# Patient Record
Sex: FEMALE | Race: WHITE | NOT HISPANIC OR LATINO | Employment: UNEMPLOYED | URBAN - NONMETROPOLITAN AREA
[De-identification: names, ages, dates, MRNs, and addresses within clinical notes are randomized per-mention and may not be internally consistent; named-entity substitution may affect disease eponyms.]

---

## 2015-06-10 LAB
C TRACH DNA SPEC QL PROBE+SIG AMP: NEGATIVE
N GONORRHOEA DNA SPEC QL PROBE+SIG AMP: NEGATIVE
SPECIMEN DESCRIP: NORMAL
SPECIMEN DESCRIPTION: NORMAL

## 2016-01-22 LAB — PHQ9 SCORE: 27

## 2016-04-07 LAB
ALT SERPL-CCNC: 25 U/L (ref 10–47)
AST SERPL-CCNC: 24 U/L (ref 11–38)
CREAT SERPL-MCNC: 1 MG/DL (ref 0.6–1.2)
GLUCOSE SERPL-MCNC: 84 MG/DL (ref 73–118)
POTASSIUM SERPL-SCNC: 4.6 MMOL/L (ref 3.6–5.1)

## 2016-04-12 LAB — PHQ9 SCORE: 24

## 2017-03-01 ENCOUNTER — TRANSFERRED RECORDS (OUTPATIENT)
Dept: HEALTH INFORMATION MANAGEMENT | Facility: HOSPITAL | Age: 42
End: 2017-03-01

## 2017-03-20 ENCOUNTER — TRANSFERRED RECORDS (OUTPATIENT)
Dept: HEALTH INFORMATION MANAGEMENT | Facility: HOSPITAL | Age: 42
End: 2017-03-20

## 2017-04-03 ENCOUNTER — OFFICE VISIT (OUTPATIENT)
Dept: FAMILY MEDICINE | Facility: OTHER | Age: 42
End: 2017-04-03
Attending: PHYSICIAN ASSISTANT
Payer: COMMERCIAL

## 2017-04-03 VITALS
SYSTOLIC BLOOD PRESSURE: 112 MMHG | OXYGEN SATURATION: 94 % | HEIGHT: 63 IN | HEART RATE: 72 BPM | TEMPERATURE: 97.9 F | WEIGHT: 232 LBS | BODY MASS INDEX: 41.11 KG/M2 | DIASTOLIC BLOOD PRESSURE: 70 MMHG

## 2017-04-03 DIAGNOSIS — F11.21 OPIOID DEPENDENCE IN REMISSION (H): ICD-10-CM

## 2017-04-03 DIAGNOSIS — G89.4 CHRONIC PAIN SYNDROME: ICD-10-CM

## 2017-04-03 DIAGNOSIS — Z71.89 ACP (ADVANCE CARE PLANNING): Chronic | ICD-10-CM

## 2017-04-03 DIAGNOSIS — M17.31 POST-TRAUMATIC OSTEOARTHRITIS OF RIGHT KNEE: Primary | ICD-10-CM

## 2017-04-03 DIAGNOSIS — Z76.89 ESTABLISHING CARE WITH NEW DOCTOR, ENCOUNTER FOR: ICD-10-CM

## 2017-04-03 DIAGNOSIS — E03.9 ACQUIRED HYPOTHYROIDISM: ICD-10-CM

## 2017-04-03 PROBLEM — F33.1 MODERATE EPISODE OF RECURRENT MAJOR DEPRESSIVE DISORDER (H): Status: ACTIVE | Noted: 2017-04-03

## 2017-04-03 PROBLEM — F41.1 GAD (GENERALIZED ANXIETY DISORDER): Status: ACTIVE | Noted: 2017-04-03

## 2017-04-03 PROBLEM — R60.0 BILATERAL EDEMA OF LOWER EXTREMITY: Status: ACTIVE | Noted: 2017-04-03

## 2017-04-03 PROBLEM — F43.10 PTSD (POST-TRAUMATIC STRESS DISORDER): Status: ACTIVE | Noted: 2017-04-03

## 2017-04-03 PROCEDURE — 99214 OFFICE O/P EST MOD 30 MIN: CPT | Performed by: PHYSICIAN ASSISTANT

## 2017-04-03 PROCEDURE — 99213 OFFICE O/P EST LOW 20 MIN: CPT

## 2017-04-03 RX ORDER — BUPRENORPHINE AND NALOXONE 8; 2 MG/1; MG/1
2 FILM, SOLUBLE BUCCAL; SUBLINGUAL DAILY
COMMUNITY
End: 2019-12-13

## 2017-04-03 RX ORDER — LEVOTHYROXINE SODIUM 200 UG/1
200 TABLET ORAL DAILY
COMMUNITY
Start: 2010-11-16 | End: 2017-09-06

## 2017-04-03 RX ORDER — HYDROCHLOROTHIAZIDE 12.5 MG/1
12.5 CAPSULE ORAL DAILY
COMMUNITY
End: 2017-04-24

## 2017-04-03 RX ORDER — BUPROPION HYDROCHLORIDE 150 MG/1
150 TABLET, EXTENDED RELEASE ORAL DAILY
COMMUNITY
End: 2018-05-24

## 2017-04-03 RX ORDER — HYDROXYZINE HYDROCHLORIDE 25 MG/1
25 TABLET, FILM COATED ORAL 4 TIMES DAILY PRN
COMMUNITY

## 2017-04-03 RX ORDER — LAMOTRIGINE 100 MG/1
175 TABLET ORAL 2 TIMES DAILY
COMMUNITY
End: 2019-06-27

## 2017-04-03 ASSESSMENT — PATIENT HEALTH QUESTIONNAIRE - PHQ9: 5. POOR APPETITE OR OVEREATING: MORE THAN HALF THE DAYS

## 2017-04-03 ASSESSMENT — ANXIETY QUESTIONNAIRES
GAD7 TOTAL SCORE: 12
7. FEELING AFRAID AS IF SOMETHING AWFUL MIGHT HAPPEN: NOT AT ALL
5. BEING SO RESTLESS THAT IT IS HARD TO SIT STILL: SEVERAL DAYS
2. NOT BEING ABLE TO STOP OR CONTROL WORRYING: MORE THAN HALF THE DAYS
6. BECOMING EASILY ANNOYED OR IRRITABLE: MORE THAN HALF THE DAYS
1. FEELING NERVOUS, ANXIOUS, OR ON EDGE: NEARLY EVERY DAY
3. WORRYING TOO MUCH ABOUT DIFFERENT THINGS: MORE THAN HALF THE DAYS
IF YOU CHECKED OFF ANY PROBLEMS ON THIS QUESTIONNAIRE, HOW DIFFICULT HAVE THESE PROBLEMS MADE IT FOR YOU TO DO YOUR WORK, TAKE CARE OF THINGS AT HOME, OR GET ALONG WITH OTHER PEOPLE: SOMEWHAT DIFFICULT

## 2017-04-03 ASSESSMENT — PAIN SCALES - GENERAL: PAINLEVEL: SEVERE PAIN (7)

## 2017-04-03 NOTE — NURSING NOTE
"Chief Complaint   Patient presents with     Establish Care     *_* Health Care Directive *_*     Declined        Initial /70 (BP Location: Left arm, Patient Position: Chair, Cuff Size: Adult Large)  Pulse 72  Temp 97.9  F (36.6  C) (Tympanic)  Ht 5' 3.25\" (1.607 m)  Wt 200 lb (90.7 kg)  LMP 03/20/2017  SpO2 94%  Breastfeeding? No  BMI 35.15 kg/m2 Estimated body mass index is 35.15 kg/(m^2) as calculated from the following:    Height as of this encounter: 5' 3.25\" (1.607 m).    Weight as of this encounter: 200 lb (90.7 kg).  Medication Reconciliation: complete   Annette Araiza CMA(AAMA)   "

## 2017-04-03 NOTE — MR AVS SNAPSHOT
After Visit Summary   4/3/2017    Autumn Wallko    MRN: [id number]           Patient Information     Date Of Birth          1975        Visit Information        Provider Department      4/3/2017 3:15 PM Apryl Hathaway PA [facility]        Today's Diagnoses     Post-traumatic osteoarthritis of right knee    -  1    Establishing care with new doctor, encounter for        ACP (advance care planning)        Opioid dependence in remission (H)        Chronic pain syndrome        Acquired hypothyroidism          Care Instructions      Thank you for choosing [facility].   I have office hours 8:00 am to 4:30 pm on Monday's, Wednesday's, Thursday's and Friday's. My nurse and I are out of the office every Tuesday.    Following your visit, when your labs and diagnostic testing have returned, I will review then and you will be contacted by my nurse.  If you are on My Chart, you can also view results there.    For refills, notify your pharmacy regarding what you need and the pharmacy will generate a refill request. Do not call my nurse as she is unable to process refill request. Please plan ahead and allow 3-5 days for refill requests.    You will generally receive a reminder call the day prior to your appointment.  If you cannot attend your appointment, please cancel your appointment with as much notice as possible.  If there is a pattern of failure to present for your appointments, I cannot provide consistent, meaningful, ongoing care for you. It is very important to me that you come in for your care, so we can best assist you with your health care needs.    IMPORTANT:  Please note that it is my standard of practice to NOT participate in prescribing ongoing requested Narcotic Analgesic therapy, and/or participate in the prescribing of other controlled substances.  My nurse and I am happy to assist you with the process of referral for alternative pain management as needed, and  other treatment modalities including but not limited to:  Physical Therapy, Physical Medicine and Rehab, Counseling, Chiropractic Care, Orthopedic Care, and non-narcotic medication management.     In the event that you need to be seen for emergent concerns and I am out of office,  please see one of my colleagues for acute concerns.  You may also present to UC or ER.  I appreciate the opportunity to serve you and look forward to supporting your healthcare needs in the future. Please contact me with any questions or concerns that you may have.    Sincerely,      Apryl Hathaway RN, PA-C             Follow-ups after your visit        Additional Services     ORTHOPEDICS ADULT REFERRAL       Your provider has referred you to: ortho for Synvisc (due in June) for est care for care of her knee.     Please be aware that coverage of these services is subject to the terms and limitations of your health insurance plan.  Call member services at your health plan with any benefit or coverage questions.      Please bring the following to your appointment:    >>   Any x-rays, CTs or MRIs which have been performed.  Contact the facility where they were done to arrange for  prior to your scheduled appointment.    >>   List of current medications   >>   This referral request   >>   Any documents/labs given to you for this referral                  Who to contact     If you have questions or need follow up information about today's clinic visit or your schedule please contact [facility] directly at [phone].  Normal or non-critical lab and imaging results will be communicated to you by MyChart, letter or phone within 4 business days after the clinic has received the results. If you do not hear from us within 7 days, please contact the clinic through MyChart or phone. If you have a critical or abnormal lab result, we will notify you by phone as soon as possible.  Submit refill requests through [employer] or call your  "pharmacy and they will forward the refill request to us. Please allow 3 business days for your refill to be completed.          Additional Information About Your Visit        MyChart Information     [employer] lets you send messages to your doctor, view your test results, renew your prescriptions, schedule appointments and more. To sign up, go to [email or web address] . Click on \"Log in\" on the left side of the screen, which will take you to the Welcome page. Then click on \"Sign up Now\" on the right side of the page.     You will be asked to enter the access code listed below, as well as some personal information. Please follow the directions to create your username and password.     Your access code is: [email or web address]  Expires: 2017  3:51 PM     Your access code will  in 90 days. If you need help or a new code, please call your [place] clinic or [phone].        Care EveryWhere ID     This is your Care EveryWhere ID. This could be used by other organizations to access your [facility] medical records  [id number]        Your Vitals Were     Pulse Temperature Height Last Period Pulse Oximetry Breastfeeding?    72 97.9  F (36.6  C) (Tympanic) 5' 3.25\" (1.607 m) 2017 94% No    BMI (Body Mass Index)                   40.77 kg/m2            Blood Pressure from Last 3 Encounters:   17 112/70    Weight from Last 3 Encounters:   17 232 lb (105.2 kg)              We Performed the Following     ORTHOPEDICS ADULT REFERRAL        Primary Care Provider    None Specified       No primary provider on file.        Thank you!     Thank you for choosing [facility]  for your care. Our goal is always to provide you with excellent care. Hearing back from our patients is one way we can continue to improve our services. Please take a few minutes to complete the written survey that you may receive in the mail after your visit with us. Thank you!             Your Updated Medication List - Protect " others around you: Learn how to safely use, store and throw away your medicines at www.disposemymeds.org.          This list is accurate as of: 4/3/17  3:56 PM.  Always use your most recent med list.                   Brand Name Dispense Instructions for use    buprenorphine HCl-naloxone HCl 8-2 MG per film    SUBOXONE     Place 1 Film under the tongue daily       hydrochlorothiazide 12.5 MG capsule    MICROZIDE     Take 12.5 mg by mouth daily       HYDROXYZINE HCL PO      Take 25 mg by mouth 4 times daily as needed for itching       LAMICTAL 100 MG tablet   Generic drug:  lamoTRIgine      Take 100 mg by mouth 2 times daily       levothyroxine 200 MCG tablet    SYNTHROID/LEVOTHROID     Take 200 mcg by mouth daily       nicotine      Place onto the skin every 8 hours       TYLENOL PO      Take 500 mg by mouth every 4 hours as needed for mild pain or fever       WELLBUTRIN  MG 12 hr tablet   Generic drug:  buPROPion      Take 150 mg by mouth 2 times daily

## 2017-04-03 NOTE — PROGRESS NOTES
SUBJECTIVE:                                                    Ilene Holbrook is a 41 year old female who presents to clinic today for the following health issues:        New Patient/Transfer of Care    Hypothyroidism Follow-up      Since last visit, patient describes the following symptoms: Weight stable, no hair loss, no skin changes, no constipation, no loose stools       Problem list and histories reviewed & adjusted, as indicated.  Additional history: as documented    Patient Active Problem List   Diagnosis     ACP (advance care planning)     Past Surgical History:   Procedure Laterality Date     CHOLECYSTECTOMY       GYN SURGERY       SECTION 7733-1646       Social History   Substance Use Topics     Smoking status: Current Every Day Smoker     Packs/day: 0.50     Years: 20.00     Types: Cigarettes     Smokeless tobacco: Not on file     Alcohol use No     History reviewed. No pertinent family history.      Current Outpatient Prescriptions   Medication Sig Dispense Refill     levothyroxine (SYNTHROID/LEVOTHROID) 200 MCG tablet Take 200 mcg by mouth daily       lamoTRIgine (LAMICTAL) 100 MG tablet Take 100 mg by mouth 2 times daily       buPROPion (WELLBUTRIN SR) 150 MG 12 hr tablet Take 150 mg by mouth 2 times daily       hydrochlorothiazide (MICROZIDE) 12.5 MG capsule Take 12.5 mg by mouth daily       buprenorphine HCl-naloxone HCl (SUBOXONE) 8-2 MG per film Place 1 Film under the tongue daily       HYDROXYZINE HCL PO Take 25 mg by mouth 4 times daily as needed for itching       Acetaminophen (TYLENOL PO) Take 500 mg by mouth every 4 hours as needed for mild pain or fever       nicotine Patch in Place Place onto the skin every 8 hours       Allergies   Allergen Reactions     Depakote [Valproic Acid]      BP Readings from Last 3 Encounters:   17 112/70    Wt Readings from Last 3 Encounters:   17 200 lb (90.7 kg)                    Reviewed and updated as needed this visit by clinical  "staff  Tobacco  Allergies  Meds  Med Hx  Surg Hx  Fam Hx  Soc Hx      Reviewed and updated as needed this visit by Provider         ROS:  Constitutional, neuro, ENT, endocrine, pulmonary, cardiac, gastrointestinal, genitourinary, musculoskeletal, integument and psychiatric systems are negative, except as otherwise noted.    OBJECTIVE:                                                    /70 (BP Location: Left arm, Patient Position: Chair, Cuff Size: Adult Large)  Pulse 72  Temp 97.9  F (36.6  C) (Tympanic)  Ht 5' 3.25\" (1.607 m)  Wt 200 lb (90.7 kg)  LMP 03/20/2017  SpO2 94%  Breastfeeding? No  BMI 35.15 kg/m2  Body mass index is 35.15 kg/(m^2).  GENERAL APPEARANCE: healthy, alert and no distress  EYES: Eyes grossly normal to inspection, PERRL and conjunctivae and sclerae normal  HENT: ear canals and TM's normal and nose and mouth without ulcers or lesions  NECK: no adenopathy, no asymmetry, masses, or scars and thyroid normal to palpation  RESP: lungs clear to auscultation - no rales, rhonchi or wheezes  CV: regular rates and rhythm, normal S1 S2, no S3 or S4 and no murmur, click or rub  MS: extremities normal- no gross deformities noted  SKIN: no suspicious lesions or rashes  NEURO: Normal strength and tone, mentation intact and speech normal  PSYCH: mentation appears normal and affect normal/bright. Long discussion on her journey to sobriety.  She is clean.  She is active in the suboxone clinic in [facility].       Diagnostic test results:  Diagnostic Test Results:  No results found for this or any previous visit (from the past 24 hour(s)).     ASSESSMENT/PLAN:                                                    1. Establishing care with new doctor, encounter for  She is accepted for health care.  We will not manage her pain or Suboxone.  I do not have ability to script this as I an not licensed.     2. ACP (advance care planning)  Given and discussed.     3. Opioid dependence in remission " (H)  [facility] and  Using [facility] for depression and other concerns.     4. Post-traumatic osteoarthritis of right knee  Needing referral to have Synvisc placed.  She will not need this for 2 months.   Also has gotten injections of cortisone.  Wants to see ortho in June. Referral is made.     5. Chronic pain syndrome  Back, knees, hips, ? Fibromyalgia.           See Patient Instructions    SEVEN Bryant  [facility]

## 2017-04-03 NOTE — PATIENT INSTRUCTIONS
Thank you for choosing [facility].   I have office hours 8:00 am to 4:30 pm on Monday's, Wednesday's, Thursday's and Friday's. My nurse and I are out of the office every Tuesday.    Following your visit, when your labs and diagnostic testing have returned, I will review then and you will be contacted by my nurse.  If you are on My Chart, you can also view results there.    For refills, notify your pharmacy regarding what you need and the pharmacy will generate a refill request. Do not call my nurse as she is unable to process refill request. Please plan ahead and allow 3-5 days for refill requests.    You will generally receive a reminder call the day prior to your appointment.  If you cannot attend your appointment, please cancel your appointment with as much notice as possible.  If there is a pattern of failure to present for your appointments, I cannot provide consistent, meaningful, ongoing care for you. It is very important to me that you come in for your care, so we can best assist you with your health care needs.    IMPORTANT:  Please note that it is my standard of practice to NOT participate in prescribing ongoing requested Narcotic Analgesic therapy, and/or participate in the prescribing of other controlled substances.  My nurse and I am happy to assist you with the process of referral for alternative pain management as needed, and other treatment modalities including but not limited to:  Physical Therapy, Physical Medicine and Rehab, Counseling, Chiropractic Care, Orthopedic Care, and non-narcotic medication management.     In the event that you need to be seen for emergent concerns and I am out of office,  please see one of my colleagues for acute concerns.  You may also present to  or ER.  I appreciate the opportunity to serve you and look forward to supporting your healthcare needs in the future. Please contact me with any questions or concerns that you may  have.    Sincerely,      Apryl Hathaway RN, PA-C

## 2017-04-04 ASSESSMENT — ANXIETY QUESTIONNAIRES: GAD7 TOTAL SCORE: 12

## 2017-04-04 ASSESSMENT — PATIENT HEALTH QUESTIONNAIRE - PHQ9: SUM OF ALL RESPONSES TO PHQ QUESTIONS 1-9: 10

## 2017-04-05 DIAGNOSIS — M25.561 ACUTE PAIN OF RIGHT KNEE: Primary | ICD-10-CM

## 2017-04-10 ENCOUNTER — OFFICE VISIT (OUTPATIENT)
Dept: ORTHOPEDICS | Facility: OTHER | Age: 42
End: 2017-04-10
Attending: ORTHOPAEDIC SURGERY
Payer: COMMERCIAL

## 2017-04-10 ENCOUNTER — APPOINTMENT (OUTPATIENT)
Dept: GENERAL RADIOLOGY | Facility: OTHER | Age: 42
End: 2017-04-10
Attending: ORTHOPAEDIC SURGERY
Payer: COMMERCIAL

## 2017-04-10 VITALS
DIASTOLIC BLOOD PRESSURE: 78 MMHG | WEIGHT: 230 LBS | SYSTOLIC BLOOD PRESSURE: 116 MMHG | TEMPERATURE: 96.3 F | OXYGEN SATURATION: 98 % | HEIGHT: 63 IN | BODY MASS INDEX: 40.75 KG/M2 | HEART RATE: 67 BPM

## 2017-04-10 DIAGNOSIS — M17.31 POST-TRAUMATIC OSTEOARTHRITIS OF RIGHT KNEE: ICD-10-CM

## 2017-04-10 PROCEDURE — 99203 OFFICE O/P NEW LOW 30 MIN: CPT | Performed by: ORTHOPAEDIC SURGERY

## 2017-04-10 PROCEDURE — 99212 OFFICE O/P EST SF 10 MIN: CPT

## 2017-04-10 PROCEDURE — 73564 X-RAY EXAM KNEE 4 OR MORE: CPT | Mod: TC,RT

## 2017-04-10 ASSESSMENT — PAIN SCALES - GENERAL: PAINLEVEL: SEVERE PAIN (7)

## 2017-04-10 NOTE — MR AVS SNAPSHOT
"              After Visit Summary   4/10/2017    Autumn Holbrook    MRN: [id number]           Patient Information     Date Of Birth          1975        Visit Information        Provider Department      4/10/2017 10:15 AM Gurjit Hung DO  ORTHOPEDICS        Today's Diagnoses     Post-traumatic osteoarthritis of right knee           Follow-ups after your visit        Your next 10 appointments already scheduled     Jun 08, 2017 10:15 AM CDT   (Arrive by 10:00 AM)   Return Visit with Gurjit Hung DO    ORTHOPEDICS ([place] [facility])    [address] [facility] [id number] [phone]              Who to contact     If you have questions or need follow up information about today's clinic visit or your schedule please contact  ORTHOPEDICS directly at [phone].  Normal or non-critical lab and imaging results will be communicated to you by MyChart, letter or phone within 4 business days after the clinic has received the results. If you do not hear from us within 7 days, please contact the clinic through MyChart or phone. If you have a critical or abnormal lab result, we will notify you by phone as soon as possible.  Submit refill requests through [employer] or call your pharmacy and they will forward the refill request to us. Please allow 3 business days for your refill to be completed.          Additional Information About Your Visit        [employer] Information     [employer] lets you send messages to your doctor, view your test results, renew your prescriptions, schedule appointments and more. To sign up, go to [email or web address] . Click on \"Log in\" on the left side of the screen, which will take you to the Welcome page. Then click on \"Sign up Now\" on the right side of the page.     You will be asked to enter the access code listed below, as well as some personal information. Please follow the directions to create your username and password.     Your access code is: [email or web address]  Expires: " "2017  3:51 PM     Your access code will  in 90 days. If you need help or a new code, please call your [facility] clinic or [phone].        Care EveryWhere ID     This is your Care EveryWhere ID. This could be used by other organizations to access your [facility] medical records  [id number]        Your Vitals Were     Pulse Temperature Height Last Period Pulse Oximetry BMI (Body Mass Index)    67 96.3  F (35.7  C) (Tympanic) 5' 3\" (1.6 m) 2017 98% 40.74 kg/m2       Blood Pressure from Last 3 Encounters:   04/10/17 116/78   17 112/70    Weight from Last 3 Encounters:   04/10/17 230 lb (104.3 kg)   17 232 lb (105.2 kg)              Today, you had the following     No orders found for display       Primary Care Provider    None Specified       No primary provider on file.        Thank you!     Thank you for choosing  ORTHOPEDICS  for your care. Our goal is always to provide you with excellent care. Hearing back from our patients is one way we can continue to improve our services. Please take a few minutes to complete the written survey that you may receive in the mail after your visit with us. Thank you!             Your Updated Medication List - Protect others around you: Learn how to safely use, store and throw away your medicines at www.disposemymeds.org.          This list is accurate as of: 4/10/17 10:27 AM.  Always use your most recent med list.                   Brand Name Dispense Instructions for use    buprenorphine HCl-naloxone HCl 8-2 MG per film    SUBOXONE     Place 1 Film under the tongue daily       hydrochlorothiazide 12.5 MG capsule    MICROZIDE     Take 12.5 mg by mouth daily       HYDROXYZINE HCL PO      Take 25 mg by mouth 4 times daily as needed for itching       LAMICTAL 100 MG tablet   Generic drug:  lamoTRIgine      Take 100 mg by mouth 2 times daily       levothyroxine 200 MCG tablet    SYNTHROID/LEVOTHROID     Take 200 mcg by mouth daily       nicotine      " Place onto the skin every 8 hours       TYLENOL PO      Take 500 mg by mouth every 4 hours as needed for mild pain or fever       WELLBUTRIN  MG 12 hr tablet   Generic drug:  buPROPion      Take 150 mg by mouth 2 times daily

## 2017-04-10 NOTE — NURSING NOTE
"Chief Complaint   Patient presents with     Musculoskeletal Problem     Complaints of left knee meniscus tear.       Initial /78 (BP Location: Right arm, Patient Position: Chair, Cuff Size: Adult Regular)  Pulse 67  Temp 96.3  F (35.7  C) (Tympanic)  Ht 5' 3\" (1.6 m)  Wt 230 lb (104.3 kg)  LMP 03/20/2017  SpO2 98%  BMI 40.74 kg/m2 Estimated body mass index is 40.74 kg/(m^2) as calculated from the following:    Height as of this encounter: 5' 3\" (1.6 m).    Weight as of this encounter: 230 lb (104.3 kg).  Medication Reconciliation: complete   Marleny Isidro LPN      "

## 2017-04-10 NOTE — PROGRESS NOTES
Patient presents today to establish care with an orthopedic surgeon in the area.  She recently moved here from [place] area has been undergoing treatment for osteoarthritis in her right knee.  She has a usual symptoms of aching pain, activity related increasing in pain and start up stiffness.  She's had a series of corticosteroid injections as well as a Visco supplementation injection in December.  Her orthopedic surgeon in any [place] was told her that she would need another injection probably in 6 months or so since she had such a good response to the first one.    Past medical history is significant for hypothyroidism, seizure disorder, peripheral edema, and opioid dependence.  Her present medications are levothyroxine Wellbutrin Suboxone hydroxyzine and nicotine patch.    Review of systems General no fever chills or night sweats no recent illnesses    HEENT wears glasses, no difficulty with swallowing since of smell hearing or neck motion    Chest normal    Cardiovascular normal    Abdomen and , negative    Neurologic: No recent changes    Musculoskeletal see chief complaint    Physical exam: The patient is alert oriented female in no obvious distress    HEENT: Normal vision, speech, hearing, full range of motion cervical spine    Chest normal excursion no deformity    Cardiovascular regular rate and rhythm no murmurs    Abdomen: Normal.    Neurologic: Normal gait, normal balance and coordination    Musculoskeletal: The right knee has range of motion of 0 120 , there is tenderness over the lateral joint line and crepitation with range of motion.  The knee is stable to varus valgus and drawer, there is a moderate effusion present.  There is tenderness over the proximal tibia, and with compression of the patellofemoral joint.    Radiograph: X-rays demonstrate narrowing of the lateral joint space with peripheral osteophytes as well as narrowing of the patellofemoral joint and patellofemoral joint  "arthrosis.    Assessment and plan: Patient has moderate arthritic change and has responded well to previous Visco supplementation.  If her symptoms warrant, she could receive this again in June as directed by her previous orthopedic provider./78 (BP Location: Right arm, Patient Position: Chair, Cuff Size: Adult Regular)  Pulse 67  Temp 96.3  F (35.7  C) (Tympanic)  Ht 5' 3\" (1.6 m)  Wt 230 lb (104.3 kg)  LMP 03/20/2017  SpO2 98%  BMI 40.74 kg/m2  "

## 2017-04-24 DIAGNOSIS — I10 BENIGN ESSENTIAL HYPERTENSION: Primary | ICD-10-CM

## 2017-04-24 RX ORDER — HYDROCHLOROTHIAZIDE 12.5 MG/1
12.5 CAPSULE ORAL DAILY
Qty: 30 CAPSULE | Refills: 0 | Status: SHIPPED | OUTPATIENT
Start: 2017-04-24 | End: 2017-06-14

## 2017-04-24 NOTE — TELEPHONE ENCOUNTER
HCTZ      Last Written Prescription Date: Historic entry  Last Fill Quantity:n/a, # refills: n/a  Last Office Visit with G, P or [facility] prescribing provider: 4/3/17  Next 5 appointments (look out 90 days)     Jun 08, 2017 10:15 AM CDT   (Arrive by 10:00 AM)   Return Visit with Gurjit Hung DO    ORTHOPEDICS ([facility])    [address] [facility] [id number] [phone]                   No results found for: POTASSIUM  No results found for: CR  BP Readings from Last 3 Encounters:   04/10/17 116/78   04/03/17 112/70

## 2017-04-27 DIAGNOSIS — Z12.31 VISIT FOR SCREENING MAMMOGRAM: Primary | ICD-10-CM

## 2017-05-23 ENCOUNTER — TRANSFERRED RECORDS (OUTPATIENT)
Dept: HEALTH INFORMATION MANAGEMENT | Facility: HOSPITAL | Age: 42
End: 2017-05-23

## 2017-06-08 ENCOUNTER — OFFICE VISIT (OUTPATIENT)
Dept: ORTHOPEDICS | Facility: OTHER | Age: 42
End: 2017-06-08
Attending: ORTHOPAEDIC SURGERY
Payer: COMMERCIAL

## 2017-06-08 VITALS
HEIGHT: 63 IN | WEIGHT: 250 LBS | RESPIRATION RATE: 18 BRPM | TEMPERATURE: 97.7 F | SYSTOLIC BLOOD PRESSURE: 128 MMHG | HEART RATE: 81 BPM | OXYGEN SATURATION: 97 % | BODY MASS INDEX: 44.3 KG/M2 | DIASTOLIC BLOOD PRESSURE: 70 MMHG

## 2017-06-08 DIAGNOSIS — M17.31 POST-TRAUMATIC OSTEOARTHRITIS OF RIGHT KNEE: Primary | ICD-10-CM

## 2017-06-08 PROCEDURE — 20610 DRAIN/INJ JOINT/BURSA W/O US: CPT | Mod: RT

## 2017-06-08 PROCEDURE — 20610 DRAIN/INJ JOINT/BURSA W/O US: CPT | Mod: RT | Performed by: ORTHOPAEDIC SURGERY

## 2017-06-08 PROCEDURE — 99212 OFFICE O/P EST SF 10 MIN: CPT

## 2017-06-08 ASSESSMENT — PAIN SCALES - GENERAL: PAINLEVEL: EXTREME PAIN (8)

## 2017-06-08 NOTE — MR AVS SNAPSHOT
"              After Visit Summary   2017    Autumn Holbrook    MRN: [id number]           Patient Information     Date Of Birth          1975        Visit Information        Provider Department      2017 10:15 AM Gurjit Hung, DO  ORTH[facility]ED[facility]        Today's Diagnoses     Post-traumatic osteoarthritis of right knee    -  1       Follow-ups after your visit        Follow-up notes from your care team     Return if symptoms worsen or fail to improve.      Who to contact     If you have questions or need follow up information about today's clinic visit or your schedule please contact  ORTHOPEDICS directly at [phone].  Normal or non-critical lab and imaging results will be communicated to you by MyChart, letter or phone within 4 business days after the clinic has received the results. If you do not hear from us within 7 days, please contact the clinic through TranSwitchhart or phone. If you have a critical or abnormal lab result, we will notify you by phone as soon as possible.  Submit refill requests through edelight or call your pharmacy and they will forward the refill request to us. Please allow 3 business days for your refill to be completed.          Additional Information About Your Visit        MyChart Information     edelight lets you send messages to your doctor, view your test results, renew your prescriptions, schedule appointments and more. To sign up, go to [email or web address] . Click on \"Log in\" on the left side of the screen, which will take you to the Welcome page. Then click on \"Sign up Now\" on the right side of the page.     You will be asked to enter the access code listed below, as well as some personal information. Please follow the directions to create your username and password.     Your access code is: [email or web address]  Expires: 2017  3:51 PM     Your access code will  in 90 days. If you need help or a new code, please call your [place] clinic or [phone].        Care " "EveryWhere ID     This is your Care EveryWhere ID. This could be used by other organizations to access your [facility] medical records  [id number]        Your Vitals Were     Pulse Temperature Respirations Height Pulse Oximetry BMI (Body Mass Index)    81 97.7  F (36.5  C) (Tympanic) 18 5' 3\" (1.6 m) 97% 44.29 kg/m2       Blood Pressure from Last 3 Encounters:   06/08/17 128/70   04/10/17 116/78   04/03/17 112/70    Weight from Last 3 Encounters:   06/08/17 250 lb (113.4 kg)   04/10/17 230 lb (104.3 kg)   04/03/17 232 lb (105.2 kg)              Today, you had the following     No orders found for display       Primary Care Provider    None Specified       No primary provider on file.        Thank you!     Thank you for choosing  ORTHOPEDICS  for your care. Our goal is always to provide you with excellent care. Hearing back from our patients is one way we can continue to improve our services. Please take a few minutes to complete the written survey that you may receive in the mail after your visit with us. Thank you!             Your Updated Medication List - Protect others around you: Learn how to safely use, store and throw away your medicines at www.disposemymeds.org.          This list is accurate as of: 6/8/17 10:35 AM.  Always use your most recent med list.                   Brand Name Dispense Instructions for use    buprenorphine HCl-naloxone HCl 8-2 MG per film    SUBOXONE     Place 1 Film under the tongue daily       hydrochlorothiazide 12.5 MG capsule    MICROZIDE    30 capsule    Take 1 capsule (12.5 mg) by mouth daily       HYDROXYZINE HCL PO      Take 25 mg by mouth 4 times daily as needed for itching       LAMICTAL 100 MG tablet   Generic drug:  lamoTRIgine      Take 100 mg by mouth 2 times daily       LATUDA PO      Patient states 25mg once daily       levothyroxine 200 MCG tablet    SYNTHROID/LEVOTHROID     Take 200 mcg by mouth daily       nicotine      Place onto the skin every 8 hours       " TYLENOL PO      Take 500 mg by mouth every 4 hours as needed for mild pain or fever       WELLBUTRIN  MG 12 hr tablet   Generic drug:  buPROPion      Take 150 mg by mouth 2 times daily

## 2017-06-08 NOTE — NURSING NOTE
"Chief Complaint   Patient presents with     RECHECK     Right Knee follow up and states injection in right knee synvisc       Initial /70 (BP Location: Left arm, Patient Position: Chair, Cuff Size: Adult Large)  Pulse 81  Temp 97.7  F (36.5  C) (Tympanic)  Resp 18  Ht 5' 3\" (1.6 m)  Wt 250 lb (113.4 kg)  SpO2 97%  BMI 44.29 kg/m2 Estimated body mass index is 44.29 kg/(m^2) as calculated from the following:    Height as of this encounter: 5' 3\" (1.6 m).    Weight as of this encounter: 250 lb (113.4 kg).  Medication Reconciliation: unable or not appropriate to perform   Nevaeh Dudley LPN      "

## 2017-06-08 NOTE — PROGRESS NOTES
Patient presents today for an injection in her right knee.  She has a post traumatic osteoarthritis of her right knee and has been receiving periodic corticosteroid and physical supplementation in the right knee.  It is too early for her to have another Synvisc but she hasn't had a corticosteroid shot several months and this can also help with her symptoms.  A cortical steroid was injected into her right knee today without incident.    Medication utilized Carbocaine and 4 mg of dexamethasone.    Description of procedure: Corticosteroid injection right knee    Patient was counseled regarding risks and benefits in technical aspects of the procedure as well as what to watch for in terms of potential complications after the injection.  Consent form was signed by the patient in the doctor.  Right knee was injected with a mixture of Carbocaine and 4 mg dexamethasone without incident.  She will follow-up on an as-needed basis.

## 2017-06-14 ENCOUNTER — OFFICE VISIT (OUTPATIENT)
Dept: FAMILY MEDICINE | Facility: OTHER | Age: 42
End: 2017-06-14
Attending: PHYSICIAN ASSISTANT
Payer: COMMERCIAL

## 2017-06-14 VITALS
HEART RATE: 76 BPM | SYSTOLIC BLOOD PRESSURE: 122 MMHG | OXYGEN SATURATION: 95 % | TEMPERATURE: 98.4 F | BODY MASS INDEX: 44.29 KG/M2 | WEIGHT: 250 LBS | DIASTOLIC BLOOD PRESSURE: 66 MMHG

## 2017-06-14 DIAGNOSIS — R63.5 ABNORMAL WEIGHT GAIN: ICD-10-CM

## 2017-06-14 DIAGNOSIS — E03.9 ACQUIRED HYPOTHYROIDISM: Primary | ICD-10-CM

## 2017-06-14 DIAGNOSIS — I10 BENIGN ESSENTIAL HYPERTENSION: ICD-10-CM

## 2017-06-14 LAB
ANION GAP SERPL CALCULATED.3IONS-SCNC: 7 MMOL/L (ref 3–14)
BUN SERPL-MCNC: 15 MG/DL (ref 7–30)
CALCIUM SERPL-MCNC: 8.7 MG/DL (ref 8.5–10.1)
CHLORIDE SERPL-SCNC: 105 MMOL/L (ref 94–109)
CO2 SERPL-SCNC: 28 MMOL/L (ref 20–32)
CREAT SERPL-MCNC: 0.78 MG/DL (ref 0.52–1.04)
GFR SERPL CREATININE-BSD FRML MDRD: 80 ML/MIN/1.7M2
GLUCOSE SERPL-MCNC: 113 MG/DL (ref 70–99)
POTASSIUM SERPL-SCNC: 4.3 MMOL/L (ref 3.4–5.3)
SODIUM SERPL-SCNC: 140 MMOL/L (ref 133–144)
TSH SERPL DL<=0.05 MIU/L-ACNC: 1.73 MU/L (ref 0.4–4)

## 2017-06-14 PROCEDURE — 36415 COLL VENOUS BLD VENIPUNCTURE: CPT | Mod: ZL | Performed by: PHYSICIAN ASSISTANT

## 2017-06-14 PROCEDURE — 80048 BASIC METABOLIC PNL TOTAL CA: CPT | Mod: ZL | Performed by: PHYSICIAN ASSISTANT

## 2017-06-14 PROCEDURE — 99214 OFFICE O/P EST MOD 30 MIN: CPT | Performed by: PHYSICIAN ASSISTANT

## 2017-06-14 PROCEDURE — 84443 ASSAY THYROID STIM HORMONE: CPT | Mod: ZL | Performed by: PHYSICIAN ASSISTANT

## 2017-06-14 PROCEDURE — 99212 OFFICE O/P EST SF 10 MIN: CPT

## 2017-06-14 RX ORDER — HYDROCHLOROTHIAZIDE 12.5 MG/1
12.5 CAPSULE ORAL DAILY
Qty: 90 CAPSULE | Refills: 1 | Status: SHIPPED | OUTPATIENT
Start: 2017-06-14 | End: 2017-11-26

## 2017-06-14 ASSESSMENT — ANXIETY QUESTIONNAIRES
GAD7 TOTAL SCORE: 21
IF YOU CHECKED OFF ANY PROBLEMS ON THIS QUESTIONNAIRE, HOW DIFFICULT HAVE THESE PROBLEMS MADE IT FOR YOU TO DO YOUR WORK, TAKE CARE OF THINGS AT HOME, OR GET ALONG WITH OTHER PEOPLE: VERY DIFFICULT
6. BECOMING EASILY ANNOYED OR IRRITABLE: NEARLY EVERY DAY
1. FEELING NERVOUS, ANXIOUS, OR ON EDGE: NEARLY EVERY DAY
7. FEELING AFRAID AS IF SOMETHING AWFUL MIGHT HAPPEN: NEARLY EVERY DAY
3. WORRYING TOO MUCH ABOUT DIFFERENT THINGS: NEARLY EVERY DAY
5. BEING SO RESTLESS THAT IT IS HARD TO SIT STILL: NEARLY EVERY DAY
2. NOT BEING ABLE TO STOP OR CONTROL WORRYING: NEARLY EVERY DAY

## 2017-06-14 ASSESSMENT — PAIN SCALES - GENERAL: PAINLEVEL: EXTREME PAIN (8)

## 2017-06-14 ASSESSMENT — PATIENT HEALTH QUESTIONNAIRE - PHQ9: 5. POOR APPETITE OR OVEREATING: NEARLY EVERY DAY

## 2017-06-14 NOTE — MR AVS SNAPSHOT
After Visit Summary   6/14/2017    Autumn Holbrook    MRN: [id number]           Patient Information     Date Of Birth          1975        Visit Information        Provider Department      6/14/2017 9:00 AM Apryl Hathaway PA [facility]        Today's Diagnoses     Acquired hypothyroidism    -  1    Benign essential hypertension        Abnormal weight gain          Care Instructions      Thank you for choosing [facility].   I have office hours 8:00 am to 4:30 pm on Monday's, Wednesday's, Thursday's and Friday's. My nurse and I are out of the office every Tuesday.    Following your visit, when your labs and diagnostic testing have returned, I will review then and you will be contacted by my nurse.  If you are on My Chart, you can also view results there.    For refills, notify your pharmacy regarding what you need and the pharmacy will generate a refill request. Do not call my nurse as she is unable to process refill request. Please plan ahead and allow 3-5 days for refill requests.    You will generally receive a reminder call the day prior to your appointment.  If you cannot attend your appointment, please cancel your appointment with as much notice as possible.  If there is a pattern of failure to present for your appointments, I cannot provide consistent, meaningful, ongoing care for you. It is very important to me that you come in for your care, so we can best assist you with your health care needs.    IMPORTANT:  Please note that it is my standard of practice to NOT participate in prescribing ongoing requested Narcotic Analgesic therapy, and/or participate in the prescribing of other controlled substances.  My nurse and I am happy to assist you with the process of referral for alternative pain management as needed, and other treatment modalities including but not limited to:  Physical Therapy, Physical Medicine and Rehab, Counseling, Chiropractic Care, Orthopedic  Care, and non-narcotic medication management.     In the event that you need to be seen for emergent concerns and I am out of office,  please see one of my colleagues for acute concerns.  You may also present to  or ER.  I appreciate the opportunity to serve you and look forward to supporting your healthcare needs in the future. Please contact me with any questions or concerns that you may have.    Sincerely,      Apryl Hathaway RN, PA-C               Follow-ups after your visit        Additional Services     NUTRITION REFERRAL       Your provider has referred you to: [facility]    Please be aware that coverage of these services is subject to the terms and limitations of your health insurance plan.  Call member services at your health plan with any benefit or coverage questions.      Please bring the following with you to your appointment:    (1) This referral request  (2) Any documents given to you regarding this referral  (3) Any specific questions you have about diet and/or food choices                  Your next 10 appointments already scheduled     Jun 19, 2017 10:30 AM CDT   MAMMOGRAM with  MAMMOGRAM [facility] ([facility] )    [address] [id number] [phone]           Do not wear any body powder, lotions, deodorant or perfume the day of the exam. Bring a list of all medications, especially hormones.  If your last mammogram was not done at [facility], please bring your mammogram films. We will need the name of your MD/PA to send a copy of your report.            Jun 20, 2017  1:40 PM CDT   (Arrive by 1:20 PM)   Return Visit with Gurjit Hung DO    ORTHOPEDICS ([facility] )    [address] [facility] [address] [phone]              Who to contact     If you have questions or need follow up information about today's clinic visit or your schedule please contact [facility] directly at [phone].  Normal  "or non-critical lab and imaging results will be communicated to you by JustShareIthart, letter or phone within 4 business days after the clinic has received the results. If you do not hear from us within 7 days, please contact the clinic through Neuro Kineticst or phone. If you have a critical or abnormal lab result, we will notify you by phone as soon as possible.  Submit refill requests through Streamup or call your pharmacy and they will forward the refill request to us. Please allow 3 business days for your refill to be completed.          Additional Information About Your Visit        [email or web address] Information     Streamup lets you send messages to your doctor, view your test results, renew your prescriptions, schedule appointments and more. To sign up, go to [email or web address] . Click on \"Log in\" on the left side of the screen, which will take you to the Welcome page. Then click on \"Sign up Now\" on the right side of the page.     You will be asked to enter the access code listed below, as well as some personal information. Please follow the directions to create your username and password.     Your access code is: [email or web address]  Expires: 2017  3:51 PM     Your access code will  in 90 days. If you need help or a new code, please call your [place] clinic or [phone].        Care EveryWhere ID     This is your Care EveryWhere ID. This could be used by other organizations to access your [place] medical records  [id number]        Your Vitals Were     Pulse Temperature Pulse Oximetry BMI (Body Mass Index)          76 98.4  F (36.9  C) (Tympanic) 95% 44.29 kg/m2         Blood Pressure from Last 3 Encounters:   17 122/66   17 128/70   04/10/17 116/78    Weight from Last 3 Encounters:   17 250 lb (113.4 kg)   17 250 lb (113.4 kg)   04/10/17 230 lb (104.3 kg)              We Performed the Following     Basic metabolic panel     NUTRITION REFERRAL     TSH          Where to get your medicines    "   These medications were sent to [facility] [address] [place], [place] - [address]  [address], [place] [address]     Phone:  [phone]     hydrochlorothiazide 12.5 MG capsule          Primary Care Provider    None Specified       No primary provider on file.        Thank you!     Thank you for choosing [facility]  for your care. Our goal is always to provide you with excellent care. Hearing back from our patients is one way we can continue to improve our services. Please take a few minutes to complete the written survey that you may receive in the mail after your visit with us. Thank you!             Your Updated Medication List - Protect others around you: Learn how to safely use, store and throw away your medicines at www.disposemymeds.org.          This list is accurate as of: 6/14/17 10:13 AM.  Always use your most recent med list.                   Brand Name Dispense Instructions for use    buprenorphine HCl-naloxone HCl 8-2 MG per film    SUBOXONE     Place 1 Film under the tongue daily       hydrochlorothiazide 12.5 MG capsule    MICROZIDE    90 capsule    Take 1 capsule (12.5 mg) by mouth daily       HYDROXYZINE HCL PO      Take 25 mg by mouth 4 times daily as needed for itching       LAMICTAL 100 MG tablet   Generic drug:  lamoTRIgine      Take 100 mg by mouth 2 times daily       LATUDA PO      Take 20 mg by mouth daily Patient states 25mg once daily       levothyroxine 200 MCG tablet    SYNTHROID/LEVOTHROID     Take 200 mcg by mouth daily       nicotine      Place onto the skin every 8 hours       TYLENOL PO      Take 500 mg by mouth every 4 hours as needed for mild pain or fever       WELLBUTRIN  MG 12 hr tablet   Generic drug:  buPROPion      Take 150 mg by mouth 2 times daily

## 2017-06-14 NOTE — PATIENT INSTRUCTIONS
Thank you for choosing [facility].   I have office hours 8:00 am to 4:30 pm on Monday's, Wednesday's, Thursday's and Friday's. My nurse and I are out of the office every Tuesday.    Following your visit, when your labs and diagnostic testing have returned, I will review then and you will be contacted by my nurse.  If you are on My Chart, you can also view results there.    For refills, notify your pharmacy regarding what you need and the pharmacy will generate a refill request. Do not call my nurse as she is unable to process refill request. Please plan ahead and allow 3-5 days for refill requests.    You will generally receive a reminder call the day prior to your appointment.  If you cannot attend your appointment, please cancel your appointment with as much notice as possible.  If there is a pattern of failure to present for your appointments, I cannot provide consistent, meaningful, ongoing care for you. It is very important to me that you come in for your care, so we can best assist you with your health care needs.    IMPORTANT:  Please note that it is my standard of practice to NOT participate in prescribing ongoing requested Narcotic Analgesic therapy, and/or participate in the prescribing of other controlled substances.  My nurse and I am happy to assist you with the process of referral for alternative pain management as needed, and other treatment modalities including but not limited to:  Physical Therapy, Physical Medicine and Rehab, Counseling, Chiropractic Care, Orthopedic Care, and non-narcotic medication management.     In the event that you need to be seen for emergent concerns and I am out of office,  please see one of my colleagues for acute concerns.  You may also present to  or ER.  I appreciate the opportunity to serve you and look forward to supporting your healthcare needs in the future. Please contact me with any questions or concerns that you may  have.    Sincerely,      Apryl Hathaway RN, PA-C

## 2017-06-14 NOTE — PROGRESS NOTES
SUBJECTIVE:                                                    Autumn Holbrook is a 41 year old female who presents to clinic today for the following health issues:        Hypothyroidism Follow-up      Since last visit, patient describes the following symptoms:  no hair loss, no skin changes,, no loose stools, weight gain of 20 lbs and constipation       Amount of exercise or physical activity: 4-5 days/week for an average of 45-60 minutes    Problems taking medications regularly: No    Medication side effects: none    Diet: low salt      Diuretic medication follow up       Duration: Has been on since Nov 2016    Description (location/character/radiation): Patient states follow up for Microzide medication,     Intensity:  mild    Accompanying signs and symptoms: None. Notes significant improvement.     History (similar episodes/previous evaluation): taking Microzide     Precipitating or alleviating factors: None    Therapies tried and outcome: Microzide, effective       Depression and Anxiety Follow-Up    Status since last visit: No change    Other associated symptoms:Tearful, PTSD    Complicating factors: stress at home, recently moved    Significant life event: Yes-  Stress, moving, taking care of her mom     Current substance abuse: Not currently but has been clean of Vicodin and marijuana for one year    Follows at [facility] with Rafa Wall and Karyn Akhtar and they fill her medications. Gets Suboxone filled by Dr. Tse at [facility] in [facility].     PHQ-9 SCORE 4/3/2017 6/14/2017   Total Score 10 22     COY-7 SCORE 4/3/2017 6/14/2017   Total Score 12 21        PHQ-9  English      PHQ-9   Any Language     GAD7       Amount of exercise or physical activity: 4-5 days/week for an average of 45-60 minutes    Problems taking medications regularly: No    Medication side effects: none    Diet: low salt       Problem list and histories reviewed & adjusted, as indicated.  Additional history: as  documented    Patient Active Problem List   Diagnosis     ACP (advance care planning)     Post-traumatic osteoarthritis of right knee     Chronic pain syndrome     Opioid dependence in remission (H)     PTSD (post-traumatic stress disorder)     COY (generalized anxiety disorder)     Moderate episode of recurrent major depressive disorder (H)     Bilateral edema of lower extremity     Acquired hypothyroidism     Past Surgical History:   Procedure Laterality Date     CHOLECYSTECTOMY       GYN SURGERY       SECTION 0493-2447     GYN SURGERY  2004    [facility]E       Social History   Substance Use Topics     Smoking status: Current Every Day Smoker     Packs/day: 0.50     Years: 20.00     Types: Cigarettes     Smokeless tobacco: Not on file     Alcohol use No     Family History   Problem Relation Age of Onset     CEREBROVASCULAR DISEASE Mother      Alcoholism Mother      CANCER Mother      Depression Mother      Eczema Mother      Hypertension Mother      Migraines Mother      MENTAL ILLNESS Mother      Osteoarthritis Mother      OSTEOPOROSIS Mother      Alcoholism Father      CANCER Father      Hyperlipidemia Father      Hypertension Father      Myocardial Infarction Father      MENTAL ILLNESS Sister      Migraines Sister          Current Outpatient Prescriptions   Medication Sig Dispense Refill     Lurasidone HCl (LATUDA PO) Take 20 mg by mouth daily Patient states 25mg once daily        hydrochlorothiazide (MICROZIDE) 12.5 MG capsule Take 1 capsule (12.5 mg) by mouth daily 30 capsule 0     levothyroxine (SYNTHROID/LEVOTHROID) 200 MCG tablet Take 200 mcg by mouth daily       lamoTRIgine (LAMICTAL) 100 MG tablet Take 100 mg by mouth 2 times daily       buPROPion (WELLBUTRIN SR) 150 MG 12 hr tablet Take 150 mg by mouth 2 times daily       buprenorphine HCl-naloxone HCl (SUBOXONE) 8-2 MG per film Place 1 Film under the tongue daily       Acetaminophen (TYLENOL PO) Take 500 mg by mouth every 4 hours as needed for  mild pain or fever       HYDROXYZINE HCL PO Take 25 mg by mouth 4 times daily as needed for itching       nicotine Patch in Place Place onto the skin every 8 hours       Allergies   Allergen Reactions     Depakote [Valproic Acid]      BP Readings from Last 3 Encounters:   06/14/17 122/66   06/08/17 128/70   04/10/17 116/78    Wt Readings from Last 3 Encounters:   06/14/17 250 lb (113.4 kg)   06/08/17 250 lb (113.4 kg)   04/10/17 230 lb (104.3 kg)           Tobacco  Allergies  Meds  Med Hx  Surg Hx  Fam Hx  Soc Hx      Reviewed and updated as needed this visit by Provider    ROS:  Constitutional, HEENT, cardiovascular, pulmonary, GI, , musculoskeletal, neuro, skin, endocrine and psych systems are negative, except as otherwise noted.    OBJECTIVE:                                                    /66 (BP Location: Left arm, Patient Position: Chair, Cuff Size: Adult Large)  Pulse 76  Temp 98.4  F (36.9  C) (Tympanic)  Wt 250 lb (113.4 kg)  SpO2 95%  BMI 44.29 kg/m2  Body mass index is 44.29 kg/(m^2).  GENERAL: healthy, alert and no distress  EYES: Eyes grossly normal to inspection, PERRL and conjunctivae and sclerae normal  HENT: ear canals and TM's normal, nose and mouth without ulcers or lesions  NECK: no adenopathy, no asymmetry, masses, or scars and thyroid normal to palpation  RESP: lungs clear to auscultation - no rales, rhonchi or wheezes  CV: regular rate and rhythm, normal S1 S2, no S3 or S4, no murmur, click or rub, no peripheral edema  ABDOMEN: soft, nontender, no hepatosplenomegaly, no masses and bowel sounds normal  MS: no gross musculoskeletal defects noted, no edema  NEURO: Normal strength and tone, mentation intact and speech normal  PSYCH: mentation appears normal, affect normal/bright    Diagnostic Test Results:  No results found for this or any previous visit (from the past 24 hour(s)).     ASSESSMENT/PLAN:                                                      1. Acquired  hypothyroidism  Patient has been tolerating her synthroid well. She has noted a 20 lbs weight gain recently. We will recheck levels today and make adjustments if necessary.   - TSH    2. Benign essential hypertension  Her BP has been well controlled but she is in need of refills. She has been tolerating the med well with no side effects. We will check lab work and refill today.   - hydrochlorothiazide (MICROZIDE) 12.5 MG capsule; Take 1 capsule (12.5 mg) by mouth daily  Dispense: 90 capsule; Refill: 1  - TSH  - Basic metabolic panel    3. Abnormal weight gain  20 lb weight gain. Her and her therapist feel it would be beneficial for patient to see a dietician. We will place referral .   - Basic metabolic panel  - NUTRITION REFERRAL    Please make appt today for a well-woman exam. We will review your mammo and complete your pap smear at that visit.     Emily Macdonald, FNP Student, saw this patient under the supervision of Apryl Hathaway PA-C.     [facility]  [facility]

## 2017-06-15 ASSESSMENT — PATIENT HEALTH QUESTIONNAIRE - PHQ9: SUM OF ALL RESPONSES TO PHQ QUESTIONS 1-9: 22

## 2017-06-15 ASSESSMENT — ANXIETY QUESTIONNAIRES: GAD7 TOTAL SCORE: 21

## 2017-06-19 DIAGNOSIS — Z12.31 VISIT FOR SCREENING MAMMOGRAM: Primary | ICD-10-CM

## 2017-06-19 PROCEDURE — G0202 SCR MAMMO BI INCL CAD: HCPCS | Mod: TC

## 2017-06-28 ENCOUNTER — TRANSFERRED RECORDS (OUTPATIENT)
Dept: HEALTH INFORMATION MANAGEMENT | Facility: HOSPITAL | Age: 42
End: 2017-06-28

## 2017-07-03 ENCOUNTER — OFFICE VISIT (OUTPATIENT)
Dept: ORTHOPEDICS | Facility: OTHER | Age: 42
End: 2017-07-03
Attending: ORTHOPAEDIC SURGERY
Payer: COMMERCIAL

## 2017-07-03 VITALS
DIASTOLIC BLOOD PRESSURE: 76 MMHG | TEMPERATURE: 98 F | HEART RATE: 72 BPM | HEIGHT: 63 IN | WEIGHT: 250 LBS | OXYGEN SATURATION: 95 % | SYSTOLIC BLOOD PRESSURE: 112 MMHG | BODY MASS INDEX: 44.3 KG/M2

## 2017-07-03 DIAGNOSIS — M17.31 POST-TRAUMATIC OSTEOARTHRITIS OF RIGHT KNEE: Primary | ICD-10-CM

## 2017-07-03 PROCEDURE — 99212 OFFICE O/P EST SF 10 MIN: CPT

## 2017-07-03 PROCEDURE — 20610 DRAIN/INJ JOINT/BURSA W/O US: CPT | Mod: RT | Performed by: ORTHOPAEDIC SURGERY

## 2017-07-03 ASSESSMENT — PAIN SCALES - GENERAL: PAINLEVEL: EXTREME PAIN (8)

## 2017-07-03 NOTE — PROGRESS NOTES
"Patient presents today for Synvisc injection into her right knee.  She has had these injections in the past with excellent results and now is beginning to experience more symptoms.  A fairly recent steroid injection gave her approximately 2 weeks of relief but did not alleviate her symptoms to her satisfaction.    Physical exam: The knee has a full range of motion, trace effusion, tenderness along the lateral and medial joint lines, as well as tenderness with compression of the patellofemoral joint.  X-rays were reviewed and demonstrate joint space narrowing consistent with early arthritic change.    Assessment and plan the patient was counseled regarding the planned injection, risks and benefits were again were reiterated, and her consent was obtained.  After the injection she tolerated this well and will go about her usual activity and follow-up on an as-needed basis.    Procedure note: Synvisc one injection in the right knee.    Description of procedure: After the patient's consent was obtained, her right knee was prepped with alcohol.  Through a medial approach, the Synvisc one syringe contents were injected into the knee joint without difficulty.  After the needle was withdrawn and pressure was held on the needle wound to make sure there've been no bleeding.  A Band-Aid was applied.  The patient was instructed in potential complications and adverse reactions and how to obtain urgent care should she experience these./76 (BP Location: Left arm, Patient Position: Sitting, Cuff Size: Adult Large)  Pulse 72  Temp 98  F (36.7  C) (Tympanic)  Ht 5' 3\" (1.6 m)  Wt 250 lb (113.4 kg)  SpO2 95%  BMI 44.29 kg/m2  "

## 2017-07-03 NOTE — MR AVS SNAPSHOT
"              After Visit Summary   7/3/2017    Autumn Holbrook    MRN: [id number]           Patient Information     Date Of Birth          1975        Visit Information        Provider Department      7/3/2017 1:20 PM Gurjit Hung,   ORTHOPEDICS        Today's Diagnoses     Post-traumatic osteoarthritis of right knee    -  1       Follow-ups after your visit        Follow-up notes from your care team     Return if symptoms worsen or fail to improve.      Your next 10 appointments already scheduled     Jul 06, 2017  2:15 PM CDT   (Arrive by 2:00 PM)   PHYSICAL with SEVEN Pandey   [facility] ([facility] )    [address] [place] [address] [phone]            Jul 20, 2017  2:30 PM CDT   (Arrive by 2:15 PM)   New Nutrition with Blanca Mota RD   [facility] ([facility] )    [address] [place] [address] [phone]              Who to contact     If you have questions or need follow up information about today's clinic visit or your schedule please contact  ORTHOPEDICS directly at [phone].  Normal or non-critical lab and imaging results will be communicated to you by MyChart, letter or phone within 4 business days after the clinic has received the results. If you do not hear from us within 7 days, please contact the clinic through [employer] or phone. If you have a critical or abnormal lab result, we will notify you by phone as soon as possible.  Submit refill requests through [employer] or call your pharmacy and they will forward the refill request to us. Please allow 3 business days for your refill to be completed.          Additional Information About Your Visit        MyChart Information     Promon lets you send messages to your doctor, view your test results, renew your prescriptions, schedule appointments and more. To sign up, go to [email or web address] . Click on \"Log in\" on the left side of the screen, " "which will take you to the Welcome page. Then click on \"Sign up Now\" on the right side of the page.     You will be asked to enter the access code listed below, as well as some personal information. Please follow the directions to create your username and password.     Your access code is: [email or web address]  Expires: 10/1/2017  1:41 PM     Your access code will  in 90 days. If you need help or a new code, please call your [facility] or [phone].        Care EveryWhere ID     This is your Care EveryWhere ID. This could be used by other organizations to access your [place] medical records  [id number]        Your Vitals Were     Pulse Temperature Height Pulse Oximetry BMI (Body Mass Index)       72 98  F (36.7  C) (Tympanic) 5' 3\" (1.6 m) 95% 44.29 kg/m2        Blood Pressure from Last 3 Encounters:   17 112/76   17 122/66   17 128/70    Weight from Last 3 Encounters:   17 250 lb (113.4 kg)   17 250 lb (113.4 kg)   17 250 lb (113.4 kg)              We Performed the Following     Large Joint/Bursa injection and/or drainage (Shoulder, Knee)          Today's Medication Changes          These changes are accurate as of: 7/3/17  1:41 PM.  If you have any questions, ask your nurse or doctor.               Start taking these medicines.        Dose/Directions    Hylan 48 MG/6ML Sosy injection   Commonly known as:  SYNVISC ONE   Used for:  Post-traumatic osteoarthritis of right knee   Started by:  Gurjit Hung DO        Dose:  16 mg   16 mg by INTRA-ARTICULAR route once for 1 dose   Quantity:  2 mL   Refills:  0            Where to get your medicines      Some of these will need a paper prescription and others can be bought over the counter.  Ask your nurse if you have questions.     You don't need a prescription for these medications     Hylan 48 MG/6ML Sosy injection                Primary Care Provider Office Phone # Fax #    SEVEN Pandey [phone] "       [facility] [address]  [place] [address]        Equal Access to Services     [employer] : Hadii carol camilo hadhayleeo Sobridgetali, waaxda luqadaha, qaybta kaalmada adedanielito, consuelo lizzy quocandrew hayskale gudino jayce valverde. So [facility] [phone].    ATENCIÓN: Si habla español, tiene a muñoz disposición servicios gratuitos de asistencia lingüística. Llame al [phone].    We comply with applicable federal civil rights laws and [place] laws. We do not discriminate on the basis of race, color, national origin, age, disability sex, sexual orientation or gender identity.            Thank you!     Thank you for choosing  ORTHOPEDICS  for your care. Our goal is always to provide you with excellent care. Hearing back from our patients is one way we can continue to improve our services. Please take a few minutes to complete the written survey that you may receive in the mail after your visit with us. Thank you!             Your Updated Medication List - Protect others around you: Learn how to safely use, store and throw away your medicines at www.disposemymeds.org.          This list is accurate as of: 7/3/17  1:41 PM.  Always use your most recent med list.                   Brand Name Dispense Instructions for use Diagnosis    buprenorphine HCl-naloxone HCl 8-2 MG per film    SUBOXONE     Place 1 Film under the tongue daily        hydrochlorothiazide 12.5 MG capsule    MICROZIDE    90 capsule    Take 1 capsule (12.5 mg) by mouth daily    Benign essential hypertension       HYDROXYZINE HCL PO      Take 25 mg by mouth 4 times daily as needed for itching        Hylan 48 MG/6ML Sosy injection    SYNVISC ONE    2 mL    16 mg by INTRA-ARTICULAR route once for 1 dose    Post-traumatic osteoarthritis of right knee       LAMICTAL 100 MG tablet   Generic drug:  lamoTRIgine      Take 100 mg by mouth 2 times daily        LATUDA PO      Take 20 mg by mouth daily Patient states 25mg once daily        levothyroxine 200  MCG tablet    SYNTHROID/LEVOTHROID     Take 200 mcg by mouth daily        nicotine      Place onto the skin every 8 hours        TYLENOL PO      Take 500 mg by mouth every 4 hours as needed for mild pain or fever        WELLBUTRIN  MG 12 hr tablet   Generic drug:  buPROPion      Take 150 mg by mouth 2 times daily

## 2017-07-03 NOTE — NURSING NOTE
"Chief Complaint   Patient presents with     RECHECK     Right knee follow up possible synvisc injection.       Initial /76 (BP Location: Left arm, Patient Position: Sitting, Cuff Size: Adult Large)  Pulse 72  Temp 98  F (36.7  C) (Tympanic)  Ht 5' 3\" (1.6 m)  Wt 250 lb (113.4 kg)  SpO2 95%  BMI 44.29 kg/m2 Estimated body mass index is 44.29 kg/(m^2) as calculated from the following:    Height as of this encounter: 5' 3\" (1.6 m).    Weight as of this encounter: 250 lb (113.4 kg).  Medication Reconciliation: complete   Marleny Isidro LPN      "

## 2017-07-06 ENCOUNTER — OFFICE VISIT (OUTPATIENT)
Dept: FAMILY MEDICINE | Facility: OTHER | Age: 42
End: 2017-07-06
Attending: PHYSICIAN ASSISTANT
Payer: COMMERCIAL

## 2017-07-06 VITALS
HEART RATE: 74 BPM | TEMPERATURE: 98.3 F | DIASTOLIC BLOOD PRESSURE: 70 MMHG | OXYGEN SATURATION: 96 % | WEIGHT: 257 LBS | HEIGHT: 63 IN | BODY MASS INDEX: 45.54 KG/M2 | SYSTOLIC BLOOD PRESSURE: 116 MMHG

## 2017-07-06 DIAGNOSIS — Z01.419 WELL WOMAN EXAM WITH ROUTINE GYNECOLOGICAL EXAM: Primary | ICD-10-CM

## 2017-07-06 PROCEDURE — G0476 HPV COMBO ASSAY CA SCREEN: HCPCS | Mod: ZL | Performed by: PHYSICIAN ASSISTANT

## 2017-07-06 PROCEDURE — 99000 SPECIMEN HANDLING OFFICE-LAB: CPT | Performed by: PHYSICIAN ASSISTANT

## 2017-07-06 PROCEDURE — 99396 PREV VISIT EST AGE 40-64: CPT | Performed by: PHYSICIAN ASSISTANT

## 2017-07-06 PROCEDURE — G0123 SCREEN CERV/VAG THIN LAYER: HCPCS | Mod: ZL | Performed by: PHYSICIAN ASSISTANT

## 2017-07-06 ASSESSMENT — ANXIETY QUESTIONNAIRES
6. BECOMING EASILY ANNOYED OR IRRITABLE: NEARLY EVERY DAY
7. FEELING AFRAID AS IF SOMETHING AWFUL MIGHT HAPPEN: NEARLY EVERY DAY
GAD7 TOTAL SCORE: 21
1. FEELING NERVOUS, ANXIOUS, OR ON EDGE: NEARLY EVERY DAY
IF YOU CHECKED OFF ANY PROBLEMS ON THIS QUESTIONNAIRE, HOW DIFFICULT HAVE THESE PROBLEMS MADE IT FOR YOU TO DO YOUR WORK, TAKE CARE OF THINGS AT HOME, OR GET ALONG WITH OTHER PEOPLE: EXTREMELY DIFFICULT
5. BEING SO RESTLESS THAT IT IS HARD TO SIT STILL: NEARLY EVERY DAY
3. WORRYING TOO MUCH ABOUT DIFFERENT THINGS: NEARLY EVERY DAY
2. NOT BEING ABLE TO STOP OR CONTROL WORRYING: NEARLY EVERY DAY

## 2017-07-06 ASSESSMENT — PAIN SCALES - GENERAL: PAINLEVEL: EXTREME PAIN (9)

## 2017-07-06 ASSESSMENT — PATIENT HEALTH QUESTIONNAIRE - PHQ9: 5. POOR APPETITE OR OVEREATING: NEARLY EVERY DAY

## 2017-07-06 NOTE — PROGRESS NOTES
SUBJECTIVE:   CC: Autumn Holbrook is an 41 year old woman who presents for preventive health visit.     Healthy Habits:    Do you get at least three servings of calcium containing foods daily (dairy, green leafy vegetables, etc.)? yes    Amount of exercise or daily activities, outside of work: 7 day(s) per week    Problems taking medications regularly No    Medication side effects: No    Have you had an eye exam in the past two years? no    Do you see a dentist twice per year? no    Do you have sleep apnea, excessive snoring or daytime drowsiness?yes     GYN:  LMP 6.11.17.  Cycles irregular.  Weight is up.   She is wanting more exercise. Too fatigued. BSE done and Mammogram is good. No family hx in first degree for breast cancer.  Hx of Leep and HPV +.          Today's PHQ-2 Score: No flowsheet data found.    Abuse: Current or Past(Physical, Sexual or Emotional)- No  Do you feel safe in your environment - Yes    Social History   Substance Use Topics     Smoking status: Current Every Day Smoker     Packs/day: 0.50     Years: 20.00     Types: Cigarettes     Smokeless tobacco: Not on file     Alcohol use No     The patient does not drink >3 drinks per day nor >7 drinks per week.    Reviewed orders with patient.  Reviewed health maintenance and updated orders accordingly - Yes  Labs reviewed in EPIC  BP Readings from Last 3 Encounters:   17 116/70   17 112/76   17 122/66    Wt Readings from Last 3 Encounters:   17 257 lb (116.6 kg)   17 250 lb (113.4 kg)   17 250 lb (113.4 kg)                  Patient Active Problem List   Diagnosis     ACP (advance care planning)     Post-traumatic osteoarthritis of right knee     Chronic pain syndrome     Opioid dependence in remission (H)     PTSD (post-traumatic stress disorder)     COY (generalized anxiety disorder)     Moderate episode of recurrent major depressive disorder (H)     Bilateral edema of lower extremity     Acquired  hypothyroidism     Past Surgical History:   Procedure Laterality Date     CHOLECYSTECTOMY       GYN SURGERY       SECTION 2415-0551     GYN SURGERY  2004    [facility]       Social History   Substance Use Topics     Smoking status: Current Every Day Smoker     Packs/day: 0.50     Years: 20.00     Types: Cigarettes     Smokeless tobacco: Not on file     Alcohol use No     Family History   Problem Relation Age of Onset     CEREBROVASCULAR DISEASE Mother      Alcoholism Mother      CANCER Mother      Depression Mother      Eczema Mother      Hypertension Mother      Migraines Mother      MENTAL ILLNESS Mother      Osteoarthritis Mother      OSTEOPOROSIS Mother      Alcoholism Father      CANCER Father      Hyperlipidemia Father      Hypertension Father      Myocardial Infarction Father      MENTAL ILLNESS Sister      Migraines Sister          Current Outpatient Prescriptions   Medication Sig Dispense Refill     hydrochlorothiazide (MICROZIDE) 12.5 MG capsule Take 1 capsule (12.5 mg) by mouth daily 90 capsule 1     Lurasidone HCl (LATUDA PO) Take 30 mg by mouth daily Patient states 25mg once daily        levothyroxine (SYNTHROID/LEVOTHROID) 200 MCG tablet Take 200 mcg by mouth daily       lamoTRIgine (LAMICTAL) 100 MG tablet Take 100 mg by mouth 2 times daily       buPROPion (WELLBUTRIN SR) 150 MG 12 hr tablet Take 150 mg by mouth daily        buprenorphine HCl-naloxone HCl (SUBOXONE) 8-2 MG per film Place 1 Film under the tongue daily       HYDROXYZINE HCL PO Take 25 mg by mouth 4 times daily as needed for itching       Acetaminophen (TYLENOL PO) Take 500 mg by mouth every 4 hours as needed for mild pain or fever       nicotine Patch in Place Place onto the skin every 8 hours       Allergies   Allergen Reactions     Depakote [Valproic Acid]        Patient over age 50, mutual decision to screen reflected in health maintenance.  Patient under age 50, mutual decision reflected in health maintenance.      Pertinent  "mammograms are reviewed under the imaging tab.  History of abnormal Pap smear: YES - updated in Problem List and Health Maintenance accordingly    Reviewed and updated as needed this visit by clinical staff  Tobacco  Allergies  Meds  Med Hx  Surg Hx  Fam Hx  Soc Hx        Reviewed and updated as needed this visit by Provider          Past Medical History:   Diagnosis Date     Anxiety      Arthritis      Depressive disorder      Migraine      Osteoarthritis      Thyroid disease       Past Surgical History:   Procedure Laterality Date     CHOLECYSTECTOMY       GYN SURGERY       SECTION 9991-0389     GYN SURGERY  2004    LEEP       ROS:  C: NEGATIVE for fever, chills, change in weight  I: NEGATIVE for worrisome rashes, moles or lesions  E: NEGATIVE for vision changes or irritation  ENT: NEGATIVE for ear, mouth and throat problems  R: NEGATIVE for significant cough or SOB  B: NEGATIVE for masses, tenderness or discharge  CV: NEGATIVE for chest pain, palpitations or peripheral edema  GI: NEGATIVE for nausea, abdominal pain, heartburn, or change in bowel habits  : NEGATIVE for unusual urinary or vaginal symptoms. Periods are regular.  M: NEGATIVE for significant arthralgias or myalgia  N: NEGATIVE for weakness, dizziness or paresthesias  E: NEGATIVE for temperature intolerance, skin/hair changes  H: NEGATIVE for bleeding problems  P: NEGATIVE for changes in mood or affect    OBJECTIVE:   /70 (BP Location: Left arm, Patient Position: Chair, Cuff Size: Adult Large)  Pulse 74  Temp 98.3  F (36.8  C) (Tympanic)  Ht 5' 3\" (1.6 m)  Wt 257 lb (116.6 kg)  SpO2 96%  Breastfeeding? No  BMI 45.53 kg/m2  EXAM:  GENERAL: healthy, alert and no distress  EYES: Eyes grossly normal to inspection, PERRL and conjunctivae and sclerae normal  HENT: ear canals and TM's normal, nose and mouth without ulcers or lesions  NECK: no adenopathy, no asymmetry, masses, or scars and thyroid normal to palpation  RESP: " "lungs clear to auscultation - no rales, rhonchi or wheezes  BREAST: normal without masses, tenderness or nipple discharge and no palpable axillary masses or adenopathy. Mammogram done and normal.   CV: regular rate and rhythm, normal S1 S2, no S3 or S4, no murmur, click or rub, no peripheral edema and peripheral pulses strong  ABDOMEN: soft, nontender, no hepatosplenomegaly, no masses and bowel sounds normal   (female): normal female external genitalia, normal urethral meatus, vaginal mucosa pink, moist, well rugated, and normal cervix/adnexa/uterus without masses or discharge  MS: no gross musculoskeletal defects noted, no edema  SKIN: no suspicious lesions or rashes  NEURO: Normal strength and tone, mentation intact and speech normal  PSYCH: mentation appears normal, affect normal/bright    ASSESSMENT/PLAN:   1. Well woman exam with routine gynecological exam  She is going to be having pap smear under [facility].   She is going to be given referral for smoking and is active with this now.  Getting better on smoking.  Lifestyle weight loss and discussion. Working with Mental Health.     - A pap thin layer screen with  HPV - recommended age 30 - 65 years (select HPV order below)  - HPV High Risk Types DNA Cervical      COUNSELING:   Reviewed preventive health counseling, as reflected in patient instructions       Regular exercise       Healthy diet/nutrition       Vision screening       Hearing screening       Contraception       Safe sex practices/STD prevention       Advance Care Planning         reports that she has been smoking Cigarettes.  She has a 10.00 pack-year smoking history. She does not have any smokeless tobacco history on file.  Tobacco Cessation Action Plan: Pharmacotherapies : Zyban/Wellbutrin  Estimated body mass index is 45.53 kg/(m^2) as calculated from the following:    Height as of this encounter: 5' 3\" (1.6 m).    Weight as of this encounter: 257 lb (116.6 kg).       Counseling Resources:  ATP IV " Guidelines  Pooled Cohorts Equation Calculator  Breast Cancer Risk Calculator  FRAX Risk Assessment  ICSI Preventive Guidelines  Dietary Guidelines for Americans, 2010  [employer]'s MyPlate  ASA Prophylaxis  Lung CA Screening    SEVEN Bryant  [facility]

## 2017-07-06 NOTE — NURSING NOTE
"Chief Complaint   Patient presents with     Physical     ADELA        Initial /70 (BP Location: Left arm, Patient Position: Chair, Cuff Size: Adult Large)  Pulse 74  Temp 98.3  F (36.8  C) (Tympanic)  Ht 5' 3\" (1.6 m)  Wt 257 lb (116.6 kg)  SpO2 96%  Breastfeeding? No  BMI 45.53 kg/m2 Estimated body mass index is 45.53 kg/(m^2) as calculated from the following:    Height as of this encounter: 5' 3\" (1.6 m).    Weight as of this encounter: 257 lb (116.6 kg).  Medication Reconciliation: complete   Annette Araiza CMA(AAMA)   "

## 2017-07-06 NOTE — MR AVS SNAPSHOT
After Visit Summary   7/6/2017    Autumn Holbrook    MRN: [id number]           Patient Information     Date Of Birth          1975        Visit Information        Provider Department      7/6/2017 2:15 PM Apryl Hathaway PA [facility]        Today's Diagnoses     Well woman exam with routine gynecological exam    -  1      Care Instructions      Preventive Health Recommendations  Female Ages 40 to 49    Yearly exam:     See your health care provider every year in order to  1. Review health changes.   2. Discuss preventive care.    3. Review your medicines if your doctor prescribed any.      Get a Pap test every three years (unless you have an abnormal result and your provider advises testing more often).      If you get Pap tests with HPV test, you only need to test every 5 years, unless you have an abnormal result. You do not need a Pap test if your uterus was removed (hysterectomy) and you have not had cancer.      You should be tested each year for STDs (sexually transmitted diseases), if you're at risk.       Ask your doctor if you should have a mammogram.      Have a colonoscopy (test for colon cancer) if someone in your family has had colon cancer or polyps before age 50.       Have a cholesterol test every 5 years.       Have a diabetes test (fasting glucose) after age 45. If you are at risk for diabetes, you should have this test every 3 years.    Shots: Get a flu shot each year. Get a tetanus shot every 10 years.     Nutrition:     Eat at least 5 servings of fruits and vegetables each day.    Eat whole-grain bread, whole-wheat pasta and brown rice instead of white grains and rice.    Talk to your provider about Calcium and Vitamin D.     Lifestyle    Exercise at least 150 minutes a week (an average of 30 minutes a day, 5 days a week). This will help you control your weight and prevent disease.    Limit alcohol to one drink per day.    No smoking.     Wear sunscreen to  "prevent skin cancer.    See your dentist every six months for an exam and cleaning.          Follow-ups after your visit        Follow-up notes from your care team     Return in about 1 year (around 2018).      Your next 10 appointments already scheduled     2017  2:30 PM CDT   (Arrive by 2:15 PM)   New Nutrition with Blanca Mota RD   [facility] ([facility] )    [address] [place] [address] [phone]              Who to contact     If you have questions or need follow up information about today's clinic visit or your schedule please contact [facility] directly at [phone].  Normal or non-critical lab and imaging results will be communicated to you by MyChart, letter or phone within 4 business days after the clinic has received the results. If you do not hear from us within 7 days, please contact the clinic through [employer] or phone. If you have a critical or abnormal lab result, we will notify you by phone as soon as possible.  Submit refill requests through [employer] or call your pharmacy and they will forward the refill request to us. Please allow 3 business days for your refill to be completed.          Additional Information About Your Visit        MyChart Information     [employer] lets you send messages to your doctor, view your test results, renew your prescriptions, schedule appointments and more. To sign up, go to [email or web address] . Click on \"Log in\" on the left side of the screen, which will take you to the Welcome page. Then click on \"Sign up Now\" on the right side of the page.     You will be asked to enter the access code listed below, as well as some personal information. Please follow the directions to create your username and password.     Your access code is: [email or web address]  Expires: 10/1/2017  1:41 PM     Your access code will  in 90 days. If you need help or a new code, please call your [facility] or " "[phone].        Care EveryWhere ID     This is your Care EveryWhere ID. This could be used by other organizations to access your [place] medical records  [id number]        Your Vitals Were     Pulse Temperature Height Pulse Oximetry Breastfeeding? BMI (Body Mass Index)    74 98.3  F (36.8  C) (Tympanic) 5' 3\" (1.6 m) 96% No 45.53 kg/m2       Blood Pressure from Last 3 Encounters:   07/06/17 116/70   07/03/17 112/76   06/14/17 122/66    Weight from Last 3 Encounters:   07/06/17 257 lb (116.6 kg)   07/03/17 250 lb (113.4 kg)   06/14/17 250 lb (113.4 kg)              We Performed the Following     A pap thin layer screen with  HPV - recommended age 30 - 65 years (select HPV order below)     HPV High Risk Types DNA Cervical        Primary Care Provider Office Phone # Fax #    SEVEN Pandey [phone]       [facility] [address]  [facility] [address]        Equal Access to Services     [facility]: Hadii aad ku hadasho Soomaali, waaxda luqadaha, qaybta kaalmada adekaleyada, consuelo eduardo . So [facility] [phone].    ATENCIÓN: Si habla español, tiene a muñoz disposición servicios gratuitos de asistencia lingüística. Brandt al [phone].    We comply with applicable federal civil rights laws and [place] laws. We do not discriminate on the basis of race, color, national origin, age, disability sex, sexual orientation or gender identity.            Thank you!     Thank you for choosing [facility]  for your care. Our goal is always to provide you with excellent care. Hearing back from our patients is one way we can continue to improve our services. Please take a few minutes to complete the written survey that you may receive in the mail after your visit with us. Thank you!             Your Updated Medication List - Protect others around you: Learn how to safely use, store and throw away your medicines at www.disposemymeds.org.        "   This list is accurate as of: 7/6/17  2:46 PM.  Always use your most recent med list.                   Brand Name Dispense Instructions for use Diagnosis    buprenorphine HCl-naloxone HCl 8-2 MG per film    SUBOXONE     Place 1 Film under the tongue daily        hydrochlorothiazide 12.5 MG capsule    MICROZIDE    90 capsule    Take 1 capsule (12.5 mg) by mouth daily    Benign essential hypertension       HYDROXYZINE HCL PO      Take 25 mg by mouth 4 times daily as needed for itching        LAMICTAL 100 MG tablet   Generic drug:  lamoTRIgine      Take 100 mg by mouth 2 times daily        LATUDA PO      Take 30 mg by mouth daily Patient states 25mg once daily        levothyroxine 200 MCG tablet    SYNTHROID/LEVOTHROID     Take 200 mcg by mouth daily        nicotine      Place onto the skin every 8 hours        TYLENOL PO      Take 500 mg by mouth every 4 hours as needed for mild pain or fever        WELLBUTRIN  MG 12 hr tablet   Generic drug:  buPROPion      Take 150 mg by mouth daily

## 2017-07-06 NOTE — LETTER
[facility] [place]  [address]  [facility] [address]  [phone]        July 12, 2017    Autumn Holbrook  [address] [facility] [address]          Dear Autumn Holbrook    Your pap smear is normal.  It is generally recommended that women have a yearly pelvic exam.  If your provider  requested that you have a pap smear more frequently because of any previous problems please schedule that appointment as requested.  If you have any questions please call the clinic at [phone].      Sincerely,        Apryl Hathaway PA-C

## 2017-07-07 ASSESSMENT — PATIENT HEALTH QUESTIONNAIRE - PHQ9: SUM OF ALL RESPONSES TO PHQ QUESTIONS 1-9: 23

## 2017-07-07 ASSESSMENT — ANXIETY QUESTIONNAIRES: GAD7 TOTAL SCORE: 21

## 2017-07-12 LAB
COPATH REPORT: NORMAL
PAP: NORMAL

## 2017-07-13 LAB
FINAL DIAGNOSIS: NORMAL
HPV HR 12 DNA CVX QL NAA+PROBE: NEGATIVE
HPV16 DNA SPEC QL NAA+PROBE: NEGATIVE
HPV18 DNA SPEC QL NAA+PROBE: NEGATIVE
SPECIMEN DESCRIPTION: NORMAL

## 2017-07-20 ENCOUNTER — HOSPITAL ENCOUNTER (OUTPATIENT)
Dept: NUTRITION | Facility: HOSPITAL | Age: 42
Discharge: HOME | End: 2017-07-20
Attending: PHYSICIAN ASSISTANT | Admitting: PHYSICIAN ASSISTANT
Payer: COMMERCIAL

## 2017-07-20 VITALS — HEIGHT: 63 IN | BODY MASS INDEX: 45.43 KG/M2 | WEIGHT: 256.4 LBS

## 2017-07-20 PROCEDURE — 97802 MEDICAL NUTRITION INDIV IN: CPT | Performed by: DIETITIAN, REGISTERED

## 2017-07-20 NOTE — PROGRESS NOTES
"[facility]  Medical Nutrition Therapy    Visit Type: Initial Assessment    Autumn Holbrook referred by Apryl Hathaway NP for MNT related to Obesity    Patient accompanied by daughter.    Nutrition Assessment:  Anthropometrics  Height: .  160 cm (5' 3\") BMI:    45.51   Weight:  116.3 kg (256 lb 6.4 oz) BSA:  2.27   IBW (kg):  Female: 52.2 Male: 57.2 Recommended BMT:          Nutrition History:  Pt reports weight was 183# 1 year ago and attributes weight gain to some psych meds and moving/quitting her job.  At one point she was 325# per her report.  Her goal weight is 180-190#.  Pt also reports hx of bulimia but states has not practiced bulimic behaviors since 3/2017.         Physical Activity:  No scheduled.  States she is very busy with household chores.  She does have a fitbit.         Nutrition Prescription  Energy:         Protein:            Fluid:         Fat:          Carbohydrate:   45 grams/meal   15-30 grams/snack       Fiber:               Micronutrient:                           Food Record:  Pt states she often eats very little during the day and then overeats at supper meal and in the evening.  She likes sweets in the evening.  Drinks diet drinks and 1 - 32 oz coffee with heavy cream in it daily.         Nutrition Diagnosis:  Morbid obesity with BMI of 45.        Nutrition Intervention:  Educated pt on healthy diet and carbohydrate counting meal plan.  Reviewed carbohydrate limits, portion control, label reading, importance of eating meals more frequently throughout the day, benefits of exercise, optimal weight loss.  Written educational materials provided.         Nutrition Goals:  Weight loss of 1# per week.         Nutrition Recommendations:  Follow carbohydrate recommendations provided.  Add exercise to goal of 30 minutes daily.  Keep written food logs or use phone dany.       Patient to follow-up with RD in 1 month.   Patient has RD contact information to call/email if " needed.

## 2017-08-08 ENCOUNTER — TRANSFERRED RECORDS (OUTPATIENT)
Dept: HEALTH INFORMATION MANAGEMENT | Facility: HOSPITAL | Age: 42
End: 2017-08-08

## 2017-08-30 ENCOUNTER — OFFICE VISIT (OUTPATIENT)
Dept: FAMILY MEDICINE | Facility: OTHER | Age: 42
End: 2017-08-30
Attending: PHYSICIAN ASSISTANT
Payer: COMMERCIAL

## 2017-08-30 VITALS
SYSTOLIC BLOOD PRESSURE: 116 MMHG | WEIGHT: 265 LBS | DIASTOLIC BLOOD PRESSURE: 68 MMHG | OXYGEN SATURATION: 98 % | HEART RATE: 71 BPM | TEMPERATURE: 97 F | BODY MASS INDEX: 46.94 KG/M2

## 2017-08-30 DIAGNOSIS — Z13.220 LIPID SCREENING: ICD-10-CM

## 2017-08-30 DIAGNOSIS — R60.0 BILATERAL EDEMA OF LOWER EXTREMITY: ICD-10-CM

## 2017-08-30 DIAGNOSIS — F33.1 MODERATE EPISODE OF RECURRENT MAJOR DEPRESSIVE DISORDER (H): ICD-10-CM

## 2017-08-30 DIAGNOSIS — N91.1 SECONDARY AMENORRHEA: ICD-10-CM

## 2017-08-30 DIAGNOSIS — E03.9 ACQUIRED HYPOTHYROIDISM: Primary | ICD-10-CM

## 2017-08-30 LAB
ALBUMIN SERPL-MCNC: 3.4 G/DL (ref 3.4–5)
ALP SERPL-CCNC: 80 U/L (ref 40–150)
ALT SERPL W P-5'-P-CCNC: 24 U/L (ref 0–50)
ANION GAP SERPL CALCULATED.3IONS-SCNC: 7 MMOL/L (ref 3–14)
AST SERPL W P-5'-P-CCNC: 14 U/L (ref 0–45)
BILIRUB SERPL-MCNC: 0.5 MG/DL (ref 0.2–1.3)
BUN SERPL-MCNC: 13 MG/DL (ref 7–30)
CALCIUM SERPL-MCNC: 8.5 MG/DL (ref 8.5–10.1)
CHLORIDE SERPL-SCNC: 105 MMOL/L (ref 94–109)
CHOLEST SERPL-MCNC: 212 MG/DL
CO2 SERPL-SCNC: 29 MMOL/L (ref 20–32)
CREAT SERPL-MCNC: 0.8 MG/DL (ref 0.52–1.04)
EST. AVERAGE GLUCOSE BLD GHB EST-MCNC: 120 MG/DL
FSH SERPL-ACNC: 6.7 IU/L
GFR SERPL CREATININE-BSD FRML MDRD: 77 ML/MIN/1.7M2
GLUCOSE SERPL-MCNC: 102 MG/DL (ref 70–99)
HBA1C MFR BLD: 5.8 % (ref 4.3–6)
HDLC SERPL-MCNC: 81 MG/DL
LDLC SERPL CALC-MCNC: 120 MG/DL
NONHDLC SERPL-MCNC: 131 MG/DL
POTASSIUM SERPL-SCNC: 3.8 MMOL/L (ref 3.4–5.3)
PROT SERPL-MCNC: 6.9 G/DL (ref 6.8–8.8)
SODIUM SERPL-SCNC: 141 MMOL/L (ref 133–144)
T3FREE SERPL-MCNC: 2.6 PG/ML (ref 2.3–4.2)
T4 FREE SERPL-MCNC: 1.15 NG/DL (ref 0.76–1.46)
TRIGL SERPL-MCNC: 54 MG/DL
TSH SERPL DL<=0.005 MIU/L-ACNC: 3.41 MU/L (ref 0.4–4)

## 2017-08-30 PROCEDURE — 83036 HEMOGLOBIN GLYCOSYLATED A1C: CPT | Mod: ZL | Performed by: PHYSICIAN ASSISTANT

## 2017-08-30 PROCEDURE — 36415 COLL VENOUS BLD VENIPUNCTURE: CPT | Mod: ZL | Performed by: PHYSICIAN ASSISTANT

## 2017-08-30 PROCEDURE — 80061 LIPID PANEL: CPT | Mod: ZL | Performed by: PHYSICIAN ASSISTANT

## 2017-08-30 PROCEDURE — 99000 SPECIMEN HANDLING OFFICE-LAB: CPT | Performed by: PHYSICIAN ASSISTANT

## 2017-08-30 PROCEDURE — 83001 ASSAY OF GONADOTROPIN (FSH): CPT | Mod: ZL | Performed by: PHYSICIAN ASSISTANT

## 2017-08-30 PROCEDURE — 99212 OFFICE O/P EST SF 10 MIN: CPT

## 2017-08-30 PROCEDURE — 80053 COMPREHEN METABOLIC PANEL: CPT | Mod: ZL | Performed by: PHYSICIAN ASSISTANT

## 2017-08-30 PROCEDURE — 40000788 ZZHCL STATISTIC ESTIMATED AVERAGE GLUCOSE: Mod: ZL | Performed by: PHYSICIAN ASSISTANT

## 2017-08-30 PROCEDURE — 99214 OFFICE O/P EST MOD 30 MIN: CPT | Performed by: PHYSICIAN ASSISTANT

## 2017-08-30 PROCEDURE — 84481 FREE ASSAY (FT-3): CPT | Mod: ZL | Performed by: PHYSICIAN ASSISTANT

## 2017-08-30 PROCEDURE — 84443 ASSAY THYROID STIM HORMONE: CPT | Mod: ZL | Performed by: PHYSICIAN ASSISTANT

## 2017-08-30 PROCEDURE — 84439 ASSAY OF FREE THYROXINE: CPT | Mod: ZL | Performed by: PHYSICIAN ASSISTANT

## 2017-08-30 ASSESSMENT — PAIN SCALES - GENERAL: PAINLEVEL: SEVERE PAIN (6)

## 2017-08-30 NOTE — PROGRESS NOTES
SUBJECTIVE:   Autumn Holbrook is a 42 year old female who presents to clinic today for the following health issues:          Lab Work       Duration: none     Description (location/character/radiation): patient is here today to have labs done for the [facility]. Patient requesting to have CBC, Lipid profile, ALC, Metabolic panel, thyroid, tsh, t3&t4 done today. Patient is fasting today     Intensity:  Patient states 6/10 for pain     Accompanying signs and symptoms: none     History (similar episodes/previous evaluation): tobacco exposure     Precipitating or alleviating factors: None    Therapies tried and outcome: None         Problem list and histories reviewed & adjusted, as indicated.  Additional history: as documented    Patient Active Problem List   Diagnosis     ACP (advance care planning)     Post-traumatic osteoarthritis of right knee     Chronic pain syndrome     Opioid dependence in remission (H)     PTSD (post-traumatic stress disorder)     COY (generalized anxiety disorder)     Moderate episode of recurrent major depressive disorder (H)     Bilateral edema of lower extremity     Acquired hypothyroidism     Past Surgical History:   Procedure Laterality Date     CHOLECYSTECTOMY       GYN SURGERY       SECTION 2932-7530     GYN SURGERY  2004    [facility]       Social History   Substance Use Topics     Smoking status: Current Every Day Smoker     Packs/day: 0.50     Years: 20.00     Types: Cigarettes     Smokeless tobacco: Not on file     Alcohol use No     Family History   Problem Relation Age of Onset     CEREBROVASCULAR DISEASE Mother      Alcoholism Mother      CANCER Mother      Depression Mother      Eczema Mother      Hypertension Mother      Migraines Mother      MENTAL ILLNESS Mother      Osteoarthritis Mother      OSTEOPOROSIS Mother      Alcoholism Father      CANCER Father      Hyperlipidemia Father      Hypertension Father      Myocardial Infarction Father      MENTAL ILLNESS Sister       Migraines Sister          Current Outpatient Prescriptions   Medication Sig Dispense Refill     hydrochlorothiazide (MICROZIDE) 12.5 MG capsule Take 1 capsule (12.5 mg) by mouth daily 90 capsule 1     levothyroxine (SYNTHROID/LEVOTHROID) 200 MCG tablet Take 200 mcg by mouth daily       lamoTRIgine (LAMICTAL) 100 MG tablet Take 100 mg by mouth 2 times daily       buPROPion (WELLBUTRIN SR) 150 MG 12 hr tablet Take 150 mg by mouth daily        buprenorphine HCl-naloxone HCl (SUBOXONE) 8-2 MG per film Place 1 Film under the tongue daily       HYDROXYZINE HCL PO Take 25 mg by mouth 4 times daily as needed for itching       Acetaminophen (TYLENOL PO) Take 500 mg by mouth every 4 hours as needed for mild pain or fever       nicotine Patch in Place Place onto the skin every 8 hours       Allergies   Allergen Reactions     Depakote [Valproic Acid]      BP Readings from Last 3 Encounters:   08/30/17 116/68   07/06/17 116/70   07/03/17 112/76    Wt Readings from Last 3 Encounters:   08/30/17 265 lb (120.2 kg)   07/20/17 256 lb 6.4 oz (116.3 kg)   07/06/17 257 lb (116.6 kg)                        Reviewed and updated as needed this visit by clinical staffAllergies       Reviewed and updated as needed this visit by Provider         ROS:  Constitutional, neuro, ENT, endocrine, pulmonary, cardiac, gastrointestinal, genitourinary, musculoskeletal, integument and psychiatric systems are negative, except as otherwise noted.      OBJECTIVE:                                                    /68 (BP Location: Left arm, Patient Position: Chair, Cuff Size: Adult Large)  Pulse 71  Temp 97  F (36.1  C) (Tympanic)  Wt 265 lb (120.2 kg)  SpO2 98%  Breastfeeding? No  BMI 46.94 kg/m2  Body mass index is 46.94 kg/(m^2).  GENERAL APPEARANCE: healthy, alert, mild distress, cooperative and fatigued  EYES: Eyes grossly normal to inspection, PERRL and conjunctivae and sclerae normal  HENT: ear canals and TM's normal and nose and  mouth without ulcers or lesions  NECK: no adenopathy, no asymmetry, masses, or scars and thyroid normal to palpation  RESP: lungs clear to auscultation - no rales, rhonchi or wheezes  CV: regular rates and rhythm, normal S1 S2, no S3 or S4 and no murmur, click or rub  LYMPHATICS: normal ant/post cervical and supraclavicular nodes  ABDOMEN: soft, nontender, without hepatosplenomegaly or masses and bowel sounds normal  MS: lower leg swelling. Joint pain.   SKIN: no suspicious lesions or rashes  NEURO: Normal strength and tone, mentation intact and speech normal  PSYCH: mentation appears normal, affect flat and fatigued    Diagnostic test results:  Diagnostic Test Results:  Results for orders placed or performed in visit on 08/30/17 (from the past 24 hour(s))   T4, free   Result Value Ref Range    T4 Free 1.15 0.76 - 1.46 ng/dL   TSH   Result Value Ref Range    TSH 3.41 0.40 - 4.00 mU/L   Lipid Profile (Chol, Trig, HDL, LDL calc)   Result Value Ref Range    Cholesterol 212 (H) <200 mg/dL    Triglycerides 54 <150 mg/dL    HDL Cholesterol 81 >49 mg/dL    LDL Cholesterol Calculated 120 (H) <100 mg/dL    Non HDL Cholesterol 131 (H) <130 mg/dL   Hemoglobin A1c   Result Value Ref Range    Hemoglobin A1C 5.8 4.3 - 6.0 %   Comprehensive metabolic panel (BMP + Alb, Alk Phos, ALT, AST, Total. Bili, TP)   Result Value Ref Range    Sodium 141 133 - 144 mmol/L    Potassium 3.8 3.4 - 5.3 mmol/L    Chloride 105 94 - 109 mmol/L    Carbon Dioxide 29 20 - 32 mmol/L    Anion Gap 7 3 - 14 mmol/L    Glucose 102 (H) 70 - 99 mg/dL    Urea Nitrogen 13 7 - 30 mg/dL    Creatinine 0.80 0.52 - 1.04 mg/dL    GFR Estimate 77 >60 mL/min/1.7m2    GFR Estimate If Black >90 >60 mL/min/1.7m2    Calcium 8.5 8.5 - 10.1 mg/dL    Bilirubin Total 0.5 0.2 - 1.3 mg/dL    Albumin 3.4 3.4 - 5.0 g/dL    Protein Total 6.9 6.8 - 8.8 g/dL    Alkaline Phosphatase 80 40 - 150 U/L    ALT 24 0 - 50 U/L    AST 14 0 - 45 U/L   Estimated Average Glucose   Result Value Ref  Range    Estimated Average Glucose 120 mg/dL          ASSESSMENT/PLAN:                                                    1. Acquired hypothyroidism  Weight up and fatigued.   Wants recheck needing checked for mental health also. Wants a dietary program if her TSH is ok.  Stimulants are not recommended.   Not able to exercise due to arthritis and discussion on her possibly going to water walking.   - T3 Free  - T4, free  - TSH    2. Moderate episode of recurrent major depressive disorder (H)  Worseing.  Off Latuda and working on a new program. Seeing her therapist and wanting new direction.  We will check her labs and forward.  She really looks overwhelmed and exhausted today.   discussion on extra stressors she has placed in her life might be too much for her to handle.   - Hemoglobin A1c    3. Bilateral edema of lower extremity  With weight up check labs.   - Comprehensive metabolic panel (BMP + Alb, Alk Phos, ALT, AST, Total. Bili, TP)    4. Lipid screening  Needing this done. Hx of hyper lipid. She has mild increase in her LDL and recommendations are dietary therapy and recheck in a year.   - Lipid Profile (Chol, Trig, HDL, LDL calc)    30 minutes in visit over 50 % face to face discussion.   See Patient Instructions    SEVEN Bryant  [facility] [place]

## 2017-08-30 NOTE — NURSING NOTE
"Chief Complaint   Patient presents with     Blood Draw     requesting labs        Initial /68 (BP Location: Left arm, Patient Position: Chair, Cuff Size: Adult Large)  Pulse 71  Temp 97  F (36.1  C) (Tympanic)  Wt 265 lb (120.2 kg)  SpO2 98%  Breastfeeding? No  BMI 46.94 kg/m2 Estimated body mass index is 46.94 kg/(m^2) as calculated from the following:    Height as of 7/20/17: 5' 3\" (1.6 m).    Weight as of this encounter: 265 lb (120.2 kg).  Medication Reconciliation: complete   Annette Araiza CMA(AAMA)   "

## 2017-08-30 NOTE — PATIENT INSTRUCTIONS
Thank you for choosing [facility].   I have office hours 8:00 am to 4:30 pm on Monday's, Wednesday's, Thursday's and Friday's. My nurse and I are out of the office every Tuesday.    Following your visit, when your labs and diagnostic testing have returned, I will review then and you will be contacted by my nurse.  If you are on My Chart, you can also view results there.    For refills, notify your pharmacy regarding what you need and the pharmacy will generate a refill request. Do not call my nurse as she is unable to process refill request. Please plan ahead and allow 3-5 days for refill requests.    You will generally receive a reminder call the day prior to your appointment.  If you cannot attend your appointment, please cancel your appointment with as much notice as possible.  If there is a pattern of failure to present for your appointments, I cannot provide consistent, meaningful, ongoing care for you. It is very important to me that you come in for your care, so we can best assist you with your health care needs.    IMPORTANT:  Please note that it is my standard of practice to NOT participate in prescribing ongoing requested Narcotic Analgesic therapy, and/or participate in the prescribing of other controlled substances.  My nurse and I am happy to assist you with the process of referral for alternative pain management as needed, and other treatment modalities including but not limited to:  Physical Therapy, Physical Medicine and Rehab, Counseling, Chiropractic Care, Orthopedic Care, and non-narcotic medication management.     In the event that you need to be seen for emergent concerns and I am out of office,  please see one of my colleagues for acute concerns.  You may also present to  or ER.  I appreciate the opportunity to serve you and look forward to supporting your healthcare needs in the future. Please contact me with any questions or concerns that you may  have.    Sincerely,      Apryl Hathaway RN, PA-C

## 2017-08-30 NOTE — LETTER
August 30, 2017      Autumn Holbrook  [address] [facility] [address]        Dear ,    We are writing to inform you of your test results.    We also faxed a copy of these to [facility].    Resulted Orders   T4, free   Result Value Ref Range    T4 Free 1.15 0.76 - 1.46 ng/dL   TSH   Result Value Ref Range    TSH 3.41 0.40 - 4.00 mU/L   Lipid Profile (Chol, Trig, HDL, LDL calc)   Result Value Ref Range    Cholesterol 212 (H) <200 mg/dL      Comment:      Desirable:       <200 mg/dl    Triglycerides 54 <150 mg/dL      Comment:      Fasting specimen    HDL Cholesterol 81 >49 mg/dL    LDL Cholesterol Calculated 120 (H) <100 mg/dL      Comment:      Above desirable:  100-129 mg/dl  Borderline High:  130-159 mg/dL  High:             160-189 mg/dL  Very high:       >189 mg/dl      Non HDL Cholesterol 131 (H) <130 mg/dL      Comment:      Above Desirable:  130-159 mg/dl  Borderline high:  160-189 mg/dl  High:             190-219 mg/dl  Very high:       >219 mg/dl     Hemoglobin A1c   Result Value Ref Range    Hemoglobin A1C 5.8 4.3 - 6.0 %   Comprehensive metabolic panel (BMP + Alb, Alk Phos, ALT, AST, Total. Bili, TP)   Result Value Ref Range    Sodium 141 133 - 144 mmol/L    Potassium 3.8 3.4 - 5.3 mmol/L    Chloride 105 94 - 109 mmol/L    Carbon Dioxide 29 20 - 32 mmol/L    Anion Gap 7 3 - 14 mmol/L    Glucose 102 (H) 70 - 99 mg/dL      Comment:      Fasting specimen    Urea Nitrogen 13 7 - 30 mg/dL    Creatinine 0.80 0.52 - 1.04 mg/dL    GFR Estimate 77 >60 mL/min/1.7m2      Comment:      Non  GFR Calc    GFR Estimate If Black >90 >60 mL/min/1.7m2      Comment:       GFR Calc    Calcium 8.5 8.5 - 10.1 mg/dL    Bilirubin Total 0.5 0.2 - 1.3 mg/dL    Albumin 3.4 3.4 - 5.0 g/dL    Protein Total 6.9 6.8 - 8.8 g/dL    Alkaline Phosphatase 80 40 - 150 U/L    ALT 24 0 - 50 U/L    AST 14 0 - 45 U/L   Estimated Average Glucose   Result Value Ref Range    Estimated Average Glucose 120 mg/dL        If you have any questions or concerns, please call the clinic at the number listed above.       Sincerely,      The office of  SEVEN Bryant

## 2017-08-31 ENCOUNTER — HOSPITAL ENCOUNTER (OUTPATIENT)
Dept: NUTRITION | Facility: HOSPITAL | Age: 42
Setting detail: THERAPIES SERIES
End: 2017-08-31
Attending: PHYSICIAN ASSISTANT
Payer: COMMERCIAL

## 2017-08-31 VITALS — WEIGHT: 266.7 LBS | BODY MASS INDEX: 47.24 KG/M2

## 2017-08-31 PROCEDURE — 97803 MED NUTRITION INDIV SUBSEQ: CPT | Performed by: DIETITIAN, REGISTERED

## 2017-08-31 ASSESSMENT — ANXIETY QUESTIONNAIRES
3. WORRYING TOO MUCH ABOUT DIFFERENT THINGS: NEARLY EVERY DAY
2. NOT BEING ABLE TO STOP OR CONTROL WORRYING: NEARLY EVERY DAY
6. BECOMING EASILY ANNOYED OR IRRITABLE: NEARLY EVERY DAY
1. FEELING NERVOUS, ANXIOUS, OR ON EDGE: MORE THAN HALF THE DAYS
7. FEELING AFRAID AS IF SOMETHING AWFUL MIGHT HAPPEN: NEARLY EVERY DAY
GAD7 TOTAL SCORE: 19
5. BEING SO RESTLESS THAT IT IS HARD TO SIT STILL: MORE THAN HALF THE DAYS
IF YOU CHECKED OFF ANY PROBLEMS ON THIS QUESTIONNAIRE, HOW DIFFICULT HAVE THESE PROBLEMS MADE IT FOR YOU TO DO YOUR WORK, TAKE CARE OF THINGS AT HOME, OR GET ALONG WITH OTHER PEOPLE: VERY DIFFICULT

## 2017-08-31 ASSESSMENT — PATIENT HEALTH QUESTIONNAIRE - PHQ9
5. POOR APPETITE OR OVEREATING: NEARLY EVERY DAY
SUM OF ALL RESPONSES TO PHQ QUESTIONS 1-9: 24

## 2017-08-31 NOTE — PROGRESS NOTES
Pt is here today for follow up r/t her desire to lose weight.      Current weight is 266.7# which is up 10.3# since last visit 6 weeks ago.  BMI is 47 - morbidly obese.  IBWR is 104-127#.      Pt states she did not do well with eating more meals during the day and less in the evening.  She is still eating majority of food from supper meal on.  She is very teary during our visit today and states she is feeling overwhelmed with caring for her mother, trying to clean and do repairs on her mother's home and attend all medical appointments for both of them.  She does see a therapist weekly and states she knows she needs to allow time to care for herself.  She is not exercising.      We reviewed labs from yesterday:  A1c 5.8%, fasting glucose 102, LDL-120.  Emphasized importance of weight loss to prevent health issues in the future.      Pt's goals are:    1.  Allow time for exercise at least 3x/week.   2.  Discuss sweets with mother as mother has many sweets in the home which tempt her.    3.  Again, adjust meal schedule to allow for more routine meals vs eating in the evening.      Follow up in 1 month.

## 2017-09-01 ASSESSMENT — ANXIETY QUESTIONNAIRES: GAD7 TOTAL SCORE: 19

## 2017-09-06 DIAGNOSIS — E03.9 ACQUIRED HYPOTHYROIDISM: Primary | ICD-10-CM

## 2017-09-06 RX ORDER — LEVOTHYROXINE SODIUM 200 UG/1
200 TABLET ORAL DAILY
Qty: 90 TABLET | Refills: 1 | Status: SHIPPED | OUTPATIENT
Start: 2017-09-06 | End: 2018-03-02

## 2017-09-06 NOTE — TELEPHONE ENCOUNTER
Synthroid  Historical reported by pt  Last visit: 8/30/17  Last refill: 11/16/2010  Routed to Apryl Hathaway to advise. [facility]  [facility], per report received from Mirta Mak RN

## 2017-09-07 DIAGNOSIS — Z53.9 ERRONEOUS ENCOUNTER--DISREGARD: Primary | ICD-10-CM

## 2017-09-11 ENCOUNTER — OFFICE VISIT (OUTPATIENT)
Dept: ORTHOPEDICS | Facility: OTHER | Age: 42
End: 2017-09-11
Attending: ORTHOPAEDIC SURGERY
Payer: COMMERCIAL

## 2017-09-11 VITALS
DIASTOLIC BLOOD PRESSURE: 70 MMHG | HEIGHT: 63 IN | BODY MASS INDEX: 46.95 KG/M2 | OXYGEN SATURATION: 96 % | HEART RATE: 78 BPM | TEMPERATURE: 97.8 F | SYSTOLIC BLOOD PRESSURE: 118 MMHG | WEIGHT: 265 LBS | RESPIRATION RATE: 18 BRPM

## 2017-09-11 DIAGNOSIS — M17.31 POST-TRAUMATIC OSTEOARTHRITIS OF RIGHT KNEE: Primary | ICD-10-CM

## 2017-09-11 PROCEDURE — 99212 OFFICE O/P EST SF 10 MIN: CPT

## 2017-09-11 PROCEDURE — 99213 OFFICE O/P EST LOW 20 MIN: CPT | Performed by: ORTHOPAEDIC SURGERY

## 2017-09-11 ASSESSMENT — PAIN SCALES - GENERAL: PAINLEVEL: SEVERE PAIN (6)

## 2017-09-11 NOTE — MR AVS SNAPSHOT
"              After Visit Summary   9/11/2017    Autumn Holbrook    MRN: [id number]           Patient Information     Date Of Birth          1975        Visit Information        Provider Department      9/11/2017 3:00 PM Gurjit Hung DO  ORTHOPEDICS         Follow-ups after your visit        Your next 10 appointments already scheduled     Sep 26, 2017  2:30 PM CDT   (Arrive by 2:15 PM)   Return Nutrition with Blanca Mota RD   [facility] ([facility] )    [address] [place] [address] [phone]            Jan 08, 2018  2:00 PM CST   (Arrive by 1:45 PM)   Return Visit with Gurjit Hung DO    ORTHOPEDICS ([facility] )    [address] [facility] [address] [phone]              Who to contact     If you have questions or need follow up information about today's clinic visit or your schedule please contact  ORTHOPEDICS directly at [phone].  Normal or non-critical lab and imaging results will be communicated to you by [employer], letter or phone within 4 business days after the clinic has received the results. If you do not hear from us within 7 days, please contact the clinic through AkaRxt or phone. If you have a critical or abnormal lab result, we will notify you by phone as soon as possible.  Submit refill requests through [employer]weetminster or call your pharmacy and they will forward the refill request to us. Please allow 3 business days for your refill to be completed.          Additional Information About Your Visit        Bagels [employer] BeanharSocialite Information     Tweetminster lets you send messages to your doctor, view your test results, renew your prescriptions, schedule appointments and more. To sign up, go to [email or web address] . Click on \"Log in\" on the left side of the screen, which will take you to the Welcome page. Then click on \"Sign up Now\" on the right side of the page.     You will be asked to enter the access code listed below, as well " "as some personal information. Please follow the directions to create your username and password.     Your access code is: [email or web address]  Expires: 10/1/2017  1:41 PM     Your access code will  in 90 days. If you need help or a new code, please call your [facility] clinic or [phone].        Care EveryWhere ID     This is your Care EveryWhere ID. This could be used by other organizations to access your [facility] medical records  [id number]        Your Vitals Were     Pulse Temperature Respirations Height Pulse Oximetry BMI (Body Mass Index)    78 97.8  F (36.6  C) (Tympanic) 18 5' 3\" (1.6 m) 96% 46.94 kg/m2       Blood Pressure from Last 3 Encounters:   17 118/70   17 116/68   17 116/70    Weight from Last 3 Encounters:   17 265 lb (120.2 kg)   17 266 lb 11.2 oz (121 kg)   17 265 lb (120.2 kg)              Today, you had the following     No orders found for display       Primary Care Provider Office Phone # Fax #    SEVEN Pandey [phone]       [facility] [address] [place] [id number]        Equal Access to Services     SEBAS BUITRAGO AH: Hadii aad ku hadasho Soomaali, waaxda luqadaha, qaybta kaalmada adeegyada, consuelo ball haykhloen evie eduardo . So [facility] [phone].    ATENCIÓN: Si habla español, tiene a muñoz disposición servicios gratuitos de asistencia lingüística. Llame al [phone].    We comply with applicable federal civil rights laws and [place] laws. We do not discriminate on the basis of race, color, national origin, age, disability sex, sexual orientation or gender identity.            Thank you!     Thank you for choosing  ORTHOPEDICS  for your care. Our goal is always to provide you with excellent care. Hearing back from our patients is one way we can continue to improve our services. Please take a few minutes to complete the written survey that you may receive in the mail after your visit with us. Thank " you!             Your Updated Medication List - Protect others around you: Learn how to safely use, store and throw away your medicines at www.disposemymeds.org.          This list is accurate as of: 9/11/17  3:15 PM.  Always use your most recent med list.                   Brand Name Dispense Instructions for use Diagnosis    buprenorphine HCl-naloxone HCl 8-2 MG per film    SUBOXONE     Place 8 Film under the tongue 2 times daily Patient states takes 1 film 8 mg twice daily        hydrochlorothiazide 12.5 MG capsule    MICROZIDE    90 capsule    Take 1 capsule (12.5 mg) by mouth daily    Benign essential hypertension       HYDROXYZINE HCL PO      Take 25 mg by mouth 4 times daily as needed for itching        LAMICTAL 100 MG tablet   Generic drug:  lamoTRIgine      Take 150 mg by mouth 2 times daily        levothyroxine 200 MCG tablet    SYNTHROID/LEVOTHROID    90 tablet    Take 1 tablet (200 mcg) by mouth daily    Acquired hypothyroidism       nicotine      Place onto the skin every 8 hours        TYLENOL PO      Take 500 mg by mouth every 4 hours as needed for mild pain or fever        WELLBUTRIN  MG 12 hr tablet   Generic drug:  buPROPion      Take 150 mg by mouth daily

## 2017-09-11 NOTE — PROGRESS NOTES
Patient presents today requesting a steroid injection into her right knee.  She has established diagnosis of posttraumatic osteoarthritis of the right knee, although her x-ray findings and her last radiographs were not more than mild to moderate.  She schedules her Synvisc injections every 6 months and has done that regardless of whether her symptoms have worsened or not, and she has in the past received several corticosteroid injections in the knee on almost a scheduled basis.  Every 6-8 weeks seems to be her pattern.  She moved up here from the [facility] back in the spring and has had one corticosteroid injection by me back in April, and then her Synvisc was administered in July.  Since that time there's been no appreciable change in her knee, she has no effusions, pain pattern is roughly similar.  She has gained proximally 30-40 pounds since the spring as well.  She has done no PT, and is trying to diet but apparently not very successfully.  Recent thyroid function was within normal ranges.    Physical exam: The patient has a valgus knee, has no effusion, has full range of motion in extension and has flexion to approximately 100 .  She has mild joint line tenderness, and tolerates a fairly vigorous physical exam today.  X-rays were reviewed from her previous radiographs.  Assessment and plan: I think the patient has become too dependent on these injections and seems to be very fixated on them.  I informed her that corticosteroid injections particularly in early in an mild arthritis Actually cause further arthritic change and deterioration of the cartilage and actually accelerate the process, they should be used extremely sparingly and only for control of significant inflammation in order words she should reserve them for when she really has no other option.  Also as far as her Synvisc is concerned scheduling them every 6 months as probably not necessary either the patient was very agitated and flustered by this  "conversation but I do not think it's in her best interest to be placing medication and her knees every 3-4 months for her particular level of arthritic change.  I recommended she work very hard on weight loss, worked very hard on maintaining her range of motion and getting some muscle strength and control, cannot rely on these injections on a scheduled manner, again she was not very happy with my assessment and was very insistent upon the injections but I refused.  I recommend that she follows up with me or another orthopedist of her choice on an as-needed basis but am strongly recommending against repetitive corticosteroid injections several times a year for her condition./70 (BP Location: Left arm, Patient Position: Sitting, Cuff Size: Adult Large)  Pulse 78  Temp 97.8  F (36.6  C) (Tympanic)  Resp 18  Ht 5' 3\" (1.6 m)  Wt 265 lb (120.2 kg)  SpO2 96%  BMI 46.94 kg/m2  "

## 2017-09-11 NOTE — NURSING NOTE
"Chief Complaint   Patient presents with     Musculoskeletal Problem     Right knee follow up on injection        Initial /70 (BP Location: Left arm, Patient Position: Sitting, Cuff Size: Adult Large)  Pulse 78  Temp 97.8  F (36.6  C) (Tympanic)  Resp 18  Ht 5' 3\" (1.6 m)  Wt 265 lb (120.2 kg)  SpO2 96%  BMI 46.94 kg/m2 Estimated body mass index is 46.94 kg/(m^2) as calculated from the following:    Height as of this encounter: 5' 3\" (1.6 m).    Weight as of this encounter: 265 lb (120.2 kg).  Medication Reconciliation: unable or not appropriate to perform   Nevaeh Dudley LPN      "

## 2017-09-26 ENCOUNTER — HOSPITAL ENCOUNTER (OUTPATIENT)
Dept: NUTRITION | Facility: HOSPITAL | Age: 42
Setting detail: THERAPIES SERIES
End: 2017-09-26
Attending: PHYSICIAN ASSISTANT
Payer: COMMERCIAL

## 2017-09-26 VITALS — BODY MASS INDEX: 45.47 KG/M2 | WEIGHT: 256.7 LBS

## 2017-09-26 PROCEDURE — 97803 MED NUTRITION INDIV SUBSEQ: CPT | Performed by: DIETITIAN, REGISTERED

## 2017-09-26 NOTE — PROGRESS NOTES
Pt is here today for follow up regarding her desire to lose weight.      Wt 116.4 kg (256 lb 11.2 oz)  BMI 45.47 kg/m2  Weight is down 10# in the past month.  Pt is very pleased with her loss.      She notes that she has done well with limiting sweets.  She placed all the sweets that her mother likes in one cupboard and she does not go in it.  She has made some jello and sugar free pudding for herself.  Pt has started eating some oatmeal for breakfast which she states keeps her full for a long time.  She is trying to eat supper earlier 5:30-7 pm.  Still no scheduled exercise - doing many household projects she states need to be done by winter.      Pt states she has some swelling in her legs she feels may be r/t her Suboxone.  She is going to talk to her provider in [place] regarding weaning the Suboxone.      Pt has done well this month. Encouraged increased exercise.  She desires follow up to continue to stay on track.  Will see again in 1 month.      Total time spent with pt was 20 minutes.

## 2017-10-30 ENCOUNTER — HOSPITAL ENCOUNTER (OUTPATIENT)
Dept: NUTRITION | Facility: HOSPITAL | Age: 42
Setting detail: THERAPIES SERIES
End: 2017-10-30
Attending: PHYSICIAN ASSISTANT
Payer: COMMERCIAL

## 2017-10-30 VITALS — WEIGHT: 255.6 LBS | BODY MASS INDEX: 45.28 KG/M2

## 2017-10-30 PROCEDURE — 97803 MED NUTRITION INDIV SUBSEQ: CPT | Performed by: DIETITIAN, REGISTERED

## 2017-10-30 NOTE — PROGRESS NOTES
Pt is here today for follow up r/t her desire to lose weight.      Current weight is 255.6# which is down 1.1# from visit one month ago and down 11.1# in the past 2 months.      Pt reports she has not been feeling well since after our last visit.  She c/o of nausea, vomiting, some dizziness and not being able to eat much.  She is somewhat shocked she did not lose more weight.  Pt started to wean Suboxone last week and is also changing antidepressants from Wellbutrin to Zoloft.  She states she has swelling in her legs and hopes this will go away as Suboxone weans (weaning takes 7 months).  She has also been wearing a nicotine patch in effort to quit smoking.  She is very focused on feeling ill.  I encouraged her to see primary provider.      Pt states she has been eating very small portions r/t feeling so ill.  Activity has been the cleaning and organizing of her mother's home along with some shoveling.  She has thoughts of joining a gym but has not followed through.  She is walking with a cane today.      Pt desires follow up in month.  Weight loss goal remains 1# per week.      Will see again in 1 month for follow up.      Total time spent with pt was 30 minutes.

## 2017-11-26 DIAGNOSIS — I10 BENIGN ESSENTIAL HYPERTENSION: ICD-10-CM

## 2017-11-28 ENCOUNTER — HOSPITAL ENCOUNTER (OUTPATIENT)
Dept: NUTRITION | Facility: HOSPITAL | Age: 42
Discharge: HOME | End: 2017-11-28
Attending: PHYSICIAN ASSISTANT | Admitting: PHYSICIAN ASSISTANT
Payer: COMMERCIAL

## 2017-11-28 VITALS — BODY MASS INDEX: 46.07 KG/M2 | WEIGHT: 260.1 LBS

## 2017-11-28 PROCEDURE — 97803 MED NUTRITION INDIV SUBSEQ: CPT | Performed by: DIETITIAN, REGISTERED

## 2017-11-28 NOTE — PROGRESS NOTES
"Pt is here today for follow up regarding her desire to lose weight.      Current weight is 2601.# with ibwr of 104-127#.  BMI is 46.  She would like to weigh 180#.     Weight is up 4.5# in the past month.     Pt reports the past month has been \"bad\".  She notes more travel and dining out.  Pt also states she had two Thanksgivings and has been eating more sweets and comfort foods.      No routine exercise.  Stopped wearing her Fitbit as she can't find the .      Pt continues to speak much about how busy she is and that she does not have time to exercise.  She also notes she will have more sweets available to her over the Christmas season.  We again discussed the importance of routine exercise and taking care of herself so she can continue to take care of others.  Pt does see a counselor.  She continues to wean off of  Suboxone.  Pt states legs remain swollen.  Reviewed benefits of low sodium diet and foods to limit.      Goals:  Routine exercise.   Weight loss 1# per week.  Pt will investigate WEL program.     Follow up in 1 month.     Total time spent with pt was 35 minutes.    "

## 2017-11-30 RX ORDER — HYDROCHLOROTHIAZIDE 12.5 MG/1
CAPSULE ORAL
Qty: 90 CAPSULE | Refills: 2 | Status: SHIPPED | OUTPATIENT
Start: 2017-11-30 | End: 2018-08-15

## 2018-01-11 ENCOUNTER — TELEPHONE (OUTPATIENT)
Dept: FAMILY MEDICINE | Facility: OTHER | Age: 43
End: 2018-01-11

## 2018-01-11 NOTE — TELEPHONE ENCOUNTER
Received DMV form for a disability parking permit to be filled out.  States her current one expires this month. Okay to fill this out for her osteoarthritis of knee.  Rosio Bethea LPN

## 2018-01-29 ENCOUNTER — ALLIED HEALTH/NURSE VISIT (OUTPATIENT)
Dept: ALLERGY | Facility: OTHER | Age: 43
End: 2018-01-29
Attending: PHYSICIAN ASSISTANT
Payer: COMMERCIAL

## 2018-01-29 DIAGNOSIS — Z11.1 SCREENING EXAMINATION FOR PULMONARY TUBERCULOSIS: Primary | ICD-10-CM

## 2018-01-29 PROCEDURE — 86580 TB INTRADERMAL TEST: CPT | Mod: ZL

## 2018-01-29 PROCEDURE — 96372 THER/PROPH/DIAG INJ SC/IM: CPT

## 2018-01-31 ENCOUNTER — ALLIED HEALTH/NURSE VISIT (OUTPATIENT)
Dept: ALLERGY | Facility: OTHER | Age: 43
End: 2018-01-31
Attending: PHYSICIAN ASSISTANT
Payer: COMMERCIAL

## 2018-01-31 DIAGNOSIS — Z11.1 SCREENING EXAMINATION FOR PULMONARY TUBERCULOSIS: Primary | ICD-10-CM

## 2018-01-31 LAB
PPDINDURATION: 0 MM (ref 0–5)
PPDREDNESS: 0 MM

## 2018-01-31 NOTE — MR AVS SNAPSHOT
"              After Visit Summary   2018    Autumn Holbrook    MRN: [id number]           Patient Information     Date Of Birth          1975        Visit Information        Provider Department      2018 2:00 PM HC SHOT ROOM [facility]        Today's Diagnoses     Screening examination for pulmonary tuberculosis    -  1       Follow-ups after your visit        Who to contact     If you have questions or need follow up information about today's clinic visit or your schedule please contact [facility] directly at [phone].  Normal or non-critical lab and imaging results will be communicated to you by MyChart, letter or phone within 4 business days after the clinic has received the results. If you do not hear from us within 7 days, please contact the clinic through [facility] or phone. If you have a critical or abnormal lab result, we will notify you by phone as soon as possible.  Submit refill requests through [employer] or call your pharmacy and they will forward the refill request to us. Please allow 3 business days for your refill to be completed.          Additional Information About Your Visit        MyChart Information     [employer]See[employer] lets you send messages to your doctor, view your test results, renew your prescriptions, schedule appointments and more. To sign up, go to [email or web address] . Click on \"Log in\" on the left side of the screen, which will take you to the Welcome page. Then click on \"Sign up Now\" on the right side of the page.     You will be asked to enter the access code listed below, as well as some personal information. Please follow the directions to create your username and password.     Your access code is: [email or web address]  Expires: 2018  1:57 PM     Your access code will  in 90 days. If you need help or a new code, please call your [facility] or [phone].        Care EveryWhere ID     This is your Care EveryWhere ID. This could be used " by other organizations to access your [facility] medical records  [id number]         Blood Pressure from Last 3 Encounters:   09/11/17 118/70   08/30/17 116/68   07/06/17 116/70    Weight from Last 3 Encounters:   11/28/17 260 lb 1.6 oz (118 kg)   10/30/17 255 lb 9.6 oz (115.9 kg)   09/26/17 256 lb 11.2 oz (116.4 kg)              Today, you had the following     No orders found for display       Primary Care Provider Office Phone # Fax #    SEVEN Pandey [phone]       [facility] [address] [facility] [address]  [facility] [place] [address]        Equal Access to Services     BELL BUITRAGO : Hadii aad ku hadasho Soomaali, waaxda luqadaha, qaybta kaalmada adeegyada, consuelo eduardo . So [facility] [phone].    ATENCIÓN: Si habla español, tiene a muñoz disposición servicios gratuitos de asistencia lingüística. Llame al [phone].    We comply with applicable federal civil rights laws and [place] laws. We do not discriminate on the basis of race, color, national origin, age, disability, sex, sexual orientation, or gender identity.            Thank you!     Thank you for choosing [facility]  for your care. Our goal is always to provide you with excellent care. Hearing back from our patients is one way we can continue to improve our services. Please take a few minutes to complete the written survey that you may receive in the mail after your visit with us. Thank you!             Your Updated Medication List - Protect others around you: Learn how to safely use, store and throw away your medicines at www.disposemymeds.org.          This list is accurate as of 1/31/18  2:56 PM.  Always use your most recent med list.                   Brand Name Dispense Instructions for use Diagnosis    buprenorphine HCl-naloxone HCl 8-2 MG per film    SUBOXONE     Place 8 Film under the tongue 2 times daily Patient states takes 1 film 8 mg twice daily        hydrochlorothiazide 12.5 MG  capsule    MICROZIDE    90 capsule    TAKE ONE CAPSULE BY MOUTH ONCE DAILY    Benign essential hypertension       HYDROXYZINE HCL PO      Take 25 mg by mouth 4 times daily as needed for itching        LAMICTAL 100 MG tablet   Generic drug:  lamoTRIgine      Take 150 mg by mouth 2 times daily        levothyroxine 200 MCG tablet    SYNTHROID/LEVOTHROID    90 tablet    Take 1 tablet (200 mcg) by mouth daily    Acquired hypothyroidism       nicotine      Place onto the skin every 8 hours        TYLENOL PO      Take 500 mg by mouth every 4 hours as needed for mild pain or fever        WELLBUTRIN  MG 12 hr tablet   Generic drug:  buPROPion      Take 150 mg by mouth daily

## 2018-01-31 NOTE — PROGRESS NOTES
Prior to mantoux reading verified patient identity using patient's name and date of birth.    Mantoux results: No induration.  No swelling.  No redness.  Janay Hemphill RN

## 2018-03-02 DIAGNOSIS — E03.9 ACQUIRED HYPOTHYROIDISM: ICD-10-CM

## 2018-03-02 RX ORDER — LEVOTHYROXINE SODIUM 200 UG/1
TABLET ORAL
Qty: 90 TABLET | Refills: 0 | Status: SHIPPED | OUTPATIENT
Start: 2018-03-02 | End: 2018-05-29

## 2018-05-23 NOTE — PROGRESS NOTES
SUBJECTIVE:   Autumn Holbrook is a 42 year old female who presents to clinic today for the following health issues:        Hypothyroidism Follow-up      Since last visit, patient describes the following symptoms: weight gain of 25 lbs, dry skin, tremors, depression and fatigue      Amount of exercise or physical activity: 6-7 days/week for an average of 30-45 minutes    Problems taking medications regularly: No    Medication side effects: fatigue    Diet: regular (no restrictions)        Depression and Anxiety Follow-Up    Status since last visit: Worsened  Body pains,weight gain,living situation    Other associated symptoms:None    Complicating factors:     Significant life event: No     Current substance abuse: None    PHQ-9 2017   Total Score 22 23 24   Q9: Suicide Ideation Nearly every day Nearly every day Nearly every day     COY-7 SCORE 2017   Total Score 21 21 19       PHQ-9  English  PHQ-9   Any Language  COY-7  Suicide Assessment Five-step Evaluation and Treatment (SAFE-T)    Problem list and histories reviewed & adjusted, as indicated.  Additional history: as documented    Patient Active Problem List   Diagnosis     ACP (advance care planning)     Post-traumatic osteoarthritis of right knee     Chronic pain syndrome     Opioid dependence in remission (H)     PTSD (post-traumatic stress disorder)     COY (generalized anxiety disorder)     Moderate episode of recurrent major depressive disorder (H)     Bilateral edema of lower extremity     Acquired hypothyroidism     Past Surgical History:   Procedure Laterality Date     CHOLECYSTECTOMY       GYN SURGERY       SECTION 1292-2460     GYN SURGERY  2004    LEE       Social History   Substance Use Topics     Smoking status: Current Every Day Smoker     Packs/day: 0.50     Years: 20.00     Types: Cigarettes     Smokeless tobacco: Never Used     Alcohol use No     Family History   Problem Relation Age  of Onset     CEREBROVASCULAR DISEASE Mother      Alcoholism Mother      CANCER Mother      Depression Mother      Eczema Mother      Hypertension Mother      Migraines Mother      MENTAL ILLNESS Mother      Osteoarthritis Mother      OSTEOPOROSIS Mother      Alcoholism Father      CANCER Father      Hyperlipidemia Father      Hypertension Father      Myocardial Infarction Father      MENTAL ILLNESS Sister      Migraines Sister          Current Outpatient Prescriptions   Medication Sig Dispense Refill     Acetaminophen (TYLENOL PO) Take 500 mg by mouth every 4 hours as needed for mild pain or fever       atomoxetine (STRATTERA) 40 MG capsule Take 40 mg by mouth       buprenorphine HCl-naloxone HCl (SUBOXONE) 8-2 MG per film Place 8 Film under the tongue 2 times daily Patient states takes 1 film 8 mg twice daily       hydrochlorothiazide (MICROZIDE) 12.5 MG capsule TAKE ONE CAPSULE BY MOUTH ONCE DAILY 90 capsule 2     HYDROXYZINE HCL PO Take 25 mg by mouth 4 times daily as needed for itching       lamoTRIgine (LAMICTAL) 100 MG tablet Take 150 mg by mouth 2 times daily        levothyroxine (SYNTHROID/LEVOTHROID) 200 MCG tablet TAKE ONE TABLET BY MOUTH ONCE DAILY 90 tablet 0     nicotine Patch in Place Place onto the skin every 8 hours       predniSONE (DELTASONE) 20 MG tablet        sertraline (ZOLOFT) 100 MG tablet        Allergies   Allergen Reactions     Depakote [Valproic Acid]      Gabapentin Swelling     Recent Labs   Lab Test  08/30/17   1000  06/14/17   1017 04/07/16   A1C  5.8   --    --    LDL  120*   --    --    HDL  81   --    --    TRIG  54   --    --    ALT  24   --   25   CR  0.80  0.78  1.0   GFRESTIMATED  77  80   --    GFRESTBLACK  >90  >90  African American GFR Calc     --    POTASSIUM  3.8  4.3  4.6   TSH  3.41  1.73   --       BP Readings from Last 3 Encounters:   05/24/18 120/70   09/11/17 118/70   08/30/17 116/68    Wt Readings from Last 3 Encounters:   05/24/18 268 lb (121.6 kg)   11/28/17 260  "lb 1.6 oz (118 kg)   10/30/17 255 lb 9.6 oz (115.9 kg)                    Reviewed and updated as needed this visit by clinical staff       Reviewed and updated as needed this visit by Provider         ROS:  Constitutional, neuro, ENT, endocrine, pulmonary, cardiac, gastrointestinal, genitourinary, musculoskeletal, integument and psychiatric systems are negative, except as otherwise noted.    OBJECTIVE:                                                    /70  Pulse 74  Temp 98  F (36.7  C)  Ht 5' 3\" (1.6 m)  Wt 268 lb (121.6 kg)  SpO2 97%  Breastfeeding? No  BMI 47.47 kg/m2  Body mass index is 47.47 kg/(m^2).  GENERAL APPEARANCE: healthy, alert and no distress  EYES: Eyes grossly normal to inspection, PERRL and conjunctivae and sclerae normal  HENT: ear canals and TM's normal and nose and mouth without ulcers or lesions  NECK: no adenopathy, no asymmetry, masses, or scars and thyroid normal to palpation  RESP: lungs clear to auscultation - no rales, rhonchi or wheezes  CV: regular rates and rhythm, normal S1 S2, no S3 or S4 and no murmur, click or rub  LYMPHATICS: no cervical adenopathy  ABDOMEN: soft, nontender, without hepatosplenomegaly or masses and bowel sounds normal  MS: extremities normal- no gross deformities noted  SKIN: no suspicious lesions or rashes  NEURO: Normal strength and tone, mentation intact and speech normal  PSYCH: crying all the time. Very overwhelmed. 35 minutes just on her mom and her stressors.     Diagnostic test results:  Diagnostic Test Results:  No results found for this or any previous visit (from the past 24 hour(s)).     ASSESSMENT/PLAN:                                                    1. Acquired hypothyroidism  She is going to be given a recheck and increase her medications.   - TSH with free T4 reflex    2. Moderate episode of recurrent major depressive disorder (H)  She is depressed. Started on Straterra.     3. COY (generalized anxiety disorder)  Mood lability. " Given recommendations. Is seeing counseling.       See Patient Instructions    SEVEN Bryant  [facility]

## 2018-05-23 NOTE — PATIENT INSTRUCTIONS

## 2018-05-24 ENCOUNTER — OFFICE VISIT (OUTPATIENT)
Dept: FAMILY MEDICINE | Facility: OTHER | Age: 43
End: 2018-05-24
Attending: PHYSICIAN ASSISTANT
Payer: COMMERCIAL

## 2018-05-24 ENCOUNTER — RADIANT APPOINTMENT (OUTPATIENT)
Dept: GENERAL RADIOLOGY | Facility: OTHER | Age: 43
End: 2018-05-24
Attending: PHYSICIAN ASSISTANT
Payer: COMMERCIAL

## 2018-05-24 VITALS
SYSTOLIC BLOOD PRESSURE: 120 MMHG | WEIGHT: 268 LBS | TEMPERATURE: 98 F | BODY MASS INDEX: 47.48 KG/M2 | DIASTOLIC BLOOD PRESSURE: 70 MMHG | HEIGHT: 63 IN | HEART RATE: 74 BPM | OXYGEN SATURATION: 97 %

## 2018-05-24 DIAGNOSIS — R10.32 ABDOMINAL PAIN, LEFT LOWER QUADRANT: ICD-10-CM

## 2018-05-24 DIAGNOSIS — F41.1 GAD (GENERALIZED ANXIETY DISORDER): ICD-10-CM

## 2018-05-24 DIAGNOSIS — F33.1 MODERATE EPISODE OF RECURRENT MAJOR DEPRESSIVE DISORDER (H): ICD-10-CM

## 2018-05-24 DIAGNOSIS — E03.9 ACQUIRED HYPOTHYROIDISM: Primary | ICD-10-CM

## 2018-05-24 LAB
ALBUMIN SERPL-MCNC: 3.7 G/DL (ref 3.4–5)
ALP SERPL-CCNC: 76 U/L (ref 40–150)
ALT SERPL W P-5'-P-CCNC: 21 U/L (ref 0–50)
ANION GAP SERPL CALCULATED.3IONS-SCNC: 3 MMOL/L (ref 3–14)
AST SERPL W P-5'-P-CCNC: 11 U/L (ref 0–45)
BASOPHILS # BLD AUTO: 0.1 10E9/L (ref 0–0.2)
BASOPHILS NFR BLD AUTO: 0.6 %
BILIRUB SERPL-MCNC: 0.4 MG/DL (ref 0.2–1.3)
BUN SERPL-MCNC: 14 MG/DL (ref 7–30)
CALCIUM SERPL-MCNC: 9.2 MG/DL (ref 8.5–10.1)
CHLORIDE SERPL-SCNC: 103 MMOL/L (ref 94–109)
CO2 SERPL-SCNC: 33 MMOL/L (ref 20–32)
CREAT SERPL-MCNC: 0.84 MG/DL (ref 0.52–1.04)
DIFFERENTIAL METHOD BLD: ABNORMAL
EOSINOPHIL # BLD AUTO: 0.3 10E9/L (ref 0–0.7)
EOSINOPHIL NFR BLD AUTO: 1.9 %
ERYTHROCYTE [DISTWIDTH] IN BLOOD BY AUTOMATED COUNT: 14.9 % (ref 10–15)
GFR SERPL CREATININE-BSD FRML MDRD: 73 ML/MIN/1.7M2
GLUCOSE SERPL-MCNC: 102 MG/DL (ref 70–99)
HCT VFR BLD AUTO: 44.7 % (ref 35–47)
HGB BLD-MCNC: 14.7 G/DL (ref 11.7–15.7)
IMM GRANULOCYTES # BLD: 0.1 10E9/L (ref 0–0.4)
IMM GRANULOCYTES NFR BLD: 0.9 %
LYMPHOCYTES # BLD AUTO: 3.3 10E9/L (ref 0.8–5.3)
LYMPHOCYTES NFR BLD AUTO: 23.4 %
MCH RBC QN AUTO: 29 PG (ref 26.5–33)
MCHC RBC AUTO-ENTMCNC: 32.9 G/DL (ref 31.5–36.5)
MCV RBC AUTO: 88 FL (ref 78–100)
MONOCYTES # BLD AUTO: 1 10E9/L (ref 0–1.3)
MONOCYTES NFR BLD AUTO: 6.9 %
NEUTROPHILS # BLD AUTO: 9.2 10E9/L (ref 1.6–8.3)
NEUTROPHILS NFR BLD AUTO: 66.3 %
NRBC # BLD AUTO: 0 10*3/UL
NRBC BLD AUTO-RTO: 0 /100
PLATELET # BLD AUTO: 301 10E9/L (ref 150–450)
POTASSIUM SERPL-SCNC: 4.4 MMOL/L (ref 3.4–5.3)
PROT SERPL-MCNC: 7.3 G/DL (ref 6.8–8.8)
RBC # BLD AUTO: 5.07 10E12/L (ref 3.8–5.2)
SODIUM SERPL-SCNC: 139 MMOL/L (ref 133–144)
TSH SERPL DL<=0.005 MIU/L-ACNC: 1.17 MU/L (ref 0.4–4)
WBC # BLD AUTO: 13.9 10E9/L (ref 4–11)

## 2018-05-24 PROCEDURE — G0463 HOSPITAL OUTPT CLINIC VISIT: HCPCS

## 2018-05-24 PROCEDURE — 85025 COMPLETE CBC W/AUTO DIFF WBC: CPT | Mod: ZL | Performed by: PHYSICIAN ASSISTANT

## 2018-05-24 PROCEDURE — 36415 COLL VENOUS BLD VENIPUNCTURE: CPT | Mod: ZL | Performed by: PHYSICIAN ASSISTANT

## 2018-05-24 PROCEDURE — 84443 ASSAY THYROID STIM HORMONE: CPT | Mod: ZL | Performed by: PHYSICIAN ASSISTANT

## 2018-05-24 PROCEDURE — 74019 RADEX ABDOMEN 2 VIEWS: CPT | Mod: TC

## 2018-05-24 PROCEDURE — 99214 OFFICE O/P EST MOD 30 MIN: CPT | Performed by: PHYSICIAN ASSISTANT

## 2018-05-24 PROCEDURE — 80053 COMPREHEN METABOLIC PANEL: CPT | Mod: ZL | Performed by: PHYSICIAN ASSISTANT

## 2018-05-24 RX ORDER — PREDNISONE 20 MG/1
TABLET ORAL
COMMUNITY
Start: 2018-04-19 | End: 2018-12-19

## 2018-05-24 RX ORDER — ATOMOXETINE 40 MG/1
40 CAPSULE ORAL
COMMUNITY
Start: 2018-05-17 | End: 2018-09-21

## 2018-05-24 RX ORDER — SERTRALINE HYDROCHLORIDE 100 MG/1
TABLET, FILM COATED ORAL
COMMUNITY
Start: 2018-03-15 | End: 2019-06-27 | Stop reason: DRUGHIGH

## 2018-05-24 ASSESSMENT — ANXIETY QUESTIONNAIRES
1. FEELING NERVOUS, ANXIOUS, OR ON EDGE: NEARLY EVERY DAY
GAD7 TOTAL SCORE: 20
IF YOU CHECKED OFF ANY PROBLEMS ON THIS QUESTIONNAIRE, HOW DIFFICULT HAVE THESE PROBLEMS MADE IT FOR YOU TO DO YOUR WORK, TAKE CARE OF THINGS AT HOME, OR GET ALONG WITH OTHER PEOPLE: VERY DIFFICULT
2. NOT BEING ABLE TO STOP OR CONTROL WORRYING: NEARLY EVERY DAY
5. BEING SO RESTLESS THAT IT IS HARD TO SIT STILL: NEARLY EVERY DAY
4. TROUBLE RELAXING: NEARLY EVERY DAY
6. BECOMING EASILY ANNOYED OR IRRITABLE: NEARLY EVERY DAY
7. FEELING AFRAID AS IF SOMETHING AWFUL MIGHT HAPPEN: MORE THAN HALF THE DAYS
3. WORRYING TOO MUCH ABOUT DIFFERENT THINGS: NEARLY EVERY DAY

## 2018-05-24 ASSESSMENT — PAIN SCALES - GENERAL: PAINLEVEL: MODERATE PAIN (4)

## 2018-05-24 NOTE — NURSING NOTE
"Chief Complaint   Patient presents with     Thyroid Problem     Panel Management     hiv screen, TD      Depression       Initial /70  Pulse 74  Temp 98  F (36.7  C)  Ht 5' 3\" (1.6 m)  Wt 268 lb (121.6 kg)  SpO2 97%  Breastfeeding? No  BMI 47.47 kg/m2 Estimated body mass index is 47.47 kg/(m^2) as calculated from the following:    Height as of this encounter: 5' 3\" (1.6 m).    Weight as of this encounter: 268 lb (121.6 kg).  Medication Reconciliation: complete    SKYE Samayoa    "

## 2018-05-24 NOTE — MR AVS SNAPSHOT
After Visit Summary   5/24/2018    Autumn Holbrook    MRN: [id number]           Patient Information     Date Of Birth          1975        Visit Information        Provider Department      5/24/2018 11:15 AM Apryl Hathaway PA [facility] [place]        Today's Diagnoses     Acquired hypothyroidism    -  1    Moderate episode of recurrent major depressive disorder (H)        COY (generalized anxiety disorder)        Abdominal pain, left lower quadrant          Care Instructions      HOW TO QUIT SMOKING  Smoking is one of the hardest habits to break. About half of all those who have ever smoked have been able to quit, and most of those (about 70%) who still smoke want to quit. Here are some of the best ways to stop smoking.     KEEP TRYING:  It takes most smokers about 8 tries before they are finally able to fully quit. So, the more often you try and fail, the better your chance of quitting the next time! So, don't give up!    GO COLD TURKEY:  Most ex-smokers quit cold turkey. Trying to cut back gradually doesn't seem to work as well, perhaps because it continues the smoking habit. Also, it is possible to fool yourself by inhaling more while smoking fewer cigarettes. This results in the same amount of nicotine in your body!    GET SUPPORT:  Support programs can make an important difference, especially for the heavy smoker. These groups offer lectures, methods to change your behavior and peer support. Call the free national Quitline for more information. 800-QUIT-NOW (163-892-5785). Low-cost or free programs are offered by many hospitals, local chapters of the American Lung Association (329-304-4979) and the American Cancer Society (697-127-8427). Support at home is important too. Non-smokers can help by offering praise and encouragement. If the smoker fails to quit, encourage them to try again!    OVER-THE-COUNTER MEDICINES:  For those who can't quit on their own, Nicotine Replacement  Therapy (NRT) may make quitting much easier. Certain aids such as the nicotine patch, gum and lozenge are available without a prescription. However, it is best to use these under the guidance of your doctor. The skin patch provides a steady supply of nicotine to the body. Nicotine gum and lozenge gives temporary bursts of low levels of nicotine. Both methods take the edge off the craving for cigarettes. WARNING: If you feel symptoms of nicotine overdose, such as nausea, vomiting, dizziness, weakness, or fast heartbeat, stop using these and see your doctor.    PRESCRIPTION MEDICINES:  After evaluating your smoking patterns and prior attempts at quitting, your doctor may offer a prescription medicine such as bupropion (Zyban, Wellbutrin), varenicline (Chantix, Champix), a niocotine inhaler or nasal spray. Each has its unique advantage and side effects which your doctor can review with you.    HEALTH BENEFITS OF QUITTING:  The benefits of quitting start right away and keep improving the longer you go without smokin minutes: blood pressure and pulse return to normal  8 hours: oxygen levels return to normal  2 days: ability to smell and taste begins to improve as damaged nerves start to regrow  2-3 weeks: circulation and lung function improves  1-9 months: decreased cough, congestion and shortness of breath; less tired  1 year: risk of heart attack decreases by half  5 years: risk of lung cancer decreases by half; risk of stroke becomes the same as a non-smoker  For information about how to quit smoking, visit the following links:  National Cancer [place] ,   Clearing the Air, Quit Smoking Today   - an online booklet. http://www.smokefree.gov/pubs/clearing_the_air.pdf  Smokefree.gov http://smokefree.gov/  QuitNet http://www.quitnet.com/    4363-8639 [employer]le[employer] Singlet[employer], [address] [place] [address], [place] [address]. All rights reserved. This information is not intended as a substitute for professional medical care.  Always follow your healthcare professional's instructions.    The Benefits of Living Smoke Free  What do you want to gain from quitting? Check off some reasons to quit.  Health Benefits  ___ Reduce my risk of lung cancer, heart disease, chronic lung disease  ___ Have fewer wrinkles and softer skin  ___ Improve my sense of taste and smell  ___ For pregnant women--reduce the risk of having a miscarriage, stillbirth, premature birth, or low-birth-weight baby  Personal Benefits  ___ Feel more in control of my life  ___ Have better-smelling hair, breath, clothes, home, and car  ___ Save time by not having to take smoke breaks, buy cigarettes, or hunt for a light  ___ Have whiter teeth  Family Benefits  ___ Reduce my children s respiratory tract infections  ___ Set a good example for my children  ___ Reduce my family s cancer risk  Financial Benefits  ___ Save hundreds of dollars each year that would be spent on cigarettes  ___ Save money on medical bills  ___ Save on life, health, and car insurance premiums    Those Dollars Add Up!  Cigarettes are expensive, and getting more expensive all the time. Do you realize how much money you are spending on cigarettes per year? What is the average amount you spend on a pack of cigarettes? What is the average number of packs that you smoke per day? Using your answers to these questions, fill in this formula to help you find out:  ($ _____ per pack) ×  ( _____ number of packs per day) × (365 days) =  $ _____ yearly cost of smoking  Besides tobacco, there are other costs, including extra cleaning bills and replacement costs for clothing and furniture; medical expenses for smoking-related illnesses; and higher health, life, and car insurance premiums.    Cigars and Pipes Count Too!  Cigars and pipes are also dangerous. So are smokeless (chewing) tobacco and snuff. All of these products contain nicotine, a highly addictive substance that has harmful effects on your body. Quitting  "smoking means giving up all tobacco products.      2034-6738 [employer]le[employer] Singlet[employer], [address] [facility], [place] [address]. All rights reserved. This information is not intended as a substitute for professional medical care. Always follow your healthcare professional's instructions.          Follow-ups after your visit        Future tests that were ordered for you today     Open Future Orders        Priority Expected Expires Ordered    XR ABDOMEN 2 VW (Clinic Performed) Routine 5/24/2018 5/24/2019 5/24/2018            Who to contact     If you have questions or need follow up information about today's clinic visit or your schedule please contact [facility] [place] directly at [phone].  Normal or non-critical lab and imaging results will be communicated to you by MyChart, letter or phone within 4 business days after the clinic has received the results. If you do not hear from us within 7 days, please contact the clinic through MyChart or phone. If you have a critical or abnormal lab result, we will notify you by phone as soon as possible.  Submit refill requests through [employer] or call your pharmacy and they will forward the refill request to us. Please allow 3 business days for your refill to be completed.          Additional Information About Your Visit        Care EveryWhere ID     This is your Care EveryWhere ID. This could be used by other organizations to access your [facility] medical records  [id number]        Your Vitals Were     Pulse Temperature Height Pulse Oximetry Breastfeeding? BMI (Body Mass Index)    74 98  F (36.7  C) 5' 3\" (1.6 m) 97% No 47.47 kg/m2       Blood Pressure from Last 3 Encounters:   05/24/18 120/70   09/11/17 118/70   08/30/17 116/68    Weight from Last 3 Encounters:   05/24/18 268 lb (121.6 kg)   11/28/17 260 lb 1.6 oz (118 kg)   10/30/17 255 lb 9.6 oz (115.9 kg)              We Performed the Following     CBC with platelets differential     Comprehensive metabolic panel (BMP " + Alb, Alk Phos, ALT, AST, Total. Bili, TP)     Tobacco Cessation - Order to Satisfy Health Maintenance     TSH with free T4 reflex        Primary Care Provider Office Phone # Fax #    Apryl WALLACE SEVEN Hathaway [phone]       [facility] [address] [address]        Equal Access to Services     BELL BUITRAGO : Hadii aad ku hadasho Soomaali, waaxda luqadaha, qaybta kaalmada adeegyada, waxay idiin hayaan adeeg kharash laomarn . So [facility] [phone].    ATENCIÓN: Si habla español, tiene a muñoz disposición servicios gratuitos de asistencia lingüística. Brandt al [phone].    We comply with applicable federal civil rights laws and [place] laws. We do not discriminate on the basis of race, color, national origin, age, disability, sex, sexual orientation, or gender identity.            Thank you!     Thank you for choosing [facility]  for your care. Our goal is always to provide you with excellent care. Hearing back from our patients is one way we can continue to improve our services. Please take a few minutes to complete the written survey that you may receive in the mail after your visit with us. Thank you!             Your Updated Medication List - Protect others around you: Learn how to safely use, store and throw away your medicines at www.disposemymeds.org.          This list is accurate as of 5/24/18 12:04 PM.  Always use your most recent med list.                   Brand Name Dispense Instructions for use Diagnosis    atomoxetine 40 MG capsule    STRATTERA     Take 40 mg by mouth        buprenorphine HCl-naloxone HCl 8-2 MG per film    SUBOXONE     Place 8 Film under the tongue 2 times daily Patient states takes 1 film 8 mg twice daily        hydrochlorothiazide 12.5 MG capsule    MICROZIDE    90 capsule    TAKE ONE CAPSULE BY MOUTH ONCE DAILY    Benign essential hypertension       HYDROXYZINE HCL PO      Take 25 mg by mouth 4 times daily as needed for itching        LAMICTAL  100 MG tablet   Generic drug:  lamoTRIgine      Take 150 mg by mouth 2 times daily        levothyroxine 200 MCG tablet    SYNTHROID/LEVOTHROID    90 tablet    TAKE ONE TABLET BY MOUTH ONCE DAILY    Acquired hypothyroidism       nicotine      Place onto the skin every 8 hours        predniSONE 20 MG tablet    DELTASONE          sertraline 100 MG tablet    ZOLOFT          TYLENOL PO      Take 500 mg by mouth every 4 hours as needed for mild pain or fever

## 2018-05-24 NOTE — LETTER
My Depression Action Plan  Name: Autumn BATISTA Hnatko   Date of Birth 1975  Date: 5/23/2018    My doctor: Apryl Hathaway   My clinic: [facility] [place]  [place] [address]  [phone]          GREEN    ZONE   Good Control    What it looks like:     Things are going generally well. You have normal up s and down s. You may even feel depressed from time to time, but bad moods usually last less than a day.   What you need to do:  1. Continue to care for yourself (see self care plan)  2. Check your depression survival kit and update it as needed  3. Follow your physician s recommendations including any medication.  4. Do not stop taking medication unless you consult with your physician first.           YELLOW         ZONE Getting Worse    What it looks like:     Depression is starting to interfere with your life.     It may be hard to get out of bed; you may be starting to isolate yourself from others.    Symptoms of depression are starting to last most all day and this has happened for several days.     You may have suicidal thoughts but they are not constant.   What you need to do:     1. Call your care team, your response to treatment will improve if you keep your care team informed of your progress. Yellow periods are signs an adjustment may need to be made.     2. Continue your self-care, even if you have to fake it!    3. Talk to someone in your support network    4. Open up your depression survival kit           RED    ZONE Medical Alert - Get Help    What it looks like:     Depression is seriously interfering with your life.     You may experience these or other symptoms: You can t get out of bed most days, can t work or engage in other necessary activities, you have trouble taking care of basic hygiene, or basic responsibilities, thoughts of suicide or death that will not go away, self-injurious behavior.     What you need to do:  1. Call your care team and request a  same-day appointment. If they are not available (weekends or after hours) call your local crisis line, emergency room or 911.            Depression Self Care Plan / Survival Kit    Self-Care for Depression  Here s the deal. Your body and mind are really not as separate as most people think.  What you do and think affects how you feel and how you feel influences what you do and think. This means if you do things that people who feel good do, it will help you feel better.  Sometimes this is all it takes.  There is also a place for medication and therapy depending on how severe your depression is, so be sure to consult with your medical provider and/ or Behavioral Health Consultant if your symptoms are worsening or not improving.     In order to better manage my stress, I will:    Exercise  Get some form of exercise, every day. This will help reduce pain and release endorphins, the  feel good  chemicals in your brain. This is almost as good as taking antidepressants!  This is not the same as joining a gym and then never going! (they count on that by the way ) It can be as simple as just going for a walk or doing some gardening, anything that will get you moving.      Hygiene   Maintain good hygiene (Get out of bed in the morning, Make your bed, Brush your teeth, Take a shower, and Get dressed like you were going to work, even if you are unemployed).  If your clothes don't fit try to get ones that do.    Diet  I will strive to eat foods that are good for me, drink plenty of water, and avoid excessive sugar, caffeine, alcohol, and other mood-altering substances.  Some foods that are helpful in depression are: complex carbohydrates, B vitamins, flaxseed, fish or fish oil, fresh fruits and vegetables.    Psychotherapy  I agree to participate in Individual Therapy (if recommended).    Medication  If prescribed medications, I agree to take them.  Missing doses can result in serious side effects.  I understand that drinking  alcohol, or other illicit drug use, may cause potential side effects.  I will not stop my medication abruptly without first discussing it with my provider.    Staying Connected With Others  I will stay in touch with my friends, family members, and my primary care provider/team.    Use your imagination  Be creative.  We all have a creative side; it doesn t matter if it s oil painting, sand castles, or mud pies! This will also kick up the endorphins.    Witness Beauty  (AKA stop and smell the roses) Take a look outside, even in mid-winter. Notice colors, textures. Watch the squirrels and birds.     Service to others  Be of service to others.  There is always someone else in need.  By helping others we can  get out of ourselves  and remember the really important things.  This also provides opportunities for practicing all the other parts of the program.    Humor  Laugh and be silly!  Adjust your TV habits for less news and crime-drama and more comedy.    Control your stress  Try breathing deep, massage therapy, biofeedback, and meditation. Find time to relax each day.     My support system    Clinic Contact:  Phone number:    Contact 1:  Phone number:    Contact 2:  Phone number:    Confucianist/:  Phone number:    Therapist:  Phone number:    Local crisis center:    Phone number:    Other community support:  Phone number:

## 2018-05-25 ASSESSMENT — PATIENT HEALTH QUESTIONNAIRE - PHQ9: SUM OF ALL RESPONSES TO PHQ QUESTIONS 1-9: 23

## 2018-05-25 ASSESSMENT — ANXIETY QUESTIONNAIRES: GAD7 TOTAL SCORE: 20

## 2018-05-29 DIAGNOSIS — E03.9 ACQUIRED HYPOTHYROIDISM: ICD-10-CM

## 2018-05-31 RX ORDER — LEVOTHYROXINE SODIUM 200 UG/1
TABLET ORAL
Qty: 90 TABLET | Refills: 3 | Status: SHIPPED | OUTPATIENT
Start: 2018-05-31 | End: 2019-06-06

## 2018-05-31 NOTE — TELEPHONE ENCOUNTER
LEVOTHYROXIN 200MCG TAB      Last Written Prescription Date:  030218  Last Fill Quantity: 90,   # refills: 0  Last Office Visit: 052418  Future Office visit:       Routing refill request to provider for review/approval because:  Medication is reported/historical

## 2018-08-15 DIAGNOSIS — I10 BENIGN ESSENTIAL HYPERTENSION: ICD-10-CM

## 2018-08-15 NOTE — TELEPHONE ENCOUNTER
hydrochlorothiazide      Last Written Prescription Date:  11/30/17  Last Fill Quantity: 90,   # refills: 2  Last Office Visit: 5/24/18  Future Office visit:

## 2018-08-16 RX ORDER — HYDROCHLOROTHIAZIDE 12.5 MG/1
CAPSULE ORAL
Qty: 90 CAPSULE | Refills: 2 | Status: SHIPPED | OUTPATIENT
Start: 2018-08-16 | End: 2019-05-02

## 2018-08-26 ENCOUNTER — HOSPITAL ENCOUNTER (EMERGENCY)
Facility: HOSPITAL | Age: 43
Discharge: HOME | End: 2018-08-26
Attending: FAMILY MEDICINE | Admitting: FAMILY MEDICINE
Payer: COMMERCIAL

## 2018-08-26 ENCOUNTER — APPOINTMENT (OUTPATIENT)
Dept: CT IMAGING | Facility: HOSPITAL | Age: 43
End: 2018-08-26
Attending: FAMILY MEDICINE
Payer: COMMERCIAL

## 2018-08-26 ENCOUNTER — APPOINTMENT (OUTPATIENT)
Dept: GENERAL RADIOLOGY | Facility: HOSPITAL | Age: 43
End: 2018-08-26
Attending: FAMILY MEDICINE
Payer: COMMERCIAL

## 2018-08-26 VITALS
HEART RATE: 90 BPM | DIASTOLIC BLOOD PRESSURE: 97 MMHG | RESPIRATION RATE: 16 BRPM | TEMPERATURE: 98.6 F | SYSTOLIC BLOOD PRESSURE: 131 MMHG | OXYGEN SATURATION: 94 %

## 2018-08-26 DIAGNOSIS — S00.83XA FACIAL HEMATOMA, INITIAL ENCOUNTER: ICD-10-CM

## 2018-08-26 DIAGNOSIS — S83.91XA SPRAIN OF RIGHT KNEE/LEG, INITIAL ENCOUNTER: ICD-10-CM

## 2018-08-26 PROCEDURE — 99284 EMERGENCY DEPT VISIT MOD MDM: CPT | Mod: 25

## 2018-08-26 PROCEDURE — 70486 CT MAXILLOFACIAL W/O DYE: CPT | Mod: TC

## 2018-08-26 PROCEDURE — 73562 X-RAY EXAM OF KNEE 3: CPT | Mod: TC,RT

## 2018-08-26 PROCEDURE — 99284 EMERGENCY DEPT VISIT MOD MDM: CPT | Mod: Z6 | Performed by: FAMILY MEDICINE

## 2018-08-26 PROCEDURE — 70450 CT HEAD/BRAIN W/O DYE: CPT | Mod: TC

## 2018-08-26 PROCEDURE — 72125 CT NECK SPINE W/O DYE: CPT | Mod: TC

## 2018-08-26 ASSESSMENT — ENCOUNTER SYMPTOMS
SEIZURES: 0
BACK PAIN: 0
VOMITING: 0
HEADACHES: 1
LOSS OF CONSCIOUSNESS: 1
NECK PAIN: 0
NAUSEA: 0
ABDOMINAL PAIN: 0
DIFFICULTY BREATHING: 0

## 2018-08-26 ASSESSMENT — COPD QUESTIONNAIRES: COPD: 0

## 2018-08-26 NOTE — ED NOTES
Patient arrives via [facility] for evaluation of a fall. Patient was at local establishment when she tripped and fell, face first into a display rack hitting the left side of her face. Patient reports possibly losing consciousness for a few seconds. Reporting a mild headache 1-2/10. Bruising/swelling noted below left eye. Also reporting right knee pain 5/10, worse with movement. Call light within reach.

## 2018-08-26 NOTE — ED NOTES
Discharge instructions completed with patient with no questions or concerns. Vital signs stable. Knee immobilizer in place. Ice pack given.

## 2018-08-26 NOTE — DISCHARGE INSTRUCTIONS
"  Facial Contusion  A contusion is another word for a bruise. It happens when small blood vessels break open and leak blood into the nearby area. A facial contusion can result from a bump, hit, or fall. This may happen during sports or an accident. Symptoms of a contusion often include changes in skin color (bruising), swelling, and pain.   The swelling from the contusion should decrease in a few days. Bruising and pain may take several weeks to go away.   Home care    If you have been prescribed medicines for pain, take them as directed.    To help reduce swelling and pain, wrap a cold pack or bag of frozen peas in a thin towel. Put it on the injured area for up to 20 minutes. Do this a few times a day until the swelling goes down.     If you have scrapes or cuts on your face requiring stiches or other closures, care for them as directed.    For the next 24 hours (or longer if instructed):  ? Don t drink alcohol, or use sedatives or medicines that make you sleepy.  ? Don t drive or operate machinery.  ? Don't do anything strenuous. Don t lift or strain.  ? Don't return to sports or other activity that could result in another head injury.  Note about concussions  Because the injury was to your head, it is possible that a concussion (mild brain injury) could result. Symptoms of a concussion can show up later. Be alert for signs and symptoms of a concussion. Seek emergency medical care if any of these develop over the next hours to days:    Headache    Nausea or vomiting    Dizziness    Sensitivity to light or noise    Unusual sleepiness or grogginess    Trouble falling asleep    Personality changes    Vision changes    Memory loss    Confusion    Trouble walking or clumsiness    Loss of consciousness (even for a short time)    Inability to be awakened    Feeling \"off\" or slow as if in a daze   Follow-up care  Follow up with your healthcare provider, or as directed.  When to seek medical advice  Call your healthcare " provider right away if any of these occur:    Swelling or pain that gets worse, not better    New swelling or pain    Warmth or drainage from the swollen area or from cuts or scrapes    Fluid drainage or bleeding from the nose or ears    Fever of 100.4 F (38 C) or higher, or as directed by your healthcare provider  Call 911  Call 911 if any of the following occur:     Repeated vomiting    Unusual drowsiness or trouble awakening    Fainting or loss of consciousness    Seizure    Worsening confusion, memory loss, dizziness, headache, behavior, speech, or vision  Date Last Reviewed: 5/1/2017 2000-2017 [employer]. [address] [place], [place] [address]. All rights reserved. This information is not intended as a substitute for professional medical care. Always follow your healthcare professional's instructions.          Treating Strains and Sprains  Strains and sprains happen when muscles or other soft tissues near your bones stretch or tear. These injuries can cause bruising, swelling, and pain. To ease your discomfort and speed the healing of your strain or sprain, follow the tips below. Remember, a strain or sprain can take 6 to 8 weeks to heal.     Important Note: Do not give aspirin to children or teens without discussing it with your healthcare provider first.        Ice first, heat later    Use ice for the first 24 to 48 hours after injury. Ice helps prevent swelling and reduce pain. Ice the injury for no more than 20 minutes at a time and allow at least 20 minutes between icing sessions.    Apply heat after the first 72 hours, once the swelling has gone down. Heat relaxes muscles and increases blood flow. Soak the injured area in warm water or use a heating pad set on low for no more than 15 minutes at a time.  Wrap and elevate    Wrap an injured limb firmly with an elastic bandage. This provides support and helps prevent swelling. Don t wear an elastic bandage overnight. Watch for tingling,  numbness, or increased pain. Remove the bandage immediately if any of these occurs.    Elevate the injured area to help reduce swelling and throbbing. It s best to raise an injured limb above the level of your heart.     Medicines    Over-the-counter medicines such as acetaminophen or ibuprofen can help reduce pain. Some also help reduce swelling.    Take medicine only as directed.    Rest the area even if medicines are controlling the pain.  Rest    Rest the injured area by not using it for 24 hours.    When you re ready, return slowly to your normal activities. Rest the injured area often.    Don t use or walk on an injured limb if it hurts.  Date Last Reviewed: 1/1/2018 2000-2017 [employer]. [address] [place] [address], [place] [address]. All rights reserved. This information is not intended as a substitute for professional medical care. Always follow your healthcare professional's instructions.

## 2018-08-26 NOTE — ED AVS SNAPSHOT
[facility]    [address]    [place] [address]    Phone:  [phone]                                       Autumn Holbrook   MRN: [id number]    Department:  [facility]   Date of Visit:  8/26/2018           Patient Information     Date Of Birth          1975        Your diagnoses for this visit were:     Facial hematoma, initial encounter     Sprain of right knee/leg, initial encounter        You were seen by Brittni Purdy MD.      Follow-up Information     Follow up with Apryl Hathaway PA.    Specialty:  Family Practice    Why:  As needed    Contact information:    [address] [place] [address]  [place] [facility] [id number] [phone]          Discharge Instructions         Facial Contusion  A contusion is another word for a bruise. It happens when small blood vessels break open and leak blood into the nearby area. A facial contusion can result from a bump, hit, or fall. This may happen during sports or an accident. Symptoms of a contusion often include changes in skin color (bruising), swelling, and pain.   The swelling from the contusion should decrease in a few days. Bruising and pain may take several weeks to go away.   Home care    If you have been prescribed medicines for pain, take them as directed.    To help reduce swelling and pain, wrap a cold pack or bag of frozen peas in a thin towel. Put it on the injured area for up to 20 minutes. Do this a few times a day until the swelling goes down.     If you have scrapes or cuts on your face requiring stiches or other closures, care for them as directed.    For the next 24 hours (or longer if instructed):  ? Don t drink alcohol, or use sedatives or medicines that make you sleepy.  ? Don t drive or operate machinery.  ? Don't do anything strenuous. Don t lift or strain.  ? Don't return to sports or other activity that could result in another head injury.  Note about concussions  Because the injury was to your  "head, it is possible that a concussion (mild brain injury) could result. Symptoms of a concussion can show up later. Be alert for signs and symptoms of a concussion. Seek emergency medical care if any of these develop over the next hours to days:    Headache    Nausea or vomiting    Dizziness    Sensitivity to light or noise    Unusual sleepiness or grogginess    Trouble falling asleep    Personality changes    Vision changes    Memory loss    Confusion    Trouble walking or clumsiness    Loss of consciousness (even for a short time)    Inability to be awakened    Feeling \"off\" or slow as if in a daze   Follow-up care  Follow up with your healthcare provider, or as directed.  When to seek medical advice  Call your healthcare provider right away if any of these occur:    Swelling or pain that gets worse, not better    New swelling or pain    Warmth or drainage from the swollen area or from cuts or scrapes    Fluid drainage or bleeding from the nose or ears    Fever of 100.4 F (38 C) or higher, or as directed by your healthcare provider  Call 911  Call 911 if any of the following occur:     Repeated vomiting    Unusual drowsiness or trouble awakening    Fainting or loss of consciousness    Seizure    Worsening confusion, memory loss, dizziness, headache, behavior, speech, or vision  Date Last Reviewed: 5/1/2017 2000-2017 [employer]. [address] [place] [address]. All rights reserved. This information is not intended as a substitute for professional medical care. Always follow your healthcare professional's instructions.          Treating Strains and Sprains  Strains and sprains happen when muscles or other soft tissues near your bones stretch or tear. These injuries can cause bruising, swelling, and pain. To ease your discomfort and speed the healing of your strain or sprain, follow the tips below. Remember, a strain or sprain can take 6 to 8 weeks to heal.     Important Note: Do not give " aspirin to children or teens without discussing it with your healthcare provider first.        Ice first, heat later    Use ice for the first 24 to 48 hours after injury. Ice helps prevent swelling and reduce pain. Ice the injury for no more than 20 minutes at a time and allow at least 20 minutes between icing sessions.    Apply heat after the first 72 hours, once the swelling has gone down. Heat relaxes muscles and increases blood flow. Soak the injured area in warm water or use a heating pad set on low for no more than 15 minutes at a time.  Wrap and elevate    Wrap an injured limb firmly with an elastic bandage. This provides support and helps prevent swelling. Don t wear an elastic bandage overnight. Watch for tingling, numbness, or increased pain. Remove the bandage immediately if any of these occurs.    Elevate the injured area to help reduce swelling and throbbing. It s best to raise an injured limb above the level of your heart.     Medicines    Over-the-counter medicines such as acetaminophen or ibuprofen can help reduce pain. Some also help reduce swelling.    Take medicine only as directed.    Rest the area even if medicines are controlling the pain.  Rest    Rest the injured area by not using it for 24 hours.    When you re ready, return slowly to your normal activities. Rest the injured area often.    Don t use or walk on an injured limb if it hurts.  Date Last Reviewed: 1/1/2018 2000-2017 [employer]. [address] [place] [address]. All rights reserved. This information is not intended as a substitute for professional medical care. Always follow your healthcare professional's instructions.             Review of your medicines      Our records show that you are taking the medicines listed below. If these are incorrect, please call your family doctor or clinic.        Dose / Directions Last dose taken    atomoxetine 40 MG capsule   Commonly known as:  STRATTERA   Dose:  40 mg         Take 40 mg by mouth   Refills:  0        buprenorphine HCl-naloxone HCl 8-2 MG per film   Commonly known as:  SUBOXONE   Dose:  8 Film        Place 8 Film under the tongue 2 times daily Patient states takes 1 film 8 mg twice daily   Refills:  0        hydrochlorothiazide 12.5 MG capsule   Commonly known as:  MICROZIDE   Quantity:  90 capsule        TAKE ONE CAPSULE BY MOUTH ONCE DAILY   Refills:  2        HYDROXYZINE HCL PO   Dose:  25 mg        Take 25 mg by mouth 4 times daily as needed for itching   Refills:  0        LAMICTAL 100 MG tablet   Dose:  150 mg   Generic drug:  lamoTRIgine        Take 150 mg by mouth 2 times daily   Refills:  0        levothyroxine 200 MCG tablet   Commonly known as:  SYNTHROID/LEVOTHROID   Quantity:  90 tablet        TAKE 1 TABLET BY MOUTH ONCE DAILY   Refills:  3        predniSONE 20 MG tablet   Commonly known as:  DELTASONE        Refills:  0        sertraline 100 MG tablet   Commonly known as:  ZOLOFT        Refills:  0        TYLENOL PO   Dose:  500 mg        Take 500 mg by mouth every 4 hours as needed for mild pain or fever   Refills:  0                Procedures and tests performed during your visit     CT Cervical Spine w/o Contrast    CT Facial Bones without Contrast    CT Head w/o Contrast    XR Knee Right 3 Views      Orders Needing Specimen Collection     None      Pending Results     Date and Time Order Name Status Description    8/26/2018 1557 XR Knee Right 3 Views In process     8/26/2018 1556 CT Facial Bones without Contrast In process     8/26/2018 1556 CT Cervical Spine w/o Contrast In process     8/26/2018 1556 CT Head w/o Contrast In process             Pending Culture Results     No orders found from 8/24/2018 to 8/27/2018.            Thank you for choosing [facility]       Thank you for choosing [facility] for your care. Our goal is always to provide you with excellent care. Hearing back from our patients is one way we can continue to improve our services.  "Please take a few minutes to complete the written survey that you may receive in the mail after you visit with us. Thank you!        [employer] Information     [facility] lets you send messages to your doctor, view your test results, renew your prescriptions, schedule appointments and more. To sign up, go to [email or web address] . Click on \"Log in\" on the left side of the screen, which will take you to the Welcome page. Then click on \"Sign up Now\" on the right side of the page.     You will be asked to enter the access code listed below, as well as some personal information. Please follow the directions to create your username and password.     Your access code is: [email or web address]  Expires: 2018  5:30 PM     Your access code will  in 90 days. If you need help or a new code, please call your [facility] clinic or [phone].        Care EveryWhere ID     This is your Care EveryWhere ID. This could be used by other organizations to access your [facility] medical records  [id number]        Equal Access to Services     [facility]: Hadcosme Pryor, waaxda gurmeet, qaybdiony kaalkeiry collins, consuelo eduardo . So [facility] [phone].    ATENCIÓN: Si habla español, tiene a muñoz disposición servicios gratuitos de asistencia lingüística. Llame al [phone].    We comply with applicable federal civil rights laws and [place] laws. We do not discriminate on the basis of race, color, national origin, age, disability, sex, sexual orientation, or gender identity.            After Visit Summary       This is your record. Keep this with you and show to your community pharmacist(s) and doctor(s) at your next visit.                  "

## 2018-08-26 NOTE — ED PROVIDER NOTES
History     Chief Complaint   Patient presents with     Fall     Patient is a 42 year old female presenting with trauma.   Trauma  Mechanism of injury: fall  Injury location: head/neck and leg  Injury location detail: head and L ear and R knee  [place]ident location[place] [employer]   Time since incident: 30 minutes  Arrived directly from scene: yes     Fall:       Fall occurred: tripped       Height of fall: from standig        Impact surface: tripped and face planted onto a shelf.       Point of impact: head       Entrapped after fall: no    Protective equipment:        None       Suspicion of alcohol use: no       Suspicion of drug use: no    EMS/PTA data:       Bystander interventions: none       Ambulatory at scene: yes       Blood loss: none       Responsiveness: alert       Oriented to: person, place, situation and time       Loss of consciousness: yes       Loss of consciousness duration: 15 seconds       Amnesic to event: no       Airway interventions: none       Breathing interventions: none       IV access: none       IO access: none       Fluids administered: none       Cardiac interventions: none       Medications administered: none       Immobilization: C-collar       Airway condition since incident: stable       Breathing condition since incident: stable       Circulation condition since incident: stable       Mental status condition since incident: stable       Disability condition since incident: stable    Current symptoms:       Pain scale: 5/10       Pain quality: aching       Pain timing: intermittent       Associated symptoms:             Reports headache and loss of consciousness.             Denies abdominal pain, back pain, blindness, chest pain, difficulty breathing, hearing loss, nausea, neck pain, seizures and vomiting.     Relevant PMH:       Medical risk factors:             No asthma, COPD, CAD, CHF, past MI, CABG, cardiac stents, AICD, pacemaker, hemophilia, diabetes, kidney disease,  dialysis or pregnancy.        Tetanus status: unknown       The patient has not been admitted to the hospital due to injury in the past year, and has not been treated and released from the ED due to injury in the past year.    Autumn Holbrook is a 42 year old female who presents to ER by EMS after falling while shopping [place] the [employer]     Problem List:    Patient Active Problem List    Diagnosis Date Noted     ACP (advance care planning) 2017     Priority: Medium     Advance Care Planning 4/3/2017: ACP Review of Chart / Resources Provided:  Reviewed chart for advance care plan.  Ilene Holbrook has no plan or code status on file. Discussed available resources and provided with information.   Added by Annette Araiza             Post-traumatic osteoarthritis of right knee 2017     Priority: Medium     Chronic pain syndrome 2017     Priority: Medium     Opioid dependence in remission (H) 2017     Priority: Medium     On suboxone.        PTSD (post-traumatic stress disorder) 2017     Priority: Medium     COY (generalized anxiety disorder) 2017     Priority: Medium     Moderate episode of recurrent major depressive disorder (H) 2017     Priority: Medium     Bilateral edema of lower extremity 2017     Priority: Medium     Acquired hypothyroidism 2017     Priority: Medium        Past Medical History:    Past Medical History:   Diagnosis Date     Anxiety      Arthritis      Depressive disorder      Migraine      Osteoarthritis      Thyroid disease        Past Surgical History:    Past Surgical History:   Procedure Laterality Date     CHOLECYSTECTOMY       GYN SURGERY       SECTION 7718-3193     GYN SURGERY  2004    LEEP       Family History:    Family History   Problem Relation Age of Onset     Cerebrovascular Disease Mother      Alcoholism Mother      Cancer Mother      Depression Mother      Eczema Mother      Hypertension Mother      Migraines Mother       Mental Illness Mother      Osteoarthritis Mother      Osteoperosis Mother      Alcoholism Father      Cancer Father      Hyperlipidemia Father      Hypertension Father      Myocardial Infarction Father      Mental Illness Sister      Migraines Sister        Social History:  Marital Status:  Single [1]  Social History   Substance Use Topics     Smoking status: Current Every Day Smoker     Packs/day: 0.50     Years: 20.00     Types: Cigarettes     Smokeless tobacco: Never Used     Alcohol use No        Medications:      Acetaminophen (TYLENOL PO)   atomoxetine (STRATTERA) 40 MG capsule   buprenorphine HCl-naloxone HCl (SUBOXONE) 8-2 MG per film   hydrochlorothiazide (MICROZIDE) 12.5 MG capsule   HYDROXYZINE HCL PO   lamoTRIgine (LAMICTAL) 100 MG tablet   levothyroxine (SYNTHROID/LEVOTHROID) 200 MCG tablet   nicotine Patch in Place   predniSONE (DELTASONE) 20 MG tablet   sertraline (ZOLOFT) 100 MG tablet         Review of Systems   HENT: Negative for hearing loss.    Eyes: Negative for blindness.   Cardiovascular: Negative for chest pain.   Gastrointestinal: Negative for abdominal pain, nausea and vomiting.   Musculoskeletal: Negative for back pain and neck pain.   Neurological: Positive for loss of consciousness and headaches. Negative for seizures.       Physical Exam          Physical Exam   Constitutional: She is oriented to person, place, and time. She appears well-developed and well-nourished.   Left periorbital bruising    HENT:   Right Ear: External ear normal.   Left Ear: External ear normal.   Nose: Nose normal.   Mouth/Throat: Oropharynx is clear and moist.   Eyes: Pupils are equal, round, and reactive to light. Right eye exhibits no discharge. Left eye exhibits no discharge. No scleral icterus.   Neck: Normal range of motion. Neck supple. No JVD present. No tracheal deviation present. No thyromegaly present.   Cardiovascular: Normal rate, regular rhythm, normal heart sounds and intact distal pulses.   Exam reveals no gallop and no friction rub.    No murmur heard.  Pulmonary/Chest: Effort normal and breath sounds normal. No stridor. No respiratory distress. She has no wheezes. She has no rales. She exhibits no tenderness.   Abdominal: Soft. Bowel sounds are normal. She exhibits no distension and no mass. There is no tenderness. There is no rebound and no guarding.   Musculoskeletal:   Left knee with medial effusion and medial joint line tenderness    Lymphadenopathy:     She has no cervical adenopathy.   Neurological: She is alert and oriented to person, place, and time. She displays normal reflexes. No cranial nerve deficit. She exhibits normal muscle tone. Coordination normal.   Skin: Skin is warm and dry. No rash noted. No erythema. No pallor.   Nursing note and vitals reviewed.      ED Course     ED Course     Procedures          {Patient presented to ER for evaluation of injuries following a fall  While [place] [employer]. Patient arrived by EMS . EMS report taken . Primary survey . Airway without compromise. Breathing without compromise. Circulation . Normal blood pressure. Warm extremities with equal pedal pulses.   C collar in place. GCS 15 . Brief loc . No amnesia. Secondary survey  Large facial contusion . Significant right knee effusion and tenderness . Labs and diagnostics ordered. Xray knee showing extensive degenerative changes with spurs, do not suspect significant acute fracture but will await final radiology read.Patient placed in knee immobilizer and felt improved knee stability and less discomfort. Ct head showing no cervical fracture or facial fracture, NO intracranial bleed or skull fracture. Discussed all findings with patient . Patient discharged to home in stable to improved condition with written discharge instructions and followup provided         Results for orders placed or performed in visit on 05/24/18   XR ABDOMEN 2 VW (Clinic Performed)    Narrative    PROCEDURE: XR ABDOMEN 2 VW  5/24/2018 12:23 PM    HISTORY: ; Moderate episode of recurrent major depressive disorder  (H); COY (generalized anxiety disorder)    COMPARISONS: None.    TECHNIQUE: Supine and upright views.    FINDINGS: There is no free air. Surgical clips are seen in the right  upper quadrant. Moderate amount gas and stool are seen within the  colon, especially on the right. There are no dilated gas-filled small  bowel loops. No mass is seen and there are no suspicious  calcifications. Mild degenerative changes seen in the lower lumbar  spine.         Impression    IMPRESSION: Moderate amount of gas and stool.    TRISTIAN LAFLEUR MD        No results found for this or any previous visit (from the past 24 hour(s)).    Medications - No data to display    Assessments & Plan (with Medical Decision Making)     I have reviewed the nursing notes.    I have reviewed the findings, diagnosis, plan and need for follow up with the patient.      New Prescriptions    No medications on file       Final diagnoses:   Facial hematoma, initial encounter   Sprain of right knee/leg, initial encounter       8/26/2018   [facility]     Brittni Purdy MD  08/26/18 3214

## 2018-08-26 NOTE — ED TRIAGE NOTES
Pt presents with c/o falling at the [employer] store and now has knee pain.

## 2018-09-17 NOTE — PROGRESS NOTES
SUBJECTIVE:   Autumn Holbrook is a 43 year old female who presents to clinic today for the following health issues:    ED/UC Followup:    [place]  [facility] [place] of visit: 18  Reason for visit: Facial hematoma  Current Status: improved some     She fell while at [employer] injuring her face and her right knee.  Autumn was seen in Urgent Care and x rays were negative.  She also has worsening right knee pain and she has had a pre existing knee issue.    Records are reviewed    Problem list and histories reviewed & adjusted, as indicated.  Additional history: as documented    Patient Active Problem List   Diagnosis     ACP (advance care planning)     Post-traumatic osteoarthritis of right knee     Chronic pain syndrome     Opioid dependence in remission (H)     PTSD (post-traumatic stress disorder)     COY (generalized anxiety disorder)     Moderate episode of recurrent major depressive disorder (H)     Bilateral edema of lower extremity     Acquired hypothyroidism     Past Surgical History:   Procedure Laterality Date     CHOLECYSTECTOMY       GYN SURGERY       SECTION 7621-6625     GYN SURGERY  2004    [facility]       Social History   Substance Use Topics     Smoking status: Current Every Day Smoker     Packs/day: 0.50     Years: 20.00     Types: Cigarettes     Smokeless tobacco: Never Used     Alcohol use No     Family History   Problem Relation Age of Onset     Cerebrovascular Disease Mother      Alcoholism Mother      Cancer Mother      Depression Mother      Eczema Mother      Hypertension Mother      Migraines Mother      Mental Illness Mother      Osteoarthritis Mother      Osteoporosis Mother      Alcoholism Father      Cancer Father      Hyperlipidemia Father      Hypertension Father      Myocardial Infarction Father      Mental Illness Sister      Migraines Sister          Current Outpatient Prescriptions   Medication Sig Dispense Refill     UNABLE TO FIND Place 3 Film under the tongue daily  "MEDICATION NAME: suboxone 6-2 mg       Acetaminophen (TYLENOL PO) Take 500 mg by mouth every 4 hours as needed for mild pain or fever       atomoxetine (STRATTERA) 80 MG capsule        buprenorphine HCl-naloxone HCl (SUBOXONE) 8-2 MG per film Place 8 Film under the tongue 2 times daily Patient states takes 1 film 8 mg twice daily       hydrochlorothiazide (MICROZIDE) 12.5 MG capsule TAKE ONE CAPSULE BY MOUTH ONCE DAILY 90 capsule 2     HYDROXYZINE HCL PO Take 25 mg by mouth 4 times daily as needed for itching       lamoTRIgine (LAMICTAL) 100 MG tablet Take 150 mg by mouth 2 times daily        levothyroxine (SYNTHROID/LEVOTHROID) 200 MCG tablet TAKE 1 TABLET BY MOUTH ONCE DAILY 90 tablet 3     predniSONE (DELTASONE) 20 MG tablet        sertraline (ZOLOFT) 100 MG tablet        Allergies   Allergen Reactions     Depakote [Valproic Acid]      Gabapentin Swelling       Reviewed and updated as needed this visit by clinical staff  Tobacco  Allergies  Meds  Problems  Med Hx  Surg Hx  Fam Hx  Soc Hx        Reviewed and updated as needed this visit by Provider         ROS:  Constitutional, HEENT, cardiovascular, pulmonary, gi and gu systems are negative, except as otherwise noted.    OBJECTIVE:     /70 (BP Location: Right arm, Patient Position: Sitting, Cuff Size: Adult Large)  Pulse 96  Temp 97.6  F (36.4  C) (Tympanic)  Resp 18  Ht 5' 3\" (1.6 m)  Wt 285 lb (129.3 kg)  SpO2 99%  BMI 50.49 kg/m2  Body mass index is 50.49 kg/(m^2).  Physical Exam   Constitutional: She is oriented to person, place, and time. She appears well-developed and well-nourished. No distress.   HENT:   There is a palpable organizing hematoma in the left infraorbital region.   Musculoskeletal:   Examination of the right knee shows tenderness over the medial aspect.  There is no effusion noted. Mild patellofemoral crepitus. There is no MCL, LCL, PCL, or ACL laxity.  Patellar apprehension test is negative.  Lachmann's test is not " performed.  Michael's test is not performed.   Neurological: She is alert and oriented to person, place, and time.   Psychiatric: She has a normal mood and affect.         Diagnostic Test Results:  No results found for this or any previous visit (from the past 24 hour(s)).    ASSESSMENT/PLAN:     Facial hematoma, subsequent encounter  Discussed the natural history of organizing hematoma.  Will observe.    Acute pain of right knee  X ray shows questionable fracture.  This is reviewed with patient.  MRI scheduled.  Follow up with Primary Care Provider   - MR Knee Right w/o Contrast; Future      Apollo Trores MD  [facility] [place]

## 2018-09-21 ENCOUNTER — OFFICE VISIT (OUTPATIENT)
Dept: FAMILY MEDICINE | Facility: OTHER | Age: 43
End: 2018-09-21
Attending: FAMILY MEDICINE
Payer: COMMERCIAL

## 2018-09-21 VITALS
TEMPERATURE: 97.6 F | WEIGHT: 285 LBS | RESPIRATION RATE: 18 BRPM | HEART RATE: 96 BPM | BODY MASS INDEX: 50.5 KG/M2 | HEIGHT: 63 IN | SYSTOLIC BLOOD PRESSURE: 122 MMHG | DIASTOLIC BLOOD PRESSURE: 70 MMHG | OXYGEN SATURATION: 99 %

## 2018-09-21 DIAGNOSIS — S00.83XD FACIAL HEMATOMA, SUBSEQUENT ENCOUNTER: Primary | ICD-10-CM

## 2018-09-21 DIAGNOSIS — M25.561 ACUTE PAIN OF RIGHT KNEE: ICD-10-CM

## 2018-09-21 PROCEDURE — G0463 HOSPITAL OUTPT CLINIC VISIT: HCPCS

## 2018-09-21 PROCEDURE — 99213 OFFICE O/P EST LOW 20 MIN: CPT | Performed by: FAMILY MEDICINE

## 2018-09-21 RX ORDER — BUPRENORPHINE HYDROCHLORIDE, NALOXONE HYDROCHLORIDE 2; .5 MG/1; MG/1
3 FILM, SOLUBLE BUCCAL; SUBLINGUAL DAILY
COMMUNITY
Start: 2018-09-11 | End: 2018-09-21

## 2018-09-21 RX ORDER — ATOMOXETINE 80 MG/1
CAPSULE ORAL
COMMUNITY
Start: 2018-08-29 | End: 2018-11-28

## 2018-09-21 ASSESSMENT — ANXIETY QUESTIONNAIRES
GAD7 TOTAL SCORE: 17
6. BECOMING EASILY ANNOYED OR IRRITABLE: MORE THAN HALF THE DAYS
5. BEING SO RESTLESS THAT IT IS HARD TO SIT STILL: MORE THAN HALF THE DAYS
IF YOU CHECKED OFF ANY PROBLEMS ON THIS QUESTIONNAIRE, HOW DIFFICULT HAVE THESE PROBLEMS MADE IT FOR YOU TO DO YOUR WORK, TAKE CARE OF THINGS AT HOME, OR GET ALONG WITH OTHER PEOPLE: SOMEWHAT DIFFICULT
3. WORRYING TOO MUCH ABOUT DIFFERENT THINGS: NEARLY EVERY DAY
1. FEELING NERVOUS, ANXIOUS, OR ON EDGE: MORE THAN HALF THE DAYS
7. FEELING AFRAID AS IF SOMETHING AWFUL MIGHT HAPPEN: MORE THAN HALF THE DAYS
2. NOT BEING ABLE TO STOP OR CONTROL WORRYING: NEARLY EVERY DAY
4. TROUBLE RELAXING: NEARLY EVERY DAY

## 2018-09-21 ASSESSMENT — PAIN SCALES - GENERAL: PAINLEVEL: SEVERE PAIN (7)

## 2018-09-21 NOTE — MR AVS SNAPSHOT
"              After Visit Summary   9/21/2018    Autumn Holbrook    MRN: [id number]           Patient Information     Date Of Birth          1975        Visit Information        Provider Department      9/21/2018 9:20 AM Apollo Torres MD [facility]        Today's Diagnoses     Facial hematoma, subsequent encounter    -  1    Acute pain of right knee          Care Instructions    Radiology will call to schedule MRI right knee.            Follow-ups after your visit        Follow-up notes from your care team     Return in about 1 week (around 9/28/2018) for follow up visit Apryl Hathaway.      Future tests that were ordered for you today     Open Future Orders        Priority Expected Expires Ordered    MR Knee Right w/o Contrast Routine  9/21/2019 9/21/2018            Who to contact     If you have questions or need follow up information about today's clinic visit or your schedule please contact [facility] directly at [phone].  Normal or non-critical lab and imaging results will be communicated to you by MyChart, letter or phone within 4 business days after the clinic has received the results. If you do not hear from us within 7 days, please contact the clinic through MyChart or phone. If you have a critical or abnormal lab result, we will notify you by phone as soon as possible.  Submit refill requests through [employer] or call your pharmacy and they will forward the refill request to us. Please allow 3 business days for your refill to be completed.          Additional Information About Your Visit        [facility] Information     [employer] lets you send messages to your doctor, view your test results, renew your prescriptions, schedule appointments and more. To sign up, go to [email or web address] . Click on \"Log in\" on the left side of the screen, which will take you to the Welcome page. Then click on \"Sign up Now\" on the right side of the page.     You will " "be asked to enter the access code listed below, as well as some personal information. Please follow the directions to create your username and password.     Your access code is: [email or web address]  Expires: 2018  5:30 PM     Your access code will  in 90 days. If you need help or a new code, please call your [facility] or [phone].        Care EveryWhere ID     This is your Care EveryWhere ID. This could be used by other organizations to access your [facility] medical records  [id number]        Your Vitals Were     Pulse Temperature Respirations Height Pulse Oximetry BMI (Body Mass Index)    96 97.6  F (36.4  C) (Tympanic) 18 5' 3\" (1.6 m) 99% 50.49 kg/m2       Blood Pressure from Last 3 Encounters:   18 122/70   18 131/97   18 120/70    Weight from Last 3 Encounters:   18 285 lb (129.3 kg)   18 268 lb (121.6 kg)   17 260 lb 1.6 oz (118 kg)               Primary Care Provider Office Phone # Fax #    SEVEN Pandey [phone]       [address] [place] [address]        Equal Access to Services     BELL BUITRAGO AH: Hadii carol ku hadasho Soomaali, waaxda luqadaha, qaybta kaalmada adeegyada, consuelo eduardo . So [facility] [phone].    ATENCIÓN: Si habla español, tiene a muñoz disposición servicios gratuitos de asistencia lingüística. LlKind[facility] Hospital [facility] [phone].    We comply with applicable federal civil rights laws and [place] laws. We do not discriminate on the basis of race, color, national origin, age, disability, sex, sexual orientation, or gender identity.            Thank you!     Thank you for choosing [facility]  for your care. Our goal is always to provide you with excellent care. Hearing back from our patients is one way we can continue to improve our services. Please take a few minutes to complete the written survey that you may receive in the mail after your visit with us. Thank you!   "           Your Updated Medication List - Protect others around you: Learn how to safely use, store and throw away your medicines at www.disposemymeds.org.          This list is accurate as of 9/21/18  9:48 AM.  Always use your most recent med list.                   Brand Name Dispense Instructions for use Diagnosis    atomoxetine 80 MG capsule    STRATTERA          buprenorphine HCl-naloxone HCl 8-2 MG per film    SUBOXONE     Place 8 Film under the tongue 2 times daily Patient states takes 1 film 8 mg twice daily        hydrochlorothiazide 12.5 MG capsule    MICROZIDE    90 capsule    TAKE ONE CAPSULE BY MOUTH ONCE DAILY    Benign essential hypertension       HYDROXYZINE HCL PO      Take 25 mg by mouth 4 times daily as needed for itching        LAMICTAL 100 MG tablet   Generic drug:  lamoTRIgine      Take 150 mg by mouth 2 times daily        levothyroxine 200 MCG tablet    SYNTHROID/LEVOTHROID    90 tablet    TAKE 1 TABLET BY MOUTH ONCE DAILY    Acquired hypothyroidism       predniSONE 20 MG tablet    DELTASONE          sertraline 100 MG tablet    ZOLOFT          TYLENOL PO      Take 500 mg by mouth every 4 hours as needed for mild pain or fever        UNABLE TO FIND      Place 3 Film under the tongue daily MEDICATION NAME: suboxone 6-2 mg

## 2018-09-21 NOTE — NURSING NOTE
"Chief Complaint   Patient presents with     ER F/U       Initial /70 (BP Location: Right arm, Patient Position: Sitting, Cuff Size: Adult Large)  Pulse 96  Temp 97.6  F (36.4  C) (Tympanic)  Resp 18  Ht 5' 3\" (1.6 m)  Wt 285 lb (129.3 kg)  SpO2 99%  BMI 50.49 kg/m2 Estimated body mass index is 50.49 kg/(m^2) as calculated from the following:    Height as of this encounter: 5' 3\" (1.6 m).    Weight as of this encounter: 285 lb (129.3 kg).  Medication Reconciliation: complete    Juana Berman LPN  "

## 2018-09-22 ASSESSMENT — ANXIETY QUESTIONNAIRES: GAD7 TOTAL SCORE: 17

## 2018-09-22 ASSESSMENT — PATIENT HEALTH QUESTIONNAIRE - PHQ9: SUM OF ALL RESPONSES TO PHQ QUESTIONS 1-9: 21

## 2018-10-03 ENCOUNTER — HOSPITAL ENCOUNTER (OUTPATIENT)
Dept: MRI IMAGING | Facility: HOSPITAL | Age: 43
Discharge: HOME | End: 2018-10-03
Attending: FAMILY MEDICINE | Admitting: FAMILY MEDICINE
Payer: COMMERCIAL

## 2018-10-03 DIAGNOSIS — M25.561 ACUTE PAIN OF RIGHT KNEE: ICD-10-CM

## 2018-10-03 PROCEDURE — 73721 MRI JNT OF LWR EXTRE W/O DYE: CPT | Mod: TC,RT

## 2018-10-05 ENCOUNTER — TELEPHONE (OUTPATIENT)
Dept: FAMILY MEDICINE | Facility: OTHER | Age: 43
End: 2018-10-05

## 2018-10-05 DIAGNOSIS — M25.561 ACUTE PAIN OF RIGHT KNEE: Primary | ICD-10-CM

## 2018-10-10 ENCOUNTER — TELEPHONE (OUTPATIENT)
Dept: FAMILY MEDICINE | Facility: OTHER | Age: 43
End: 2018-10-10

## 2018-10-10 NOTE — TELEPHONE ENCOUNTER
1:58 PM    Reason for Call: Phone Call    Description: Pt spoke with Juana from Dr Torres's office just a little bit ago. She would like you to call her back again and stated it was important. Please call her back at [phone]    Was an appointment offered for this call? No  If yes : Appointment type              Date    Preferred method for responding to this message: Telephone Call  What is your phone number ?    If we cannot reach you directly, may we leave a detailed response at the number you provided? Yes    Can this message wait until your PCP/provider returns, if available today? No, provider out    Luci Hernadez

## 2018-11-26 NOTE — PROGRESS NOTES
SUBJECTIVE:   CC: Autumn Holbrook is an 43 year old woman who presents for preventive health visit.     Healthy Habits:    Do you get at least three servings of calcium containing foods daily (dairy, green leafy vegetables, etc.)?  no,    Amount of exercise or daily activities, outside of work: 3 hour(s) per day    Problems taking medications regularly No    Medication side effects: Yes headaches, believes to be caused from the Trintellix    Have you had an eye exam in the past two years? no    Do you see a dentist twice per year? no    Do you have sleep apnea, excessive snoring or daytime drowsiness?no      Depression and Anxiety Follow-Up    Status since last visit: Worsened and working with her therapist.     Other associated symptoms:None    Complicating factors:     Significant life event: yes change in med.      Current substance abuse: None    PHQ 5/24/2018 9/21/2018 11/28/2018   PHQ-9 Total Score 23 21 27   Q9: Suicide Ideation More than half the days More than half the days Nearly every day   F/U: Thoughts of suicide or self-harm No No Yes   F/U: Self harm-plan - - No   F/U: Self-harm action - - No   F/U: Safety concerns No No Yes     COY-7 SCORE 5/24/2018 9/21/2018 11/28/2018   Total Score 20 17 15       PHQ-9  English  PHQ-9   Any Language  COY-7  Suicide Assessment Five-step Evaluation and Treatment (SAFE-T)  Hypothyroidism Follow-up      Since last visit, patient describes the following symptoms: weight gain of 20 lbs    Chronic Pain Follow-Up       Type / Location of Pain: knee pain, back pain,   Analgesia/pain control:       Recent changes:  worse      Overall control: Inadequate pain control  Activity level/function:      Daily activities:  Able to do light housework, cooking    Work:  Unable to work  Adverse effects:  Yes living with her mom who is dependant.   Adherance    Taking medication as directed?  Yes    Participating in other treatments: yes  Risk Factors:    Sleep:  Fair     Mood/anxiety:  worsened    Recent family or social stressors:  conflict between family members    Other aggravating factors: prolonged sitting, prolonged standing and poor posture  PHQ-9 SCORE 2018   PHQ-9 Total Score 23 21 27     COY-7 SCORE 2018   Total Score 20 17 15     Encounter-Level CSA:     There are no encounter-level csa.          Today's PHQ-2 Score: No flowsheet data found.    Abuse: Current or Past(Physical, Sexual or Emotional)- No  Do you feel safe in your environment? Yes    Social History   Substance Use Topics     Smoking status: Current Every Day Smoker     Packs/day: 0.50     Years: 20.00     Types: Cigarettes     Smokeless tobacco: Never Used     Alcohol use No     If you drink alcohol do you typically have >3 drinks per day or >7 drinks per week? No                     Reviewed orders with patient.  Reviewed health maintenance and updated orders accordingly - Yes  Labs reviewed in EPIC  BP Readings from Last 3 Encounters:   18 144/86   18 122/70   18 131/97    Wt Readings from Last 3 Encounters:   18 295 lb (133.8 kg)   18 285 lb (129.3 kg)   18 268 lb (121.6 kg)                  Patient Active Problem List   Diagnosis     ACP (advance care planning)     Post-traumatic osteoarthritis of right knee     Chronic pain syndrome     Opioid dependence in remission (H)     PTSD (post-traumatic stress disorder)     COY (generalized anxiety disorder)     Moderate episode of recurrent major depressive disorder (H)     Bilateral edema of lower extremity     Acquired hypothyroidism     Morbid obesity (H)     Past Surgical History:   Procedure Laterality Date     CHOLECYSTECTOMY       GYN SURGERY       SECTION 1668-1702     GYN SURGERY  [address] [place] [address]       Social History   Substance Use Topics     Smoking status: Current Every Day Smoker     Packs/day: 0.50     Years: 20.00     Types: Cigarettes     Smokeless  tobacco: Never Used     Alcohol use No     Family History   Problem Relation Age of Onset     Cerebrovascular Disease Mother      Alcoholism Mother      Cancer Mother      Depression Mother      Eczema Mother      Hypertension Mother      Migraines Mother      Mental Illness Mother      Osteoarthritis Mother      Osteoporosis Mother      Alcoholism Father      Cancer Father      Hyperlipidemia Father      Hypertension Father      Myocardial Infarction Father      Mental Illness Sister      Migraines Sister          Current Outpatient Prescriptions   Medication Sig Dispense Refill     Acetaminophen (TYLENOL PO) Take 500 mg by mouth every 4 hours as needed for mild pain or fever       hydrochlorothiazide (MICROZIDE) 12.5 MG capsule TAKE ONE CAPSULE BY MOUTH ONCE DAILY 90 capsule 2     HYDROXYZINE HCL PO Take 25 mg by mouth 4 times daily as needed for itching       lamoTRIgine (LAMICTAL) 100 MG tablet Take 150 mg by mouth 2 times daily        levothyroxine (SYNTHROID/LEVOTHROID) 200 MCG tablet TAKE 1 TABLET BY MOUTH ONCE DAILY 90 tablet 3     order for DME Equipment being ordered: TEDS 2 Device 3     predniSONE (DELTASONE) 20 MG tablet        sertraline (ZOLOFT) 100 MG tablet        UNABLE TO FIND Place 3 Film under the tongue daily MEDICATION NAME: suboxone 6-2 mg       buprenorphine HCl-naloxone HCl (SUBOXONE) 8-2 MG per film Place 4 Film under the tongue 2 times daily Patient states takes 1 film 8 mg twice daily       Allergies   Allergen Reactions     Depakote [Valproic Acid]      Gabapentin Swelling       Mammogram Screening: Patient over age 50, mutual decision to screen reflected in health maintenance.    Pertinent mammograms are reviewed under the imaging tab.  History of abnormal Pap smear:   Last 3 Pap and HPV Results:   PAP / HPV Latest Ref Rng & Units 7/6/2017   PAP - NIL   HPV 16 DNA NEG Negative   HPV 18 DNA NEG Negative   OTHER HR HPV NEG Negative     PAP / HPV Latest Ref Rng & Units 7/6/2017   PAP - NIL  "  HPV 16 DNA NEG Negative   HPV 18 DNA NEG Negative   OTHER HR HPV NEG Negative     Reviewed and updated as needed this visit by clinical staff  Tobacco  Allergies  Meds         Reviewed and updated as needed this visit by Provider          Past Medical History:   Diagnosis Date     Anxiety      Arthritis      Depressive disorder      Migraine      Osteoarthritis      Thyroid disease       Past Surgical History:   Procedure Laterality Date     CHOLECYSTECTOMY       GYN SURGERY       SECTION [id number]     GYN SURGERY  2004    LEEP       ROS:  CONSTITUTIONAL: NEGATIVE for fever, chills, POSITIVE: mood changes and loss of feli.   INTEGUMENTARU/SKIN: NEGATIVE for worrisome rashes, moles or lesions  EYES: NEGATIVE for vision changes or irritation  ENT: NEGATIVE for ear, mouth and throat problems  RESP: NEGATIVE for significant cough or SOB  BREAST: NEGATIVE for masses, tenderness or discharge  CV: NEGATIVE for chest pain, palpitations or peripheral edema  GI: NEGATIVE for nausea, abdominal pain, heartburn, or change in bowel habits  : NEGATIVE for unusual urinary or vaginal symptoms. Periods are regular.  MUSCULOSKELETAL: NEGATIVE for significant arthralgias or myalgia  NEURO: NEGATIVE for weakness, dizziness or paresthesias  ENDOCRINE: NEGATIVE for temperature intolerance, skin/hair changes  PSYCHIATRIC: positive for mood changes and working with her therapist.  Crying in the visit and crying every day.     OBJECTIVE:   /86 (BP Location: Left arm)  Pulse 73  Temp 98.3  F (36.8  C) (Tympanic)  Ht 5' 3\" (1.6 m)  Wt 295 lb (133.8 kg)  SpO2 97%  BMI 52.26 kg/m2  EXAM:  GENERAL: healthy, alert and no distress  EYES: Eyes grossly normal to inspection, PERRL and conjunctivae and sclerae normal  HENT: ear canals and TM's normal, nose and mouth without ulcers or lesions  NECK: no adenopathy, no asymmetry, masses, or scars and thyroid normal to palpation  RESP: lungs clear to auscultation - no rales, " rhonchi or wheezes  BREAST: normal without masses, tenderness or nipple discharge and no palpable axillary masses or adenopathy  CV: regular rate and rhythm, normal S1 S2, no S3 or S4, no murmur, click or rub, no peripheral edema and peripheral pulses strong  ABDOMEN: soft, nontender, no hepatosplenomegaly, no masses and bowel sounds normal  MS: no gross musculoskeletal defects noted, no edema  SKIN: no suspicious lesions or rashes  NEURO: Normal strength and tone, mentation intact and speech normal  PSYCH: mentation appears normal, affect is tearful and emotional. Is having caregiver burnout. Feels a heavy burden. Low self esteem about her weight gain and her poor lifestyle. livign with her ill mom nad caring for her. Seeing therapy on a weekly basis. Chronic pain is not helping her with management of this.   LYMPH: no cervical, supraclavicular, axillary, or inguinal adenopathy    Diagnostic Test Results:  No results found for this or any previous visit (from the past 24 hour(s)).  Results for orders placed or performed in visit on 11/28/18 (from the past 24 hour(s))   Comprehensive metabolic panel   Result Value Ref Range    Sodium 136 133 - 144 mmol/L    Potassium 4.0 3.4 - 5.3 mmol/L    Chloride 105 94 - 109 mmol/L    Carbon Dioxide 27 20 - 32 mmol/L    Anion Gap 4 3 - 14 mmol/L    Glucose 106 (H) 70 - 99 mg/dL    Urea Nitrogen 11 7 - 30 mg/dL    Creatinine 0.67 0.52 - 1.04 mg/dL    GFR Estimate >90 >60 mL/min/1.7m2    GFR Estimate If Black >90 >60 mL/min/1.7m2    Calcium 8.6 8.5 - 10.1 mg/dL    Bilirubin Total 0.4 0.2 - 1.3 mg/dL    Albumin 3.5 3.4 - 5.0 g/dL    Protein Total 7.1 6.8 - 8.8 g/dL    Alkaline Phosphatase 79 40 - 150 U/L    ALT 28 0 - 50 U/L    AST 20 0 - 45 U/L   TSH with free T4 reflex   Result Value Ref Range    TSH 0.85 0.40 - 4.00 mU/L   Lipid Profile   Result Value Ref Range    Cholesterol 228 (H) <200 mg/dL    Triglycerides 64 <150 mg/dL    HDL Cholesterol 90 >49 mg/dL    LDL Cholesterol  Calculated 125 (H) <100 mg/dL    Non HDL Cholesterol 138 (H) <130 mg/dL       ASSESSMENT/PLAN:   1. Well woman exam  Not needing pap smear and needing mammogram.  Not sexually active. Placing others needs before her own. Very tearful today. Her labs are done. Discussion on vaccine. Mental health preservation.  Working with medications and management with her Sulfoxone.   - Lipid Profile    2. Generalized edema  Never seems to get better. Checking labs for albumin and proteins.  Given compression socks for her legs. Avoiding salt. .    - order for DME; Equipment being ordered: TEDS  Dispense: 2 Device; Refill: 3  - Comprehensive metabolic panel  - TSH with free T4 reflex    3. Morbid obesity (H)  Is having knee surgery and needing to lose weight before her knee  Surgery.   Options reviewed.  Limited funds.   - phentermine (ADIPEX-P) 15 MG capsule; Take 1 capsule (15 mg) by mouth every morning  Dispense: 30 capsule; Refill: 0    4. Acquired hypothyroidism  Due for recheck.   - TSH with free T4 reflex    5. Atypical chest pain  Feels it is related to anxiety.   Checked EKG and was sinus and labs are done. If pain returns she should go to ER.     - EKG 12-lead complete w/read - (Clinic Performed)    6. Encounter for screening mammogram for breast cancer  Will get this done exam negative.   - MA SCREENING DIGITAL BILATERAL ([place]); Future    7. Severe episode of recurrent major depressive disorder, without psychotic features (H)  Adding in top max to see if we can stop her from cycling of her mood. May also curb appetites. Recheck weight in one month.    - topiramate (TOPAMAX) 25 MG tablet; Take one to two at bedtime  Dispense: 60 tablet; Refill: 1      COUNSELING:   Reviewed preventive health counseling, as reflected in patient instructions       Regular exercise       Healthy diet/nutrition       Vision screening       Hearing screening       Immunization             Advance Care Planning    BP Readings from Last 1  "Encounters:   11/28/18 144/86     Estimated body mass index is 52.26 kg/(m^2) as calculated from the following:    Height as of this encounter: 5' 3\" (1.6 m).    Weight as of this encounter: 295 lb (133.8 kg).           reports that she has been smoking Cigarettes.  She has a 10.00 pack-year smoking history. She has never used smokeless tobacco.      Counseling Resources:  ATP IV Guidelines  Pooled Cohorts Equation Calculator  Breast Cancer Risk Calculator  FRAX Risk Assessment  ICSI Preventive Guidelines  Dietary Guidelines for Americans, 2010  USDA's MyPlate  ASA Prophylaxis  Lung CA Screening    SEVEN Bryant  [facility] [place]  "

## 2018-11-28 ENCOUNTER — OFFICE VISIT (OUTPATIENT)
Dept: FAMILY MEDICINE | Facility: OTHER | Age: 43
End: 2018-11-28
Attending: PHYSICIAN ASSISTANT
Payer: COMMERCIAL

## 2018-11-28 VITALS
TEMPERATURE: 98.3 F | HEIGHT: 63 IN | SYSTOLIC BLOOD PRESSURE: 144 MMHG | BODY MASS INDEX: 51.91 KG/M2 | HEART RATE: 73 BPM | OXYGEN SATURATION: 97 % | WEIGHT: 293 LBS | DIASTOLIC BLOOD PRESSURE: 86 MMHG

## 2018-11-28 DIAGNOSIS — F33.2 SEVERE EPISODE OF RECURRENT MAJOR DEPRESSIVE DISORDER, WITHOUT PSYCHOTIC FEATURES (H): ICD-10-CM

## 2018-11-28 DIAGNOSIS — E03.9 ACQUIRED HYPOTHYROIDISM: ICD-10-CM

## 2018-11-28 DIAGNOSIS — Z01.419 WELL WOMAN EXAM: Primary | ICD-10-CM

## 2018-11-28 DIAGNOSIS — R07.89 ATYPICAL CHEST PAIN: ICD-10-CM

## 2018-11-28 DIAGNOSIS — E66.01 MORBID OBESITY (H): ICD-10-CM

## 2018-11-28 DIAGNOSIS — Z12.31 ENCOUNTER FOR SCREENING MAMMOGRAM FOR BREAST CANCER: ICD-10-CM

## 2018-11-28 DIAGNOSIS — R60.1 GENERALIZED EDEMA: ICD-10-CM

## 2018-11-28 LAB
ALBUMIN SERPL-MCNC: 3.5 G/DL (ref 3.4–5)
ALP SERPL-CCNC: 79 U/L (ref 40–150)
ALT SERPL W P-5'-P-CCNC: 28 U/L (ref 0–50)
ANION GAP SERPL CALCULATED.3IONS-SCNC: 4 MMOL/L (ref 3–14)
AST SERPL W P-5'-P-CCNC: 20 U/L (ref 0–45)
BILIRUB SERPL-MCNC: 0.4 MG/DL (ref 0.2–1.3)
BUN SERPL-MCNC: 11 MG/DL (ref 7–30)
CALCIUM SERPL-MCNC: 8.6 MG/DL (ref 8.5–10.1)
CHLORIDE SERPL-SCNC: 105 MMOL/L (ref 94–109)
CHOLEST SERPL-MCNC: 228 MG/DL
CO2 SERPL-SCNC: 27 MMOL/L (ref 20–32)
CREAT SERPL-MCNC: 0.67 MG/DL (ref 0.52–1.04)
GFR SERPL CREATININE-BSD FRML MDRD: >90 ML/MIN/1.7M2
GLUCOSE SERPL-MCNC: 106 MG/DL (ref 70–99)
HDLC SERPL-MCNC: 90 MG/DL
LDLC SERPL CALC-MCNC: 125 MG/DL
NONHDLC SERPL-MCNC: 138 MG/DL
POTASSIUM SERPL-SCNC: 4 MMOL/L (ref 3.4–5.3)
PROT SERPL-MCNC: 7.1 G/DL (ref 6.8–8.8)
SODIUM SERPL-SCNC: 136 MMOL/L (ref 133–144)
TRIGL SERPL-MCNC: 64 MG/DL
TSH SERPL DL<=0.005 MIU/L-ACNC: 0.85 MU/L (ref 0.4–4)

## 2018-11-28 PROCEDURE — 93005 ELECTROCARDIOGRAM TRACING: CPT

## 2018-11-28 PROCEDURE — 84443 ASSAY THYROID STIM HORMONE: CPT | Mod: ZL | Performed by: PHYSICIAN ASSISTANT

## 2018-11-28 PROCEDURE — 80053 COMPREHEN METABOLIC PANEL: CPT | Mod: ZL | Performed by: PHYSICIAN ASSISTANT

## 2018-11-28 PROCEDURE — 80061 LIPID PANEL: CPT | Mod: ZL | Performed by: PHYSICIAN ASSISTANT

## 2018-11-28 PROCEDURE — 36415 COLL VENOUS BLD VENIPUNCTURE: CPT | Mod: ZL | Performed by: PHYSICIAN ASSISTANT

## 2018-11-28 PROCEDURE — 93010 ELECTROCARDIOGRAM REPORT: CPT | Performed by: INTERNAL MEDICINE

## 2018-11-28 PROCEDURE — 99396 PREV VISIT EST AGE 40-64: CPT | Performed by: PHYSICIAN ASSISTANT

## 2018-11-28 RX ORDER — TOPIRAMATE 25 MG/1
TABLET, FILM COATED ORAL
Qty: 60 TABLET | Refills: 1 | Status: SHIPPED | OUTPATIENT
Start: 2018-11-28 | End: 2018-12-19

## 2018-11-28 RX ORDER — PHENTERMINE HYDROCHLORIDE 15 MG/1
15 CAPSULE ORAL EVERY MORNING
Qty: 30 CAPSULE | Refills: 0 | Status: SHIPPED | OUTPATIENT
Start: 2018-11-28 | End: 2018-12-26

## 2018-11-28 ASSESSMENT — ANXIETY QUESTIONNAIRES
1. FEELING NERVOUS, ANXIOUS, OR ON EDGE: SEVERAL DAYS
3. WORRYING TOO MUCH ABOUT DIFFERENT THINGS: NEARLY EVERY DAY
6. BECOMING EASILY ANNOYED OR IRRITABLE: NEARLY EVERY DAY
GAD7 TOTAL SCORE: 15
5. BEING SO RESTLESS THAT IT IS HARD TO SIT STILL: SEVERAL DAYS
2. NOT BEING ABLE TO STOP OR CONTROL WORRYING: NEARLY EVERY DAY
7. FEELING AFRAID AS IF SOMETHING AWFUL MIGHT HAPPEN: SEVERAL DAYS

## 2018-11-28 ASSESSMENT — PATIENT HEALTH QUESTIONNAIRE - PHQ9
5. POOR APPETITE OR OVEREATING: NEARLY EVERY DAY
SUM OF ALL RESPONSES TO PHQ QUESTIONS 1-9: 27

## 2018-11-28 ASSESSMENT — PAIN SCALES - GENERAL: PAINLEVEL: SEVERE PAIN (6)

## 2018-11-28 NOTE — MR AVS SNAPSHOT
After Visit Summary   11/28/2018    Autumn Holbrook    MRN: [id number]           Patient Information     Date Of Birth          1975        Visit Information        Provider Department      11/28/2018 10:40 AM Apryl Hathaway PA [facility]        Today's Diagnoses     Well woman exam    -  1    Generalized edema        Morbid obesity (H)        Acquired hypothyroidism        Atypical chest pain        Encounter for screening mammogram for breast cancer        Severe episode of recurrent major depressive disorder, without psychotic features (H)          Care Instructions      Preventive Health Recommendations  Female Ages 40 to 49    Yearly exam:     See your health care provider every year in order to  1. Review health changes.   2. Discuss preventive care.    3. Review your medicines if your doctor prescribed any.      Get a Pap test every three years (unless you have an abnormal result and your provider advises testing more often).      If you get Pap tests with HPV test, you only need to test every 5 years, unless you have an abnormal result. You do not need a Pap test if your uterus was removed (hysterectomy) and you have not had cancer.      You should be tested each year for STDs (sexually transmitted diseases), if you're at risk.     Ask your doctor if you should have a mammogram.      Have a colonoscopy (test for colon cancer) if someone in your family has had colon cancer or polyps before age 50.       Have a cholesterol test every 5 years.       Have a diabetes test (fasting glucose) after age 45. If you are at risk for diabetes, you should have this test every 3 years.    Shots: Get a flu shot each year. Get a tetanus shot every 10 years.     Nutrition:     Eat at least 5 servings of fruits and vegetables each day.    Eat whole-grain bread, whole-wheat pasta and brown rice instead of white grains and rice.    Get adequate Calcium and Vitamin  "D.      Lifestyle    Exercise at least 150 minutes a week (an average of 30 minutes a day, 5 days a week). This will help you control your weight and prevent disease.    Limit alcohol to one drink per day.    No smoking.     Wear sunscreen to prevent skin cancer.    See your dentist every six months for an exam and cleaning.          Follow-ups after your visit        Your next 10 appointments already scheduled     Dec 19, 2018  2:20 PM CST   (Arrive by 2:05 PM)   Pre-Op physical with SEVEN Pandey   [facility] [place] ([facility] [place] )    [address]  [place] [address]   [phone]              Future tests that were ordered for you today     Open Future Orders        Priority Expected Expires Ordered    MA SCREENING DIGITAL BILATERAL ([place]) Routine  11/28/2019 11/28/2018            Who to contact     If you have questions or need follow up information about today's clinic visit or your schedule please contact [facility] directly at [phone].  Normal or non-critical lab and imaging results will be communicated to you by MyChart, letter or phone within 4 business days after the clinic has received the results. If you do not hear from us within 7 days, please contact the clinic through MyChart or phone. If you have a critical or abnormal lab result, we will notify you by phone as soon as possible.  Submit refill requests through [employer] or call your pharmacy and they will forward the refill request to us. Please allow 3 business days for your refill to be completed.          Additional Information About Your Visit        Care EveryWhere ID     This is your Care EveryWhere ID. This could be used by other organizations to access your [facility] medical records  [id number]        Your Vitals Were     Pulse Temperature Height Pulse Oximetry BMI (Body Mass Index)       73 98.3  F (36.8  C) (Tympanic) 5' 3\" (1.6 m) 97% 52.26 kg/m2        Blood " Pressure from Last 3 Encounters:   11/28/18 144/86   09/21/18 122/70   08/26/18 131/97    Weight from Last 3 Encounters:   11/28/18 295 lb (133.8 kg)   09/21/18 285 lb (129.3 kg)   05/24/18 268 lb (121.6 kg)              We Performed the Following     Comprehensive metabolic panel     EKG 12-lead complete w/read - (Clinic Performed)     Lipid Profile     TSH with free T4 reflex          Today's Medication Changes          These changes are accurate as of 11/28/18 11:47 AM.  If you have any questions, ask your nurse or doctor.               Start taking these medicines.        Dose/Directions    order for DME   Used for:  Generalized edema   Started by:  Apryl Hathaway PA        Equipment being ordered: TEDS   Quantity:  2 Device   Refills:  3       phentermine 15 MG capsule   Commonly known as:  ADIPEX-P   Used for:  Morbid obesity (H)   Started by:  Apryl Hathaway PA        Dose:  15 mg   Take 1 capsule (15 mg) by mouth every morning   Quantity:  30 capsule   Refills:  0       topiramate 25 MG tablet   Commonly known as:  TOPAMAX   Used for:  Severe episode of recurrent major depressive disorder, without psychotic features (H)   Started by:  Apryl Hathaway PA        Take one to two at bedtime   Quantity:  60 tablet   Refills:  1         Stop taking these medicines if you haven't already. Please contact your care team if you have questions.     atomoxetine 80 MG capsule   Commonly known as:  STRATTERA   Stopped by:  Apryl Hathaway PA                Where to get your medicines      These medications were sent to [id number] - [place], [place] - [address], [place] [address]     Phone:  [phone]     topiramate 25 MG tablet         Some of these will need a paper prescription and others can be bought over the counter.  Ask your nurse if you have questions.     Bring a paper prescription for each of these medications     order for DME    phentermine 15 MG capsule                 Primary Care Provider Office Phone # Fax #    SEVEN Pandey [phone]       [facility] [address] [address]        Equal Access to Services     BELL BUITRAGO : Hadii aad ku hadbree Pryor, waaxda luqadaha, qaybta kaalmada karina, consuelo garcia liskale gudino jayce valverde. So [facility] [phone].    ATENCIÓN: Si habla español, tiene a muñoz disposición servicios gratuitos de asistencia lingüística. Elieame al [phone].    We comply with applicable federal civil rights laws and [place] laws. We do not discriminate on the basis of race, color, national origin, age, disability, sex, sexual orientation, or gender identity.            Thank you!     Thank you for choosing [facility]  for your care. Our goal is always to provide you with excellent care. Hearing back from our patients is one way we can continue to improve our services. Please take a few minutes to complete the written survey that you may receive in the mail after your visit with us. Thank you!             Your Updated Medication List - Protect others around you: Learn how to safely use, store and throw away your medicines at www.disposemymeds.org.          This list is accurate as of 11/28/18 11:47 AM.  Always use your most recent med list.                   Brand Name Dispense Instructions for use Diagnosis    buprenorphine HCl-naloxone HCl 8-2 MG per film    SUBOXONE     Place 4 Film under the tongue 2 times daily Patient states takes 1 film 8 mg twice daily        hydrochlorothiazide 12.5 MG capsule    MICROZIDE    90 capsule    TAKE ONE CAPSULE BY MOUTH ONCE DAILY    Benign essential hypertension       HYDROXYZINE HCL PO      Take 25 mg by mouth 4 times daily as needed for itching        LAMICTAL 100 MG tablet   Generic drug:  lamoTRIgine      Take 150 mg by mouth 2 times daily        levothyroxine 200 MCG tablet    SYNTHROID/LEVOTHROID    90 tablet    TAKE 1 TABLET BY MOUTH ONCE DAILY     Acquired hypothyroidism       order for DME     2 Device    Equipment being ordered: TEDS    Generalized edema       phentermine 15 MG capsule    ADIPEX-P    30 capsule    Take 1 capsule (15 mg) by mouth every morning    Morbid obesity (H)       predniSONE 20 MG tablet    DELTASONE          sertraline 100 MG tablet    ZOLOFT          topiramate 25 MG tablet    TOPAMAX    60 tablet    Take one to two at bedtime    Severe episode of recurrent major depressive disorder, without psychotic features (H)       TYLENOL PO      Take 500 mg by mouth every 4 hours as needed for mild pain or fever        UNABLE TO FIND      Place 3 Film under the tongue daily MEDICATION NAME: suboxone 6-2 mg

## 2018-11-28 NOTE — NURSING NOTE
"Chief Complaint   Patient presents with     Physical       Initial /86 (BP Location: Left arm)  Pulse 73  Temp 98.3  F (36.8  C) (Tympanic)  Ht 5' 3\" (1.6 m)  Wt 295 lb (133.8 kg)  SpO2 97%  BMI 52.26 kg/m2 Estimated body mass index is 52.26 kg/(m^2) as calculated from the following:    Height as of this encounter: 5' 3\" (1.6 m).    Weight as of this encounter: 295 lb (133.8 kg).  Medication Reconciliation: complete    Mary Bond LPN  "

## 2018-11-29 ASSESSMENT — ANXIETY QUESTIONNAIRES: GAD7 TOTAL SCORE: 15

## 2018-12-18 NOTE — PATIENT INSTRUCTIONS
Before Your Surgery      Call your surgeon if there is any change in your health. This includes signs of a cold or flu (such as a sore throat, runny nose, cough, rash or fever).    Do not smoke, drink alcohol or take over the counter medicine (unless your surgeon or primary care doctor tells you to) for the 24 hours before and after surgery.    If you take prescribed drugs: Follow your doctor s orders about which medicines to take and which to stop until after surgery.    Eating and drinking prior to surgery: follow the instructions from your surgeon    Take a shower or bath the night before surgery. Use the soap your surgeon gave you to gently clean your skin. If you do not have soap from your surgeon, use your regular soap. Do not shave or scrub the surgery site.  Wear clean pajamas and have clean sheets on your bed.   HOW TO QUIT SMOKING  Smoking is one of the hardest habits to break. About half of all those who have ever smoked have been able to quit, and most of those (about 70%) who still smoke want to quit. Here are some of the best ways to stop smoking.     KEEP TRYING:  It takes most smokers about 8 tries before they are finally able to fully quit. So, the more often you try and fail, the better your chance of quitting the next time! So, don't give up!    GO COLD TURKEY:  Most ex-smokers quit cold turkey. Trying to cut back gradually doesn't seem to work as well, perhaps because it continues the smoking habit. Also, it is possible to fool yourself by inhaling more while smoking fewer cigarettes. This results in the same amount of nicotine in your body!    GET SUPPORT:  Support programs can make an important difference, especially for the heavy smoker. These groups offer lectures, methods to change your behavior and peer support. Call the free national Quitline for more information. 800-QUIT-NOW (999-744-8624). Low-cost or free programs are offered by many hospitals, local chapters of the American Lung  Association (275-860-8615) and the American Cancer Society (425-146-0644). Support at home is important too. Non-smokers can help by offering praise and encouragement. If the smoker fails to quit, encourage them to try again!    OVER-THE-COUNTER MEDICINES:  For those who can't quit on their own, Nicotine Replacement Therapy (NRT) may make quitting much easier. Certain aids such as the nicotine patch, gum and lozenge are available without a prescription. However, it is best to use these under the guidance of your doctor. The skin patch provides a steady supply of nicotine to the body. Nicotine gum and lozenge gives temporary bursts of low levels of nicotine. Both methods take the edge off the craving for cigarettes. WARNING: If you feel symptoms of nicotine overdose, such as nausea, vomiting, dizziness, weakness, or fast heartbeat, stop using these and see your doctor.    PRESCRIPTION MEDICINES:  After evaluating your smoking patterns and prior attempts at quitting, your doctor may offer a prescription medicine such as bupropion (Zyban, Wellbutrin), varenicline (Chantix, Champix), a niocotine inhaler or nasal spray. Each has its unique advantage and side effects which your doctor can review with you.    HEALTH BENEFITS OF QUITTING:  The benefits of quitting start right away and keep improving the longer you go without smokin minutes: blood pressure and pulse return to normal  8 hours: oxygen levels return to normal  2 days: ability to smell and taste begins to improve as damaged nerves start to regrow  2-3 weeks: circulation and lung function improves  1-9 months: decreased cough, congestion and shortness of breath; less tired  1 year: risk of heart attack decreases by half  5 years: risk of lung cancer decreases by half; risk of stroke becomes the same as a non-smoker  For information about how to quit smoking, visit the following links:  [facility] ,   Clearing the Air, Quit Smoking Today   -  an online booklet. http://www.smokefree.gov/pubs/clearing_the_air.pdf  Smokefree.gov http://smokefree.gov/  QuitNet http://www.quitnet.com/    3120-4363 [employer], [address] [place] [address], [place] [address]. All rights reserved. This information is not intended as a substitute for professional medical care. Always follow your healthcare professional's instructions.      The Benefits of Living Smoke Free  What do you want to gain from quitting? Check off some reasons to quit.  Health Benefits  ___ Reduce my risk of lung cancer, heart disease, chronic lung disease  ___ Have fewer wrinkles and softer skin  ___ Improve my sense of taste and smell  ___ For pregnant women--reduce the risk of having a miscarriage, stillbirth, premature birth, or low-birth-weight baby  Personal Benefits  ___ Feel more in control of my life  ___ Have better-smelling hair, breath, clothes, home, and car  ___ Save time by not having to take smoke breaks, buy cigarettes, or hunt for a light  ___ Have whiter teeth  Family Benefits  ___ Reduce my children s respiratory tract infections  ___ Set a good example for my children  ___ Reduce my family s cancer risk  Financial Benefits  ___ Save hundreds of dollars each year that would be spent on cigarettes  ___ Save money on medical bills  ___ Save on life, health, and car insurance premiums    Those Dollars Add Up!  Cigarettes are expensive, and getting more expensive all the time. Do you realize how much money you are spending on cigarettes per year? What is the average amount you spend on a pack of cigarettes? What is the average number of packs that you smoke per day? Using your answers to these questions, fill in this formula to help you find out:  ($ _____ per pack) ×  ( _____ number of packs per day) × (365 days) =  $ _____ yearly cost of smoking  Besides tobacco, there are other costs, including extra cleaning bills and replacement costs for clothing and furniture; medical expenses for  smoking-related illnesses; and higher health, life, and car insurance premiums.    Cigars and Pipes Count Too!  Cigars and pipes are also dangerous. So are smokeless (chewing) tobacco and snuff. All of these products contain nicotine, a highly addictive substance that has harmful effects on your body. Quitting smoking means giving up all tobacco products.      2519-0913 [employer] [facility], [address] [place] [address], [place] [address]. All rights reserved. This information is not intended as a substitute for professional medical care. Always follow your healthcare professional's instructions.

## 2018-12-18 NOTE — PROGRESS NOTES
[facility] [place]  [address]  [place] [address]  [phone]  Dept: [phone]    PRE-OP EVALUATION:  Today's date: 2018    Autumn Holbrook (: 1975) presents for pre-operative evaluation assessment as requested by Dr. Wilkins.  She requires evaluation and anesthesia risk assessment prior to undergoing surgery/procedure for treatment of rhinoscopy .    Proposed Surgery/ Procedure: rhinoscopy   Date of Surgery/ Procedure: 2019  Time of Surgery/ Procedure: [facility]/Surgical Facility: [facility]  Primary Physician: Apryl Hathaway  Type of Anesthesia Anticipated: to be determined    Patient has a Health Care Directive or Living Will:  NO    1. NO - Do you have a history of heart attack, stroke, stent, bypass or surgery on an artery in the head, neck, heart or legs?  2. NO - Do you ever have any pain or discomfort in your chest?  3. NO - Do you have a history of  Heart Failure?  4. NO - Are you troubled by shortness of breath when: walking on the level, up a slight hill or at night?  5. NO - Do you currently have a cold, bronchitis or other respiratory infection?  6. NO - Do you have a cough, shortness of breath or wheezing?  7. NO - Do you sometimes get pains in the calves of your legs when you walk?  8. NO - Do you or anyone in your family have previous history of blood clots?  9. YES - DO YOU OR DOES ANYONE IN YOUR FAMILY HAVE A SERIOUS BLEEDING PROBLEM SUCH AS PROLONGED BLEEDING FOLLOWING SURGERIES OR CUTS? 1195 sfter   10. NO - Have you ever had problems with anemia or been told to take iron pills?  11. NO - Have you had any abnormal blood loss such as black, tarry or bloody stools, or abnormal vaginal bleeding?  12. YES - HAVE YOU EVER HAD A BLOOD TRANSFUSION?  following   13. NO - Have you or any of your relatives ever had problems with anesthesia?  14. NO - Do you have sleep apnea, excessive snoring or daytime drowsiness?  15. NO - Do you  have any prosthetic heart valves?  16. NO - Do you have prosthetic joints?  17. NO - Is there any chance that you may be pregnant?      HPI:     HPI related to upcoming procedure: she will be having right knee arthroscopic surgery to repair meniscal tear.       See problem list for active medical problems.  Problems all longstanding and stable, except as noted/documented.  See ROS for pertinent symptoms related to these conditions.                                                                                                                                                          .    MEDICAL HISTORY:     Patient Active Problem List    Diagnosis Date Noted     Morbid obesity (H) 2018     Priority: Medium     ACP (advance care planning) 2017     Priority: Medium     Advance Care Planning 4/3/2017: ACP Review of Chart / Resources Provided:  Reviewed chart for advance care plan.  Ilene Holbrook has no plan or code status on file. Discussed available resources and provided with information.   Added by Annette Araiza             Post-traumatic osteoarthritis of right knee 2017     Priority: Medium     Chronic pain syndrome 2017     Priority: Medium     Opioid dependence in remission (H) 2017     Priority: Medium     On suboxone.        PTSD (post-traumatic stress disorder) 2017     Priority: Medium     COY (generalized anxiety disorder) 2017     Priority: Medium     Moderate episode of recurrent major depressive disorder (H) 2017     Priority: Medium     Bilateral edema of lower extremity 2017     Priority: Medium     Acquired hypothyroidism 2017     Priority: Medium      Past Medical History:   Diagnosis Date     Anxiety      Arthritis      Depressive disorder      Migraine      Osteoarthritis      Thyroid disease      Past Surgical History:   Procedure Laterality Date     CHOLECYSTECTOMY       GYN SURGERY       SECTION [id number]     GYN SURGERY   2004    [facility]     Current Outpatient Medications   Medication Sig Dispense Refill     Acetaminophen (TYLENOL PO) Take 500 mg by mouth every 4 hours as needed for mild pain or fever       buprenorphine HCl-naloxone HCl (SUBOXONE) 8-2 MG per film Place 4 Film under the tongue daily Patient states takes 1 film 8 mg twice daily       hydrochlorothiazide (MICROZIDE) 12.5 MG capsule TAKE ONE CAPSULE BY MOUTH ONCE DAILY 90 capsule 2     HYDROXYZINE HCL PO Take 25 mg by mouth 4 times daily as needed for itching       lamoTRIgine (LAMICTAL) 100 MG tablet Take 150 mg by mouth 2 times daily        levothyroxine (SYNTHROID/LEVOTHROID) 200 MCG tablet TAKE 1 TABLET BY MOUTH ONCE DAILY 90 tablet 3     order for DME Equipment being ordered: TEDS 2 Device 3     phentermine (ADIPEX-P) 15 MG capsule Take 1 capsule (15 mg) by mouth every morning 30 capsule 0     sertraline (ZOLOFT) 100 MG tablet        topiramate (TOPAMAX) 25 MG tablet Take one to two at bedtime 60 tablet 1     UNABLE TO FIND Place 3 Film under the tongue daily MEDICATION NAME: suboxone 6-2 mg       OTC products: no recent use of OTC ASA, NSAIDS or Steroids, no use of herbal medications or other supplements and remains on Suboxone. May require high level of pain control.     Allergies   Allergen Reactions     Depakote [Valproic Acid]      Gabapentin Swelling      Latex Allergy: NO    Social History     Tobacco Use     Smoking status: Current Every Day Smoker     Packs/day: 0.50     Years: 20.00     Pack years: 10.00     Types: Cigarettes     Smokeless tobacco: Never Used   Substance Use Topics     Alcohol use: No     History   Drug Use No     Comment: previous opiod addiction       REVIEW OF SYSTEMS:   CONSTITUTIONAL: NEGATIVE for fever, chills, change in weight  INTEGUMENTARY/SKIN: NEGATIVE for worrisome rashes, moles or lesions  EYES: NEGATIVE for vision changes or irritation  ENT/MOUTH: NEGATIVE for ear, mouth and throat problems  RESP: NEGATIVE for significant cough  "or SOB  CV: NEGATIVE for chest pain, palpitations or peripheral edema  GI: NEGATIVE for nausea, abdominal pain, heartburn, or change in bowel habits  : NEGATIVE for frequency, dysuria, or hematuria  MUSCULOSKELETAL: positive for knee pain.  Has tear in meniscus.   NEURO: NEGATIVE for weakness, dizziness or paresthesias  ENDOCRINE: NEGATIVE for temperature intolerance, skin/hair changes  HEME: NEGATIVE for bleeding problems  PSYCHIATRIC: NEGATIVE for changes in mood or affect    EXAM:   /86 (BP Location: Left arm, Patient Position: Sitting, Cuff Size: Adult Large)   Pulse 85   Temp 98.8  F (37.1  C) (Tympanic)   Ht 1.6 m (5' 3\")   Wt 130.6 kg (288 lb)   SpO2 95%   BMI 51.02 kg/m      GENERAL APPEARANCE: healthy, alert and no distress     EYES: EOMI, PERRL     HENT: ear canals and TM's normal and nose and mouth without ulcers or lesions     NECK: no adenopathy, no asymmetry, masses, or scars and thyroid normal to palpation     RESP: lungs clear to auscultation - no rales, rhonchi or wheezes     CV: regular rates and rhythm, normal S1 S2, no S3 or S4 and no murmur, click or rub     ABDOMEN:  soft, nontender, no HSM or masses and bowel sounds normal     MS: extremities normal- no gross deformities noted, right knee is clicking. Has pain on standing and limited ability to get on table with out assistance.      SKIN: no suspicious lesions or rashes     NEURO: Normal strength and tone, sensory exam grossly normal, mentation intact and speech normal     PSYCH: mentation appears normal. and affect normal/bright, much better this week.      LYMPHATICS: No cervical adenopathy    DIAGNOSTICS:     EKG: appears normal, NSR, normal axis, normal intervals, no acute ST/T changes c/w ischemia, no LVH by voltage criteria, unchanged from previous tracings  Labs Resulted Today:   Results for orders placed or performed in visit on 12/19/18   Hemoglobin A1c   Result Value Ref Range    Hemoglobin A1C 5.9 (H) 0 - 5.6 % "   Estimated Average Glucose   Result Value Ref Range    Estimated Average Glucose 123 mg/dL       Recent Labs   Lab Test 11/28/18  1215 05/24/18  1213 08/30/17  1000   HGB  --  14.7  --    PLT  --  301  --     139 141   POTASSIUM 4.0 4.4 3.8   CR 0.67 0.84 0.80   A1C  --   --  5.8        IMPRESSION:   Reason for surgery/procedure: right knee meniscal tear   Diagnosis/reason for consult: medical clearance.     The proposed surgical procedure is considered INTERMEDIATE risk.    REVISED CARDIAC RISK INDEX  The patient has the following serious cardiovascular risks for perioperative complications such as (MI, PE, VFib and 3  AV Block):  No serious cardiac risks  INTERPRETATION: 1 risks: Class II (low risk - 0.9% complication rate)    The patient has the following additional risks for perioperative complications:  No identified additional risks  Morbid obesity      ICD-10-CM    1. Preop general physical exam Z01.818    2. Tobacco abuse Z72.0 Tobacco Cessation - Order to Satisfy Health Maintenance   3. Tobacco abuse counseling Z71.6        RECOMMENDATIONS:   1. Preop general physical exam  She is on Suboxone for narcotic addiction.  Has taped all the way down to 4 mg from 32 2 years ago. We are working on getting her off this. Can hold the morning of her procedure. Trying to limit her medication afterwards. We will work with her on pain control if needed and has a pain specialist working with her.      2. Tobacco abuse  Working on cutting back.   - Tobacco Cessation - Order to Satisfy Health Maintenance    3. Tobacco abuse counseling  Working on cutting back.     4. Severe episode of recurrent major depressive disorder, without psychotic features (H)  She is going to be given Topamax at 100 mg at bedtime. Working for her weight loss.   - topiramate (TOPAMAX) 50 MG tablet; Take one to two at bedtime (50 to 100 mg)  Dispense: 60 tablet; Refill: 1      APPROVAL GIVEN to proceed with proposed procedure, without further  diagnostic evaluation       Signed Electronically by: SEVEN Bryant    Copy of this evaluation report is provided to requesting physician.    [place] Preop Guidelines    Revised Cardiac Risk Index

## 2018-12-19 ENCOUNTER — OFFICE VISIT (OUTPATIENT)
Dept: FAMILY MEDICINE | Facility: OTHER | Age: 43
End: 2018-12-19
Attending: PHYSICIAN ASSISTANT
Payer: COMMERCIAL

## 2018-12-19 ENCOUNTER — TELEPHONE (OUTPATIENT)
Dept: FAMILY MEDICINE | Facility: OTHER | Age: 43
End: 2018-12-19

## 2018-12-19 VITALS
HEIGHT: 63 IN | DIASTOLIC BLOOD PRESSURE: 86 MMHG | WEIGHT: 288 LBS | HEART RATE: 85 BPM | OXYGEN SATURATION: 95 % | TEMPERATURE: 98.8 F | BODY MASS INDEX: 51.03 KG/M2 | SYSTOLIC BLOOD PRESSURE: 122 MMHG

## 2018-12-19 DIAGNOSIS — Z71.6 TOBACCO ABUSE COUNSELING: ICD-10-CM

## 2018-12-19 DIAGNOSIS — Z72.0 TOBACCO ABUSE: ICD-10-CM

## 2018-12-19 DIAGNOSIS — F33.2 SEVERE EPISODE OF RECURRENT MAJOR DEPRESSIVE DISORDER, WITHOUT PSYCHOTIC FEATURES (H): ICD-10-CM

## 2018-12-19 DIAGNOSIS — Z01.818 PREOP GENERAL PHYSICAL EXAM: Primary | ICD-10-CM

## 2018-12-19 DIAGNOSIS — E66.01 MORBID OBESITY (H): ICD-10-CM

## 2018-12-19 LAB
EST. AVERAGE GLUCOSE BLD GHB EST-MCNC: 123 MG/DL
HBA1C MFR BLD: 5.9 % (ref 0–5.6)

## 2018-12-19 PROCEDURE — 36415 COLL VENOUS BLD VENIPUNCTURE: CPT | Mod: ZL | Performed by: PHYSICIAN ASSISTANT

## 2018-12-19 PROCEDURE — 83036 HEMOGLOBIN GLYCOSYLATED A1C: CPT | Mod: ZL | Performed by: PHYSICIAN ASSISTANT

## 2018-12-19 PROCEDURE — 40000788 ZZHCL STATISTIC ESTIMATED AVERAGE GLUCOSE: Mod: ZL | Performed by: PHYSICIAN ASSISTANT

## 2018-12-19 PROCEDURE — G0463 HOSPITAL OUTPT CLINIC VISIT: HCPCS

## 2018-12-19 PROCEDURE — 99214 OFFICE O/P EST MOD 30 MIN: CPT | Performed by: PHYSICIAN ASSISTANT

## 2018-12-19 RX ORDER — TOPIRAMATE 50 MG/1
TABLET, FILM COATED ORAL
Qty: 60 TABLET | Refills: 1 | Status: SHIPPED | OUTPATIENT
Start: 2018-12-19 | End: 2019-01-16

## 2018-12-19 ASSESSMENT — ANXIETY QUESTIONNAIRES
3. WORRYING TOO MUCH ABOUT DIFFERENT THINGS: NEARLY EVERY DAY
6. BECOMING EASILY ANNOYED OR IRRITABLE: SEVERAL DAYS
1. FEELING NERVOUS, ANXIOUS, OR ON EDGE: MORE THAN HALF THE DAYS
7. FEELING AFRAID AS IF SOMETHING AWFUL MIGHT HAPPEN: SEVERAL DAYS
GAD7 TOTAL SCORE: 14
4. TROUBLE RELAXING: MORE THAN HALF THE DAYS
2. NOT BEING ABLE TO STOP OR CONTROL WORRYING: NEARLY EVERY DAY
5. BEING SO RESTLESS THAT IT IS HARD TO SIT STILL: MORE THAN HALF THE DAYS

## 2018-12-19 ASSESSMENT — PATIENT HEALTH QUESTIONNAIRE - PHQ9: SUM OF ALL RESPONSES TO PHQ QUESTIONS 1-9: 14

## 2018-12-19 ASSESSMENT — MIFFLIN-ST. JEOR: SCORE: 1920.49

## 2018-12-19 ASSESSMENT — PAIN SCALES - GENERAL: PAINLEVEL: SEVERE PAIN (6)

## 2018-12-19 NOTE — NURSING NOTE
"Chief Complaint   Patient presents with     Pre-Op Exam       Initial /86 (BP Location: Left arm, Patient Position: Sitting, Cuff Size: Adult Large)   Pulse 85   Temp 98.8  F (37.1  C) (Tympanic)   Ht 1.6 m (5' 3\")   Wt 130.6 kg (288 lb)   SpO2 95%   BMI 51.02 kg/m   Estimated body mass index is 51.02 kg/m  as calculated from the following:    Height as of this encounter: 1.6 m (5' 3\").    Weight as of this encounter: 130.6 kg (288 lb).  Medication Reconciliation: complete    Mary Bond LPN  "

## 2018-12-20 ASSESSMENT — ANXIETY QUESTIONNAIRES: GAD7 TOTAL SCORE: 14

## 2018-12-26 DIAGNOSIS — E66.01 MORBID OBESITY (H): ICD-10-CM

## 2018-12-27 RX ORDER — PHENTERMINE HYDROCHLORIDE 15 MG/1
CAPSULE ORAL
Qty: 30 CAPSULE | Refills: 0 | Status: SHIPPED | OUTPATIENT
Start: 2018-12-27 | End: 2019-01-16

## 2018-12-27 NOTE — TELEPHONE ENCOUNTER
Phentermine      Last Written Prescription Date:  11/28/18  Last Fill Quantity: 30,   # refills: 0  Last Office Visit: 12/19/18  Future Office visit:    Next 5 appointments (look out 90 days)    Jan 16, 2019  2:00 PM CST  (Arrive by 1:45 PM)  SHORT with SEVEN Pandey  [facility] [place] ([facility] ) [address]  [place] [address]  [phone]

## 2019-01-14 NOTE — PROGRESS NOTES
SUBJECTIVE:   Autumn Holbrook is a 43 year old female who presents to clinic today for the following health issues:      Weight f/u      Duration: Follow up    Description (location/character/radiation): pt taking phentermine and topamax    Intensity:  severe    Accompanying signs and symptoms: N/A    History (similar episodes/previous evaluation): None    Precipitating or alleviating factors: None    Therapies tried and outcome: Medication, currently off d/t surgery           Problem list and histories reviewed & adjusted, as indicated.  Additional history: as documented    Patient Active Problem List   Diagnosis     ACP (advance care planning)     Post-traumatic osteoarthritis of right knee     Chronic pain syndrome     Opioid dependence in remission (H)     PTSD (post-traumatic stress disorder)     COY (generalized anxiety disorder)     Moderate episode of recurrent major depressive disorder (H)     Bilateral edema of lower extremity     Acquired hypothyroidism     Morbid obesity (H)     Past Surgical History:   Procedure Laterality Date     CHOLECYSTECTOMY       GYN SURGERY       SECTION [id number]     GYN SURGERY  2004    [facility]       Social History     Tobacco Use     Smoking status: Current Every Day Smoker     Packs/day: 0.50     Years: 20.00     Pack years: 10.00     Types: Cigarettes     Smokeless tobacco: Never Used   Substance Use Topics     Alcohol use: No     Family History   Problem Relation Age of Onset     Cerebrovascular Disease Mother      Alcoholism Mother      Cancer Mother      Depression Mother      Eczema Mother      Hypertension Mother      Migraines Mother      Mental Illness Mother      Osteoarthritis Mother      Osteoporosis Mother      Alcoholism Father      Cancer Father      Hyperlipidemia Father      Hypertension Father      Myocardial Infarction Father      Mental Illness Sister      Migraines Sister          Current Outpatient Medications   Medication Sig Dispense Refill      Acetaminophen (TYLENOL PO) Take 500 mg by mouth every 4 hours as needed for mild pain or fever       hydrochlorothiazide (MICROZIDE) 12.5 MG capsule TAKE ONE CAPSULE BY MOUTH ONCE DAILY 90 capsule 2     HYDROXYZINE HCL PO Take 25 mg by mouth 4 times daily as needed for itching       lamoTRIgine (LAMICTAL) 100 MG tablet Take 150 mg by mouth 2 times daily        levothyroxine (SYNTHROID/LEVOTHROID) 200 MCG tablet TAKE 1 TABLET BY MOUTH ONCE DAILY 90 tablet 3     order for DME Equipment being ordered: TEDS 2 Device 3     phentermine (ADIPEX-P) 15 MG capsule TAKE 1 CAPSULE BY MOUTH ONCE DAILY IN THE MORNING 30 capsule 0     sertraline (ZOLOFT) 100 MG tablet        topiramate (TOPAMAX) 50 MG tablet Take one to two at bedtime (50 to 100 mg) 60 tablet 1     buprenorphine HCl-naloxone HCl (SUBOXONE) 8-2 MG per film Place 4 Film under the tongue daily Patient states takes 1 film 8 mg twice daily       Allergies   Allergen Reactions     Depakote [Valproic Acid]      Gabapentin Swelling     Recent Labs   Lab Test 12/19/18  1155 11/28/18  1215 05/24/18  1213 08/30/17  1000   A1C 5.9*  --   --  5.8   LDL  --  125*  --  120*   HDL  --  90  --  81   TRIG  --  64  --  54   ALT  --  28 21 24   CR  --  0.67 0.84 0.80   GFRESTIMATED  --  >90 73 77   GFRESTBLACK  --  >90 89 >90   POTASSIUM  --  4.0 4.4 3.8   TSH  --  0.85 1.17 3.41      BP Readings from Last 3 Encounters:   01/16/19 118/76   12/19/18 122/86   11/28/18 144/86    Wt Readings from Last 3 Encounters:   01/16/19 130.6 kg (288 lb)   12/19/18 130.6 kg (288 lb)   11/28/18 133.8 kg (295 lb)                    Reviewed and updated as needed this visit by clinical staff       Reviewed and updated as needed this visit by Provider         ROS:  Constitutional, neuro, ENT, endocrine, pulmonary, cardiac, gastrointestinal, genitourinary, musculoskeletal, integument and psychiatric systems are negative, except as otherwise noted.    OBJECTIVE:                                         "            /76 (BP Location: Left arm, Patient Position: Sitting, Cuff Size: Adult Large)   Pulse 84   Temp 97.6  F (36.4  C) (Tympanic)   Ht 1.6 m (5' 3\")   Wt 130.6 kg (288 lb)   SpO2 97%   BMI 51.02 kg/m    Body mass index is 51.02 kg/m .  GENERAL APPEARANCE: healthy, alert and no distress  EYES: Eyes grossly normal to inspection, PERRL and conjunctivae and sclerae normal  HENT: ear canals and TM's normal and nose and mouth without ulcers or lesions  NECK: no adenopathy, no asymmetry, masses, or scars and thyroid normal to palpation  RESP: lungs clear to auscultation - no rales, rhonchi or wheezes  CV: regular rates and rhythm, normal S1 S2, no S3 or S4 and no murmur, click or rub  PSYCH: mentation appears normal and affect normal/bright    Diagnostic test results:  Diagnostic Test Results:  No results found for this or any previous visit (from the past 24 hour(s)).     ASSESSMENT/PLAN:                                                    1. Morbid obesity (H)  She is on hold right now due to surgery.   She is given a refill of Topamax.  Has been taking 2 pills at bedtime. Some sleep issues on or off the phentermine.   - phentermine (ADIPEX-P) 15 MG capsule; Take 1 capsule (15 mg) by mouth every morning  Dispense: 30 capsule; Refill: 0    2. Encounter for tobacco use cessation counseling  Trying to quit.   - Tobacco Cessation - Order to Satisfy Health Maintenance        See Patient Instructions    Apryl Hathaway PA-C  [facility] [place]  "

## 2019-01-16 ENCOUNTER — OFFICE VISIT (OUTPATIENT)
Dept: FAMILY MEDICINE | Facility: OTHER | Age: 44
End: 2019-01-16
Attending: PHYSICIAN ASSISTANT
Payer: COMMERCIAL

## 2019-01-16 VITALS
TEMPERATURE: 97.6 F | SYSTOLIC BLOOD PRESSURE: 118 MMHG | HEIGHT: 63 IN | HEART RATE: 84 BPM | DIASTOLIC BLOOD PRESSURE: 76 MMHG | WEIGHT: 288 LBS | OXYGEN SATURATION: 97 % | BODY MASS INDEX: 51.03 KG/M2

## 2019-01-16 DIAGNOSIS — F33.2 SEVERE EPISODE OF RECURRENT MAJOR DEPRESSIVE DISORDER, WITHOUT PSYCHOTIC FEATURES (H): ICD-10-CM

## 2019-01-16 DIAGNOSIS — Z71.6 ENCOUNTER FOR TOBACCO USE CESSATION COUNSELING: ICD-10-CM

## 2019-01-16 DIAGNOSIS — E66.01 MORBID OBESITY (H): Primary | ICD-10-CM

## 2019-01-16 PROCEDURE — 99213 OFFICE O/P EST LOW 20 MIN: CPT | Performed by: PHYSICIAN ASSISTANT

## 2019-01-16 PROCEDURE — G0463 HOSPITAL OUTPT CLINIC VISIT: HCPCS

## 2019-01-16 RX ORDER — PHENTERMINE HYDROCHLORIDE 15 MG/1
15 CAPSULE ORAL EVERY MORNING
Qty: 30 CAPSULE | Refills: 0 | Status: SHIPPED | OUTPATIENT
Start: 2019-01-16 | End: 2019-02-13

## 2019-01-16 RX ORDER — TOPIRAMATE 50 MG/1
TABLET, FILM COATED ORAL
Qty: 60 TABLET | Refills: 1 | Status: SHIPPED | OUTPATIENT
Start: 2019-01-16 | End: 2019-04-19

## 2019-01-16 ASSESSMENT — ANXIETY QUESTIONNAIRES
6. BECOMING EASILY ANNOYED OR IRRITABLE: NEARLY EVERY DAY
1. FEELING NERVOUS, ANXIOUS, OR ON EDGE: NEARLY EVERY DAY
7. FEELING AFRAID AS IF SOMETHING AWFUL MIGHT HAPPEN: NEARLY EVERY DAY
5. BEING SO RESTLESS THAT IT IS HARD TO SIT STILL: NEARLY EVERY DAY
GAD7 TOTAL SCORE: 21
3. WORRYING TOO MUCH ABOUT DIFFERENT THINGS: NEARLY EVERY DAY
IF YOU CHECKED OFF ANY PROBLEMS ON THIS QUESTIONNAIRE, HOW DIFFICULT HAVE THESE PROBLEMS MADE IT FOR YOU TO DO YOUR WORK, TAKE CARE OF THINGS AT HOME, OR GET ALONG WITH OTHER PEOPLE: VERY DIFFICULT
2. NOT BEING ABLE TO STOP OR CONTROL WORRYING: NEARLY EVERY DAY

## 2019-01-16 ASSESSMENT — PATIENT HEALTH QUESTIONNAIRE - PHQ9
5. POOR APPETITE OR OVEREATING: NEARLY EVERY DAY
SUM OF ALL RESPONSES TO PHQ QUESTIONS 1-9: 24

## 2019-01-16 ASSESSMENT — PAIN SCALES - GENERAL: PAINLEVEL: EXTREME PAIN (8)

## 2019-01-16 ASSESSMENT — MIFFLIN-ST. JEOR: SCORE: 1920.49

## 2019-01-16 NOTE — PATIENT INSTRUCTIONS

## 2019-01-16 NOTE — NURSING NOTE
"Chief Complaint   Patient presents with     Weight Problem       Initial /76 (BP Location: Left arm, Patient Position: Sitting, Cuff Size: Adult Large)   Pulse 84   Temp 97.6  F (36.4  C) (Tympanic)   Ht 1.6 m (5' 3\")   Wt 130.6 kg (288 lb)   SpO2 97%   BMI 51.02 kg/m   Estimated body mass index is 51.02 kg/m  as calculated from the following:    Height as of this encounter: 1.6 m (5' 3\").    Weight as of this encounter: 130.6 kg (288 lb).  Medication Reconciliation: complete    Mary Bond LPN  "

## 2019-01-17 ASSESSMENT — ANXIETY QUESTIONNAIRES: GAD7 TOTAL SCORE: 21

## 2019-01-18 ENCOUNTER — TRANSFERRED RECORDS (OUTPATIENT)
Dept: HEALTH INFORMATION MANAGEMENT | Facility: CLINIC | Age: 44
End: 2019-01-18

## 2019-02-13 DIAGNOSIS — E66.01 MORBID OBESITY (H): ICD-10-CM

## 2019-02-14 ENCOUNTER — TRANSFERRED RECORDS (OUTPATIENT)
Dept: HEALTH INFORMATION MANAGEMENT | Facility: CLINIC | Age: 44
End: 2019-02-14

## 2019-02-14 RX ORDER — PHENTERMINE HYDROCHLORIDE 15 MG/1
CAPSULE ORAL
Qty: 30 CAPSULE | Refills: 0 | Status: SHIPPED | OUTPATIENT
Start: 2019-02-14 | End: 2019-03-06

## 2019-02-14 NOTE — TELEPHONE ENCOUNTER
Phentermine  Last visit: 1.16.19  Last refill: 1.16.19 #30    Future appointments:   Next 5 appointments (look out 90 days)    Mar 06, 2019  2:00 PM CST  (Arrive by 1:45 PM)  SHORT with SEVEN Pandey  [facility] [place] ([facility] [place] ) [address]  [place] [address]  [phone]

## 2019-03-01 NOTE — PROGRESS NOTES
SUBJECTIVE:   Autumn Holbrook is a 43 year old female who presents to clinic today for the following health issues:      Obesity f/u      Duration: follow up     Description (location/character/radiation): pt taking phentermine and topamax    Intensity:  NA    Accompanying signs and symptoms: Weight    History (similar episodes/previous evaluation): None    Precipitating or alleviating factors: None    Therapies tried and outcome: phentermine 15 MG       Depression Followup    Status since last visit: Worsened     See PHQ-9 for current symptoms.  Other associated symptoms: None    Complicating factors:   Significant life event:  No   Current substance abuse:  None  Anxiety or Panic symptoms:  Yes-      PHQ 2018   PHQ-9 Total Score 27 14 24   Q9: Suicide Ideation Nearly every day Several days Nearly every day   F/U: Thoughts of suicide or self-harm Yes - -   F/U: Self harm-plan No - -   F/U: Self-harm action No - -   F/U: Safety concerns Yes - -       PHQ-9  English  PHQ-9   Any Language  Suicide Assessment Five-step Evaluation and Treatment (SAFE-T)    Problem list and histories reviewed & adjusted, as indicated.  Additional history: as documented    Patient Active Problem List   Diagnosis     ACP (advance care planning)     Post-traumatic osteoarthritis of right knee     Chronic pain syndrome     Opioid dependence in remission (H)     PTSD (post-traumatic stress disorder)     COY (generalized anxiety disorder)     Moderate episode of recurrent major depressive disorder (H)     Bilateral edema of lower extremity     Acquired hypothyroidism     Morbid obesity (H)     Past Surgical History:   Procedure Laterality Date     CHOLECYSTECTOMY       GYN SURGERY       SECTION 9149-2638     GYN SURGERY  2004    [facility]       Social History     Tobacco Use     Smoking status: Current Every Day Smoker     Packs/day: 0.50     Years: 20.00     Pack years: 10.00     Types: Cigarettes      Smokeless tobacco: Never Used   Substance Use Topics     Alcohol use: No     Family History   Problem Relation Age of Onset     Cerebrovascular Disease Mother      Alcoholism Mother      Cancer Mother      Depression Mother      Eczema Mother      Hypertension Mother      Migraines Mother      Mental Illness Mother      Osteoarthritis Mother      Osteoporosis Mother      Alcoholism Father      Cancer Father      Hyperlipidemia Father      Hypertension Father      Myocardial Infarction Father      Mental Illness Sister      Migraines Sister          Current Outpatient Medications   Medication Sig Dispense Refill     Acetaminophen (TYLENOL PO) Take 500 mg by mouth every 4 hours as needed for mild pain or fever       atomoxetine (STRATTERA) 60 MG capsule Take 60 mg by mouth daily       buprenorphine HCl-naloxone HCl (SUBOXONE) 8-2 MG per film Place 4 Film under the tongue daily Patient states takes 1 film 8 mg twice daily       hydrochlorothiazide (MICROZIDE) 12.5 MG capsule TAKE ONE CAPSULE BY MOUTH ONCE DAILY 90 capsule 2     HYDROXYZINE HCL PO Take 25 mg by mouth 4 times daily as needed for itching       lamoTRIgine (LAMICTAL) 100 MG tablet Take 150 mg by mouth 2 times daily        levothyroxine (SYNTHROID/LEVOTHROID) 200 MCG tablet TAKE 1 TABLET BY MOUTH ONCE DAILY 90 tablet 3     order for DME Equipment being ordered: TEDS 2 Device 3     phentermine (ADIPEX-P) 15 MG capsule TAKE 1 CAPSULE BY MOUTH ONCE DAILY IN THE MORNING 30 capsule 0     sertraline (ZOLOFT) 100 MG tablet        topiramate (TOPAMAX) 50 MG tablet Take one to two at bedtime (50 to 100 mg) 60 tablet 1     Allergies   Allergen Reactions     Depakote [Valproic Acid]      Gabapentin Swelling     Propofol Unknown     Got very stiff and tight.      Recent Labs   Lab Test 12/19/18  1155 11/28/18  1215 05/24/18  1213 08/30/17  1000   A1C 5.9*  --   --  5.8   LDL  --  125*  --  120*   HDL  --  90  --  81   TRIG  --  64  --  54   ALT  --  28 21 24   CR  --   "0.67 0.84 0.80   GFRESTIMATED  --  >90 73 77   GFRESTBLACK  --  >90 89 >90   POTASSIUM  --  4.0 4.4 3.8   TSH  --  0.85 1.17 3.41      BP Readings from Last 3 Encounters:   03/06/19 140/80   01/16/19 118/76   12/19/18 122/86    Wt Readings from Last 3 Encounters:   03/06/19 129.3 kg (285 lb)   01/16/19 130.6 kg (288 lb)   12/19/18 130.6 kg (288 lb)                    Reviewed and updated as needed this visit by clinical staff       Reviewed and updated as needed this visit by Provider         ROS:  Constitutional, neuro, ENT, endocrine, pulmonary, cardiac, gastrointestinal, genitourinary, musculoskeletal, integument and psychiatric systems are negative, except as otherwise noted.    OBJECTIVE:                                                    /80 (BP Location: Left arm, Patient Position: Sitting, Cuff Size: Adult Large)   Pulse 85   Temp 98.5  F (36.9  C) (Tympanic)   Ht 1.6 m (5' 3\")   Wt 129.3 kg (285 lb)   SpO2 99%   BMI 50.49 kg/m    Body mass index is 50.49 kg/m .  GENERAL APPEARANCE: healthy, alert and mild distress  EYES: Eyes grossly normal to inspection, PERRL and conjunctivae and sclerae normal  HENT: ear canals and TM's normal and nose and mouth without ulcers or lesions  NECK: no adenopathy, no asymmetry, masses, or scars and thyroid normal to palpation  RESP: lungs clear to auscultation - no rales, rhonchi or wheezes  CV: regular rates and rhythm, normal S1 S2, no S3 or S4 and no murmur, click or rub  LYMPHATICS: no cervical adenopathy  ABDOMEN: soft, nontender, without hepatosplenomegaly or masses and bowel sounds normal  MS: right knee crepitation is present. Painful. Swollen.   SKIN: no suspicious lesions or rashes  NEURO: Normal strength and tone, mentation intact and speech normal    Diagnostic test results:  Diagnostic Test Results:  No results found for this or any previous visit (from the past 24 hour(s)).     ASSESSMENT/PLAN:                                                      1. " Tobacco abuse  Given a referral.   - Tobacco Cessation - Order to Satisfy Health Maintenance    2. Tobacco abuse counseling  Given referral.     3. Chronic pain syndrome  [facility]. Worse pain after knee surgery. Is cutting back on her Suboxone per the taper they are doing. Down to 4 mg and we will give some baclofen.   - baclofen (LIORESAL) 10 MG tablet; Take 1 tablet (10 mg) by mouth 3 times daily  Dispense: 90 tablet; Refill: 1    4. Morbid obesity (H)  This helps her keep some weight off and we are trying to get her weight down so she can move easier. Very emotional.   - phentermine (ADIPEX-P) 15 MG capsule; Take 1 capsule (15 mg) by mouth every morning  Dispense: 30 capsule; Refill: 1    5. Current mild episode of major depressive disorder without prior episode (H)  She is going to be seeing her therapist and psychiatrist. Mood is down and tearful. Just had surgery and pain is out of control.        See Patient Instructions    SEVEN Bryant  [facility] [place]

## 2019-03-06 ENCOUNTER — OFFICE VISIT (OUTPATIENT)
Dept: FAMILY MEDICINE | Facility: OTHER | Age: 44
End: 2019-03-06
Attending: PHYSICIAN ASSISTANT
Payer: COMMERCIAL

## 2019-03-06 VITALS
WEIGHT: 285 LBS | HEART RATE: 85 BPM | TEMPERATURE: 98.5 F | HEIGHT: 63 IN | DIASTOLIC BLOOD PRESSURE: 80 MMHG | BODY MASS INDEX: 50.5 KG/M2 | SYSTOLIC BLOOD PRESSURE: 140 MMHG | OXYGEN SATURATION: 99 %

## 2019-03-06 DIAGNOSIS — E66.01 MORBID OBESITY (H): ICD-10-CM

## 2019-03-06 DIAGNOSIS — F32.0 CURRENT MILD EPISODE OF MAJOR DEPRESSIVE DISORDER WITHOUT PRIOR EPISODE (H): ICD-10-CM

## 2019-03-06 DIAGNOSIS — G89.4 CHRONIC PAIN SYNDROME: Primary | ICD-10-CM

## 2019-03-06 DIAGNOSIS — Z71.6 TOBACCO ABUSE COUNSELING: ICD-10-CM

## 2019-03-06 DIAGNOSIS — Z72.0 TOBACCO ABUSE: ICD-10-CM

## 2019-03-06 PROCEDURE — G0463 HOSPITAL OUTPT CLINIC VISIT: HCPCS | Mod: 25

## 2019-03-06 PROCEDURE — 99214 OFFICE O/P EST MOD 30 MIN: CPT | Performed by: PHYSICIAN ASSISTANT

## 2019-03-06 PROCEDURE — G0463 HOSPITAL OUTPT CLINIC VISIT: HCPCS

## 2019-03-06 RX ORDER — PHENTERMINE HYDROCHLORIDE 15 MG/1
15 CAPSULE ORAL EVERY MORNING
Qty: 30 CAPSULE | Refills: 1 | Status: SHIPPED | OUTPATIENT
Start: 2019-03-06 | End: 2019-04-10

## 2019-03-06 RX ORDER — ATOMOXETINE 60 MG/1
60 CAPSULE ORAL DAILY
COMMUNITY
End: 2023-03-01

## 2019-03-06 RX ORDER — BACLOFEN 10 MG/1
10 TABLET ORAL 3 TIMES DAILY
Qty: 90 TABLET | Refills: 1 | Status: SHIPPED | OUTPATIENT
Start: 2019-03-06 | End: 2019-05-02

## 2019-03-06 ASSESSMENT — PAIN SCALES - GENERAL: PAINLEVEL: WORST PAIN (10)

## 2019-03-06 ASSESSMENT — MIFFLIN-ST. JEOR: SCORE: 1906.88

## 2019-03-06 NOTE — PATIENT INSTRUCTIONS

## 2019-03-06 NOTE — NURSING NOTE
"Chief Complaint   Patient presents with     Weight Problem       Initial /80 (BP Location: Left arm, Patient Position: Sitting, Cuff Size: Adult Large)   Pulse 85   Temp 98.5  F (36.9  C) (Tympanic)   Ht 1.6 m (5' 3\")   Wt 129.3 kg (285 lb)   SpO2 99%   BMI 50.49 kg/m   Estimated body mass index is 50.49 kg/m  as calculated from the following:    Height as of this encounter: 1.6 m (5' 3\").    Weight as of this encounter: 129.3 kg (285 lb).  Medication Reconciliation: complete    Mary Bond LPN  "

## 2019-03-07 ASSESSMENT — ANXIETY QUESTIONNAIRES
1. FEELING NERVOUS, ANXIOUS, OR ON EDGE: NEARLY EVERY DAY
7. FEELING AFRAID AS IF SOMETHING AWFUL MIGHT HAPPEN: SEVERAL DAYS
6. BECOMING EASILY ANNOYED OR IRRITABLE: NEARLY EVERY DAY
3. WORRYING TOO MUCH ABOUT DIFFERENT THINGS: NEARLY EVERY DAY
IF YOU CHECKED OFF ANY PROBLEMS ON THIS QUESTIONNAIRE, HOW DIFFICULT HAVE THESE PROBLEMS MADE IT FOR YOU TO DO YOUR WORK, TAKE CARE OF THINGS AT HOME, OR GET ALONG WITH OTHER PEOPLE: EXTREMELY DIFFICULT
2. NOT BEING ABLE TO STOP OR CONTROL WORRYING: NEARLY EVERY DAY
GAD7 TOTAL SCORE: 19
5. BEING SO RESTLESS THAT IT IS HARD TO SIT STILL: NEARLY EVERY DAY

## 2019-03-07 ASSESSMENT — PATIENT HEALTH QUESTIONNAIRE - PHQ9
SUM OF ALL RESPONSES TO PHQ QUESTIONS 1-9: 24
5. POOR APPETITE OR OVEREATING: NEARLY EVERY DAY

## 2019-03-08 ASSESSMENT — ANXIETY QUESTIONNAIRES: GAD7 TOTAL SCORE: 19

## 2019-04-05 NOTE — PROGRESS NOTES
SUBJECTIVE:   Autumn Holbrook is a 43 year old female who presents to clinic today for the following health issues:      Morbid Obesity      Duration: 1 month f/u    Description (location/character/radiation): taking phentermine and topamax for weight control    Intensity:  NA    Accompanying signs and symptoms: bipolar illness.     History (similar episodes/previous evaluation): None    Precipitating or alleviating factors: None    Therapies tried and outcome: None       Depression Followup    Status since last visit: Worsened anxiety    See PHQ-9 for current symptoms.  Other associated symptoms: None    Complicating factors:   Significant life event:  Yes-  Taking care of mom.    Current substance abuse:  None  Anxiety or Panic symptoms:  Yes-  Crying every day.     PHQ 2019 3/7/2019 4/10/2019   PHQ-9 Total Score 24 24 23   Q9: Thoughts of better off dead/self-harm past 2 weeks Nearly every day Nearly every day Nearly every day   F/U: Thoughts of suicide or self-harm - - -   F/U: Self harm-plan - - -   F/U: Self-harm action - - -   F/U: Safety concerns - - -       PHQ-9  English  PHQ-9   Any Language  Suicide Assessment Five-step Evaluation and Treatment (SAFE-T)    Problem list and histories reviewed & adjusted, as indicated.  Additional history: as documented    Patient Active Problem List   Diagnosis     ACP (advance care planning)     Post-traumatic osteoarthritis of right knee     Chronic pain syndrome     Opioid dependence in remission (H)     PTSD (post-traumatic stress disorder)     COY (generalized anxiety disorder)     Moderate episode of recurrent major depressive disorder (H)     Bilateral edema of lower extremity     Acquired hypothyroidism     Morbid obesity (H)     Past Surgical History:   Procedure Laterality Date     CHOLECYSTECTOMY       GYN SURGERY       SECTION 4412-4388     GYN SURGERY  [address] [place] [address]       Social History     Tobacco Use     Smoking status: Current Every Day  Smoker     Packs/day: 0.50     Years: 20.00     Pack years: 10.00     Types: Cigarettes     Smokeless tobacco: Never Used   Substance Use Topics     Alcohol use: No     Family History   Problem Relation Age of Onset     Cerebrovascular Disease Mother      Alcoholism Mother      Cancer Mother      Depression Mother      Eczema Mother      Hypertension Mother      Migraines Mother      Mental Illness Mother      Osteoarthritis Mother      Osteoporosis Mother      Alcoholism Father      Cancer Father      Hyperlipidemia Father      Hypertension Father      Myocardial Infarction Father      Mental Illness Sister      Migraines Sister          Current Outpatient Medications   Medication Sig Dispense Refill     Acetaminophen (TYLENOL PO) Take 500 mg by mouth every 4 hours as needed for mild pain or fever       atomoxetine (STRATTERA) 60 MG capsule Take 60 mg by mouth daily       baclofen (LIORESAL) 10 MG tablet Take 1 tablet (10 mg) by mouth 3 times daily 90 tablet 1     buprenorphine HCl-naloxone HCl (SUBOXONE) 8-2 MG per film Place 2 Film under the tongue daily Patient states takes 2mg film total 4 mg  daily       hydrochlorothiazide (MICROZIDE) 12.5 MG capsule TAKE ONE CAPSULE BY MOUTH ONCE DAILY 90 capsule 2     HYDROXYZINE HCL PO Take 25 mg by mouth 4 times daily as needed for itching       lamoTRIgine (LAMICTAL) 100 MG tablet Take 175 mg by mouth 2 times daily        levothyroxine (SYNTHROID/LEVOTHROID) 200 MCG tablet TAKE 1 TABLET BY MOUTH ONCE DAILY 90 tablet 3     Melatonin 10 MG CAPS Take 10 mg by mouth daily       melatonin 3 MG CAPS Take 3 mg by mouth daily       order for DME Equipment being ordered: TEDS 2 Device 3     phentermine (ADIPEX-P) 15 MG capsule Take 1 capsule (15 mg) by mouth every morning 30 capsule 1     sertraline (ZOLOFT) 100 MG tablet        topiramate (TOPAMAX) 50 MG tablet Take one to two at bedtime (50 to 100 mg) 60 tablet 1     Allergies   Allergen Reactions     Depakote [Valproic Acid]   "    Gabapentin Swelling     Propofol Unknown     Got very stiff and tight.      Recent Labs   Lab Test 12/19/18  1155 11/28/18  1215 05/24/18  1213 08/30/17  1000   A1C 5.9*  --   --  5.8   LDL  --  125*  --  120*   HDL  --  90  --  81   TRIG  --  64  --  54   ALT  --  28 21 24   CR  --  0.67 0.84 0.80   GFRESTIMATED  --  >90 73 77   GFRESTBLACK  --  >90 89 >90   POTASSIUM  --  4.0 4.4 3.8   TSH  --  0.85 1.17 3.41      BP Readings from Last 3 Encounters:   04/10/19 124/78   03/06/19 140/80   01/16/19 118/76    Wt Readings from Last 3 Encounters:   04/10/19 127 kg (280 lb)   03/06/19 129.3 kg (285 lb)   01/16/19 130.6 kg (288 lb)                    Reviewed and updated as needed this visit by clinical staff       Reviewed and updated as needed this visit by Provider         ROS:  Constitutional, neuro, ENT, endocrine, pulmonary, cardiac, gastrointestinal, genitourinary, musculoskeletal, integument and psychiatric systems are negative, except as otherwise noted.    OBJECTIVE:                                                    /78 (BP Location: Left arm, Patient Position: Sitting, Cuff Size: Adult Large)   Pulse 78   Temp 98.7  F (37.1  C) (Tympanic)   Ht 1.6 m (5' 3\")   Wt 127 kg (280 lb)   SpO2 97%   BMI 49.60 kg/m    Body mass index is 49.6 kg/m .  GENERAL APPEARANCE: healthy, alert and no distress  EYES: Eyes grossly normal to inspection, PERRL and conjunctivae and sclerae normal  HENT: ear canals and TM's normal and nose and mouth without ulcers or lesions  NECK: no adenopathy, no asymmetry, masses, or scars and thyroid normal to palpation  RESP: lungs clear to auscultation - no rales, rhonchi or wheezes  CV: regular rates and rhythm, normal S1 S2, no S3 or S4 and no murmur, click or rub  MS: extremities normal- no gross deformities noted  SKIN: no suspicious lesions or rashes  NEURO: Normal strength and tone, mentation intact and speech normal  PSYCH: mentation appears normal and affect " normal/bright    Diagnostic test results:  Diagnostic Test Results:  No results found for this or any previous visit (from the past 24 hour(s)).     ASSESSMENT/PLAN:                                                      1. Morbid obesity (H)  Not getting much better. Low dose is not really affecting her. Will bump up  A little bit.     2. Moderate episode of recurrent major depressive disorder (H)  Is going to counseling. Is bipolar. Is really in a struggling mess. Bumped up her Lamotrigine.  Will need significant counseling and is showing up for appointments. Calling her Psyche MD and see if decreasing her Zoloft will help. She is going to be seeing us back in a month.   45 minutes in visit over 50 % in face to face counseling.   See Patient Instructions    SEVEN Bryant  [facility]

## 2019-04-10 ENCOUNTER — OFFICE VISIT (OUTPATIENT)
Dept: FAMILY MEDICINE | Facility: OTHER | Age: 44
End: 2019-04-10
Attending: PHYSICIAN ASSISTANT
Payer: COMMERCIAL

## 2019-04-10 VITALS
WEIGHT: 280 LBS | TEMPERATURE: 98.7 F | HEART RATE: 78 BPM | OXYGEN SATURATION: 97 % | HEIGHT: 63 IN | SYSTOLIC BLOOD PRESSURE: 124 MMHG | DIASTOLIC BLOOD PRESSURE: 78 MMHG | BODY MASS INDEX: 49.61 KG/M2

## 2019-04-10 DIAGNOSIS — F33.1 MODERATE EPISODE OF RECURRENT MAJOR DEPRESSIVE DISORDER (H): ICD-10-CM

## 2019-04-10 DIAGNOSIS — E66.01 MORBID OBESITY (H): Primary | ICD-10-CM

## 2019-04-10 PROCEDURE — G0463 HOSPITAL OUTPT CLINIC VISIT: HCPCS | Mod: 25

## 2019-04-10 PROCEDURE — G0463 HOSPITAL OUTPT CLINIC VISIT: HCPCS

## 2019-04-10 PROCEDURE — 99214 OFFICE O/P EST MOD 30 MIN: CPT | Performed by: PHYSICIAN ASSISTANT

## 2019-04-10 RX ORDER — MELATONIN 10 MG
10 CAPSULE ORAL
COMMUNITY

## 2019-04-10 RX ORDER — PHENTERMINE HYDROCHLORIDE 30 MG/1
30 CAPSULE ORAL EVERY MORNING
Qty: 30 CAPSULE | Refills: 1 | Status: SHIPPED | OUTPATIENT
Start: 2019-04-10 | End: 2019-05-08

## 2019-04-10 ASSESSMENT — ANXIETY QUESTIONNAIRES
IF YOU CHECKED OFF ANY PROBLEMS ON THIS QUESTIONNAIRE, HOW DIFFICULT HAVE THESE PROBLEMS MADE IT FOR YOU TO DO YOUR WORK, TAKE CARE OF THINGS AT HOME, OR GET ALONG WITH OTHER PEOPLE: EXTREMELY DIFFICULT
GAD7 TOTAL SCORE: 16
1. FEELING NERVOUS, ANXIOUS, OR ON EDGE: NEARLY EVERY DAY
7. FEELING AFRAID AS IF SOMETHING AWFUL MIGHT HAPPEN: NOT AT ALL
2. NOT BEING ABLE TO STOP OR CONTROL WORRYING: NEARLY EVERY DAY
5. BEING SO RESTLESS THAT IT IS HARD TO SIT STILL: NEARLY EVERY DAY
6. BECOMING EASILY ANNOYED OR IRRITABLE: SEVERAL DAYS
3. WORRYING TOO MUCH ABOUT DIFFERENT THINGS: NEARLY EVERY DAY

## 2019-04-10 ASSESSMENT — PATIENT HEALTH QUESTIONNAIRE - PHQ9
SUM OF ALL RESPONSES TO PHQ QUESTIONS 1-9: 23
5. POOR APPETITE OR OVEREATING: NEARLY EVERY DAY

## 2019-04-10 ASSESSMENT — MIFFLIN-ST. JEOR: SCORE: 1884.2

## 2019-04-10 ASSESSMENT — PAIN SCALES - GENERAL: PAINLEVEL: SEVERE PAIN (6)

## 2019-04-10 NOTE — NURSING NOTE
"Chief Complaint   Patient presents with     Weight Problem       Initial /78 (BP Location: Left arm, Patient Position: Sitting, Cuff Size: Adult Large)   Pulse 78   Temp 98.7  F (37.1  C) (Tympanic)   Ht 1.6 m (5' 3\")   Wt 127 kg (280 lb)   SpO2 97%   BMI 49.60 kg/m   Estimated body mass index is 49.6 kg/m  as calculated from the following:    Height as of this encounter: 1.6 m (5' 3\").    Weight as of this encounter: 127 kg (280 lb).  Medication Reconciliation: complete    Mary Bond LPN  "

## 2019-04-10 NOTE — PATIENT INSTRUCTIONS
Thank you for choosing [facility].   I have office hours 8:00 am to 4:30 pm on Monday's, Wednesday's, Thursday's and Friday's. My nurse and I are out of the office every Tuesday.    Following your visit, when your labs and diagnostic testing have returned, I will review then and you will be contacted by my nurse.  If you are on My Chart, you can also view results there.    For refills, notify your pharmacy regarding what you need and the pharmacy will generate a refill request. Do not call my nurse as she is unable to process refill request. Please plan ahead and allow 3-5 days for refill requests.    You will generally receive a reminder call the day prior to your appointment.  If you cannot attend your appointment, please cancel your appointment with as much notice as possible.  If there is a pattern of failure to present for your appointments, I cannot provide consistent, meaningful, ongoing care for you. It is very important to me that you come in for your care, so we can best assist you with your health care needs.    IMPORTANT:  Please note that it is my standard of practice to NOT participate in prescribing ongoing requested Narcotic Analgesic therapy, and/or participate in the prescribing of other controlled substances.  My nurse and I am happy to assist you with the process of referral for alternative pain management as needed, and other treatment modalities including but not limited to:  Physical Therapy, Physical Medicine and Rehab, Counseling, Chiropractic Care, Orthopedic Care, and non-narcotic medication management.     In the event that you need to be seen for emergent concerns and I am out of office,  please see one of my colleagues for acute concerns.  You may also present to  or ER.  I appreciate the opportunity to serve you and look forward to supporting your healthcare needs in the future. Please contact me with any questions or concerns that you may  have.    Sincerely,      Apryl Hathaway RN, PA-C

## 2019-04-11 ASSESSMENT — ANXIETY QUESTIONNAIRES: GAD7 TOTAL SCORE: 16

## 2019-04-19 DIAGNOSIS — F33.2 SEVERE EPISODE OF RECURRENT MAJOR DEPRESSIVE DISORDER, WITHOUT PSYCHOTIC FEATURES (H): ICD-10-CM

## 2019-04-22 NOTE — TELEPHONE ENCOUNTER
topamax      Last Written Prescription Date:  1/16/19  Last Fill Quantity: 60,   # refills: 1  Last Office Visit: 4/10/19  Future Office visit:    Next 5 appointments (look out 90 days)    May 08, 2019  3:40 PM CDT  (Arrive by 3:25 PM)  SHORT with SEVEN Pandey  [facility] [address]  [place] [address]  [phone]

## 2019-04-23 RX ORDER — TOPIRAMATE 50 MG/1
TABLET, FILM COATED ORAL
Qty: 60 TABLET | Refills: 1 | Status: SHIPPED | OUTPATIENT
Start: 2019-04-23 | End: 2019-05-08

## 2019-05-02 DIAGNOSIS — I10 BENIGN ESSENTIAL HYPERTENSION: ICD-10-CM

## 2019-05-02 DIAGNOSIS — G89.4 CHRONIC PAIN SYNDROME: ICD-10-CM

## 2019-05-02 RX ORDER — HYDROCHLOROTHIAZIDE 12.5 MG/1
CAPSULE ORAL
Qty: 90 CAPSULE | Refills: 0 | Status: SHIPPED | OUTPATIENT
Start: 2019-05-02 | End: 2019-08-08

## 2019-05-02 NOTE — TELEPHONE ENCOUNTER
baclofen (LIORESAL) 10 MG tablet      Last Written Prescription Date:  3-6-19  Last Fill Quantity: 90,   # refills: 1  Last Office Visit: 4-10-19  Future Office visit:    Next 5 appointments (look out 90 days)    May 08, 2019  3:40 PM CDT  (Arrive by 3:25 PM)  SHORT with SEVEN Pandey  [facility] ([facility] ) [address]  [place] [address]  [phone]           Routing refill request to provider for review/approval because:  Drug not on the [facility], [facility] or [facility] refill protocol or controlled substance

## 2019-05-03 RX ORDER — BACLOFEN 10 MG/1
TABLET ORAL
Qty: 90 TABLET | Refills: 1 | Status: SHIPPED | OUTPATIENT
Start: 2019-05-03 | End: 2019-07-05

## 2019-05-08 ENCOUNTER — OFFICE VISIT (OUTPATIENT)
Dept: FAMILY MEDICINE | Facility: OTHER | Age: 44
End: 2019-05-08
Attending: PHYSICIAN ASSISTANT
Payer: COMMERCIAL

## 2019-05-08 VITALS
HEART RATE: 79 BPM | WEIGHT: 287 LBS | DIASTOLIC BLOOD PRESSURE: 80 MMHG | OXYGEN SATURATION: 97 % | SYSTOLIC BLOOD PRESSURE: 128 MMHG | BODY MASS INDEX: 50.84 KG/M2 | TEMPERATURE: 97.6 F

## 2019-05-08 DIAGNOSIS — F33.1 MODERATE EPISODE OF RECURRENT MAJOR DEPRESSIVE DISORDER (H): Primary | ICD-10-CM

## 2019-05-08 DIAGNOSIS — E66.01 MORBID OBESITY (H): ICD-10-CM

## 2019-05-08 DIAGNOSIS — F33.2 SEVERE EPISODE OF RECURRENT MAJOR DEPRESSIVE DISORDER, WITHOUT PSYCHOTIC FEATURES (H): ICD-10-CM

## 2019-05-08 PROCEDURE — G0463 HOSPITAL OUTPT CLINIC VISIT: HCPCS

## 2019-05-08 PROCEDURE — 99213 OFFICE O/P EST LOW 20 MIN: CPT | Performed by: PHYSICIAN ASSISTANT

## 2019-05-08 RX ORDER — TOPIRAMATE 50 MG/1
TABLET, FILM COATED ORAL
Qty: 60 TABLET | Refills: 1 | Status: SHIPPED | OUTPATIENT
Start: 2019-05-08 | End: 2019-09-04

## 2019-05-08 RX ORDER — PHENTERMINE HYDROCHLORIDE 30 MG/1
30 CAPSULE ORAL EVERY MORNING
Qty: 30 CAPSULE | Refills: 1 | Status: SHIPPED | OUTPATIENT
Start: 2019-05-08 | End: 2019-08-05

## 2019-05-08 RX ORDER — BUPROPION HYDROCHLORIDE 150 MG/1
150 TABLET ORAL EVERY MORNING
Qty: 90 TABLET | Refills: 0 | Status: SHIPPED | OUTPATIENT
Start: 2019-05-08 | End: 2019-08-05

## 2019-05-08 ASSESSMENT — ANXIETY QUESTIONNAIRES
7. FEELING AFRAID AS IF SOMETHING AWFUL MIGHT HAPPEN: SEVERAL DAYS
4. TROUBLE RELAXING: NEARLY EVERY DAY
2. NOT BEING ABLE TO STOP OR CONTROL WORRYING: MORE THAN HALF THE DAYS
3. WORRYING TOO MUCH ABOUT DIFFERENT THINGS: MORE THAN HALF THE DAYS
GAD7 TOTAL SCORE: 15
6. BECOMING EASILY ANNOYED OR IRRITABLE: SEVERAL DAYS
5. BEING SO RESTLESS THAT IT IS HARD TO SIT STILL: NEARLY EVERY DAY
IF YOU CHECKED OFF ANY PROBLEMS ON THIS QUESTIONNAIRE, HOW DIFFICULT HAVE THESE PROBLEMS MADE IT FOR YOU TO DO YOUR WORK, TAKE CARE OF THINGS AT HOME, OR GET ALONG WITH OTHER PEOPLE: EXTREMELY DIFFICULT
1. FEELING NERVOUS, ANXIOUS, OR ON EDGE: NEARLY EVERY DAY

## 2019-05-08 ASSESSMENT — PATIENT HEALTH QUESTIONNAIRE - PHQ9: SUM OF ALL RESPONSES TO PHQ QUESTIONS 1-9: 23

## 2019-05-08 ASSESSMENT — PAIN SCALES - GENERAL: PAINLEVEL: EXTREME PAIN (9)

## 2019-05-08 NOTE — LETTER
My Depression Action Plan  Name: Autumn BATISTA Hnatko   Date of Birth 1975  Date: 5/8/2019    My doctor: Apryl Hathaway   My clinic: [facility] [place]  [address]  [place] [address]  [phone]          GREEN    ZONE   Good Control    What it looks like:     Things are going generally well. You have normal up s and down s. You may even feel depressed from time to time, but bad moods usually last less than a day.   What you need to do:  1. Continue to care for yourself (see self care plan)  2. Check your depression survival kit and update it as needed  3. Follow your physician s recommendations including any medication.  4. Do not stop taking medication unless you consult with your physician first.           YELLOW         ZONE Getting Worse    What it looks like:     Depression is starting to interfere with your life.     It may be hard to get out of bed; you may be starting to isolate yourself from others.    Symptoms of depression are starting to last most all day and this has happened for several days.     You may have suicidal thoughts but they are not constant.   What you need to do:     1. Call your care team, your response to treatment will improve if you keep your care team informed of your progress. Yellow periods are signs an adjustment may need to be made.     2. Continue your self-care, even if you have to fake it!    3. Talk to someone in your support network    4. Open up your depression survival kit           RED    ZONE Medical Alert - Get Help    What it looks like:     Depression is seriously interfering with your life.     You may experience these or other symptoms: You can t get out of bed most days, can t work or engage in other necessary activities, you have trouble taking care of basic hygiene, or basic responsibilities, thoughts of suicide or death that will not go away, self-injurious behavior.     What you need to do:  1. Call your care team and  request a same-day appointment. If they are not available (weekends or after hours) call your local crisis line, emergency room or 911.            Depression Self Care Plan / Survival Kit    Self-Care for Depression  Here s the deal. Your body and mind are really not as separate as most people think.  What you do and think affects how you feel and how you feel influences what you do and think. This means if you do things that people who feel good do, it will help you feel better.  Sometimes this is all it takes.  There is also a place for medication and therapy depending on how severe your depression is, so be sure to consult with your medical provider and/ or Behavioral Health Consultant if your symptoms are worsening or not improving.     In order to better manage my stress, I will:    Exercise  Get some form of exercise, every day. This will help reduce pain and release endorphins, the  feel good  chemicals in your brain. This is almost as good as taking antidepressants!  This is not the same as joining a gym and then never going! (they count on that by the way ) It can be as simple as just going for a walk or doing some gardening, anything that will get you moving.      Hygiene   Maintain good hygiene (Get out of bed in the morning, Make your bed, Brush your teeth, Take a shower, and Get dressed like you were going to work, even if you are unemployed).  If your clothes don't fit try to get ones that do.    Diet  I will strive to eat foods that are good for me, drink plenty of water, and avoid excessive sugar, caffeine, alcohol, and other mood-altering substances.  Some foods that are helpful in depression are: complex carbohydrates, B vitamins, flaxseed, fish or fish oil, fresh fruits and vegetables.    Psychotherapy  I agree to participate in Individual Therapy (if recommended).    Medication  If prescribed medications, I agree to take them.  Missing doses can result in serious side effects.  I understand that  drinking alcohol, or other illicit drug use, may cause potential side effects.  I will not stop my medication abruptly without first discussing it with my provider.    Staying Connected With Others  I will stay in touch with my friends, family members, and my primary care provider/team.    Use your imagination  Be creative.  We all have a creative side; it doesn t matter if it s oil painting, sand castles, or mud pies! This will also kick up the endorphins.    Witness Beauty  (AKA stop and smell the roses) Take a look outside, even in mid-winter. Notice colors, textures. Watch the squirrels and birds.     Service to others  Be of service to others.  There is always someone else in need.  By helping others we can  get out of ourselves  and remember the really important things.  This also provides opportunities for practicing all the other parts of the program.    Humor  Laugh and be silly!  Adjust your TV habits for less news and crime-drama and more comedy.    Control your stress  Try breathing deep, massage therapy, biofeedback, and meditation. Find time to relax each day.     My support system    Clinic Contact:  Phone number:    Contact 1:  Phone number:    Contact 2:  Phone number:    Jewish/:  Phone number:    Therapist:  Phone number:    Local crisis center:    Phone number:    Other community support:  Phone number:

## 2019-05-08 NOTE — PATIENT INSTRUCTIONS
Thank you for choosing [facility].   I have office hours 8:00 am to 4:30 pm on Monday's, Wednesday's, Thursday's and Friday's. My nurse and I are out of the office every Tuesday.    Following your visit, when your labs and diagnostic testing have returned, I will review then and you will be contacted by my nurse.  If you are on My Chart, you can also view results there.    For refills, notify your pharmacy regarding what you need and the pharmacy will generate a refill request. Do not call my nurse as she is unable to process refill request. Please plan ahead and allow 3-5 days for refill requests.    You will generally receive a reminder call the day prior to your appointment.  If you cannot attend your appointment, please cancel your appointment with as much notice as possible.  If there is a pattern of failure to present for your appointments, I cannot provide consistent, meaningful, ongoing care for you. It is very important to me that you come in for your care, so we can best assist you with your health care needs.    IMPORTANT:  Please note that it is my standard of practice to NOT participate in prescribing ongoing requested Narcotic Analgesic therapy, and/or participate in the prescribing of other controlled substances.  My nurse and I am happy to assist you with the process of referral for alternative pain management as needed, and other treatment modalities including but not limited to:  Physical Therapy, Physical Medicine and Rehab, Counseling, Chiropractic Care, Orthopedic Care, and non-narcotic medication management.     In the event that you need to be seen for emergent concerns and I am out of office,  please see one of my colleagues for acute concerns.  You may also present to  or ER.  I appreciate the opportunity to serve you and look forward to supporting your healthcare needs in the future. Please contact me with any questions or concerns that you may  have.    Sincerely,      Apryl Hathaway RN, PA-C

## 2019-05-08 NOTE — PROGRESS NOTES
SUBJECTIVE:   Autumn Holbrook is a 43 year old female who presents to clinic today for the following   health issues:        Depression and Anxiety Follow-Up    Status since last visit: No change    Other associated symptoms:None    Complicating factors:     Significant life event: No     Current substance abuse: None    PHQ 1/16/2019 3/7/2019 4/10/2019   PHQ-9 Total Score 24 24 23   Q9: Thoughts of better off dead/self-harm past 2 weeks Nearly every day Nearly every day Nearly every day   F/U: Thoughts of suicide or self-harm - - -   F/U: Self harm-plan - - -   F/U: Self-harm action - - -   F/U: Safety concerns - - -     COY-7 SCORE 1/16/2019 3/7/2019 4/10/2019   Total Score 21 19 16       PHQ-9  English  PHQ-9   Any Language  COY-7  Suicide Assessment Five-step Evaluation and Treatment (SAFE-T)    Amount of exercise or physical activity: everyday work at home     Problems taking medications regularly: No    Medication side effects: none    Diet: regular (no restrictions)      Weight loss follow up       Duration: 1 month    Description (location/character/radiation): patient does everyday work at home but no routine exercise. Patient current weight today was 287lb.     Intensity:  9/10    Accompanying signs and symptoms: no side effects to medications     History (similar episodes/previous evaluation): phentermine and Topamax     Precipitating or alleviating factors: None    Therapies tried and outcome: phentermine/ topamax            Additional history: as documented    Reviewed  and updated as needed this visit by clinical staff         Reviewed and updated as needed this visit by Provider         Patient Active Problem List   Diagnosis     ACP (advance care planning)     Post-traumatic osteoarthritis of right knee     Chronic pain syndrome     Opioid dependence in remission (H)     PTSD (post-traumatic stress disorder)     COY (generalized anxiety disorder)     Moderate episode of recurrent major depressive  disorder (H)     Bilateral edema of lower extremity     Acquired hypothyroidism     Morbid obesity (H)     Past Surgical History:   Procedure Laterality Date     CHOLECYSTECTOMY       GYN SURGERY       SECTION 5538-8315     GYN SURGERY  2004    [facility]E       Social History     Tobacco Use     Smoking status: Current Every Day Smoker     Packs/day: 0.50     Years: 20.00     Pack years: 10.00     Types: Cigarettes     Smokeless tobacco: Never Used   Substance Use Topics     Alcohol use: No     Family History   Problem Relation Age of Onset     Cerebrovascular Disease Mother      Alcoholism Mother      Cancer Mother      Depression Mother      Eczema Mother      Hypertension Mother      Migraines Mother      Mental Illness Mother      Osteoarthritis Mother      Osteoporosis Mother      Alcoholism Father      Cancer Father      Hyperlipidemia Father      Hypertension Father      Myocardial Infarction Father      Mental Illness Sister      Migraines Sister          Current Outpatient Medications   Medication Sig Dispense Refill     Acetaminophen (TYLENOL PO) Take 500 mg by mouth every 4 hours as needed for mild pain or fever       atomoxetine (STRATTERA) 60 MG capsule Take 60 mg by mouth daily       baclofen (LIORESAL) 10 MG tablet TAKE 1 TABLET BY MOUTH THREE TIMES DAILY 90 tablet 1     buprenorphine HCl-naloxone HCl (SUBOXONE) 8-2 MG per film Place 2 Film under the tongue daily Patient states takes 2mg film total 4 mg  daily       hydrochlorothiazide (MICROZIDE) 12.5 MG capsule TAKE 1 CAPSULE BY MOUTH ONCE DAILY 90 capsule 0     HYDROXYZINE HCL PO Take 25 mg by mouth 4 times daily as needed for itching       lamoTRIgine (LAMICTAL) 100 MG tablet Take 175 mg by mouth 2 times daily        levothyroxine (SYNTHROID/LEVOTHROID) 200 MCG tablet TAKE 1 TABLET BY MOUTH ONCE DAILY 90 tablet 3     Melatonin 10 MG CAPS Take 10 mg by mouth daily       melatonin 3 MG CAPS Take 3 mg by mouth daily       order for DME  Equipment being ordered: TEDS 2 Device 3     phentermine (ADIPEX-P) 30 MG capsule Take 1 capsule (30 mg) by mouth every morning 30 capsule 1     sertraline (ZOLOFT) 100 MG tablet        topiramate (TOPAMAX) 50 MG tablet TAKE 1 TO 2 TABLETS BY MOUTH AT BEDTIME 60 tablet 1     Allergies   Allergen Reactions     Depakote [Valproic Acid]      Gabapentin Swelling     Propofol Unknown     Got very stiff and tight.      Recent Labs   Lab Test 12/19/18  1155 11/28/18  1215 05/24/18  1213 08/30/17  1000   A1C 5.9*  --   --  5.8   LDL  --  125*  --  120*   HDL  --  90  --  81   TRIG  --  64  --  54   ALT  --  28 21 24   CR  --  0.67 0.84 0.80   GFRESTIMATED  --  >90 73 77   GFRESTBLACK  --  >90 89 >90   POTASSIUM  --  4.0 4.4 3.8   TSH  --  0.85 1.17 3.41      BP Readings from Last 3 Encounters:   05/08/19 128/80   04/10/19 124/78   03/06/19 140/80    Wt Readings from Last 3 Encounters:   05/08/19 130.2 kg (287 lb)   04/10/19 127 kg (280 lb)   03/06/19 129.3 kg (285 lb)                    ROS:  Constitutional, neuro, ENT, endocrine, pulmonary, cardiac, gastrointestinal, genitourinary, musculoskeletal, integument and psychiatric systems are negative, except as otherwise noted.    OBJECTIVE:                                                    /80 (BP Location: Right arm, Patient Position: Chair, Cuff Size: Adult Large)   Pulse 79   Temp 97.6  F (36.4  C) (Tympanic)   Wt 130.2 kg (287 lb)   SpO2 97%   BMI 50.84 kg/m    Body mass index is 50.84 kg/m .  GENERAL APPEARANCE: healthy, alert and no distress  EYES: Eyes grossly normal to inspection, PERRL and conjunctivae and sclerae normal  HENT: ear canals and TM's normal and nose and mouth without ulcers or lesions  NECK: no adenopathy, no asymmetry, masses, or scars and thyroid normal to palpation  RESP: lungs clear to auscultation - no rales, rhonchi or wheezes  CV: regular rates and rhythm, normal S1 S2, no S3 or S4 and no murmur, click or rub  LYMPHATICS: no cervical  "adenopathy  ABDOMEN: soft, nontender, without hepatosplenomegaly or masses and bowel sounds normal  MS: extremities right knee is \"just terrible\".  Has severe pain in knee and preventing her from moving.   SKIN: no suspicious lesions or rashes  NEURO: Normal strength and tone, mentation intact and speech normal  PSYCH: mentation appears normal and affect stressed. Emotional. Making bad food choices.     Diagnostic test results:  Diagnostic Test Results:  No results found for this or any previous visit (from the past 24 hour(s)).     ASSESSMENT/PLAN:                                                    1. Morbid obesity (H)  Refill and recheck in a month.     2. Moderate episode of recurrent major depressive disorder (H)  Not really getting any time away. Mood is adjusted by her mom's behavior most of the time. Wellbutrin will be added in. With nalxone might help her weight. Continue on her Straterra.  Still working with Suboxone and that seems to help.         See Patient Instructions    SEVEN Bryant  [facility] - [place]        "

## 2019-05-08 NOTE — NURSING NOTE
"Chief Complaint   Patient presents with     Weight Check     Depression       Initial /80 (BP Location: Right arm, Patient Position: Chair, Cuff Size: Adult Large)   Pulse 79   Temp 97.6  F (36.4  C) (Tympanic)   Wt 130.2 kg (287 lb)   SpO2 97%   BMI 50.84 kg/m   Estimated body mass index is 50.84 kg/m  as calculated from the following:    Height as of 4/10/19: 1.6 m (5' 3\").    Weight as of this encounter: 130.2 kg (287 lb).  Medication Reconciliation: complete    Annette Araiza MA  "

## 2019-05-09 ASSESSMENT — ANXIETY QUESTIONNAIRES: GAD7 TOTAL SCORE: 15

## 2019-05-16 ENCOUNTER — TELEPHONE (OUTPATIENT)
Dept: FAMILY MEDICINE | Facility: OTHER | Age: 44
End: 2019-05-16

## 2019-05-16 NOTE — TELEPHONE ENCOUNTER
Left message for patient to return call to schedule appointment. Please inform provider is out and patient does have a follow up scheduled in June. Schedule if patient still needs/wants with another provider.

## 2019-05-16 NOTE — TELEPHONE ENCOUNTER
1:26 PM    Reason for Call: OVERBOOK    Patient is having the following symptoms: follow up 1 month for 1 months.    The patient is requesting an appointment for ASAP with Apryl Hathaway.    Was an appointment offered for this call? No  If yes : Appointment type              Date    Preferred method for responding to this message: Telephone Call  What is your phone number ?[phone]    If we cannot reach you directly, may we leave a detailed response at the number you provided? Yes    Can this message wait until your PCP/provider returns, if unavailable today? Yes , pcp is out     Charla Head

## 2019-06-06 DIAGNOSIS — E03.9 ACQUIRED HYPOTHYROIDISM: ICD-10-CM

## 2019-06-07 RX ORDER — LEVOTHYROXINE SODIUM 200 UG/1
TABLET ORAL
Qty: 30 TABLET | Refills: 1 | Status: SHIPPED | OUTPATIENT
Start: 2019-06-07 | End: 2019-08-05

## 2019-06-24 NOTE — PROGRESS NOTES
"Subjective     Autumn Holbrook is a 43 year old female who presents to clinic today for the following health issues:    HPI   {Chronic Problem SUPERLIST:078978}    Amount of exercise or physical activity: {Exercise frequency days per week:946597}    Problems taking medications regularly: {Med Problems:613044::\"No\"}    Medication side effects: {CHRONIC MED SIDE EFFECTS:755228::\"none\"}    Diet: { :365463}      Weight Issues       Duration: ***    Description (location/character/radiation): ***    Intensity:  {mild,moderate,severe:475987}    Accompanying signs and symptoms: ***    History (similar episodes/previous evaluation): {.:924869::\"None\"}    Precipitating or alleviating factors: {.:600133::\"None\"}    Therapies tried and outcome: {.:648968::\"None\"}       {HIST REVIEW/ LINKS 2 (Optional):637523}    {Additional problems for the provider to add (optional):142029}  Reviewed and updated as needed this visit by Provider         Review of Systems   {ROS COMP (Optional):895747}      Objective    There were no vitals taken for this visit.  There is no height or weight on file to calculate BMI.  Physical Exam   {Exam List (Optional):652655}    {Diagnostic Test Results (Optional):967687::\"Diagnostic Test Results:\",\"Labs reviewed in Epic\"}        {PROVIDER CHARTING PREFERENCE:298077}      "

## 2019-06-27 ENCOUNTER — OFFICE VISIT (OUTPATIENT)
Dept: FAMILY MEDICINE | Facility: OTHER | Age: 44
End: 2019-06-27
Attending: PHYSICIAN ASSISTANT
Payer: COMMERCIAL

## 2019-06-27 VITALS
BODY MASS INDEX: 49.95 KG/M2 | WEIGHT: 282 LBS | HEART RATE: 86 BPM | OXYGEN SATURATION: 94 % | SYSTOLIC BLOOD PRESSURE: 128 MMHG | DIASTOLIC BLOOD PRESSURE: 84 MMHG

## 2019-06-27 DIAGNOSIS — B37.2 YEAST INFECTION OF THE SKIN: ICD-10-CM

## 2019-06-27 DIAGNOSIS — F30.9 MANIA (H): Primary | ICD-10-CM

## 2019-06-27 PROCEDURE — 99214 OFFICE O/P EST MOD 30 MIN: CPT | Performed by: PHYSICIAN ASSISTANT

## 2019-06-27 PROCEDURE — G0463 HOSPITAL OUTPT CLINIC VISIT: HCPCS

## 2019-06-27 RX ORDER — BUSPIRONE HYDROCHLORIDE 10 MG/1
10 TABLET ORAL 2 TIMES DAILY
Refills: 0 | COMMUNITY
Start: 2019-06-20 | End: 2021-12-09

## 2019-06-27 RX ORDER — LAMOTRIGINE 25 MG/1
TABLET ORAL
Refills: 1 | COMMUNITY
Start: 2019-06-17 | End: 2020-12-04

## 2019-06-27 RX ORDER — IBUPROFEN 800 MG/1
TABLET, FILM COATED ORAL
Refills: 1 | COMMUNITY
Start: 2019-04-04 | End: 2020-03-13

## 2019-06-27 RX ORDER — NYSTATIN 100000 [USP'U]/G
POWDER TOPICAL 2 TIMES DAILY
Qty: 60 G | Refills: 3 | Status: SHIPPED | OUTPATIENT
Start: 2019-06-27 | End: 2020-03-13

## 2019-06-27 RX ORDER — NALOXONE HYDROCHLORIDE 4 MG/.1ML
SPRAY NASAL
Refills: 0 | COMMUNITY
Start: 2019-04-18 | End: 2023-04-20

## 2019-06-27 RX ORDER — LAMOTRIGINE 150 MG/1
150 TABLET ORAL 2 TIMES DAILY
Refills: 2 | COMMUNITY
Start: 2019-05-24 | End: 2021-07-26

## 2019-06-27 ASSESSMENT — ANXIETY QUESTIONNAIRES
1. FEELING NERVOUS, ANXIOUS, OR ON EDGE: NEARLY EVERY DAY
6. BECOMING EASILY ANNOYED OR IRRITABLE: NEARLY EVERY DAY
4. TROUBLE RELAXING: NEARLY EVERY DAY
5. BEING SO RESTLESS THAT IT IS HARD TO SIT STILL: NEARLY EVERY DAY
7. FEELING AFRAID AS IF SOMETHING AWFUL MIGHT HAPPEN: NEARLY EVERY DAY
IF YOU CHECKED OFF ANY PROBLEMS ON THIS QUESTIONNAIRE, HOW DIFFICULT HAVE THESE PROBLEMS MADE IT FOR YOU TO DO YOUR WORK, TAKE CARE OF THINGS AT HOME, OR GET ALONG WITH OTHER PEOPLE: EXTREMELY DIFFICULT
GAD7 TOTAL SCORE: 21
3. WORRYING TOO MUCH ABOUT DIFFERENT THINGS: NEARLY EVERY DAY
2. NOT BEING ABLE TO STOP OR CONTROL WORRYING: NEARLY EVERY DAY

## 2019-06-27 ASSESSMENT — PATIENT HEALTH QUESTIONNAIRE - PHQ9: SUM OF ALL RESPONSES TO PHQ QUESTIONS 1-9: 27

## 2019-06-27 ASSESSMENT — PAIN SCALES - GENERAL: PAINLEVEL: EXTREME PAIN (8)

## 2019-06-27 NOTE — PROGRESS NOTES
Subjective     Autumn Holbrook is a 43 year old female who presents to clinic today for the following health issues:    HPI   Depression and Anxiety Follow-Up    How are you doing with your depression since your last visit? Worsened     How are you doing with your anxiety since your last visit?  Worsened     Are you having other symptoms that might be associated with depression or anxiety? Yes:  panic attacks, mood changes, frustrated, confused, having episodes where she goes blank, lethargy, nausea,vomiting    Have you had a significant life event? No     Do you have any concerns with your use of alcohol or other drugs? No    Social History     Tobacco Use     Smoking status: Current Every Day Smoker     Packs/day: 0.50     Years: 20.00     Pack years: 10.00     Types: Cigarettes     Smokeless tobacco: Never Used   Substance Use Topics     Alcohol use: No     Drug use: No     Comment: previous opiod addiction     PHQ 3/7/2019 4/10/2019 5/8/2019   PHQ-9 Total Score 24 23 -   Q9: Thoughts of better off dead/self-harm past 2 weeks Nearly every day Nearly every day -   F/U: Thoughts of suicide or self-harm - - -   F/U: Self harm-plan - - -   F/U: Self-harm action - - -   F/U: Safety concerns - - -   PHQ-A Total Score - - 23   PHQ-A Depressed most days in past year - - Yes   PHQ-A Mood affect on daily activities - - Extremely dIfficult   PHQ-A Suicide Ideation past 2 weeks - - Nearly every day   PHQ-A Suicide Ideation past month - - No   PHQ-A Previous suicide attempt - - No     COY-7 SCORE 3/7/2019 4/10/2019 5/8/2019   Total Score 19 16 15     No flowsheet data found.  No flowsheet data found.      Suicide Assessment Five-step Evaluation and Treatment (SAFE-T)    Amount of exercise or physical activity: 6-7 days/week for an average of 30-45 minutes    Problems taking medications regularly: No    Medication side effects: many     Diet: regular (no restrictions)      Weight follow up       Duration: 1 month follow up      Description (location/character/radiation): unable to lose weight    Intensity:  NA    Accompanying signs and symptoms: pt believes phentermine was helping until her zoloft dose was changed     History (similar episodes/previous evaluation): None    Precipitating or alleviating factors: None    Therapies tried and outcome: phentermine/topamax       Patient Active Problem List   Diagnosis     ACP (advance care planning)     Post-traumatic osteoarthritis of right knee     Chronic pain syndrome     Opioid dependence in remission (H)     PTSD (post-traumatic stress disorder)     COY (generalized anxiety disorder)     Moderate episode of recurrent major depressive disorder (H)     Bilateral edema of lower extremity     Acquired hypothyroidism     Morbid obesity (H)     Past Surgical History:   Procedure Laterality Date     CHOLECYSTECTOMY       GYN SURGERY       SECTION 8781-0996     GYN SURGERY  2004    [facility]E       Social History     Tobacco Use     Smoking status: Current Every Day Smoker     Packs/day: 0.50     Years: 20.00     Pack years: 10.00     Types: Cigarettes     Smokeless tobacco: Never Used   Substance Use Topics     Alcohol use: No     Family History   Problem Relation Age of Onset     Cerebrovascular Disease Mother      Alcoholism Mother      Cancer Mother      Depression Mother      Eczema Mother      Hypertension Mother      Migraines Mother      Mental Illness Mother      Osteoarthritis Mother      Osteoporosis Mother      Alcoholism Father      Cancer Father      Hyperlipidemia Father      Hypertension Father      Myocardial Infarction Father      Mental Illness Sister      Migraines Sister          Current Outpatient Medications   Medication Sig Dispense Refill     Acetaminophen (TYLENOL PO) Take 500 mg by mouth every 4 hours as needed for mild pain or fever       atomoxetine (STRATTERA) 60 MG capsule Take 60 mg by mouth daily       baclofen (LIORESAL) 10 MG tablet TAKE 1 TABLET BY  MOUTH THREE TIMES DAILY 90 tablet 1     buprenorphine HCl-naloxone HCl (SUBOXONE) 8-2 MG per film Place 2 Film under the tongue daily Patient states takes 2mg film total 4 mg  daily       buPROPion (WELLBUTRIN XL) 150 MG 24 hr tablet Take 1 tablet (150 mg) by mouth every morning 90 tablet 0     busPIRone (BUSPAR) 10 MG tablet Take 10 mg by mouth 2 times daily  0     hydrochlorothiazide (MICROZIDE) 12.5 MG capsule TAKE 1 CAPSULE BY MOUTH ONCE DAILY 90 capsule 0     HYDROXYZINE HCL PO Take 25 mg by mouth 4 times daily as needed for itching       ibuprofen (ADVIL/MOTRIN) 800 MG tablet   1     lamoTRIgine (LAMICTAL) 150 MG tablet Take 150 mg by mouth 2 times daily  2     lamoTRIgine (LAMICTAL) 25 MG tablet TAKE 1 TABLET BY MOUTH ONCE DAILY WITH LAMOTRIGINE 150 MG  1     levothyroxine (SYNTHROID/LEVOTHROID) 200 MCG tablet TAKE 1 TABLET BY MOUTH ONCE DAILY 30 tablet 1     Melatonin 10 MG CAPS Take 10 mg by mouth daily       melatonin 3 MG CAPS Take 3 mg by mouth daily       NARCAN 4 MG/0.1ML nasal spray   0     nystatin (MYCOSTATIN) 618360 UNIT/GM external powder Apply topically 2 times daily 60 g 3     order for DME Equipment being ordered: TEDS 2 Device 3     phentermine (ADIPEX-P) 30 MG capsule Take 1 capsule (30 mg) by mouth every morning 30 capsule 1     topiramate (TOPAMAX) 50 MG tablet TAKE 1 TO 2 TABLETS BY MOUTH AT BEDTIME 60 tablet 1     Allergies   Allergen Reactions     Depakote [Valproic Acid]      Gabapentin Swelling     Propofol Unknown     Got very stiff and tight.      Recent Labs   Lab Test 12/19/18  1155 11/28/18  1215 05/24/18  1213 08/30/17  1000   A1C 5.9*  --   --  5.8   LDL  --  125*  --  120*   HDL  --  90  --  81   TRIG  --  64  --  54   ALT  --  28 21 24   CR  --  0.67 0.84 0.80   GFRESTIMATED  --  >90 73 77   GFRESTBLACK  --  >90 89 >90   POTASSIUM  --  4.0 4.4 3.8   TSH  --  0.85 1.17 3.41      BP Readings from Last 3 Encounters:   06/27/19 128/84   05/08/19 128/80   04/10/19 124/78    Wt  Readings from Last 3 Encounters:   06/27/19 127.9 kg (282 lb)   05/08/19 130.2 kg (287 lb)   04/10/19 127 kg (280 lb)                      Reviewed and updated as needed this visit by Provider         Review of Systems   ROS COMP: Constitutional, HEENT, cardiovascular, pulmonary, GI, , musculoskeletal, neuro, skin, endocrine and psych systems are negative, except as otherwise noted.      Objective    /84 (Patient Position: Sitting)   Pulse 86   Wt 127.9 kg (282 lb)   SpO2 94%   BMI 49.95 kg/m    Body mass index is 49.95 kg/m .  Physical Exam   GENERAL: healthy, alert, over weight and fatigued  EYES: Eyes grossly normal to inspection, PERRL and conjunctivae and sclerae normal  NECK: no adenopathy, no asymmetry, masses, or scars and thyroid normal to palpation  CV: regular rate and rhythm, normal S1 S2, no S3 or S4, no murmur, click or rub, no peripheral edema and peripheral pulses strong  ABDOMEN: soft, nontender, no hepatosplenomegaly, no masses and bowel sounds normal  MS: no gross musculoskeletal defects noted, no edema  PSYCH: concentration poor, confused, tangential, anxious, fatigued, speech pressured, judgement and insight impaired, appearance disheveled.  After 45 mintues of her non stop talking, she was angry and yelling I did not give her enough time.  I rush her in office.   I explained she is feeling manic right now and I am concerned about her safety. Denies any substance abuse.  She is given the recommendation to go to [facility] if they are open bed for her.  She then got more angry.  Was able to calm once I told her there was no hold nor was I going to force her into going to the hospital but would benefit her.      Diagnostic Test Results:  Labs reviewed in Epic        Assessment & Plan     1. Lisa (H)  She hasn't been sleeping.  I am not scripting her psyche medication and has angelina appointment on Monday with her med MD and also her therapist is seen weekly. Has the responsibility of  "caring for her mom who is a handful.  Also she is emotionally labile every day bringing something different.  hasnt' slept in over a week. Less than an hour a night.   We did let her go home we call her on Monday.     2. Yeast infection of the skin  Given medication for her panus.  Rash is present.   - nystatin (MYCOSTATIN) 725490 UNIT/GM external powder; Apply topically 2 times daily  Dispense: 60 g; Refill: 3     Tobacco Cessation:   reports that she has been smoking cigarettes.  She has a 10.00 pack-year smoking history. She has never used smokeless tobacco.        BMI:   Estimated body mass index is 49.95 kg/m  as calculated from the following:    Height as of 4/10/19: 1.6 m (5' 3\").    Weight as of this encounter: 127.9 kg (282 lb).           See Patient Instructions    No follow-ups on file.    SEVEN Bryant  [facility] - [place]      "

## 2019-06-27 NOTE — NURSING NOTE
"Chief Complaint   Patient presents with     Weight Problem     Depression       Initial /84 (Patient Position: Sitting)   Pulse 86   Wt 127.9 kg (282 lb)   SpO2 94%   BMI 49.95 kg/m   Estimated body mass index is 49.95 kg/m  as calculated from the following:    Height as of 4/10/19: 1.6 m (5' 3\").    Weight as of this encounter: 127.9 kg (282 lb).  Medication Reconciliation: complete  "

## 2019-06-27 NOTE — PATIENT INSTRUCTIONS
Thank you for choosing [facility].   I have office hours 8:00 am to 4:30 pm on Monday's, Wednesday's, Thursday's and Friday's. My nurse and I are out of the office every Tuesday.    Following your visit, when your labs and diagnostic testing have returned, I will review then and you will be contacted by my nurse.  If you are on My Chart, you can also view results there.    For refills, notify your pharmacy regarding what you need and the pharmacy will generate a refill request. Do not call my nurse as she is unable to process refill request. Please plan ahead and allow 3-5 days for refill requests.    You will generally receive a reminder call the day prior to your appointment.  If you cannot attend your appointment, please cancel your appointment with as much notice as possible.  If there is a pattern of failure to present for your appointments, I cannot provide consistent, meaningful, ongoing care for you. It is very important to me that you come in for your care, so we can best assist you with your health care needs.    IMPORTANT:  Please note that it is my standard of practice to NOT participate in prescribing ongoing requested Narcotic Analgesic therapy, and/or participate in the prescribing of other controlled substances.  My nurse and I am happy to assist you with the process of referral for alternative pain management as needed, and other treatment modalities including but not limited to:  Physical Therapy, Physical Medicine and Rehab, Counseling, Chiropractic Care, Orthopedic Care, and non-narcotic medication management.     In the event that you need to be seen for emergent concerns and I am out of office,  please see one of my colleagues for acute concerns.  You may also present to  or ER.  I appreciate the opportunity to serve you and look forward to supporting your healthcare needs in the future. Please contact me with any questions or concerns that you may  have.    Sincerely,      Apryl Hathaway RN, PA-C

## 2019-06-28 ASSESSMENT — ANXIETY QUESTIONNAIRES: GAD7 TOTAL SCORE: 21

## 2019-07-01 ENCOUNTER — OFFICE VISIT (OUTPATIENT)
Dept: FAMILY MEDICINE | Facility: OTHER | Age: 44
End: 2019-07-01
Attending: PHYSICIAN ASSISTANT
Payer: COMMERCIAL

## 2019-07-01 VITALS
HEART RATE: 91 BPM | WEIGHT: 282 LBS | BODY MASS INDEX: 49.95 KG/M2 | DIASTOLIC BLOOD PRESSURE: 76 MMHG | SYSTOLIC BLOOD PRESSURE: 122 MMHG | OXYGEN SATURATION: 97 %

## 2019-07-01 DIAGNOSIS — F31.81 BIPOLAR 2 DISORDER (H): ICD-10-CM

## 2019-07-01 DIAGNOSIS — R73.09 ELEVATED GLUCOSE: Primary | ICD-10-CM

## 2019-07-01 DIAGNOSIS — G89.29 OTHER CHRONIC PAIN: ICD-10-CM

## 2019-07-01 LAB
ALBUMIN SERPL-MCNC: 3.5 G/DL (ref 3.4–5)
ALP SERPL-CCNC: 82 U/L (ref 40–150)
ALT SERPL W P-5'-P-CCNC: 25 U/L (ref 0–50)
ANION GAP SERPL CALCULATED.3IONS-SCNC: 4 MMOL/L (ref 3–14)
AST SERPL W P-5'-P-CCNC: 17 U/L (ref 0–45)
BILIRUB SERPL-MCNC: 0.2 MG/DL (ref 0.2–1.3)
BUN SERPL-MCNC: 16 MG/DL (ref 7–30)
CALCIUM SERPL-MCNC: 8.6 MG/DL (ref 8.5–10.1)
CHLORIDE SERPL-SCNC: 108 MMOL/L (ref 94–109)
CO2 SERPL-SCNC: 26 MMOL/L (ref 20–32)
CREAT SERPL-MCNC: 0.95 MG/DL (ref 0.52–1.04)
EST. AVERAGE GLUCOSE BLD GHB EST-MCNC: 131 MG/DL
GFR SERPL CREATININE-BSD FRML MDRD: 72 ML/MIN/{1.73_M2}
GLUCOSE SERPL-MCNC: 116 MG/DL (ref 70–99)
HBA1C MFR BLD: 6.2 % (ref 0–5.6)
POTASSIUM SERPL-SCNC: 3.6 MMOL/L (ref 3.4–5.3)
PROT SERPL-MCNC: 6.9 G/DL (ref 6.8–8.8)
SODIUM SERPL-SCNC: 138 MMOL/L (ref 133–144)
TSH SERPL DL<=0.005 MIU/L-ACNC: 1.9 MU/L (ref 0.4–4)

## 2019-07-01 PROCEDURE — 80053 COMPREHEN METABOLIC PANEL: CPT | Mod: ZL | Performed by: PHYSICIAN ASSISTANT

## 2019-07-01 PROCEDURE — 84443 ASSAY THYROID STIM HORMONE: CPT | Mod: ZL | Performed by: PHYSICIAN ASSISTANT

## 2019-07-01 PROCEDURE — 83036 HEMOGLOBIN GLYCOSYLATED A1C: CPT | Mod: ZL | Performed by: PHYSICIAN ASSISTANT

## 2019-07-01 PROCEDURE — G0463 HOSPITAL OUTPT CLINIC VISIT: HCPCS

## 2019-07-01 PROCEDURE — 99213 OFFICE O/P EST LOW 20 MIN: CPT | Performed by: PHYSICIAN ASSISTANT

## 2019-07-01 PROCEDURE — 36415 COLL VENOUS BLD VENIPUNCTURE: CPT | Mod: ZL | Performed by: PHYSICIAN ASSISTANT

## 2019-07-01 PROCEDURE — 40000788 ZZHCL STATISTIC ESTIMATED AVERAGE GLUCOSE: Mod: ZL | Performed by: PHYSICIAN ASSISTANT

## 2019-07-01 ASSESSMENT — PAIN SCALES - GENERAL: PAINLEVEL: EXTREME PAIN (9)

## 2019-07-01 NOTE — PATIENT INSTRUCTIONS
Thank you for choosing [facility].   I have office hours 8:00 am to 4:30 pm on Monday's, Wednesday's, Thursday's and Friday's. My nurse and I are out of the office every Tuesday.    Following your visit, when your labs and diagnostic testing have returned, I will review then and you will be contacted by my nurse.  If you are on My Chart, you can also view results there.    For refills, notify your pharmacy regarding what you need and the pharmacy will generate a refill request. Do not call my nurse as she is unable to process refill request. Please plan ahead and allow 3-5 days for refill requests.    You will generally receive a reminder call the day prior to your appointment.  If you cannot attend your appointment, please cancel your appointment with as much notice as possible.  If there is a pattern of failure to present for your appointments, I cannot provide consistent, meaningful, ongoing care for you. It is very important to me that you come in for your care, so we can best assist you with your health care needs.    IMPORTANT:  Please note that it is my standard of practice to NOT participate in prescribing ongoing requested Narcotic Analgesic therapy, and/or participate in the prescribing of other controlled substances.  My nurse and I am happy to assist you with the process of referral for alternative pain management as needed, and other treatment modalities including but not limited to:  Physical Therapy, Physical Medicine and Rehab, Counseling, Chiropractic Care, Orthopedic Care, and non-narcotic medication management.     In the event that you need to be seen for emergent concerns and I am out of office,  please see one of my colleagues for acute concerns.  You may also present to  or ER.  I appreciate the opportunity to serve you and look forward to supporting your healthcare needs in the future. Please contact me with any questions or concerns that you may  have.    Sincerely,      Apryl Hathaway RN, PA-C

## 2019-07-01 NOTE — PROGRESS NOTES
Subjective     Autumn Holbrook is a 43 year old female who presents to clinic today for the following health issues:    HPI   Depression and Anxiety Follow-Up    How are you doing with your depression since your last visit? No change    How are you doing with your anxiety since your last visit?  No change    Are you having other symptoms that might be associated with depression or anxiety? Yes:  irritability    Have you had a significant life event? No     Do you have any concerns with your use of alcohol or other drugs? No    Social History     Tobacco Use     Smoking status: Current Every Day Smoker     Packs/day: 0.50     Years: 20.00     Pack years: 10.00     Types: Cigarettes     Smokeless tobacco: Never Used   Substance Use Topics     Alcohol use: No     Drug use: No     Comment: previous opiod addiction     PHQ 4/10/2019 5/8/2019 6/27/2019   PHQ-9 Total Score 23 - 27   Q9: Thoughts of better off dead/self-harm past 2 weeks Nearly every day - Nearly every day   F/U: Thoughts of suicide or self-harm - - Yes   F/U: Self harm-plan - - Yes   F/U: Self-harm action - - No   F/U: Safety concerns - - No   PHQ-A Total Score - 23 -   PHQ-A Depressed most days in past year - Yes -   PHQ-A Mood affect on daily activities - Extremely dIfficult -   PHQ-A Suicide Ideation past 2 weeks - Nearly every day -   PHQ-A Suicide Ideation past month - No -   PHQ-A Previous suicide attempt - No -     COY-7 SCORE 4/10/2019 5/8/2019 6/27/2019   Total Score 16 15 21     No flowsheet data found.  No flowsheet data found.      Suicide Assessment Five-step Evaluation and Treatment (SAFE-T)    Amount of exercise or physical activity: None    Problems taking medications regularly: No    Medication side effects: none    Diet: regular (no restrictions)          Patient Active Problem List   Diagnosis     ACP (advance care planning)     Post-traumatic osteoarthritis of right knee     Chronic pain syndrome     Opioid dependence in remission (H)      PTSD (post-traumatic stress disorder)     COY (generalized anxiety disorder)     Moderate episode of recurrent major depressive disorder (H)     Bilateral edema of lower extremity     Acquired hypothyroidism     Morbid obesity (H)     Past Surgical History:   Procedure Laterality Date     CHOLECYSTECTOMY       GYN SURGERY       SECTION 7499-9309     GYN SURGERY  2004    LEE       Social History     Tobacco Use     Smoking status: Current Every Day Smoker     Packs/day: 0.50     Years: 20.00     Pack years: 10.00     Types: Cigarettes     Smokeless tobacco: Never Used   Substance Use Topics     Alcohol use: No     Family History   Problem Relation Age of Onset     Cerebrovascular Disease Mother      Alcoholism Mother      Cancer Mother      Depression Mother      Eczema Mother      Hypertension Mother      Migraines Mother      Mental Illness Mother      Osteoarthritis Mother      Osteoporosis Mother      Alcoholism Father      Cancer Father      Hyperlipidemia Father      Hypertension Father      Myocardial Infarction Father      Mental Illness Sister      Migraines Sister          Current Outpatient Medications   Medication Sig Dispense Refill     Acetaminophen (TYLENOL PO) Take 500 mg by mouth every 4 hours as needed for mild pain or fever       atomoxetine (STRATTERA) 60 MG capsule Take 60 mg by mouth daily       baclofen (LIORESAL) 10 MG tablet TAKE 1 TABLET BY MOUTH THREE TIMES DAILY 90 tablet 1     buprenorphine HCl-naloxone HCl (SUBOXONE) 8-2 MG per film Place 2 Film under the tongue daily Patient states takes 2mg film total 4 mg  daily       buPROPion (WELLBUTRIN XL) 150 MG 24 hr tablet Take 1 tablet (150 mg) by mouth every morning 90 tablet 0     busPIRone (BUSPAR) 10 MG tablet Take 10 mg by mouth 2 times daily  0     hydrochlorothiazide (MICROZIDE) 12.5 MG capsule TAKE 1 CAPSULE BY MOUTH ONCE DAILY 90 capsule 0     HYDROXYZINE HCL PO Take 25 mg by mouth 4 times daily as needed for itching        ibuprofen (ADVIL/MOTRIN) 800 MG tablet   1     lamoTRIgine (LAMICTAL) 150 MG tablet Take 150 mg by mouth 2 times daily  2     lamoTRIgine (LAMICTAL) 25 MG tablet TAKE 1 TABLET BY MOUTH ONCE DAILY WITH LAMOTRIGINE 150 MG  1     levothyroxine (SYNTHROID/LEVOTHROID) 200 MCG tablet TAKE 1 TABLET BY MOUTH ONCE DAILY 30 tablet 1     Melatonin 10 MG CAPS Take 10 mg by mouth daily       melatonin 3 MG CAPS Take 3 mg by mouth daily       NARCAN 4 MG/0.1ML nasal spray   0     nystatin (MYCOSTATIN) 086003 UNIT/GM external powder Apply topically 2 times daily 60 g 3     order for DME Equipment being ordered: TEDS 2 Device 3     phentermine (ADIPEX-P) 30 MG capsule Take 1 capsule (30 mg) by mouth every morning 30 capsule 1     topiramate (TOPAMAX) 50 MG tablet TAKE 1 TO 2 TABLETS BY MOUTH AT BEDTIME 60 tablet 1     Allergies   Allergen Reactions     Depakote [Valproic Acid]      Gabapentin Swelling     Propofol Unknown     Got very stiff and tight.      Recent Labs   Lab Test 12/19/18  1155 11/28/18  1215 05/24/18  1213 08/30/17  1000   A1C 5.9*  --   --  5.8   LDL  --  125*  --  120*   HDL  --  90  --  81   TRIG  --  64  --  54   ALT  --  28 21 24   CR  --  0.67 0.84 0.80   GFRESTIMATED  --  >90 73 77   GFRESTBLACK  --  >90 89 >90   POTASSIUM  --  4.0 4.4 3.8   TSH  --  0.85 1.17 3.41      BP Readings from Last 3 Encounters:   07/01/19 122/76   06/27/19 128/84   05/08/19 128/80    Wt Readings from Last 3 Encounters:   07/01/19 127.9 kg (282 lb)   06/27/19 127.9 kg (282 lb)   05/08/19 130.2 kg (287 lb)                      Reviewed and updated as needed this visit by Provider         Review of Systems   ROS COMP: Constitutional, HEENT, cardiovascular, pulmonary, gi and gu systems are negative, except as otherwise noted.      Objective    /76 (Patient Position: Sitting)   Pulse 91   Wt 127.9 kg (282 lb)   SpO2 97%   BMI 49.95 kg/m    Body mass index is 49.95 kg/m .  Physical Exam   GENERAL: healthy, alert and no  "distress  EYES: Eyes grossly normal to inspection, PERRL and conjunctivae and sclerae normal  HENT: ear canals and TM's normal, nose and mouth without ulcers or lesions  NECK: no adenopathy, no asymmetry, masses, or scars and thyroid normal to palpation  RESP: lungs clear to auscultation - no rales, rhonchi or wheezes  CV: regular rate and rhythm, normal S1 S2, no S3 or S4, no murmur, click or rub, no peripheral edema and peripheral pulses strong  ABDOMEN: soft, nontender, no hepatosplenomegaly, no masses and bowel sounds normal  MS: no gross musculoskeletal defects noted, no edema  SKIN: no suspicious lesions or rashes  NEURO: Normal strength and tone, mentation intact and speech normal  PSYCH: mood is less agitated. Focus and attention are better  Upset at her co pay and change in insurance.     Diagnostic Test Results:  Labs reviewed in Epic  No results found for this or any previous visit (from the past 24 hour(s)).        Assessment & Plan     1. Elevated glucose  Recheck glucose again. Only 10 lbs down. recommenced going off Phentermine due to the cost and really not giving us the effect we need.    - Comprehensive metabolic panel  - Hemoglobin A1c    2. Other chronic pain  She is going to be given  Referral to see chronic pain. Going to go off Suboxone.   - TSH with free T4 reflex      3. Bipolar 2 disorder (H)  She is going to see [facility]. Working with her med MD. Better on her jude today. Not homicidal and suicidal. Speech is better.focus is better. See us back in 6 months.     Tobacco Cessation:   reports that she has been smoking cigarettes.  She has a 10.00 pack-year smoking history. She has never used smokeless tobacco.        BMI:   Estimated body mass index is 49.95 kg/m  as calculated from the following:    Height as of 4/10/19: 1.6 m (5' 3\").    Weight as of this encounter: 127.9 kg (282 lb).   Weight management plan: Discussed healthy diet and exercise guidelines        See Patient " Instructions    No follow-ups on file.    SEVEN Bryant  [facility]

## 2019-07-01 NOTE — NURSING NOTE
"Chief Complaint   Patient presents with     Depression     Anxiety       Initial /76 (Patient Position: Sitting)   Pulse 91   Wt 127.9 kg (282 lb)   SpO2 97%   BMI 49.95 kg/m   Estimated body mass index is 49.95 kg/m  as calculated from the following:    Height as of 4/10/19: 1.6 m (5' 3\").    Weight as of this encounter: 127.9 kg (282 lb).  Medication Reconciliation: complete  "

## 2019-07-05 DIAGNOSIS — G89.4 CHRONIC PAIN SYNDROME: ICD-10-CM

## 2019-07-05 RX ORDER — BACLOFEN 10 MG/1
TABLET ORAL
Qty: 90 TABLET | Refills: 1 | Status: SHIPPED | OUTPATIENT
Start: 2019-07-05 | End: 2019-08-27

## 2019-07-11 ENCOUNTER — TELEPHONE (OUTPATIENT)
Dept: FAMILY MEDICINE | Facility: OTHER | Age: 44
End: 2019-07-11

## 2019-07-11 NOTE — TELEPHONE ENCOUNTER
Pt calls, had disability parking form filled out  Was to pick it up with other paper work  Was not in envelope  Made copy from scanned form and faxed to [employer]  for pt to .    Isabel Holbrook

## 2019-08-05 DIAGNOSIS — E66.01 MORBID OBESITY (H): ICD-10-CM

## 2019-08-05 DIAGNOSIS — F33.2 SEVERE EPISODE OF RECURRENT MAJOR DEPRESSIVE DISORDER, WITHOUT PSYCHOTIC FEATURES (H): ICD-10-CM

## 2019-08-05 DIAGNOSIS — E03.9 ACQUIRED HYPOTHYROIDISM: ICD-10-CM

## 2019-08-05 NOTE — TELEPHONE ENCOUNTER
Wellbutrin       Last Written Prescription Date:  5/8/2019  Last Fill Quantity: 90,   # refills: 0      Euthrox       Last Written Prescription Date:  Request   Last Fill Quantity: 30,   # refills: 1  Last Office Visit: 7/1/2019  Future Office visit:       Routing refill request to provider for review/approval because:  Drug not active on patient's medication list    Adipex       Last Written Prescription Date:  5/8/2019  Last Fill Quantity: 30,   # refills: 1  Last Office Visit: 7/1/2019  Future Office visit:       Routing refill request to provider for review/approval because:  Drug not on the [facility], P or [facility] refill protocol or controlled substance

## 2019-08-06 RX ORDER — BUPROPION HYDROCHLORIDE 150 MG/1
TABLET ORAL
Qty: 90 TABLET | Refills: 0 | Status: SHIPPED | OUTPATIENT
Start: 2019-08-06 | End: 2019-10-23

## 2019-08-06 RX ORDER — LEVOTHYROXINE SODIUM 200 UG/1
TABLET ORAL
Qty: 30 TABLET | Refills: 1 | Status: SHIPPED | OUTPATIENT
Start: 2019-08-06 | End: 2019-11-13

## 2019-08-06 RX ORDER — PHENTERMINE HYDROCHLORIDE 30 MG/1
CAPSULE ORAL
Qty: 30 CAPSULE | Refills: 0 | Status: SHIPPED | OUTPATIENT
Start: 2019-08-06 | End: 2019-09-04

## 2019-08-08 DIAGNOSIS — I10 BENIGN ESSENTIAL HYPERTENSION: ICD-10-CM

## 2019-08-09 RX ORDER — HYDROCHLOROTHIAZIDE 12.5 MG/1
CAPSULE ORAL
Qty: 90 CAPSULE | Refills: 0 | Status: SHIPPED | OUTPATIENT
Start: 2019-08-09 | End: 2019-11-21

## 2019-08-27 DIAGNOSIS — G89.4 CHRONIC PAIN SYNDROME: ICD-10-CM

## 2019-08-27 NOTE — TELEPHONE ENCOUNTER
Baclofen      Last Written Prescription Date:  8.5.19  Last Fill Quantity: #90,   # refills: 0  Last Office Visit: 7.1.19  Future Office visit:       Routing refill request to provider for review/approval because:  Drug not on the [facility], P or [facility] refill protocol or controlled substance

## 2019-08-28 RX ORDER — BACLOFEN 10 MG/1
TABLET ORAL
Qty: 90 TABLET | Refills: 1 | Status: SHIPPED | OUTPATIENT
Start: 2019-08-28 | End: 2019-10-28

## 2019-09-04 DIAGNOSIS — F33.2 SEVERE EPISODE OF RECURRENT MAJOR DEPRESSIVE DISORDER, WITHOUT PSYCHOTIC FEATURES (H): ICD-10-CM

## 2019-09-04 DIAGNOSIS — E66.01 MORBID OBESITY (H): ICD-10-CM

## 2019-09-05 RX ORDER — TOPIRAMATE 50 MG/1
TABLET, FILM COATED ORAL
Qty: 60 TABLET | Refills: 1 | Status: SHIPPED | OUTPATIENT
Start: 2019-09-05 | End: 2019-10-29

## 2019-09-05 NOTE — TELEPHONE ENCOUNTER
Phentermine 30mg      Last Written Prescription Date:  8/6/2019  Last Fill Quantity: 30,   # refills: 0  Last Office Visit: 7/1/2019  Future Office visit:       Routing refill request to provider for review/approval because:  Drug not on the [facility], P or [facility] refill protocol or controlled substance

## 2019-09-06 RX ORDER — PHENTERMINE HYDROCHLORIDE 30 MG/1
CAPSULE ORAL
Qty: 30 CAPSULE | Refills: 0 | Status: SHIPPED | OUTPATIENT
Start: 2019-09-06 | End: 2019-09-30

## 2019-09-13 ENCOUNTER — TELEPHONE (OUTPATIENT)
Dept: FAMILY MEDICINE | Facility: OTHER | Age: 44
End: 2019-09-13

## 2019-09-13 NOTE — TELEPHONE ENCOUNTER
Notified Arabella of verbal orders for patient, also faxed them current H & P from last visit with Apryl.

## 2019-09-13 NOTE — TELEPHONE ENCOUNTER
Angelica from [facility]   Needs verbal orders to admit to home care.

## 2019-09-15 ENCOUNTER — MEDICAL CORRESPONDENCE (OUTPATIENT)
Dept: HEALTH INFORMATION MANAGEMENT | Facility: CLINIC | Age: 44
End: 2019-09-15

## 2019-09-17 ENCOUNTER — TELEPHONE (OUTPATIENT)
Dept: FAMILY MEDICINE | Facility: OTHER | Age: 44
End: 2019-09-17

## 2019-09-17 NOTE — TELEPHONE ENCOUNTER
Marion from [facility] calls  Requesting verbal orders for skilled nursing 1 time weekly     [phone]    May need an MD to sign??    Isabel Holbrook LPN

## 2019-09-30 DIAGNOSIS — E66.01 MORBID OBESITY (H): ICD-10-CM

## 2019-09-30 RX ORDER — PHENTERMINE HYDROCHLORIDE 30 MG/1
CAPSULE ORAL
Qty: 30 CAPSULE | Refills: 0 | Status: SHIPPED | OUTPATIENT
Start: 2019-09-30 | End: 2019-11-12

## 2019-10-23 DIAGNOSIS — F33.2 SEVERE EPISODE OF RECURRENT MAJOR DEPRESSIVE DISORDER, WITHOUT PSYCHOTIC FEATURES (H): ICD-10-CM

## 2019-10-25 RX ORDER — BUPROPION HYDROCHLORIDE 150 MG/1
TABLET ORAL
Qty: 90 TABLET | Refills: 0 | Status: SHIPPED | OUTPATIENT
Start: 2019-10-25 | End: 2020-01-30

## 2019-10-28 DIAGNOSIS — F33.2 SEVERE EPISODE OF RECURRENT MAJOR DEPRESSIVE DISORDER, WITHOUT PSYCHOTIC FEATURES (H): ICD-10-CM

## 2019-10-28 DIAGNOSIS — G89.4 CHRONIC PAIN SYNDROME: ICD-10-CM

## 2019-10-28 NOTE — TELEPHONE ENCOUNTER
Topirimate 50mg      Last Written Prescription Date:  9-5-2019  Last Fill Quantity: 60,   # refills: 1  Last Office Visit: 7-1-2019  Future Office visit:    Next 5 appointments (look out 90 days)    Jan 02, 2020  3:40 PM CST  (Arrive by 3:25 PM)  SHORT with SEVEN Pandey  [facility] ([facility] ) [address]  [place] [address]  [phone]           Routing refill request to provider for review/approval because:

## 2019-10-29 RX ORDER — TOPIRAMATE 50 MG/1
TABLET, FILM COATED ORAL
Qty: 60 TABLET | Refills: 1 | Status: SHIPPED | OUTPATIENT
Start: 2019-10-29 | End: 2020-01-03

## 2019-10-29 NOTE — TELEPHONE ENCOUNTER
Baclofen      Last Written Prescription Date:  9.30.19  Last Fill Quantity: #90,   # refills: 0  Last Office Visit: 7.1.19  Future Office visit:    Next 5 appointments (look out 90 days)    Jan 02, 2020  3:40 PM CST  (Arrive by 3:25 PM)  SHORT with SEVEN Pandey  [facility] ([facility] ) [address]  [place] [address]  [phone]           Routing refill request to provider for review/approval because:  Drug not on the [facility], P or [facility] refill protocol or controlled substance     no

## 2019-10-30 RX ORDER — BACLOFEN 10 MG/1
TABLET ORAL
Qty: 90 TABLET | Refills: 1 | Status: SHIPPED | OUTPATIENT
Start: 2019-10-30 | End: 2020-01-03

## 2019-11-12 DIAGNOSIS — E66.01 MORBID OBESITY (H): ICD-10-CM

## 2019-11-12 RX ORDER — PHENTERMINE HYDROCHLORIDE 30 MG/1
CAPSULE ORAL
Qty: 30 CAPSULE | Refills: 0 | Status: SHIPPED | OUTPATIENT
Start: 2019-11-12 | End: 2019-12-13

## 2019-11-13 DIAGNOSIS — E03.9 ACQUIRED HYPOTHYROIDISM: ICD-10-CM

## 2019-11-13 RX ORDER — LEVOTHYROXINE SODIUM 200 UG/1
TABLET ORAL
Qty: 30 TABLET | Refills: 1 | Status: SHIPPED | OUTPATIENT
Start: 2019-11-13 | End: 2019-12-13

## 2019-11-21 DIAGNOSIS — I10 BENIGN ESSENTIAL HYPERTENSION: ICD-10-CM

## 2019-11-22 RX ORDER — HYDROCHLOROTHIAZIDE 12.5 MG/1
CAPSULE ORAL
Qty: 90 CAPSULE | Refills: 0 | Status: SHIPPED | OUTPATIENT
Start: 2019-11-22 | End: 2020-02-25

## 2019-11-22 NOTE — TELEPHONE ENCOUNTER
hydrochlorothiazide (MICROZIDE) 12.5 MG capsule     Last Written Prescription Date:  08/09/2019  Last Fill Quantity: 90,   # refills: 0  Last Office Visit: 07/01/2019  Future Office visit:    Next 5 appointments (look out 90 days)    Dec 13, 2019 11:20 AM CST  (Arrive by 11:05 AM)  [facility] Visit with SEVEN Pandey  [facility] [address]  [place] [address]  [phone]   Jan 02, 2020  3:40 PM CST  (Arrive by 3:25 PM)  SHORT with SEVEN Pandey  [facility] ([facility] [place] ) [address]  [place] [address]  [phone]           Routing refill request to provider for review/approval because:

## 2019-12-13 ENCOUNTER — OFFICE VISIT (OUTPATIENT)
Dept: FAMILY MEDICINE | Facility: OTHER | Age: 44
End: 2019-12-13
Attending: PHYSICIAN ASSISTANT
Payer: COMMERCIAL

## 2019-12-13 VITALS
OXYGEN SATURATION: 97 % | HEIGHT: 63 IN | DIASTOLIC BLOOD PRESSURE: 82 MMHG | BODY MASS INDEX: 50.5 KG/M2 | HEART RATE: 83 BPM | SYSTOLIC BLOOD PRESSURE: 122 MMHG | WEIGHT: 285 LBS

## 2019-12-13 DIAGNOSIS — E03.9 ACQUIRED HYPOTHYROIDISM: ICD-10-CM

## 2019-12-13 DIAGNOSIS — N39.46 MIXED STRESS AND URGE URINARY INCONTINENCE: ICD-10-CM

## 2019-12-13 DIAGNOSIS — G89.4 CHRONIC PAIN SYNDROME: Primary | ICD-10-CM

## 2019-12-13 DIAGNOSIS — F33.1 MODERATE EPISODE OF RECURRENT MAJOR DEPRESSIVE DISORDER (H): ICD-10-CM

## 2019-12-13 DIAGNOSIS — R60.9 DEPENDENT EDEMA: ICD-10-CM

## 2019-12-13 DIAGNOSIS — E66.01 MORBID OBESITY (H): ICD-10-CM

## 2019-12-13 DIAGNOSIS — F31.61 MILD MIXED BIPOLAR I DISORDER (H): ICD-10-CM

## 2019-12-13 PROBLEM — E66.9 OBESITY: Status: ACTIVE | Noted: 2018-09-21

## 2019-12-13 PROBLEM — Z00.00 ROUTINE GENERAL MEDICAL EXAMINATION AT A HEALTH CARE FACILITY: Status: ACTIVE | Noted: 2017-04-03

## 2019-12-13 PROBLEM — S83.281A TEAR OF LATERAL CARTILAGE OR MENISCUS OF KNEE, CURRENT, RIGHT, INITIAL ENCOUNTER: Status: ACTIVE | Noted: 2017-04-03

## 2019-12-13 PROBLEM — K21.9 GERD (GASTROESOPHAGEAL REFLUX DISEASE): Status: ACTIVE | Noted: 2019-12-13

## 2019-12-13 PROCEDURE — 99214 OFFICE O/P EST MOD 30 MIN: CPT | Performed by: PHYSICIAN ASSISTANT

## 2019-12-13 PROCEDURE — G0463 HOSPITAL OUTPT CLINIC VISIT: HCPCS

## 2019-12-13 RX ORDER — BUPRENORPHINE AND NALOXONE 2; .5 MG/1; MG/1
1 FILM, SOLUBLE BUCCAL; SUBLINGUAL DAILY
Refills: 0 | COMMUNITY
Start: 2019-11-29 | End: 2022-11-02

## 2019-12-13 RX ORDER — LEVOTHYROXINE SODIUM 200 UG/1
200 TABLET ORAL DAILY
Qty: 30 TABLET | Refills: 1 | Status: SHIPPED | OUTPATIENT
Start: 2019-12-13 | End: 2020-01-30

## 2019-12-13 RX ORDER — IBUPROFEN 800 MG/1
800 TABLET, FILM COATED ORAL EVERY 8 HOURS PRN
Qty: 90 TABLET | Refills: 0 | Status: SHIPPED | OUTPATIENT
Start: 2019-12-13 | End: 2023-01-24

## 2019-12-13 RX ORDER — ARIPIPRAZOLE 10 MG/1
10 TABLET ORAL DAILY
Refills: 0 | COMMUNITY
Start: 2019-11-25 | End: 2021-07-26

## 2019-12-13 RX ORDER — OXYBUTYNIN CHLORIDE 5 MG/1
5 TABLET ORAL 3 TIMES DAILY
Qty: 90 TABLET | Refills: 1 | Status: SHIPPED | OUTPATIENT
Start: 2019-12-13 | End: 2020-03-09

## 2019-12-13 ASSESSMENT — PATIENT HEALTH QUESTIONNAIRE - PHQ9: SUM OF ALL RESPONSES TO PHQ QUESTIONS 1-9: 24

## 2019-12-13 ASSESSMENT — MIFFLIN-ST. JEOR: SCORE: 1901.88

## 2019-12-13 ASSESSMENT — ANXIETY QUESTIONNAIRES
2. NOT BEING ABLE TO STOP OR CONTROL WORRYING: MORE THAN HALF THE DAYS
6. BECOMING EASILY ANNOYED OR IRRITABLE: MORE THAN HALF THE DAYS
3. WORRYING TOO MUCH ABOUT DIFFERENT THINGS: MORE THAN HALF THE DAYS
GAD7 TOTAL SCORE: 16
5. BEING SO RESTLESS THAT IT IS HARD TO SIT STILL: NEARLY EVERY DAY
1. FEELING NERVOUS, ANXIOUS, OR ON EDGE: MORE THAN HALF THE DAYS
4. TROUBLE RELAXING: NEARLY EVERY DAY
7. FEELING AFRAID AS IF SOMETHING AWFUL MIGHT HAPPEN: MORE THAN HALF THE DAYS

## 2019-12-13 ASSESSMENT — PAIN SCALES - GENERAL: PAINLEVEL: EXTREME PAIN (8)

## 2019-12-13 NOTE — PROGRESS NOTES
Subjective     Autumn Holbrook is a 44 year old female who presents to clinic today for the following health issues:    HPI   Depression and Anxiety Follow-Up    How are you doing with your depression since your last visit? Improved     How are you doing with your anxiety since your last visit?  Improved     Are you having other symptoms that might be associated with depression or anxiety? Yes:  suicidal thoughts, insomnia, she told me she had not showered for 3 months and finally took one the other day    Have you had a significant life event? OTHER: stressors with her mother. mothers health declining, getting harder for pt to deal with      Do you have any concerns with your use of alcohol or other drugs? No    Social History     Tobacco Use     Smoking status: Current Every Day Smoker     Packs/day: 0.50     Years: 20.00     Pack years: 10.00     Types: Cigarettes     Smokeless tobacco: Never Used   Substance Use Topics     Alcohol use: No     Drug use: No     Comment: previous opiod addiction     PHQ 5/8/2019 6/27/2019 12/13/2019   PHQ-9 Total Score - 27 24   Q9: Thoughts of better off dead/self-harm past 2 weeks - Nearly every day More than half the days   F/U: Thoughts of suicide or self-harm - Yes Yes   F/U: Self harm-plan - Yes No   F/U: Self-harm action - No No   F/U: Safety concerns - No No   PHQ-A Total Score 23 - -   PHQ-A Depressed most days in past year Yes - -   PHQ-A Mood affect on daily activities Extremely dIfficult - -   PHQ-A Suicide Ideation past 2 weeks Nearly every day - -   PHQ-A Suicide Ideation past month No - -   PHQ-A Previous suicide attempt No - -     COY-7 SCORE 5/8/2019 6/27/2019 12/13/2019   Total Score 15 21 16     Last PHQ-9 12/13/2019   1.  Little interest or pleasure in doing things 2   2.  Feeling down, depressed, or hopeless 2   3.  Trouble falling or staying asleep, or sleeping too much 3   4.  Feeling tired or having little energy 3   5.  Poor appetite or overeating 3   6.   Feeling bad about yourself 3   7.  Trouble concentrating 3   8.  Moving slowly or restless 3   Q9: Thoughts of better off dead/self-harm past 2 weeks 2   PHQ-9 Total Score 24   Difficulty at work, home, or with people Extremely dIfficult   In the past two weeks have you had thoughts of suicide or self harm? Yes   Do you have concerns about your personal safety or the safety of others? No   In the past 2 weeks have you thought about a plan or had intention to harm yourself? No   In the past 2 weeks have you acted on these thoughts in any way? No     COY-7  12/13/2019   1. Feeling nervous, anxious, or on edge 2   2. Not being able to stop or control worrying 2   3. Worrying too much about different things 2   4. Trouble relaxing 3   5. Being so restless that it is hard to sit still 3   6. Becoming easily annoyed or irritable 2   7. Feeling afraid, as if something awful might happen 2   COY-7 Total Score 16   If you checked any problems, how difficult have they made it for you to do your work, take care of things at home, or get along with other people? -     Recommend transfer of care to [facility] Assessment Five-step Evaluation and Treatment (SAFE-T)    Chronic Pain Follow-Up       Type / Location of Pain: knees and back  Analgesia/pain control:       Recent changes:  worse      Overall control: Inadequate pain control  Activity level/function:      Daily activities:  Able to do light housework, cooking    Work:  Unable to work  Adverse effects:  No  Adherance    Taking medication as directed?  Yes    Participating in other treatments: no -   Risk Factors:    Sleep:  Poor    Mood/anxiety:  worsened    Recent family or social stressors:  conflict between family members    Other aggravating factors: prolonged sitting, prolonged standing and repetitive activities - movement  PHQ-9 SCORE 5/8/2019 6/27/2019 12/13/2019   PHQ-9 Total Score - 27 24   PHQ-A Total Score 23 - -     COY-7 SCORE 5/8/2019 6/27/2019  2019   Total Score 15 21 16     Encounter-Level CSA:    There are no encounter-level csa.     Patient-Level CSA:    There are no patient-level csa.         How many servings of fruits and vegetables do you eat daily?  2-3    On average, how many sweetened beverages do you drink each day (Examples: soda, juice, sweet tea, etc.  Do NOT count diet or artificially sweetened beverages)?   1    How many days per week do you miss taking your medication? 0    Medication Followup of phentermine    Taking Medication as prescribed: yes    Side Effects:  None    Medication Helping Symptoms:  yes       Patient Active Problem List   Diagnosis     Routine general medical examination at a health care facility     Tear of lateral cartilage or meniscus of knee, current, right, initial encounter     Chronic pain syndrome     Opioid dependence in remission (H)     PTSD (post-traumatic stress disorder)     COY (generalized anxiety disorder)     Moderate episode of recurrent major depressive disorder (H)     Bilateral edema of lower extremity     Hypothyroid     Obesity     GERD (gastroesophageal reflux disease)     Mild mixed bipolar I disorder (H)     Trigger finger of both hands     Morbid obesity (H)     Past Surgical History:   Procedure Laterality Date     CHOLECYSTECTOMY       GYN SURGERY       SECTION 9354-1650     GYN SURGERY  [address] [place] [address]       Social History     Tobacco Use     Smoking status: Current Every Day Smoker     Packs/day: 0.50     Years: 20.00     Pack years: 10.00     Types: Cigarettes     Smokeless tobacco: Never Used   Substance Use Topics     Alcohol use: No     Family History   Problem Relation Age of Onset     Cerebrovascular Disease Mother      Alcoholism Mother      Cancer Mother      Depression Mother      Eczema Mother      Hypertension Mother      Migraines Mother      Mental Illness Mother      Osteoarthritis Mother      Osteoporosis Mother      Alcoholism Father      Cancer Father       Hyperlipidemia Father      Hypertension Father      Myocardial Infarction Father      Mental Illness Sister      Migraines Sister          Current Outpatient Medications   Medication Sig Dispense Refill     Acetaminophen (TYLENOL PO) Take 500 mg by mouth every 4 hours as needed for mild pain or fever       ARIPiprazole (ABILIFY) 10 MG tablet Take 10 mg by mouth daily  0     atomoxetine (STRATTERA) 60 MG capsule Take 60 mg by mouth daily       baclofen (LIORESAL) 10 MG tablet TAKE 1 TABLET BY MOUTH THREE TIMES DAILY 90 tablet 1     buprenorphine HCl-naloxone HCl (SUBOXONE) 2-0.5 MG per film PLACE 1 STRIP UNDER THE TONGUE TWICE DAILY  0     buPROPion (WELLBUTRIN XL) 150 MG 24 hr tablet TAKE 1 TABLET BY MOUTH ONCE DAILY IN THE MORNING 90 tablet 0     busPIRone (BUSPAR) 10 MG tablet Take 10 mg by mouth 2 times daily  0     hydrochlorothiazide (MICROZIDE) 12.5 MG capsule TAKE 1 CAPSULE BY MOUTH ONCE DAILY 90 capsule 0     HYDROXYZINE HCL PO Take 25 mg by mouth 4 times daily as needed for itching       hylan (SYNVISC) 16 MG/2ML injection 16 mg by INTRA-ARTICULAR route every 6 months       ibuprofen (ADVIL/MOTRIN) 800 MG tablet Take 1 tablet (800 mg) by mouth every 8 hours as needed for moderate pain 90 tablet 0     ibuprofen (ADVIL/MOTRIN) 800 MG tablet   1     lamoTRIgine (LAMICTAL) 150 MG tablet Take 150 mg by mouth 2 times daily  2     lamoTRIgine (LAMICTAL) 25 MG tablet TAKE 1 TABLET BY MOUTH ONCE DAILY WITH LAMOTRIGINE 150 MG  1     levothyroxine (EUTHYROX) 200 MCG tablet Take 1 tablet (200 mcg) by mouth daily 30 tablet 1     Melatonin 10 MG CAPS Take 10 mg by mouth daily       melatonin 3 MG CAPS Take 3 mg by mouth daily       NARCAN 4 MG/0.1ML nasal spray   0     nystatin (MYCOSTATIN) 345431 UNIT/GM external powder Apply topically 2 times daily 60 g 3     order for DME Equipment being ordered: TEDS 2 Device 3     oxybutynin (DITROPAN) 5 MG tablet Take 1 tablet (5 mg) by mouth 3 times daily 90 tablet 1      "phentermine (ADIPEX-P) 30 MG capsule TAKE 1 CAPSULE BY MOUTH ONCE DAILY IN THE MORNING 30 capsule 0     topiramate (TOPAMAX) 50 MG tablet TAKE 1 TO 2 TABLETS BY MOUTH ONCE DAILY AT BEDTIME 60 tablet 1     Allergies   Allergen Reactions     Depakote [Valproic Acid]      Gabapentin Swelling     Propofol Unknown     Got very stiff and tight.      Recent Labs   Lab Test 07/01/19  1140 12/19/18  1155 11/28/18  1215 05/24/18  1213 08/30/17  1000   A1C 6.2* 5.9*  --   --  5.8   LDL  --   --  125*  --  120*   HDL  --   --  90  --  81   TRIG  --   --  64  --  54   ALT 25  --  28 21 24   CR 0.95  --  0.67 0.84 0.80   GFRESTIMATED 72  --  >90 73 77   GFRESTBLACK 84  --  >90 89 >90   POTASSIUM 3.6  --  4.0 4.4 3.8   TSH 1.90  --  0.85 1.17 3.41      BP Readings from Last 3 Encounters:   12/13/19 122/82   07/01/19 122/76   06/27/19 128/84    Wt Readings from Last 3 Encounters:   12/13/19 129.3 kg (285 lb)   07/01/19 127.9 kg (282 lb)   06/27/19 127.9 kg (282 lb)          Hypothyroidism Follow-up      Since last visit, patient describes the following symptoms: Weight stable, no hair loss, no skin changes, no constipation, no loose stools      How many servings of fruits and vegetables do you eat daily?  0-1    On average, how many sweetened beverages do you drink each day (Examples: soda, juice, sweet tea, etc.  Do NOT count diet or artificially sweetened beverages)?   0    How many days per week do you miss taking your medication? 0                 Reviewed and updated as needed this visit by Provider  Tobacco  Allergies  Meds  Problems  Med Hx  Surg Hx  Fam Hx         Review of Systems   ROS COMP: Constitutional, HEENT, cardiovascular, pulmonary, GI, , musculoskeletal, neuro, skin, endocrine and psych systems are negative, except as otherwise noted.      Objective    /82 (Patient Position: Sitting)   Pulse 83   Ht 1.6 m (5' 3\")   Wt 129.3 kg (285 lb)   SpO2 97%   BMI 50.49 kg/m    Body mass index is 50.49 " kg/m .  Physical Exam   GENERAL: healthy, alert and no distress  EYES: Eyes grossly normal to inspection, PERRL and conjunctivae and sclerae normal  HENT: ear canals and TM's normal, nose and mouth without ulcers or lesions  NECK: no adenopathy, no asymmetry, masses, or scars and thyroid normal to palpation  RESP: lungs clear to auscultation - no rales, rhonchi or wheezes  BREAST: normal without masses, tenderness or nipple discharge and no palpable axillary masses or adenopathy  CV: regular rate and rhythm, normal S1 S2, no S3 or S4, no murmur, click or rub, no peripheral edema and peripheral pulses strong  ABDOMEN: soft, nontender, no hepatosplenomegaly, no masses and bowel sounds normal  MS: no gross musculoskeletal defects noted, no edema  SKIN: no suspicious lesions or rashes  NEURO: Normal strength and tone, mentation intact and speech normal  PSYCH: mentation appears normal, affect normal/bright  LYMPH: no cervical, supraclavicular, axillary, or inguinal adenopathy    Diagnostic Test Results:  Labs reviewed in Epic  No results found for this or any previous visit (from the past 24 hour(s)).        Assessment & Plan     1. Acquired hypothyroidism  Recheck labs.   - levothyroxine (EUTHYROX) 200 MCG tablet; Take 1 tablet (200 mcg) by mouth daily  Dispense: 30 tablet; Refill: 1  - TSH with free T4 reflex    2. Chronic pain syndrome  She is miserable. Getting off suboxone wants to be done. Wants to look in to medical [facility]. Referral will be made. Given information and referral per our Care coordination.  Kylie did come in office today and discuss this with her.   - ibuprofen (ADVIL/MOTRIN) 800 MG tablet; Take 1 tablet (800 mg) by mouth every 8 hours as needed for moderate pain  Dispense: 90 tablet; Refill: 0  - MENTAL HEALTH REFERRAL  - Adult; Psychiatry and Medication Management; Psychiatry; Other: Not Listed - Enter Referral Details in Scheduling Comments Below; Patient call to schedule    3. Morbid obesity  (H)  Weight leading to leg swelling.  She can't get her weight down. emotional state  not appropriate for bipass.     4. Moderate episode of recurrent major depressive disorder (H)  Seeing therapy weekly. Not helping her   - MENTAL HEALTH REFERRAL  - Adult; Psychiatry and Medication Management; Psychiatry; Other: Not Listed - Enter Referral Details in Scheduling Comments Below; Patient call to schedule    5. Mild mixed bipolar I disorder (H)  As above. In Care Coordination as of today.   - MENTAL HEALTH REFERRAL  - Adult; Psychiatry and Medication Management; Psychiatry; Other: Not Listed - Enter Referral Details in Scheduling Comments Below; Patient call to schedule    6. Mixed stress and urge urinary incontinence  Refill is given. Check for infection.   - UA reflex to Microscopic and Culture - [place]  - oxybutynin (DITROPAN) 5 MG tablet; Take 1 tablet (5 mg) by mouth 3 times daily  Dispense: 90 tablet; Refill: 1     Tobacco Cessation:   reports that she has been smoking cigarettes. She has a 10.00 pack-year smoking history. She has never used smokeless tobacco.          See Patient Instructions    No follow-ups on file.    SEVEN Bryant  [facility] [place]

## 2019-12-13 NOTE — PATIENT INSTRUCTIONS
Psychologists/ Counselors      [place]   [phone]  [facility]   [phone]  [facility] [phone]  [facility] (kids) [phone]  [facility](teens) [phone]  [facility]  [phone]  [facility]  [phone]  [facility]  [phone]  [facility]  [phone]  [facility]  [phone]  [facility] counseling [phone] (Opening new location)  [facility]   [phone] (Opening new location)  [facility]    [phone]   [facility]   [phone]   [facility]  [phone]  [facility] [phone]  [facility]  [phone]  [facility]  [phone]     [facility] [phone]      [facility] counseling [phone]  [facility]   [phone]  [facility] (Samuel Fenton LMFT)                                                   [phone]  [facility] Greg[facility]  [phone]  [facility] [phone]  [facility]      [phone]  [facility]       [phone]  [facility]  [phone]  [facility]      [phone]     [place]  Atilio [facility]   [phone]  [facility]      [phone]  [facility] Dr. RODRIGO Mathew [phone]  [facility] Psychological Services      [phone]  [facility]  [phone]    [facility]                      [phone]    *Facilities in bold italics indicate medication management  Services are offered.    Crisis support  Mobile crisis line- [phone]  [facility]: Free online resources including therapy for Mental Health and substance use problems: Http://thriverange.org    Crisis Text Line  Text HOME to 966-327 - The Crisis Text Line serves anyone, in any type of crisis, providing access to free, 24/7 support and information via the medium people already use and  trust  http://www.crisistextline.org   Here's how it works:  Text HOME to 325-982 from anywhere in the [place], anytime, about any type of crisis.  A live, trained Crisis Counselor receives the text and responds quickly.  The volunteer Crisis Counselor will help you move from a 'hot moment to a cool moment'

## 2019-12-13 NOTE — NURSING NOTE
"No chief complaint on file.      Initial /82 (Patient Position: Sitting)   Pulse 83   Ht 1.6 m (5' 3\")   Wt 129.3 kg (285 lb)   SpO2 97%   BMI 50.49 kg/m   Estimated body mass index is 50.49 kg/m  as calculated from the following:    Height as of this encounter: 1.6 m (5' 3\").    Weight as of this encounter: 129.3 kg (285 lb).  Medication Reconciliation: complete  Merissa Randle LPN    "

## 2019-12-14 ASSESSMENT — ANXIETY QUESTIONNAIRES: GAD7 TOTAL SCORE: 16

## 2019-12-16 RX ORDER — PHENTERMINE HYDROCHLORIDE 30 MG/1
CAPSULE ORAL
Qty: 30 CAPSULE | Refills: 0 | Status: SHIPPED | OUTPATIENT
Start: 2019-12-16 | End: 2020-01-16

## 2019-12-16 NOTE — TELEPHONE ENCOUNTER
Phentermine       Last Written Prescription Date:  11/12/2019  Last Fill Quantity: 30,   # refills: 0  Last Office Visit: 12/13/2019  Future Office visit:    Next 5 appointments (look out 90 days)    Jan 02, 2020  3:40 PM CST  (Arrive by 3:25 PM)  SHORT with SEVEN Pandey  [facility] [place] ([facility] [place] ) [address]  [place] [address]  [phone]

## 2019-12-30 DIAGNOSIS — F33.2 SEVERE EPISODE OF RECURRENT MAJOR DEPRESSIVE DISORDER, WITHOUT PSYCHOTIC FEATURES (H): ICD-10-CM

## 2019-12-30 DIAGNOSIS — G89.4 CHRONIC PAIN SYNDROME: ICD-10-CM

## 2020-01-03 DIAGNOSIS — G89.4 CHRONIC PAIN SYNDROME: ICD-10-CM

## 2020-01-03 RX ORDER — BACLOFEN 10 MG/1
TABLET ORAL
Qty: 90 TABLET | Refills: 1 | Status: CANCELLED | OUTPATIENT
Start: 2020-01-03

## 2020-01-03 RX ORDER — TOPIRAMATE 50 MG/1
TABLET, FILM COATED ORAL
Qty: 60 TABLET | Refills: 3 | Status: SHIPPED | OUTPATIENT
Start: 2020-01-03 | End: 2020-05-06

## 2020-01-03 RX ORDER — BACLOFEN 10 MG/1
TABLET ORAL
Qty: 90 TABLET | Refills: 3 | Status: SHIPPED | OUTPATIENT
Start: 2020-01-03 | End: 2020-04-27

## 2020-01-03 NOTE — TELEPHONE ENCOUNTER
baclofen (LIORESAL) 10 MG tablet  Last visit date with prescribing provider: 12-  Last refill date: 10-  Quantity: 90, Refills: 1    Isabel Holbrook LPN      Next 5 appointments (look out 90 days)    Jan 23, 2020 11:40 AM CST  (Arrive by 11:25 AM)  SHORT with SEVEN Pandey  [facility] [place] ([facility] [place] ) [address] [place] [address]  [phone]            Next 5 appointments (look out 90 days)    Jan 23, 2020 11:40 AM CST  (Arrive by 11:25 AM)  SHORT with SEVEN Pandey  [facility] [place] ([facility] [place] ) [address]  [place] [address]  [phone]

## 2020-01-09 ENCOUNTER — TELEPHONE (OUTPATIENT)
Dept: FAMILY MEDICINE | Facility: OTHER | Age: 45
End: 2020-01-09

## 2020-01-09 NOTE — TELEPHONE ENCOUNTER
[facility] called looking for verbal orders continuation of nursing once weekly    [facility] staff can be reached at the following number:  [phone]

## 2020-01-14 DIAGNOSIS — E66.01 MORBID OBESITY (H): ICD-10-CM

## 2020-01-15 NOTE — TELEPHONE ENCOUNTER
phentermine      Last Written Prescription Date:  12/16/19  Last Fill Quantity: 30,   # refills: 0  Last Office Visit: 12/13/19  Future Office visit:    Next 5 appointments (look out 90 days)    Jan 23, 2020 11:40 AM CST  (Arrive by 11:25 AM)  SHORT with SEVEN Pandey  [facility] [place] ([facility] [place] ) [address]  [place] [address]  [phone]

## 2020-01-16 RX ORDER — PHENTERMINE HYDROCHLORIDE 30 MG/1
CAPSULE ORAL
Qty: 30 CAPSULE | Refills: 1 | Status: SHIPPED | OUTPATIENT
Start: 2020-01-16 | End: 2020-03-09

## 2020-01-22 DIAGNOSIS — Z00.00 ROUTINE GENERAL MEDICAL EXAMINATION AT A HEALTH CARE FACILITY: ICD-10-CM

## 2020-01-22 DIAGNOSIS — R73.09 ELEVATED GLUCOSE: ICD-10-CM

## 2020-01-22 DIAGNOSIS — E03.9 ACQUIRED HYPOTHYROIDISM: Primary | ICD-10-CM

## 2020-01-22 DIAGNOSIS — F32.A FATIGUE DUE TO DEPRESSION: ICD-10-CM

## 2020-01-22 DIAGNOSIS — R53.83 FATIGUE DUE TO DEPRESSION: ICD-10-CM
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
## 2020-01-29 DIAGNOSIS — R53.83 FATIGUE DUE TO DEPRESSION: ICD-10-CM

## 2020-01-29 DIAGNOSIS — F32.A FATIGUE DUE TO DEPRESSION: ICD-10-CM

## 2020-01-29 DIAGNOSIS — Z00.00 ROUTINE GENERAL MEDICAL EXAMINATION AT A HEALTH CARE FACILITY: ICD-10-CM

## 2020-01-29 DIAGNOSIS — R73.09 ELEVATED GLUCOSE: ICD-10-CM

## 2020-01-29 DIAGNOSIS — N39.46 MIXED STRESS AND URGE URINARY INCONTINENCE: ICD-10-CM

## 2020-01-29 DIAGNOSIS — E03.9 ACQUIRED HYPOTHYROIDISM: ICD-10-CM

## 2020-01-29 LAB
ALBUMIN SERPL-MCNC: 3.5 G/DL (ref 3.4–5)
ALBUMIN UR-MCNC: NEGATIVE MG/DL
ALP SERPL-CCNC: 66 U/L (ref 40–150)
ALT SERPL W P-5'-P-CCNC: 28 U/L (ref 0–50)
ANION GAP SERPL CALCULATED.3IONS-SCNC: 5 MMOL/L (ref 3–14)
APPEARANCE UR: CLEAR
AST SERPL W P-5'-P-CCNC: 13 U/L (ref 0–45)
BASOPHILS # BLD AUTO: 0 10E9/L (ref 0–0.2)
BASOPHILS NFR BLD AUTO: 0.5 %
BILIRUB SERPL-MCNC: 0.4 MG/DL (ref 0.2–1.3)
BILIRUB UR QL STRIP: NEGATIVE
BUN SERPL-MCNC: 16 MG/DL (ref 7–30)
CALCIUM SERPL-MCNC: 9.1 MG/DL (ref 8.5–10.1)
CHLORIDE SERPL-SCNC: 109 MMOL/L (ref 94–109)
CHOLEST SERPL-MCNC: 252 MG/DL
CO2 SERPL-SCNC: 26 MMOL/L (ref 20–32)
COLOR UR AUTO: NORMAL
CREAT SERPL-MCNC: 0.98 MG/DL (ref 0.52–1.04)
DIFFERENTIAL METHOD BLD: NORMAL
EOSINOPHIL # BLD AUTO: 0.2 10E9/L (ref 0–0.7)
EOSINOPHIL NFR BLD AUTO: 2.2 %
ERYTHROCYTE [DISTWIDTH] IN BLOOD BY AUTOMATED COUNT: 14.4 % (ref 10–15)
EST. AVERAGE GLUCOSE BLD GHB EST-MCNC: 126 MG/DL
GFR SERPL CREATININE-BSD FRML MDRD: 69 ML/MIN/{1.73_M2}
GLUCOSE SERPL-MCNC: 101 MG/DL (ref 70–99)
GLUCOSE UR STRIP-MCNC: NEGATIVE MG/DL
HBA1C MFR BLD: 6 % (ref 0–5.6)
HCT VFR BLD AUTO: 41.2 % (ref 35–47)
HDLC SERPL-MCNC: 87 MG/DL
HGB BLD-MCNC: 13.5 G/DL (ref 11.7–15.7)
HGB UR QL STRIP: NEGATIVE
IMM GRANULOCYTES # BLD: 0 10E9/L (ref 0–0.4)
IMM GRANULOCYTES NFR BLD: 0.5 %
KETONES UR STRIP-MCNC: NEGATIVE MG/DL
LDLC SERPL CALC-MCNC: 155 MG/DL
LEUKOCYTE ESTERASE UR QL STRIP: NEGATIVE
LYMPHOCYTES # BLD AUTO: 1.9 10E9/L (ref 0.8–5.3)
LYMPHOCYTES NFR BLD AUTO: 24.1 %
MCH RBC QN AUTO: 29.2 PG (ref 26.5–33)
MCHC RBC AUTO-ENTMCNC: 32.8 G/DL (ref 31.5–36.5)
MCV RBC AUTO: 89 FL (ref 78–100)
MONOCYTES # BLD AUTO: 0.5 10E9/L (ref 0–1.3)
MONOCYTES NFR BLD AUTO: 5.8 %
NEUTROPHILS # BLD AUTO: 5.4 10E9/L (ref 1.6–8.3)
NEUTROPHILS NFR BLD AUTO: 66.9 %
NITRATE UR QL: NEGATIVE
NONHDLC SERPL-MCNC: 165 MG/DL
NRBC # BLD AUTO: 0 10*3/UL
NRBC BLD AUTO-RTO: 0 /100
PH UR STRIP: 6 PH (ref 4.7–8)
PLATELET # BLD AUTO: 270 10E9/L (ref 150–450)
POTASSIUM SERPL-SCNC: 3.8 MMOL/L (ref 3.4–5.3)
PROT SERPL-MCNC: 6.8 G/DL (ref 6.8–8.8)
RBC # BLD AUTO: 4.63 10E12/L (ref 3.8–5.2)
SODIUM SERPL-SCNC: 140 MMOL/L (ref 133–144)
SOURCE: NORMAL
SP GR UR STRIP: 1.02 (ref 1–1.03)
T4 FREE SERPL-MCNC: 1.01 NG/DL (ref 0.76–1.46)
TRIGL SERPL-MCNC: 52 MG/DL
TSH SERPL DL<=0.005 MIU/L-ACNC: 18.84 MU/L (ref 0.4–4)
UROBILINOGEN UR STRIP-MCNC: NORMAL MG/DL (ref 0–2)
WBC # BLD AUTO: 8 10E9/L (ref 4–11)

## 2020-01-29 PROCEDURE — 84443 ASSAY THYROID STIM HORMONE: CPT | Mod: ZL | Performed by: PHYSICIAN ASSISTANT

## 2020-01-29 PROCEDURE — 85025 COMPLETE CBC W/AUTO DIFF WBC: CPT | Mod: ZL | Performed by: PHYSICIAN ASSISTANT

## 2020-01-29 PROCEDURE — 40000788 ZZHCL STATISTIC ESTIMATED AVERAGE GLUCOSE: Mod: ZL | Performed by: PHYSICIAN ASSISTANT

## 2020-01-29 PROCEDURE — 36415 COLL VENOUS BLD VENIPUNCTURE: CPT | Mod: ZL | Performed by: PHYSICIAN ASSISTANT

## 2020-01-29 PROCEDURE — 81003 URINALYSIS AUTO W/O SCOPE: CPT | Mod: ZL | Performed by: PHYSICIAN ASSISTANT

## 2020-01-29 PROCEDURE — 82306 VITAMIN D 25 HYDROXY: CPT | Mod: ZL | Performed by: PHYSICIAN ASSISTANT

## 2020-01-29 PROCEDURE — 80061 LIPID PANEL: CPT | Mod: ZL | Performed by: PHYSICIAN ASSISTANT

## 2020-01-29 PROCEDURE — 84439 ASSAY OF FREE THYROXINE: CPT | Mod: ZL | Performed by: PHYSICIAN ASSISTANT

## 2020-01-29 PROCEDURE — 83036 HEMOGLOBIN GLYCOSYLATED A1C: CPT | Mod: ZL | Performed by: PHYSICIAN ASSISTANT

## 2020-01-29 PROCEDURE — 80053 COMPREHEN METABOLIC PANEL: CPT | Mod: ZL | Performed by: PHYSICIAN ASSISTANT

## 2020-01-30 ENCOUNTER — OFFICE VISIT (OUTPATIENT)
Dept: FAMILY MEDICINE | Facility: OTHER | Age: 45
End: 2020-01-30
Attending: PHYSICIAN ASSISTANT
Payer: COMMERCIAL

## 2020-01-30 VITALS
BODY MASS INDEX: 51.89 KG/M2 | SYSTOLIC BLOOD PRESSURE: 120 MMHG | DIASTOLIC BLOOD PRESSURE: 80 MMHG | OXYGEN SATURATION: 99 % | WEIGHT: 282 LBS | HEIGHT: 62 IN | HEART RATE: 75 BPM | TEMPERATURE: 98 F

## 2020-01-30 DIAGNOSIS — G89.4 CHRONIC PAIN SYNDROME: ICD-10-CM

## 2020-01-30 DIAGNOSIS — Z23 NEED FOR TDAP VACCINATION: ICD-10-CM

## 2020-01-30 DIAGNOSIS — F33.2 SEVERE EPISODE OF RECURRENT MAJOR DEPRESSIVE DISORDER, WITHOUT PSYCHOTIC FEATURES (H): ICD-10-CM

## 2020-01-30 DIAGNOSIS — E66.01 MORBID OBESITY (H): ICD-10-CM

## 2020-01-30 DIAGNOSIS — E03.9 ACQUIRED HYPOTHYROIDISM: ICD-10-CM

## 2020-01-30 DIAGNOSIS — Z00.00 ROUTINE GENERAL MEDICAL EXAMINATION AT A HEALTH CARE FACILITY: Primary | ICD-10-CM

## 2020-01-30 DIAGNOSIS — F33.1 MODERATE EPISODE OF RECURRENT MAJOR DEPRESSIVE DISORDER (H): ICD-10-CM

## 2020-01-30 DIAGNOSIS — Z12.31 VISIT FOR SCREENING MAMMOGRAM: ICD-10-CM

## 2020-01-30 DIAGNOSIS — I10 BENIGN ESSENTIAL HYPERTENSION: ICD-10-CM

## 2020-01-30 PROCEDURE — 99396 PREV VISIT EST AGE 40-64: CPT | Performed by: PHYSICIAN ASSISTANT

## 2020-01-30 PROCEDURE — 90471 IMMUNIZATION ADMIN: CPT

## 2020-01-30 PROCEDURE — 90715 TDAP VACCINE 7 YRS/> IM: CPT

## 2020-01-30 RX ORDER — LEVOTHYROXINE SODIUM 300 UG/1
300 TABLET ORAL DAILY
Qty: 90 TABLET | Refills: 1 | Status: SHIPPED | OUTPATIENT
Start: 2020-01-30 | End: 2020-05-28

## 2020-01-30 RX ORDER — BUPROPION HYDROCHLORIDE 150 MG/1
TABLET ORAL
Qty: 90 TABLET | Refills: 1 | Status: SHIPPED | OUTPATIENT
Start: 2020-01-30 | End: 2020-07-24

## 2020-01-30 ASSESSMENT — ANXIETY QUESTIONNAIRES
2. NOT BEING ABLE TO STOP OR CONTROL WORRYING: MORE THAN HALF THE DAYS
IF YOU CHECKED OFF ANY PROBLEMS ON THIS QUESTIONNAIRE, HOW DIFFICULT HAVE THESE PROBLEMS MADE IT FOR YOU TO DO YOUR WORK, TAKE CARE OF THINGS AT HOME, OR GET ALONG WITH OTHER PEOPLE: EXTREMELY DIFFICULT
3. WORRYING TOO MUCH ABOUT DIFFERENT THINGS: MORE THAN HALF THE DAYS
7. FEELING AFRAID AS IF SOMETHING AWFUL MIGHT HAPPEN: NEARLY EVERY DAY
6. BECOMING EASILY ANNOYED OR IRRITABLE: MORE THAN HALF THE DAYS
5. BEING SO RESTLESS THAT IT IS HARD TO SIT STILL: NEARLY EVERY DAY
GAD7 TOTAL SCORE: 18
1. FEELING NERVOUS, ANXIOUS, OR ON EDGE: NEARLY EVERY DAY
4. TROUBLE RELAXING: NEARLY EVERY DAY

## 2020-01-30 ASSESSMENT — PATIENT HEALTH QUESTIONNAIRE - PHQ9: SUM OF ALL RESPONSES TO PHQ QUESTIONS 1-9: 27

## 2020-01-30 ASSESSMENT — MIFFLIN-ST. JEOR: SCORE: 1864.45

## 2020-01-30 ASSESSMENT — PAIN SCALES - GENERAL: PAINLEVEL: SEVERE PAIN (7)

## 2020-01-30 NOTE — NURSING NOTE
"Chief Complaint   Patient presents with     Physical       Initial /80   Pulse 75   Temp 98  F (36.7  C) (Tympanic)   Ht 1.562 m (5' 1.5\")   Wt 127.9 kg (282 lb)   SpO2 99%   BMI 52.42 kg/m   Estimated body mass index is 52.42 kg/m  as calculated from the following:    Height as of this encounter: 1.562 m (5' 1.5\").    Weight as of this encounter: 127.9 kg (282 lb).  Medication Reconciliation: complete     Lisha Perez LPN    "

## 2020-01-31 LAB — DEPRECATED CALCIDIOL+CALCIFEROL SERPL-MC: 35 UG/L (ref 20–75)

## 2020-01-31 PROCEDURE — 90715 TDAP VACCINE 7 YRS/> IM: CPT | Performed by: PHYSICIAN ASSISTANT

## 2020-01-31 PROCEDURE — 90715 TDAP VACCINE 7 YRS/> IM: CPT

## 2020-01-31 ASSESSMENT — ANXIETY QUESTIONNAIRES: GAD7 TOTAL SCORE: 18

## 2020-02-03 RX ORDER — BUPROPION HYDROCHLORIDE 150 MG/1
TABLET ORAL
Refills: 0 | OUTPATIENT
Start: 2020-02-03

## 2020-02-18 NOTE — PROGRESS NOTES
Subjective     Autumn Holbrook is a 44 year old female who presents to clinic today for the following health issues:    HPI   Medication Followup of  Phentermine, and Levothyroxine     Taking Medication as prescribed: yes    Side Effects:  None    Medication Helping Symptoms:  yes     Leg swelling       Duration: many months     Description (location/character/radiation): both legs. Avoiding salt all the time. Not wearing LUBNA hose.     Intensity:  moderate    Accompanying signs and symptoms: swelling with changes in skin color (stasis dermatitis)    History (similar episodes/previous evaluation): None    Precipitating or alleviating factors: None    Therapies tried and outcome: None       Patient Active Problem List   Diagnosis     Routine general medical examination at a health care facility     Tear of lateral cartilage or meniscus of knee, current, right, initial encounter     Chronic pain syndrome     Opioid dependence in remission (H)     PTSD (post-traumatic stress disorder)     COY (generalized anxiety disorder)     Moderate episode of recurrent major depressive disorder (H)     Bilateral edema of lower extremity     Hypothyroid     Obesity     GERD (gastroesophageal reflux disease)     Mild mixed bipolar I disorder (H)     Trigger finger of both hands     Morbid obesity (H)     Past Surgical History:   Procedure Laterality Date     CHOLECYSTECTOMY       GYN SURGERY       SECTION [id number]     GYN SURGERY  [address] [place] [address]       Social History     Tobacco Use     Smoking status: Current Every Day Smoker     Packs/day: 0.50     Years: 20.00     Pack years: 10.00     Types: Cigarettes     Smokeless tobacco: Never Used     Tobacco comment: has patches   Substance Use Topics     Alcohol use: No     Family History   Problem Relation Age of Onset     Cerebrovascular Disease Mother      Alcoholism Mother      Cancer Mother      Depression Mother      Eczema Mother      Hypertension Mother      Migraines  Mother      Mental Illness Mother      Osteoarthritis Mother      Osteoporosis Mother      Alcoholism Father      Cancer Father      Hyperlipidemia Father      Hypertension Father      Myocardial Infarction Father      Mental Illness Sister      Migraines Sister          Current Outpatient Medications   Medication Sig Dispense Refill     Acetaminophen (TYLENOL PO) Take 500 mg by mouth every 4 hours as needed for mild pain or fever       ARIPiprazole (ABILIFY) 10 MG tablet Take 10 mg by mouth daily  0     atomoxetine (STRATTERA) 60 MG capsule Take 60 mg by mouth daily       baclofen (LIORESAL) 10 MG tablet TAKE 1 TABLET BY MOUTH THREE TIMES DAILY 90 tablet 3     buprenorphine HCl-naloxone HCl (SUBOXONE) 2-0.5 MG per film PLACE 1 STRIP UNDER THE TONGUE TWICE DAILY  0     buPROPion (WELLBUTRIN XL) 150 MG 24 hr tablet TAKE 1 TABLET BY MOUTH ONCE DAILY IN THE MORNING 90 tablet 1     busPIRone (BUSPAR) 10 MG tablet Take 10 mg by mouth 2 times daily  0     cholecalciferol (VITAMIN D3) 5000 units (125 mcg) capsule Take by mouth daily       furosemide (LASIX) 20 MG tablet Take 1 tablet (20 mg) by mouth 2 times daily as needed (leg swelling) 60 tablet 1     hydrochlorothiazide (MICROZIDE) 12.5 MG capsule TAKE 1 CAPSULE BY MOUTH ONCE DAILY 90 capsule 2     HYDROXYZINE HCL PO Take 25 mg by mouth 4 times daily as needed for itching       hylan (SYNVISC) 16 MG/2ML injection 16 mg by INTRA-ARTICULAR route every 6 months       ibuprofen (ADVIL/MOTRIN) 800 MG tablet Take 1 tablet (800 mg) by mouth every 8 hours as needed for moderate pain 90 tablet 0     lamoTRIgine (LAMICTAL) 150 MG tablet Take 150 mg by mouth 2 times daily  2     lamoTRIgine (LAMICTAL) 25 MG tablet TAKE 1 TABLET BY MOUTH ONCE DAILY WITH LAMOTRIGINE 150 MG  1     levothyroxine (SYNTHROID/LEVOTHROID) 300 MCG tablet Take 1 tablet (300 mcg) by mouth daily 90 tablet 1     Melatonin 10 MG CAPS Take 10 mg by mouth daily       melatonin 3 MG CAPS Take 3 mg by mouth daily   "     NARCAN 4 MG/0.1ML nasal spray   0     order for DME Equipment being ordered: incont pad to wear in undergarment. 100 pad 11     order for DME Equipment being ordered: TEDS 2 Device 3     oxybutynin (DITROPAN) 5 MG tablet TAKE 1 TABLET BY MOUTH THREE TIMES DAILY 90 tablet 1     phentermine (ADIPEX-P) 30 MG capsule Take 1 capsule by mouth once daily in the morning 30 capsule 0     topiramate (TOPAMAX) 50 MG tablet TAKE 1 TO 2 TABLETS BY MOUTH ONCE DAILY AT BEDTIME 60 tablet 3     Allergies   Allergen Reactions     Depakote [Valproic Acid]      Gabapentin Swelling     Propofol Unknown     Got very stiff and tight.      Recent Labs   Lab Test 02/27/20  1634 01/29/20  1351 07/01/19  1140 12/19/18  1155 11/28/18  1215  08/30/17  1000   A1C  --  6.0* 6.2* 5.9*  --   --  5.8   LDL  --  155*  --   --  125*  --  120*   HDL  --  87  --   --  90  --  81   TRIG  --  52  --   --  64  --  54   ALT  --  28 25  --  28   < > 24   CR  --  0.98 0.95  --  0.67   < > 0.80   GFRESTIMATED  --  69 72  --  >90   < > 77   GFRESTBLACK  --  80 84  --  >90   < > >90   POTASSIUM  --  3.8 3.6  --  4.0   < > 3.8   TSH 0.07* 18.84* 1.90  --  0.85   < > 3.41    < > = values in this interval not displayed.      BP Readings from Last 3 Encounters:   03/13/20 120/84   01/30/20 120/80   12/13/19 122/82    Wt Readings from Last 3 Encounters:   03/13/20 126 kg (277 lb 12.8 oz)   01/30/20 127.9 kg (282 lb)   12/13/19 129.3 kg (285 lb)                      Reviewed and updated as needed this visit by Provider         Review of Systems   ROS COMP: Constitutional, HEENT, cardiovascular, pulmonary, gi and gu systems are negative, except as otherwise noted.      Objective    /84 (BP Location: Left arm, Patient Position: Sitting, Cuff Size: Adult Large)   Pulse 92   Temp 98.5  F (36.9  C) (Tympanic)   Ht 1.562 m (5' 1.5\")   Wt 126 kg (277 lb 12.8 oz)   SpO2 98%   BMI 51.64 kg/m    Body mass index is 51.64 kg/m .  Physical Exam   GENERAL: healthy, " alert and no distress  EYES: Eyes grossly normal to inspection, PERRL and conjunctivae and sclerae normal  HENT: ear canals and TM's normal, nose and mouth without ulcers or lesions  NECK: no adenopathy, no asymmetry, masses, or scars and thyroid normal to palpation  RESP: lungs clear to auscultation - no rales, rhonchi or wheezes  CV: regular rate and rhythm, normal S1 S2, no S3 or S4, no murmur, click or rub, no peripheral edema and peripheral pulses strong  MS: both legs are swollen and +2 pitting. Dependent issues.   SKIN: stasis dermatitis.   PSYCH: mentation appears normal, affect normal/bright    Diagnostic Test Results:  Labs reviewed in Epic  No results found for this or any previous visit (from the past 24 hour(s)).        Assessment & Plan     1. Vaccine counseling  Given   - PPSV23, IM/SUBQ (2+ YRS) - [id number]    2. Bilateral leg edema  Try lasix. Us only PRN . Salt in diet and how to avoid recommended. Not sleeping well sitting up and this also is causing more edema.  Discussion on leg elevation possible use of ULBNA hose doesn't think she can get them on.   - furosemide (LASIX) 20 MG tablet; Take 1 tablet (20 mg) by mouth 2 times daily as needed (leg swelling)  Dispense: 60 tablet; Refill: 1    3. Morbid obesity (H)  Phentermine is showing help in weight down almost 20 # continue.     4. Acquired hypothyroidism  Was on 200 now over replaced on 300. Will do 250. See where this brings us. Recheck in 6 weeks.   - TSH with free T4 reflex    5. Chronic rhinitis  Very congested today. Try this for opening up sinus passage.   - fluticasone (FLONASE) 50 MCG/ACT nasal spray; Spray 1 spray into both nostrils daily  Dispense: 15.8 mL; Refill: 11           See Patient Instructions    No follow-ups on file.    Apryl Hathaway, PA  [facility] - [place]

## 2020-02-21 ENCOUNTER — TELEPHONE (OUTPATIENT)
Dept: FAMILY MEDICINE | Facility: OTHER | Age: 45
End: 2020-02-21

## 2020-02-21 DIAGNOSIS — E03.9 ACQUIRED HYPOTHYROIDISM: Primary | ICD-10-CM

## 2020-02-21 NOTE — TELEPHONE ENCOUNTER
12:03 PM    Reason for Call: Phone Call    Description: Pt called to reschedule her appt she had for today - we rescheduled her for 03/13/20 and she is wondering if there are labs that need to be done prior to appt time. Please call pt back.    Was an appointment offered for this call? No  If yes : Appointment type              Date    Preferred method for responding to this message: Telephone Call  What is your phone number ?[phone]    If we cannot reach you directly, may we leave a detailed response at the number you provided? Yes    Can this message wait until your PCP/provider returns, if available today? Not applicable, pcp is in today     Melonie Chaudhary

## 2020-02-24 DIAGNOSIS — I10 BENIGN ESSENTIAL HYPERTENSION: ICD-10-CM

## 2020-02-25 RX ORDER — HYDROCHLOROTHIAZIDE 12.5 MG/1
CAPSULE ORAL
Qty: 90 CAPSULE | Refills: 2 | Status: SHIPPED | OUTPATIENT
Start: 2020-02-25 | End: 2020-11-19

## 2020-02-27 DIAGNOSIS — E03.9 ACQUIRED HYPOTHYROIDISM: ICD-10-CM

## 2020-02-27 LAB
T4 FREE SERPL-MCNC: 1.82 NG/DL (ref 0.76–1.46)
TSH SERPL DL<=0.005 MIU/L-ACNC: 0.07 MU/L (ref 0.4–4)

## 2020-02-27 PROCEDURE — 84443 ASSAY THYROID STIM HORMONE: CPT | Mod: ZL | Performed by: PHYSICIAN ASSISTANT

## 2020-02-27 PROCEDURE — 84439 ASSAY OF FREE THYROXINE: CPT | Mod: ZL | Performed by: PHYSICIAN ASSISTANT

## 2020-02-27 PROCEDURE — 36415 COLL VENOUS BLD VENIPUNCTURE: CPT | Mod: ZL | Performed by: PHYSICIAN ASSISTANT

## 2020-02-28 ENCOUNTER — TELEPHONE (OUTPATIENT)
Dept: FAMILY MEDICINE | Facility: OTHER | Age: 45
End: 2020-02-28

## 2020-02-28 NOTE — TELEPHONE ENCOUNTER
Called and left message for patient to call back to set up appointment to be seen per Apryl. Kenya Mendes LPN

## 2020-03-02 DIAGNOSIS — N39.46 MIXED STRESS AND URGE URINARY INCONTINENCE: ICD-10-CM

## 2020-03-04 RX ORDER — OXYBUTYNIN CHLORIDE 5 MG/1
TABLET ORAL
Refills: 0 | OUTPATIENT
Start: 2020-03-04

## 2020-03-05 ENCOUNTER — ANCILLARY PROCEDURE (OUTPATIENT)
Dept: MAMMOGRAPHY | Facility: OTHER | Age: 45
End: 2020-03-05
Attending: PHYSICIAN ASSISTANT
Payer: COMMERCIAL

## 2020-03-05 DIAGNOSIS — N39.46 MIXED STRESS AND URGE URINARY INCONTINENCE: ICD-10-CM

## 2020-03-05 DIAGNOSIS — E66.01 MORBID OBESITY (H): ICD-10-CM

## 2020-03-05 DIAGNOSIS — Z12.31 VISIT FOR SCREENING MAMMOGRAM: ICD-10-CM

## 2020-03-05 PROCEDURE — 77067 SCR MAMMO BI INCL CAD: CPT | Mod: TC

## 2020-03-09 RX ORDER — OXYBUTYNIN CHLORIDE 5 MG/1
TABLET ORAL
Qty: 90 TABLET | Refills: 1 | Status: SHIPPED | OUTPATIENT
Start: 2020-03-09 | End: 2020-05-28

## 2020-03-09 RX ORDER — PHENTERMINE HYDROCHLORIDE 30 MG/1
CAPSULE ORAL
Qty: 30 CAPSULE | Refills: 0 | Status: SHIPPED | OUTPATIENT
Start: 2020-03-09 | End: 2020-04-14

## 2020-03-09 NOTE — TELEPHONE ENCOUNTER
Not on protocol, please advise.    phentermine (ADIPEX-P) 30 MG capsule       Last Written Prescription Date:  1/16/20  Last Fill Quantity: 30,   # refills: 1  Last Office Visit: 1/30/20  Future Office visit:    Next 5 appointments (look out 90 days)    Mar 13, 2020  2:00 PM CDT  (Arrive by 1:45 PM)  SHORT with SEVEN Pandey  [facility] ([facility] [place] ) [address]  [place] [address]  [phone]           Routing refill request to provider for review/approval because:  Drug not on the G, P or  Health refill protocol or controlled substance

## 2020-03-12 DIAGNOSIS — Z53.9 PERSONS ENCOUNTERING HEALTH SERVICES FOR SPECIFIC PROCEDURES, NOT CARRIED OUT: Primary | ICD-10-CM

## 2020-03-13 ENCOUNTER — OFFICE VISIT (OUTPATIENT)
Dept: FAMILY MEDICINE | Facility: OTHER | Age: 45
End: 2020-03-13
Attending: PHYSICIAN ASSISTANT
Payer: COMMERCIAL

## 2020-03-13 VITALS
BODY MASS INDEX: 51.12 KG/M2 | WEIGHT: 277.8 LBS | TEMPERATURE: 98.5 F | HEIGHT: 62 IN | DIASTOLIC BLOOD PRESSURE: 84 MMHG | HEART RATE: 92 BPM | SYSTOLIC BLOOD PRESSURE: 120 MMHG | OXYGEN SATURATION: 98 %

## 2020-03-13 DIAGNOSIS — R60.0 BILATERAL LEG EDEMA: ICD-10-CM

## 2020-03-13 DIAGNOSIS — Z71.85 VACCINE COUNSELING: Primary | ICD-10-CM

## 2020-03-13 DIAGNOSIS — J31.0 CHRONIC RHINITIS: ICD-10-CM

## 2020-03-13 DIAGNOSIS — E03.9 ACQUIRED HYPOTHYROIDISM: ICD-10-CM

## 2020-03-13 DIAGNOSIS — E66.01 MORBID OBESITY (H): ICD-10-CM

## 2020-03-13 PROCEDURE — 90471 IMMUNIZATION ADMIN: CPT

## 2020-03-13 PROCEDURE — 99214 OFFICE O/P EST MOD 30 MIN: CPT | Performed by: PHYSICIAN ASSISTANT

## 2020-03-13 PROCEDURE — 90732 PPSV23 VACC 2 YRS+ SUBQ/IM: CPT

## 2020-03-13 PROCEDURE — G0463 HOSPITAL OUTPT CLINIC VISIT: HCPCS

## 2020-03-13 RX ORDER — LEVOTHYROXINE SODIUM 125 UG/1
250 TABLET ORAL DAILY
Qty: 60 TABLET | Refills: 1 | Status: SHIPPED | OUTPATIENT
Start: 2020-03-13 | End: 2020-05-28

## 2020-03-13 RX ORDER — FLUTICASONE PROPIONATE 50 MCG
1 SPRAY, SUSPENSION (ML) NASAL DAILY
Qty: 15.8 ML | Refills: 11 | Status: SHIPPED | OUTPATIENT
Start: 2020-03-13 | End: 2020-08-14

## 2020-03-13 RX ORDER — FUROSEMIDE 20 MG
20 TABLET ORAL 2 TIMES DAILY PRN
Qty: 60 TABLET | Refills: 1 | Status: SHIPPED | OUTPATIENT
Start: 2020-03-13 | End: 2020-05-06

## 2020-03-13 ASSESSMENT — PAIN SCALES - GENERAL: PAINLEVEL: EXTREME PAIN (8)

## 2020-03-13 ASSESSMENT — MIFFLIN-ST. JEOR: SCORE: 1845.4

## 2020-03-13 NOTE — NURSING NOTE
"Chief Complaint   Patient presents with     follow up medication       Initial /84 (BP Location: Left arm, Patient Position: Sitting, Cuff Size: Adult Large)   Pulse 92   Temp 98.5  F (36.9  C) (Tympanic)   Ht 1.562 m (5' 1.5\")   Wt 126 kg (277 lb 12.8 oz)   SpO2 98%   BMI 51.64 kg/m   Estimated body mass index is 51.64 kg/m  as calculated from the following:    Height as of this encounter: 1.562 m (5' 1.5\").    Weight as of this encounter: 126 kg (277 lb 12.8 oz).  Medication Reconciliation: complete  Kenya Mendes LPN  "

## 2020-03-13 NOTE — PATIENT INSTRUCTIONS
Thank you for choosing [facility].   I have office hours 8:00 am to 4:30 pm on Monday's, Wednesday's, Thursday's and Friday's. My nurse and I are out of the office every Tuesday.    Following your visit, when your labs and diagnostic testing have returned, I will review then and you will be contacted by my nurse.  If you are on My Chart, you can also view results there.    For refills, notify your pharmacy regarding what you need and the pharmacy will generate a refill request. Do not call my nurse as she is unable to process refill request. Please plan ahead and allow 3-5 days for refill requests.    You will generally receive a reminder call the day prior to your appointment.  If you cannot attend your appointment, please cancel your appointment with as much notice as possible.  If there is a pattern of failure to present for your appointments, I cannot provide consistent, meaningful, ongoing care for you. It is very important to me that you come in for your care, so we can best assist you with your health care needs.    IMPORTANT:  Please note that it is my standard of practice to NOT participate in prescribing ongoing requested Narcotic Analgesic therapy, and/or participate in the prescribing of other controlled substances.  My nurse and I am happy to assist you with the process of referral for alternative pain management as needed, and other treatment modalities including but not limited to:  Physical Therapy, Physical Medicine and Rehab, Counseling, Chiropractic Care, Orthopedic Care, and non-narcotic medication management.     In the event that you need to be seen for emergent concerns and I am out of office,  please see one of my colleagues for acute concerns.  You may also present to  or ER.  I appreciate the opportunity to serve you and look forward to supporting your healthcare needs in the future. Please contact me with any questions or concerns that you may  have.    Sincerely,      Apryl Hathaway RN, PA-C

## 2020-04-13 DIAGNOSIS — E66.01 MORBID OBESITY (H): ICD-10-CM

## 2020-04-14 RX ORDER — PHENTERMINE HYDROCHLORIDE 30 MG/1
CAPSULE ORAL
Qty: 30 CAPSULE | Refills: 0 | Status: SHIPPED | OUTPATIENT
Start: 2020-04-14 | End: 2020-05-16

## 2020-04-14 NOTE — TELEPHONE ENCOUNTER
phentermine (ADIPEX-P) 30 MG capsule    Last Written Prescription Date:  3/9/20  Last Fill Quantity: 30,   # refills: 0  Last Office Visit: 3/13/20  Future Office visit:       Routing refill request to provider for review/approval because:  Medication is reported/historical

## 2020-04-15 DIAGNOSIS — E66.01 MORBID OBESITY (H): ICD-10-CM

## 2020-04-16 RX ORDER — PHENTERMINE HYDROCHLORIDE 30 MG/1
CAPSULE ORAL
Qty: 30 CAPSULE | Refills: 0 | OUTPATIENT
Start: 2020-04-16

## 2020-04-26 DIAGNOSIS — G89.4 CHRONIC PAIN SYNDROME: ICD-10-CM

## 2020-04-27 RX ORDER — BACLOFEN 10 MG/1
TABLET ORAL
Qty: 90 TABLET | Refills: 0 | Status: SHIPPED | OUTPATIENT
Start: 2020-04-27 | End: 2020-05-28

## 2020-04-27 NOTE — TELEPHONE ENCOUNTER
baclofen      Last Written Prescription Date:  1/3/20  Last Fill Quantity: 90,   # refills: 3  Last Office Visit: 3/13/20  Future Office visit:    Next 5 appointments (look out 90 days)    May 06, 2020  3:20 PM CDT  Telephone Visit with SEVEN Pandey  [facility] [place] ([facility] ) [address]  [place] [address]  [phone]           Routing refill request to provider for review/approval because:  Drug not on the [facility], P or [facility] refill protocol or controlled substance

## 2020-05-05 DIAGNOSIS — F33.2 SEVERE EPISODE OF RECURRENT MAJOR DEPRESSIVE DISORDER, WITHOUT PSYCHOTIC FEATURES (H): ICD-10-CM

## 2020-05-05 DIAGNOSIS — R60.0 BILATERAL LEG EDEMA: ICD-10-CM

## 2020-05-06 RX ORDER — TOPIRAMATE 50 MG/1
TABLET, FILM COATED ORAL
Qty: 60 TABLET | Refills: 0 | Status: SHIPPED | OUTPATIENT
Start: 2020-05-06 | End: 2020-06-10

## 2020-05-06 RX ORDER — FUROSEMIDE 20 MG
TABLET ORAL
Qty: 60 TABLET | Refills: 0 | Status: SHIPPED | OUTPATIENT
Start: 2020-05-06 | End: 2020-06-09

## 2020-05-06 NOTE — TELEPHONE ENCOUNTER
topiramate      Last Written Prescription Date:  1/3/20  Last Fill Quantity: 60,   # refills: 3  Last Office Visit: 3/13/20  Future Office visit:       Routing refill request to provider for review/approval because:  Review Authorizing provider's last note.

## 2020-05-11 ENCOUNTER — TELEPHONE (OUTPATIENT)
Dept: FAMILY MEDICINE | Facility: OTHER | Age: 45
End: 2020-05-11

## 2020-05-11 DIAGNOSIS — Z53.9 PERSONS ENCOUNTERING HEALTH SERVICES FOR SPECIFIC PROCEDURES, NOT CARRIED OUT: Primary | ICD-10-CM

## 2020-05-11 NOTE — TELEPHONE ENCOUNTER
[facility] requesting verbal orders to continue nursing cares 1 time weekly for 9 weeks, medication management, mental health and pain assessment.    [phone]    Please advise.  Ginny Yadav LPN

## 2020-05-14 DIAGNOSIS — E66.01 MORBID OBESITY (H): ICD-10-CM

## 2020-05-16 RX ORDER — PHENTERMINE HYDROCHLORIDE 30 MG/1
CAPSULE ORAL
Qty: 30 CAPSULE | Refills: 0 | Status: SHIPPED | OUTPATIENT
Start: 2020-05-16 | End: 2020-06-10

## 2020-05-25 DIAGNOSIS — N39.46 MIXED STRESS AND URGE URINARY INCONTINENCE: ICD-10-CM

## 2020-05-25 DIAGNOSIS — G89.4 CHRONIC PAIN SYNDROME: ICD-10-CM

## 2020-05-25 DIAGNOSIS — E03.9 ACQUIRED HYPOTHYROIDISM: ICD-10-CM

## 2020-05-28 ENCOUNTER — OFFICE VISIT (OUTPATIENT)
Dept: FAMILY MEDICINE | Facility: OTHER | Age: 45
End: 2020-05-28
Attending: PHYSICIAN ASSISTANT
Payer: COMMERCIAL

## 2020-05-28 VITALS
HEART RATE: 82 BPM | OXYGEN SATURATION: 97 % | SYSTOLIC BLOOD PRESSURE: 110 MMHG | TEMPERATURE: 98.9 F | DIASTOLIC BLOOD PRESSURE: 68 MMHG

## 2020-05-28 DIAGNOSIS — E03.9 ACQUIRED HYPOTHYROIDISM: ICD-10-CM

## 2020-05-28 DIAGNOSIS — F43.10 PTSD (POST-TRAUMATIC STRESS DISORDER): ICD-10-CM

## 2020-05-28 DIAGNOSIS — N39.46 MIXED STRESS AND URGE URINARY INCONTINENCE: ICD-10-CM

## 2020-05-28 DIAGNOSIS — G89.4 CHRONIC PAIN SYNDROME: Primary | ICD-10-CM

## 2020-05-28 LAB — TSH SERPL DL<=0.005 MIU/L-ACNC: 0.44 MU/L (ref 0.4–4)

## 2020-05-28 PROCEDURE — 84443 ASSAY THYROID STIM HORMONE: CPT | Mod: ZL | Performed by: PHYSICIAN ASSISTANT

## 2020-05-28 PROCEDURE — G0463 HOSPITAL OUTPT CLINIC VISIT: HCPCS

## 2020-05-28 PROCEDURE — 99214 OFFICE O/P EST MOD 30 MIN: CPT | Performed by: PHYSICIAN ASSISTANT

## 2020-05-28 PROCEDURE — 36415 COLL VENOUS BLD VENIPUNCTURE: CPT | Mod: ZL | Performed by: PHYSICIAN ASSISTANT

## 2020-05-28 RX ORDER — OXYBUTYNIN CHLORIDE 5 MG/1
TABLET ORAL
Qty: 90 TABLET | Refills: 0 | Status: SHIPPED | OUTPATIENT
Start: 2020-05-28 | End: 2020-10-13

## 2020-05-28 RX ORDER — BACLOFEN 10 MG/1
TABLET ORAL
Qty: 90 TABLET | Refills: 0 | Status: SHIPPED | OUTPATIENT
Start: 2020-05-28 | End: 2020-10-21

## 2020-05-28 RX ORDER — LEVOTHYROXINE SODIUM 125 UG/1
TABLET ORAL
Qty: 180 TABLET | Refills: 1 | Status: SHIPPED | OUTPATIENT
Start: 2020-05-28 | End: 2020-11-19

## 2020-05-28 RX ORDER — OXYBUTYNIN CHLORIDE 5 MG/1
TABLET ORAL
Qty: 90 TABLET | Refills: 1 | Status: SHIPPED | OUTPATIENT
Start: 2020-05-28 | End: 2020-08-14

## 2020-05-28 RX ORDER — BACLOFEN 10 MG/1
TABLET ORAL
Qty: 90 TABLET | Refills: 3 | Status: SHIPPED | OUTPATIENT
Start: 2020-05-28 | End: 2020-08-14

## 2020-05-28 ASSESSMENT — ANXIETY QUESTIONNAIRES
2. NOT BEING ABLE TO STOP OR CONTROL WORRYING: SEVERAL DAYS
3. WORRYING TOO MUCH ABOUT DIFFERENT THINGS: SEVERAL DAYS
5. BEING SO RESTLESS THAT IT IS HARD TO SIT STILL: SEVERAL DAYS
1. FEELING NERVOUS, ANXIOUS, OR ON EDGE: NEARLY EVERY DAY
6. BECOMING EASILY ANNOYED OR IRRITABLE: NEARLY EVERY DAY
7. FEELING AFRAID AS IF SOMETHING AWFUL MIGHT HAPPEN: NEARLY EVERY DAY
GAD7 TOTAL SCORE: 15
4. TROUBLE RELAXING: NEARLY EVERY DAY

## 2020-05-28 ASSESSMENT — PATIENT HEALTH QUESTIONNAIRE - PHQ9: SUM OF ALL RESPONSES TO PHQ QUESTIONS 1-9: 22

## 2020-05-28 ASSESSMENT — PAIN SCALES - GENERAL: PAINLEVEL: EXTREME PAIN (9)

## 2020-05-28 NOTE — PATIENT INSTRUCTIONS
Thank you for choosing [facility].   I have office hours 8:00 am to 4:30 pm on Monday's, Wednesday's, Thursday's and Friday's. My nurse and I are out of the office every Tuesday.    Following your visit, when your labs and diagnostic testing have returned, I will review then and you will be contacted by my nurse.  If you are on My Chart, you can also view results there.    For refills, notify your pharmacy regarding what you need and the pharmacy will generate a refill request. Do not call my nurse as she is unable to process refill request. Please plan ahead and allow 3-5 days for refill requests.    You will generally receive a reminder call the day prior to your appointment.  If you cannot attend your appointment, please cancel your appointment with as much notice as possible.  If there is a pattern of failure to present for your appointments, I cannot provide consistent, meaningful, ongoing care for you. It is very important to me that you come in for your care, so we can best assist you with your health care needs.    IMPORTANT:  Please note that it is my standard of practice to NOT participate in prescribing ongoing requested Narcotic Analgesic therapy, and/or participate in the prescribing of other controlled substances.  My nurse and I am happy to assist you with the process of referral for alternative pain management as needed, and other treatment modalities including but not limited to:  Physical Therapy, Physical Medicine and Rehab, Counseling, Chiropractic Care, Orthopedic Care, and non-narcotic medication management.     In the event that you need to be seen for emergent concerns and I am out of office,  please see one of my colleagues for acute concerns.  You may also present to  or ER.  I appreciate the opportunity to serve you and look forward to supporting your healthcare needs in the future. Please contact me with any questions or concerns that you may  have.    Sincerely,      Apryl Hathaway RN, PA-C

## 2020-05-28 NOTE — LETTER
[facility] [place]  [address] [place] [address]  Phone: [phone]    May 28, 2020        Autumn Holbrook  [address] [facility] [address]          To whom it may concern:    RE: Autumn Holbrook    Patient was seen and treated today at our clinic.  She is applying to be placed on Medical Marijuana.  She has known chronic intractable pain in her joints of her hands and knees.  Has current injections in her knee's, both cortisone and Synvisc.  She has had multiple knee surgeries.  Pt has not been of benefit.  She has hx of Opoid Use disorder and thus has been on Suboxone and that does help her pain minimally.  She also has a hx of PSTD, and seeing her therapist over a year. He mood is very sad and labile.       She would benefit from Medical Cannabis.     She will sign a release of x rays and labs and ortho notes to be sent on her knee pain.  Her mental health records are with her Therapist and of course your facility.       Please contact me for questions or concerns.      Sincerely,        Apryl Hathaway PA-C

## 2020-05-28 NOTE — NURSING NOTE
"Chief Complaint   Patient presents with     Thyroid Problem       Initial /68   Pulse 82   Temp 98.9  F (37.2  C) (Tympanic)   SpO2 97%  Estimated body mass index is 51.64 kg/m  as calculated from the following:    Height as of 3/13/20: 1.562 m (5' 1.5\").    Weight as of 3/13/20: 126 kg (277 lb 12.8 oz).  Medication Reconciliation: complete  Lisha Perez LPN  "

## 2020-05-28 NOTE — PROGRESS NOTES
"Subjective     Autumn Holbrook is a 44 year old female who presents to clinic today for the following health issues:    HPI   Hypertension Follow-up      Do you check your blood pressure regularly outside of the clinic? { :257792}     Are you following a low salt diet? { :384788}    Are your blood pressures ever more than 140 on the top number (systolic) OR more   than 90 on the bottom number (diastolic), for example 140/90? { :320402}      How many servings of fruits and vegetables do you eat daily?  { :011978}    On average, how many sweetened beverages do you drink each day (Examples: soda, juice, sweet tea, etc.  Do NOT count diet or artificially sweetened beverages)?   { 1-11:574891}    How many days per week do you exercise enough to make your heart beat faster? { :547713}    How many minutes a day do you exercise enough to make your heart beat faster? { :544226}    How many days per week do you miss taking your medication? {0-7 :601049}    {additonal problems for provider to add (Optional):729452}    {HIST REVIEW/ LINKS 2 (Optional):424214}    {Additional problems for the provider to add (optional):833995}  Reviewed and updated as needed this visit by Provider         Review of Systems   {ROS COMP (Optional):798478}      Objective    There were no vitals taken for this visit.  There is no height or weight on file to calculate BMI.  Physical Exam   {Exam List (Optional):038431}    {Diagnostic Test Results (Optional):291107::\"Diagnostic Test Results:\",\"Labs reviewed in Epic\"}        {PROVIDER CHARTING PREFERENCE:879051}      "

## 2020-05-28 NOTE — PROGRESS NOTES
Subjective     Autumn Holbrook is a 44 year old female who presents to clinic today for the following health issues:    HPI   Hypothyroidism Follow-up      Since last visit, patient describes the following symptoms:   constipation      How many servings of fruits and vegetables do you eat daily?  0-1    On average, how many sweetened beverages do you drink each day (Examples: soda, juice, sweet tea, etc.  Do NOT count diet or artificially sweetened beverages)?   0    How many days per week do you exercise enough to make your heart beat faster? 3 or less    How many minutes a day do you exercise enough to make your heart beat faster? 9 or less    How many days per week do you miss taking your medication? 0    Depression and Anxiety Follow-Up    How are you doing with your depression since your last visit? No change    How are you doing with your anxiety since your last visit?  No change    Are you having other symptoms that might be associated with depression or anxiety? No    Have you had a significant life event? No - upon asking this question pt became tearful    Do you have any concerns with your use of alcohol or other drugs? No    Social History     Tobacco Use     Smoking status: Current Every Day Smoker     Packs/day: 0.50     Years: 20.00     Pack years: 10.00     Types: Cigarettes     Smokeless tobacco: Never Used     Tobacco comment: has patches   Substance Use Topics     Alcohol use: No     Drug use: No     Comment: previous opiod addiction     PHQ 12/13/2019 1/30/2020 5/28/2020   PHQ-9 Total Score 24 27 22   Q9: Thoughts of better off dead/self-harm past 2 weeks More than half the days Nearly every day More than half the days   F/U: Thoughts of suicide or self-harm Yes - -   F/U: Self harm-plan No - -   F/U: Self-harm action No - -   F/U: Safety concerns No - -   PHQ-A Total Score - - -   PHQ-A Depressed most days in past year - - -   PHQ-A Mood affect on daily activities - - -   PHQ-A Suicide Ideation  past 2 weeks - - -   PHQ-A Suicide Ideation past month - - -   PHQ-A Previous suicide attempt - - -     COY-7 SCORE 12/13/2019 1/30/2020 5/28/2020   Total Score 16 18 15     Last PHQ-9 5/28/2020   1.  Little interest or pleasure in doing things 2   2.  Feeling down, depressed, or hopeless 2   3.  Trouble falling or staying asleep, or sleeping too much 3   4.  Feeling tired or having little energy 3   5.  Poor appetite or overeating 3   6.  Feeling bad about yourself 3   7.  Trouble concentrating 3   8.  Moving slowly or restless 1   Q9: Thoughts of better off dead/self-harm past 2 weeks 2   PHQ-9 Total Score 22   Difficulty at work, home, or with people -   In the past two weeks have you had thoughts of suicide or self harm? -   Do you have concerns about your personal safety or the safety of others? -   In the past 2 weeks have you thought about a plan or had intention to harm yourself? -   In the past 2 weeks have you acted on these thoughts in any way? -     CYO-7  5/28/2020   1. Feeling nervous, anxious, or on edge 3   2. Not being able to stop or control worrying 1   3. Worrying too much about different things 1   4. Trouble relaxing 3   5. Being so restless that it is hard to sit still 1   6. Becoming easily annoyed or irritable 3   7. Feeling afraid, as if something awful might happen 3   COY-7 Total Score 15   If you checked any problems, how difficult have they made it for you to do your work, take care of things at home, or get along with other people? -         Suicide Assessment Five-step Evaluation and Treatment (SAFE-T)      Patient Active Problem List   Diagnosis     Routine general medical examination at a health care facility     Tear of lateral cartilage or meniscus of knee, current, right, initial encounter     Chronic pain syndrome     Opioid dependence in remission (H)     PTSD (post-traumatic stress disorder)     COY (generalized anxiety disorder)     Moderate episode of recurrent major  depressive disorder (H)     Bilateral edema of lower extremity     Hypothyroid     Obesity     GERD (gastroesophageal reflux disease)     Mild mixed bipolar I disorder (H)     Trigger finger of both hands     Morbid obesity (H)     Past Surgical History:   Procedure Laterality Date     CHOLECYSTECTOMY       GYN SURGERY       SECTION 6299-5621     GYN SURGERY  2004    LEE       Social History     Tobacco Use     Smoking status: Current Every Day Smoker     Packs/day: 0.50     Years: 20.00     Pack years: 10.00     Types: Cigarettes     Smokeless tobacco: Never Used     Tobacco comment: has patches   Substance Use Topics     Alcohol use: No     Family History   Problem Relation Age of Onset     Cerebrovascular Disease Mother      Alcoholism Mother      Cancer Mother      Depression Mother      Eczema Mother      Hypertension Mother      Migraines Mother      Mental Illness Mother      Osteoarthritis Mother      Osteoporosis Mother      Alcoholism Father      Cancer Father      Hyperlipidemia Father      Hypertension Father      Myocardial Infarction Father      Mental Illness Sister      Migraines Sister          Current Outpatient Medications   Medication Sig Dispense Refill     Acetaminophen (TYLENOL PO) Take 500 mg by mouth every 4 hours as needed for mild pain or fever       ARIPiprazole (ABILIFY) 10 MG tablet Take 10 mg by mouth daily  0     atomoxetine (STRATTERA) 60 MG capsule Take 60 mg by mouth daily       baclofen (LIORESAL) 10 MG tablet TAKE 1 TABLET BY MOUTH THREE TIMES DAILY 90 tablet 0     buprenorphine HCl-naloxone HCl (SUBOXONE) 2-0.5 MG per film PLACE 1 STRIP UNDER THE TONGUE TWICE DAILY  0     buPROPion (WELLBUTRIN XL) 150 MG 24 hr tablet TAKE 1 TABLET BY MOUTH ONCE DAILY IN THE MORNING 90 tablet 1     busPIRone (BUSPAR) 10 MG tablet Take 10 mg by mouth 2 times daily  0     cholecalciferol (VITAMIN D3) 5000 units (125 mcg) capsule Take by mouth daily       fluticasone (FLONASE) 50  MCG/ACT nasal spray Spray 1 spray into both nostrils daily 15.8 mL 11     furosemide (LASIX) 20 MG tablet TAKE 1 TABLET BY MOUTH TWICE DAILY AS NEEDED FOR  LEG  SWELLING 60 tablet 0     hydrochlorothiazide (MICROZIDE) 12.5 MG capsule TAKE 1 CAPSULE BY MOUTH ONCE DAILY 90 capsule 2     HYDROXYZINE HCL PO Take 25 mg by mouth 4 times daily as needed for itching       hylan (SYNVISC) 16 MG/2ML injection 16 mg by INTRA-ARTICULAR route every 6 months       ibuprofen (ADVIL/MOTRIN) 800 MG tablet Take 1 tablet (800 mg) by mouth every 8 hours as needed for moderate pain 90 tablet 0     lamoTRIgine (LAMICTAL) 150 MG tablet Take 150 mg by mouth 2 times daily  2     lamoTRIgine (LAMICTAL) 25 MG tablet TAKE 1 TABLET BY MOUTH ONCE DAILY WITH LAMOTRIGINE 150 MG  1     levothyroxine (SYNTHROID/LEVOTHROID) 125 MCG tablet Take 2 tablets (250 mcg) by mouth daily 60 tablet 1     Melatonin 10 MG CAPS Take 10 mg by mouth daily       order for DME Equipment being ordered: incont pad to wear in undergarment. 100 pad 11     order for DME Equipment being ordered: TEDS 2 Device 3     oxybutynin (DITROPAN) 5 MG tablet TAKE 1 TABLET BY MOUTH THREE TIMES DAILY 90 tablet 1     phentermine (ADIPEX-P) 30 MG capsule Take 1 capsule by mouth once daily in the morning 30 capsule 0     topiramate (TOPAMAX) 50 MG tablet TAKE 1 TO 2 TABLETS BY MOUTH ONCE DAILY AT BEDTIME 60 tablet 0     NARCAN 4 MG/0.1ML nasal spray   0     Allergies   Allergen Reactions     Depakote [Valproic Acid]      Gabapentin Swelling     Propofol Unknown     Got very stiff and tight.      Recent Labs   Lab Test 02/27/20  1634 01/29/20  1351 07/01/19  1140 12/19/18  1155 11/28/18  1215  08/30/17  1000   A1C  --  6.0* 6.2* 5.9*  --   --  5.8   LDL  --  155*  --   --  125*  --  120*   HDL  --  87  --   --  90  --  81   TRIG  --  52  --   --  64  --  54   ALT  --  28 25  --  28   < > 24   CR  --  0.98 0.95  --  0.67   < > 0.80   GFRESTIMATED  --  69 72  --  >90   < > 77   GFRESTBLACK   --  80 84  --  >90   < > >90   POTASSIUM  --  3.8 3.6  --  4.0   < > 3.8   TSH 0.07* 18.84* 1.90  --  0.85   < > 3.41    < > = values in this interval not displayed.      BP Readings from Last 3 Encounters:   05/28/20 110/68   03/13/20 120/84   01/30/20 120/80    Wt Readings from Last 3 Encounters:   03/13/20 126 kg (277 lb 12.8 oz)   01/30/20 127.9 kg (282 lb)   12/13/19 129.3 kg (285 lb)                      Reviewed and updated as needed this visit by Provider         Review of Systems   Constitutional, HEENT, cardiovascular, pulmonary, GI, , musculoskeletal, neuro, skin, endocrine and psych systems are negative, except as otherwise noted.      Objective    /68   Pulse 82   Temp 98.9  F (37.2  C) (Tympanic)   SpO2 97%   There is no height or weight on file to calculate BMI.  Physical Exam   GENERAL: healthy, alert and no distress  EYES: Eyes grossly normal to inspection, PERRL and conjunctivae and sclerae normal  HENT: ear canals and TM's normal, nose and mouth without ulcers or lesions  NECK: no adenopathy, no asymmetry, masses, or scars and thyroid normal to palpation  RESP: lungs clear to auscultation - no rales, rhonchi or wheezes  CV: regular rate and rhythm, normal S1 S2, no S3 or S4, no murmur, click or rub, no peripheral edema and peripheral pulses strong  ABDOMEN: soft, nontender, no hepatosplenomegaly, no masses and bowel sounds normal  MS: no gross musculoskeletal defects noted, no edema  SKIN: no suspicious lesions or rashes  PSYCH: she is very emotional today. Crying all the time. Daughter is graduating tomorrow.      Diagnostic Test Results:  Labs reviewed in Epic  No results found for this or any previous visit (from the past 24 hour(s)).        Assessment & Plan     1. Acquired hypothyroidism  She is over replaced in March we need her to have a recheck today.   - TSH with free T4 reflex    2. Chronic pain syndrome  She is going to be trying to move her Suboxone to [place].   She is  dong very well on a very low dose. We are trying to get all her care at one mental health facility.     3. PTSD (post-traumatic stress disorder)  She is going to be given recommendations on her Therapy.  Seeing Duane again today. Not much of a change in her medications.  Most of this is around her daughter going off to College.  She is her best friend.       35 mintues in visit over 50 % face to face.     See Patient Instructions    No follow-ups on file.    SEVEN Bryant  [facility] [place]

## 2020-05-29 ASSESSMENT — ANXIETY QUESTIONNAIRES: GAD7 TOTAL SCORE: 15

## 2020-06-09 DIAGNOSIS — F33.2 SEVERE EPISODE OF RECURRENT MAJOR DEPRESSIVE DISORDER, WITHOUT PSYCHOTIC FEATURES (H): ICD-10-CM

## 2020-06-09 DIAGNOSIS — R60.0 BILATERAL LEG EDEMA: ICD-10-CM

## 2020-06-09 DIAGNOSIS — E66.01 MORBID OBESITY (H): ICD-10-CM

## 2020-06-09 RX ORDER — FUROSEMIDE 20 MG
TABLET ORAL
Qty: 60 TABLET | Refills: 0 | Status: SHIPPED | OUTPATIENT
Start: 2020-06-09 | End: 2020-07-13

## 2020-06-09 NOTE — TELEPHONE ENCOUNTER
topiramate 50mg      Last Written Prescription Date:  5/6/2020  Last Fill Quantity: 60,   # refills: 0  Last Office Visit: 5/28/20  Future Office visit:    Next 5 appointments (look out 90 days)    Aug 14, 2020  2:00 PM CDT  (Arrive by 1:45 PM)  SHORT with SEVEN Pandey  [facility] [place] ([facility] [place] ) [address]  [place] [address]  [phone]           Routing refill request to provider for review/approval because:  Needs provider review

## 2020-06-09 NOTE — TELEPHONE ENCOUNTER
phentermine      Last Written Prescription Date:  5/16/20  Last Fill Quantity: 30,   # refills: 0  Last Office Visit: 5/28/20  Future Office visit:    Next 5 appointments (look out 90 days)    Aug 14, 2020  2:00 PM CDT  (Arrive by 1:45 PM)  SHORT with SEVEN Pnadey  [facility] ([facility] ) [address]  [place] [address]  [phone]           Routing refill request to provider for review/approval because:  Drug not on the [facility], P or [facility] refill protocol or controlled substance

## 2020-06-10 RX ORDER — TOPIRAMATE 50 MG/1
TABLET, FILM COATED ORAL
Qty: 60 TABLET | Refills: 0 | Status: SHIPPED | OUTPATIENT
Start: 2020-06-10 | End: 2020-07-13

## 2020-06-10 RX ORDER — PHENTERMINE HYDROCHLORIDE 30 MG/1
CAPSULE ORAL
Qty: 30 CAPSULE | Refills: 0 | Status: SHIPPED | OUTPATIENT
Start: 2020-06-10 | End: 2020-07-13

## 2020-07-09 ENCOUNTER — TELEPHONE (OUTPATIENT)
Dept: FAMILY MEDICINE | Facility: OTHER | Age: 45
End: 2020-07-09

## 2020-07-09 DIAGNOSIS — F33.2 SEVERE EPISODE OF RECURRENT MAJOR DEPRESSIVE DISORDER, WITHOUT PSYCHOTIC FEATURES (H): ICD-10-CM

## 2020-07-09 DIAGNOSIS — E66.01 MORBID OBESITY (H): ICD-10-CM

## 2020-07-09 DIAGNOSIS — R60.0 BILATERAL LEG EDEMA: ICD-10-CM

## 2020-07-09 NOTE — TELEPHONE ENCOUNTER
Sandra from [facility] called, need verbal orders for skilled nursing once weekly for mental health assessment, pain management, med management.     [phone]

## 2020-07-10 NOTE — TELEPHONE ENCOUNTER
Call from [facility] inquiring on verbal orders, from call on 7/9/20. Notified ok for verbal orders per Apryl Hathaway.

## 2020-07-13 RX ORDER — FUROSEMIDE 20 MG
TABLET ORAL
Qty: 60 TABLET | Refills: 3 | Status: SHIPPED | OUTPATIENT
Start: 2020-07-13 | End: 2020-08-14

## 2020-07-13 RX ORDER — PHENTERMINE HYDROCHLORIDE 30 MG/1
CAPSULE ORAL
Qty: 30 CAPSULE | Refills: 0 | Status: SHIPPED | OUTPATIENT
Start: 2020-07-13 | End: 2020-08-10

## 2020-07-13 RX ORDER — TOPIRAMATE 50 MG/1
TABLET, FILM COATED ORAL
Qty: 60 TABLET | Refills: 0 | Status: SHIPPED | OUTPATIENT
Start: 2020-07-13 | End: 2020-08-20

## 2020-07-13 NOTE — TELEPHONE ENCOUNTER
PCP out. Please advise, thank you.    phentermine (ADIPEX-P) 30 MG capsule       Last Written Prescription Date:  06/10/20  Last Fill Quantity: 30,   # refills: 0  Last Office Visit: 05/28/20   Future Office visit:    Next 5 appointments (look out 90 days)    Aug 14, 2020  2:00 PM CDT  (Arrive by 1:45 PM)  [facility] with SEVEN Pandey  [facility] [address]  [place] [address]  [phone]           Routing refill request to provider for review/approval because:  Drug not on the [facility], [facility] or  Health refill protocol or controlled substance    topiramate (TOPAMAX) 50 MG tablet       Last Written Prescription Date:  06/10/20  Last Fill Quantity: 60,   # refills: 0    Failed protocol due to  Review Authorizing provider's last note.

## 2020-07-20 DIAGNOSIS — Z53.9 PERSONS ENCOUNTERING HEALTH SERVICES FOR SPECIFIC PROCEDURES, NOT CARRIED OUT: Primary | ICD-10-CM

## 2020-07-22 DIAGNOSIS — F33.2 SEVERE EPISODE OF RECURRENT MAJOR DEPRESSIVE DISORDER, WITHOUT PSYCHOTIC FEATURES (H): ICD-10-CM

## 2020-07-22 NOTE — TELEPHONE ENCOUNTER
Bupropion 150 MG 24 hour      Last Written Prescription Date:  1-  Last Fill Quantity: 90,   # refills: 1  Last Office Visit: 5-  Future Office visit:    Next 5 appointments (look out 90 days)    Aug 14, 2020  2:00 PM CDT  (Arrive by 1:45 PM)  SHORT with SEVEN Pandey  [facility] [place] ([facility] [place] ) [address]  [place] [address]  [phone]

## 2020-07-24 RX ORDER — BUPROPION HYDROCHLORIDE 150 MG/1
TABLET ORAL
Qty: 90 TABLET | Refills: 0 | Status: SHIPPED | OUTPATIENT
Start: 2020-07-24 | End: 2020-10-21

## 2020-08-13 NOTE — PROGRESS NOTES
Subjective     Autumn Holbrook is a 44 year old female who presents to clinic today for the following health issues:    HPI       Anxiety Follow-Up    How are you doing with your anxiety since your last visit? Worsened due to situational things going on    Are you having other symptoms that might be associated with anxiety? No    Have you had a significant life event? Grief or Loss and Health Concerns     Are you feeling depressed? Yes:  a little bit    Do you have any concerns with your use of alcohol or other drugs? No    Social History     Tobacco Use     Smoking status: Current Every Day Smoker     Packs/day: 0.50     Years: 20.00     Pack years: 10.00     Types: Cigarettes     Smokeless tobacco: Never Used     Tobacco comment: has patches   Substance Use Topics     Alcohol use: No     Drug use: No     Comment: previous opiod addiction     COY-7 SCORE 1/30/2020 5/28/2020 8/14/2020   Total Score 18 15 13     PHQ 1/30/2020 5/28/2020 8/14/2020   PHQ-9 Total Score 27 22 0   Q9: Thoughts of better off dead/self-harm past 2 weeks Nearly every day More than half the days Not at all   F/U: Thoughts of suicide or self-harm - - -   F/U: Self harm-plan - - -   F/U: Self-harm action - - -   F/U: Safety concerns - - -   PHQ-A Total Score - - -   PHQ-A Depressed most days in past year - - -   PHQ-A Mood affect on daily activities - - -   PHQ-A Suicide Ideation past 2 weeks - - -   PHQ-A Suicide Ideation past month - - -   PHQ-A Previous suicide attempt - - -     Last PHQ-9 8/14/2020   1.  Little interest or pleasure in doing things 0   2.  Feeling down, depressed, or hopeless 0   3.  Trouble falling or staying asleep, or sleeping too much 0   4.  Feeling tired or having little energy 0   5.  Poor appetite or overeating 0   6.  Feeling bad about yourself 0   7.  Trouble concentrating 0   8.  Moving slowly or restless 0   Q9: Thoughts of better off dead/self-harm past 2 weeks 0   PHQ-9 Total Score 0   Difficulty at work, home,  or with people -   In the past two weeks have you had thoughts of suicide or self harm? -   Do you have concerns about your personal safety or the safety of others? -   In the past 2 weeks have you thought about a plan or had intention to harm yourself? -   In the past 2 weeks have you acted on these thoughts in any way? -     COY-7  8/14/2020   1. Feeling nervous, anxious, or on edge 1   2. Not being able to stop or control worrying 2   3. Worrying too much about different things 2   4. Trouble relaxing 2   5. Being so restless that it is hard to sit still 2   6. Becoming easily annoyed or irritable 2   7. Feeling afraid, as if something awful might happen 2   COY-7 Total Score 13   If you checked any problems, how difficult have they made it for you to do your work, take care of things at home, or get along with other people? Somewhat difficult         How many servings of fruits and vegetables do you eat daily?  0-1    On average, how many sweetened beverages do you drink each day (Examples: soda, juice, sweet tea, etc.  Do NOT count diet or artificially sweetened beverages)?   0    How many days per week do you exercise enough to make your heart beat faster? 3 or less    How many minutes a day do you exercise enough to make your heart beat faster? 9 or less    How many days per week do you miss taking your medication? 0    Leg swelling bilateral      Duration: been ongoing for 2 monts    Description (location/character/radiation): both legs    Intensity:  moderate    Accompanying signs and symptoms: none    History (similar episodes/previous evaluation): yes hx of legs swelling    Precipitating or alleviating factors: None    Therapies tried and outcome: lasix, hydrochlorothiazide, oxybutnin she takes but still swelling         Patient Active Problem List   Diagnosis     Routine general medical examination at a health care facility     Tear of lateral cartilage or meniscus of knee, current, right, initial  encounter     Chronic pain syndrome     Opioid dependence in remission (H)     PTSD (post-traumatic stress disorder)     COY (generalized anxiety disorder)     Moderate episode of recurrent major depressive disorder (H)     Bilateral edema of lower extremity     Hypothyroid     Obesity     GERD (gastroesophageal reflux disease)     Mild mixed bipolar I disorder (H)     Trigger finger of both hands     Morbid obesity (H)     Past Surgical History:   Procedure Laterality Date     CHOLECYSTECTOMY       GYN SURGERY       SECTION [id number]     GYN SURGERY  2004    LEEP       Social History     Tobacco Use     Smoking status: Current Every Day Smoker     Packs/day: 0.50     Years: 20.00     Pack years: 10.00     Types: Cigarettes     Smokeless tobacco: Never Used     Tobacco comment: has patches   Substance Use Topics     Alcohol use: No     Family History   Problem Relation Age of Onset     Cerebrovascular Disease Mother      Alcoholism Mother      Cancer Mother      Depression Mother      Eczema Mother      Hypertension Mother      Migraines Mother      Mental Illness Mother      Osteoarthritis Mother      Osteoporosis Mother      Alcoholism Father      Cancer Father      Hyperlipidemia Father      Hypertension Father      Myocardial Infarction Father      Mental Illness Sister      Migraines Sister          Current Outpatient Medications   Medication Sig Dispense Refill     Acetaminophen (TYLENOL PO) Take 500 mg by mouth every 4 hours as needed for mild pain or fever       ARIPiprazole (ABILIFY) 10 MG tablet Take 10 mg by mouth daily  0     atomoxetine (STRATTERA) 60 MG capsule Take 60 mg by mouth daily       baclofen (LIORESAL) 10 MG tablet TAKE 1 TABLET BY MOUTH THREE TIMES DAILY 90 tablet 0     buprenorphine HCl-naloxone HCl (SUBOXONE) 2-0.5 MG per film PLACE 1 STRIP UNDER THE TONGUE TWICE DAILY  0     buPROPion (WELLBUTRIN XL) 150 MG 24 hr tablet TAKE 1 TABLET BY MOUTH ONCE DAILY IN THE MORNING 90  tablet 0     busPIRone (BUSPAR) 10 MG tablet Take 10 mg by mouth 2 times daily  0     EUTHYROX 125 MCG tablet Take 2 tablets by mouth once daily 180 tablet 1     furosemide (LASIX) 20 MG tablet TAKE 1 TABLET BY MOUTH TWICE DAILY AS NEEDED FOR  LEG  SWELLING 60 tablet 3     hydrochlorothiazide (MICROZIDE) 12.5 MG capsule TAKE 1 CAPSULE BY MOUTH ONCE DAILY 90 capsule 2     HYDROXYZINE HCL PO Take 25 mg by mouth 4 times daily as needed for itching       hylan (SYNVISC) 16 MG/2ML injection 16 mg by INTRA-ARTICULAR route every 6 months       ibuprofen (ADVIL/MOTRIN) 800 MG tablet Take 1 tablet (800 mg) by mouth every 8 hours as needed for moderate pain 90 tablet 0     lamoTRIgine (LAMICTAL) 150 MG tablet Take 150 mg by mouth 2 times daily  2     lamoTRIgine (LAMICTAL) 25 MG tablet TAKE 1 TABLET BY MOUTH ONCE DAILY WITH LAMOTRIGINE 150 MG  1     Melatonin 10 MG CAPS Take 10 mg by mouth daily       NARCAN 4 MG/0.1ML nasal spray   0     NEW MED 0.5 mLs Tangerine Vaporizer Oil Cartridge 0.5ml Inhale 1 to 3 puffs up to 5x per day as needed       order for DME Equipment being ordered: incont pad to wear in undergarment. 100 pad 11     order for DME Equipment being ordered: TEDS 2 Device 3     oxybutynin (DITROPAN) 5 MG tablet TAKE 1 TABLET BY MOUTH THREE TIMES DAILY 90 tablet 0     phentermine (ADIPEX-P) 30 MG capsule Take 1 capsule by mouth once daily in the morning 30 capsule 0     topiramate (TOPAMAX) 50 MG tablet TAKE 1 TO 2 TABLETS BY MOUTH ONCE DAILY AT BEDTIME 60 tablet 0     Allergies   Allergen Reactions     Depakote [Valproic Acid]      Gabapentin Swelling     Propofol Unknown     Got very stiff and tight.      Recent Labs   Lab Test 05/28/20  1209 02/27/20  1634 01/29/20  1351 07/01/19  1140 12/19/18  1155 11/28/18  1215  08/30/17  1000   A1C  --   --  6.0* 6.2* 5.9*  --   --  5.8   LDL  --   --  155*  --   --  125*  --  120*   HDL  --   --  87  --   --  90  --  81   TRIG  --   --  52  --   --  64  --  54   ALT  --   " --  28 25  --  28   < > 24   CR  --   --  0.98 0.95  --  0.67   < > 0.80   GFRESTIMATED  --   --  69 72  --  >90   < > 77   GFRESTBLACK  --   --  80 84  --  >90   < > >90   POTASSIUM  --   --  3.8 3.6  --  4.0   < > 3.8   TSH 0.44 0.07* 18.84* 1.90  --  0.85   < > 3.41    < > = values in this interval not displayed.      BP Readings from Last 3 Encounters:   08/14/20 110/60   05/28/20 110/68   03/13/20 120/84    Wt Readings from Last 3 Encounters:   08/14/20 128.2 kg (282 lb 9.6 oz)   03/13/20 126 kg (277 lb 12.8 oz)   01/30/20 127.9 kg (282 lb)                    Reviewed and updated as needed this visit by Provider         Review of Systems   Constitutional, HEENT, cardiovascular, pulmonary, GI, , musculoskeletal, neuro, skin, endocrine and psych systems are negative, except as otherwise noted.      Objective    /60 (BP Location: Left arm, Patient Position: Sitting, Cuff Size: Adult Large)   Pulse 88   Temp 97.4  F (36.3  C) (Tympanic)   Ht 1.562 m (5' 1.5\")   Wt 128.2 kg (282 lb 9.6 oz)   LMP 11/14/2019   SpO2 97%   BMI 52.53 kg/m    Body mass index is 52.53 kg/m .  Physical Exam   GENERAL: healthy, alert and no distress  EYES: Eyes grossly normal to inspection, PERRL and conjunctivae and sclerae normal  HENT: ear canals and TM's normal, nose and mouth without ulcers or lesions  NECK: no adenopathy, no asymmetry, masses, or scars and thyroid normal to palpation  RESP: lungs clear to auscultation - no rales, rhonchi or wheezes  BREAST: normal without masses, tenderness or nipple discharge and no palpable axillary masses or adenopathy  CV: regular rates and rhythm, normal S1 S2, no S3 or S4, no murmur, click or rub, peripheral pulses strong and no peripheral edema  ABDOMEN: soft, nontender, no hepatosplenomegaly, no masses and bowel sounds normal  MS: bilateral leg swelling in both lower legs.   SKIN: no suspicious lesions or rashes  NEURO: Normal strength and tone, mentation intact and speech " "normal  PSYCH: mentation appears normal, affect normal/bright    Diagnostic Test Results:  Labs reviewed in Epic  No results found for this or any previous visit (from the past 24 hour(s)).        Assessment & Plan     1. PTSD (post-traumatic stress disorder)  She is very tearful again today. She is very stressed with her father dying and all the drama that is going with this.  Seeing her therapist.  Working with her on her mood. Helping her lose weight.     2. Bilateral edema of lower extremity  Most likely related to her weight but will check for insuffiencey.   - [place] Doppler No Exercise; Future  - Basic metabolic panel    3. Chronic pain syndrome  On Suboxone, was doing this up in the falls.  On a very low low dose. No issues with use knees are still very bad but weight loss very important on her ability to have further intervention.        BMI:   Estimated body mass index is 52.53 kg/m  as calculated from the following:    Height as of this encounter: 1.562 m (5' 1.5\").    Weight as of this encounter: 128.2 kg (282 lb 9.6 oz).           Apryl Hathaway RN, PA-C       No follow-ups on file.    SEVEN Bryant  [facility] [place]    "

## 2020-08-14 ENCOUNTER — OFFICE VISIT (OUTPATIENT)
Dept: FAMILY MEDICINE | Facility: OTHER | Age: 45
End: 2020-08-14
Attending: PHYSICIAN ASSISTANT
Payer: COMMERCIAL

## 2020-08-14 VITALS
TEMPERATURE: 97.4 F | HEART RATE: 88 BPM | SYSTOLIC BLOOD PRESSURE: 110 MMHG | HEIGHT: 62 IN | BODY MASS INDEX: 52.01 KG/M2 | DIASTOLIC BLOOD PRESSURE: 60 MMHG | OXYGEN SATURATION: 97 % | WEIGHT: 282.6 LBS

## 2020-08-14 DIAGNOSIS — Z11.4 ENCOUNTER FOR SCREENING FOR HIV: ICD-10-CM

## 2020-08-14 DIAGNOSIS — Z23 ENCOUNTER FOR IMMUNIZATION: ICD-10-CM

## 2020-08-14 DIAGNOSIS — F43.10 PTSD (POST-TRAUMATIC STRESS DISORDER): Primary | ICD-10-CM

## 2020-08-14 DIAGNOSIS — R60.0 BILATERAL LEG EDEMA: ICD-10-CM

## 2020-08-14 DIAGNOSIS — R60.0 BILATERAL EDEMA OF LOWER EXTREMITY: ICD-10-CM

## 2020-08-14 DIAGNOSIS — G89.4 CHRONIC PAIN SYNDROME: ICD-10-CM

## 2020-08-14 LAB
ANION GAP SERPL CALCULATED.3IONS-SCNC: 6 MMOL/L (ref 3–14)
BUN SERPL-MCNC: 12 MG/DL (ref 7–30)
CALCIUM SERPL-MCNC: 9 MG/DL (ref 8.5–10.1)
CHLORIDE SERPL-SCNC: 106 MMOL/L (ref 94–109)
CO2 SERPL-SCNC: 26 MMOL/L (ref 20–32)
CREAT SERPL-MCNC: 0.84 MG/DL (ref 0.52–1.04)
GFR SERPL CREATININE-BSD FRML MDRD: 83 ML/MIN/{1.73_M2}
GLUCOSE SERPL-MCNC: 127 MG/DL (ref 70–99)
POTASSIUM SERPL-SCNC: 3.2 MMOL/L (ref 3.4–5.3)
SODIUM SERPL-SCNC: 138 MMOL/L (ref 133–144)

## 2020-08-14 PROCEDURE — 80048 BASIC METABOLIC PNL TOTAL CA: CPT | Mod: ZL | Performed by: PHYSICIAN ASSISTANT

## 2020-08-14 PROCEDURE — 90746 HEPB VACCINE 3 DOSE ADULT IM: CPT

## 2020-08-14 PROCEDURE — 90471 IMMUNIZATION ADMIN: CPT

## 2020-08-14 PROCEDURE — G0463 HOSPITAL OUTPT CLINIC VISIT: HCPCS | Mod: 25

## 2020-08-14 PROCEDURE — 87389 HIV-1 AG W/HIV-1&-2 AB AG IA: CPT | Mod: ZL | Performed by: PHYSICIAN ASSISTANT

## 2020-08-14 PROCEDURE — G0463 HOSPITAL OUTPT CLINIC VISIT: HCPCS

## 2020-08-14 PROCEDURE — 36415 COLL VENOUS BLD VENIPUNCTURE: CPT | Mod: ZL | Performed by: PHYSICIAN ASSISTANT

## 2020-08-14 PROCEDURE — 99214 OFFICE O/P EST MOD 30 MIN: CPT | Performed by: PHYSICIAN ASSISTANT

## 2020-08-14 RX ORDER — FUROSEMIDE 20 MG
40 TABLET ORAL DAILY PRN
Qty: 60 TABLET | Refills: 3 | Status: SHIPPED | OUTPATIENT
Start: 2020-08-14 | End: 2021-03-04

## 2020-08-14 RX ORDER — POTASSIUM CHLORIDE 750 MG/1
TABLET, EXTENDED RELEASE ORAL
Qty: 30 TABLET | Refills: 3 | Status: SHIPPED | OUTPATIENT
Start: 2020-08-14 | End: 2020-12-09

## 2020-08-14 ASSESSMENT — ANXIETY QUESTIONNAIRES
2. NOT BEING ABLE TO STOP OR CONTROL WORRYING: MORE THAN HALF THE DAYS
5. BEING SO RESTLESS THAT IT IS HARD TO SIT STILL: MORE THAN HALF THE DAYS
1. FEELING NERVOUS, ANXIOUS, OR ON EDGE: SEVERAL DAYS
4. TROUBLE RELAXING: MORE THAN HALF THE DAYS
3. WORRYING TOO MUCH ABOUT DIFFERENT THINGS: MORE THAN HALF THE DAYS
6. BECOMING EASILY ANNOYED OR IRRITABLE: MORE THAN HALF THE DAYS
GAD7 TOTAL SCORE: 13
IF YOU CHECKED OFF ANY PROBLEMS ON THIS QUESTIONNAIRE, HOW DIFFICULT HAVE THESE PROBLEMS MADE IT FOR YOU TO DO YOUR WORK, TAKE CARE OF THINGS AT HOME, OR GET ALONG WITH OTHER PEOPLE: SOMEWHAT DIFFICULT
7. FEELING AFRAID AS IF SOMETHING AWFUL MIGHT HAPPEN: MORE THAN HALF THE DAYS

## 2020-08-14 ASSESSMENT — PATIENT HEALTH QUESTIONNAIRE - PHQ9: SUM OF ALL RESPONSES TO PHQ QUESTIONS 1-9: 0

## 2020-08-14 ASSESSMENT — MIFFLIN-ST. JEOR: SCORE: 1867.18

## 2020-08-14 ASSESSMENT — PAIN SCALES - GENERAL: PAINLEVEL: EXTREME PAIN (8)

## 2020-08-14 NOTE — NURSING NOTE
"Chief Complaint   Patient presents with     anxiety     swelling in both legs       Initial /60 (BP Location: Left arm, Patient Position: Sitting, Cuff Size: Adult Large)   Pulse 88   Temp 97.4  F (36.3  C) (Tympanic)   Ht 1.562 m (5' 1.5\")   Wt 128.2 kg (282 lb 9.6 oz)   LMP 11/14/2019   SpO2 97%   BMI 52.53 kg/m   Estimated body mass index is 52.53 kg/m  as calculated from the following:    Height as of this encounter: 1.562 m (5' 1.5\").    Weight as of this encounter: 128.2 kg (282 lb 9.6 oz).  Medication Reconciliation: complete  Kenya Mendes LPN  "

## 2020-08-14 NOTE — LETTER
My Depression Action Plan  Name: Autumn BATISTA Hnatko   Date of Birth 1975  Date: 8/14/2020    My doctor: Apryl Hathaway   My clinic: [facility] [place]  [facility] [facility] [address]  [phone]          GREEN    ZONE   Good Control    What it looks like:     Things are going generally well. You have normal ups and downs. You may even feel depressed from time to time, but bad moods usually last less than a day.   What you need to do:  1. Continue to care for yourself (see self care plan)  2. Check your depression survival kit and update it as needed  3. Follow your physician s recommendations including any medication.  4. Do not stop taking medication unless you consult with your physician first.           YELLOW         ZONE Getting Worse    What it looks like:     Depression is starting to interfere with your life.     It may be hard to get out of bed; you may be starting to isolate yourself from others.    Symptoms of depression are starting to last most all day and this has happened for several days.     You may have suicidal thoughts but they are not constant.   What you need to do:     1. Call your care team. Your response to treatment will improve if you keep your care team informed of your progress. Yellow periods are signs an adjustment may need to be made.     2. Continue your self-care.  Just get dressed and ready for the day.  Don't give yourself time to talk yourself out of it.    3. Talk to someone in your support network.    4. Open up your Depression Self-Care Plan/Wellness Kit.           RED    ZONE Medical Alert - Get Help    What it looks like:     Depression is seriously interfering with your life.     You may experience these or other symptoms: You can t get out of bed most days, can t work or engage in other necessary activities, you have trouble taking care of basic hygiene, or basic responsibilities, thoughts of suicide or death that will not go away,  self-injurious behavior.     What you need to do:  1. Call your care team and request a same-day appointment. If they are not available (weekends or after hours) call your local crisis line, emergency room or 911.            Depression Self-Care Plan / Wellness Kit    Self-Care for Depression  Here s the deal. Your body and mind are really not as separate as most people think.  What you do and think affects how you feel and how you feel influences what you do and think. This means if you do things that people who feel good do, it will help you feel better.  Sometimes this is all it takes.  There is also a place for medication and therapy depending on how severe your depression is, so be sure to consult with your medical provider and/ or Behavioral Health Consultant if your symptoms are worsening or not improving.     In order to better manage my stress, I will:    Exercise  Get some form of exercise, every day. This will help reduce pain and release endorphins, the  feel good  chemicals in your brain. This is almost as good as taking antidepressants!  This is not the same as joining a gym and then never going! (they count on that by the way ) It can be as simple as just going for a walk or doing some gardening, anything that will get you moving.      Hygiene   Maintain good hygiene (get out of bed in the morning, make your bed, brush your teeth, take a shower, and get dressed like you were going to work, even if you are unemployed).  If your clothes don't fit try to get ones that do.    Diet  Strive to eat foods that are good for me, drink plenty of water, and avoid excessive sugar, caffeine, alcohol, and other mood-altering substances.  Some foods that are helpful in depression are: complex carbohydrates, B vitamins, flaxseed, fish or fish oil, fresh fruits and vegetables.    Psychotherapy  Agree to participate in Individual Therapy (if recommended).    Medication  If prescribed medications, I agree to take them.   Missing doses can result in serious side effects.  I understand that drinking alcohol, or other illicit drug use, may cause potential side effects.  I will not stop my medication abruptly without first discussing it with my provider.    Staying Connected With Others  Stay in touch with my friends, family members, and my primary care provider/team.    Use your imagination  Be creative.  We all have a creative side; it doesn t matter if it s oil painting, sand castles, or mud pies! This will also kick up the endorphins.    Witness Beauty  (AKA stop and smell the roses) Take a look outside, even in mid-winter. Notice colors, textures. Watch the squirrels and birds.     Service to others  Be of service to others.  There is always someone else in need.  By helping others we can  get out of ourselves  and remember the really important things.  This also provides opportunities for practicing all the other parts of the program.    Humor  Laugh and be silly!  Adjust your TV habits for less news and crime-drama and more comedy.    Control your stress  Try breathing deep, massage therapy, biofeedback, and meditation. Find time to relax each day.     Crisis Text Line  http://www.crisistextline.org    The Crisis Text Line serves anyone, in any type of crisis, providing access to free, 24/7 support and information via the medium people already use and trust:    Here's how it works:  1.  Text 783-983 from anywhere in the [place], anytime, about any type of crisis.  2.  A live, trained Crisis Counselor receives the text and responds quickly.  3.  The volunteer Crisis Counselor will help you move from a 'hot moment to a cool moment'.    My support system    Clinic Contact:  Phone number:    Contact 1:  Phone number:    Contact 2:  Phone number:    Church/:  Phone number:    Therapist:  Phone number:    Local crisis center:    Phone number:    Other community support:  Phone number:

## 2020-08-14 NOTE — LETTER
My Depression Action Plan  Name: Autumn BATISTA Hnatko   Date of Birth 1975  Date: 8/14/2020    My doctor: Apryl Hathaway   My clinic: [facility] [place]  [facility] [facility] [address]  [phone]          GREEN    ZONE   Good Control    What it looks like:     Things are going generally well. You have normal ups and downs. You may even feel depressed from time to time, but bad moods usually last less than a day.   What you need to do:  1. Continue to care for yourself (see self care plan)  2. Check your depression survival kit and update it as needed  3. Follow your physician s recommendations including any medication.  4. Do not stop taking medication unless you consult with your physician first.           YELLOW         ZONE Getting Worse    What it looks like:     Depression is starting to interfere with your life.     It may be hard to get out of bed; you may be starting to isolate yourself from others.    Symptoms of depression are starting to last most all day and this has happened for several days.     You may have suicidal thoughts but they are not constant.   What you need to do:     1. Call your care team. Your response to treatment will improve if you keep your care team informed of your progress. Yellow periods are signs an adjustment may need to be made.     2. Continue your self-care.  Just get dressed and ready for the day.  Don't give yourself time to talk yourself out of it.    3. Talk to someone in your support network.    4. Open up your Depression Self-Care Plan/Wellness Kit.           RED    ZONE Medical Alert - Get Help    What it looks like:     Depression is seriously interfering with your life.     You may experience these or other symptoms: You can t get out of bed most days, can t work or engage in other necessary activities, you have trouble taking care of basic hygiene, or basic responsibilities, thoughts of suicide or death that will not go away,  self-injurious behavior.     What you need to do:  1. Call your care team and request a same-day appointment. If they are not available (weekends or after hours) call your local crisis line, emergency room or 911.            Depression Self-Care Plan / Wellness Kit    Self-Care for Depression  Here s the deal. Your body and mind are really not as separate as most people think.  What you do and think affects how you feel and how you feel influences what you do and think. This means if you do things that people who feel good do, it will help you feel better.  Sometimes this is all it takes.  There is also a place for medication and therapy depending on how severe your depression is, so be sure to consult with your medical provider and/ or Behavioral Health Consultant if your symptoms are worsening or not improving.     In order to better manage my stress, I will:    Exercise  Get some form of exercise, every day. This will help reduce pain and release endorphins, the  feel good  chemicals in your brain. This is almost as good as taking antidepressants!  This is not the same as joining a gym and then never going! (they count on that by the way ) It can be as simple as just going for a walk or doing some gardening, anything that will get you moving.      Hygiene   Maintain good hygiene (get out of bed in the morning, make your bed, brush your teeth, take a shower, and get dressed like you were going to work, even if you are unemployed).  If your clothes don't fit try to get ones that do.    Diet  Strive to eat foods that are good for me, drink plenty of water, and avoid excessive sugar, caffeine, alcohol, and other mood-altering substances.  Some foods that are helpful in depression are: complex carbohydrates, B vitamins, flaxseed, fish or fish oil, fresh fruits and vegetables.    Psychotherapy  Agree to participate in Individual Therapy (if recommended).    Medication  If prescribed medications, I agree to take them.   Missing doses can result in serious side effects.  I understand that drinking alcohol, or other illicit drug use, may cause potential side effects.  I will not stop my medication abruptly without first discussing it with my provider.    Staying Connected With Others  Stay in touch with my friends, family members, and my primary care provider/team.    Use your imagination  Be creative.  We all have a creative side; it doesn t matter if it s oil painting, sand castles, or mud pies! This will also kick up the endorphins.    Witness Beauty  (AKA stop and smell the roses) Take a look outside, even in mid-winter. Notice colors, textures. Watch the squirrels and birds.     Service to others  Be of service to others.  There is always someone else in need.  By helping others we can  get out of ourselves  and remember the really important things.  This also provides opportunities for practicing all the other parts of the program.    Humor  Laugh and be silly!  Adjust your TV habits for less news and crime-drama and more comedy.    Control your stress  Try breathing deep, massage therapy, biofeedback, and meditation. Find time to relax each day.     Crisis Text Line  http://www.crisistextline.org    The Crisis Text Line serves anyone, in any type of crisis, providing access to free, 24/7 support and information via the medium people already use and trust:    Here's how it works:  1.  Text 036-673 from anywhere in the [place], anytime, about any type of crisis.  2.  A live, trained Crisis Counselor receives the text and responds quickly.  3.  The volunteer Crisis Counselor will help you move from a 'hot moment to a cool moment'.    My support system    Clinic Contact:  Phone number:    Contact 1:  Phone number:    Contact 2:  Phone number:    Gnosticism/:  Phone number:    Therapist:  Phone number:    Local crisis center:    Phone number:    Other community support:  Phone number:

## 2020-08-15 ASSESSMENT — ANXIETY QUESTIONNAIRES: GAD7 TOTAL SCORE: 13

## 2020-08-18 LAB — HIV 1+2 AB+HIV1 P24 AG SERPL QL IA: NONREACTIVE

## 2020-08-19 DIAGNOSIS — F33.2 SEVERE EPISODE OF RECURRENT MAJOR DEPRESSIVE DISORDER, WITHOUT PSYCHOTIC FEATURES (H): ICD-10-CM

## 2020-08-19 NOTE — TELEPHONE ENCOUNTER
topiramate 50 MG      Last Written Prescription Date:  7-  Last Fill Quantity: 60,   # refills: 0  Last Office Visit: 8-  Future Office visit:    Next 5 appointments (look out 90 days)    Nov 11, 2020 11:20 AM CST  (Arrive by 11:05 AM)  SHORT with SEVEN Pandey  [facility] [place] ([facility] [place] ) [address]  [place] [address]  [phone]

## 2020-08-20 ENCOUNTER — TELEPHONE (OUTPATIENT)
Dept: FAMILY MEDICINE | Facility: OTHER | Age: 45
End: 2020-08-20

## 2020-08-20 RX ORDER — TOPIRAMATE 50 MG/1
TABLET, FILM COATED ORAL
Qty: 60 TABLET | Refills: 0 | Status: SHIPPED | OUTPATIENT
Start: 2020-08-20 | End: 2021-01-27

## 2020-09-03 DIAGNOSIS — E66.01 MORBID OBESITY (H): ICD-10-CM

## 2020-09-04 ENCOUNTER — TELEPHONE (OUTPATIENT)
Dept: FAMILY MEDICINE | Facility: OTHER | Age: 45
End: 2020-09-04

## 2020-09-04 NOTE — TELEPHONE ENCOUNTER
Phentermine  30 mg     Last Written Prescription Date:  8-  Last Fill Quantity: 30,   # refills: 0  Last Office Visit: 8-  Future Office visit:    Next 5 appointments (look out 90 days)    Nov 11, 2020 11:15 AM CST  (Arrive by 11:00 AM)  SHORT with SEVEN Pandey  [facility] ([facility] ) [address]  [place] [address]  [phone]           Routing refill request to provider for review/approval because:

## 2020-09-09 RX ORDER — PHENTERMINE HYDROCHLORIDE 30 MG/1
CAPSULE ORAL
Qty: 30 CAPSULE | Refills: 0 | Status: SHIPPED | OUTPATIENT
Start: 2020-09-09 | End: 2020-10-09

## 2020-09-10 DIAGNOSIS — Z53.9 PERSONS ENCOUNTERING HEALTH SERVICES FOR SPECIFIC PROCEDURES, NOT CARRIED OUT: Primary | ICD-10-CM

## 2020-10-07 DIAGNOSIS — E66.01 MORBID OBESITY (H): ICD-10-CM

## 2020-10-08 NOTE — TELEPHONE ENCOUNTER
Phentermine       Last Written Prescription Date:  9/09/2020  Last Fill Quantity: 30,   # refills: 0  Last Office Visit: 8/14/2020  Future Office visit:    Next 5 appointments (look out 90 days)    Nov 11, 2020 11:15 AM  (Arrive by 11:00 AM)  SHORT with SEVEN Pandey  [facility] [place] ([facility] [place] ) [address]  [place] [address]  [phone]

## 2020-10-09 RX ORDER — PHENTERMINE HYDROCHLORIDE 30 MG/1
CAPSULE ORAL
Qty: 30 CAPSULE | Refills: 0 | Status: SHIPPED | OUTPATIENT
Start: 2020-10-09 | End: 2020-11-04

## 2020-10-20 DIAGNOSIS — F33.2 SEVERE EPISODE OF RECURRENT MAJOR DEPRESSIVE DISORDER, WITHOUT PSYCHOTIC FEATURES (H): ICD-10-CM

## 2020-10-20 DIAGNOSIS — G89.4 CHRONIC PAIN SYNDROME: ICD-10-CM

## 2020-10-20 NOTE — TELEPHONE ENCOUNTER
Wellbutrin      Last Written Prescription Date:  7.24.2020  Last Fill Quantity: 90,   # refills: 0    Baclofen      Last Written Prescription Date:  5.28.2020  Last Fill Quantity: 90,   # refills: 0  Last Office Visit: 08.14.2020

## 2020-10-21 RX ORDER — BACLOFEN 10 MG/1
TABLET ORAL
Qty: 90 TABLET | Refills: 0 | Status: SHIPPED | OUTPATIENT
Start: 2020-10-21 | End: 2020-11-25

## 2020-10-21 RX ORDER — BUPROPION HYDROCHLORIDE 150 MG/1
TABLET ORAL
Qty: 90 TABLET | Refills: 0 | Status: SHIPPED | OUTPATIENT
Start: 2020-10-21 | End: 2021-01-21

## 2020-10-24 ENCOUNTER — TELEPHONE (OUTPATIENT)
Dept: FAMILY MEDICINE | Facility: OTHER | Age: 45
End: 2020-10-24

## 2020-10-24 NOTE — TELEPHONE ENCOUNTER
Received a PA from [employer] for Levothyroxine Sodium 125 mcg tablets. Submitted on CMM. Waiting for a response.

## 2020-10-28 NOTE — TELEPHONE ENCOUNTER
Received a letter from [employer] stating that Levothyroxine Sodium 125 mcg tablets is covered and doesn't need a PA. Forms scanned to Epic.

## 2020-11-03 DIAGNOSIS — E66.01 MORBID OBESITY (H): ICD-10-CM

## 2020-11-04 ENCOUNTER — TELEPHONE (OUTPATIENT)
Dept: FAMILY MEDICINE | Facility: OTHER | Age: 45
End: 2020-11-04

## 2020-11-04 RX ORDER — PHENTERMINE HYDROCHLORIDE 30 MG/1
CAPSULE ORAL
Qty: 30 CAPSULE | Refills: 0 | Status: SHIPPED | OUTPATIENT
Start: 2020-11-04 | End: 2020-12-02

## 2020-11-04 NOTE — TELEPHONE ENCOUNTER
3:09 PM    Reason for Call: Verbal Orders     Description: [facility] verbal orders to continue SNV 1 x per week x 9 weeks.     Was an appointment offered for this call? No  If yes : Appointment type              Date    Preferred method for responding to this message: Telephone Call  What is your phone number -  Sandra can be reached at [phone]    If we cannot reach you directly, may we leave a detailed response at the number you provided? Yes    Can this message wait until your PCP/provider returns, if available today? YES        Angelica Bailey RN

## 2020-11-04 NOTE — TELEPHONE ENCOUNTER
phetermine      Last Written Prescription Date:  10/9/20  Last Fill Quantity: 30,   # refills: 0  Last Office Visit: 8/14/20  Future Office visit:    Next 5 appointments (look out 90 days)    Nov 11, 2020 11:15 AM  (Arrive by 11:00 AM)  SHORT with SEVEN Pandey  [facility] [place] ([facility] [place] ) [address]  [place] [address]  [phone]

## 2020-11-05 NOTE — TELEPHONE ENCOUNTER
I attempted to call Sandra back with the number that was listed, and had to leave a voicemail. Awaiting for a return call back to give verbal orders. Kenya Mendes LPN

## 2020-11-12 DIAGNOSIS — Z53.9 PERSONS ENCOUNTERING HEALTH SERVICES FOR SPECIFIC PROCEDURES, NOT CARRIED OUT: Primary | ICD-10-CM

## 2020-11-19 DIAGNOSIS — E03.9 ACQUIRED HYPOTHYROIDISM: ICD-10-CM

## 2020-11-19 DIAGNOSIS — I10 BENIGN ESSENTIAL HYPERTENSION: ICD-10-CM

## 2020-11-19 RX ORDER — LEVOTHYROXINE SODIUM 125 UG/1
TABLET ORAL
Qty: 180 TABLET | Refills: 0 | Status: SHIPPED | OUTPATIENT
Start: 2020-11-19 | End: 2020-12-04

## 2020-11-19 RX ORDER — HYDROCHLOROTHIAZIDE 12.5 MG/1
CAPSULE ORAL
Qty: 90 CAPSULE | Refills: 0 | Status: SHIPPED | OUTPATIENT
Start: 2020-11-19 | End: 2021-02-17

## 2020-11-19 NOTE — TELEPHONE ENCOUNTER
hydrochlorothiazide 12.5 mg      Last Written Prescription Date:  2/25/20  Last Fill Quantity: 90,   # refills: 2  Last Office Visit: 8/14/20  Future Office visit:    Next 5 appointments (look out 90 days)    Dec 04, 2020  3:00 PM  (Arrive by 2:45 PM)  SHORT with SEVEN Pandey  [facility] [place] ([facility] ) [address] [place] [address]  [phone]           Routing refill request to provider for review/approval because:      Synthroid 125 mcg      Last Written Prescription Date:  0  Last Fill Quantity: 0,   # refills: 0  Last Office Visit: 8/14/20  Future Office visit:    Next 5 appointments (look out 90 days)    Dec 04, 2020  3:00 PM  (Arrive by 2:45 PM)  SHORT with SEVEN Pandey  [facility] ([facility] ) [address] [facility] [address] [facility] [address]  [phone]           Routing refill request to provider for review/approval because:  Drug not active on patient's medication list

## 2020-11-23 DIAGNOSIS — G89.4 CHRONIC PAIN SYNDROME: ICD-10-CM

## 2020-11-25 RX ORDER — BACLOFEN 10 MG/1
TABLET ORAL
Qty: 90 TABLET | Refills: 0 | Status: SHIPPED | OUTPATIENT
Start: 2020-11-25 | End: 2020-12-23

## 2020-11-25 NOTE — TELEPHONE ENCOUNTER
baclofen      Last Written Prescription Date:  10/21/20  Last Fill Quantity: 90,   # refills: 0  Last Office Visit: 8/14/20  Future Office visit:    Next 5 appointments (look out 90 days)    Dec 04, 2020  3:00 PM  (Arrive by 2:45 PM)  SHORT with SEVEN Pandey  [facility] ([facility] [place] ) [address]  [place] [address]  [phone]

## 2020-12-01 DIAGNOSIS — E66.01 MORBID OBESITY (H): ICD-10-CM

## 2020-12-02 RX ORDER — PHENTERMINE HYDROCHLORIDE 30 MG/1
CAPSULE ORAL
Qty: 30 CAPSULE | Refills: 0 | Status: SHIPPED | OUTPATIENT
Start: 2020-12-02 | End: 2021-01-06

## 2020-12-04 ENCOUNTER — TELEPHONE (OUTPATIENT)
Dept: FAMILY MEDICINE | Facility: OTHER | Age: 45
End: 2020-12-04

## 2020-12-04 ENCOUNTER — OFFICE VISIT (OUTPATIENT)
Dept: FAMILY MEDICINE | Facility: OTHER | Age: 45
End: 2020-12-04
Attending: PHYSICIAN ASSISTANT
Payer: COMMERCIAL

## 2020-12-04 VITALS
TEMPERATURE: 99.4 F | HEIGHT: 62 IN | DIASTOLIC BLOOD PRESSURE: 76 MMHG | SYSTOLIC BLOOD PRESSURE: 118 MMHG | OXYGEN SATURATION: 96 % | HEART RATE: 94 BPM | WEIGHT: 281.8 LBS | BODY MASS INDEX: 51.86 KG/M2

## 2020-12-04 DIAGNOSIS — F33.2 SEVERE EPISODE OF RECURRENT MAJOR DEPRESSIVE DISORDER, WITHOUT PSYCHOTIC FEATURES (H): ICD-10-CM

## 2020-12-04 DIAGNOSIS — F31.61 MILD MIXED BIPOLAR I DISORDER (H): ICD-10-CM

## 2020-12-04 DIAGNOSIS — R60.0 BILATERAL LEG EDEMA: ICD-10-CM

## 2020-12-04 DIAGNOSIS — E03.9 ACQUIRED HYPOTHYROIDISM: Primary | ICD-10-CM

## 2020-12-04 DIAGNOSIS — R60.0 BILATERAL LEG EDEMA: Primary | ICD-10-CM

## 2020-12-04 LAB
ANION GAP SERPL CALCULATED.3IONS-SCNC: 6 MMOL/L (ref 3–14)
BUN SERPL-MCNC: 11 MG/DL (ref 7–30)
CALCIUM SERPL-MCNC: 8.9 MG/DL (ref 8.5–10.1)
CHLORIDE SERPL-SCNC: 102 MMOL/L (ref 94–109)
CO2 SERPL-SCNC: 30 MMOL/L (ref 20–32)
CREAT SERPL-MCNC: 0.72 MG/DL (ref 0.52–1.04)
GFR SERPL CREATININE-BSD FRML MDRD: >90 ML/MIN/{1.73_M2}
GLUCOSE SERPL-MCNC: 113 MG/DL (ref 70–99)
POTASSIUM SERPL-SCNC: 3.8 MMOL/L (ref 3.4–5.3)
SODIUM SERPL-SCNC: 138 MMOL/L (ref 133–144)
T4 FREE SERPL-MCNC: 1.47 NG/DL (ref 0.76–1.46)
TSH SERPL DL<=0.005 MIU/L-ACNC: 0.1 MU/L (ref 0.4–4)

## 2020-12-04 PROCEDURE — 99214 OFFICE O/P EST MOD 30 MIN: CPT | Performed by: PHYSICIAN ASSISTANT

## 2020-12-04 PROCEDURE — G0463 HOSPITAL OUTPT CLINIC VISIT: HCPCS

## 2020-12-04 PROCEDURE — 84439 ASSAY OF FREE THYROXINE: CPT | Mod: ZL | Performed by: PHYSICIAN ASSISTANT

## 2020-12-04 PROCEDURE — 84443 ASSAY THYROID STIM HORMONE: CPT | Mod: ZL | Performed by: PHYSICIAN ASSISTANT

## 2020-12-04 PROCEDURE — 80048 BASIC METABOLIC PNL TOTAL CA: CPT | Mod: ZL | Performed by: PHYSICIAN ASSISTANT

## 2020-12-04 PROCEDURE — 36415 COLL VENOUS BLD VENIPUNCTURE: CPT | Mod: ZL | Performed by: PHYSICIAN ASSISTANT

## 2020-12-04 RX ORDER — LAMOTRIGINE 100 MG/1
100 TABLET ORAL 2 TIMES DAILY
Qty: 180 TABLET | Refills: 3 | Status: SHIPPED | OUTPATIENT
Start: 2020-12-04 | End: 2020-12-04

## 2020-12-04 RX ORDER — LEVOTHYROXINE SODIUM 112 UG/1
224 TABLET ORAL DAILY
Qty: 180 TABLET | Refills: 0 | Status: SHIPPED | OUTPATIENT
Start: 2020-12-04 | End: 2021-02-25

## 2020-12-04 ASSESSMENT — ANXIETY QUESTIONNAIRES
4. TROUBLE RELAXING: NEARLY EVERY DAY
1. FEELING NERVOUS, ANXIOUS, OR ON EDGE: NEARLY EVERY DAY
GAD7 TOTAL SCORE: 15
5. BEING SO RESTLESS THAT IT IS HARD TO SIT STILL: NEARLY EVERY DAY
7. FEELING AFRAID AS IF SOMETHING AWFUL MIGHT HAPPEN: NEARLY EVERY DAY
2. NOT BEING ABLE TO STOP OR CONTROL WORRYING: NOT AT ALL
6. BECOMING EASILY ANNOYED OR IRRITABLE: NOT AT ALL
3. WORRYING TOO MUCH ABOUT DIFFERENT THINGS: NEARLY EVERY DAY

## 2020-12-04 ASSESSMENT — MIFFLIN-ST. JEOR: SCORE: 1858.55

## 2020-12-04 ASSESSMENT — PAIN SCALES - GENERAL: PAINLEVEL: EXTREME PAIN (8)

## 2020-12-04 ASSESSMENT — PATIENT HEALTH QUESTIONNAIRE - PHQ9: SUM OF ALL RESPONSES TO PHQ QUESTIONS 1-9: 13

## 2020-12-04 NOTE — NURSING NOTE
"Chief Complaint   Patient presents with     Medication Follow-up       Initial /76 (BP Location: Left arm, Patient Position: Sitting, Cuff Size: Adult Regular)   Pulse 94   Temp 99.4  F (37.4  C) (Tympanic)   Ht 1.562 m (5' 1.5\")   Wt 127.8 kg (281 lb 12.8 oz)   SpO2 96%   BMI 52.38 kg/m   Estimated body mass index is 52.38 kg/m  as calculated from the following:    Height as of this encounter: 1.562 m (5' 1.5\").    Weight as of this encounter: 127.8 kg (281 lb 12.8 oz).  Medication Reconciliation: complete  Kenya Mendes LPN  "

## 2020-12-04 NOTE — PROGRESS NOTES
Subjective     Autumn Holbrook is a 45 year old female who presents to clinic today for the following health issues:    HPI         Depression and Anxiety Follow-Up    How are you doing with your depression since your last visit? Worsened     How are you doing with your anxiety since your last visit?  Worsened     Are you having other symptoms that might be associated with depression or anxiety? No    Have you had a significant life event? OTHER: mom just got diagnosed with cancer     Do you have any concerns with your use of alcohol or other drugs? No    Social History     Tobacco Use     Smoking status: Current Every Day Smoker     Packs/day: 0.50     Years: 20.00     Pack years: 10.00     Types: Cigarettes     Smokeless tobacco: Never Used     Tobacco comment: has patches   Substance Use Topics     Alcohol use: No     Drug use: No     Comment: previous opiod addiction     PHQ 5/28/2020 8/14/2020 12/4/2020   PHQ-9 Total Score 22 0 13   Q9: Thoughts of better off dead/self-harm past 2 weeks More than half the days Not at all Not at all   F/U: Thoughts of suicide or self-harm - - -   F/U: Self harm-plan - - -   F/U: Self-harm action - - -   F/U: Safety concerns - - -   PHQ-A Total Score - - -   PHQ-A Depressed most days in past year - - -   PHQ-A Mood affect on daily activities - - -   PHQ-A Suicide Ideation past 2 weeks - - -   PHQ-A Suicide Ideation past month - - -   PHQ-A Previous suicide attempt - - -     COY-7 SCORE 5/28/2020 8/14/2020 12/4/2020   Total Score 15 13 15     Last PHQ-9 12/4/2020   1.  Little interest or pleasure in doing things 1   2.  Feeling down, depressed, or hopeless 2   3.  Trouble falling or staying asleep, or sleeping too much 3   4.  Feeling tired or having little energy 1   5.  Poor appetite or overeating 2   6.  Feeling bad about yourself 3   7.  Trouble concentrating 1   8.  Moving slowly or restless 0   Q9: Thoughts of better off dead/self-harm past 2 weeks 0   PHQ-9 Total Score 13  "  Difficulty at work, home, or with people -   In the past two weeks have you had thoughts of suicide or self harm? -   Do you have concerns about your personal safety or the safety of others? -   In the past 2 weeks have you thought about a plan or had intention to harm yourself? -   In the past 2 weeks have you acted on these thoughts in any way? -     COY-7  12/4/2020   1. Feeling nervous, anxious, or on edge 3   2. Not being able to stop or control worrying 0   3. Worrying too much about different things 3   4. Trouble relaxing 3   5. Being so restless that it is hard to sit still 3   6. Becoming easily annoyed or irritable 0   7. Feeling afraid, as if something awful might happen 3   COY-7 Total Score 15   If you checked any problems, how difficult have they made it for you to do your work, take care of things at home, or get along with other people? -       Suicide Assessment Five-step Evaluation and Treatment (SAFE-T)      Concern - Bilateral leg swelling  Onset: many months  Description: leg and hand swelling  Intensity: 8/10  Progression of Symptoms:  worsening  Accompanying Signs & Symptoms: none  Previous history of similar problem: none  Precipitating factors:        Worsened by: none  Alleviating factors:        Improved by: none  Therapies tried and outcome: None      Review of Systems   Constitutional, HEENT, cardiovascular, pulmonary, gi and gu systems are negative, except as otherwise noted.      Objective    /76 (BP Location: Left arm, Patient Position: Sitting, Cuff Size: Adult Regular)   Pulse 94   Temp 99.4  F (37.4  C) (Tympanic)   Ht 1.562 m (5' 1.5\")   Wt 127.8 kg (281 lb 12.8 oz)   SpO2 96%   BMI 52.38 kg/m    Body mass index is 52.38 kg/m .  Physical Exam   GENERAL: healthy, alert and no distress  NECK: no adenopathy, no asymmetry, masses, or scars and thyroid normal to palpation  RESP: lungs clear to auscultation - no rales, rhonchi or wheezes  CV: regular rate and rhythm, " normal S1 S2, no S3 or S4, no murmur, click or rub, no peripheral edema and peripheral pulses strong  ABDOMEN: soft, nontender, no hepatosplenomegaly, no masses and bowel sounds normal  MS: has significant leg swelling on right very taught. Not improving with leg elevation. Both legs have puffiness but right is more swollen than left.   NEURO: Normal strength and tone, mentation intact and speech normal  PSYCH: mentation appears normal, affect normal/bright  LYMPH: no cervical, supraclavicular, axillary, or inguinal adenopathy    No results found for this or any previous visit (from the past 24 hour(s)).        Assessment & Plan       1. Acquired hypothyroidism  Decreased to 224 recheck in 2 months.   - TSH with free T4 reflex    2. Bilateral leg edema  She is going to have Ct run noff to right leg.   - Basic metabolic panel  - CT Abdomen Pelvis w Contrast; Future    3. Severe episode of recurrent major depressive disorder, without psychotic features (H)  Mom has cancer. Father  this year. Really a bad year for jenn. Seeing therpaist and this is really helping her. Needing to walk through this day by day.           See Patient Instructions    No follow-ups on file.    Apryl Hathaway PA-C  [facility] [place]

## 2020-12-05 ASSESSMENT — ANXIETY QUESTIONNAIRES: GAD7 TOTAL SCORE: 15

## 2020-12-07 DIAGNOSIS — R60.0 BILATERAL EDEMA OF LOWER EXTREMITY: ICD-10-CM

## 2020-12-08 NOTE — TELEPHONE ENCOUNTER
potassium chloride ER (K-TAB/KLOR-CON) 10 MEQ CR tablet      Last Written Prescription Date:  8/14/20  Last Fill Quantity: 30,   # refills: 3  Last Office Visit: 12/4/20  Future Office visit:    Next 5 appointments (look out 90 days)    Jan 06, 2021  2:00 PM  (Arrive by 1:45 PM)  SHORT with SEVEN Pandey  [facility] ([facility] ) [address]  [place] [address]  [phone]           Routing refill request to provider for review/approval because:

## 2020-12-09 RX ORDER — POTASSIUM CHLORIDE 750 MG/1
TABLET, EXTENDED RELEASE ORAL
Qty: 30 TABLET | Refills: 0 | Status: SHIPPED | OUTPATIENT
Start: 2020-12-09 | End: 2021-01-06

## 2020-12-14 ENCOUNTER — HOSPITAL ENCOUNTER (OUTPATIENT)
Dept: CT IMAGING | Facility: HOSPITAL | Age: 45
Discharge: HOME | End: 2020-12-14
Attending: PHYSICIAN ASSISTANT | Admitting: PHYSICIAN ASSISTANT
Payer: COMMERCIAL

## 2020-12-14 DIAGNOSIS — R60.0 BILATERAL LEG EDEMA: ICD-10-CM

## 2020-12-14 PROCEDURE — 250N000011 HC RX IP 250 OP 636: Performed by: RADIOLOGY

## 2020-12-14 PROCEDURE — 75635 CT ANGIO ABDOMINAL ARTERIES: CPT

## 2020-12-14 RX ORDER — IOPAMIDOL 755 MG/ML
100 INJECTION, SOLUTION INTRAVASCULAR ONCE
Status: COMPLETED | OUTPATIENT
Start: 2020-12-14 | End: 2020-12-14

## 2020-12-14 RX ADMIN — IOPAMIDOL 100 ML: 755 INJECTION, SOLUTION INTRAVENOUS at 14:01

## 2020-12-22 DIAGNOSIS — G89.4 CHRONIC PAIN SYNDROME: ICD-10-CM

## 2020-12-23 RX ORDER — BACLOFEN 10 MG/1
TABLET ORAL
Qty: 90 TABLET | Refills: 0 | Status: SHIPPED | OUTPATIENT
Start: 2020-12-23 | End: 2021-01-27

## 2020-12-23 NOTE — TELEPHONE ENCOUNTER
baclofen (LIORESAL) 10 MG tablet    Last Written Prescription Date: 11/25/20  Last Fill Quantity: 90,   # refills: 0  Last Office Visit: 12/4/20  Future Office visit:    Next 5 appointments (look out 90 days)    Jan 06, 2021  2:00 PM  (Arrive by 1:45 PM)  SHORT with SEVEN Pandey  [facility] ([facility] ) [address]  [place] [address]  [phone]           Routing refill request to provider for review/approval

## 2021-01-05 DIAGNOSIS — E66.01 MORBID OBESITY (H): ICD-10-CM

## 2021-01-05 DIAGNOSIS — R60.0 BILATERAL EDEMA OF LOWER EXTREMITY: ICD-10-CM

## 2021-01-05 DIAGNOSIS — N39.46 MIXED STRESS AND URGE URINARY INCONTINENCE: ICD-10-CM

## 2021-01-06 ENCOUNTER — TELEPHONE (OUTPATIENT)
Dept: FAMILY MEDICINE | Facility: OTHER | Age: 46
End: 2021-01-06

## 2021-01-06 RX ORDER — PHENTERMINE HYDROCHLORIDE 30 MG/1
CAPSULE ORAL
Qty: 30 CAPSULE | Refills: 0 | Status: SHIPPED | OUTPATIENT
Start: 2021-01-06 | End: 2021-02-03

## 2021-01-06 RX ORDER — POTASSIUM CHLORIDE 750 MG/1
TABLET, EXTENDED RELEASE ORAL
Qty: 30 TABLET | Refills: 0 | Status: SHIPPED | OUTPATIENT
Start: 2021-01-06 | End: 2021-02-03

## 2021-01-06 RX ORDER — OXYBUTYNIN CHLORIDE 5 MG/1
TABLET ORAL
Qty: 90 TABLET | Refills: 0 | Status: SHIPPED | OUTPATIENT
Start: 2021-01-06 | End: 2021-03-31

## 2021-01-06 NOTE — TELEPHONE ENCOUNTER
Potassium chlor      Last Written Prescription Date:  12/9/2020  Last Fill Quantity: 30,   # refills: 0  Last Office Visit: 12/4/2020  Future Office visit:    Next 5 appointments (look out 90 days)    Jan 18, 2021 11:15 AM  (Arrive by 11:00 AM)  SHORT with SEVEN Pandey  [facility] ) [address]  [place] [address]  [phone]         phentermine      Last Written Prescription Date:  12/2/2020  Last Fill Quantity: 30,   # refills: 0  Last Office Visit: 12/4/2020  Future Office visit:    Next 5 appointments (look out 90 days)    Jan 18, 2021 11:15 AM  (Arrive by 11:00 AM)  SHORT with SEVEN Pandey  [facility] [place] ([facility] [place] ) [address]  [place] [address]  [phone]         ditropan      Last Written Prescription Date:  10/13/2020  Last Fill Quantity: 90,   # refills: 0  Last Office Visit: 12/4/2020  Future Office visit:    Next 5 appointments (look out 90 days)    Jan 18, 2021 11:15 AM  (Arrive by 11:00 AM)  SHORT with SEVEN Pandey  [facility] ([facility] ) [address]  [place] [address]  [phone]

## 2021-01-06 NOTE — TELEPHONE ENCOUNTER
Verbal orders requested, continued skilled home nursing once a week x8 weeks. Call Sandra [phone]

## 2021-01-15 NOTE — PROGRESS NOTES
Subjective     Autumn Holbrook is a 45 year old female who presents to clinic today for the following health issues:    HPI      Depression and Anxiety Follow-Up    How are you doing with your depression since your last visit? No change    How are you doing with your anxiety since your last visit?  No change    Are you having other symptoms that might be associated with depression or anxiety? No    Have you had a significant life event? Grief or Loss;mom has cancer, dad  of lung cancer less than a year ago.      Do you have any concerns with your use of alcohol or other drugs? No, restarted her Topamax and that has also seemed to help her medications.     Social History     Tobacco Use     Smoking status: Current Every Day Smoker     Packs/day: 0.50     Years: 20.00     Pack years: 10.00     Types: Cigarettes     Smokeless tobacco: Never Used     Tobacco comment: has patches   Substance Use Topics     Alcohol use: No     Drug use: No     Comment: previous opiod addiction     PHQ 2020   PHQ-9 Total Score 0 13 23   Q9: Thoughts of better off dead/self-harm past 2 weeks Not at all Not at all More than half the days   F/U: Thoughts of suicide or self-harm - - -   F/U: Self harm-plan - - -   F/U: Self-harm action - - -   F/U: Safety concerns - - -   PHQ-A Total Score - - -   PHQ-A Depressed most days in past year - - -   PHQ-A Mood affect on daily activities - - -   PHQ-A Suicide Ideation past 2 weeks - - -   PHQ-A Suicide Ideation past month - - -   PHQ-A Previous suicide attempt - - -     COY-7 SCORE 2020   Total Score 13 15 21     Last PHQ-9 2021   1.  Little interest or pleasure in doing things 2   2.  Feeling down, depressed, or hopeless 3   3.  Trouble falling or staying asleep, or sleeping too much 3   4.  Feeling tired or having little energy 3   5.  Poor appetite or overeating 3   6.  Feeling bad about yourself 3   7.  Trouble concentrating 3   8.   Moving slowly or restless 1   Q9: Thoughts of better off dead/self-harm past 2 weeks 2   PHQ-9 Total Score 23   Difficulty at work, home, or with people -   In the past two weeks have you had thoughts of suicide or self harm? -   Do you have concerns about your personal safety or the safety of others? -   In the past 2 weeks have you thought about a plan or had intention to harm yourself? -   In the past 2 weeks have you acted on these thoughts in any way? -     COY-7  1/18/2021   1. Feeling nervous, anxious, or on edge 3   2. Not being able to stop or control worrying 3   3. Worrying too much about different things 3   4. Trouble relaxing 3   5. Being so restless that it is hard to sit still 3   6. Becoming easily annoyed or irritable 3   7. Feeling afraid, as if something awful might happen 3   COY-7 Total Score 21   If you checked any problems, how difficult have they made it for you to do your work, take care of things at home, or get along with other people? -       Suicide Assessment Five-step Evaluation and Treatment (SAFE-T)    Hypothyroidism Follow-up      Since last visit, patient describes the following symptoms: Weight stable, no hair loss, no skin changes, no constipation, no loose stools and dry skin      Both legs swelling      Duration: ongoing    Description (location/character/radiation): both legs but mostly in the right leg    Intensity:  mild, 7/10    Accompanying signs and symptoms: swelling    History (similar episodes/previous evaluation): none    Precipitating or alleviating factors: None    Therapies tried and outcome: None       Patient Active Problem List   Diagnosis     Routine general medical examination at a health care facility     Tear of lateral cartilage or meniscus of knee, current, right, initial encounter     Chronic pain syndrome     Opioid dependence in remission (H)     PTSD (post-traumatic stress disorder)     COY (generalized anxiety disorder)     Moderate episode of  recurrent major depressive disorder (H)     Bilateral edema of lower extremity     Hypothyroid     Obesity     GERD (gastroesophageal reflux disease)     Mild mixed bipolar I disorder (H)     Trigger finger of both hands     Morbid obesity (H)     Past Surgical History:   Procedure Laterality Date     CHOLECYSTECTOMY       GYN SURGERY       SECTION 9801-1704     GYN SURGERY  2004    LEE       Social History     Tobacco Use     Smoking status: Current Every Day Smoker     Packs/day: 0.50     Years: 20.00     Pack years: 10.00     Types: Cigarettes     Smokeless tobacco: Never Used     Tobacco comment: has patches   Substance Use Topics     Alcohol use: No     Family History   Problem Relation Age of Onset     Cerebrovascular Disease Mother      Alcoholism Mother      Cancer Mother      Depression Mother      Eczema Mother      Hypertension Mother      Migraines Mother      Mental Illness Mother      Osteoarthritis Mother      Osteoporosis Mother      Alcoholism Father      Cancer Father      Hyperlipidemia Father      Hypertension Father      Myocardial Infarction Father      Mental Illness Sister      Migraines Sister          Current Outpatient Medications   Medication Sig Dispense Refill     Acetaminophen (TYLENOL PO) Take 500 mg by mouth every 4 hours as needed for mild pain or fever       ARIPiprazole (ABILIFY) 10 MG tablet Take 10 mg by mouth daily  0     atomoxetine (STRATTERA) 60 MG capsule Take 60 mg by mouth daily       baclofen (LIORESAL) 10 MG tablet TAKE 1 TABLET BY MOUTH THREE TIMES DAILY 90 tablet 0     buprenorphine HCl-naloxone HCl (SUBOXONE) 2-0.5 MG per film PLACE 1 STRIP UNDER THE TONGUE TWICE DAILY  0     buPROPion (WELLBUTRIN XL) 150 MG 24 hr tablet TAKE 1 TABLET BY MOUTH ONCE DAILY IN THE MORNING 90 tablet 0     busPIRone (BUSPAR) 10 MG tablet Take 10 mg by mouth 2 times daily  0     furosemide (LASIX) 20 MG tablet Take 2 tablets (40 mg) by mouth daily as needed (leg swelling) 60  tablet 3     hydrochlorothiazide (MICROZIDE) 12.5 MG capsule Take 1 capsule by mouth once daily 90 capsule 0     HYDROXYZINE HCL PO Take 25 mg by mouth 4 times daily as needed for itching       hylan (SYNVISC) 16 MG/2ML injection 16 mg by INTRA-ARTICULAR route every 6 months       ibuprofen (ADVIL/MOTRIN) 800 MG tablet Take 1 tablet (800 mg) by mouth every 8 hours as needed for moderate pain 90 tablet 0     lamoTRIgine (LAMICTAL) 150 MG tablet Take 150 mg by mouth 2 times daily  2     levothyroxine (SYNTHROID/LEVOTHROID) 112 MCG tablet Take 2 tablets (224 mcg) by mouth daily 180 tablet 0     Melatonin 10 MG CAPS Take 10 mg by mouth daily       NARCAN 4 MG/0.1ML nasal spray   0     NEW MED 0.5 mLs Tangerine Vaporizer Oil Cartridge 0.5ml Inhale 1 to 3 puffs up to 5x per day as needed       order for DME Equipment being ordered: incont pad to wear in undergarment. 100 pad 11     order for DME Equipment being ordered: TEDS 2 Device 3     oxybutynin (DITROPAN) 5 MG tablet TAKE 1 TABLET BY MOUTH THREE TIMES DAILY 90 tablet 0     phentermine (ADIPEX-P) 30 MG capsule Take 1 capsule by mouth once daily in the morning 30 capsule 0     potassium chloride ER (K-TAB/KLOR-CON) 10 MEQ CR tablet TAKE 1 TABLET BY MOUTH ONCE DAILY WHEN TAKING LASIX 30 tablet 0     topiramate (TOPAMAX) 50 MG tablet TAKE 1 TO 2 TABLETS BY MOUTH ONCE DAILY AT BEDTIME (Patient not taking: Reported on 12/4/2020) 60 tablet 0     Allergies   Allergen Reactions     Depakote [Valproic Acid]      Gabapentin Swelling     Propofol Unknown     Got very stiff and tight.      Recent Labs   Lab Test 12/04/20  1602 08/14/20  1510 05/28/20  1209 01/29/20  1351 01/29/20  1351 07/01/19  1140 12/19/18  1155 11/28/18  1215 08/30/17  1000 08/30/17  1000   A1C  --   --   --   --  6.0* 6.2* 5.9*  --   --  5.8   LDL  --   --   --   --  155*  --   --  125*  --  120*   HDL  --   --   --   --  87  --   --  90  --  81   TRIG  --   --   --   --  52  --   --  64  --  54   ALT  --    "--   --   --  28 25  --  28   < > 24   CR 0.72 0.84  --   --  0.98 0.95  --  0.67   < > 0.80   GFRESTIMATED >90 83  --   --  69 72  --  >90   < > 77   GFRESTBLACK >90 >90  --   --  80 84  --  >90   < > >90   POTASSIUM 3.8 3.2*  --   --  3.8 3.6  --  4.0   < > 3.8   TSH 0.10*  --  0.44   < > 18.84* 1.90  --  0.85   < > 3.41    < > = values in this interval not displayed.      BP Readings from Last 3 Encounters:   01/18/21 118/74   12/04/20 118/76   08/14/20 110/60    Wt Readings from Last 3 Encounters:   01/18/21 127.5 kg (281 lb)   12/04/20 127.8 kg (281 lb 12.8 oz)   08/14/20 128.2 kg (282 lb 9.6 oz)                    Reviewed and updated as needed this visit by Provider                 Review of Systems   Constitutional, HEENT, cardiovascular, pulmonary, GI, , musculoskeletal, neuro, skin, endocrine and psych systems are negative, except as otherwise noted.      Objective    /74 (BP Location: Right arm, Patient Position: Sitting, Cuff Size: Adult Regular)   Pulse 85   Temp 98.2  F (36.8  C) (Tympanic)   Ht 1.562 m (5' 1.5\")   Wt 127.5 kg (281 lb)   SpO2 99%   BMI 52.23 kg/m    Body mass index is 52.23 kg/m .  Physical Exam   GENERAL: healthy, alert and no distress  EYES: Eyes grossly normal to inspection, PERRL and conjunctivae and sclerae normal  HENT: ear canals and TM's normal, nose and mouth without ulcers or lesions  NECK: no adenopathy, no asymmetry, masses, or scars and thyroid normal to palpation  RESP: lungs clear to auscultation - no rales, rhonchi or wheezes  BREAST: normal without masses, tenderness or nipple discharge and no palpable axillary masses or adenopathy  CV: regular rate and rhythm, normal S1 S2, no S3 or S4, no murmur, click or rub, no peripheral edema and peripheral pulses strong  MS: no gross musculoskeletal defects noted, no edema  SKIN: no suspicious lesions or rashes  PSYCH: very emotional. Very sad and grief is very deep for her. Triggering is negative for any use of " "illicit drugs.     Diagnostic Test Results:  Labs reviewed in Epic  No results found for this or any previous visit (from the past 24 hour(s)).        Assessment & Plan     Acquired hypothyroidism  Recheck labs.   - TSH with free T4 reflex    Mild mixed bipolar I disorder (H)  She is in counseling and her mom likely has pancreatic cancer and she just lost her dad a year ago. \"my world is crumbling\"  I can't handle this at all. Will encourage weekly psyche visit. Check labs and offered referral to psychiatry as night boudreaux are very disturbing   - CBC with platelets differential  - Comprehensive metabolic panel (BMP + Alb, Alk Phos, ALT, AST, Total. Bili, TP)    Grief  Mom is worsening on her pancreatic cancer. She is emotional. She is lost her father one year ago.        Tobacco Cessation:   reports that she has been smoking cigarettes. She has a 10.00 pack-year smoking history. She has never used smokeless tobacco.  Tobacco Cessation Action Plan: Information offered: Patient not interested at this time         See Patient Instructions    No follow-ups on file.    SEVEN Bryant  [facility] [place]  "

## 2021-01-18 ENCOUNTER — OFFICE VISIT (OUTPATIENT)
Dept: FAMILY MEDICINE | Facility: OTHER | Age: 46
End: 2021-01-18
Attending: PHYSICIAN ASSISTANT
Payer: COMMERCIAL

## 2021-01-18 VITALS
OXYGEN SATURATION: 99 % | BODY MASS INDEX: 51.71 KG/M2 | SYSTOLIC BLOOD PRESSURE: 118 MMHG | DIASTOLIC BLOOD PRESSURE: 74 MMHG | HEART RATE: 85 BPM | HEIGHT: 62 IN | WEIGHT: 281 LBS | TEMPERATURE: 98.2 F

## 2021-01-18 DIAGNOSIS — F43.21 GRIEF: ICD-10-CM

## 2021-01-18 DIAGNOSIS — F31.61 MILD MIXED BIPOLAR I DISORDER (H): ICD-10-CM

## 2021-01-18 DIAGNOSIS — E03.9 ACQUIRED HYPOTHYROIDISM: Primary | ICD-10-CM

## 2021-01-18 LAB
ALBUMIN SERPL-MCNC: 3.4 G/DL (ref 3.4–5)
ALP SERPL-CCNC: 98 U/L (ref 40–150)
ALT SERPL W P-5'-P-CCNC: 33 U/L (ref 0–50)
ANION GAP SERPL CALCULATED.3IONS-SCNC: 5 MMOL/L (ref 3–14)
AST SERPL W P-5'-P-CCNC: 16 U/L (ref 0–45)
BASOPHILS # BLD AUTO: 0.1 10E9/L (ref 0–0.2)
BASOPHILS NFR BLD AUTO: 0.7 %
BILIRUB SERPL-MCNC: 0.3 MG/DL (ref 0.2–1.3)
BUN SERPL-MCNC: 18 MG/DL (ref 7–30)
CALCIUM SERPL-MCNC: 9.2 MG/DL (ref 8.5–10.1)
CHLORIDE SERPL-SCNC: 107 MMOL/L (ref 94–109)
CO2 SERPL-SCNC: 28 MMOL/L (ref 20–32)
CREAT SERPL-MCNC: 0.92 MG/DL (ref 0.52–1.04)
DIFFERENTIAL METHOD BLD: ABNORMAL
EOSINOPHIL # BLD AUTO: 0.2 10E9/L (ref 0–0.7)
EOSINOPHIL NFR BLD AUTO: 1.6 %
ERYTHROCYTE [DISTWIDTH] IN BLOOD BY AUTOMATED COUNT: 15.3 % (ref 10–15)
GFR SERPL CREATININE-BSD FRML MDRD: 74 ML/MIN/{1.73_M2}
GLUCOSE SERPL-MCNC: 137 MG/DL (ref 70–99)
HCT VFR BLD AUTO: 43.1 % (ref 35–47)
HGB BLD-MCNC: 14.3 G/DL (ref 11.7–15.7)
IMM GRANULOCYTES # BLD: 0.1 10E9/L (ref 0–0.4)
IMM GRANULOCYTES NFR BLD: 0.6 %
LYMPHOCYTES # BLD AUTO: 2.6 10E9/L (ref 0.8–5.3)
LYMPHOCYTES NFR BLD AUTO: 21.6 %
MCH RBC QN AUTO: 28.8 PG (ref 26.5–33)
MCHC RBC AUTO-ENTMCNC: 33.2 G/DL (ref 31.5–36.5)
MCV RBC AUTO: 87 FL (ref 78–100)
MONOCYTES # BLD AUTO: 0.7 10E9/L (ref 0–1.3)
MONOCYTES NFR BLD AUTO: 5.5 %
NEUTROPHILS # BLD AUTO: 8.6 10E9/L (ref 1.6–8.3)
NEUTROPHILS NFR BLD AUTO: 70 %
NRBC # BLD AUTO: 0 10*3/UL
NRBC BLD AUTO-RTO: 0 /100
PLATELET # BLD AUTO: 321 10E9/L (ref 150–450)
POTASSIUM SERPL-SCNC: 3.8 MMOL/L (ref 3.4–5.3)
PROT SERPL-MCNC: 7.2 G/DL (ref 6.8–8.8)
RBC # BLD AUTO: 4.97 10E12/L (ref 3.8–5.2)
SODIUM SERPL-SCNC: 140 MMOL/L (ref 133–144)
T4 FREE SERPL-MCNC: 1.35 NG/DL (ref 0.76–1.46)
TSH SERPL DL<=0.005 MIU/L-ACNC: 0.15 MU/L (ref 0.4–4)
WBC # BLD AUTO: 12.2 10E9/L (ref 4–11)

## 2021-01-18 PROCEDURE — 84439 ASSAY OF FREE THYROXINE: CPT | Mod: ZL | Performed by: PHYSICIAN ASSISTANT

## 2021-01-18 PROCEDURE — 85025 COMPLETE CBC W/AUTO DIFF WBC: CPT | Mod: ZL | Performed by: PHYSICIAN ASSISTANT

## 2021-01-18 PROCEDURE — 99214 OFFICE O/P EST MOD 30 MIN: CPT | Performed by: PHYSICIAN ASSISTANT

## 2021-01-18 PROCEDURE — G0463 HOSPITAL OUTPT CLINIC VISIT: HCPCS

## 2021-01-18 PROCEDURE — 80053 COMPREHEN METABOLIC PANEL: CPT | Mod: ZL | Performed by: PHYSICIAN ASSISTANT

## 2021-01-18 PROCEDURE — 36415 COLL VENOUS BLD VENIPUNCTURE: CPT | Mod: ZL | Performed by: PHYSICIAN ASSISTANT

## 2021-01-18 PROCEDURE — 84443 ASSAY THYROID STIM HORMONE: CPT | Mod: ZL | Performed by: PHYSICIAN ASSISTANT

## 2021-01-18 ASSESSMENT — ANXIETY QUESTIONNAIRES
7. FEELING AFRAID AS IF SOMETHING AWFUL MIGHT HAPPEN: NEARLY EVERY DAY
1. FEELING NERVOUS, ANXIOUS, OR ON EDGE: NEARLY EVERY DAY
5. BEING SO RESTLESS THAT IT IS HARD TO SIT STILL: NEARLY EVERY DAY
4. TROUBLE RELAXING: NEARLY EVERY DAY
3. WORRYING TOO MUCH ABOUT DIFFERENT THINGS: NEARLY EVERY DAY
GAD7 TOTAL SCORE: 21
2. NOT BEING ABLE TO STOP OR CONTROL WORRYING: NEARLY EVERY DAY
6. BECOMING EASILY ANNOYED OR IRRITABLE: NEARLY EVERY DAY

## 2021-01-18 ASSESSMENT — MIFFLIN-ST. JEOR: SCORE: 1854.92

## 2021-01-18 ASSESSMENT — PAIN SCALES - GENERAL: PAINLEVEL: SEVERE PAIN (7)

## 2021-01-18 ASSESSMENT — PATIENT HEALTH QUESTIONNAIRE - PHQ9: SUM OF ALL RESPONSES TO PHQ QUESTIONS 1-9: 23

## 2021-01-18 NOTE — NURSING NOTE
"Chief Complaint   Patient presents with     Medication Follow-up     Leg Swelling       Initial /74 (BP Location: Right arm, Patient Position: Sitting, Cuff Size: Adult Regular)   Pulse 85   Temp 98.2  F (36.8  C) (Tympanic)   Ht 1.562 m (5' 1.5\")   Wt 127.5 kg (281 lb)   SpO2 99%   BMI 52.23 kg/m   Estimated body mass index is 52.23 kg/m  as calculated from the following:    Height as of this encounter: 1.562 m (5' 1.5\").    Weight as of this encounter: 127.5 kg (281 lb).  Medication Reconciliation: complete  Kenya Mendes LPN  "

## 2021-01-19 ASSESSMENT — ANXIETY QUESTIONNAIRES: GAD7 TOTAL SCORE: 21

## 2021-01-20 DIAGNOSIS — F33.2 SEVERE EPISODE OF RECURRENT MAJOR DEPRESSIVE DISORDER, WITHOUT PSYCHOTIC FEATURES (H): ICD-10-CM

## 2021-01-20 NOTE — TELEPHONE ENCOUNTER
Burpropion      Last Written Prescription Date:  10.21.2020  Last Fill Quantity: 90,   # refills: 0  Last Office Visit: 01.18.2021

## 2021-01-21 RX ORDER — BUPROPION HYDROCHLORIDE 150 MG/1
TABLET ORAL
Qty: 90 TABLET | Refills: 0 | Status: SHIPPED | OUTPATIENT
Start: 2021-01-21 | End: 2021-04-14

## 2021-01-22 ENCOUNTER — TELEPHONE (OUTPATIENT)
Dept: FAMILY MEDICINE | Facility: OTHER | Age: 46
End: 2021-01-22

## 2021-01-22 NOTE — TELEPHONE ENCOUNTER
Received from [facility], discontinued Suboxone 2 strips every am.

## 2021-01-26 DIAGNOSIS — G89.4 CHRONIC PAIN SYNDROME: ICD-10-CM

## 2021-01-26 DIAGNOSIS — F33.2 SEVERE EPISODE OF RECURRENT MAJOR DEPRESSIVE DISORDER, WITHOUT PSYCHOTIC FEATURES (H): ICD-10-CM

## 2021-01-27 DIAGNOSIS — Z53.9 PERSONS ENCOUNTERING HEALTH SERVICES FOR SPECIFIC PROCEDURES, NOT CARRIED OUT: Primary | ICD-10-CM

## 2021-01-27 RX ORDER — BACLOFEN 10 MG/1
TABLET ORAL
Qty: 90 TABLET | Refills: 0 | Status: SHIPPED | OUTPATIENT
Start: 2021-01-27 | End: 2021-02-25

## 2021-01-27 RX ORDER — TOPIRAMATE 50 MG/1
TABLET, FILM COATED ORAL
Qty: 60 TABLET | Refills: 0 | Status: SHIPPED | OUTPATIENT
Start: 2021-01-27 | End: 2021-04-14

## 2021-01-27 NOTE — TELEPHONE ENCOUNTER
topiramate  Last Written Prescription Date: 8/20/20  Last Fill Quantity: 60 # of Refills: 0  Last Office Visit: 1/18/21    baclofen  Last Written Prescription Date: 12/23/20  Last Fill Quantity: 90 # of Refills: 0  Last Office Visit: 1/18/21

## 2021-02-01 DIAGNOSIS — Z53.9 PERSONS ENCOUNTERING HEALTH SERVICES FOR SPECIFIC PROCEDURES, NOT CARRIED OUT: Primary | ICD-10-CM

## 2021-02-02 DIAGNOSIS — E66.01 MORBID OBESITY (H): ICD-10-CM

## 2021-02-02 DIAGNOSIS — R60.0 BILATERAL EDEMA OF LOWER EXTREMITY: ICD-10-CM

## 2021-02-03 RX ORDER — POTASSIUM CHLORIDE 750 MG/1
TABLET, EXTENDED RELEASE ORAL
Qty: 30 TABLET | Refills: 0 | Status: SHIPPED | OUTPATIENT
Start: 2021-02-03 | End: 2021-03-04

## 2021-02-03 RX ORDER — PHENTERMINE HYDROCHLORIDE 30 MG/1
CAPSULE ORAL
Qty: 30 CAPSULE | Refills: 0 | Status: SHIPPED | OUTPATIENT
Start: 2021-02-03 | End: 2021-03-04

## 2021-02-03 NOTE — TELEPHONE ENCOUNTER
Adipex-P 30 mg      Last Written Prescription Date:  1/6/21  Last Fill Quantity: 30,   # refills: 0  Last Office Visit: 12/4/20  Future Office visit:    Next 5 appointments (look out 90 days)    Mar 01, 2021 11:15 AM  (Arrive by 11:00 AM)  SHORT with SE[facility] Pandey  [facility] ) [address] [place] [address]  [phone]           Routing refill request to provider for review/approval because:      Potassium 10 meq      Last Written Prescription Date:  1/6/21  Last Fill Quantity: 30,   # refills: 0  Last Office Visit: 12/4/20  Future Office visit:    Next 5 appointments (look out 90 days)    Mar 01, 2021 11:15 AM  (Arrive by 11:00 AM)  SHORT with SE[facility] Pandey  [facility] ([facility] ) [address] [place] [address]  [phone]           Routing refill request to provider for review/approval because:

## 2021-02-16 NOTE — PROGRESS NOTES
Subjective     Autumn Holbrook is a 45 year old female who presents to clinic today for the following health issues:    HPI         Depression and Anxiety Follow-Up    How are you doing with your depression since your last visit? Worsened     How are you doing with your anxiety since your last visit?  Worsened     Are you having other symptoms that might be associated with depression or anxiety? No    Have you had a significant life event? OTHER: mom sick with cancer and in nursing home     Do you have any concerns with your use of alcohol or other drugs? No    Social History     Tobacco Use     Smoking status: Current Every Day Smoker     Packs/day: 0.50     Years: 20.00     Pack years: 10.00     Types: Cigarettes     Smokeless tobacco: Never Used     Tobacco comment: has patches   Substance Use Topics     Alcohol use: No     Drug use: No     Comment: previous opiod addiction     PHQ 12/4/2020 1/18/2021 3/1/2021   PHQ-9 Total Score 13 23 19   Q9: Thoughts of better off dead/self-harm past 2 weeks Not at all More than half the days Not at all   F/U: Thoughts of suicide or self-harm - - -   F/U: Self harm-plan - - -   F/U: Self-harm action - - -   F/U: Safety concerns - - -   PHQ-A Total Score - - -   PHQ-A Depressed most days in past year - - -   PHQ-A Mood affect on daily activities - - -   PHQ-A Suicide Ideation past 2 weeks - - -   PHQ-A Suicide Ideation past month - - -   PHQ-A Previous suicide attempt - - -     COY-7 SCORE 12/4/2020 1/18/2021 3/1/2021   Total Score 15 21 19     Last PHQ-9 3/1/2021   1.  Little interest or pleasure in doing things 0   2.  Feeling down, depressed, or hopeless 3   3.  Trouble falling or staying asleep, or sleeping too much 3   4.  Feeling tired or having little energy 3   5.  Poor appetite or overeating 3   6.  Feeling bad about yourself 3   7.  Trouble concentrating 3   8.  Moving slowly or restless 1   Q9: Thoughts of better off dead/self-harm past 2 weeks 0   PHQ-9 Total Score  19   Difficulty at work, home, or with people -   In the past two weeks have you had thoughts of suicide or self harm? -   Do you have concerns about your personal safety or the safety of others? -   In the past 2 weeks have you thought about a plan or had intention to harm yourself? -   In the past 2 weeks have you acted on these thoughts in any way? -     COY-7  3/1/2021   1. Feeling nervous, anxious, or on edge 3   2. Not being able to stop or control worrying 3   3. Worrying too much about different things 3   4. Trouble relaxing 3   5. Being so restless that it is hard to sit still 1   6. Becoming easily annoyed or irritable 3   7. Feeling afraid, as if something awful might happen 3   COY-7 Total Score 19   If you checked any problems, how difficult have they made it for you to do your work, take care of things at home, or get along with other people? -       Suicide Assessment Five-step Evaluation and Treatment (SAFE-T)    Hypothyroidism Follow-up      Since last visit, patient describes the following symptoms: Weight stable, no hair loss, no skin changes, no constipation, no loose stools             Patient Active Problem List   Diagnosis     Routine general medical examination at a health care facility     Tear of lateral cartilage or meniscus of knee, current, right, initial encounter     Chronic pain syndrome     Opioid dependence in remission (H)     PTSD (post-traumatic stress disorder)     COY (generalized anxiety disorder)     Moderate episode of recurrent major depressive disorder (H)     Bilateral edema of lower extremity     Hypothyroid     Obesity     GERD (gastroesophageal reflux disease)     Mild mixed bipolar I disorder (H)     Trigger finger of both hands     Morbid obesity (H)     Grief     Past Surgical History:   Procedure Laterality Date     CHOLECYSTECTOMY       GYN SURGERY       SECTION [id number]     GYN SURGERY  2004    [facility]       Social History     Tobacco Use     Smoking  status: Current Every Day Smoker     Packs/day: 0.50     Years: 20.00     Pack years: 10.00     Types: Cigarettes     Smokeless tobacco: Never Used     Tobacco comment: has patches   Substance Use Topics     Alcohol use: No     Family History   Problem Relation Age of Onset     Cerebrovascular Disease Mother      Alcoholism Mother      Cancer Mother      Depression Mother      Eczema Mother      Hypertension Mother      Migraines Mother      Mental Illness Mother      Osteoarthritis Mother      Osteoporosis Mother      Alcoholism Father      Cancer Father      Hyperlipidemia Father      Hypertension Father      Myocardial Infarction Father      Mental Illness Sister      Migraines Sister          Current Outpatient Medications   Medication Sig Dispense Refill     Acetaminophen (TYLENOL PO) Take 500 mg by mouth every 4 hours as needed for mild pain or fever       ARIPiprazole (ABILIFY) 10 MG tablet Take 10 mg by mouth daily  0     atomoxetine (STRATTERA) 60 MG capsule Take 60 mg by mouth daily       baclofen (LIORESAL) 10 MG tablet TAKE 1 TABLET BY MOUTH THREE TIMES DAILY 90 tablet 0     buprenorphine HCl-naloxone HCl (SUBOXONE) 2-0.5 MG per film Place 1 Film under the tongue daily   0     buPROPion (WELLBUTRIN XL) 150 MG 24 hr tablet TAKE 1 TABLET BY MOUTH ONCE DAILY IN THE MORNING 90 tablet 0     busPIRone (BUSPAR) 10 MG tablet Take 10 mg by mouth 2 times daily  0     EUTHYROX 112 MCG tablet Take 2 tablets by mouth once daily 180 tablet 0     furosemide (LASIX) 20 MG tablet Take 2 tablets (40 mg) by mouth daily as needed (leg swelling) 60 tablet 3     hydrochlorothiazide (MICROZIDE) 12.5 MG capsule Take 1 capsule by mouth once daily 90 capsule 0     HYDROXYZINE HCL PO Take 25 mg by mouth 4 times daily as needed for itching       hylan (SYNVISC) 16 MG/2ML injection 16 mg by INTRA-ARTICULAR route every 6 months       ibuprofen (ADVIL/MOTRIN) 800 MG tablet Take 1 tablet (800 mg) by mouth every 8 hours as needed for  moderate pain 90 tablet 0     lamoTRIgine (LAMICTAL) 150 MG tablet Take 150 mg by mouth 2 times daily  2     Melatonin 10 MG CAPS Take 10 mg by mouth daily       NARCAN 4 MG/0.1ML nasal spray   0     NEW MED 0.5 mLs Tangerine Vaporizer Oil Cartridge 0.5ml Inhale 1 to 3 puffs up to 5x per day as needed       order for DME Equipment being ordered: incont pad to wear in undergarment. 100 pad 11     order for DME Equipment being ordered: TEDS 2 Device 3     oxybutynin (DITROPAN) 5 MG tablet TAKE 1 TABLET BY MOUTH THREE TIMES DAILY 90 tablet 0     phentermine (ADIPEX-P) 30 MG capsule Take 1 capsule by mouth once daily in the morning 30 capsule 0     potassium chloride ER (K-TAB/KLOR-CON) 10 MEQ CR tablet TAKE 1 TABLET BY MOUTH ONCE DAILY WHEN TAKING LASIX 30 tablet 0     topiramate (TOPAMAX) 50 MG tablet TAKE 1 TO 2 TABLETS BY MOUTH ONCE DAILY AT BEDTIME 60 tablet 0     Allergies   Allergen Reactions     Depakote [Valproic Acid]      Gabapentin Swelling     Propofol Unknown     Got very stiff and tight.      Recent Labs   Lab Test 01/18/21  1215 12/04/20  1602 01/29/20  1351 01/29/20  1351 07/01/19  1140 12/19/18  1155 11/28/18  1215 08/30/17  1000 08/30/17  1000   A1C  --   --   --  6.0* 6.2* 5.9*  --   --  5.8   LDL  --   --   --  155*  --   --  125*  --  120*   HDL  --   --   --  87  --   --  90  --  81   TRIG  --   --   --  52  --   --  64  --  54   ALT 33  --   --  28 25  --  28   < > 24   CR 0.92 0.72   < > 0.98 0.95  --  0.67   < > 0.80   GFRESTIMATED 74 >90   < > 69 72  --  >90   < > 77   GFRESTBLACK 85 >90   < > 80 84  --  >90   < > >90   POTASSIUM 3.8 3.8   < > 3.8 3.6  --  4.0   < > 3.8   TSH 0.15* 0.10*   < > 18.84* 1.90  --  0.85   < > 3.41    < > = values in this interval not displayed.      BP Readings from Last 3 Encounters:   03/01/21 120/70   01/18/21 118/74   12/04/20 118/76    Wt Readings from Last 3 Encounters:   03/01/21 126.6 kg (279 lb)   01/18/21 127.5 kg (281 lb)   12/04/20 127.8 kg (281 lb 12.8  "oz)                    Reviewed and updated as needed this visit by Provider                 Review of Systems   Constitutional, HEENT, cardiovascular, pulmonary, gi and gu systems are negative, except as otherwise noted.      Objective    /70 (BP Location: Right arm, Patient Position: Sitting, Cuff Size: Adult Regular)   Pulse 90   Temp 98.6  F (37  C) (Tympanic)   Ht 1.562 m (5' 1.5\")   Wt 126.6 kg (279 lb)   SpO2 98%   BMI 51.86 kg/m    Body mass index is 51.86 kg/m .  Physical Exam   GENERAL: healthy, alert and no distress  EYES: Eyes grossly normal to inspection, PERRL and conjunctivae and sclerae normal  HENT: ear canals and TM's normal, nose and mouth without ulcers or lesions  NECK: no adenopathy, no asymmetry, masses, or scars and thyroid normal to palpation  RESP: lungs clear to auscultation - no rales, rhonchi or wheezes  CV: regular rate and rhythm, normal S1 S2, no S3 or S4, no murmur, click or rub, no peripheral edema and peripheral pulses strong  MS: arthritis of the hands, knees wrist lower back.  and gait is compensated.   SKIN: no suspicious lesions or rashes  LYMPH: no cervical, supraclavicular, axillary, or inguinal adenopathy  Psyche: mood is up and down, tearful. Mom in nursing home.   Diagnostic Test Results:  Labs reviewed in Epic  No results found for this or any previous visit (from the past 24 hour(s)).        Assessment & Plan     1. Acquired hypothyroidism  Dur for a recheck. No changes last time. See if we are back in the right range.   - TSH with free T4 reflex    2. Grief  Her mom is quiet sick.   Recent stroke. 40 minutes in visit talking of mom and her recent stressful event. Mom is sick with pancreatic Cancer.  Now had a large embolic stroke. Has been in a nursing home now. She can only come to see her in a few days a week.      she is going to return to clinic in one month.    BMI:   Estimated body mass index is 51.86 kg/m  as calculated from the following:    Height " "as of this encounter: 1.562 m (5' 1.5\").    Weight as of this encounter: 126.6 kg (279 lb).   Weight management plan: Discussed healthy diet and exercise guidelines         See Patient Instructions    No follow-ups on file.    Apryl Hathaway PA-C  [facility] [place]  "

## 2021-02-17 DIAGNOSIS — I10 BENIGN ESSENTIAL HYPERTENSION: ICD-10-CM

## 2021-02-17 RX ORDER — HYDROCHLOROTHIAZIDE 12.5 MG/1
CAPSULE ORAL
Qty: 90 CAPSULE | Refills: 0 | Status: SHIPPED | OUTPATIENT
Start: 2021-02-17 | End: 2021-05-05

## 2021-02-17 NOTE — TELEPHONE ENCOUNTER
hydrochlorothiazide (MICROZIDE) 12.5 MG capsule     Last Written Prescription Date:  11/19/20  Last Fill Quantity: 90,   # refills: 0  Last Office Visit: 1/18/21  Future Office visit:    Next 5 appointments (look out 90 days)    Mar 01, 2021 11:15 AM  (Arrive by 11:00 AM)  SHORT with SEVEN Pandey  [facility] [place] ([facility] [place] ) [address]  [place] [address]  [phone]           Routing refill request to provider for review/approval

## 2021-02-25 DIAGNOSIS — G89.4 CHRONIC PAIN SYNDROME: ICD-10-CM

## 2021-02-25 DIAGNOSIS — E03.9 ACQUIRED HYPOTHYROIDISM: ICD-10-CM

## 2021-02-25 RX ORDER — BACLOFEN 10 MG/1
TABLET ORAL
Qty: 90 TABLET | Refills: 0 | Status: SHIPPED | OUTPATIENT
Start: 2021-02-25 | End: 2021-03-25

## 2021-02-25 RX ORDER — LEVOTHYROXINE SODIUM 112 UG/1
TABLET ORAL
Qty: 180 TABLET | Refills: 0 | Status: SHIPPED | OUTPATIENT
Start: 2021-02-25 | End: 2021-03-01

## 2021-02-25 NOTE — TELEPHONE ENCOUNTER
Euthyrox 112mcg      Last Written Prescription Date:  5/28/20  Last Fill Quantity: 180,   # refills: 1  Last Office Visit: 1/18/21  Future Office visit:    Next 5 appointments (look out 90 days)    Mar 01, 2021 11:15 AM  (Arrive by 11:00 AM)  SHORT with SEVEN Pandey  [facility] [address] [facility] [facility] [address]  [phone]           Routing refill request to provider for review/approval because:  Lioresal 10mg      Last Written Prescription Date:  1/27/21  Last Fill Quantity: 90,   # refills: 0  Last Office Visit: 1/18/21  Future Office visit:    Next 5 appointments (look out 90 days)    Mar 01, 2021 11:15 AM  (Arrive by 11:00 AM)  SHORT with SEVEN Pandey  [facility] ([facility] ) [address]  [place] [address]  [phone]           Routing refill request to provider for review/approval because:

## 2021-03-01 ENCOUNTER — OFFICE VISIT (OUTPATIENT)
Dept: FAMILY MEDICINE | Facility: OTHER | Age: 46
End: 2021-03-01
Attending: PHYSICIAN ASSISTANT
Payer: MEDICARE

## 2021-03-01 VITALS
SYSTOLIC BLOOD PRESSURE: 120 MMHG | DIASTOLIC BLOOD PRESSURE: 70 MMHG | WEIGHT: 279 LBS | BODY MASS INDEX: 51.34 KG/M2 | HEIGHT: 62 IN | OXYGEN SATURATION: 98 % | TEMPERATURE: 98.6 F | HEART RATE: 90 BPM

## 2021-03-01 DIAGNOSIS — E03.9 ACQUIRED HYPOTHYROIDISM: Primary | ICD-10-CM

## 2021-03-01 DIAGNOSIS — F43.21 GRIEF: ICD-10-CM

## 2021-03-01 DIAGNOSIS — E03.9 ACQUIRED HYPOTHYROIDISM: ICD-10-CM

## 2021-03-01 LAB
T4 FREE SERPL-MCNC: 1.49 NG/DL (ref 0.76–1.46)
TSH SERPL DL<=0.005 MIU/L-ACNC: 0.14 MU/L (ref 0.4–4)

## 2021-03-01 PROCEDURE — 99215 OFFICE O/P EST HI 40 MIN: CPT | Performed by: PHYSICIAN ASSISTANT

## 2021-03-01 PROCEDURE — 84443 ASSAY THYROID STIM HORMONE: CPT | Mod: ZL | Performed by: PHYSICIAN ASSISTANT

## 2021-03-01 PROCEDURE — G0463 HOSPITAL OUTPT CLINIC VISIT: HCPCS

## 2021-03-01 PROCEDURE — 84439 ASSAY OF FREE THYROXINE: CPT | Mod: ZL | Performed by: PHYSICIAN ASSISTANT

## 2021-03-01 PROCEDURE — 36415 COLL VENOUS BLD VENIPUNCTURE: CPT | Mod: ZL | Performed by: PHYSICIAN ASSISTANT

## 2021-03-01 RX ORDER — LEVOTHYROXINE SODIUM 100 UG/1
100 TABLET ORAL DAILY
Qty: 90 TABLET | Refills: 0 | Status: SHIPPED | OUTPATIENT
Start: 2021-03-01 | End: 2021-03-24

## 2021-03-01 ASSESSMENT — ANXIETY QUESTIONNAIRES
6. BECOMING EASILY ANNOYED OR IRRITABLE: NEARLY EVERY DAY
1. FEELING NERVOUS, ANXIOUS, OR ON EDGE: NEARLY EVERY DAY
4. TROUBLE RELAXING: NEARLY EVERY DAY
5. BEING SO RESTLESS THAT IT IS HARD TO SIT STILL: SEVERAL DAYS
7. FEELING AFRAID AS IF SOMETHING AWFUL MIGHT HAPPEN: NEARLY EVERY DAY
GAD7 TOTAL SCORE: 19
3. WORRYING TOO MUCH ABOUT DIFFERENT THINGS: NEARLY EVERY DAY
2. NOT BEING ABLE TO STOP OR CONTROL WORRYING: NEARLY EVERY DAY

## 2021-03-01 ASSESSMENT — PATIENT HEALTH QUESTIONNAIRE - PHQ9: SUM OF ALL RESPONSES TO PHQ QUESTIONS 1-9: 19

## 2021-03-01 ASSESSMENT — PAIN SCALES - GENERAL: PAINLEVEL: EXTREME PAIN (8)

## 2021-03-01 ASSESSMENT — MIFFLIN-ST. JEOR: SCORE: 1845.85

## 2021-03-01 NOTE — NURSING NOTE
"Chief Complaint   Patient presents with     Thyroid Disease       Initial /70 (BP Location: Right arm, Patient Position: Sitting, Cuff Size: Adult Regular)   Pulse 90   Temp 98.6  F (37  C) (Tympanic)   Ht 1.562 m (5' 1.5\")   Wt 126.6 kg (279 lb)   SpO2 98%   BMI 51.86 kg/m   Estimated body mass index is 51.86 kg/m  as calculated from the following:    Height as of this encounter: 1.562 m (5' 1.5\").    Weight as of this encounter: 126.6 kg (279 lb).  Medication Reconciliation: complete  Kenya Mendes LPN  "

## 2021-03-02 ASSESSMENT — ANXIETY QUESTIONNAIRES: GAD7 TOTAL SCORE: 19

## 2021-03-04 DIAGNOSIS — E66.01 MORBID OBESITY (H): ICD-10-CM

## 2021-03-04 DIAGNOSIS — R60.0 BILATERAL EDEMA OF LOWER EXTREMITY: ICD-10-CM

## 2021-03-04 DIAGNOSIS — R60.0 BILATERAL LEG EDEMA: ICD-10-CM

## 2021-03-04 RX ORDER — FUROSEMIDE 20 MG
TABLET ORAL
Qty: 60 TABLET | Refills: 0 | Status: SHIPPED | OUTPATIENT
Start: 2021-03-04 | End: 2021-03-31

## 2021-03-04 RX ORDER — PHENTERMINE HYDROCHLORIDE 30 MG/1
CAPSULE ORAL
Qty: 30 CAPSULE | Refills: 0 | Status: SHIPPED | OUTPATIENT
Start: 2021-03-04 | End: 2021-03-31

## 2021-03-04 RX ORDER — POTASSIUM CHLORIDE 750 MG/1
TABLET, EXTENDED RELEASE ORAL
Qty: 30 TABLET | Refills: 0 | Status: SHIPPED | OUTPATIENT
Start: 2021-03-04 | End: 2021-04-06

## 2021-03-04 NOTE — TELEPHONE ENCOUNTER
Lasix  20mg      Last Written Prescription Date:  8/14/20  Last Fill Quantity: 60,   # refills: 3  Last Office Visit: 3/1/21  Future Office visit:       Routing refill request to provider for review/approval because:  Phentermine 30mg      Last Written Prescription Date:  2/3/21  Last Fill Quantity: 30,   # refills: 0  Last Office Visit: 3/1/21  Future Office visit:       Routing refill request to provider for review/approval because:  Potassium Chloride ER  10 meq      Last Written Prescription Date:  2/3/21  Last Fill Quantity: 30,   # refills: 0  Last Office Visit: 3/1/21  Future Office visit:       Routing refill request to provider for review/approval because:

## 2021-03-11 DIAGNOSIS — Z53.9 PERSONS ENCOUNTERING HEALTH SERVICES FOR SPECIFIC PROCEDURES, NOT CARRIED OUT: Primary | ICD-10-CM

## 2021-03-11 PROCEDURE — G0179 MD RECERTIFICATION HHA PT: HCPCS | Performed by: FAMILY MEDICINE

## 2021-03-18 ENCOUNTER — TELEPHONE (OUTPATIENT)
Dept: FAMILY MEDICINE | Facility: OTHER | Age: 46
End: 2021-03-18

## 2021-03-18 DIAGNOSIS — E03.9 ACQUIRED HYPOTHYROIDISM: ICD-10-CM

## 2021-03-18 NOTE — TELEPHONE ENCOUNTER
Received call from patient. She was here on 3/1/2021 for her thyroid a change was made to 100mcg. She is concerned on such a decrease she currently takes 220mcg she was wondering if there was a mistake when it was ordered? Not sure what else I can tell her. Kenya PUENTE

## 2021-03-24 DIAGNOSIS — G89.4 CHRONIC PAIN SYNDROME: ICD-10-CM

## 2021-03-24 RX ORDER — LEVOTHYROXINE SODIUM 100 UG/1
TABLET ORAL
Qty: 90 TABLET | Refills: 0 | Status: SHIPPED | OUTPATIENT
Start: 2021-03-24 | End: 2021-06-09

## 2021-03-25 RX ORDER — BACLOFEN 10 MG/1
TABLET ORAL
Qty: 90 TABLET | Refills: 0 | Status: SHIPPED | OUTPATIENT
Start: 2021-03-25 | End: 2021-04-28

## 2021-03-25 NOTE — TELEPHONE ENCOUNTER
Baclofen   Last Written Prescription Date:  2.25.2021  Last Fill Quantity: 90,   # refills: 0  Last Office Visit: 1.18.2021  Future Office visit:    Next 5 appointments (look out 90 days)    May 05, 2021 11:15 AM  (Arrive by 11:00 AM)  SHORT with SEVEN Pandey  [facility] ([facility] ) [address]  [place] [address]  [phone]           Routing refill request to provider for review/approval because:      Yusra Brown RN

## 2021-03-30 DIAGNOSIS — R60.0 BILATERAL LEG EDEMA: ICD-10-CM

## 2021-03-30 DIAGNOSIS — N39.46 MIXED STRESS AND URGE URINARY INCONTINENCE: ICD-10-CM

## 2021-03-30 DIAGNOSIS — E66.01 MORBID OBESITY (H): ICD-10-CM

## 2021-03-31 RX ORDER — FUROSEMIDE 20 MG
TABLET ORAL
Qty: 60 TABLET | Refills: 0 | Status: SHIPPED | OUTPATIENT
Start: 2021-03-31 | End: 2021-05-05

## 2021-03-31 RX ORDER — OXYBUTYNIN CHLORIDE 5 MG/1
TABLET ORAL
Qty: 90 TABLET | Refills: 0 | Status: SHIPPED | OUTPATIENT
Start: 2021-03-31 | End: 2021-06-24

## 2021-03-31 RX ORDER — PHENTERMINE HYDROCHLORIDE 30 MG/1
CAPSULE ORAL
Qty: 30 CAPSULE | Refills: 0 | Status: SHIPPED | OUTPATIENT
Start: 2021-03-31 | End: 2021-05-05

## 2021-03-31 NOTE — TELEPHONE ENCOUNTER
Phentermine (ADIPEX-P) 30mg      Last Written Prescription Date:  3/4/2021  Last Fill Quantity: 30,   # refills: 0  Last Office Visit: 3/1/2021  Future Office visit:    Next 5 appointments (look out 90 days)    May 05, 2021 11:15 AM  (Arrive by 11:00 AM)  SHORT with SEVEN Pandey  [facility] ([facility] ) [address] [address]  [phone]           Routing refill request to provider for review/approval because:    Furosemide (LASIX) 20mg      Last Written Prescription Date:  3/4/2021  Last Fill Quantity: 60,   # refills: 0  Last Office Visit: 3/1/2021  Future Office visit:    Next 5 appointments (look out 90 days)    May 05, 2021 11:15 AM  (Arrive by 11:00 AM)  SHORT with SEVEN Pandey  [facility] ([facility] ) [address] [address]  [phone]           Routing refill request to provider for review/approval because:    Oxybutynin (DITROPAN) 5mg      Last Written Prescription Date:  1/6/2021  Last Fill Quantity: 90,   # refills: 0  Last Office Visit: 3/1/2021  Future Office visit:    Next 5 appointments (look out 90 days)    May 05, 2021 11:15 AM  (Arrive by 11:00 AM)  SHORT with SEVEN Pandey  [facility] ([facility] ) [address]  [facility] [address]  [phone]           Routing refill request to provider for review/approval because:

## 2021-04-06 DIAGNOSIS — R60.0 BILATERAL EDEMA OF LOWER EXTREMITY: ICD-10-CM

## 2021-04-06 RX ORDER — POTASSIUM CHLORIDE 750 MG/1
TABLET, EXTENDED RELEASE ORAL
Qty: 30 TABLET | Refills: 5 | Status: SHIPPED | OUTPATIENT
Start: 2021-04-06 | End: 2021-09-29

## 2021-04-13 DIAGNOSIS — F33.2 SEVERE EPISODE OF RECURRENT MAJOR DEPRESSIVE DISORDER, WITHOUT PSYCHOTIC FEATURES (H): ICD-10-CM

## 2021-04-14 RX ORDER — BUPROPION HYDROCHLORIDE 150 MG/1
TABLET ORAL
Qty: 90 TABLET | Refills: 0 | Status: SHIPPED | OUTPATIENT
Start: 2021-04-14 | End: 2021-05-05

## 2021-04-14 RX ORDER — TOPIRAMATE 50 MG/1
TABLET, FILM COATED ORAL
Qty: 60 TABLET | Refills: 0 | Status: SHIPPED | OUTPATIENT
Start: 2021-04-14 | End: 2021-06-16

## 2021-04-14 NOTE — TELEPHONE ENCOUNTER
Topamax 50 mg      Last Written Prescription Date:  1/27/21  Last Fill Quantity: 60,   # refills: 0  Last Office Visit: 3/1/21  Future Office visit:    Next 5 appointments (look out 90 days)    May 05, 2021 11:15 AM  (Arrive by 11:00 AM)  SHORT with SEVEN Pandey  [facility] ([facility] ) [address]  [place] [address]  [phone]           Routing refill request to provider for review/approval because:      Wellbutrin 150 mg      Last Written Prescription Date:  1/21/21  Last Fill Quantity: 90,   # refills: 0  Last Office Visit: 3/1/21  Future Office visit:    Next 5 appointments (look out 90 days)    May 05, 2021 11:15 AM  (Arrive by 11:00 AM)  SHORT with SEVEN Pandey  [facility] ([facility] ) [address]  [place] [address]  [phone]           Routing refill request to provider for review/approval because:

## 2021-04-27 DIAGNOSIS — G89.4 CHRONIC PAIN SYNDROME: ICD-10-CM

## 2021-04-28 RX ORDER — BACLOFEN 10 MG/1
TABLET ORAL
Qty: 90 TABLET | Refills: 0 | Status: SHIPPED | OUTPATIENT
Start: 2021-04-28 | End: 2021-05-27

## 2021-04-28 NOTE — TELEPHONE ENCOUNTER
baclofen      Last Written Prescription Date:  3/25/21  Last Fill Quantity: 90,   # refills: 0  Last Office Visit: 1/18/21  Future Office visit:    Next 5 appointments (look out 90 days)    May 05, 2021 11:15 AM  (Arrive by 11:00 AM)  SHORT with SEVEN Pandey  [facility] [place] ([facility] [place] ) [address]  [place] [address]  [phone]

## 2021-04-29 NOTE — PROGRESS NOTES
Subjective     Autumn Holbrook is a 45 year old female who presents to clinic today for the following health issues:    HPI         Depression and Anxiety Follow-Up    How are you doing with your depression since your last visit? Worsened     How are you doing with your anxiety since your last visit?  Worsened     Are you having other symptoms that might be associated with depression or anxiety? No    Have you had a significant life event? Grief or Loss- mom passed away     Do you have any concerns with your use of alcohol or other drugs? No    Social History     Tobacco Use     Smoking status: Current Every Day Smoker     Packs/day: 0.50     Years: 20.00     Pack years: 10.00     Types: Cigarettes     Smokeless tobacco: Never Used     Tobacco comment: has patches   Substance Use Topics     Alcohol use: No     Drug use: No     Comment: previous opiod addiction     PHQ 1/18/2021 3/1/2021 5/5/2021   PHQ-9 Total Score 23 19 18   Q9: Thoughts of better off dead/self-harm past 2 weeks More than half the days Not at all Not at all   F/U: Thoughts of suicide or self-harm - - -   F/U: Self harm-plan - - -   F/U: Self-harm action - - -   F/U: Safety concerns - - -   PHQ-A Total Score - - -   PHQ-A Depressed most days in past year - - -   PHQ-A Mood affect on daily activities - - -   PHQ-A Suicide Ideation past 2 weeks - - -   PHQ-A Suicide Ideation past month - - -   PHQ-A Previous suicide attempt - - -     COY-7 SCORE 1/18/2021 3/1/2021 5/5/2021   Total Score 21 19 17     Last PHQ-9 5/5/2021   1.  Little interest or pleasure in doing things 1   2.  Feeling down, depressed, or hopeless 3   3.  Trouble falling or staying asleep, or sleeping too much 3   4.  Feeling tired or having little energy 3   5.  Poor appetite or overeating 3   6.  Feeling bad about yourself 1   7.  Trouble concentrating 2   8.  Moving slowly or restless 2   Q9: Thoughts of better off dead/self-harm past 2 weeks 0   PHQ-9 Total Score 18   Difficulty at  work, home, or with people Somewhat difficult   In the past two weeks have you had thoughts of suicide or self harm? -   Do you have concerns about your personal safety or the safety of others? -   In the past 2 weeks have you thought about a plan or had intention to harm yourself? -   In the past 2 weeks have you acted on these thoughts in any way? -     COY-7  2021   1. Feeling nervous, anxious, or on edge 3   2. Not being able to stop or control worrying 3   3. Worrying too much about different things 3   4. Trouble relaxing 3   5. Being so restless that it is hard to sit still 3   6. Becoming easily annoyed or irritable 1   7. Feeling afraid, as if something awful might happen 1   COY-7 Total Score 17   If you checked any problems, how difficult have they made it for you to do your work, take care of things at home, or get along with other people? Somewhat difficult         Hypothyroidism Follow-up    Since last visit, patient describes the following symptoms: Weight stable, no hair loss, no skin changes, no constipation, no loose stools          Patient Active Problem List   Diagnosis     Routine general medical examination at a health care facility     Tear of lateral cartilage or meniscus of knee, current, right, initial encounter     Chronic pain syndrome     Opioid dependence in remission (H)     PTSD (post-traumatic stress disorder)     COY (generalized anxiety disorder)     Moderate episode of recurrent major depressive disorder (H)     Bilateral edema of lower extremity     Hypothyroid     Obesity     GERD (gastroesophageal reflux disease)     Mild mixed bipolar I disorder (H)     Trigger finger of both hands     Morbid obesity (H)     Grief     Past Surgical History:   Procedure Laterality Date     CHOLECYSTECTOMY       GYN SURGERY       SECTION 5088-1050     GYN SURGERY  [address] [place] [address]       Social History     Tobacco Use     Smoking status: Current Every Day Smoker     Packs/day: 0.50      Years: 20.00     Pack years: 10.00     Types: Cigarettes     Smokeless tobacco: Never Used     Tobacco comment: has patches   Substance Use Topics     Alcohol use: No     Family History   Problem Relation Age of Onset     Cerebrovascular Disease Mother      Alcoholism Mother      Cancer Mother      Depression Mother      Eczema Mother      Hypertension Mother      Migraines Mother      Mental Illness Mother      Osteoarthritis Mother      Osteoporosis Mother      Alcoholism Father      Cancer Father      Hyperlipidemia Father      Hypertension Father      Myocardial Infarction Father      Mental Illness Sister      Migraines Sister          Current Outpatient Medications   Medication Sig Dispense Refill     Acetaminophen (TYLENOL PO) Take 500 mg by mouth every 4 hours as needed for mild pain or fever       ARIPiprazole (ABILIFY) 10 MG tablet Take 10 mg by mouth daily  0     atomoxetine (STRATTERA) 60 MG capsule Take 60 mg by mouth daily       baclofen (LIORESAL) 10 MG tablet TAKE 1 TABLET BY MOUTH THREE TIMES DAILY 90 tablet 0     buprenorphine HCl-naloxone HCl (SUBOXONE) 2-0.5 MG per film Place 1 Film under the tongue daily   0     buPROPion (WELLBUTRIN XL) 150 MG 24 hr tablet TAKE 1 TABLET BY MOUTH ONCE DAILY IN THE MORNING 90 tablet 0     busPIRone (BUSPAR) 10 MG tablet Take 10 mg by mouth 2 times daily  0     furosemide (LASIX) 20 MG tablet TAKE 2 TABLETS BY MOUTH ONCE DAILY AS NEEDED FOR  LEG  SWELLING 60 tablet 0     hydrochlorothiazide (MICROZIDE) 12.5 MG capsule Take 1 capsule by mouth once daily 90 capsule 0     HYDROXYZINE HCL PO Take 25 mg by mouth 4 times daily as needed for itching       hylan (SYNVISC) 16 MG/2ML injection 16 mg by INTRA-ARTICULAR route every 6 months       ibuprofen (ADVIL/MOTRIN) 800 MG tablet Take 1 tablet (800 mg) by mouth every 8 hours as needed for moderate pain 90 tablet 0     lamoTRIgine (LAMICTAL) 150 MG tablet Take 150 mg by mouth 2 times daily  2     levothyroxine (EUTHYROX)  100 MCG tablet Take two pills by mouth every day. 90 tablet 0     Melatonin 10 MG CAPS Take 10 mg by mouth daily       NARCAN 4 MG/0.1ML nasal spray   0     NEW MED 0.5 mLs Tangerine Vaporizer Oil Cartridge 0.5ml Inhale 1 to 3 puffs up to 5x per day as needed       order for DME Equipment being ordered: incont pad to wear in undergarment. 100 pad 11     order for DME Equipment being ordered: TEDS 2 Device 3     oxybutynin (DITROPAN) 5 MG tablet TAKE 1 TABLET BY MOUTH THREE TIMES DAILY 90 tablet 0     phentermine (ADIPEX-P) 30 MG capsule Take 1 capsule by mouth once daily in the morning 30 capsule 0     potassium chloride ER (K-TAB/KLOR-CON) 10 MEQ CR tablet TAKE 1 TABLET BY MOUTH ONCE DAILY WHEN TAKING LASIX 30 tablet 5     topiramate (TOPAMAX) 50 MG tablet TAKE 1 TO 2 TABLETS BY MOUTH ONCE DAILY AT BEDTIME 60 tablet 0     Allergies   Allergen Reactions     Depakote [Valproic Acid]      Gabapentin Swelling     Propofol Unknown     Got very stiff and tight.      Recent Labs   Lab Test 03/01/21  1252 01/18/21  1215 12/04/20  1602 01/29/20  1351 01/29/20  1351 07/01/19  1140 12/19/18  1155 11/28/18  1215 08/30/17  1000 08/30/17  1000   A1C  --   --   --   --  6.0* 6.2* 5.9*  --   --  5.8   LDL  --   --   --   --  155*  --   --  125*  --  120*   HDL  --   --   --   --  87  --   --  90  --  81   TRIG  --   --   --   --  52  --   --  64  --  54   ALT  --  33  --   --  28 25  --  28   < > 24   CR  --  0.92 0.72   < > 0.98 0.95  --  0.67   < > 0.80   GFRESTIMATED  --  74 >90   < > 69 72  --  >90   < > 77   GFRESTBLACK  --  85 >90   < > 80 84  --  >90   < > >90   POTASSIUM  --  3.8 3.8   < > 3.8 3.6  --  4.0   < > 3.8   TSH 0.14* 0.15* 0.10*   < > 18.84* 1.90  --  0.85   < > 3.41    < > = values in this interval not displayed.      BP Readings from Last 3 Encounters:   05/05/21 116/70   03/01/21 120/70   01/18/21 118/74    Wt Readings from Last 3 Encounters:   05/05/21 126.6 kg (279 lb)   03/01/21 126.6 kg (279 lb)  "  01/18/21 127.5 kg (281 lb)                    Reviewed and updated as needed this visit by Provider                 Review of Systems   Constitutional, HEENT, cardiovascular, pulmonary, GI, , musculoskeletal, neuro, skin, endocrine and psych systems are negative, except as otherwise noted.      Objective    /70   Pulse 70   Temp 98.1  F (36.7  C) (Tympanic)   Resp 18   Ht 1.562 m (5' 1.5\")   Wt 126.6 kg (279 lb)   SpO2 98%   BMI 51.86 kg/m    Body mass index is 51.86 kg/m .  Physical Exam   GENERAL: healthy, alert and no distress  NECK: no adenopathy, no asymmetry, masses, or scars and thyroid normal to palpation  RESP: lungs clear to auscultation - no rales, rhonchi or wheezes  CV: regular rate and rhythm, normal S1 S2, no S3 or S4, no murmur, click or rub, no peripheral edema and peripheral pulses strong  ABDOMEN: soft, nontender, no hepatosplenomegaly, no masses and bowel sounds normal  MS: no gross musculoskeletal defects noted, no edema    Diagnostic Test Results:  Labs reviewed in Epic  Results for orders placed or performed in visit on 05/05/21   TSH with free T4 reflex     Status: Abnormal   Result Value Ref Range    TSH 0.30 (L) 0.40 - 4.00 mU/L   T4 free     Status: None   Result Value Ref Range    T4 Free 1.35 0.76 - 1.46 ng/dL           Assessment & Plan     1. Precordial pain  She has been having fleeiting episodes of pain. EKG is normal. Has significant stress with loss of her mom. We will see her back in a month. Her thyroid is again abnormal. She is finding anxiety is high and working with her therapist on this. Go t ER if this gain returns.   - EKG 12-lead complete w/read - (Clinic Performed)    2. Acquired hypothyroidism  Hold for now T4 is ok.   - TSH with free T4 reflex    3. Morbid obesity (H)  Longstanding has been working on weight loss with her phentermine. Maintained weight in last year while on this. Needing to lose weight for her knee surgery.     4. Severe episode of " recurrent major depressive disorder, without psychotic features (H)  Severe depression, notify her therapist anxiety is worse.  I am not skipping her medication on a consistent basis as she is with a [facility] Nurse Practitioner    - buPROPion (WELLBUTRIN XL) 300 MG 24 hr tablet; Take one by mouth every day.  Dispense: 90 tablet; Refill: 1            See Patient Instructions    No follow-ups on file.    SEVEN Bryant  [facility] [place]

## 2021-05-05 ENCOUNTER — OFFICE VISIT (OUTPATIENT)
Dept: FAMILY MEDICINE | Facility: OTHER | Age: 46
End: 2021-05-05
Attending: PHYSICIAN ASSISTANT
Payer: MEDICARE

## 2021-05-05 VITALS
TEMPERATURE: 98.1 F | DIASTOLIC BLOOD PRESSURE: 70 MMHG | OXYGEN SATURATION: 98 % | RESPIRATION RATE: 18 BRPM | HEIGHT: 62 IN | HEART RATE: 70 BPM | BODY MASS INDEX: 51.34 KG/M2 | SYSTOLIC BLOOD PRESSURE: 116 MMHG | WEIGHT: 279 LBS

## 2021-05-05 DIAGNOSIS — E03.9 ACQUIRED HYPOTHYROIDISM: ICD-10-CM

## 2021-05-05 DIAGNOSIS — F33.2 SEVERE EPISODE OF RECURRENT MAJOR DEPRESSIVE DISORDER, WITHOUT PSYCHOTIC FEATURES (H): ICD-10-CM

## 2021-05-05 DIAGNOSIS — R07.2 PRECORDIAL PAIN: Primary | ICD-10-CM

## 2021-05-05 DIAGNOSIS — E66.01 MORBID OBESITY (H): ICD-10-CM

## 2021-05-05 LAB
T4 FREE SERPL-MCNC: 1.35 NG/DL (ref 0.76–1.46)
TSH SERPL DL<=0.005 MIU/L-ACNC: 0.3 MU/L (ref 0.4–4)

## 2021-05-05 PROCEDURE — 36415 COLL VENOUS BLD VENIPUNCTURE: CPT | Mod: ZL | Performed by: PHYSICIAN ASSISTANT

## 2021-05-05 PROCEDURE — 84443 ASSAY THYROID STIM HORMONE: CPT | Mod: ZL | Performed by: PHYSICIAN ASSISTANT

## 2021-05-05 PROCEDURE — 84439 ASSAY OF FREE THYROXINE: CPT | Mod: ZL | Performed by: PHYSICIAN ASSISTANT

## 2021-05-05 PROCEDURE — 99214 OFFICE O/P EST MOD 30 MIN: CPT | Performed by: PHYSICIAN ASSISTANT

## 2021-05-05 PROCEDURE — G0463 HOSPITAL OUTPT CLINIC VISIT: HCPCS

## 2021-05-05 PROCEDURE — 93010 ELECTROCARDIOGRAM REPORT: CPT | Performed by: INTERNAL MEDICINE

## 2021-05-05 PROCEDURE — 93005 ELECTROCARDIOGRAM TRACING: CPT

## 2021-05-05 RX ORDER — BUPROPION HYDROCHLORIDE 300 MG/1
TABLET ORAL
Qty: 90 TABLET | Refills: 1 | Status: SHIPPED | OUTPATIENT
Start: 2021-05-05 | End: 2021-06-24

## 2021-05-05 ASSESSMENT — ANXIETY QUESTIONNAIRES
4. TROUBLE RELAXING: NEARLY EVERY DAY
3. WORRYING TOO MUCH ABOUT DIFFERENT THINGS: NEARLY EVERY DAY
5. BEING SO RESTLESS THAT IT IS HARD TO SIT STILL: NEARLY EVERY DAY
7. FEELING AFRAID AS IF SOMETHING AWFUL MIGHT HAPPEN: SEVERAL DAYS
GAD7 TOTAL SCORE: 17
1. FEELING NERVOUS, ANXIOUS, OR ON EDGE: NEARLY EVERY DAY
IF YOU CHECKED OFF ANY PROBLEMS ON THIS QUESTIONNAIRE, HOW DIFFICULT HAVE THESE PROBLEMS MADE IT FOR YOU TO DO YOUR WORK, TAKE CARE OF THINGS AT HOME, OR GET ALONG WITH OTHER PEOPLE: SOMEWHAT DIFFICULT
2. NOT BEING ABLE TO STOP OR CONTROL WORRYING: NEARLY EVERY DAY
6. BECOMING EASILY ANNOYED OR IRRITABLE: SEVERAL DAYS

## 2021-05-05 ASSESSMENT — MIFFLIN-ST. JEOR: SCORE: 1845.85

## 2021-05-05 ASSESSMENT — PAIN SCALES - GENERAL: PAINLEVEL: EXTREME PAIN (9)

## 2021-05-05 ASSESSMENT — PATIENT HEALTH QUESTIONNAIRE - PHQ9: SUM OF ALL RESPONSES TO PHQ QUESTIONS 1-9: 18

## 2021-05-05 NOTE — NURSING NOTE
"Chief Complaint   Patient presents with     Thyroid Disease     Depression     Anxiety       Initial /70   Pulse 70   Temp 98.1  F (36.7  C) (Tympanic)   Resp 18   Ht 1.562 m (5' 1.5\")   Wt 126.6 kg (279 lb)   SpO2 98%   BMI 51.86 kg/m   Estimated body mass index is 51.86 kg/m  as calculated from the following:    Height as of this encounter: 1.562 m (5' 1.5\").    Weight as of this encounter: 126.6 kg (279 lb).  Medication Reconciliation: complete  Erika Carbone LPN  "

## 2021-05-06 ASSESSMENT — ANXIETY QUESTIONNAIRES: GAD7 TOTAL SCORE: 17

## 2021-05-11 ENCOUNTER — TELEPHONE (OUTPATIENT)
Dept: FAMILY MEDICINE | Facility: OTHER | Age: 46
End: 2021-05-11

## 2021-05-11 NOTE — TELEPHONE ENCOUNTER
[facility] [id number]  Formerly [facility]    Verbal order for skilled nursing one time a week for eight weeks effective this week.

## 2021-05-17 DIAGNOSIS — Z53.9 PERSONS ENCOUNTERING HEALTH SERVICES FOR SPECIFIC PROCEDURES, NOT CARRIED OUT: Primary | ICD-10-CM

## 2021-05-17 PROCEDURE — G0179 MD RECERTIFICATION HHA PT: HCPCS | Performed by: FAMILY MEDICINE

## 2021-05-19 ENCOUNTER — APPOINTMENT (OUTPATIENT)
Dept: ULTRASOUND IMAGING | Facility: HOSPITAL | Age: 46
End: 2021-05-19
Attending: EMERGENCY MEDICINE
Payer: MEDICARE

## 2021-05-19 ENCOUNTER — HOSPITAL ENCOUNTER (EMERGENCY)
Facility: HOSPITAL | Age: 46
Discharge: HOME | End: 2021-05-19
Attending: EMERGENCY MEDICINE | Admitting: EMERGENCY MEDICINE
Payer: MEDICARE

## 2021-05-19 VITALS
TEMPERATURE: 98.5 F | OXYGEN SATURATION: 98 % | WEIGHT: 279 LBS | HEIGHT: 63 IN | RESPIRATION RATE: 16 BRPM | BODY MASS INDEX: 49.43 KG/M2 | DIASTOLIC BLOOD PRESSURE: 68 MMHG | SYSTOLIC BLOOD PRESSURE: 113 MMHG | HEART RATE: 78 BPM

## 2021-05-19 DIAGNOSIS — L03.115 CELLULITIS OF RIGHT LOWER EXTREMITY: ICD-10-CM

## 2021-05-19 DIAGNOSIS — M79.661 RIGHT CALF PAIN: ICD-10-CM

## 2021-05-19 LAB
ALBUMIN SERPL-MCNC: 3.4 G/DL (ref 3.4–5)
ALP SERPL-CCNC: 92 U/L (ref 40–150)
ALT SERPL W P-5'-P-CCNC: 31 U/L (ref 0–50)
ANION GAP SERPL CALCULATED.3IONS-SCNC: 9 MMOL/L (ref 3–14)
AST SERPL W P-5'-P-CCNC: 14 U/L (ref 0–45)
BASOPHILS # BLD AUTO: 0 10E9/L (ref 0–0.2)
BASOPHILS NFR BLD AUTO: 0.1 %
BILIRUB SERPL-MCNC: 0.3 MG/DL (ref 0.2–1.3)
BUN SERPL-MCNC: 18 MG/DL (ref 7–30)
CALCIUM SERPL-MCNC: 8.8 MG/DL (ref 8.5–10.1)
CHLORIDE SERPL-SCNC: 106 MMOL/L (ref 94–109)
CO2 SERPL-SCNC: 22 MMOL/L (ref 20–32)
CREAT SERPL-MCNC: 0.78 MG/DL (ref 0.52–1.04)
CRP SERPL-MCNC: 42.4 MG/L (ref 0–8)
D DIMER PPP FEU-MCNC: 0.5 UG/ML FEU (ref 0–0.5)
DIFFERENTIAL METHOD BLD: ABNORMAL
EOSINOPHIL # BLD AUTO: 0 10E9/L (ref 0–0.7)
EOSINOPHIL NFR BLD AUTO: 0 %
ERYTHROCYTE [DISTWIDTH] IN BLOOD BY AUTOMATED COUNT: 14.2 % (ref 10–15)
GFR SERPL CREATININE-BSD FRML MDRD: >90 ML/MIN/{1.73_M2}
GLUCOSE SERPL-MCNC: 274 MG/DL (ref 70–99)
HCT VFR BLD AUTO: 40.5 % (ref 35–47)
HGB BLD-MCNC: 13.5 G/DL (ref 11.7–15.7)
IMM GRANULOCYTES # BLD: 0.1 10E9/L (ref 0–0.4)
IMM GRANULOCYTES NFR BLD: 0.7 %
INR PPP: 1.11 (ref 0.86–1.14)
LYMPHOCYTES # BLD AUTO: 1.1 10E9/L (ref 0.8–5.3)
LYMPHOCYTES NFR BLD AUTO: 5.3 %
MCH RBC QN AUTO: 29.1 PG (ref 26.5–33)
MCHC RBC AUTO-ENTMCNC: 33.3 G/DL (ref 31.5–36.5)
MCV RBC AUTO: 87 FL (ref 78–100)
MONOCYTES # BLD AUTO: 0.8 10E9/L (ref 0–1.3)
MONOCYTES NFR BLD AUTO: 3.9 %
NEUTROPHILS # BLD AUTO: 18.2 10E9/L (ref 1.6–8.3)
NEUTROPHILS NFR BLD AUTO: 90 %
NRBC # BLD AUTO: 0 10*3/UL
NRBC BLD AUTO-RTO: 0 /100
PLATELET # BLD AUTO: 306 10E9/L (ref 150–450)
POTASSIUM SERPL-SCNC: 3.5 MMOL/L (ref 3.4–5.3)
PROT SERPL-MCNC: 7 G/DL (ref 6.8–8.8)
RBC # BLD AUTO: 4.64 10E12/L (ref 3.8–5.2)
SODIUM SERPL-SCNC: 137 MMOL/L (ref 133–144)
WBC # BLD AUTO: 20.2 10E9/L (ref 4–11)

## 2021-05-19 PROCEDURE — 99284 EMERGENCY DEPT VISIT MOD MDM: CPT | Performed by: EMERGENCY MEDICINE

## 2021-05-19 PROCEDURE — 86140 C-REACTIVE PROTEIN: CPT | Performed by: EMERGENCY MEDICINE

## 2021-05-19 PROCEDURE — 85025 COMPLETE CBC W/AUTO DIFF WBC: CPT | Performed by: EMERGENCY MEDICINE

## 2021-05-19 PROCEDURE — 250N000011 HC RX IP 250 OP 636: Performed by: EMERGENCY MEDICINE

## 2021-05-19 PROCEDURE — 85379 FIBRIN DEGRADATION QUANT: CPT | Performed by: EMERGENCY MEDICINE

## 2021-05-19 PROCEDURE — 85610 PROTHROMBIN TIME: CPT | Performed by: EMERGENCY MEDICINE

## 2021-05-19 PROCEDURE — 80053 COMPREHEN METABOLIC PANEL: CPT | Performed by: EMERGENCY MEDICINE

## 2021-05-19 PROCEDURE — 36415 COLL VENOUS BLD VENIPUNCTURE: CPT | Performed by: EMERGENCY MEDICINE

## 2021-05-19 PROCEDURE — 250N000013 HC RX MED GY IP 250 OP 250 PS 637: Performed by: EMERGENCY MEDICINE

## 2021-05-19 PROCEDURE — 93971 EXTREMITY STUDY: CPT | Mod: RT

## 2021-05-19 PROCEDURE — 96372 THER/PROPH/DIAG INJ SC/IM: CPT | Performed by: EMERGENCY MEDICINE

## 2021-05-19 PROCEDURE — 99285 EMERGENCY DEPT VISIT HI MDM: CPT | Mod: 25

## 2021-05-19 RX ORDER — SULFAMETHOXAZOLE/TRIMETHOPRIM 800-160 MG
1 TABLET ORAL 2 TIMES DAILY
Qty: 14 TABLET | Refills: 0 | Status: SHIPPED | OUTPATIENT
Start: 2021-05-19 | End: 2021-05-26

## 2021-05-19 RX ORDER — SULFAMETHOXAZOLE/TRIMETHOPRIM 800-160 MG
1 TABLET ORAL ONCE
Status: COMPLETED | OUTPATIENT
Start: 2021-05-19 | End: 2021-05-19

## 2021-05-19 RX ORDER — SULFAMETHOXAZOLE/TRIMETHOPRIM 800-160 MG
1 TABLET ORAL 2 TIMES DAILY
Status: DISCONTINUED | OUTPATIENT
Start: 2021-05-19 | End: 2021-05-19

## 2021-05-19 RX ORDER — CEFTRIAXONE SODIUM 1 G
1 VIAL (EA) INJECTION ONCE
Status: COMPLETED | OUTPATIENT
Start: 2021-05-19 | End: 2021-05-19

## 2021-05-19 RX ORDER — CEPHALEXIN 500 MG/1
500 CAPSULE ORAL 4 TIMES DAILY
Qty: 28 CAPSULE | Refills: 0 | Status: SHIPPED | OUTPATIENT
Start: 2021-05-19 | End: 2021-05-26

## 2021-05-19 RX ORDER — IBUPROFEN 200 MG
400 TABLET ORAL ONCE
Status: COMPLETED | OUTPATIENT
Start: 2021-05-19 | End: 2021-05-19

## 2021-05-19 RX ADMIN — SULFAMETHOXAZOLE AND TRIMETHOPRIM 1 TABLET: 800; 160 TABLET ORAL at 02:15

## 2021-05-19 RX ADMIN — IBUPROFEN 400 MG: 200 TABLET, FILM COATED ORAL at 01:49

## 2021-05-19 RX ADMIN — CEFTRIAXONE 1 G: 1 INJECTION, POWDER, FOR SOLUTION INTRAMUSCULAR; INTRAVENOUS at 02:11

## 2021-05-19 ASSESSMENT — MIFFLIN-ST. JEOR: SCORE: 1869.67

## 2021-05-19 NOTE — ED NOTES
Pt presents with c/o Rt calf pain and swelling that started 2 days ago and has progressively worsened. Noted redness of both calves but Rt is much darker and is painful. Pedal pulses intact.

## 2021-05-19 NOTE — ED PROVIDER NOTES
History     Chief Complaint   Patient presents with     Leg Swelling     Rt leg, started 2 days ago     Leg Pain     started 2 days ago and worsened today     HPI  Autumn Holbrook is a 45 year old female who presents ambulatory through triage during the [place] pandemic.  Patient has a history of osteoarthritis of the bilateral knees and is status post steroid/Synvisc injection 1 day ago after having a routine orthopedic visit.  Patient presents now with atraumatic right leg pain starting worsening over the last day.  Reports some chronic redness of bilateral calves.  Reports that the right calf seems more stiff and swollen than her baseline.  Patient denies any chest pain, shortness of breath.  Presents ambulatory with her cane.  She does report she is had some chronic pain in her leg over many years.  She also ports worsened knee pain over multiple months.    There has been no runny nose, sore throat, cough, change in taste or smell.  No bowel or bladder incontinence.  No skin rash.  No erythema.  No numbness or tingling.  She has had 2 shots of the moderna vaccine and has not had covid    .  Allergies:  Allergies   Allergen Reactions     Depakote [Valproic Acid]      Gabapentin Swelling     Propofol Unknown     Got very stiff and tight.        Problem List:    Patient Active Problem List    Diagnosis Date Noted     Grief 01/18/2021     Priority: Medium     GERD (gastroesophageal reflux disease) 12/13/2019     Priority: Medium     Morbid obesity (H) 12/13/2019     Priority: Medium     Obesity 09/21/2018     Priority: Medium     Trigger finger of both hands 05/17/2018     Priority: Medium     IMO Regulatory Load OCT 2020       Routine general medical examination at a health care facility 04/03/2017     Priority: Medium     Advance Care Planning 4/3/2017: ACP Review of Chart / Resources Provided:  Reviewed chart for advance care plan.  Ilene Holbrook has no plan or code status on file. Discussed available resources  and provided with information.   Added by Annette Araiza          Overview:   Pap smear:08  Cholesterol 08       Tear of lateral cartilage or meniscus of knee, current, right, initial encounter 2017     Priority: Medium     Chronic pain syndrome 2017     Priority: Medium     Opioid dependence in remission (H) 2017     Priority: Medium     On suboxone.        PTSD (post-traumatic stress disorder) 2017     Priority: Medium     COY (generalized anxiety disorder) 2017     Priority: Medium     Moderate episode of recurrent major depressive disorder (H) 2017     Priority: Medium     Bilateral edema of lower extremity 2017     Priority: Medium     Hypothyroid 2017     Priority: Medium     Mild mixed bipolar I disorder (H) 2010     Priority: Medium        Past Medical History:    Past Medical History:   Diagnosis Date     Anxiety      Arthritis      Depressive disorder      Migraine      Osteoarthritis      Thyroid disease        Past Surgical History:    Past Surgical History:   Procedure Laterality Date     CHOLECYSTECTOMY       GYN SURGERY       SECTION 3300-3528     GYN SURGERY  2004    LEEP       Family History:    Family History   Problem Relation Age of Onset     Cerebrovascular Disease Mother      Alcoholism Mother      Cancer Mother      Depression Mother      Eczema Mother      Hypertension Mother      Migraines Mother      Mental Illness Mother      Osteoarthritis Mother      Osteoporosis Mother      Alcoholism Father      Cancer Father      Hyperlipidemia Father      Hypertension Father      Myocardial Infarction Father      Mental Illness Sister      Migraines Sister        Social History:  Marital Status:   [5]  Social History     Tobacco Use     Smoking status: Current Every Day Smoker     Packs/day: 0.50     Years: 20.00     Pack years: 10.00     Types: Cigarettes     Smokeless tobacco: Never Used     Tobacco comment: pt  "declines   Substance Use Topics     Alcohol use: No     Drug use: No     Comment: previous opiod addiction        Medications:    Acetaminophen (TYLENOL PO)  ARIPiprazole (ABILIFY) 10 MG tablet  atomoxetine (STRATTERA) 60 MG capsule  baclofen (LIORESAL) 10 MG tablet  buprenorphine HCl-naloxone HCl (SUBOXONE) 2-0.5 MG per film  buPROPion (WELLBUTRIN XL) 300 MG 24 hr tablet  busPIRone (BUSPAR) 10 MG tablet  furosemide (LASIX) 20 MG tablet  hydrochlorothiazide (MICROZIDE) 12.5 MG capsule  hylan (SYNVISC) 16 MG/2ML injection  lamoTRIgine (LAMICTAL) 150 MG tablet  levothyroxine (EUTHYROX) 100 MCG tablet  NARCAN 4 MG/0.1ML nasal spray  NEW MED  oxybutynin (DITROPAN) 5 MG tablet  phentermine (ADIPEX-P) 30 MG capsule  potassium chloride ER (K-TAB/KLOR-CON) 10 MEQ CR tablet  topiramate (TOPAMAX) 50 MG tablet  HYDROXYZINE HCL PO  ibuprofen (ADVIL/MOTRIN) 800 MG tablet  Melatonin 10 MG CAPS  order for DME  order for DME          Review of Systems  Per HPI other 10 - does walk with a cane.      Physical Exam   BP: 120/80  Pulse: 96  Temp: 96.6  F (35.9  C)  Resp: 20  Height: 160 cm (5' 3\")  Weight: 126.6 kg (279 lb)  SpO2: 98 %      Physical Exam   Nursing note and vitals reviewed.  Constitutional: The patient appears well-developed.  Patient is seen initially in triage.  She presents with her cane.  HENT:   Mouth/Throat: Oropharynx is clear and moist.        Normal inspection   Eyes: Pupils are equal, round, and reactive to light.   Neck: Trachea normal. Neck supple. No rigidity. No tracheal deviation present.   Cardiovascular: Normal rate, regular rhythm, normal heart sounds and intact distal pulses.    No murmur heard.  Pulmonary/Chest: Effort normal and breath sounds normal. No stridor. No respiratory distress.   Abdominal: Soft. Bowel sounds are normal. The patient exhibits no pulsatile midline mass. There is no tenderness.  Protuberant consistent body habitus.  Musculoskeletal: Patient has some tenderness and swelling " about the right calf.  There is no joint warmth, joint erythema or fluctuance/crepitus lower extremities.  Injection site of her knees from yesterday is without redness swelling or fluctuance.  She reports a lateral approach to both knee injections.  Cap refill and distal pulses intact-dp and PT. Toes are warm and no emboli  Neurological: The patient is alert.  GCS 15.  Presents ambulatory.  She has intact move the hip knee ankle and EHL.  Intact sensory to palpation lower extremities.  Normal tone.  Normal patellar reflexes.  Skin: Skin is warm.  No petechia or purpura.  Undressed for lower extremity examination. patient does have some mild erythema of the lower extremities suggesting a degree of venous stasis.  There is subtle asymmetry with slightly more redness in the right medial calf.  There is no crepitus or subcu emphysema.  There is no petechia or purpura.  Psychiatric: The patient's behavior is with flat affect.  She is pleasant and the interaction with me.    ED Course     ED Course as of May 19 0353   Wed May 19, 2021   0116 Initial exam including examination  oflower extremities done in triage.  Patient moved to room 2.  Orders placed and patient undressed for further repeat exam.      0159 Ddx:  cellulitis/infection >> DVT.   No signs of arterial occlusion, compartment syndrome,joint infection or crepitus/Nec.       0352 Epic downtime completed.  V rad ultrasound right lower extremity: No evidence of DVT.  Unremarkable soft tissues.      0352 Patient is discharged with instructions for both Keflex and Bactrim.  Cellulitis follow-up instructions and recheck in 24 hours.  Patient requested discharge.        Procedures  Ultrasound right lower extremity.  Please see ED course above.  V rad results.          Results for orders placed or performed during the hospital encounter of 05/19/21 (from the past 24 hour(s))   CBC with platelets differential   Result Value Ref Range    WBC 20.2 (H) 4.0 - 11.0 10e9/L     RBC Count 4.64 3.8 - 5.2 10e12/L    Hemoglobin 13.5 11.7 - 15.7 g/dL    Hematocrit 40.5 35.0 - 47.0 %    MCV 87 78 - 100 fl    MCH 29.1 26.5 - 33.0 pg    MCHC 33.3 31.5 - 36.5 g/dL    RDW 14.2 10.0 - 15.0 %    Platelet Count 306 150 - 450 10e9/L    Diff Method Automated Method     % Neutrophils 90.0 %    % Lymphocytes 5.3 %    % Monocytes 3.9 %    % Eosinophils 0.0 %    % Basophils 0.1 %    % Immature Granulocytes 0.7 %    Nucleated RBCs 0 0 /100    Absolute Neutrophil 18.2 (H) 1.6 - 8.3 10e9/L    Absolute Lymphocytes 1.1 0.8 - 5.3 10e9/L    Absolute Monocytes 0.8 0.0 - 1.3 10e9/L    Absolute Eosinophils 0.0 0.0 - 0.7 10e9/L    Absolute Basophils 0.0 0.0 - 0.2 10e9/L    Abs Immature Granulocytes 0.1 0 - 0.4 10e9/L    Absolute Nucleated RBC 0.0    Comprehensive metabolic panel   Result Value Ref Range    Sodium 137 133 - 144 mmol/L    Potassium 3.5 3.4 - 5.3 mmol/L    Chloride 106 94 - 109 mmol/L    Carbon Dioxide 22 20 - 32 mmol/L    Anion Gap 9 3 - 14 mmol/L    Glucose 274 (H) 70 - 99 mg/dL    Urea Nitrogen 18 7 - 30 mg/dL    Creatinine 0.78 0.52 - 1.04 mg/dL    GFR Estimate >90 >60 mL/min/[1.73_m2]    GFR Estimate If Black >90 >60 mL/min/[1.73_m2]    Calcium 8.8 8.5 - 10.1 mg/dL    Bilirubin Total 0.3 0.2 - 1.3 mg/dL    Albumin 3.4 3.4 - 5.0 g/dL    Protein Total 7.0 6.8 - 8.8 g/dL    Alkaline Phosphatase 92 40 - 150 U/L    ALT 31 0 - 50 U/L    AST 14 0 - 45 U/L   CRP inflammation   Result Value Ref Range    CRP Inflammation 42.4 (H) 0.0 - 8.0 mg/L   D dimer quantitative   Result Value Ref Range    D Dimer 0.5 0.0 - 0.50 ug/ml FEU   INR   Result Value Ref Range    INR 1.11 0.86 - 1.14       Medications   ibuprofen (ADVIL/MOTRIN) tablet 400 mg (400 mg Oral Given 5/19/21 0149)   cefTRIAXone (ROCEPHIN) injection 1 g (1 g Intramuscular Given 5/19/21 0211)   sulfamethoxazole-trimethoprim (BACTRIM DS) 800-160 MG per tablet 1 tablet (1 tablet Oral Given 5/19/21 0215)       Assessments & Plan (with Medical Decision  Making)     I have reviewed the nursing notes.    I have reviewed the findings, diagnosis, plan and need for follow up with the patient.  MDM:  Autumn BATISTA Hnatko 1975 resents with atraumatic progressive right medial calf pain swelling and redness.  Patient's neurovascular intact.  No signs of compartment syndrome.  No signs of joint infection.  Symptoms are not suggestive of necrotizing process or vasculitis clinically.  With a D-dimer being 0.5 a DVT is unlikely.  We will treat for cellulitis as below.  Patient is medically stable.  At times symptoms progress or worsen require further escalation of cares or inpatient treatment but at this time there is no indication of such.  At the Time of this dictation ultrasound is pending during epic downtime.  Assuming the ultrasound is negative for DVT or organized fluid collection-abscess patient be discharged home with supportive cares close follow-up and prescriptions as below.    New Prescriptions    CEPHALEXIN (KEFLEX) 500 MG CAPSULE    Take 1 capsule (500 mg) by mouth 4 times daily for 7 days    SULFAMETHOXAZOLE-TRIMETHOPRIM (BACTRIM DS) 800-160 MG TABLET    Take 1 tablet by mouth 2 times daily for 7 days       Final diagnoses:   Right calf pain   Cellulitis of right lower extremity     Apryl Hathaway, PA  [address] [address]  [phone]    In 2 days      [facility]  [address] [address] [phone]  In 1 day  to recheck.  Addendum: Ultrasound right extremity is negative.  Discharged as planned with AVS printed just prior to [facility] downtime.    AVS for cellulitis.       5/19/2021   [facility]     Mu Mathew MD  05/19/21 0221       Mu Mathew MD  05/19/21 0356

## 2021-05-25 DIAGNOSIS — E66.01 MORBID OBESITY (H): ICD-10-CM

## 2021-05-25 DIAGNOSIS — G89.4 CHRONIC PAIN SYNDROME: ICD-10-CM

## 2021-05-26 DIAGNOSIS — Z53.9 PERSONS ENCOUNTERING HEALTH SERVICES FOR SPECIFIC PROCEDURES, NOT CARRIED OUT: Primary | ICD-10-CM

## 2021-05-26 NOTE — TELEPHONE ENCOUNTER
Baclofen      Last Written Prescription Date:  4/28/21  Last Fill Quantity: 90,   # refills: 0  Last Office Visit: 5/5/21  Future Office visit:    Next 5 appointments (look out 90 days)    Aug 04, 2021  2:30 PM  (Arrive by 2:15 PM)  SHORT with SEVEN Pandey  [facility] [place] ([facility] [place] ) [address]  [place] [address]  [phone]           Routing refill request to provider for review/approval because:      Adipex      Last Written Prescription Date:  5/5/21  Last Fill Quantity: 30,   # refills: 0  Last Office Visit: 5/5/21  Future Office visit:    Next 5 appointments (look out 90 days)    Aug 04, 2021  2:30 PM  (Arrive by 2:15 PM)  SHORT with SEVEN Pandey  [facility] [place] ([facility] [place] ) [address]  [place] [address]  [phone]           Routing refill request to provider for review/approval because:

## 2021-05-27 RX ORDER — BACLOFEN 10 MG/1
TABLET ORAL
Qty: 90 TABLET | Refills: 0 | Status: SHIPPED | OUTPATIENT
Start: 2021-05-27 | End: 2021-06-24

## 2021-05-27 RX ORDER — PHENTERMINE HYDROCHLORIDE 30 MG/1
CAPSULE ORAL
Qty: 30 CAPSULE | Refills: 0 | Status: SHIPPED | OUTPATIENT
Start: 2021-05-27 | End: 2021-07-07

## 2021-06-01 DIAGNOSIS — R60.0 BILATERAL LEG EDEMA: ICD-10-CM

## 2021-06-03 RX ORDER — FUROSEMIDE 20 MG
TABLET ORAL
Qty: 60 TABLET | Refills: 0 | Status: SHIPPED | OUTPATIENT
Start: 2021-06-03 | End: 2021-07-06

## 2021-06-03 NOTE — TELEPHONE ENCOUNTER
lasix      Last Written Prescription Date:  5/5/21  Last Fill Quantity: 60,   # refills: 0  Last Office Visit: 5/5/21  Future Office visit:    Next 5 appointments (look out 90 days)    Aug 04, 2021  2:30 PM  (Arrive by 2:15 PM)  SHORT with SEVEN Pandey  [facility] [place] ([facility] [place] ) [address]  [place] [address]  [phone]

## 2021-06-08 DIAGNOSIS — E03.9 ACQUIRED HYPOTHYROIDISM: ICD-10-CM

## 2021-06-09 RX ORDER — LEVOTHYROXINE SODIUM 100 UG/1
TABLET ORAL
Qty: 90 TABLET | Refills: 0 | Status: SHIPPED | OUTPATIENT
Start: 2021-06-09 | End: 2021-07-28

## 2021-06-09 NOTE — TELEPHONE ENCOUNTER
levothyroxine (EUTHYROX) 100 MCG tablet      Last Written Prescription Date:  3/24/2021  Last Fill Quantity: 90,   # refills: 0  Last Office Visit: 5/5/2021  Future Office visit:    Next 5 appointments (look out 90 days)    Aug 04, 2021  2:30 PM  (Arrive by 2:15 PM)  SHORT with SEVEN Pandey  [facility] [place] ([facility] [place] ) [address]  [place] [address]  [phone]

## 2021-06-16 DIAGNOSIS — F33.2 SEVERE EPISODE OF RECURRENT MAJOR DEPRESSIVE DISORDER, WITHOUT PSYCHOTIC FEATURES (H): ICD-10-CM

## 2021-06-16 RX ORDER — TOPIRAMATE 50 MG/1
TABLET, FILM COATED ORAL
Qty: 60 TABLET | Refills: 0 | Status: SHIPPED | OUTPATIENT
Start: 2021-06-16 | End: 2021-08-25

## 2021-06-16 NOTE — TELEPHONE ENCOUNTER
topamax      Last Written Prescription Date:  4/14/21  Last Fill Quantity: 60,   # refills: 0  Last Office Visit: 5/5/21  Future Office visit:    Next 5 appointments (look out 90 days)    Aug 04, 2021  2:30 PM  (Arrive by 2:15 PM)  SHORT with SEVEN Pandey  [facility] [place] ([facility] [place] ) [address]  [place] [address]  [phone]

## 2021-06-19 ENCOUNTER — MEDICAL CORRESPONDENCE (OUTPATIENT)
Dept: HEALTH INFORMATION MANAGEMENT | Facility: CLINIC | Age: 46
End: 2021-06-19

## 2021-06-24 DIAGNOSIS — F33.2 SEVERE EPISODE OF RECURRENT MAJOR DEPRESSIVE DISORDER, WITHOUT PSYCHOTIC FEATURES (H): ICD-10-CM

## 2021-06-24 DIAGNOSIS — N39.46 MIXED STRESS AND URGE URINARY INCONTINENCE: ICD-10-CM

## 2021-06-24 DIAGNOSIS — G89.4 CHRONIC PAIN SYNDROME: ICD-10-CM

## 2021-06-24 RX ORDER — OXYBUTYNIN CHLORIDE 5 MG/1
5 TABLET ORAL 3 TIMES DAILY
Qty: 90 TABLET | Refills: 3 | Status: SHIPPED | OUTPATIENT
Start: 2021-06-24 | End: 2022-08-01 | Stop reason: ALTCHOICE

## 2021-06-24 RX ORDER — BUPROPION HYDROCHLORIDE 150 MG/1
150 TABLET ORAL EVERY MORNING
Qty: 90 TABLET | Refills: 1 | Status: SHIPPED | OUTPATIENT
Start: 2021-06-24 | End: 2021-07-26

## 2021-06-24 RX ORDER — BACLOFEN 10 MG/1
TABLET ORAL
Qty: 90 TABLET | Refills: 0 | Status: SHIPPED | OUTPATIENT
Start: 2021-06-24 | End: 2021-07-28

## 2021-06-24 NOTE — TELEPHONE ENCOUNTER
baclofen (LIORESAL) 10 MG tablet  Last Written Prescription Date:  5/27/21  Last Fill Quantity: 90,   # refills: 0  Last Office Visit: 5/5/21  Future Office visit:    Next 5 appointments (look out 90 days)    Aug 04, 2021  2:30 PM  (Arrive by 2:15 PM)  SHORT with SEVEN Pandey  [facility] [place] ([facility] [place] ) [address]  [place] [address]  [phone]           Routing refill request to provider for review/approval

## 2021-06-24 NOTE — TELEPHONE ENCOUNTER
Oxybutynin Chloride 5 MG Oral Tablet  Last Written Prescription Date:  3/31/21    Last Fill Quantity: 90,   # refills: 0  Last Office Visit: 5/5/21  Future Office visit:    Next 5 appointments (look out 90 days)    Aug 04, 2021  2:30 PM  (Arrive by 2:15 PM)  SHORT with SEVEN Pandey  [facility] [place] ([facility] [place] ) [address]  [place] [address]  [phone]           Routing refill request to provider for review/approval because:

## 2021-07-06 DIAGNOSIS — E66.01 MORBID OBESITY (H): ICD-10-CM

## 2021-07-06 DIAGNOSIS — R60.0 BILATERAL LEG EDEMA: ICD-10-CM

## 2021-07-06 RX ORDER — FUROSEMIDE 20 MG
TABLET ORAL
Qty: 60 TABLET | Refills: 0 | Status: SHIPPED | OUTPATIENT
Start: 2021-07-06 | End: 2021-08-04

## 2021-07-06 NOTE — TELEPHONE ENCOUNTER
Furosemide 20mg  Last Written Prescription Date:  6/3/2021  Last Fill Quantity: 60 tablet,   # refills: 0  Last Office Visit: 5/5/2021  Future Office visit:    Next 5 appointments (look out 90 days)    Jul 26, 2021  4:00 PM  (Arrive by 3:45 PM)  SHORT with SEVEN Pandey  [facility] [place] ([facility] [place] ) [address]  [place] [address]  [phone]

## 2021-07-07 RX ORDER — PHENTERMINE HYDROCHLORIDE 30 MG/1
CAPSULE ORAL
Qty: 30 CAPSULE | Refills: 0 | Status: SHIPPED | OUTPATIENT
Start: 2021-07-07 | End: 2021-07-28

## 2021-07-07 NOTE — TELEPHONE ENCOUNTER
Adipex      Last Written Prescription Date:  5/27/2021  Last Fill Quantity: 30,   # refills: 0  Last Office Visit: 5/5/2021  Future Office visit:    Next 5 appointments (look out 90 days)    Jul 26, 2021  4:00 PM  (Arrive by 3:45 PM)  SHORT with SEVEN Pandey  [facility] [place] ([facility] [place] ) [address]  [place] [address]  [phone]

## 2021-07-13 DIAGNOSIS — Z53.9 PERSONS ENCOUNTERING HEALTH SERVICES FOR SPECIFIC PROCEDURES, NOT CARRIED OUT: Primary | ICD-10-CM

## 2021-07-20 NOTE — PROGRESS NOTES
Assessment & Plan     Dysuria  Get urine tested. She is concerned on odor.   - UA reflex to Microscopic; Future  - UA reflex to Microscopic    Moderate episode of recurrent major depressive disorder (H)  Seeing therapist. Better with her medications and also doing much  Better on her interpersonal relationships.     Acquired hypothyroidism  She will need to have her labs done.   - TSH with free T4 reflex; Future  - TSH with free T4 reflex  - T4 free    Dependent edema  Get JOBST, elevation, avoid salt.   - Miscellaneous Order for DME - ONLY FOR DME  - Basic metabolic panel; Future  - Basic metabolic panel             See Patient Instructions    No follow-ups on file.    SEVEN Bryant  [facility] [place] Le[place]va is a 45 year old who presents for the following health issues     DUE FOR  HEP C SCREENING  HEP B #2  PHYSICAL   MAMMOGRAM  COVID VACCINE  PHQ/COY    HPI     Depression and Anxiety Follow-Up    How are you doing with your depression since your last visit? Improved     How are you doing with your anxiety since your last visit?  Improved     Are you having other symptoms that might be associated with depression or anxiety? Yes:  anxious    Have you had a significant life event? Grief or Loss, dealing with mother's estate      Do you have any concerns with your use of alcohol or other drugs? No    Social History     Tobacco Use     Smoking status: Current Every Day Smoker     Packs/day: 0.50     Years: 20.00     Pack years: 10.00     Types: Cigarettes     Smokeless tobacco: Never Used     Tobacco comment: pt declines   Substance Use Topics     Alcohol use: No     Drug use: No     Comment: previous opiod addiction     PHQ 3/1/2021 5/5/2021 7/26/2021   PHQ-9 Total Score 19 18 14   Q9: Thoughts of better off dead/self-harm past 2 weeks Not at all Not at all Not at all   F/U: Thoughts of suicide or self-harm - - -   F/U: Self harm-plan - - -   F/U: Self-harm action - - -    F/U: Safety concerns - - -   PHQ-A Total Score - - -   PHQ-A Depressed most days in past year - - -   PHQ-A Mood affect on daily activities - - -   PHQ-A Suicide Ideation past 2 weeks - - -   PHQ-A Suicide Ideation past month - - -   PHQ-A Previous suicide attempt - - -     COY-7 SCORE 3/1/2021 5/5/2021 7/26/2021   Total Score 19 17 19     Last PHQ-9 7/26/2021   1.  Little interest or pleasure in doing things 1   2.  Feeling down, depressed, or hopeless 0   3.  Trouble falling or staying asleep, or sleeping too much 3   4.  Feeling tired or having little energy 1   5.  Poor appetite or overeating 3   6.  Feeling bad about yourself 1   7.  Trouble concentrating 3   8.  Moving slowly or restless 2   Q9: Thoughts of better off dead/self-harm past 2 weeks 0   PHQ-9 Total Score 14   Difficulty at work, home, or with people Somewhat difficult   In the past two weeks have you had thoughts of suicide or self harm? -   Do you have concerns about your personal safety or the safety of others? -   In the past 2 weeks have you thought about a plan or had intention to harm yourself? -   In the past 2 weeks have you acted on these thoughts in any way? -     COY-7  7/26/2021   1. Feeling nervous, anxious, or on edge 3   2. Not being able to stop or control worrying 3   3. Worrying too much about different things 3   4. Trouble relaxing 3   5. Being so restless that it is hard to sit still 3   6. Becoming easily annoyed or irritable 1   7. Feeling afraid, as if something awful might happen 3   COY-7 Total Score 19   If you checked any problems, how difficult have they made it for you to do your work, take care of things at home, or get along with other people? Very difficult       Suicide Assessment Five-step Evaluation and Treatment (SAFE-T)      How many servings of fruits and vegetables do you eat daily?  0-1    On average, how many sweetened beverages do you drink each day (Examples: soda, juice, sweet tea, etc.  Do NOT count  diet or artificially sweetened beverages)?   0    How many days per week do you exercise enough to make your heart beat faster? 3 or less    How many minutes a day do you exercise enough to make your heart beat faster? 9 or less    How many days per week do you miss taking your medication? 0        Review of Systems   Constitutional, HEENT, cardiovascular, pulmonary, gi and gu systems are negative, except as otherwise noted.      Objective    /80   Pulse 97   Temp 98.4  F (36.9  C)   Resp 20   Wt 124.7 kg (275 lb)   SpO2 98%   BMI 48.71 kg/m    Body mass index is 48.71 kg/m .  Physical Exam   GENERAL: healthy, alert and no distress  EYES: Eyes grossly normal to inspection, PERRL and conjunctivae and sclerae normal  NECK: no adenopathy, no asymmetry, masses, or scars and thyroid normal to palpation  RESP: lungs clear to auscultation - no rales, rhonchi or wheezes  CV: regular rate and rhythm, normal S1 S2, no S3 or S4, no murmur, click or rub, no peripheral edema and peripheral pulses strong  ABDOMEN: soft, nontender, no hepatosplenomegaly, no masses and bowel sounds normal  MS: no gross musculoskeletal defects noted, leg edema on right worse than left. Redness more prominent on the left as well. 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 Still  Needing to lose more weight before she can have replaced.   PSYCH: mentation appears normal, affect normal/bright, pt states she is feeling much better on her grief and loss. Still has flat affect most of visit.     No results found for any visits on 07/26/21.

## 2021-07-26 ENCOUNTER — OFFICE VISIT (OUTPATIENT)
Dept: FAMILY MEDICINE | Facility: OTHER | Age: 46
End: 2021-07-26
Attending: PHYSICIAN ASSISTANT
Payer: MEDICARE

## 2021-07-26 VITALS
RESPIRATION RATE: 20 BRPM | WEIGHT: 275 LBS | HEART RATE: 97 BPM | TEMPERATURE: 98.4 F | DIASTOLIC BLOOD PRESSURE: 80 MMHG | BODY MASS INDEX: 48.71 KG/M2 | OXYGEN SATURATION: 98 % | SYSTOLIC BLOOD PRESSURE: 126 MMHG

## 2021-07-26 DIAGNOSIS — F33.1 MODERATE EPISODE OF RECURRENT MAJOR DEPRESSIVE DISORDER (H): ICD-10-CM

## 2021-07-26 DIAGNOSIS — E03.9 ACQUIRED HYPOTHYROIDISM: ICD-10-CM

## 2021-07-26 DIAGNOSIS — R60.9 DEPENDENT EDEMA: ICD-10-CM

## 2021-07-26 DIAGNOSIS — R30.0 DYSURIA: Primary | ICD-10-CM

## 2021-07-26 LAB
ALBUMIN UR-MCNC: NEGATIVE MG/DL
ANION GAP SERPL CALCULATED.3IONS-SCNC: 7 MMOL/L (ref 3–14)
APPEARANCE UR: CLEAR
BILIRUB UR QL STRIP: NEGATIVE
BUN SERPL-MCNC: 10 MG/DL (ref 7–30)
CALCIUM SERPL-MCNC: 8.8 MG/DL (ref 8.5–10.1)
CHLORIDE BLD-SCNC: 104 MMOL/L (ref 94–109)
CO2 SERPL-SCNC: 27 MMOL/L (ref 20–32)
COLOR UR AUTO: NORMAL
CREAT SERPL-MCNC: 0.84 MG/DL (ref 0.52–1.04)
GFR SERPL CREATININE-BSD FRML MDRD: 83 ML/MIN/1.73M2
GLUCOSE BLD-MCNC: 110 MG/DL (ref 70–99)
GLUCOSE UR STRIP-MCNC: NEGATIVE MG/DL
HGB UR QL STRIP: NEGATIVE
KETONES UR STRIP-MCNC: NEGATIVE MG/DL
LEUKOCYTE ESTERASE UR QL STRIP: NEGATIVE
NITRATE UR QL: NEGATIVE
PH UR STRIP: 5.5 [PH] (ref 4.7–8)
POTASSIUM BLD-SCNC: 3.1 MMOL/L (ref 3.4–5.3)
SODIUM SERPL-SCNC: 138 MMOL/L (ref 133–144)
SP GR UR STRIP: 1.01 (ref 1–1.03)
T4 FREE SERPL-MCNC: 1.36 NG/DL (ref 0.76–1.46)
TSH SERPL DL<=0.005 MIU/L-ACNC: 0.17 MU/L (ref 0.4–4)
UROBILINOGEN UR STRIP-MCNC: NORMAL MG/DL

## 2021-07-26 PROCEDURE — 99213 OFFICE O/P EST LOW 20 MIN: CPT | Performed by: PHYSICIAN ASSISTANT

## 2021-07-26 PROCEDURE — G0463 HOSPITAL OUTPT CLINIC VISIT: HCPCS

## 2021-07-26 PROCEDURE — 80048 BASIC METABOLIC PNL TOTAL CA: CPT | Mod: ZL | Performed by: PHYSICIAN ASSISTANT

## 2021-07-26 PROCEDURE — 84443 ASSAY THYROID STIM HORMONE: CPT | Mod: ZL | Performed by: PHYSICIAN ASSISTANT

## 2021-07-26 PROCEDURE — 36415 COLL VENOUS BLD VENIPUNCTURE: CPT | Mod: ZL | Performed by: PHYSICIAN ASSISTANT

## 2021-07-26 PROCEDURE — 84439 ASSAY OF FREE THYROXINE: CPT | Mod: ZL | Performed by: PHYSICIAN ASSISTANT

## 2021-07-26 PROCEDURE — 81003 URINALYSIS AUTO W/O SCOPE: CPT | Mod: ZL | Performed by: PHYSICIAN ASSISTANT

## 2021-07-26 RX ORDER — CARIPRAZINE 3 MG/1
3 CAPSULE, GELATIN COATED ORAL EVERY MORNING
COMMUNITY
Start: 2021-07-02 | End: 2021-12-09 | Stop reason: DRUGHIGH

## 2021-07-26 RX ORDER — ASENAPINE 5 MG/1
TABLET SUBLINGUAL
COMMUNITY
Start: 2021-06-03 | End: 2022-07-11 | Stop reason: DRUGHIGH

## 2021-07-26 RX ORDER — LAMOTRIGINE 25 MG/1
TABLET ORAL
COMMUNITY
Start: 2021-06-16 | End: 2021-07-26

## 2021-07-26 RX ORDER — LAMOTRIGINE 100 MG/1
TABLET ORAL
COMMUNITY
Start: 2021-06-01 | End: 2021-07-26

## 2021-07-26 RX ORDER — CARIPRAZINE 1.5 MG/1
1.5 CAPSULE, GELATIN COATED ORAL EVERY MORNING
COMMUNITY
Start: 2021-06-16 | End: 2021-07-26

## 2021-07-26 ASSESSMENT — PATIENT HEALTH QUESTIONNAIRE - PHQ9
SUM OF ALL RESPONSES TO PHQ QUESTIONS 1-9: 14
5. POOR APPETITE OR OVEREATING: NEARLY EVERY DAY

## 2021-07-26 ASSESSMENT — ANXIETY QUESTIONNAIRES
IF YOU CHECKED OFF ANY PROBLEMS ON THIS QUESTIONNAIRE, HOW DIFFICULT HAVE THESE PROBLEMS MADE IT FOR YOU TO DO YOUR WORK, TAKE CARE OF THINGS AT HOME, OR GET ALONG WITH OTHER PEOPLE: VERY DIFFICULT
6. BECOMING EASILY ANNOYED OR IRRITABLE: SEVERAL DAYS
1. FEELING NERVOUS, ANXIOUS, OR ON EDGE: NEARLY EVERY DAY
GAD7 TOTAL SCORE: 19
5. BEING SO RESTLESS THAT IT IS HARD TO SIT STILL: NEARLY EVERY DAY
2. NOT BEING ABLE TO STOP OR CONTROL WORRYING: NEARLY EVERY DAY
7. FEELING AFRAID AS IF SOMETHING AWFUL MIGHT HAPPEN: NEARLY EVERY DAY
3. WORRYING TOO MUCH ABOUT DIFFERENT THINGS: NEARLY EVERY DAY

## 2021-07-26 ASSESSMENT — PAIN SCALES - GENERAL: PAINLEVEL: EXTREME PAIN (9)

## 2021-07-26 NOTE — NURSING NOTE
"Chief Complaint   Patient presents with     Recheck Medication       Initial /80   Pulse 97   Temp 98.4  F (36.9  C)   Resp 20   Wt 124.7 kg (275 lb)   SpO2 98%   BMI 48.71 kg/m   Estimated body mass index is 48.71 kg/m  as calculated from the following:    Height as of 5/19/21: 1.6 m (5' 3\").    Weight as of this encounter: 124.7 kg (275 lb).  Medication Reconciliation: complete  Jessenia Barbosa LPN  "

## 2021-07-27 DIAGNOSIS — G89.4 CHRONIC PAIN SYNDROME: ICD-10-CM

## 2021-07-27 DIAGNOSIS — E03.9 ACQUIRED HYPOTHYROIDISM: ICD-10-CM

## 2021-07-27 DIAGNOSIS — E66.01 MORBID OBESITY (H): ICD-10-CM

## 2021-07-27 ASSESSMENT — ANXIETY QUESTIONNAIRES: GAD7 TOTAL SCORE: 19

## 2021-07-28 RX ORDER — PHENTERMINE HYDROCHLORIDE 30 MG/1
CAPSULE ORAL
Qty: 30 CAPSULE | Refills: 0 | Status: SHIPPED | OUTPATIENT
Start: 2021-07-28 | End: 2021-09-01

## 2021-07-28 RX ORDER — BACLOFEN 10 MG/1
TABLET ORAL
Qty: 90 TABLET | Refills: 0 | Status: SHIPPED | OUTPATIENT
Start: 2021-07-28 | End: 2021-09-01

## 2021-07-28 RX ORDER — LEVOTHYROXINE SODIUM 100 UG/1
TABLET ORAL
Qty: 90 TABLET | Refills: 0 | Status: SHIPPED | OUTPATIENT
Start: 2021-07-28 | End: 2021-09-08

## 2021-07-28 NOTE — TELEPHONE ENCOUNTER
Levothyroxine Sodium 100 MCG Oral Tablet  Last Written Prescription Date:  6/9/21  Last Fill Quantity: 90,   # refills: 0  Last Office Visit: 7/26/21  Future Office visit:    Next 5 appointments (look out 90 days)    Sep 08, 2021  3:30 PM  (Arrive by 3:15 PM)  SHORT with SEVEN Pandey  [facility] ([facility] [place] ) [address] [facility] [address]  [phone]           Routing refill request to provider for review/approval because:        Phentermine HCL 30 mg oral capsule  Last Written Prescription Date:  7/7/21  Last Fill Quantity: 30,   # refills: 0  Last Office Visit: 7/26/21  Future Office visit:    Next 5 appointments (look out 90 days)    Sep 08, 2021  3:30 PM  (Arrive by 3:15 PM)  SHORT with SEVEN Pandey  [facility] ([facility] ) [address] [facility] [address] [address]  [facility] [address]  [phone]           Routing refill request to provider for review/approval because:        Baclofen     Last Written Prescription Date:  6/24/21  Last Fill Quantity: 90,   # refills: 0  Last Office Visit: 7/26/21  Future Office visit:    Next 5 appointments (look out 90 days)    Sep 08, 2021  3:30 PM  (Arrive by 3:15 PM)  SHORT with SEVEN Pandey  [facility] ([facility] ) [address] [facility] [address]  [phone]           Routing refill request to provider for review/approval because:

## 2021-08-03 DIAGNOSIS — I10 BENIGN ESSENTIAL HYPERTENSION: ICD-10-CM

## 2021-08-03 DIAGNOSIS — R60.0 BILATERAL LEG EDEMA: ICD-10-CM

## 2021-08-04 RX ORDER — HYDROCHLOROTHIAZIDE 12.5 MG/1
CAPSULE ORAL
Qty: 90 CAPSULE | Refills: 0 | Status: SHIPPED | OUTPATIENT
Start: 2021-08-04 | End: 2021-12-09 | Stop reason: DRUGHIGH

## 2021-08-04 RX ORDER — FUROSEMIDE 20 MG
TABLET ORAL
Qty: 60 TABLET | Refills: 0 | Status: SHIPPED | OUTPATIENT
Start: 2021-08-04 | End: 2021-09-01

## 2021-08-04 NOTE — TELEPHONE ENCOUNTER
Lasix       Last Written Prescription Date:  7/6/2021  Last Fill Quantity: 60,   # refills: 0  Last Office Visit: 7/26/2021  Future Office visit:    Next 5 appointments (look out 90 days)    Sep 08, 2021  3:30 PM  (Arrive by 3:15 PM)  SHORT with SEVEN Pandey  [facility] [place] ([facility] - [place] ) [address]  [place] [address]  [phone]

## 2021-08-04 NOTE — TELEPHONE ENCOUNTER
Microzide       Last Written Prescription Date:  5/5/2021  Last Fill Quantity: 90,   # refills: 0  Last Office Visit: 7/26/2021  Future Office visit:    Next 5 appointments (look out 90 days)    Sep 08, 2021  3:30 PM  (Arrive by 3:15 PM)  SHORT with SEVEN Pandey  [facility] [place] ([facility] [place] ) [address]  [place] [address]  [phone]

## 2021-08-11 DIAGNOSIS — I10 BENIGN ESSENTIAL HYPERTENSION: ICD-10-CM

## 2021-08-11 RX ORDER — HYDROCHLOROTHIAZIDE 25 MG/1
25 TABLET ORAL DAILY
Qty: 90 TABLET | Refills: 0 | Status: SHIPPED | OUTPATIENT
Start: 2021-08-11 | End: 2021-10-29

## 2021-08-11 NOTE — TELEPHONE ENCOUNTER
Received message from patient, she is inquiring if she can get a increase in her diuretic, states skin is getting tight around ankles, does not feel that the 12.5 is no longer working.

## 2021-08-13 ENCOUNTER — TELEPHONE (OUTPATIENT)
Dept: FAMILY MEDICINE | Facility: OTHER | Age: 46
End: 2021-08-13

## 2021-08-24 DIAGNOSIS — F33.2 SEVERE EPISODE OF RECURRENT MAJOR DEPRESSIVE DISORDER, WITHOUT PSYCHOTIC FEATURES (H): ICD-10-CM

## 2021-08-25 RX ORDER — TOPIRAMATE 50 MG/1
TABLET, FILM COATED ORAL
Qty: 60 TABLET | Refills: 0 | Status: SHIPPED | OUTPATIENT
Start: 2021-08-25 | End: 2021-10-29

## 2021-08-25 NOTE — TELEPHONE ENCOUNTER
topamax      Last Written Prescription Date:  6/16/21  Last Fill Quantity: 60,   # refills: 0  Last Office Visit: 7/26/21  Future Office visit:    Next 5 appointments (look out 90 days)    Sep 08, 2021  3:30 PM  (Arrive by 3:15 PM)  SHORT with SEVEN Pandey  [facility] [place] ([facility] [place] ) [address]  [place] [address]  [phone]

## 2021-08-31 DIAGNOSIS — R60.0 BILATERAL LEG EDEMA: ICD-10-CM

## 2021-08-31 DIAGNOSIS — E66.01 MORBID OBESITY (H): ICD-10-CM

## 2021-08-31 DIAGNOSIS — G89.4 CHRONIC PAIN SYNDROME: ICD-10-CM

## 2021-09-01 RX ORDER — PHENTERMINE HYDROCHLORIDE 30 MG/1
CAPSULE ORAL
Qty: 30 CAPSULE | Refills: 0 | Status: SHIPPED | OUTPATIENT
Start: 2021-09-01 | End: 2021-09-29

## 2021-09-01 RX ORDER — FUROSEMIDE 20 MG
TABLET ORAL
Qty: 60 TABLET | Refills: 0 | Status: SHIPPED | OUTPATIENT
Start: 2021-09-01 | End: 2021-09-29

## 2021-09-01 RX ORDER — BACLOFEN 10 MG/1
TABLET ORAL
Qty: 90 TABLET | Refills: 0 | Status: SHIPPED | OUTPATIENT
Start: 2021-09-01 | End: 2021-09-29

## 2021-09-01 NOTE — TELEPHONE ENCOUNTER
baclofen      Last Written Prescription Date:  7/28/21  Last Fill Quantity: 90,   # refills: 0  Last Office Visit: 7/26/21  Future Office visit:    Next 5 appointments (look out 90 days)    Sep 08, 2021  3:30 PM  (Arrive by 3:15 PM)  SHORT with SEVEN Pandey  [facility] [place] ([facility] ) [address] [place] [address]  [phone]         adipex      Last Written Prescription Date:  7/28/21  Last Fill Quantity: 30,   # refills: 0  Last Office Visit: 7/26/21  Future Office visit:    Next 5 appointments (look out 90 days)    Sep 08, 2021  3:30 PM  (Arrive by 3:15 PM)  SHORT with SEVEN Pandey  [facility] [place] ([facility] [place] ) [address]  [place] [address]  [phone]

## 2021-09-01 NOTE — TELEPHONE ENCOUNTER
lasix      Last Written Prescription Date:  8/4/21  Last Fill Quantity: 60,   # refills: 0  Last Office Visit: 7/26/21  Future Office visit:    Next 5 appointments (look out 90 days)    Sep 08, 2021  3:30 PM  (Arrive by 3:15 PM)  SHORT with SEVEN Pandey  [facility] [place] ([facility] [place] ) [address]  [place] [address]  [phone]

## 2021-09-07 NOTE — PROGRESS NOTES
"    Assessment & Plan     Acquired hypothyroidism  She is due for a recheck.     - levothyroxine (SYNTHROID/LEVOTHROID) 100 MCG tablet; Take 2 tablets by mouth once daily  - TSH with free T4 reflex; Future    COY (generalized anxiety disorder)  She is getting much better. Still under stress. Having to go through court for the house.  The house is in disrepair. Needing to be working on a legal way to take care of this.     Benign essential hypertension  Good blood pressure she is feeling better that way but very tired today.                BMI:   Estimated body mass index is 50.13 kg/m  as calculated from the following:    Height as of this encounter: 1.6 m (5' 3\").    Weight as of this encounter: 128.4 kg (283 lb).   Weight management plan: Discussed healthy diet and exercise guidelines    See Patient Instructions    No follow-ups on file.    SEVEN Bryant  [facility] [place] Le[place] is a 46 year old who presents for the following health issues     HPI     Depression and Anxiety Follow-Up    How are you doing with your depression since your last visit? Improved     How are you doing with your anxiety since your last visit?  No change    Are you having other symptoms that might be associated with depression or anxiety? No    Have you had a significant life event? Grief or Loss     Do you have any concerns with your use of alcohol or other drugs? No    Social History     Tobacco Use     Smoking status: Current Every Day Smoker     Packs/day: 0.50     Years: 32.00     Pack years: 16.00     Types: Cigarettes     Start date: 1/1/1989     Smokeless tobacco: Never Used     Tobacco comment: pt declines   Substance Use Topics     Alcohol use: No     Drug use: No     Comment: previous opiod addiction     PHQ 5/5/2021 7/26/2021 9/8/2021   PHQ-9 Total Score 18 14 11   Q9: Thoughts of better off dead/self-harm past 2 weeks Not at all Not at all Not at all   F/U: Thoughts of suicide or " self-harm - - -   F/U: Self harm-plan - - -   F/U: Self-harm action - - -   F/U: Safety concerns - - -   PHQ-A Total Score - - -   PHQ-A Depressed most days in past year - - -   PHQ-A Mood affect on daily activities - - -   PHQ-A Suicide Ideation past 2 weeks - - -   PHQ-A Suicide Ideation past month - - -   PHQ-A Previous suicide attempt - - -     COY-7 SCORE 5/5/2021 7/26/2021 9/8/2021   Total Score 17 19 19     Last PHQ-9 9/8/2021   1.  Little interest or pleasure in doing things 1   2.  Feeling down, depressed, or hopeless 1   3.  Trouble falling or staying asleep, or sleeping too much 2   4.  Feeling tired or having little energy 2   5.  Poor appetite or overeating 3   6.  Feeling bad about yourself 1   7.  Trouble concentrating 1   8.  Moving slowly or restless 0   Q9: Thoughts of better off dead/self-harm past 2 weeks 0   PHQ-9 Total Score 11   Difficulty at work, home, or with people -   In the past two weeks have you had thoughts of suicide or self harm? -   Do you have concerns about your personal safety or the safety of others? -   In the past 2 weeks have you thought about a plan or had intention to harm yourself? -   In the past 2 weeks have you acted on these thoughts in any way? -     COY-7  9/8/2021   1. Feeling nervous, anxious, or on edge 3   2. Not being able to stop or control worrying 3   3. Worrying too much about different things 3   4. Trouble relaxing 3   5. Being so restless that it is hard to sit still 3   6. Becoming easily annoyed or irritable 1   7. Feeling afraid, as if something awful might happen 3   COY-7 Total Score 19   If you checked any problems, how difficult have they made it for you to do your work, take care of things at home, or get along with other people? -       Suicide Assessment Five-step Evaluation and Treatment (SAFE-T)          Review of Systems   CONSTITUTIONAL: NEGATIVE for fever, chills, change in weight  INTEGUMENTARY/SKIN: NEGATIVE for worrisome rashes, moles  "or lesions  ENT/MOUTH: NEGATIVE for ear, mouth and throat problems  RESP: NEGATIVE for significant cough or SOB  CV: NEGATIVE for chest pain, palpitations or peripheral edema  PSYCHIATRIC: NEGATIVE for changes in mood or affect  ROS otherwise negative      Objective    /86 (BP Location: Left arm, Patient Position: Sitting, Cuff Size: Adult Regular)   Pulse 81   Temp 97.5  F (36.4  C) (Tympanic)   Ht 1.6 m (5' 3\")   Wt 128.4 kg (283 lb)   SpO2 98%   BMI 50.13 kg/m    Body mass index is 50.13 kg/m .  Physical Exam   GENERAL: healthy, alert and no distress  HENT: ear canals and TM's normal, nose and mouth without ulcers or lesions  NECK: no adenopathy, no asymmetry, masses, or scars and thyroid normal to palpation  RESP: lungs clear to auscultation - no rales, rhonchi or wheezes  CV: regular rate and rhythm, normal S1 S2, no S3 or S4, no murmur, click or rub, no peripheral edema and peripheral pulses strong  ABDOMEN: soft, nontender, no hepatosplenomegaly, no masses and bowel sounds normal  MS: bilateral knee pain. Getting injections next week , have been somewhat helpful. Has had hyaluronic acid. Moving trigger finger well. Seeing Dr. Laura.                 "

## 2021-09-08 ENCOUNTER — OFFICE VISIT (OUTPATIENT)
Dept: FAMILY MEDICINE | Facility: OTHER | Age: 46
End: 2021-09-08
Attending: PHYSICIAN ASSISTANT
Payer: MEDICARE

## 2021-09-08 VITALS
DIASTOLIC BLOOD PRESSURE: 86 MMHG | HEART RATE: 81 BPM | HEIGHT: 63 IN | TEMPERATURE: 97.5 F | OXYGEN SATURATION: 98 % | SYSTOLIC BLOOD PRESSURE: 124 MMHG | BODY MASS INDEX: 50.14 KG/M2 | WEIGHT: 283 LBS

## 2021-09-08 DIAGNOSIS — I10 BENIGN ESSENTIAL HYPERTENSION: ICD-10-CM

## 2021-09-08 DIAGNOSIS — E03.9 ACQUIRED HYPOTHYROIDISM: ICD-10-CM

## 2021-09-08 DIAGNOSIS — F41.1 GAD (GENERALIZED ANXIETY DISORDER): Primary | ICD-10-CM

## 2021-09-08 LAB
ANION GAP SERPL CALCULATED.3IONS-SCNC: 6 MMOL/L (ref 3–14)
BUN SERPL-MCNC: 11 MG/DL (ref 7–30)
CALCIUM SERPL-MCNC: 8.9 MG/DL (ref 8.5–10.1)
CHLORIDE BLD-SCNC: 104 MMOL/L (ref 94–109)
CO2 SERPL-SCNC: 26 MMOL/L (ref 20–32)
CREAT SERPL-MCNC: 0.81 MG/DL (ref 0.52–1.04)
GFR SERPL CREATININE-BSD FRML MDRD: 86 ML/MIN/1.73M2
GLUCOSE BLD-MCNC: 140 MG/DL (ref 70–99)
POTASSIUM BLD-SCNC: 3.2 MMOL/L (ref 3.4–5.3)
SODIUM SERPL-SCNC: 136 MMOL/L (ref 133–144)
T4 FREE SERPL-MCNC: 1.32 NG/DL (ref 0.76–1.46)
TSH SERPL DL<=0.005 MIU/L-ACNC: 0.21 MU/L (ref 0.4–4)

## 2021-09-08 PROCEDURE — 84439 ASSAY OF FREE THYROXINE: CPT | Mod: ZL | Performed by: PHYSICIAN ASSISTANT

## 2021-09-08 PROCEDURE — 80048 BASIC METABOLIC PNL TOTAL CA: CPT | Mod: ZL | Performed by: PHYSICIAN ASSISTANT

## 2021-09-08 PROCEDURE — 99213 OFFICE O/P EST LOW 20 MIN: CPT | Performed by: PHYSICIAN ASSISTANT

## 2021-09-08 PROCEDURE — 84443 ASSAY THYROID STIM HORMONE: CPT | Mod: ZL | Performed by: PHYSICIAN ASSISTANT

## 2021-09-08 PROCEDURE — G0463 HOSPITAL OUTPT CLINIC VISIT: HCPCS

## 2021-09-08 PROCEDURE — 36415 COLL VENOUS BLD VENIPUNCTURE: CPT | Mod: ZL | Performed by: PHYSICIAN ASSISTANT

## 2021-09-08 RX ORDER — LEVOTHYROXINE SODIUM 100 UG/1
TABLET ORAL
Qty: 90 TABLET | Refills: 0 | Status: SHIPPED | OUTPATIENT
Start: 2021-09-08 | End: 2021-12-01

## 2021-09-08 ASSESSMENT — ANXIETY QUESTIONNAIRES
4. TROUBLE RELAXING: NEARLY EVERY DAY
1. FEELING NERVOUS, ANXIOUS, OR ON EDGE: NEARLY EVERY DAY
2. NOT BEING ABLE TO STOP OR CONTROL WORRYING: NEARLY EVERY DAY
6. BECOMING EASILY ANNOYED OR IRRITABLE: SEVERAL DAYS
GAD7 TOTAL SCORE: 19
7. FEELING AFRAID AS IF SOMETHING AWFUL MIGHT HAPPEN: NEARLY EVERY DAY
3. WORRYING TOO MUCH ABOUT DIFFERENT THINGS: NEARLY EVERY DAY
5. BEING SO RESTLESS THAT IT IS HARD TO SIT STILL: NEARLY EVERY DAY

## 2021-09-08 ASSESSMENT — MIFFLIN-ST. JEOR: SCORE: 1882.81

## 2021-09-08 ASSESSMENT — PAIN SCALES - GENERAL: PAINLEVEL: EXTREME PAIN (8)

## 2021-09-08 ASSESSMENT — PATIENT HEALTH QUESTIONNAIRE - PHQ9: SUM OF ALL RESPONSES TO PHQ QUESTIONS 1-9: 11

## 2021-09-08 NOTE — NURSING NOTE
"Chief Complaint   Patient presents with     Depression     Anxiety       Initial Blood Pressure 124/86 (BP Location: Left arm, Patient Position: Sitting, Cuff Size: Adult Regular)   Pulse 81   Temperature 97.5  F (36.4  C) (Tympanic)   Height 1.6 m (5' 3\")   Weight 128.4 kg (283 lb)   Oxygen Saturation 98%   Body Mass Index 50.13 kg/m   Estimated body mass index is 50.13 kg/m  as calculated from the following:    Height as of this encounter: 1.6 m (5' 3\").    Weight as of this encounter: 128.4 kg (283 lb).  Medication Reconciliation: complete  Kenya Mendes LPN  "

## 2021-09-09 DIAGNOSIS — R73.09 ELEVATED GLUCOSE: Primary | ICD-10-CM

## 2021-09-09 DIAGNOSIS — E03.9 ACQUIRED HYPOTHYROIDISM: ICD-10-CM

## 2021-09-09 RX ORDER — LEVOTHYROXINE SODIUM 88 UG/1
TABLET ORAL
Qty: 90 TABLET | Refills: 0 | Status: SHIPPED | OUTPATIENT
Start: 2021-09-09 | End: 2021-12-01

## 2021-09-09 ASSESSMENT — ANXIETY QUESTIONNAIRES: GAD7 TOTAL SCORE: 19

## 2021-09-28 DIAGNOSIS — E66.01 MORBID OBESITY (H): ICD-10-CM

## 2021-09-28 DIAGNOSIS — G89.4 CHRONIC PAIN SYNDROME: ICD-10-CM

## 2021-09-28 DIAGNOSIS — R60.0 BILATERAL EDEMA OF LOWER EXTREMITY: ICD-10-CM

## 2021-09-28 DIAGNOSIS — R60.0 BILATERAL LEG EDEMA: ICD-10-CM

## 2021-09-29 RX ORDER — PHENTERMINE HYDROCHLORIDE 30 MG/1
CAPSULE ORAL
Qty: 30 CAPSULE | Refills: 0 | Status: SHIPPED | OUTPATIENT
Start: 2021-09-29 | End: 2021-10-29

## 2021-09-29 RX ORDER — BACLOFEN 10 MG/1
TABLET ORAL
Qty: 90 TABLET | Refills: 0 | Status: SHIPPED | OUTPATIENT
Start: 2021-09-29 | End: 2021-10-29

## 2021-09-29 RX ORDER — FUROSEMIDE 20 MG
TABLET ORAL
Qty: 60 TABLET | Refills: 0 | Status: SHIPPED | OUTPATIENT
Start: 2021-09-29 | End: 2021-10-29

## 2021-09-29 RX ORDER — POTASSIUM CHLORIDE 750 MG/1
TABLET, EXTENDED RELEASE ORAL
Qty: 30 TABLET | Refills: 0 | Status: SHIPPED | OUTPATIENT
Start: 2021-09-29 | End: 2021-10-29

## 2021-09-29 NOTE — TELEPHONE ENCOUNTER
lasix      Last Written Prescription Date:  9/1/21  Last Fill Quantity: 60,   # refills: 0  Last Office Visit: 9/8/21  Future Office visit:    Next 5 appointments (look out 90 days)    Dec 09, 2021  2:00 PM  (Arrive by 1:45 PM)  SHORT with SEVEN Pandey  [facility] [place] ([facility] [place] ) [address]  [place] [address]  [phone]

## 2021-09-29 NOTE — TELEPHONE ENCOUNTER
baclofen      Last Written Prescription Date:  9/1/21  Last Fill Quantity: 90,   # refills: 0  Last Office Visit: 9/8/21  Future Office visit:    Next 5 appointments (look out 90 days)    Dec 09, 2021  2:00 PM  (Arrive by 1:45 PM)  SHORT with SEVEN Pandey  [facility] ([facility] ) [address]  [facility] [address]  [phone]         phentermine      Last Written Prescription Date:  9/1/21  Last Fill Quantity: 30,   # refills: 0  Last Office Visit: 9/8/21  Future Office visit:    Next 5 appointments (look out 90 days)    Dec 09, 2021  2:00 PM  (Arrive by 1:45 PM)  SHORT with SEVEN Pandey  [facility] ([facility] ) [address]  [place] [address]  [phone]         potassium      Last Written Prescription Date:  4/6/21  Last Fill Quantity: 30,   # refills: 5  Last Office Visit: 9/8/21  Future Office visit:

## 2021-10-11 NOTE — TELEPHONE ENCOUNTER
phentermine      Last Written Prescription Date:  4/14/2020  Last Fill Quantity: 30,   # refills: 0  Last Office Visit: 3/13/2020   Clothing

## 2021-10-29 DIAGNOSIS — E66.01 MORBID OBESITY (H): ICD-10-CM

## 2021-10-29 DIAGNOSIS — I10 BENIGN ESSENTIAL HYPERTENSION: ICD-10-CM

## 2021-10-29 DIAGNOSIS — R60.0 BILATERAL LEG EDEMA: ICD-10-CM

## 2021-10-29 DIAGNOSIS — R60.0 BILATERAL EDEMA OF LOWER EXTREMITY: ICD-10-CM

## 2021-10-29 DIAGNOSIS — G89.4 CHRONIC PAIN SYNDROME: ICD-10-CM

## 2021-10-29 DIAGNOSIS — F33.2 SEVERE EPISODE OF RECURRENT MAJOR DEPRESSIVE DISORDER, WITHOUT PSYCHOTIC FEATURES (H): ICD-10-CM

## 2021-10-29 RX ORDER — POTASSIUM CHLORIDE 750 MG/1
TABLET, EXTENDED RELEASE ORAL
Qty: 30 TABLET | Refills: 0 | Status: SHIPPED | OUTPATIENT
Start: 2021-10-29 | End: 2021-12-01

## 2021-10-29 RX ORDER — HYDROCHLOROTHIAZIDE 25 MG/1
TABLET ORAL
Qty: 90 TABLET | Refills: 0 | Status: SHIPPED | OUTPATIENT
Start: 2021-10-29 | End: 2022-01-27

## 2021-10-29 RX ORDER — PHENTERMINE HYDROCHLORIDE 30 MG/1
CAPSULE ORAL
Qty: 30 CAPSULE | Refills: 0 | Status: SHIPPED | OUTPATIENT
Start: 2021-10-29 | End: 2021-12-01

## 2021-10-29 RX ORDER — TOPIRAMATE 50 MG/1
TABLET, FILM COATED ORAL
Qty: 60 TABLET | Refills: 0 | Status: SHIPPED | OUTPATIENT
Start: 2021-10-29 | End: 2021-12-01

## 2021-10-29 RX ORDER — FUROSEMIDE 20 MG
TABLET ORAL
Qty: 60 TABLET | Refills: 0 | Status: SHIPPED | OUTPATIENT
Start: 2021-10-29 | End: 2021-12-01

## 2021-10-29 RX ORDER — BACLOFEN 10 MG/1
TABLET ORAL
Qty: 90 TABLET | Refills: 0 | Status: SHIPPED | OUTPATIENT
Start: 2021-10-29 | End: 2021-12-01

## 2021-10-29 NOTE — TELEPHONE ENCOUNTER
BACLOFEN      Last Written Prescription Date:  9-  Last Fill Quantity: 90,   # refills: 0  Last Office Visit: 9-8-2021  Future Office visit:      LASIX      Last Written Prescription Date:  9-  Last Fill Quantity: 60,   # refills: 0  Last Office Visit: 9-8-2021      HCTZ      Last Written Prescription Date:  8-11-20212  Last Fill Quantity: 90,   # refills: 0  Last Office Visit: 9-8-*2021      ADIPEX      Last Written Prescription Date:  9-  Last Fill Quantity: 30,   # refills: 0  Last Office Visit: 9-8-2021    KLORCON      Last Written Prescription Date:  9-  Last Fill Quantity: 30,   # refills: 0  Last Office Visit: 9-8-2021      TOPAMAX      Last Written Prescription Date:  8-  Last Fill Quantity: 60,   # refills: 0  Last Office Visit: 9-8-2021  Future Office visit:    Next 5 appointments (look out 90 days)    Dec 09, 2021  2:00 PM  (Arrive by 1:45 PM)  SHORT with SEVEN Pandey  [facility] [place] ([facility] [place] ) [address]  [place] [address]  [phone]           Routing refill request to provider for review/approval because:  Drug not on the [facility], [facility] or  Health refill protocol or controlled substance

## 2021-11-01 ENCOUNTER — TELEPHONE (OUTPATIENT)
Dept: FAMILY MEDICINE | Facility: OTHER | Age: 46
End: 2021-11-01

## 2021-11-01 DIAGNOSIS — E03.9 ACQUIRED HYPOTHYROIDISM: Primary | ICD-10-CM

## 2021-11-01 NOTE — TELEPHONE ENCOUNTER
3:35 PM    Reason for Call: Phone Call    Description: Patient called and states she would like orders placed to test her Thyroid levels. Please call patient back for further discussion.     Was an appointment offered for this call? No  If yes : Appointment type              Date    Preferred method for responding to this message: Telephone Call  What is your phone number ? [phone]    If we cannot reach you directly, may we leave a detailed response at the number you provided? Yes    Can this message wait until your PCP/provider returns, if available today? Not applicable, provider is in today    Kathy Singh

## 2021-11-04 ENCOUNTER — LAB (OUTPATIENT)
Dept: LAB | Facility: OTHER | Age: 46
End: 2021-11-04
Payer: MEDICARE

## 2021-11-04 DIAGNOSIS — E03.9 ACQUIRED HYPOTHYROIDISM: ICD-10-CM

## 2021-11-04 DIAGNOSIS — R73.09 ELEVATED GLUCOSE: ICD-10-CM

## 2021-11-04 LAB
EST. AVERAGE GLUCOSE BLD GHB EST-MCNC: 154 MG/DL
HBA1C MFR BLD: 7 % (ref 0–5.6)
T4 FREE SERPL-MCNC: 1.38 NG/DL (ref 0.76–1.46)
TSH SERPL DL<=0.005 MIU/L-ACNC: 0.31 MU/L (ref 0.4–4)

## 2021-11-04 PROCEDURE — 84443 ASSAY THYROID STIM HORMONE: CPT | Mod: ZL

## 2021-11-04 PROCEDURE — 84439 ASSAY OF FREE THYROXINE: CPT | Mod: ZL

## 2021-11-04 PROCEDURE — 83036 HEMOGLOBIN GLYCOSYLATED A1C: CPT | Mod: ZL

## 2021-11-04 PROCEDURE — 36415 COLL VENOUS BLD VENIPUNCTURE: CPT | Mod: ZL

## 2021-11-09 DIAGNOSIS — Z53.9 PERSONS ENCOUNTERING HEALTH SERVICES FOR SPECIFIC PROCEDURES, NOT CARRIED OUT: Primary | ICD-10-CM

## 2021-11-09 PROCEDURE — G0179 MD RECERTIFICATION HHA PT: HCPCS | Performed by: FAMILY MEDICINE

## 2021-11-29 DIAGNOSIS — R60.0 BILATERAL EDEMA OF LOWER EXTREMITY: ICD-10-CM

## 2021-11-29 DIAGNOSIS — G89.4 CHRONIC PAIN SYNDROME: ICD-10-CM

## 2021-11-29 DIAGNOSIS — R60.0 BILATERAL LEG EDEMA: ICD-10-CM

## 2021-11-29 DIAGNOSIS — E66.01 MORBID OBESITY (H): ICD-10-CM

## 2021-11-29 DIAGNOSIS — E03.9 ACQUIRED HYPOTHYROIDISM: ICD-10-CM

## 2021-12-01 DIAGNOSIS — F33.2 SEVERE EPISODE OF RECURRENT MAJOR DEPRESSIVE DISORDER, WITHOUT PSYCHOTIC FEATURES (H): ICD-10-CM

## 2021-12-01 DIAGNOSIS — G89.4 CHRONIC PAIN SYNDROME: ICD-10-CM

## 2021-12-01 DIAGNOSIS — E66.01 MORBID OBESITY (H): ICD-10-CM

## 2021-12-01 DIAGNOSIS — R60.0 BILATERAL EDEMA OF LOWER EXTREMITY: ICD-10-CM

## 2021-12-01 DIAGNOSIS — E03.9 ACQUIRED HYPOTHYROIDISM: ICD-10-CM

## 2021-12-01 DIAGNOSIS — R60.0 BILATERAL LEG EDEMA: ICD-10-CM

## 2021-12-01 RX ORDER — LEVOTHYROXINE SODIUM 100 UG/1
TABLET ORAL
Qty: 90 TABLET | Refills: 0 | Status: SHIPPED | OUTPATIENT
Start: 2021-12-01 | End: 2022-03-09

## 2021-12-01 RX ORDER — PHENTERMINE HYDROCHLORIDE 30 MG/1
30 CAPSULE ORAL EVERY MORNING
Qty: 30 CAPSULE | Refills: 0 | Status: SHIPPED | OUTPATIENT
Start: 2021-12-01 | End: 2021-12-28

## 2021-12-01 RX ORDER — TOPIRAMATE 50 MG/1
TABLET, FILM COATED ORAL
Qty: 60 TABLET | Refills: 0 | Status: SHIPPED | OUTPATIENT
Start: 2021-12-01 | End: 2022-02-02

## 2021-12-01 RX ORDER — FUROSEMIDE 20 MG
TABLET ORAL
Qty: 60 TABLET | Refills: 0 | Status: SHIPPED | OUTPATIENT
Start: 2021-12-01 | End: 2022-01-06

## 2021-12-01 RX ORDER — POTASSIUM CHLORIDE 750 MG/1
TABLET, EXTENDED RELEASE ORAL
Qty: 30 TABLET | Refills: 0 | Status: SHIPPED | OUTPATIENT
Start: 2021-12-01 | End: 2022-01-06

## 2021-12-01 RX ORDER — BACLOFEN 10 MG/1
TABLET ORAL
Qty: 90 TABLET | Refills: 0 | Status: SHIPPED | OUTPATIENT
Start: 2021-12-01 | End: 2021-12-28

## 2021-12-01 RX ORDER — LEVOTHYROXINE SODIUM 88 UG/1
TABLET ORAL
Qty: 90 TABLET | Refills: 0 | Status: SHIPPED | OUTPATIENT
Start: 2021-12-01 | End: 2022-03-02

## 2021-12-02 RX ORDER — POTASSIUM CHLORIDE 750 MG/1
TABLET, EXTENDED RELEASE ORAL
Qty: 30 TABLET | Refills: 0 | OUTPATIENT
Start: 2021-12-02

## 2021-12-02 RX ORDER — BACLOFEN 10 MG/1
TABLET ORAL
Qty: 90 TABLET | Refills: 0 | OUTPATIENT
Start: 2021-12-02

## 2021-12-02 RX ORDER — FUROSEMIDE 20 MG
TABLET ORAL
Qty: 60 TABLET | Refills: 0 | OUTPATIENT
Start: 2021-12-02

## 2021-12-02 RX ORDER — LEVOTHYROXINE SODIUM 100 UG/1
TABLET ORAL
Qty: 90 TABLET | Refills: 0 | OUTPATIENT
Start: 2021-12-02

## 2021-12-02 RX ORDER — PHENTERMINE HYDROCHLORIDE 30 MG/1
CAPSULE ORAL
Qty: 30 CAPSULE | Refills: 0 | OUTPATIENT
Start: 2021-12-02

## 2021-12-02 RX ORDER — LEVOTHYROXINE SODIUM 88 UG/1
TABLET ORAL
Qty: 90 TABLET | Refills: 0 | OUTPATIENT
Start: 2021-12-02

## 2021-12-07 NOTE — PROGRESS NOTES
Assessment & Plan     Severe episode of recurrent major depressive disorder, without psychotic features (H)  She is gaining weight , impacting all parts of her life.  She is having trouble with ADL's are very difficult. Can't clean her body, can't clean her home, trouble moving all day long.  She is angry with herself for getting to this point.     Elevated glucose  a1c reviewed.     Type 2 diabetes mellitus with diabetic polyneuropathy, without long-term current use of insulin (H)  She is formally diabetic. No insulin needed. She is motivated to know her body can do it's own work as well to overcome the diabetes .   - metFORMIN (GLUCOPHAGE-XR) 500 MG 24 hr tablet; Take one pill by mouth with dinner for 2 weeks then increase to two pills at supper there after.  - Diabetes Education Referral (Gloria)    Review of external notes as documented elsewhere in note  Diagnosis or treatment significantly limited by social determinants of health - bipolar illness.   Ordering of each unique test  Prescription drug management  10  minutes spent on the date of the encounter doing chart review, review of outside records, review of test results, interpretation of tests, patient visit and documentation        See Patient Instructions    No follow-ups on file.    SEVEN [facility]  [facility] - [place]    Subjective   Autumn is a 46 year old who presents for the following health issues     HPI     Depression and Anxiety Follow-Up    How are you doing with your depression since your last visit? Worsened     How are you doing with your anxiety since your last visit?  Worsened     Are you having other symptoms that might be associated with depression or anxiety? No    Have you had a significant life event? Financial Concerns and Housing Concerns     Do you have any concerns with your use of alcohol or other drugs? No    Social History     Tobacco Use     Smoking status: Current Every Day Smoker     Packs/day: 0.50      Years: 32.00     Pack years: 16.00     Types: Cigarettes     Start date: 1/1/1989     Smokeless tobacco: Never Used     Tobacco comment: pt declines   Substance Use Topics     Alcohol use: No     Drug use: No     Comment: previous opiod addiction     PHQ 7/26/2021 9/8/2021 12/9/2021   PHQ-9 Total Score 14 11 21   Q9: Thoughts of better off dead/self-harm past 2 weeks Not at all Not at all Not at all   F/U: Thoughts of suicide or self-harm - - -   F/U: Self harm-plan - - -   F/U: Self-harm action - - -   F/U: Safety concerns - - -   PHQ-A Total Score - - -   PHQ-A Depressed most days in past year - - -   PHQ-A Mood affect on daily activities - - -   PHQ-A Suicide Ideation past 2 weeks - - -   PHQ-A Suicide Ideation past month - - -   PHQ-A Previous suicide attempt - - -     COY-7 SCORE 7/26/2021 9/8/2021 12/9/2021   Total Score 19 19 20     Last PHQ-9 12/9/2021   1.  Little interest or pleasure in doing things 2   2.  Feeling down, depressed, or hopeless 2   3.  Trouble falling or staying asleep, or sleeping too much 3   4.  Feeling tired or having little energy 3   5.  Poor appetite or overeating 3   6.  Feeling bad about yourself 3   7.  Trouble concentrating 3   8.  Moving slowly or restless 2   Q9: Thoughts of better off dead/self-harm past 2 weeks 0   PHQ-9 Total Score 21   Difficulty at work, home, or with people Very difficult   In the past two weeks have you had thoughts of suicide or self harm? -   Do you have concerns about your personal safety or the safety of others? -   In the past 2 weeks have you thought about a plan or had intention to harm yourself? -   In the past 2 weeks have you acted on these thoughts in any way? -     COY-7  12/9/2021   1. Feeling nervous, anxious, or on edge 3   2. Not being able to stop or control worrying 3   3. Worrying too much about different things 3   4. Trouble relaxing 3   5. Being so restless that it is hard to sit still 3   6. Becoming easily annoyed or irritable  "2   7. Feeling afraid, as if something awful might happen 3   COY-7 Total Score 20   If you checked any problems, how difficult have they made it for you to do your work, take care of things at home, or get along with other people? Very difficult       Suicide Assessment Five-step Evaluation and Treatment (SAFE-T)        She is working with her therapist Duane, he is helping her with life decisions.     Review of Systems   Constitutional, HEENT, cardiovascular, pulmonary, GI, , musculoskeletal, neuro, skin, endocrine and psych systems are negative, except as otherwise noted.      Objective    /82   Pulse 78   Temp 97.9  F (36.6  C)   Ht 1.6 m (5' 3\")   Wt 131.1 kg (289 lb)   SpO2 98%   BMI 51.19 kg/m    Body mass index is 51.19 kg/m .  Physical Exam   GENERAL: healthy, alert and no distress  EYES: Eyes grossly normal to inspection, PERRL and conjunctivae and sclerae normal  HENT: ear canals and TM's normal, nose and mouth without ulcers or lesions  NECK: no adenopathy, no asymmetry, masses, or scars and thyroid normal to palpation  RESP: lungs clear to auscultation - no rales, rhonchi or wheezes  CV: regular rate and rhythm, normal S1 S2, no S3 or S4, no murmur, click or rub, no peripheral edema and peripheral pulses strong  MS: leg and knee pain and walking with a cane.    SKIN: no suspicious lesions or rashes  NEURO: numbness on her feet and hands worsening with increase in weight.   PSYCH: very anxious upset , she is fearful that she will again be homeless.     Lab on 11/04/2021   Component Date Value Ref Range Status     Estimated Average Glucose 11/04/2021 154  mg/dL Final     Hemoglobin A1C 11/04/2021 7.0* 0.0 - 5.6 % Final    Normal <5.7%   Prediabetes 5.7-6.4%    Diabetes 6.5% or higher     Note: Adopted from ADA consensus guidelines.     TSH 11/04/2021 0.31* 0.40 - 4.00 mU/L Final     Free T4 11/04/2021 1.38  0.76 - 1.46 ng/dL Final               "

## 2021-12-08 RX ORDER — LITHIUM CARBONATE 300 MG
TABLET ORAL
COMMUNITY
Start: 2021-11-15 | End: 2021-12-09

## 2021-12-08 RX ORDER — CARIPRAZINE 4.5 MG/1
CAPSULE, GELATIN COATED ORAL
COMMUNITY
Start: 2021-11-19 | End: 2022-03-09

## 2021-12-09 ENCOUNTER — OFFICE VISIT (OUTPATIENT)
Dept: FAMILY MEDICINE | Facility: OTHER | Age: 46
End: 2021-12-09
Attending: PHYSICIAN ASSISTANT
Payer: MEDICARE

## 2021-12-09 VITALS
HEIGHT: 63 IN | TEMPERATURE: 97.9 F | BODY MASS INDEX: 51.21 KG/M2 | OXYGEN SATURATION: 98 % | WEIGHT: 289 LBS | DIASTOLIC BLOOD PRESSURE: 82 MMHG | HEART RATE: 78 BPM | SYSTOLIC BLOOD PRESSURE: 120 MMHG

## 2021-12-09 DIAGNOSIS — F33.2 SEVERE EPISODE OF RECURRENT MAJOR DEPRESSIVE DISORDER, WITHOUT PSYCHOTIC FEATURES (H): Primary | ICD-10-CM

## 2021-12-09 DIAGNOSIS — E11.42 TYPE 2 DIABETES MELLITUS WITH DIABETIC POLYNEUROPATHY, WITHOUT LONG-TERM CURRENT USE OF INSULIN (H): ICD-10-CM

## 2021-12-09 DIAGNOSIS — R73.09 ELEVATED GLUCOSE: ICD-10-CM

## 2021-12-09 PROBLEM — E11.9 DIABETES MELLITUS, TYPE 2 (H): Status: ACTIVE | Noted: 2021-12-09

## 2021-12-09 PROCEDURE — 99214 OFFICE O/P EST MOD 30 MIN: CPT | Performed by: PHYSICIAN ASSISTANT

## 2021-12-09 PROCEDURE — G0463 HOSPITAL OUTPT CLINIC VISIT: HCPCS

## 2021-12-09 RX ORDER — METFORMIN HCL 500 MG
TABLET, EXTENDED RELEASE 24 HR ORAL
Qty: 60 TABLET | Refills: 3 | Status: SHIPPED | OUTPATIENT
Start: 2021-12-09 | End: 2022-01-19

## 2021-12-09 ASSESSMENT — ANXIETY QUESTIONNAIRES
3. WORRYING TOO MUCH ABOUT DIFFERENT THINGS: NEARLY EVERY DAY
5. BEING SO RESTLESS THAT IT IS HARD TO SIT STILL: NEARLY EVERY DAY
6. BECOMING EASILY ANNOYED OR IRRITABLE: MORE THAN HALF THE DAYS
4. TROUBLE RELAXING: NEARLY EVERY DAY
IF YOU CHECKED OFF ANY PROBLEMS ON THIS QUESTIONNAIRE, HOW DIFFICULT HAVE THESE PROBLEMS MADE IT FOR YOU TO DO YOUR WORK, TAKE CARE OF THINGS AT HOME, OR GET ALONG WITH OTHER PEOPLE: VERY DIFFICULT
GAD7 TOTAL SCORE: 20
2. NOT BEING ABLE TO STOP OR CONTROL WORRYING: NEARLY EVERY DAY
7. FEELING AFRAID AS IF SOMETHING AWFUL MIGHT HAPPEN: NEARLY EVERY DAY
1. FEELING NERVOUS, ANXIOUS, OR ON EDGE: NEARLY EVERY DAY

## 2021-12-09 ASSESSMENT — PAIN SCALES - GENERAL: PAINLEVEL: EXTREME PAIN (8)

## 2021-12-09 ASSESSMENT — PATIENT HEALTH QUESTIONNAIRE - PHQ9: SUM OF ALL RESPONSES TO PHQ QUESTIONS 1-9: 21

## 2021-12-09 ASSESSMENT — MIFFLIN-ST. JEOR: SCORE: 1910.03

## 2021-12-09 NOTE — NURSING NOTE
"Chief Complaint   Patient presents with     Depression       Initial /82   Pulse 78   Temp 97.9  F (36.6  C)   Ht 1.6 m (5' 3\")   Wt 131.1 kg (289 lb)   SpO2 98%   BMI 51.19 kg/m   Estimated body mass index is 51.19 kg/m  as calculated from the following:    Height as of this encounter: 1.6 m (5' 3\").    Weight as of this encounter: 131.1 kg (289 lb).  Medication Reconciliation: complete  Manpreet Ramirez LPN  "

## 2021-12-10 ASSESSMENT — ANXIETY QUESTIONNAIRES: GAD7 TOTAL SCORE: 20

## 2021-12-15 ENCOUNTER — HOSPITAL ENCOUNTER (OUTPATIENT)
Dept: EDUCATION SERVICES | Facility: HOSPITAL | Age: 46
Discharge: HOME | End: 2021-12-15
Attending: PHYSICIAN ASSISTANT | Admitting: PHYSICIAN ASSISTANT
Payer: MEDICARE

## 2021-12-15 VITALS
HEIGHT: 62 IN | OXYGEN SATURATION: 90 % | RESPIRATION RATE: 16 BRPM | DIASTOLIC BLOOD PRESSURE: 82 MMHG | BODY MASS INDEX: 52.85 KG/M2 | WEIGHT: 287.2 LBS | SYSTOLIC BLOOD PRESSURE: 132 MMHG | HEART RATE: 76 BPM

## 2021-12-15 DIAGNOSIS — E11.65 TYPE 2 DIABETES MELLITUS WITH HYPERGLYCEMIA, WITHOUT LONG-TERM CURRENT USE OF INSULIN (H): Primary | ICD-10-CM

## 2021-12-15 PROCEDURE — G0108 DIAB MANAGE TRN  PER INDIV: HCPCS | Performed by: DIETITIAN, REGISTERED

## 2021-12-15 ASSESSMENT — MIFFLIN-ST. JEOR: SCORE: 1887.89

## 2021-12-15 ASSESSMENT — PAIN SCALES - GENERAL: PAINLEVEL: EXTREME PAIN (9)

## 2021-12-15 NOTE — PATIENT INSTRUCTIONS
Think about the changes you want to make. Write down some of the goals you have.    Test BG 1x a day- in the morning before you eat or drink anything    Follow up on Tuesday December 28th at 2pm.

## 2021-12-15 NOTE — PROGRESS NOTES
Autumn is here for Session 1 for Diabetes Education. See Taisha Corral's note for Diabetes Ed provided. Autumn is tolerating her Metformin.    Yohana Ruiz RN, [facility]

## 2021-12-15 NOTE — LETTER
"    12/15/2021        RE: Autumn BATISTA Hnatko  [address] [facility] [address]        Autumn is here for Session 1 for Diabetes Education. See Taisha Corral's note for Diabetes Ed provided. Autumn is tolerating her Metformin.    Yohana Ruiz RN, [facility]    Diabetes Self-Management Education & Support    Presents for: Initial Assessment for new diagnosis    SUBJECTIVE/OBJECTIVE:  Presents for: Initial Assessment for new diagnosis  Accompanied by: Self  Diabetes education in the past 24mo: No  Focus of Visit: Patient Unsure,Diabetes Pathophysiology,Monitoring,Healthy Eating  Diabetes type: Type 2  Date of diagnosis: 11/4/21  Disease course: Other  How confident are you filling out medical forms by yourself:: Extremely  Diabetes management related comments/concerns: Affording supplies  Transportation concerns: Yes  Difficulty affording diabetes medication?: No  Difficulty affording diabetes testing supplies?: Sometimes (possibly)  Other concerns:: Glasses,Physical impairment  Cultural Influences/Ethnic Background:  American    Diabetes Symptoms & Complications:  Fatigue: Yes  Neuropathy: Sometimes  Polydipsia: Yes  Polyphagia: Yes  Polyuria: Yes  Visual change: No  Slow healing wounds: Yes  Weight trend: Increasing  Complications assessed today?: No    Patient Problem List and Family Medical History reviewed for relevant medical history, current medical status, and diabetes risk factors.    Vitals:  /82   Pulse 76   Resp 16   Ht 1.578 m (5' 2.12\")   Wt 130.3 kg (287 lb 3.2 oz)   SpO2 (!) 90%   BMI 52.33 kg/m    Estimated body mass index is 52.33 kg/m  as calculated from the following:    Height as of this encounter: 1.578 m (5' 2.12\").    Weight as of this encounter: 130.3 kg (287 lb 3.2 oz).   Last 3 BP:   BP Readings from Last 3 Encounters:   12/15/21 132/82   12/09/21 120/82   09/08/21 124/86       History   Smoking Status     Current Every Day Smoker     Packs/day: 0.50     Years: 32.00     Types: " Cigarettes     Start date: 1/1/1989   Smokeless Tobacco     Never Used     Comment: pt declines       Labs:  Lab Results   Component Value Date    A1C 7.0 11/04/2021    A1C 6.0 01/29/2020     Lab Results   Component Value Date     09/08/2021     05/19/2021     Lab Results   Component Value Date     01/29/2020     HDL Cholesterol   Date Value Ref Range Status   01/29/2020 87 >49 mg/dL Final   ]  GFR Estimate   Date Value Ref Range Status   09/08/2021 86 >60 mL/min/1.73m2 Final     Comment:     As of July 11, 2021, eGFR is calculated by the CKD-EPI creatinine equation, without race adjustment. eGFR can be influenced by muscle mass, exercise, and diet. The reported eGFR is an estimation only and is only applicable if the renal function is stable.   05/19/2021 >90 >60 mL/min/[1.73_m2] Final     Comment:     Non  GFR Calc  Starting 12/18/2018, serum creatinine based estimated GFR (eGFR) will be   calculated using the Chronic Kidney Disease Epidemiology Collaboration   (CKD-EPI) equation.       GFR Estimate If Black   Date Value Ref Range Status   05/19/2021 >90 >60 mL/min/[1.73_m2] Final     Comment:      GFR Calc  Starting 12/18/2018, serum creatinine based estimated GFR (eGFR) will be   calculated using the Chronic Kidney Disease Epidemiology Collaboration   (CKD-EPI) equation.       Lab Results   Component Value Date    CR 0.81 09/08/2021    CR 0.78 05/19/2021     No results found for: MICROALBUMIN    Healthy Eating:  Healthy Eating Assessed Today: Yes  Cultural/Restoration diet restrictions?: No  Meal planning/habits: Carb counting,Frequent snacking  How many times a week on average do you eat food made away from home (restaurant/take-out)?: 4 (3-4 times a week)  Meals include: Dinner (usually only 1 meal a day but snacking more, if feeling shaky is eating more)  Dinner: out to eat, 3:30- 5:30, [employer]- chicken wings, steak bites, gyro, fish kelley (favorite) with  salad, Burger. McDonal[employer], Snickers convience- Pizza, wings  Snacks: popcorn skinny girl kettle corn, chips, cauliflower, bagged salads, lettuce  Beverages: Diet soda,Water,Milk,Coffee (prabhjot in water, 1%, cream and splenda in coffee)  Has patient met with a dietitian in the past?: Yes (with gestational diabetes)    Being Active:  Being Active Assessed Today: Yes  Exercise:: Currently not exercising (not much movement since mom passed)  Barrier to exercise: Physical limitation (chronic pain)    Monitoring:  Monitoring Assessed Today: No  Did patient bring glucose meter to appointment? : No  Blood Glucose Meter:  (none yet)    Provided monitor kit- insurance may cover a different monitor and     Taking Medications:  Diabetes Medication(s)     Biguanides       metFORMIN (GLUCOPHAGE-XR) 500 MG 24 hr tablet    Take one pill by mouth with dinner for 2 weeks then increase to two pills at supper there after.        Taking Medication Assessed Today: Yes  Problems taking diabetes medications regularly?: No  Diabetes medication side effects?: No    Problem Solving:       Reducing Risks:  Diabetes Risks: Age over 45 years,History of gestational diabetes,Sedentary Lifestyle  CAD Risks: Sedentary lifestyle,Tobacco exposure,Stress,Obesity,Family history,Diabetes Mellitus  Has dilated eye exam at least once a year?: No (working with insurance on finding doctor)  Sees dentist every 6 months?: No (working with insuance on finding a dentist)    Healthy Coping:  Healthy Coping Assessed Today: Yes  Emotional response to diabetes: Ready to learn,Depression (depression due to mom passing)  Informal Support system:: Family (mental health practitioner, [facility], )  Stage of change: ACTION (Actively working towards change)  Support resources: In-person Offerings  Patient Activation Measure Survey Score:  KEN Score (Last Two) 11/28/2018 3/7/2019   KEN Raw Score 27 27   Activation Score 47.4 47.4   KEN Level 2 2       Diabetes knowledge  and skills assessment:   Patient is knowledgeable in diabetes management concepts related to: Healthy Eating, Being Active, Monitoring, Taking Medication and Reducing Risks    Patient needs further education on the following diabetes management concepts: Healthy Eating, Being Active, Monitoring, Taking Medication, Problem Solving, Reducing Risks and Healthy Coping    Based on learning assessment above, most appropriate setting for further diabetes education would be: Individual setting.      INTERVENTIONS:    Education provided today on:  AADE Self-Care Behaviors:  Diabetes Pathophysiology  Healthy Eating: carbohydrate counting, consistency in amount, composition, and timing of food intake, eating out and portion control  Being Active: relationship to blood glucose, describe appropriate activity program and precautions to take  Monitoring: purpose, proper technique, individual blood glucose targets, frequency of monitoring and proper sharps disposal  Taking Medication: action of prescribed medication and side effects of prescribed medications  Patient was instructed on Accu-Chek Guide Me meter and was able to provide an accurate return demonstration. Patient's blood glucose reading today was- did not test    Opportunities for ongoing education and support in diabetes-self management were discussed.    Pt verbalized understanding of concepts discussed and recommendations provided today.       Education Materials Provided:  Accu-Chek Guide Me meter kit and Basics for type 2 diabetes my food plan      ASSESSMENT:  Pt seen for session 1. Pt has lost many family members over the past 2 years, most recently her mom this past July. She has been in a great depression since that time. She does see a therapist. We were discussing trying to eat regular meals and at that point pt became very overwhelmed and felt hopeless. She did not think she will be able to change her ways. She felt her depression caused her diabetes. Offered  to have a member of our  team come in and talk with her, but she was able to calm herself and said she had an appointment with her therapist in the next day or 2. Much more discussion on making small changes as she feels comfortable. At this point pt feels comfortable with testing her BG 1x per day in the morning, maybe occasionally in the evening and will work on eating out less often.      Patient's most recent   Lab Results   Component Value Date    A1C 7.0 11/04/2021    A1C 6.0 01/29/2020    is meeting goal of <7.0    PLAN  See Patient Instructions for co-developed, patient-stated behavior change goals.  AVS printed and provided to patient today. See Follow-Up section for recommended follow-up.    Think about the changes you want to make. Write down some of the goals you have.    Test BG 1x a day- in the morning before you eat or drink anything    Follow up on Tuesday December 28th at 2pm.  Time Spent: 90 minutes  Encounter Type: Individual    Any diabetes medication dose changes were made via the CDE Protocol and Collaborative Practice Agreement with the patient's referring provider. A copy of this encounter was shared with the provider.            Sincerely,        Taisha Corral RD

## 2021-12-16 NOTE — PROGRESS NOTES
"Diabetes Self-Management Education & Support    Presents for: Initial Assessment for new diagnosis    SUBJECTIVE/OBJECTIVE:  Presents for: Initial Assessment for new diagnosis  Accompanied by: Self  Diabetes education in the past 24mo: No  Focus of Visit: Patient Unsure,Diabetes Pathophysiology,Monitoring,Healthy Eating  Diabetes type: Type 2  Date of diagnosis: 11/4/21  Disease course: Other  How confident are you filling out medical forms by yourself:: Extremely  Diabetes management related comments/concerns: Affording supplies  Transportation concerns: Yes  Difficulty affording diabetes medication?: No  Difficulty affording diabetes testing supplies?: Sometimes (possibly)  Other concerns:: Glasses,Physical impairment  Cultural Influences/Ethnic Background:  American    Diabetes Symptoms & Complications:  Fatigue: Yes  Neuropathy: Sometimes  Polydipsia: Yes  Polyphagia: Yes  Polyuria: Yes  Visual change: No  Slow healing wounds: Yes  Weight trend: Increasing  Complications assessed today?: No    Patient Problem List and Family Medical History reviewed for relevant medical history, current medical status, and diabetes risk factors.    Vitals:  /82   Pulse 76   Resp 16   Ht 1.578 m (5' 2.12\")   Wt 130.3 kg (287 lb 3.2 oz)   SpO2 (!) 90%   BMI 52.33 kg/m    Estimated body mass index is 52.33 kg/m  as calculated from the following:    Height as of this encounter: 1.578 m (5' 2.12\").    Weight as of this encounter: 130.3 kg (287 lb 3.2 oz).   Last 3 BP:   BP Readings from Last 3 Encounters:   12/15/21 132/82   12/09/21 120/82   09/08/21 124/86       History   Smoking Status     Current Every Day Smoker     Packs/day: 0.50     Years: 32.00     Types: Cigarettes     Start date: 1/1/1989   Smokeless Tobacco     Never Used     Comment: pt declines       Labs:  Lab Results   Component Value Date    A1C 7.0 11/04/2021    A1C 6.0 01/29/2020     Lab Results   Component Value Date     09/08/2021     " 05/19/2021     Lab Results   Component Value Date     01/29/2020     HDL Cholesterol   Date Value Ref Range Status   01/29/2020 87 >49 mg/dL Final   ]  GFR Estimate   Date Value Ref Range Status   09/08/2021 86 >60 mL/min/1.73m2 Final     Comment:     As of July 11, 2021, eGFR is calculated by the CKD-EPI creatinine equation, without race adjustment. eGFR can be influenced by muscle mass, exercise, and diet. The reported eGFR is an estimation only and is only applicable if the renal function is stable.   05/19/2021 >90 >60 mL/min/[1.73_m2] Final     Comment:     Non  GFR Calc  Starting 12/18/2018, serum creatinine based estimated GFR (eGFR) will be   calculated using the Chronic Kidney Disease Epidemiology Collaboration   (CKD-EPI) equation.       GFR Estimate If Black   Date Value Ref Range Status   05/19/2021 >90 >60 mL/min/[1.73_m2] Final     Comment:      GFR Calc  Starting 12/18/2018, serum creatinine based estimated GFR (eGFR) will be   calculated using the Chronic Kidney Disease Epidemiology Collaboration   (CKD-EPI) equation.       Lab Results   Component Value Date    CR 0.81 09/08/2021    CR 0.78 05/19/2021     No results found for: MICROALBUMIN    Healthy Eating:  Healthy Eating Assessed Today: Yes  Cultural/Alevism diet restrictions?: No  Meal planning/habits: Carb counting,Frequent snacking  How many times a week on average do you eat food made away from home (restaurant/take-out)?: 4 (3-4 times a week)  Meals include: Dinner (usually only 1 meal a day but snacking more, if feeling shaky is eating more)  Dinner: out to eat, 3:30- 5:30, [employer]- chicken wings, steak bites, gyro, fish kelley (favorite) with salad, Burger. McDonalds, Snickers convience- Pizza, wings  Snacks: popcorn skinny girl kettle corn, chips, cauliflower, bagged salads, lettuce  Beverages: Diet soda,Water,Milk,Coffee (prabhjot in water, 1%, cream and splenda in coffee)  Has patient met with a  dietitian in the past?: Yes (with gestational diabetes)    Being Active:  Being Active Assessed Today: Yes  Exercise:: Currently not exercising (not much movement since mom passed)  Barrier to exercise: Physical limitation (chronic pain)    Monitoring:  Monitoring Assessed Today: No  Did patient bring glucose meter to appointment? : No  Blood Glucose Meter:  (none yet)    Provided monitor kit- insurance may cover a different monitor and     Taking Medications:  Diabetes Medication(s)     Biguanides       metFORMIN (GLUCOPHAGE-XR) 500 MG 24 hr tablet    Take one pill by mouth with dinner for 2 weeks then increase to two pills at supper there after.        Taking Medication Assessed Today: Yes  Problems taking diabetes medications regularly?: No  Diabetes medication side effects?: No    Problem Solving:       Reducing Risks:  Diabetes Risks: Age over 45 years,History of gestational diabetes,Sedentary Lifestyle  CAD Risks: Sedentary lifestyle,Tobacco exposure,Stress,Obesity,Family history,Diabetes Mellitus  Has dilated eye exam at least once a year?: No (working with insurance on finding doctor)  Sees dentist every 6 months?: No (working with insuance on finding a dentist)    Healthy Coping:  Healthy Coping Assessed Today: Yes  Emotional response to diabetes: Ready to learn,Depression (depression due to mom passing)  Informal Support system:: Family (mental health practitioner, [facility], )  Stage of change: ACTION (Actively working towards change)  Support resources: In-person Offerings  Patient Activation Measure Survey Score:  KEN Score (Last Two) 11/28/2018 3/7/2019   KEN Raw Score 27 27   Activation Score 47.4 47.4   KEN Level 2 2       Diabetes knowledge and skills assessment:   Patient is knowledgeable in diabetes management concepts related to: Healthy Eating, Being Active, Monitoring, Taking Medication and Reducing Risks    Patient needs further education on the following diabetes management concepts:  Healthy Eating, Being Active, Monitoring, Taking Medication, Problem Solving, Reducing Risks and Healthy Coping    Based on learning assessment above, most appropriate setting for further diabetes education would be: Individual setting.      INTERVENTIONS:    Education provided today on:  AADE Self-Care Behaviors:  Diabetes Pathophysiology  Healthy Eating: carbohydrate counting, consistency in amount, composition, and timing of food intake, eating out and portion control  Being Active: relationship to blood glucose, describe appropriate activity program and precautions to take  Monitoring: purpose, proper technique, individual blood glucose targets, frequency of monitoring and proper sharps disposal  Taking Medication: action of prescribed medication and side effects of prescribed medications  Patient was instructed on Accu-Chek Guide Me meter and was able to provide an accurate return demonstration. Patient's blood glucose reading today was- did not test    Opportunities for ongoing education and support in diabetes-self management were discussed.    Pt verbalized understanding of concepts discussed and recommendations provided today.       Education Materials Provided:  Accu-Chek Guide Me meter kit and Basics for type 2 diabetes my food plan      ASSESSMENT:  Pt seen for session 1. Pt has lost many family members over the past 2 years, most recently her mom this past July. She has been in a great depression since that time. She does see a therapist. We were discussing trying to eat regular meals and at that point pt became very overwhelmed and felt hopeless. She did not think she will be able to change her ways. She felt her depression caused her diabetes. Offered to have a member of our  team come in and talk with her, but she was able to calm herself and said she had an appointment with her therapist in the next day or 2. Much more discussion on making small changes as she feels comfortable. At this point pt  feels comfortable with testing her BG 1x per day in the morning, maybe occasionally in the evening and will work on eating out less often.      Patient's most recent   Lab Results   Component Value Date    A1C 7.0 11/04/2021    A1C 6.0 01/29/2020    is meeting goal of <7.0    PLAN  See Patient Instructions for co-developed, patient-stated behavior change goals.  AVS printed and provided to patient today. See Follow-Up section for recommended follow-up.    Think about the changes you want to make. Write down some of the goals you have.    Test BG 1x a day- in the morning before you eat or drink anything    Follow up on Tuesday December 28th at 2pm.  Time Spent: 90 minutes  Encounter Type: Individual    Any diabetes medication dose changes were made via the CDE Protocol and Collaborative Practice Agreement with the patient's referring provider. A copy of this encounter was shared with the provider.

## 2021-12-28 DIAGNOSIS — G89.4 CHRONIC PAIN SYNDROME: ICD-10-CM

## 2021-12-28 DIAGNOSIS — E66.01 MORBID OBESITY (H): ICD-10-CM

## 2021-12-28 RX ORDER — PHENTERMINE HYDROCHLORIDE 30 MG/1
CAPSULE ORAL
Qty: 30 CAPSULE | Refills: 0 | Status: SHIPPED | OUTPATIENT
Start: 2021-12-28 | End: 2022-01-31

## 2021-12-28 RX ORDER — BACLOFEN 10 MG/1
TABLET ORAL
Qty: 90 TABLET | Refills: 0 | Status: SHIPPED | OUTPATIENT
Start: 2021-12-28 | End: 2022-01-31

## 2021-12-28 NOTE — TELEPHONE ENCOUNTER
Baclofen      Last Written Prescription Date:  12/1/21  Last Fill Quantity: 90,   # refills: 0  Last Office Visit: 12/9/21  Future Office visit:    Next 5 appointments (look out 90 days)    Mar 09, 2022  2:00 PM  (Arrive by 1:45 PM)  SHORT with SEVEN Pandey  [facility] [place] ([facility] [place] ) [address]  [place] [address]  [phone]           Routing refill request to provider for review/approval because:      Adipex-P      Last Written Prescription Date:  12/1/21  Last Fill Quantity: 30,   # refills: 0  Last Office Visit: 12/9/21  Future Office visit:    Next 5 appointments (look out 90 days)    Mar 09, 2022  2:00 PM  (Arrive by 1:45 PM)  SHORT with SEVEN Pandey  [facility] [place] ([facility] [place] ) [address]  [place] [address]  [phone]           Routing refill request to provider for review/approval because:

## 2022-01-03 DIAGNOSIS — R60.0 BILATERAL EDEMA OF LOWER EXTREMITY: ICD-10-CM

## 2022-01-03 DIAGNOSIS — R60.0 BILATERAL LEG EDEMA: ICD-10-CM

## 2022-01-06 ENCOUNTER — HOSPITAL ENCOUNTER (OUTPATIENT)
Dept: EDUCATION SERVICES | Facility: HOSPITAL | Age: 47
Discharge: HOME | End: 2022-01-06
Attending: NURSE PRACTITIONER | Admitting: PHYSICIAN ASSISTANT
Payer: MEDICARE

## 2022-01-06 ENCOUNTER — TELEPHONE (OUTPATIENT)
Dept: FAMILY MEDICINE | Facility: OTHER | Age: 47
End: 2022-01-06
Payer: MEDICARE

## 2022-01-06 VITALS
WEIGHT: 284.6 LBS | SYSTOLIC BLOOD PRESSURE: 103 MMHG | BODY MASS INDEX: 52.37 KG/M2 | HEART RATE: 91 BPM | RESPIRATION RATE: 20 BRPM | HEIGHT: 62 IN | OXYGEN SATURATION: 98 % | DIASTOLIC BLOOD PRESSURE: 86 MMHG

## 2022-01-06 DIAGNOSIS — E11.00 TYPE 2 DIABETES MELLITUS WITH HYPEROSMOLARITY WITHOUT COMA, WITHOUT LONG-TERM CURRENT USE OF INSULIN (H): ICD-10-CM

## 2022-01-06 DIAGNOSIS — Z11.59 ENCOUNTER FOR HEPATITIS C SCREENING TEST FOR LOW RISK PATIENT: ICD-10-CM

## 2022-01-06 DIAGNOSIS — E87.6 LOW POTASSIUM SYNDROME: ICD-10-CM

## 2022-01-06 DIAGNOSIS — E03.8 OTHER SPECIFIED HYPOTHYROIDISM: ICD-10-CM

## 2022-01-06 DIAGNOSIS — E03.8 OTHER SPECIFIED HYPOTHYROIDISM: Primary | ICD-10-CM

## 2022-01-06 LAB
ANION GAP SERPL CALCULATED.3IONS-SCNC: 7 MMOL/L (ref 3–14)
BUN SERPL-MCNC: 14 MG/DL (ref 7–30)
CALCIUM SERPL-MCNC: 9.4 MG/DL (ref 8.5–10.1)
CHLORIDE BLD-SCNC: 103 MMOL/L (ref 94–109)
CHOLEST SERPL-MCNC: 191 MG/DL
CO2 SERPL-SCNC: 27 MMOL/L (ref 20–32)
CREAT SERPL-MCNC: 0.69 MG/DL (ref 0.52–1.04)
CREAT UR-MCNC: 34 MG/DL
FASTING STATUS PATIENT QL REPORTED: YES
GFR SERPL CREATININE-BSD FRML MDRD: >90 ML/MIN/1.73M2
GLUCOSE BLD-MCNC: 130 MG/DL (ref 70–99)
HDLC SERPL-MCNC: 65 MG/DL
LDLC SERPL CALC-MCNC: 104 MG/DL
MICROALBUMIN UR-MCNC: 6 MG/L
MICROALBUMIN/CREAT UR: 17.65 MG/G CR (ref 0–25)
NONHDLC SERPL-MCNC: 126 MG/DL
POTASSIUM BLD-SCNC: 3 MMOL/L (ref 3.4–5.3)
SODIUM SERPL-SCNC: 137 MMOL/L (ref 133–144)
TRIGL SERPL-MCNC: 111 MG/DL

## 2022-01-06 PROCEDURE — 80061 LIPID PANEL: CPT | Mod: ZL

## 2022-01-06 PROCEDURE — G0108 DIAB MANAGE TRN  PER INDIV: HCPCS | Performed by: DIETITIAN, REGISTERED

## 2022-01-06 PROCEDURE — 36415 COLL VENOUS BLD VENIPUNCTURE: CPT | Mod: ZL

## 2022-01-06 PROCEDURE — 82043 UR ALBUMIN QUANTITATIVE: CPT | Mod: ZL

## 2022-01-06 PROCEDURE — 86803 HEPATITIS C AB TEST: CPT | Mod: ZL

## 2022-01-06 PROCEDURE — 80048 BASIC METABOLIC PNL TOTAL CA: CPT | Mod: ZL

## 2022-01-06 RX ORDER — LANCETS
EACH MISCELLANEOUS
COMMUNITY
Start: 2021-12-15

## 2022-01-06 RX ORDER — POTASSIUM CHLORIDE 750 MG/1
TABLET, EXTENDED RELEASE ORAL
Qty: 30 TABLET | Refills: 0 | Status: SHIPPED | OUTPATIENT
Start: 2022-01-06 | End: 2022-02-02

## 2022-01-06 RX ORDER — FUROSEMIDE 20 MG
TABLET ORAL
Qty: 60 TABLET | Refills: 0 | Status: SHIPPED | OUTPATIENT
Start: 2022-01-06 | End: 2022-02-02

## 2022-01-06 ASSESSMENT — PAIN SCALES - GENERAL: PAINLEVEL: SEVERE PAIN (7)

## 2022-01-06 ASSESSMENT — MIFFLIN-ST. JEOR: SCORE: 1876.1

## 2022-01-06 NOTE — LETTER
"    1/6/2022        RE: Autumn BATISTA Hnatko  [address] [facility] [address]        Diabetes Self-Management Education & Support    Presents for: Follow-up (session 2)    SUBJECTIVE/OBJECTIVE:  Presents for: Follow-up (session 2)  Accompanied by: Self  Diabetes education in the past 24mo: No  Focus of Visit: Patient Unsure,Diabetes Pathophysiology,Monitoring,Healthy Eating  Diabetes type: Type 2  Date of diagnosis: 11/4/21  Disease course: Other  How confident are you filling out medical forms by yourself:: Quite a bit  Diabetes management related comments/concerns: Affording supplies  Transportation concerns: Yes  Difficulty affording diabetes medication?: No  Difficulty affording diabetes testing supplies?: Sometimes (possibly)  Other concerns:: Glasses,Physical impairment  Cultural Influences/Ethnic Background:  American      Diabetes Symptoms & Complications:  Fatigue: Yes  Neuropathy: Sometimes  Polydipsia: Yes  Polyphagia: Yes  Polyuria: Yes  Visual change: No  Slow healing wounds: Yes  Weight trend: Stable  Complications assessed today?: No    Patient Problem List and Family Medical History reviewed for relevant medical history, current medical status, and diabetes risk factors.    Vitals:  /86   Pulse 91   Resp 20   Ht 1.578 m (5' 2.12\")   Wt 129.1 kg (284 lb 9.6 oz)   SpO2 98%   BMI 51.85 kg/m    Estimated body mass index is 51.85 kg/m  as calculated from the following:    Height as of this encounter: 1.578 m (5' 2.12\").    Weight as of this encounter: 129.1 kg (284 lb 9.6 oz).   Last 3 BP:   BP Readings from Last 3 Encounters:   01/06/22 103/86   12/15/21 132/82   12/09/21 120/82       History   Smoking Status     Current Every Day Smoker     Packs/day: 0.50     Years: 32.00     Types: Cigarettes     Start date: 1/1/1989   Smokeless Tobacco     Never Used     Comment: pt declines       Labs:  Lab Results   Component Value Date    A1C 7.0 11/04/2021    A1C 6.0 01/29/2020     Lab Results   Component " Value Date     01/06/2022     05/19/2021     Lab Results   Component Value Date     01/06/2022     01/29/2020     HDL Cholesterol   Date Value Ref Range Status   01/29/2020 87 >49 mg/dL Final     Direct Measure HDL   Date Value Ref Range Status   01/06/2022 65 >=50 mg/dL Final   ]  GFR Estimate   Date Value Ref Range Status   01/06/2022 >90 >60 mL/min/1.73m2 Final     Comment:     Effective December 21, 2021 eGFRcr in adults is calculated using the 2021 CKD-EPI creatinine equation which includes age and gender (Samy et al., NEJM, DOI: 10.1056/QAGNkf4218183)   05/19/2021 >90 >60 mL/min/[1.73_m2] Final     Comment:     Non  GFR Calc  Starting 12/18/2018, serum creatinine based estimated GFR (eGFR) will be   calculated using the Chronic Kidney Disease Epidemiology Collaboration   (CKD-EPI) equation.       GFR Estimate If Black   Date Value Ref Range Status   05/19/2021 >90 >60 mL/min/[1.73_m2] Final     Comment:      GFR Calc  Starting 12/18/2018, serum creatinine based estimated GFR (eGFR) will be   calculated using the Chronic Kidney Disease Epidemiology Collaboration   (CKD-EPI) equation.       Lab Results   Component Value Date    CR 0.69 01/06/2022    CR 0.78 05/19/2021     No results found for: MICROALBUMIN    Healthy Eating:  Healthy Eating Assessed Today: Yes  Cultural/Protestant diet restrictions?: No  Meal planning/habits: Carb counting,Frequent snacking  How many times a week on average do you eat food made away from home (restaurant/take-out)?: 4 (3-4 times a week)  Meals include: Dinner,Evening Snack (usually only 1 meal a day but snacking more, if feeling shaky is eating more)  Dinner: (P) more salad and veggies, protein  Snacks: chips, yogurt  Beverages: Diet soda,Water,Milk,Coffee (prabhjot in water, 1%, cream and splenda in coffee)  Has patient met with a dietitian in the past?: Yes (with gestational diabetes)    Being Active:  Being Active  Assessed Today: Yes  Exercise:: Currently not exercising (not much movement since mom passed)  Barrier to exercise: Physical limitation (chronic pain)    Monitoring:  Monitoring Assessed Today: No  Did patient bring glucose meter to appointment? : No  Blood Glucose Meter:  (none yet)    BG ranges   12/31/21-1/6/22 ranges from 123-196 for various hours of the day  12/24/21- 12/30/21 ranges from 113-233 for various hours of the day    Taking Medications:  Diabetes Medication(s)     Biguanides       metFORMIN (GLUCOPHAGE-XR) 500 MG 24 hr tablet    Take one pill by mouth with dinner for 2 weeks then increase to two pills at supper there after.          Taking Medication Assessed Today: Yes  Current Treatments: (P) Oral Medication (taken by mouth) (metformin 1000 with dinner)  Problems taking diabetes medications regularly?: No  Diabetes medication side effects?: No    Problem Solving:  Is the patient at risk for hypoglycemia?: (P) No  Is the patient at risk for DKA?: (P) No  Does patient have severe weather/disaster plan for diabetes management?: (P) Not Needed  Does patient have sick day plan for diabetes management?: (P) Not Needed              Reducing Risks:  Diabetes Risks: Age over 45 years,History of gestational diabetes,Sedentary Lifestyle  CAD Risks: Sedentary lifestyle,Tobacco exposure,Stress,Obesity,Family history,Diabetes Mellitus  Has dilated eye exam at least once a year?: No (working with insurance on finding doctor)  Sees dentist every 6 months?: No (working with insuance on finding a dentist)    Healthy Coping:  Healthy Coping Assessed Today: Yes  Emotional response to diabetes: Ready to learn,Depression (depression due to mom passing)  Informal Support system:: Family (mental health practitioner, [facility], )  Stage of change: ACTION (Actively working towards change)  Support resources: In-person Offerings  Patient Activation Measure Survey Score:  KEN Score (Last Two) 11/28/2018 3/7/2019   KEN  Raw Score 27 27   Activation Score 47.4 47.4   KEN Level 2 2       Diabetes knowledge and skills assessment:   Patient is knowledgeable in diabetes management concepts related to: Healthy Eating, Monitoring    Patient needs further education on the following diabetes management concepts: Healthy Eating, Being Active, Monitoring, Taking Medication, Problem Solving, Reducing Risks and Healthy Coping    Based on learning assessment above, most appropriate setting for further diabetes education would be: Individual setting.      INTERVENTIONS:    Education provided today on:  AADE Self-Care Behaviors:  Healthy Eating: consistency in amount, composition, and timing of food intake, portion control    Opportunities for ongoing education and support in diabetes-self management were discussed.    Pt verbalized understanding of concepts discussed and recommendations provided today.       Education Materials Provided:  No new materials provided today      ASSESSMENT:  Pt still worries about her new diagnosis of diabetes. By the end of the session she did feel better. Along with Yohana, we discussed her BG and how they are trending down, and overall not bad levels. She is overwhelmed with what foods to choose, so we had a very good discussion on food choices and portions. Encouraged her to set alarms to remember to eat, as she tends to get busy and forget. When she forgets to eat she can get nauseous and vomit and/or get headaches. Also encouraged her to eat even if her BG is high. Discussion on taking one small step at a time with the changes she wants to make.        Patient's most recent   Lab Results   Component Value Date    A1C 7.0 11/04/2021    A1C 6.0 01/29/2020    is meeting goal of <7.0    PLAN  See Patient Instructions for co-developed, patient-stated behavior change goals.  AVS printed and provided to patient today. See Follow-Up section for recommended follow-up.    Follow up 1/19/22  Time Spent: 60  minutes  Encounter Type: Individual    Any diabetes medication dose changes were made via the CDE Protocol and Collaborative Practice Agreement with the patient's referring provider. A copy of this encounter was shared with the provider.            Sincerely,        Taisha Corral RD

## 2022-01-06 NOTE — TELEPHONE ENCOUNTER
Lasix      Last Written Prescription Date:  12/01/21  Last Fill Quantity: 60,   # refills: 0  Last Office Visit: 12/09/21  Future Office visit:    Next 5 appointments (look out 90 days)    Mar 09, 2022  2:00 PM  (Arrive by 1:45 PM)  SHORT with SEVEN Pandey  [facility] [place] ([facility] ) [address]  [place] [address]  [phone]           Potassium      Last Written Prescription Date:  12/01/21  Last Fill Quantity: 30,   # refills: 0  Last Office Visit: 12/09/21  Future Office visit:    Next 5 appointments (look out 90 days)    Mar 09, 2022  2:00 PM  (Arrive by 1:45 PM)  SHORT with SEVEN Pandey  [facility] [place] ([facility] [place] ) [address]  [place] [address]  [phone]

## 2022-01-07 NOTE — PROGRESS NOTES
"Diabetes Self-Management Education & Support    Presents for: Follow-up (session 2)    SUBJECTIVE/OBJECTIVE:  Presents for: Follow-up (session 2)  Accompanied by: Self  Diabetes education in the past 24mo: No  Focus of Visit: Patient Unsure,Diabetes Pathophysiology,Monitoring,Healthy Eating  Diabetes type: Type 2  Date of diagnosis: 11/4/21  Disease course: Other  How confident are you filling out medical forms by yourself:: Quite a bit  Diabetes management related comments/concerns: Affording supplies  Transportation concerns: Yes  Difficulty affording diabetes medication?: No  Difficulty affording diabetes testing supplies?: Sometimes (possibly)  Other concerns:: Glasses,Physical impairment  Cultural Influences/Ethnic Background:  American      Diabetes Symptoms & Complications:  Fatigue: Yes  Neuropathy: Sometimes  Polydipsia: Yes  Polyphagia: Yes  Polyuria: Yes  Visual change: No  Slow healing wounds: Yes  Weight trend: Stable  Complications assessed today?: No    Patient Problem List and Family Medical History reviewed for relevant medical history, current medical status, and diabetes risk factors.    Vitals:  /86   Pulse 91   Resp 20   Ht 1.578 m (5' 2.12\")   Wt 129.1 kg (284 lb 9.6 oz)   SpO2 98%   BMI 51.85 kg/m    Estimated body mass index is 51.85 kg/m  as calculated from the following:    Height as of this encounter: 1.578 m (5' 2.12\").    Weight as of this encounter: 129.1 kg (284 lb 9.6 oz).   Last 3 BP:   BP Readings from Last 3 Encounters:   01/06/22 103/86   12/15/21 132/82   12/09/21 120/82       History   Smoking Status     Current Every Day Smoker     Packs/day: 0.50     Years: 32.00     Types: Cigarettes     Start date: 1/1/1989   Smokeless Tobacco     Never Used     Comment: pt declines       Labs:  Lab Results   Component Value Date    A1C 7.0 11/04/2021    A1C 6.0 01/29/2020     Lab Results   Component Value Date     01/06/2022     05/19/2021     Lab Results "   Component Value Date     01/06/2022     01/29/2020     HDL Cholesterol   Date Value Ref Range Status   01/29/2020 87 >49 mg/dL Final     Direct Measure HDL   Date Value Ref Range Status   01/06/2022 65 >=50 mg/dL Final   ]  GFR Estimate   Date Value Ref Range Status   01/06/2022 >90 >60 mL/min/1.73m2 Final     Comment:     Effective December 21, 2021 eGFRcr in adults is calculated using the 2021 CKD-EPI creatinine equation which includes age and gender (Samy et al., NEJ, DOI: 10.1056/ECUDus1228441)   05/19/2021 >90 >60 mL/min/[1.73_m2] Final     Comment:     Non  GFR Calc  Starting 12/18/2018, serum creatinine based estimated GFR (eGFR) will be   calculated using the Chronic Kidney Disease Epidemiology Collaboration   (CKD-EPI) equation.       GFR Estimate If Black   Date Value Ref Range Status   05/19/2021 >90 >60 mL/min/[1.73_m2] Final     Comment:      GFR Calc  Starting 12/18/2018, serum creatinine based estimated GFR (eGFR) will be   calculated using the Chronic Kidney Disease Epidemiology Collaboration   (CKD-EPI) equation.       Lab Results   Component Value Date    CR 0.69 01/06/2022    CR 0.78 05/19/2021     No results found for: MICROALBUMIN    Healthy Eating:  Healthy Eating Assessed Today: Yes  Cultural/Episcopal diet restrictions?: No  Meal planning/habits: Carb counting,Frequent snacking  How many times a week on average do you eat food made away from home (restaurant/take-out)?: 4 (3-4 times a week)  Meals include: Dinner,Evening Snack (usually only 1 meal a day but snacking more, if feeling shaky is eating more)  Dinner: (P) more salad and veggies, protein  Snacks: chips, yogurt  Beverages: Diet soda,Water,Milk,Coffee (prabhjot in water, 1%, cream and splenda in coffee)  Has patient met with a dietitian in the past?: Yes (with gestational diabetes)    Being Active:  Being Active Assessed Today: Yes  Exercise:: Currently not exercising (not much movement  since mom passed)  Barrier to exercise: Physical limitation (chronic pain)    Monitoring:  Monitoring Assessed Today: No  Did patient bring glucose meter to appointment? : No  Blood Glucose Meter:  (none yet)    BG ranges   12/31/21-1/6/22 ranges from 123-196 for various hours of the day  12/24/21- 12/30/21 ranges from 113-233 for various hours of the day    Taking Medications:  Diabetes Medication(s)     Biguanides       metFORMIN (GLUCOPHAGE-XR) 500 MG 24 hr tablet    Take one pill by mouth with dinner for 2 weeks then increase to two pills at supper there after.          Taking Medication Assessed Today: Yes  Current Treatments: (P) Oral Medication (taken by mouth) (metformin 1000 with dinner)  Problems taking diabetes medications regularly?: No  Diabetes medication side effects?: No    Problem Solving:  Is the patient at risk for hypoglycemia?: (P) No  Is the patient at risk for DKA?: (P) No  Does patient have severe weather/disaster plan for diabetes management?: (P) Not Needed  Does patient have sick day plan for diabetes management?: (P) Not Needed              Reducing Risks:  Diabetes Risks: Age over 45 years,History of gestational diabetes,Sedentary Lifestyle  CAD Risks: Sedentary lifestyle,Tobacco exposure,Stress,Obesity,Family history,Diabetes Mellitus  Has dilated eye exam at least once a year?: No (working with insurance on finding doctor)  Sees dentist every 6 months?: No (working with insuance on finding a dentist)    Healthy Coping:  Healthy Coping Assessed Today: Yes  Emotional response to diabetes: Ready to learn,Depression (depression due to mom passing)  Informal Support system:: Family (mental health practitioner, [facility], )  Stage of change: ACTION (Actively working towards change)  Support resources: In-person Offerings  Patient Activation Measure Survey Score:  KEN Score (Last Two) 11/28/2018 3/7/2019   KEN Raw Score 27 27   Activation Score 47.4 47.4   KEN Level 2 2       Diabetes  knowledge and skills assessment:   Patient is knowledgeable in diabetes management concepts related to: Healthy Eating, Monitoring    Patient needs further education on the following diabetes management concepts: Healthy Eating, Being Active, Monitoring, Taking Medication, Problem Solving, Reducing Risks and Healthy Coping    Based on learning assessment above, most appropriate setting for further diabetes education would be: Individual setting.      INTERVENTIONS:    Education provided today on:  AADE Self-Care Behaviors:  Healthy Eating: consistency in amount, composition, and timing of food intake, portion control    Opportunities for ongoing education and support in diabetes-self management were discussed.    Pt verbalized understanding of concepts discussed and recommendations provided today.       Education Materials Provided:  No new materials provided today      ASSESSMENT:  Pt still worries about her new diagnosis of diabetes. By the end of the session she did feel better. Along with Yohana, we discussed her BG and how they are trending down, and overall not bad levels. She is overwhelmed with what foods to choose, so we had a very good discussion on food choices and portions. Encouraged her to set alarms to remember to eat, as she tends to get busy and forget. When she forgets to eat she can get nauseous and vomit and/or get headaches. Also encouraged her to eat even if her BG is high. Discussion on taking one small step at a time with the changes she wants to make.        Patient's most recent   Lab Results   Component Value Date    A1C 7.0 11/04/2021    A1C 6.0 01/29/2020    is meeting goal of <7.0    PLAN  See Patient Instructions for co-developed, patient-stated behavior change goals.  AVS printed and provided to patient today. See Follow-Up section for recommended follow-up.    Follow up 1/19/22  Time Spent: 60 minutes  Encounter Type: Individual    Any diabetes medication dose changes were made via the  CDE Protocol and Collaborative Practice Agreement with the patient's referring provider. A copy of this encounter was shared with the provider.

## 2022-01-08 LAB — HCV AB SERPL QL IA: NONREACTIVE

## 2022-01-17 DIAGNOSIS — Z53.9 PERSONS ENCOUNTERING HEALTH SERVICES FOR SPECIFIC PROCEDURES, NOT CARRIED OUT: Primary | ICD-10-CM

## 2022-01-17 PROCEDURE — G0179 MD RECERTIFICATION HHA PT: HCPCS | Performed by: PHYSICIAN ASSISTANT

## 2022-01-19 ENCOUNTER — HOSPITAL ENCOUNTER (OUTPATIENT)
Dept: EDUCATION SERVICES | Facility: HOSPITAL | Age: 47
Discharge: HOME | End: 2022-01-19
Attending: NURSE PRACTITIONER | Admitting: PHYSICIAN ASSISTANT
Payer: MEDICARE

## 2022-01-19 VITALS
BODY MASS INDEX: 53.02 KG/M2 | WEIGHT: 288.1 LBS | HEIGHT: 62 IN | SYSTOLIC BLOOD PRESSURE: 118 MMHG | OXYGEN SATURATION: 98 % | RESPIRATION RATE: 16 BRPM | HEART RATE: 92 BPM | DIASTOLIC BLOOD PRESSURE: 88 MMHG

## 2022-01-19 DIAGNOSIS — E11.42 TYPE 2 DIABETES MELLITUS WITH DIABETIC POLYNEUROPATHY, WITHOUT LONG-TERM CURRENT USE OF INSULIN (H): ICD-10-CM

## 2022-01-19 PROCEDURE — G0108 DIAB MANAGE TRN  PER INDIV: HCPCS | Performed by: DIETITIAN, REGISTERED

## 2022-01-19 RX ORDER — METFORMIN HCL 500 MG
TABLET, EXTENDED RELEASE 24 HR ORAL
Qty: 90 TABLET | Refills: 3 | Status: SHIPPED | OUTPATIENT
Start: 2022-01-19 | End: 2022-02-17

## 2022-01-19 ASSESSMENT — PAIN SCALES - GENERAL: PAINLEVEL: EXTREME PAIN (9)

## 2022-01-19 ASSESSMENT — MIFFLIN-ST. JEOR: SCORE: 1891.97

## 2022-01-19 NOTE — LETTER
"    1/19/2022        RE: Autumn BATISTA Hnatko  [address] [facility] [address]        Diabetes Self-Management Education & Support    Presents for: Follow-up (session 3)    SUBJECTIVE/OBJECTIVE:  Presents for: Follow-up (session 3)  Accompanied by: Self  Diabetes education in the past 24mo: No  Focus of Visit: Patient Unsure,Diabetes Pathophysiology,Healthy Eating,Healthy Coping  Diabetes type: Type 2  Date of diagnosis: 11/4/21  Disease course: Other  How confident are you filling out medical forms by yourself:: Quite a bit  Diabetes management related comments/concerns: Affording supplies  Transportation concerns: Yes  Difficulty affording diabetes medication?: No  Difficulty affording diabetes testing supplies?: Sometimes (possibly)  Other concerns:: Glasses,Physical impairment  Cultural Influences/Ethnic Background:  American      Diabetes Symptoms & Complications:  Fatigue: Yes  Neuropathy: Sometimes  Polydipsia: Yes  Polyphagia: Yes  Polyuria: Yes  Visual change: No  Slow healing wounds: Yes  Weight trend: Stable  Complications assessed today?: No    Patient Problem List and Family Medical History reviewed for relevant medical history, current medical status, and diabetes risk factors.    Vitals:  /88   Pulse 92   Resp 16   Ht 1.578 m (5' 2.12\")   Wt 130.7 kg (288 lb 1.6 oz)   SpO2 98%   BMI 52.49 kg/m    Estimated body mass index is 52.49 kg/m  as calculated from the following:    Height as of this encounter: 1.578 m (5' 2.12\").    Weight as of this encounter: 130.7 kg (288 lb 1.6 oz).   Last 3 BP:   BP Readings from Last 3 Encounters:   01/19/22 118/88   01/06/22 103/86   12/15/21 132/82       History   Smoking Status     Current Every Day Smoker     Packs/day: 0.50     Years: 32.00     Types: Cigarettes     Start date: 1/1/1989   Smokeless Tobacco     Never Used     Comment: pt declines       Labs:  Lab Results   Component Value Date    A1C 7.0 11/04/2021    A1C 6.0 01/29/2020     Lab Results "   Component Value Date     01/06/2022     05/19/2021     Lab Results   Component Value Date     01/06/2022     01/29/2020     HDL Cholesterol   Date Value Ref Range Status   01/29/2020 87 >49 mg/dL Final     Direct Measure HDL   Date Value Ref Range Status   01/06/2022 65 >=50 mg/dL Final   ]  GFR Estimate   Date Value Ref Range Status   01/06/2022 >90 >60 mL/min/1.73m2 Final     Comment:     Effective December 21, 2021 eGFRcr in adults is calculated using the 2021 CKD-EPI creatinine equation which includes age and gender (Samy et al., NEJM, DOI: 10.1056/FQHEir0784810)   05/19/2021 >90 >60 mL/min/[1.73_m2] Final     Comment:     Non  GFR Calc  Starting 12/18/2018, serum creatinine based estimated GFR (eGFR) will be   calculated using the Chronic Kidney Disease Epidemiology Collaboration   (CKD-EPI) equation.       GFR Estimate If Black   Date Value Ref Range Status   05/19/2021 >90 >60 mL/min/[1.73_m2] Final     Comment:      GFR Calc  Starting 12/18/2018, serum creatinine based estimated GFR (eGFR) will be   calculated using the Chronic Kidney Disease Epidemiology Collaboration   (CKD-EPI) equation.       Lab Results   Component Value Date    CR 0.69 01/06/2022    CR 0.78 05/19/2021     No results found for: MICROALBUMIN    Healthy Eating:  Healthy Eating Assessed Today: Yes  Cultural/Anabaptist diet restrictions?: No  Meal planning/habits: Carb counting,Frequent snacking  How many times a week on average do you eat food made away from home (restaurant/take-out)?: 4 (3-4 times a week)  Meals include: Dinner,Evening Snack (usually only 1 meal a day but snacking more, if feeling shaky is eating more)  Dinner: more salad and veggies, protein  Snacks: chips, yogurt  Beverages: Diet soda,Water,Milk,Coffee (prabhjot in water, 1%, cream and splenda in coffee)  Has patient met with a dietitian in the past?: Yes (with gestational diabetes)    Being Active:  Being Active  Assessed Today: Yes  Exercise:: Currently not exercising (not much movement since mom passed)  Barrier to exercise: Physical limitation (chronic pain)    Monitoring:  Monitoring Assessed Today: Yes  Did patient bring glucose meter to appointment? : Yes  Blood Glucose Meter:  (none yet)  Times checking blood sugar at home (number): 2  Times checking blood sugar at home (per): Day  Blood glucose trend: Decreasing    Ave Glucose- 178  1/13-1/19: 132-252  1/6-1/12: 125-259    Taking Medications:  Diabetes Medication(s)     Biguanides       metFORMIN (GLUCOPHAGE-XR) 500 MG 24 hr tablet    Take one pill by mouth in the morning and two pills at supper          Taking Medication Assessed Today: Yes  Current Treatments: Oral Medication (taken by mouth) (metformin 1000 with dinner)  Problems taking diabetes medications regularly?: No  Diabetes medication side effects?: No    Problem Solving:  Problem Solving Assessed Today: Yes  Is the patient at risk for hypoglycemia?: No  Is the patient at risk for DKA?: No  Does patient have severe weather/disaster plan for diabetes management?: Not Needed  Does patient have sick day plan for diabetes management?: Not Needed      Reducing Risks:  Diabetes Risks: Age over 45 years,History of gestational diabetes,Sedentary Lifestyle  CAD Risks: Sedentary lifestyle,Tobacco exposure,Stress,Obesity,Family history,Diabetes Mellitus  Has dilated eye exam at least once a year?: No (working with insurance on finding doctor)  Sees dentist every 6 months?: No (working with insuance on finding a dentist)  Feet checked by healthcare provider in the last year?: No    Healthy Coping:  Healthy Coping Assessed Today: Yes  Emotional response to diabetes: Ready to learn,Depression (depression due to mom passing)  Informal Support system:: Family (mental health practitioner, [facility] [[email or web address]], )  Stage of change: ACTION (Actively working towards change)  Support resources: In-person Offerings  Patient  Activation Measure Survey Score:  KEN Score (Last Two) 11/28/2018 3/7/2019   KEN Raw Score 27 27   Activation Score 47.4 47.4   KEN Level 2 2       Diabetes knowledge and skills assessment:   Patient is knowledgeable in diabetes management concepts related to: Healthy Eating, Monitoring and Taking Medication    Patient needs further education on the following diabetes management concepts: Healthy Eating, Being Active, Monitoring, Taking Medication, Problem Solving, Reducing Risks and Healthy Coping    Based on learning assessment above, most appropriate setting for further diabetes education would be: Individual setting.      INTERVENTIONS:    Education provided today on:  AADE Self-Care Behaviors:  Healthy Eating: consistency in amount, composition, and timing of food intake  Taking Medication: when to take medications  Healthy Coping: benefits of making appropriate lifestyle changes    Opportunities for ongoing education and support in diabetes-self management were discussed.    Pt verbalized understanding of concepts discussed and recommendations provided today.       Education Materials Provided:  No new materials provided today      ASSESSMENT:  Pt is disappointed with seeing weight gain since last visit. She has been eating salads a lot lately, and is getting tired of this. Still eating only 1 meal a day typically, some snacking at night. Getting frustrated with not seeing results. She has an appointment for a psych evaluation tomorrow for the possibility of getting weight loss surgery. Encouraged her to focus on eating more frequently. Try to have easy snacks available to bring on the go. Encouraged her to have some carbohydrates with meals, ok to have some pasta, potatoes, etc. Yohana OLIVA discussed adding in another metformin in the morning.      Patient's most recent   Lab Results   Component Value Date    A1C 7.0 11/04/2021    A1C 6.0 01/29/2020    is meeting goal of <7.0    PLAN  See Patient Instructions for  co-developed, patient-stated behavior change goals.  AVS printed and provided to patient today. See Follow-Up section for recommended follow-up.    Start adding 1 metformin in the morning. Keep taking 2 pills with supper. Take with food. Call if having GI symptoms.    Try eating more frequently throughout the day. Don't worry as much about what. It is ok to have some carbs with meals (potatos, rice, pasta)    Diabetes Education- [phone]    Follow up on 2/2/22 at 1:30pm    Time Spent: 30 minutes  Encounter Type: Individual    Any diabetes medication dose changes were made via the CDE Protocol and Collaborative Practice Agreement with the patient's referring provider. A copy of this encounter was shared with the provider.            Sincerely,        Taisha Corral RD

## 2022-01-19 NOTE — PATIENT INSTRUCTIONS
Start adding 1 metformin in the morning. Keep taking 2 pills with supper. Take with food. Call if having GI symptoms.    Try eating more frequently throughout the day. Don't worry as much about what. It is ok to have some carbs with meals (potatos, rice, pasta)    Diabetes Education- [phone]    Follow up on 2/2/22 at 1:30pm

## 2022-01-20 NOTE — PROGRESS NOTES
"Diabetes Self-Management Education & Support    Presents for: Follow-up (session 3)    SUBJECTIVE/OBJECTIVE:  Presents for: Follow-up (session 3)  Accompanied by: Self  Diabetes education in the past 24mo: No  Focus of Visit: Patient Unsure,Diabetes Pathophysiology,Healthy Eating,Healthy Coping  Diabetes type: Type 2  Date of diagnosis: 11/4/21  Disease course: Other  How confident are you filling out medical forms by yourself:: Quite a bit  Diabetes management related comments/concerns: Affording supplies  Transportation concerns: Yes  Difficulty affording diabetes medication?: No  Difficulty affording diabetes testing supplies?: Sometimes (possibly)  Other concerns:: Glasses,Physical impairment  Cultural Influences/Ethnic Background:  American      Diabetes Symptoms & Complications:  Fatigue: Yes  Neuropathy: Sometimes  Polydipsia: Yes  Polyphagia: Yes  Polyuria: Yes  Visual change: No  Slow healing wounds: Yes  Weight trend: Stable  Complications assessed today?: No    Patient Problem List and Family Medical History reviewed for relevant medical history, current medical status, and diabetes risk factors.    Vitals:  /88   Pulse 92   Resp 16   Ht 1.578 m (5' 2.12\")   Wt 130.7 kg (288 lb 1.6 oz)   SpO2 98%   BMI 52.49 kg/m    Estimated body mass index is 52.49 kg/m  as calculated from the following:    Height as of this encounter: 1.578 m (5' 2.12\").    Weight as of this encounter: 130.7 kg (288 lb 1.6 oz).   Last 3 BP:   BP Readings from Last 3 Encounters:   01/19/22 118/88   01/06/22 103/86   12/15/21 132/82       History   Smoking Status     Current Every Day Smoker     Packs/day: 0.50     Years: 32.00     Types: Cigarettes     Start date: 1/1/1989   Smokeless Tobacco     Never Used     Comment: pt declines       Labs:  Lab Results   Component Value Date    A1C 7.0 11/04/2021    A1C 6.0 01/29/2020     Lab Results   Component Value Date     01/06/2022     05/19/2021     Lab Results "   Component Value Date     01/06/2022     01/29/2020     HDL Cholesterol   Date Value Ref Range Status   01/29/2020 87 >49 mg/dL Final     Direct Measure HDL   Date Value Ref Range Status   01/06/2022 65 >=50 mg/dL Final   ]  GFR Estimate   Date Value Ref Range Status   01/06/2022 >90 >60 mL/min/1.73m2 Final     Comment:     Effective December 21, 2021 eGFRcr in adults is calculated using the 2021 CKD-EPI creatinine equation which includes age and gender (Samy et al., NEJ, DOI: 10.1056/CITMvi1795916)   05/19/2021 >90 >60 mL/min/[1.73_m2] Final     Comment:     Non  GFR Calc  Starting 12/18/2018, serum creatinine based estimated GFR (eGFR) will be   calculated using the Chronic Kidney Disease Epidemiology Collaboration   (CKD-EPI) equation.       GFR Estimate If Black   Date Value Ref Range Status   05/19/2021 >90 >60 mL/min/[1.73_m2] Final     Comment:      GFR Calc  Starting 12/18/2018, serum creatinine based estimated GFR (eGFR) will be   calculated using the Chronic Kidney Disease Epidemiology Collaboration   (CKD-EPI) equation.       Lab Results   Component Value Date    CR 0.69 01/06/2022    CR 0.78 05/19/2021     No results found for: MICROALBUMIN    Healthy Eating:  Healthy Eating Assessed Today: Yes  Cultural/Druze diet restrictions?: No  Meal planning/habits: Carb counting,Frequent snacking  How many times a week on average do you eat food made away from home (restaurant/take-out)?: 4 (3-4 times a week)  Meals include: Dinner,Evening Snack (usually only 1 meal a day but snacking more, if feeling shaky is eating more)  Dinner: more salad and veggies, protein  Snacks: chips, yogurt  Beverages: Diet soda,Water,Milk,Coffee (prabhjot in water, 1%, cream and splenda in coffee)  Has patient met with a dietitian in the past?: Yes (with gestational diabetes)    Being Active:  Being Active Assessed Today: Yes  Exercise:: Currently not exercising (not much movement since  mom passed)  Barrier to exercise: Physical limitation (chronic pain)    Monitoring:  Monitoring Assessed Today: Yes  Did patient bring glucose meter to appointment? : Yes  Blood Glucose Meter:  (none yet)  Times checking blood sugar at home (number): 2  Times checking blood sugar at home (per): Day  Blood glucose trend: Decreasing    Ave Glucose- 178  1/13-1/19: 132-252  1/6-1/12: 125-259    Taking Medications:  Diabetes Medication(s)     Biguanides       metFORMIN (GLUCOPHAGE-XR) 500 MG 24 hr tablet    Take one pill by mouth in the morning and two pills at supper          Taking Medication Assessed Today: Yes  Current Treatments: Oral Medication (taken by mouth) (metformin 1000 with dinner)  Problems taking diabetes medications regularly?: No  Diabetes medication side effects?: No    Problem Solving:  Problem Solving Assessed Today: Yes  Is the patient at risk for hypoglycemia?: No  Is the patient at risk for DKA?: No  Does patient have severe weather/disaster plan for diabetes management?: Not Needed  Does patient have sick day plan for diabetes management?: Not Needed      Reducing Risks:  Diabetes Risks: Age over 45 years,History of gestational diabetes,Sedentary Lifestyle  CAD Risks: Sedentary lifestyle,Tobacco exposure,Stress,Obesity,Family history,Diabetes Mellitus  Has dilated eye exam at least once a year?: No (working with insurance on finding doctor)  Sees dentist every 6 months?: No (working with insuance on finding a dentist)  Feet checked by healthcare provider in the last year?: No    Healthy Coping:  Healthy Coping Assessed Today: Yes  Emotional response to diabetes: Ready to learn,Depression (depression due to mom passing)  Informal Support system:: Family (mental health practitioner, [facility], )  Stage of change: ACTION (Actively working towards change)  Support resources: In-person Offerings  Patient Activation Measure Survey Score:  KEN Score (Last Two) 11/28/2018 3/7/2019   KEN Raw  Score 27 27   Activation Score 47.4 47.4   KEN Level 2 2       Diabetes knowledge and skills assessment:   Patient is knowledgeable in diabetes management concepts related to: Healthy Eating, Monitoring and Taking Medication    Patient needs further education on the following diabetes management concepts: Healthy Eating, Being Active, Monitoring, Taking Medication, Problem Solving, Reducing Risks and Healthy Coping    Based on learning assessment above, most appropriate setting for further diabetes education would be: Individual setting.      INTERVENTIONS:    Education provided today on:  AADE Self-Care Behaviors:  Healthy Eating: consistency in amount, composition, and timing of food intake  Taking Medication: when to take medications  Healthy Coping: benefits of making appropriate lifestyle changes    Opportunities for ongoing education and support in diabetes-self management were discussed.    Pt verbalized understanding of concepts discussed and recommendations provided today.       Education Materials Provided:  No new materials provided today      ASSESSMENT:  Pt is disappointed with seeing weight gain since last visit. She has been eating salads a lot lately, and is getting tired of this. Still eating only 1 meal a day typically, some snacking at night. Getting frustrated with not seeing results. She has an appointment for a psych evaluation tomorrow for the possibility of getting weight loss surgery. Encouraged her to focus on eating more frequently. Try to have easy snacks available to bring on the go. Encouraged her to have some carbohydrates with meals, ok to have some pasta, potatoes, etc. Yohana OLIVA discussed adding in another metformin in the morning.      Patient's most recent   Lab Results   Component Value Date    A1C 7.0 11/04/2021    A1C 6.0 01/29/2020    is meeting goal of <7.0    PLAN  See Patient Instructions for co-developed, patient-stated behavior change goals.  AVS printed and provided to  patient today. See Follow-Up section for recommended follow-up.    Start adding 1 metformin in the morning. Keep taking 2 pills with supper. Take with food. Call if having GI symptoms.    Try eating more frequently throughout the day. Don't worry as much about what. It is ok to have some carbs with meals (potatos, rice, pasta)    Diabetes Education- [phone]    Follow up on 2/2/22 at 1:30pm    Time Spent: 30 minutes  Encounter Type: Individual    Any diabetes medication dose changes were made via the CDE Protocol and Collaborative Practice Agreement with the patient's referring provider. A copy of this encounter was shared with the provider.

## 2022-01-20 NOTE — PROGRESS NOTES
Met with Autumn along with Taisha Corral, Autumn was very upset about her weight today and that BG readings not lower. I discussed that we also need to be looking at adjusting her Metformin and she is open to increasing her Metformin.    Autumn is very overwhelmed about making decisions about what food to eat. She wants to fix everything right now. We discussed options and making small changes. Autumn stated that she felt better after talking over things.    Will follow in 2 weeks.    Yohana Ruiz RN, [facility]

## 2022-01-27 DIAGNOSIS — E66.01 MORBID OBESITY (H): ICD-10-CM

## 2022-01-27 DIAGNOSIS — G89.4 CHRONIC PAIN SYNDROME: ICD-10-CM

## 2022-01-27 DIAGNOSIS — I10 BENIGN ESSENTIAL HYPERTENSION: ICD-10-CM

## 2022-01-27 RX ORDER — HYDROCHLOROTHIAZIDE 25 MG/1
TABLET ORAL
Qty: 90 TABLET | Refills: 0 | Status: SHIPPED | OUTPATIENT
Start: 2022-01-27 | End: 2022-04-27

## 2022-01-27 NOTE — TELEPHONE ENCOUNTER
HCTZ  Last Written Prescription Date: 10/29/21  Last Fill Quantity: 90 # of Refills: 0  Last Office Visit: 12/9/21

## 2022-01-28 NOTE — TELEPHONE ENCOUNTER
Adipex      Last Written Prescription Date:  12/28/21  Last Fill Quantity: 30,   # refills: 0  Last Office Visit: 12/9/21  Future Office visit:    Next 5 appointments (look out 90 days)    Mar 09, 2022  2:00 PM  (Arrive by 1:45 PM)  SHORT with SEVEN Pandey  [facility] [place] ([facility] [place] ) [address]  [place] [address]  [phone]           Routing refill request to provider for review/approval because:      Baclofen      Last Written Prescription Date:  12/28/21  Last Fill Quantity: 90,   # refills: 0  Last Office Visit: 12/9/21  Future Office visit:    Next 5 appointments (look out 90 days)    Mar 09, 2022  2:00 PM  (Arrive by 1:45 PM)  SHORT with SEVEN Pandey  [facility] [place] ([facility] [place] ) [address]  [place] [address]  [phone]           Routing refill request to provider for review/approval because:

## 2022-01-31 DIAGNOSIS — F33.2 SEVERE EPISODE OF RECURRENT MAJOR DEPRESSIVE DISORDER, WITHOUT PSYCHOTIC FEATURES (H): ICD-10-CM

## 2022-01-31 DIAGNOSIS — R60.0 BILATERAL EDEMA OF LOWER EXTREMITY: ICD-10-CM

## 2022-01-31 DIAGNOSIS — R60.0 BILATERAL LEG EDEMA: ICD-10-CM

## 2022-01-31 RX ORDER — BACLOFEN 10 MG/1
TABLET ORAL
Qty: 90 TABLET | Refills: 0 | Status: SHIPPED | OUTPATIENT
Start: 2022-01-31 | End: 2022-03-02

## 2022-01-31 RX ORDER — PHENTERMINE HYDROCHLORIDE 30 MG/1
CAPSULE ORAL
Qty: 30 CAPSULE | Refills: 0 | Status: SHIPPED | OUTPATIENT
Start: 2022-01-31 | End: 2022-03-02

## 2022-02-02 ENCOUNTER — HOSPITAL ENCOUNTER (OUTPATIENT)
Dept: EDUCATION SERVICES | Facility: HOSPITAL | Age: 47
Discharge: HOME | End: 2022-02-02
Attending: NURSE PRACTITIONER | Admitting: PHYSICIAN ASSISTANT
Payer: MEDICARE

## 2022-02-02 VITALS
WEIGHT: 286 LBS | SYSTOLIC BLOOD PRESSURE: 124 MMHG | OXYGEN SATURATION: 97 % | HEART RATE: 104 BPM | RESPIRATION RATE: 16 BRPM | DIASTOLIC BLOOD PRESSURE: 70 MMHG | BODY MASS INDEX: 52.63 KG/M2 | HEIGHT: 62 IN

## 2022-02-02 DIAGNOSIS — F33.2 SEVERE EPISODE OF RECURRENT MAJOR DEPRESSIVE DISORDER, WITHOUT PSYCHOTIC FEATURES (H): ICD-10-CM

## 2022-02-02 DIAGNOSIS — R60.0 BILATERAL EDEMA OF LOWER EXTREMITY: ICD-10-CM

## 2022-02-02 DIAGNOSIS — R60.0 BILATERAL LEG EDEMA: ICD-10-CM

## 2022-02-02 PROCEDURE — G0108 DIAB MANAGE TRN  PER INDIV: HCPCS | Performed by: DIETITIAN, REGISTERED

## 2022-02-02 RX ORDER — TOPIRAMATE 50 MG/1
TABLET, FILM COATED ORAL
Qty: 60 TABLET | Refills: 0 | Status: SHIPPED | OUTPATIENT
Start: 2022-02-02 | End: 2022-06-23

## 2022-02-02 RX ORDER — TOPIRAMATE 50 MG/1
TABLET, FILM COATED ORAL
Qty: 60 TABLET | Refills: 0 | Status: SHIPPED | OUTPATIENT
Start: 2022-02-02 | End: 2022-03-09

## 2022-02-02 RX ORDER — FUROSEMIDE 20 MG
TABLET ORAL
Qty: 60 TABLET | Refills: 0 | Status: SHIPPED | OUTPATIENT
Start: 2022-02-02 | End: 2022-07-07

## 2022-02-02 RX ORDER — POTASSIUM CHLORIDE 750 MG/1
TABLET, EXTENDED RELEASE ORAL
Qty: 90 TABLET | Refills: 1 | Status: SHIPPED | OUTPATIENT
Start: 2022-02-02 | End: 2022-03-09

## 2022-02-02 RX ORDER — FUROSEMIDE 20 MG
TABLET ORAL
Qty: 60 TABLET | Refills: 0 | Status: SHIPPED | OUTPATIENT
Start: 2022-02-02 | End: 2022-03-02

## 2022-02-02 RX ORDER — POTASSIUM CHLORIDE 750 MG/1
TABLET, EXTENDED RELEASE ORAL
Qty: 30 TABLET | Refills: 0 | Status: SHIPPED | OUTPATIENT
Start: 2022-02-02 | End: 2022-04-21

## 2022-02-02 ASSESSMENT — PAIN SCALES - GENERAL: PAINLEVEL: EXTREME PAIN (9)

## 2022-02-02 ASSESSMENT — MIFFLIN-ST. JEOR: SCORE: 1882.6

## 2022-02-02 NOTE — TELEPHONE ENCOUNTER
Potassium, Lasix, Topamax      Last Written Prescription Date:  1.6.22, 1.6.22, 12.2.21  Last Fill Quantity: #30, #60, #60,   # refills: 0  Last Office Visit: 12.9.21  Future Office visit:    Next 5 appointments (look out 90 days)    Mar 09, 2022  2:00 PM  (Arrive by 1:45 PM)  SHORT with SEVEN Pandey  [facility] ([facility] [place] ) [address]  [place] [address]  [phone]           Routing refill request to provider for review/approval because:

## 2022-02-02 NOTE — LETTER
"    2/2/2022        RE: Autumn BATISTA Hnatko  [address] [facility] [address]        Diabetes Self-Management Education & Support    Presents for: Follow-up (session 3)    SUBJECTIVE/OBJECTIVE:  Presents for: Follow-up (session 3)  Accompanied by: Self  Diabetes education in the past 24mo: No  Focus of Visit: Patient Unsure,Diabetes Pathophysiology,Healthy Eating,Healthy Coping  Diabetes type: Type 2  Date of diagnosis: 11/4/21  Disease course: Other  How confident are you filling out medical forms by yourself:: Quite a bit  Diabetes management related comments/concerns: Affording supplies  Transportation concerns: Yes  Difficulty affording diabetes medication?: No  Difficulty affording diabetes testing supplies?: Sometimes (possibly)  Other concerns:: Glasses,Physical impairment  Cultural Influences/Ethnic Background:  American      Diabetes Symptoms & Complications:  Fatigue: Yes  Neuropathy: Sometimes  Polydipsia: Yes  Polyphagia: Yes  Polyuria: Yes  Visual change: No  Slow healing wounds: Yes  Weight trend: Stable  Complications assessed today?: No    Patient Problem List and Family Medical History reviewed for relevant medical history, current medical status, and diabetes risk factors.    Vitals:  /70   Pulse 104   Resp 16   Ht 1.578 m (5' 2.13\")   Wt 129.7 kg (286 lb)   SpO2 97%   BMI 52.09 kg/m    Estimated body mass index is 52.09 kg/m  as calculated from the following:    Height as of this encounter: 1.578 m (5' 2.13\").    Weight as of this encounter: 129.7 kg (286 lb).   Last 3 BP:   BP Readings from Last 3 Encounters:   02/02/22 124/70   01/19/22 118/88   01/06/22 103/86       History   Smoking Status     Current Every Day Smoker     Packs/day: 0.50     Years: 32.00     Types: Cigarettes     Start date: 1/1/1989   Smokeless Tobacco     Never Used     Comment: pt declines       Labs:  Lab Results   Component Value Date    A1C 7.0 11/04/2021    A1C 6.0 01/29/2020     Lab Results   Component Value " Date     01/06/2022     05/19/2021     Lab Results   Component Value Date     01/06/2022     01/29/2020     HDL Cholesterol   Date Value Ref Range Status   01/29/2020 87 >49 mg/dL Final     Direct Measure HDL   Date Value Ref Range Status   01/06/2022 65 >=50 mg/dL Final   ]  GFR Estimate   Date Value Ref Range Status   01/06/2022 >90 >60 mL/min/1.73m2 Final     Comment:     Effective December 21, 2021 eGFRcr in adults is calculated using the 2021 CKD-EPI creatinine equation which includes age and gender (Samy et al., NEJM, DOI: 10.1056/TCSUar9920655)   05/19/2021 >90 >60 mL/min/[1.73_m2] Final     Comment:     Non  GFR Calc  Starting 12/18/2018, serum creatinine based estimated GFR (eGFR) will be   calculated using the Chronic Kidney Disease Epidemiology Collaboration   (CKD-EPI) equation.       GFR Estimate If Black   Date Value Ref Range Status   05/19/2021 >90 >60 mL/min/[1.73_m2] Final     Comment:      GFR Calc  Starting 12/18/2018, serum creatinine based estimated GFR (eGFR) will be   calculated using the Chronic Kidney Disease Epidemiology Collaboration   (CKD-EPI) equation.       Lab Results   Component Value Date    CR 0.69 01/06/2022    CR 0.78 05/19/2021     No results found for: MICROALBUMIN    Healthy Eating:  Healthy Eating Assessed Today: Yes  Cultural/Episcopal diet restrictions?: No  Meal planning/habits: Carb counting,Frequent snacking  How many times a week on average do you eat food made away from home (restaurant/take-out)?: 4 (3-4 times a week)  Meals include: Dinner,Evening Snack (isn't eating until 1 or 2, supper around 5:50/6)  Lunch: sandwich or bowl of ceral or peice of toast or bagel  Dinner: more salad and veggies, protein, spaghetti  Snacks: chips, yogurt  Beverages: Diet soda,Water,Milk,Coffee (prabhjot in water, 1%, cream and splenda in coffee)  Has patient met with a dietitian in the past?: Yes (with gestational  diabetes)    Being Active:  Being Active Assessed Today: Yes  Exercise:: Currently not exercising (not much movement since mom passed)  Barrier to exercise: Physical limitation (chronic pain)    Monitoring:  Monitoring Assessed Today: Yes  Did patient bring glucose meter to appointment? : Yes  Blood Glucose Meter:  (none yet)  Times checking blood sugar at home (number): 2  Times checking blood sugar at home (per): Day  Blood glucose trend: Decreasing    Average B mg/dL  - :   - : 126-184    Taking Medications:  Diabetes Medication(s)     Biguanides       metFORMIN (GLUCOPHAGE-XR) 500 MG 24 hr tablet    Take one pill by mouth in the morning and two pills at supper          Taking Medication Assessed Today: Yes  Current Treatments: Oral Medication (taken by mouth) (metformin 1000 with dinner)  Problems taking diabetes medications regularly?: No  Diabetes medication side effects?: No    Problem Solving:  Problem Solving Assessed Today: Yes  Is the patient at risk for hypoglycemia?: No  Is the patient at risk for DKA?: No  Does patient have severe weather/disaster plan for diabetes management?: Not Needed  Does patient have sick day plan for diabetes management?: Not Needed           Reducing Risks:  Diabetes Risks: Age over 45 years,History of gestational diabetes,Sedentary Lifestyle  CAD Risks: Sedentary lifestyle,Tobacco exposure,Stress,Obesity,Family history,Diabetes Mellitus  Has dilated eye exam at least once a year?: No (working with insurance on finding doctor)  Sees dentist every 6 months?: No (working with insuance on finding a dentist)  Feet checked by healthcare provider in the last year?: No    Healthy Coping:  Healthy Coping Assessed Today: Yes  Emotional response to diabetes: Ready to learn,Depression (depression due to mom passing)  Informal Support system:: Family (mental health practitioner, [facility], )  Stage of change: ACTION (Actively working towards  change)  Support resources: In-person Offerings  Patient Activation Measure Survey Score:  KEN Score (Last Two) 11/28/2018 3/7/2019   KEN Raw Score 27 27   Activation Score 47.4 47.4   KEN Level 2 2       Diabetes knowledge and skills assessment:   Patient is knowledgeable in diabetes management concepts related to: Healthy Eating, Monitoring and Taking Medication    Patient needs further education on the following diabetes management concepts: Healthy Eating, Being Active, Monitoring, Taking Medication, Problem Solving, Reducing Risks and Healthy Coping    Based on learning assessment above, most appropriate setting for further diabetes education would be: Individual setting.      INTERVENTIONS:    Education provided today on:  AADE Self-Care Behaviors:  Healthy Coping: benefits of making appropriate lifestyle changes and utilize support systems    Opportunities for ongoing education and support in diabetes-self management were discussed.    Pt verbalized understanding of concepts discussed and recommendations provided today.       Education Materials Provided:  No new materials provided today      ASSESSMENT:  Pt's BG continues to trend down. Few pounds down since last visit. She went to the mental health assessment for pursuing weight loss surgery. There was a miscommunication between mental health center and bariatric center, so it was not completed. She was disappointed/frustrated about this. She is continuing to work towards improveing her eating habits. She is not eating out as much. She is getting set up for [employer]- DM friendly meals. Encouraged her to keep on making small changes related to food choices/habits.     Goals Addressed as of 2/2/2022 2/2/22       Healthy Eating (pt-stated)   100%    Added 12/16/21 by Taisha Corral RD      Eating out less often            Patient's most recent   Lab Results   Component Value Date    A1C 7.0 11/04/2021    A1C 6.0 01/29/2020    is  meeting goal of <7.0    PLAN  See Patient Instructions for co-developed, patient-stated behavior change goals.  AVS printed and provided to patient today. See Follow-Up section for recommended follow-up.    Follow up visit 2/16/21    Time Spent: 30 minutes  Encounter Type: Individual    Any diabetes medication dose changes were made via the CDE Protocol and Collaborative Practice Agreement with the patient's primary care provider. A copy of this encounter was shared with the provider.            Sincerely,      Taisha Corral RD

## 2022-02-03 NOTE — PROGRESS NOTES
"Diabetes Self-Management Education & Support    Presents for: Follow-up (session 3)    SUBJECTIVE/OBJECTIVE:  Presents for: Follow-up (session 3)  Accompanied by: Self  Diabetes education in the past 24mo: No  Focus of Visit: Patient Unsure,Diabetes Pathophysiology,Healthy Eating,Healthy Coping  Diabetes type: Type 2  Date of diagnosis: 11/4/21  Disease course: Other  How confident are you filling out medical forms by yourself:: Quite a bit  Diabetes management related comments/concerns: Affording supplies  Transportation concerns: Yes  Difficulty affording diabetes medication?: No  Difficulty affording diabetes testing supplies?: Sometimes (possibly)  Other concerns:: Glasses,Physical impairment  Cultural Influences/Ethnic Background:  American      Diabetes Symptoms & Complications:  Fatigue: Yes  Neuropathy: Sometimes  Polydipsia: Yes  Polyphagia: Yes  Polyuria: Yes  Visual change: No  Slow healing wounds: Yes  Weight trend: Stable  Complications assessed today?: No    Patient Problem List and Family Medical History reviewed for relevant medical history, current medical status, and diabetes risk factors.    Vitals:  /70   Pulse 104   Resp 16   Ht 1.578 m (5' 2.13\")   Wt 129.7 kg (286 lb)   SpO2 97%   BMI 52.09 kg/m    Estimated body mass index is 52.09 kg/m  as calculated from the following:    Height as of this encounter: 1.578 m (5' 2.13\").    Weight as of this encounter: 129.7 kg (286 lb).   Last 3 BP:   BP Readings from Last 3 Encounters:   02/02/22 124/70   01/19/22 118/88   01/06/22 103/86       History   Smoking Status     Current Every Day Smoker     Packs/day: 0.50     Years: 32.00     Types: Cigarettes     Start date: 1/1/1989   Smokeless Tobacco     Never Used     Comment: pt declines       Labs:  Lab Results   Component Value Date    A1C 7.0 11/04/2021    A1C 6.0 01/29/2020     Lab Results   Component Value Date     01/06/2022     05/19/2021     Lab Results   Component Value " Date     01/06/2022     01/29/2020     HDL Cholesterol   Date Value Ref Range Status   01/29/2020 87 >49 mg/dL Final     Direct Measure HDL   Date Value Ref Range Status   01/06/2022 65 >=50 mg/dL Final   ]  GFR Estimate   Date Value Ref Range Status   01/06/2022 >90 >60 mL/min/1.73m2 Final     Comment:     Effective December 21, 2021 eGFRcr in adults is calculated using the 2021 CKD-EPI creatinine equation which includes age and gender (Samy et al., NEJ, DOI: 10.1056/JFLErf4974641)   05/19/2021 >90 >60 mL/min/[1.73_m2] Final     Comment:     Non  GFR Calc  Starting 12/18/2018, serum creatinine based estimated GFR (eGFR) will be   calculated using the Chronic Kidney Disease Epidemiology Collaboration   (CKD-EPI) equation.       GFR Estimate If Black   Date Value Ref Range Status   05/19/2021 >90 >60 mL/min/[1.73_m2] Final     Comment:      GFR Calc  Starting 12/18/2018, serum creatinine based estimated GFR (eGFR) will be   calculated using the Chronic Kidney Disease Epidemiology Collaboration   (CKD-EPI) equation.       Lab Results   Component Value Date    CR 0.69 01/06/2022    CR 0.78 05/19/2021     No results found for: MICROALBUMIN    Healthy Eating:  Healthy Eating Assessed Today: Yes  Cultural/Worship diet restrictions?: No  Meal planning/habits: Carb counting,Frequent snacking  How many times a week on average do you eat food made away from home (restaurant/take-out)?: 4 (3-4 times a week)  Meals include: Dinner,Evening Snack (isn't eating until 1 or 2, supper around 5:50/6)  Lunch: sandwich or bowl of ceral or peice of toast or bagel  Dinner: more salad and veggies, protein, spaghetti  Snacks: chips, yogurt  Beverages: Diet soda,Water,Milk,Coffee (prabhjot in water, 1%, cream and splenda in coffee)  Has patient met with a dietitian in the past?: Yes (with gestational diabetes)    Being Active:  Being Active Assessed Today: Yes  Exercise:: Currently not exercising  (not much movement since mom passed)  Barrier to exercise: Physical limitation (chronic pain)    Monitoring:  Monitoring Assessed Today: Yes  Did patient bring glucose meter to appointment? : Yes  Blood Glucose Meter:  (none yet)  Times checking blood sugar at home (number): 2  Times checking blood sugar at home (per): Day  Blood glucose trend: Decreasing    Average B mg/dL  - :   - : 126-184    Taking Medications:  Diabetes Medication(s)     Biguanides       metFORMIN (GLUCOPHAGE-XR) 500 MG 24 hr tablet    Take one pill by mouth in the morning and two pills at supper          Taking Medication Assessed Today: Yes  Current Treatments: Oral Medication (taken by mouth) (metformin 1000 with dinner)  Problems taking diabetes medications regularly?: No  Diabetes medication side effects?: No    Problem Solving:  Problem Solving Assessed Today: Yes  Is the patient at risk for hypoglycemia?: No  Is the patient at risk for DKA?: No  Does patient have severe weather/disaster plan for diabetes management?: Not Needed  Does patient have sick day plan for diabetes management?: Not Needed           Reducing Risks:  Diabetes Risks: Age over 45 years,History of gestational diabetes,Sedentary Lifestyle  CAD Risks: Sedentary lifestyle,Tobacco exposure,Stress,Obesity,Family history,Diabetes Mellitus  Has dilated eye exam at least once a year?: No (working with insurance on finding doctor)  Sees dentist every 6 months?: No (working with insuance on finding a dentist)  Feet checked by healthcare provider in the last year?: No    Healthy Coping:  Healthy Coping Assessed Today: Yes  Emotional response to diabetes: Ready to learn,Depression (depression due to mom passing)  Informal Support system:: Family (mental health practitioner, [facility], )  Stage of change: ACTION (Actively working towards change)  Support resources: In-person Offerings  Patient Activation Measure Survey Score:  KEN Score (Last  Two) 11/28/2018 3/7/2019   KEN Raw Score 27 27   Activation Score 47.4 47.4   KEN Level 2 2       Diabetes knowledge and skills assessment:   Patient is knowledgeable in diabetes management concepts related to: Healthy Eating, Monitoring and Taking Medication    Patient needs further education on the following diabetes management concepts: Healthy Eating, Being Active, Monitoring, Taking Medication, Problem Solving, Reducing Risks and Healthy Coping    Based on learning assessment above, most appropriate setting for further diabetes education would be: Individual setting.      INTERVENTIONS:    Education provided today on:  AADE Self-Care Behaviors:  Healthy Coping: benefits of making appropriate lifestyle changes and utilize support systems    Opportunities for ongoing education and support in diabetes-self management were discussed.    Pt verbalized understanding of concepts discussed and recommendations provided today.       Education Materials Provided:  No new materials provided today      ASSESSMENT:  Pt's BG continues to trend down. Few pounds down since last visit. She went to the mental health assessment for pursuing weight loss surgery. There was a miscommunication between mental health center and bariatric center, so it was not completed. She was disappointed/frustrated about this. She is continuing to work towards improveing her eating habits. She is not eating out as much. She is getting set up for [employer]als- DM friendly meals. Encouraged her to keep on making small changes related to food choices/habits.     Goals Addressed as of 2/2/2022 2/2/22       Healthy Eating (pt-stated)   100%    Added 12/16/21 by Taisha Corral RD      Eating out less often            Patient's most recent   Lab Results   Component Value Date    A1C 7.0 11/04/2021    A1C 6.0 01/29/2020    is meeting goal of <7.0    PLAN  See Patient Instructions for co-developed, patient-stated behavior change  goals.  AVS printed and provided to patient today. See Follow-Up section for recommended follow-up.    Follow up visit 2/16/21    Time Spent: 30 minutes  Encounter Type: Individual    Any diabetes medication dose changes were made via the CDE Protocol and Collaborative Practice Agreement with the patient's primary care provider. A copy of this encounter was shared with the provider.

## 2022-02-14 NOTE — PROGRESS NOTES
Met with Autumn along with LORETO Alfredo. Please see Taisha's notes for education covered. Autumn is doing well today. Reviewed BGs and will continue with current dose of Metformin.    Yohana Ruiz RN, [facility]

## 2022-02-16 ENCOUNTER — HOSPITAL ENCOUNTER (OUTPATIENT)
Dept: EDUCATION SERVICES | Facility: HOSPITAL | Age: 47
Discharge: HOME | End: 2022-02-16
Attending: NURSE PRACTITIONER | Admitting: PHYSICIAN ASSISTANT
Payer: MEDICARE

## 2022-02-16 VITALS
WEIGHT: 286.2 LBS | RESPIRATION RATE: 16 BRPM | DIASTOLIC BLOOD PRESSURE: 79 MMHG | HEIGHT: 62 IN | OXYGEN SATURATION: 97 % | BODY MASS INDEX: 52.67 KG/M2 | SYSTOLIC BLOOD PRESSURE: 124 MMHG | HEART RATE: 102 BPM

## 2022-02-16 DIAGNOSIS — E11.42 TYPE 2 DIABETES MELLITUS WITH DIABETIC POLYNEUROPATHY, WITHOUT LONG-TERM CURRENT USE OF INSULIN (H): ICD-10-CM

## 2022-02-16 DIAGNOSIS — E11.65 TYPE 2 DIABETES MELLITUS WITH HYPERGLYCEMIA, WITHOUT LONG-TERM CURRENT USE OF INSULIN (H): Primary | ICD-10-CM

## 2022-02-16 LAB — HBA1C MFR BLD: 7.6 % (ref 0–5.7)

## 2022-02-16 PROCEDURE — G0108 DIAB MANAGE TRN  PER INDIV: HCPCS | Performed by: DIETITIAN, REGISTERED

## 2022-02-16 PROCEDURE — 83036 HEMOGLOBIN GLYCOSYLATED A1C: CPT | Performed by: NURSE PRACTITIONER

## 2022-02-16 ASSESSMENT — PAIN SCALES - GENERAL: PAINLEVEL: EXTREME PAIN (8)

## 2022-02-16 NOTE — LETTER
2/16/2022        RE: Autumn BATISTA Hnatko  [address] [facility] [address]        Met with Autumn with LORETO Alfredo. Please see Taisha's notes for education covered.    Unable to download Autumn's Accu-Chek Guide. Meter Error: E-7. Autumn reported that BG levels were increasing since our last visit: 150s into 200s.    A1C checked today: 7.6%. A1C is trending up. Will increase Metformin  mg to 2 tablets (1000 mg) twice a day.    I did discuss looking into starting Trulicity or Ozempic. Reviewed benefits, action and potential side effects. If med is not covered or if co-pay is too great, Autumn would, most likely, qualify for a Patient Assistance Program for either med.    Autumn has an upcoming appointment with Apryl Hathaway and Autumn can discuss Ozempic/Trulicity at her appointment.    Yohana Ruiz RN, [facility]    Diabetes Self-Management Education & Support    Presents for: Follow-up (session 4)    SUBJECTIVE/OBJECTIVE:  Presents for: Follow-up (session 4)  Accompanied by: Self  Diabetes education in the past 24mo: No  Focus of Visit: Patient Unsure, Diabetes Pathophysiology, Healthy Eating, Healthy Coping  Diabetes type: Type 2  Date of diagnosis: 11/4/21  Disease course: Other  How confident are you filling out medical forms by yourself:: Quite a bit  Diabetes management related comments/concerns: Affording supplies  Transportation concerns: Yes  Difficulty affording diabetes medication?: No  Difficulty affording diabetes testing supplies?: Sometimes (possibly)  Other concerns:: Glasses, Physical impairment  Cultural Influences/Ethnic Background:  American      Diabetes Symptoms & Complications:  Fatigue: Yes  Neuropathy: Sometimes  Polydipsia: Yes  Polyphagia: Yes  Polyuria: Yes  Visual change: No  Slow healing wounds: Yes  Symptom course: Improving  Weight trend: Stable  Complications assessed today?: No    Patient Problem List and Family Medical History reviewed for relevant medical history,  "current medical status, and diabetes risk factors.    Vitals:  /79   Pulse 102   Resp 16   Ht 1.578 m (5' 2.12\")   Wt 129.8 kg (286 lb 3.2 oz)   SpO2 97%   BMI 52.14 kg/m    Estimated body mass index is 52.14 kg/m  as calculated from the following:    Height as of this encounter: 1.578 m (5' 2.12\").    Weight as of this encounter: 129.8 kg (286 lb 3.2 oz).   Last 3 BP:   BP Readings from Last 3 Encounters:   02/16/22 124/79   02/02/22 124/70   01/19/22 118/88       History   Smoking Status     Current Every Day Smoker     Packs/day: 0.50     Years: 32.00     Types: Cigarettes     Start date: 1/1/1989   Smokeless Tobacco     Never Used     Comment: pt declines       Labs:  Lab Results   Component Value Date    A1C 7.6 02/16/2022     Lab Results   Component Value Date     01/06/2022     05/19/2021     Lab Results   Component Value Date     01/06/2022     01/29/2020     HDL Cholesterol   Date Value Ref Range Status   01/29/2020 87 >49 mg/dL Final     Direct Measure HDL   Date Value Ref Range Status   01/06/2022 65 >=50 mg/dL Final   ]  GFR Estimate   Date Value Ref Range Status   01/06/2022 >90 >60 mL/min/1.73m2 Final     Comment:     Effective December 21, 2021 eGFRcr in adults is calculated using the 2021 CKD-EPI creatinine equation which includes age and gender (Samy bryant al., NEJ, DOI: 10.1056/VGHMsu3647123)   05/19/2021 >90 >60 mL/min/[1.73_m2] Final     Comment:     Non  GFR Calc  Starting 12/18/2018, serum creatinine based estimated GFR (eGFR) will be   calculated using the Chronic Kidney Disease Epidemiology Collaboration   (CKD-EPI) equation.       GFR Estimate If Black   Date Value Ref Range Status   05/19/2021 >90 >60 mL/min/[1.73_m2] Final     Comment:      GFR Calc  Starting 12/18/2018, serum creatinine based estimated GFR (eGFR) will be   calculated using the Chronic Kidney Disease Epidemiology Collaboration   (CKD-EPI) equation.   "     Lab Results   Component Value Date    CR 0.69 01/06/2022    CR 0.78 05/19/2021     No results found for: MICROALBUMIN    Healthy Eating:  Healthy Eating Assessed Today: Yes  Cultural/Bahai diet restrictions?: No  Meal planning/habits: Carb counting, Frequent snacking  How many times a week on average do you eat food made away from home (restaurant/take-out)?: 4 (3-4 times a week)  Meals include: Dinner, Evening Snack (isn't eating until 1 or 2, supper around 5:50/6)  Lunch: moms meals- diabetes friendly  Dinner: more salad, protein (fish or chicken tenderloin) not much beef  Snacks: peice of cheese, sometimes cereal  Beverages: Diet soda, Water, Milk, Coffee (prabhjot in water, 1%, cream and splenda in coffee)  Has patient met with a dietitian in the past?: Yes (with gestational diabetes)    Being Active:  Being Active Assessed Today: Yes  Exercise:: Currently not exercising (not much movement since mom passed)  Barrier to exercise: Physical limitation (chronic pain)    Monitoring:  Monitoring Assessed Today: Yes  Did patient bring glucose meter to appointment? : Yes  Blood Glucose Meter: Accu-chek (guide me)  Times checking blood sugar at home (number): 2  Times checking blood sugar at home (per): Day  Blood glucose trend: Other (unable to load meter today)    Meter would not down  Morning 157  Post meal 190, 200    Taking Medications:  Diabetes Medication(s)     Biguanides       metFORMIN (GLUCOPHAGE-XR) 500 MG 24 hr tablet    Take one pill by mouth in the morning and two pills at supper          Taking Medication Assessed Today: Yes  Current Treatments: Oral Medication (taken by mouth) (metformin 1000 with dinner)  Problems taking diabetes medications regularly?: No  Diabetes medication side effects?: No    Problem Solving:  Problem Solving Assessed Today: Yes  Is the patient at risk for hypoglycemia?: No  Is the patient at risk for DKA?: No  Does patient have severe weather/disaster plan for diabetes  management?: Not Needed  Does patient have sick day plan for diabetes management?: Not Needed      Reducing Risks:  Diabetes Risks: Age over 45 years, History of gestational diabetes, Sedentary Lifestyle  CAD Risks: Sedentary lifestyle, Tobacco exposure, Stress, Obesity, Family history, Diabetes Mellitus  Has dilated eye exam at least once a year?: No (working with insurance on finding doctor)  Sees dentist every 6 months?: No (working with insuance on finding a dentist)  Feet checked by healthcare provider in the last year?: No    Healthy Coping:  Healthy Coping Assessed Today: Yes  Emotional response to diabetes: Ready to learn, Depression (depression due to mom passing)  Informal Support system:: [facility]  Stage of change: ACTION (Actively working towards change)  Support resources: In-person Offerings  Patient Activation Measure Survey Score:  KEN Score (Last Two) 11/28/2018 3/7/2019   KEN Raw Score 27 27   Activation Score 47.4 47.4   KEN Level 2 2       Diabetes knowledge and skills assessment:   Patient is knowledgeable in diabetes management concepts related to: Healthy Eating, Being Active, Monitoring, Taking Medication, Problem Solving, Reducing Risks and Healthy Coping    Patient needs further education on the following diabetes management concepts: Healthy Eating, Being Active, Problem Solving, Reducing Risks and Healthy Coping    Based on learning assessment above, most appropriate setting for further diabetes education would be: Individual setting.      INTERVENTIONS:    Education provided today on:  AADE Self-Care Behaviors:  Healthy Eating: consistency in amount, composition, and timing of food intake, weight reduction, portion control and plate planning method  Being Active: relationship to blood glucose  Monitoring: new meter, old meter does not work  Reducing Risks: A1C - goals, relating to blood glucose levels, how often to check    Opportunities  for ongoing education and support in diabetes-self management were discussed.    Pt verbalized understanding of concepts discussed and recommendations provided today.       Education Materials Provided:  No new materials provided today    ASSESSMENT:  Pt reports she has not gotten some Mom's meals. She will typically have one for lunch, sometimes dinner. Not much change to activity level. Meter would not download today. She reports her BG levels have been higher lately,  She was provided with a new meter. A1C was checked today- it did go up a little to 7.6. Yohana discussed adding a second metformin in the morning- will now be 1000mg in the morning and evening. Yohana also discussed  maybe adding another medication in the future.    Patient's most recent   Lab Results   Component Value Date    A1C 7.6 02/16/2022    is not meeting goal of <7.0    PLAN  See Patient Instructions for co-developed, patient-stated behavior change goals.  AVS printed and provided to patient today. See Follow-Up section for recommended follow-up.    Increase Metformin  mg to 2 tablets (1000 mg) twice a day.  Return Diabetes Ed on 3/9/22 at 4pm    Time Spent: 60 minutes  Encounter Type: Individual    Any diabetes medication dose changes were made via the CDE Protocol and Collaborative Practice Agreement with the patient's referring provider. A copy of this encounter was shared with the provider.            Sincerely,        Taisha Corral RD

## 2022-02-16 NOTE — LETTER
2/16/2022        RE: Autumn BATISTA Hnatko  [address] [facility] [address]        Met with Autumn with LORETO Alfredo. Please see Taisha's notes for education covered.    Unable to download Autumn's Accu-Chek Guide. Meter Error: E-7. Autumn reported that BG levels were increasing since our last visit: 150s into 200s.    A1C checked today: 7.6%. A1C is trending up. Will increase Metformin  mg to 2 tablets (1000 mg) twice a day.    I did discuss looking into starting Trulicity or Ozempic. Reviewed benefits, action and potential side effects. If med is not covered or if co-pay is too great, Autumn would, most likely, qualify for a Patient Assistance Program for either med.    Autumn has an upcoming appointment with Apryl Hathaway and Autumn can discuss Ozempic/Trulicity at her appointment.    Yohana Ruiz RN, [facility]        Sincerely,        Yohana Ruiz RN

## 2022-02-16 NOTE — PATIENT INSTRUCTIONS
Increase Metformin  mg to 2 tablets (1000 mg) twice a day.    Return Diabetes Ed on 3/9/22 at 4:00 pm

## 2022-02-16 NOTE — PROGRESS NOTES
Met with Autumn with LORETO Alfredo. Please see Taisha's notes for education covered.    Unable to download Autumn's Accu-Chek Guide. Meter Error: E-7. Autumn reported that BG levels were increasing since our last visit: 150s into 200s.    A1C checked today: 7.6%. A1C is trending up. Will increase Metformin  mg to 2 tablets (1000 mg) twice a day.    I did discuss looking into starting Trulicity or Ozempic. Reviewed benefits, action and potential side effects. If med is not covered or if co-pay is too great, Autumn would, most likely, qualify for a Patient Assistance Program for either med.    Autumn has an upcoming appointment with Apryl Hathaway and Autumn can discuss Ozempic/Trulicity at her appointment.    Yohana Ruiz RN, [facility]

## 2022-02-17 PROBLEM — M65.30 TRIGGER FINGER OF LEFT HAND, UNSPECIFIED FINGER: Status: ACTIVE | Noted: 2018-05-17

## 2022-02-17 RX ORDER — METFORMIN HCL 500 MG
TABLET, EXTENDED RELEASE 24 HR ORAL
Qty: 120 TABLET | Refills: 3 | Status: SHIPPED | OUTPATIENT
Start: 2022-02-17 | End: 2022-06-01

## 2022-02-17 NOTE — PROGRESS NOTES
"Diabetes Self-Management Education & Support    Presents for: Follow-up (session 4)    SUBJECTIVE/OBJECTIVE:  Presents for: Follow-up (session 4)  Accompanied by: Self  Diabetes education in the past 24mo: No  Focus of Visit: Patient Unsure, Diabetes Pathophysiology, Healthy Eating, Healthy Coping  Diabetes type: Type 2  Date of diagnosis: 11/4/21  Disease course: Other  How confident are you filling out medical forms by yourself:: Quite a bit  Diabetes management related comments/concerns: Affording supplies  Transportation concerns: Yes  Difficulty affording diabetes medication?: No  Difficulty affording diabetes testing supplies?: Sometimes (possibly)  Other concerns:: Glasses, Physical impairment  Cultural Influences/Ethnic Background:  American      Diabetes Symptoms & Complications:  Fatigue: Yes  Neuropathy: Sometimes  Polydipsia: Yes  Polyphagia: Yes  Polyuria: Yes  Visual change: No  Slow healing wounds: Yes  Symptom course: Improving  Weight trend: Stable  Complications assessed today?: No    Patient Problem List and Family Medical History reviewed for relevant medical history, current medical status, and diabetes risk factors.    Vitals:  /79   Pulse 102   Resp 16   Ht 1.578 m (5' 2.12\")   Wt 129.8 kg (286 lb 3.2 oz)   SpO2 97%   BMI 52.14 kg/m    Estimated body mass index is 52.14 kg/m  as calculated from the following:    Height as of this encounter: 1.578 m (5' 2.12\").    Weight as of this encounter: 129.8 kg (286 lb 3.2 oz).   Last 3 BP:   BP Readings from Last 3 Encounters:   02/16/22 124/79   02/02/22 124/70   01/19/22 118/88       History   Smoking Status     Current Every Day Smoker     Packs/day: 0.50     Years: 32.00     Types: Cigarettes     Start date: 1/1/1989   Smokeless Tobacco     Never Used     Comment: pt declines       Labs:  Lab Results   Component Value Date    A1C 7.6 02/16/2022     Lab Results   Component Value Date     01/06/2022     05/19/2021     Lab " Results   Component Value Date     01/06/2022     01/29/2020     HDL Cholesterol   Date Value Ref Range Status   01/29/2020 87 >49 mg/dL Final     Direct Measure HDL   Date Value Ref Range Status   01/06/2022 65 >=50 mg/dL Final   ]  GFR Estimate   Date Value Ref Range Status   01/06/2022 >90 >60 mL/min/1.73m2 Final     Comment:     Effective December 21, 2021 eGFRcr in adults is calculated using the 2021 CKD-EPI creatinine equation which includes age and gender (Samy et al., NEJ, DOI: 10.1056/NODGob6199981)   05/19/2021 >90 >60 mL/min/[1.73_m2] Final     Comment:     Non  GFR Calc  Starting 12/18/2018, serum creatinine based estimated GFR (eGFR) will be   calculated using the Chronic Kidney Disease Epidemiology Collaboration   (CKD-EPI) equation.       GFR Estimate If Black   Date Value Ref Range Status   05/19/2021 >90 >60 mL/min/[1.73_m2] Final     Comment:      GFR Calc  Starting 12/18/2018, serum creatinine based estimated GFR (eGFR) will be   calculated using the Chronic Kidney Disease Epidemiology Collaboration   (CKD-EPI) equation.       Lab Results   Component Value Date    CR 0.69 01/06/2022    CR 0.78 05/19/2021     No results found for: MICROALBUMIN    Healthy Eating:  Healthy Eating Assessed Today: Yes  Cultural/Denominational diet restrictions?: No  Meal planning/habits: Carb counting, Frequent snacking  How many times a week on average do you eat food made away from home (restaurant/take-out)?: 4 (3-4 times a week)  Meals include: Dinner, Evening Snack (isn't eating until 1 or 2, supper around 5:50/6)  Lunch: moms meals- diabetes friendly  Dinner: more salad, protein (fish or chicken tenderloin) not much beef  Snacks: peice of cheese, sometimes cereal  Beverages: Diet soda, Water, Milk, Coffee (prabhjot in water, 1%, cream and splenda in coffee)  Has patient met with a dietitian in the past?: Yes (with gestational diabetes)    Being Active:  Being Active Assessed  Today: Yes  Exercise:: Currently not exercising (not much movement since mom passed)  Barrier to exercise: Physical limitation (chronic pain)    Monitoring:  Monitoring Assessed Today: Yes  Did patient bring glucose meter to appointment? : Yes  Blood Glucose Meter: Accu-chek (guide me)  Times checking blood sugar at home (number): 2  Times checking blood sugar at home (per): Day  Blood glucose trend: Other (unable to load meter today)    Meter would not down  Morning 157  Post meal 190, 200    Taking Medications:  Diabetes Medication(s)     Biguanides       metFORMIN (GLUCOPHAGE-XR) 500 MG 24 hr tablet    Take one pill by mouth in the morning and two pills at supper          Taking Medication Assessed Today: Yes  Current Treatments: Oral Medication (taken by mouth) (metformin 1000 with dinner)  Problems taking diabetes medications regularly?: No  Diabetes medication side effects?: No    Problem Solving:  Problem Solving Assessed Today: Yes  Is the patient at risk for hypoglycemia?: No  Is the patient at risk for DKA?: No  Does patient have severe weather/disaster plan for diabetes management?: Not Needed  Does patient have sick day plan for diabetes management?: Not Needed      Reducing Risks:  Diabetes Risks: Age over 45 years, History of gestational diabetes, Sedentary Lifestyle  CAD Risks: Sedentary lifestyle, Tobacco exposure, Stress, Obesity, Family history, Diabetes Mellitus  Has dilated eye exam at least once a year?: No (working with insurance on finding doctor)  Sees dentist every 6 months?: No (working with insuance on finding a dentist)  Feet checked by healthcare provider in the last year?: No    Healthy Coping:  Healthy Coping Assessed Today: Yes  Emotional response to diabetes: Ready to learn, Depression (depression due to mom passing)  Informal Support system:: Family (mental health practitioner, [facility], )  Stage of change: ACTION (Actively working towards change)  Support resources:  In-person Offerings  Patient Activation Measure Survey Score:  KEN Score (Last Two) 11/28/2018 3/7/2019   KEN Raw Score 27 27   Activation Score 47.4 47.4   KEN Level 2 2       Diabetes knowledge and skills assessment:   Patient is knowledgeable in diabetes management concepts related to: Healthy Eating, Being Active, Monitoring, Taking Medication, Problem Solving, Reducing Risks and Healthy Coping    Patient needs further education on the following diabetes management concepts: Healthy Eating, Being Active, Problem Solving, Reducing Risks and Healthy Coping    Based on learning assessment above, most appropriate setting for further diabetes education would be: Individual setting.      INTERVENTIONS:    Education provided today on:  AADE Self-Care Behaviors:  Healthy Eating: consistency in amount, composition, and timing of food intake, weight reduction, portion control and plate planning method  Being Active: relationship to blood glucose  Monitoring: new meter, old meter does not work  Reducing Risks: A1C - goals, relating to blood glucose levels, how often to check    Opportunities for ongoing education and support in diabetes-self management were discussed.    Pt verbalized understanding of concepts discussed and recommendations provided today.       Education Materials Provided:  No new materials provided today    ASSESSMENT:  Pt reports she has not gotten some Mom's meals. She will typically have one for lunch, sometimes dinner. Not much change to activity level. Meter would not download today. She reports her BG levels have been higher lately,  She was provided with a new meter. A1C was checked today- it did go up a little to 7.6. Yohana discussed adding a second metformin in the morning- will now be 1000mg in the morning and evening. Yohana also discussed  maybe adding another medication in the future.    Patient's most recent   Lab Results   Component Value Date    A1C 7.6 02/16/2022    is not meeting goal of  <7.0    PLAN  See Patient Instructions for co-developed, patient-stated behavior change goals.  AVS printed and provided to patient today. See Follow-Up section for recommended follow-up.    Increase Metformin  mg to 2 tablets (1000 mg) twice a day.  Return Diabetes Ed on 3/9/22 at 4pm    Time Spent: 60 minutes  Encounter Type: Individual    Any diabetes medication dose changes were made via the CDE Protocol and Collaborative Practice Agreement with the patient's referring provider. A copy of this encounter was shared with the provider.

## 2022-02-28 DIAGNOSIS — E66.01 MORBID OBESITY (H): ICD-10-CM

## 2022-02-28 DIAGNOSIS — E03.9 ACQUIRED HYPOTHYROIDISM: ICD-10-CM

## 2022-02-28 DIAGNOSIS — G89.4 CHRONIC PAIN SYNDROME: ICD-10-CM

## 2022-02-28 DIAGNOSIS — R60.0 BILATERAL LEG EDEMA: ICD-10-CM

## 2022-03-02 RX ORDER — LEVOTHYROXINE SODIUM 88 UG/1
TABLET ORAL
Qty: 90 TABLET | Refills: 0 | Status: SHIPPED | OUTPATIENT
Start: 2022-03-02 | End: 2022-06-01

## 2022-03-02 RX ORDER — FUROSEMIDE 20 MG
TABLET ORAL
Qty: 60 TABLET | Refills: 0 | Status: SHIPPED | OUTPATIENT
Start: 2022-03-02 | End: 2022-03-31

## 2022-03-02 RX ORDER — PHENTERMINE HYDROCHLORIDE 30 MG/1
CAPSULE ORAL
Qty: 30 CAPSULE | Refills: 0 | Status: SHIPPED | OUTPATIENT
Start: 2022-03-02 | End: 2022-03-31

## 2022-03-02 RX ORDER — BACLOFEN 10 MG/1
TABLET ORAL
Qty: 90 TABLET | Refills: 0 | Status: SHIPPED | OUTPATIENT
Start: 2022-03-02 | End: 2022-03-31

## 2022-03-02 NOTE — TELEPHONE ENCOUNTER
lasix      Last Written Prescription Date:  2/2/22  Last Fill Quantity: 60,   # refills: 0  Last Office Visit: 12/9/21  Future Office visit:    Next 5 appointments (look out 90 days)    Mar 09, 2022  2:00 PM  (Arrive by 1:45 PM)  SHORT with SEVEN Pandey  [facility] [place] ([facility] [place] ) [address]  [place] [address]  [phone]         baclofen      Last Written Prescription Date:  1/31/22  Last Fill Quantity: 90,   # refills: 0  Last Office Visit: 12/9/21  Future Office visit:    Next 5 appointments (look out 90 days)    Mar 09, 2022  2:00 PM  (Arrive by 1:45 PM)  SHORT with SEVEN Pandey  [facility] [place] ([facility] [place] ) [address]  [place] [address]  [phone]       phentermine      Last Written Prescription Date:  1/31/22  Last Fill Quantity: 30,   # refills: 0  Last Office Visit: 12/9/21  Future Office visit:    Next 5 appointments (look out 90 days)    Mar 09, 2022  2:00 PM  (Arrive by 1:45 PM)  SHORT with SEVEN Pandey  [facility] [place] ([facility] [place] ) [address]  [place] [address]  [phone]

## 2022-03-03 NOTE — PROGRESS NOTES
"  Assessment & Plan   1. Acquired hypothyroidism  [place]  We will check this again.   - levothyroxine (SYNTHROID/LEVOTHROID) 88 MCG tablet; Take one pill of 88 mcg daily and one pill of 100 mg daily  Dispense: 90 tablet; Refill: 1  - levothyroxine (SYNTHROID/LEVOTHROID) 100 MCG tablet; Take one pill of 100 mcg and one pill of 88 mg together daily  Dispense: 90 tablet; Refill: 1    2. Type 2 diabetes mellitus without complication, without long-term current use of insulin (H)  Needing to correct her LDL .    - rosuvastatin (CRESTOR) 5 MG tablet; Take 1 tablet (5 mg) by mouth daily  Dispense: 90 tablet; Refill: 1    3. Severe episode of recurrent major depressive disorder, without psychotic features (H)  Mood is better on Vryalar and Zoloft. No sign of Lisa.      4. Opioid dependence in remission (H)  Seeing [facility].      Review of external notes as documented elsewhere in note  Ordering of each unique test  Prescription drug management  5 minutes spent on the date of the encounter doing chart review, patient visit and documentation        Tobacco Cessation:   reports that she has been smoking cigarettes. She started smoking about 33 years ago. She has a 16.00 pack-year smoking history. She has never used smokeless tobacco.      BMI:   Estimated body mass index is 52.65 kg/m  as calculated from the following:    Height as of 2/16/22: 1.578 m (5' 2.12\").    Weight as of this encounter: 131.1 kg (289 lb).   Weight management plan: Discussed healthy diet and exercise guidelines    See Patient Instructions    No follow-ups on file.    SEVEN Bryant  [facility] - GLORIA Leyva is a 46 year old who presents for the following health issues     HPI     Depression and Anxiety Follow-Up    How are you doing with your depression since your last visit? Improved slightly, seems to be fluxuating    How are you doing with " Initial SW/CM Assessment/Plan of Care Note     Baseline Assessment  89 year old admitted 1/28/2019 as Inpatient with a diagnosis of Non- STEMI.   Prior to admission patient was living with Adult children and residing at House.  Patient does  have a Power of  for Healthcare.  Document is not activated.  Agent is Sheldon Flores, miguel. Patient’s Primary Care Provider is Kip T Mentzer, MD.     Medical History  Past Medical History:   Diagnosis Date   • Cataract    • Diabetes mellitus (CMS/HCC)    • Essential (primary) hypertension    • Macular degeneration        Prior to Admission Status       Agency/Support  Type of Services Prior to Hospitalization: None  Support Systems: Children  Home Devices/Equipment: Mobility assist device, Shower chair  Mobility Assist Devices: None  Sensory Support Devices: Eyeglasses, Dentures    Current Status  PT Ambulation Tips:    PT Transfer Tips:    OT Bathing Tips:    OT Dressing Tips:    OT Toileting Tips:    OT Feeding Tips:    SLP Swallow/Feeding Tips:    SLP Comm/Cog Tips:    Current Mental Status:    Stressors:      Insurance  Primary: MEDICARE  Secondary: [employer]    Barriers to Discharge  Identified Barriers to Discharge/Transition Planning: Assessment/stabilization in progress    Progress Note  Order received for dc planning and advance directives. Met with pt this afternoon. Introduced self and SW role. Pt reports that she moved from her home in [place] a few months ago to live with her son and daughter in law. Her son works part time, however usually her daughter in law is home with her. Pt states that she walks with out a device at baseline.  Her family does all the cooking, cleaning shopping and sets up her medication.   Pt anticipates that she will be ready for dc tomorrow. Writer discussed home care with her and she said that she will discus with her son. Writer offered her the home care choice list, however she declined stating that she has so much  paperwork already. She states she will remember to ask him and that she feels because it is 'his home he should have a say who comes into it.' Writer did discuss [facility] and there affiliation with this hospital. Pt said she would consider them if she chooses to have home care.  Pt said that her son will transport her home.  DC IMM provided.  SW to follow up with pt/staff tomorrow re: home care.    Plan  SW/CM - Recommendations for Discharge:    PT - Recommendations for Discharge:    OT - Recommendations for Discharge:    SLP - Recommendations for Discharge:    Anticipate patient will need post-hospital services. Necessary services are available.  Anticipate patient can return to the environment from which patient entered the hospital.   Anticipate patient can provide self-care at discharge.    Refer to SW/CM Flowsheet for Goals and objective data.      your anxiety since your last visit?  Worsened     Are you having other symptoms that might be associated with depression or anxiety? No    Have you had a significant life event? Housing Concerns     Do you have any concerns with your use of alcohol or other drugs? No    Social History     Tobacco Use     Smoking status: Current Every Day Smoker     Packs/day: 0.50     Years: 32.00     Pack years: 16.00     Types: Cigarettes     Start date: 1/1/1989     Smokeless tobacco: Never Used     Tobacco comment: pt declines   Substance Use Topics     Alcohol use: No     Drug use: No     Comment: previous opiod addiction       COY-7 SCORE 9/8/2021 12/9/2021 3/9/2022   Total Score 19 20 18     Last PHQ-9 3/9/2022   1.  Little interest or pleasure in doing things 2   2.  Feeling down, depressed, or hopeless 2   3.  Trouble falling or staying asleep, or sleeping too much 2   4.  Feeling tired or having little energy 2   5.  Poor appetite or overeating 3   6.  Feeling bad about yourself 1   7.  Trouble concentrating 2   8.  Moving slowly or restless 1   Q9: Thoughts of better off dead/self-harm past 2 weeks 0   PHQ-9 Total Score 15   Difficulty at work, home, or with people Very difficult   In the past two weeks have you had thoughts of suicide or self harm? -   Do you have concerns about your personal safety or the safety of others? -   In the past 2 weeks have you thought about a plan or had intention to harm yourself? -   In the past 2 weeks have you acted on these thoughts in any way? -     COY-7  3/9/2022   1. Feeling nervous, anxious, or on edge 3   2. Not being able to stop or control worrying 3   3. Worrying too much about different things 3   4. Trouble relaxing 3   5. Being so restless that it is hard to sit still 2   6. Becoming easily annoyed or irritable 1   7. Feeling afraid, as if something awful might happen 3   COY-7 Total Score 18   If you checked any problems, how difficult have they made it for you to do your  work, take care of things at home, or get along with other people? Extremely difficult       Suicide Assessment Five-step Evaluation and Treatment (SAFE-T)        Hypothyroidism Follow-up      Since last visit, patient describes the following symptoms: weight gain of 6 lbs          Review of Systems   CONSTITUTIONAL: NEGATIVE for fever, chills, change in weight  INTEGUMENTARY/SKIN: NEGATIVE for worrisome rashes, moles or lesions  ENT/MOUTH: NEGATIVE for ear, mouth and throat problems  RESP: NEGATIVE for significant cough or SOB  CV: NEGATIVE for chest pain, palpitations or peripheral edema  MUSCULOSKELETAL: chronic pain in lower back and   PSYCHIATRIC: active with "[employer] and has an [employer] worker.       Objective    /70   Pulse 102   Temp 97.3  F (36.3  C) (Tympanic)   Resp 18   Wt 131.1 kg (289 lb)   SpO2 96%   BMI 52.65 kg/m    Body mass index is 52.65 kg/m .  Physical Exam   GENERAL: healthy, alert and no distress  EYES: Eyes grossly normal to inspection, PERRL and conjunctivae and sclerae normal  HENT: ear canals and TM's normal, nose and mouth without ulcers or lesions  NECK: no adenopathy, no asymmetry, masses, or scars and thyroid normal to palpation  RESP: lungs clear to auscultation - no rales, rhonchi or wheezes  CV: regular rate and rhythm, normal S1 S2, no S3 or S4, no murmur, click or rub, no peripheral edema and peripheral pulses strong  ABDOMEN: soft, nontender, no hepatosplenomegaly, no masses and bowel sounds normal  MS: no gross musculoskeletal defects noted, no edema  SKIN: no suspicious lesions or rashes  PSYCH: mentation appears normal, affect normal/bright    Hospital Outpatient Visit on 02/16/2022   Component Date Value Ref Range Status     Hemoglobin A1C POCT 02/16/2022 7.6 (A) < 5.7% % Final

## 2022-03-04 ENCOUNTER — TELEPHONE (OUTPATIENT)
Dept: FAMILY MEDICINE | Facility: OTHER | Age: 47
End: 2022-03-04
Payer: MEDICARE

## 2022-03-07 DIAGNOSIS — Z53.9 PERSONS ENCOUNTERING HEALTH SERVICES FOR SPECIFIC PROCEDURES, NOT CARRIED OUT: Primary | ICD-10-CM

## 2022-03-09 ENCOUNTER — HOSPITAL ENCOUNTER (OUTPATIENT)
Dept: EDUCATION SERVICES | Facility: HOSPITAL | Age: 47
End: 2022-03-09
Attending: NURSE PRACTITIONER
Payer: MEDICARE

## 2022-03-09 ENCOUNTER — OFFICE VISIT (OUTPATIENT)
Dept: FAMILY MEDICINE | Facility: OTHER | Age: 47
End: 2022-03-09
Attending: PHYSICIAN ASSISTANT
Payer: MEDICARE

## 2022-03-09 VITALS
OXYGEN SATURATION: 96 % | HEART RATE: 102 BPM | SYSTOLIC BLOOD PRESSURE: 128 MMHG | BODY MASS INDEX: 52.65 KG/M2 | DIASTOLIC BLOOD PRESSURE: 70 MMHG | RESPIRATION RATE: 18 BRPM | WEIGHT: 289 LBS | TEMPERATURE: 97.3 F

## 2022-03-09 VITALS
HEIGHT: 63 IN | WEIGHT: 287.3 LBS | SYSTOLIC BLOOD PRESSURE: 128 MMHG | HEART RATE: 102 BPM | DIASTOLIC BLOOD PRESSURE: 70 MMHG | BODY MASS INDEX: 50.91 KG/M2

## 2022-03-09 DIAGNOSIS — E03.9 ACQUIRED HYPOTHYROIDISM: Primary | ICD-10-CM

## 2022-03-09 DIAGNOSIS — F33.2 SEVERE EPISODE OF RECURRENT MAJOR DEPRESSIVE DISORDER, WITHOUT PSYCHOTIC FEATURES (H): ICD-10-CM

## 2022-03-09 DIAGNOSIS — F11.21 OPIOID DEPENDENCE IN REMISSION (H): ICD-10-CM

## 2022-03-09 DIAGNOSIS — E11.9 TYPE 2 DIABETES MELLITUS WITHOUT COMPLICATION, WITHOUT LONG-TERM CURRENT USE OF INSULIN (H): ICD-10-CM

## 2022-03-09 LAB
T4 FREE SERPL-MCNC: 1.43 NG/DL (ref 0.76–1.46)
TSH SERPL DL<=0.005 MIU/L-ACNC: 0.15 MU/L (ref 0.4–4)

## 2022-03-09 PROCEDURE — G0463 HOSPITAL OUTPT CLINIC VISIT: HCPCS | Performed by: PHYSICIAN ASSISTANT

## 2022-03-09 PROCEDURE — 84439 ASSAY OF FREE THYROXINE: CPT | Mod: ZL | Performed by: PHYSICIAN ASSISTANT

## 2022-03-09 PROCEDURE — 36415 COLL VENOUS BLD VENIPUNCTURE: CPT | Mod: ZL | Performed by: PHYSICIAN ASSISTANT

## 2022-03-09 PROCEDURE — 99214 OFFICE O/P EST MOD 30 MIN: CPT | Performed by: PHYSICIAN ASSISTANT

## 2022-03-09 PROCEDURE — G0108 DIAB MANAGE TRN  PER INDIV: HCPCS | Performed by: DIETITIAN, REGISTERED

## 2022-03-09 PROCEDURE — 84443 ASSAY THYROID STIM HORMONE: CPT | Mod: ZL | Performed by: PHYSICIAN ASSISTANT

## 2022-03-09 RX ORDER — ROSUVASTATIN CALCIUM 5 MG/1
5 TABLET, COATED ORAL DAILY
Qty: 90 TABLET | Refills: 1 | Status: SHIPPED | OUTPATIENT
Start: 2022-03-09 | End: 2022-08-12

## 2022-03-09 RX ORDER — SERTRALINE HYDROCHLORIDE 25 MG/1
TABLET, FILM COATED ORAL
COMMUNITY
Start: 2022-02-10 | End: 2022-04-06 | Stop reason: DRUGHIGH

## 2022-03-09 RX ORDER — CARIPRAZINE 3 MG/1
3 CAPSULE, GELATIN COATED ORAL DAILY
COMMUNITY
Start: 2022-02-10

## 2022-03-09 RX ORDER — LEVOTHYROXINE SODIUM 100 UG/1
TABLET ORAL
Qty: 90 TABLET | Refills: 1 | Status: SHIPPED | OUTPATIENT
Start: 2022-03-09 | End: 2022-06-10

## 2022-03-09 RX ORDER — LEVOTHYROXINE SODIUM 88 UG/1
TABLET ORAL
Qty: 90 TABLET | Refills: 1 | Status: SHIPPED | OUTPATIENT
Start: 2022-03-09 | End: 2022-06-10 | Stop reason: ALTCHOICE

## 2022-03-09 ASSESSMENT — ANXIETY QUESTIONNAIRES
IF YOU CHECKED OFF ANY PROBLEMS ON THIS QUESTIONNAIRE, HOW DIFFICULT HAVE THESE PROBLEMS MADE IT FOR YOU TO DO YOUR WORK, TAKE CARE OF THINGS AT HOME, OR GET ALONG WITH OTHER PEOPLE: EXTREMELY DIFFICULT
2. NOT BEING ABLE TO STOP OR CONTROL WORRYING: NEARLY EVERY DAY
4. TROUBLE RELAXING: NEARLY EVERY DAY
1. FEELING NERVOUS, ANXIOUS, OR ON EDGE: NEARLY EVERY DAY
3. WORRYING TOO MUCH ABOUT DIFFERENT THINGS: NEARLY EVERY DAY
6. BECOMING EASILY ANNOYED OR IRRITABLE: SEVERAL DAYS
5. BEING SO RESTLESS THAT IT IS HARD TO SIT STILL: MORE THAN HALF THE DAYS
GAD7 TOTAL SCORE: 18
7. FEELING AFRAID AS IF SOMETHING AWFUL MIGHT HAPPEN: NEARLY EVERY DAY

## 2022-03-09 ASSESSMENT — PATIENT HEALTH QUESTIONNAIRE - PHQ9: SUM OF ALL RESPONSES TO PHQ QUESTIONS 1-9: 15

## 2022-03-09 ASSESSMENT — PAIN SCALES - GENERAL
PAINLEVEL: EXTREME PAIN (8)
PAINLEVEL: EXTREME PAIN (8)

## 2022-03-09 NOTE — LETTER
"    3/9/2022        RE: Autumn BATISTA Hnatko  [address] [facility] [address]        Diabetes Self-Management Education & Support    Presents for: Follow-up (BG review)    SUBJECTIVE/OBJECTIVE:  Presents for: Follow-up (BG review)  Accompanied by: Self  Diabetes education in the past 24mo: No  Focus of Visit: Patient Unsure, Diabetes Pathophysiology, Healthy Eating, Healthy Coping  Diabetes type: Type 2  Date of diagnosis: 11/4/21  Disease course: Other  How confident are you filling out medical forms by yourself:: Quite a bit  Diabetes management related comments/concerns: Affording supplies  Transportation concerns: Yes  Difficulty affording diabetes medication?: No  Difficulty affording diabetes testing supplies?: Sometimes (possibly)  Other concerns:: Glasses, Physical impairment  Cultural Influences/Ethnic Background:  American      Diabetes Symptoms & Complications:  Fatigue: Yes  Neuropathy: Sometimes  Polydipsia: Sometimes  Polyphagia: Sometimes  Polyuria: No  Visual change: No  Slow healing wounds: No  Symptom course: Improving  Weight trend: Stable  Complications assessed today?: No    Patient Problem List and Family Medical History reviewed for relevant medical history, current medical status, and diabetes risk factors.    Vitals:  /70 (BP Location: Right arm, Patient Position: Sitting, Cuff Size: Adult Large)   Pulse 102   Ht 1.588 m (5' 2.5\")   Wt 130.3 kg (287 lb 4.8 oz)   BMI 51.71 kg/m    Estimated body mass index is 51.71 kg/m  as calculated from the following:    Height as of this encounter: 1.588 m (5' 2.5\").    Weight as of this encounter: 130.3 kg (287 lb 4.8 oz).   Last 3 BP:   BP Readings from Last 3 Encounters:   03/09/22 128/70   03/09/22 128/70   02/16/22 124/79       History   Smoking Status     Current Every Day Smoker     Packs/day: 0.50     Years: 32.00     Types: Cigarettes     Start date: 1/1/1989   Smokeless Tobacco     Never Used     Comment: pt declines       Labs:  Lab " Results   Component Value Date    A1C 7.6 02/16/2022     Lab Results   Component Value Date     01/06/2022     05/19/2021     Lab Results   Component Value Date     01/06/2022     01/29/2020     HDL Cholesterol   Date Value Ref Range Status   01/29/2020 87 >49 mg/dL Final     Direct Measure HDL   Date Value Ref Range Status   01/06/2022 65 >=50 mg/dL Final   ]  GFR Estimate   Date Value Ref Range Status   01/06/2022 >90 >60 mL/min/1.73m2 Final     Comment:     Effective December 21, 2021 eGFRcr in adults is calculated using the 2021 CKD-EPI creatinine equation which includes age and gender (Samy et al., NEJM, DOI: 10.1056/[id number])   05/19/2021 >90 >60 mL/min/[1.73_m2] Final     Comment:     Non  GFR Calc  Starting 12/18/2018, serum creatinine based estimated GFR (eGFR) will be   calculated using the Chronic Kidney Disease Epidemiology Collaboration   (CKD-EPI) equation.       GFR Estimate If Black   Date Value Ref Range Status   05/19/2021 >90 >60 mL/min/[1.73_m2] Final     Comment:      GFR Calc  Starting 12/18/2018, serum creatinine based estimated GFR (eGFR) will be   calculated using the Chronic Kidney Disease Epidemiology Collaboration   (CKD-EPI) equation.       Lab Results   Component Value Date    CR 0.69 01/06/2022    CR 0.78 05/19/2021     No results found for: MICROALBUMIN    Healthy Eating:  Healthy Eating Assessed Today: Yes  Cultural/Latter-day diet restrictions?: No  Meal planning/habits: Carb counting, Frequent snacking  How many times a week on average do you eat food made away from home (restaurant/take-out)?: 4 (3-4 times a week)  Meals include: Dinner, Evening Snack (isn't eating until 1 or 2, supper around 5:50/6)  Breakfast: coffee- meds around 11:30  Lunch: moms meals- diabetes friendly  Dinner: 2 burgers wtih salad, chicken with salad, glass of milk  Snacks: peice of cheese, sometimes cereal  Beverages: Diet soda, Water, Milk,  Coffee (prabhjot in water, 1%, cream and splenda in coffee)  Has patient met with a dietitian in the past?: Yes (with gestational diabetes)    Being Active:  Being Active Assessed Today: Yes  Exercise:: Currently not exercising (not much movement since mom passed)  Barrier to exercise: Physical limitation (chronic pain)    Monitoring:  Monitoring Assessed Today: Yes  Did patient bring glucose meter to appointment? : Yes  Blood Glucose Meter: Accu-chek (guide me)  Times checking blood sugar at home (number): 2 (forgetting at time)  Times checking blood sugar at home (per): Day  Blood glucose trend: Other (unable to load meter today)    Average- 143  Week of 3/3/22- 3/9/22: 128-167    Taking Medications:  Diabetes Medication(s)     Biguanides       metFORMIN (GLUCOPHAGE-XR) 500 MG 24 hr tablet    Take 2  - 500 mg tablets twice a day.          Taking Medication Assessed Today: Yes  Current Treatments: Oral Medication (taken by mouth) (metformin 1000 with dinner)  Problems taking diabetes medications regularly?: No  Diabetes medication side effects?: No    Problem Solving:  Problem Solving Assessed Today: Yes  Is the patient at risk for hypoglycemia?: No  Is the patient at risk for DKA?: No  Does patient have severe weather/disaster plan for diabetes management?: Not Needed  Does patient have sick day plan for diabetes management?: Not Needed        Reducing Risks:  Diabetes Risks: Age over 45 years, History of gestational diabetes, Sedentary Lifestyle  CAD Risks: Sedentary lifestyle, Tobacco exposure, Stress, Obesity, Family history, Diabetes Mellitus  Has dilated eye exam at least once a year?: No (working with insurance on finding doctor)  Sees dentist every 6 months?: No (working with insuance on finding a dentist)  Feet checked by healthcare provider in the last year?: No    Healthy Coping:  Healthy Coping Assessed Today: Yes  Emotional response to diabetes: Ready to learn, Depression (depression due to mom  passing)  Informal Support system:: Family (mental health practitioner, [facility], )  Stage of change: ACTION (Actively working towards change)  Support resources: In-person Offerings  Patient Activation Measure Survey Score:  KEN Score (Last Two) 11/28/2018 3/7/2019   KEN Raw Score 27 27   Activation Score 47.4 47.4   KEN Level 2 2       Diabetes knowledge and skills assessment:   Patient is knowledgeable in diabetes management concepts related to: Healthy Eating, Being Active, Monitoring and Taking Medication    Patient needs further education on the following diabetes management concepts: Healthy Eating, Being Active, Monitoring, Taking Medication, Problem Solving, Reducing Risks and Healthy Coping    Based on learning assessment above, most appropriate setting for further diabetes education would be: Individual setting.      INTERVENTIONS:    Education provided today on:  AADE Self-Care Behaviors:  Healthy Eating: consistency in amount, composition, and timing of food intake  Being Active: relationship to blood glucose and describe appropriate activity program  Monitoring: frequency of monitoring    Opportunities for ongoing education and support in diabetes-self management were discussed.    Pt verbalized understanding of concepts discussed and recommendations provided today.       Education Materials Provided:  No new materials provided today      ASSESSMENT:  Pt's food choices have been pretty stable. Still receiving Mom's meals and making herself some protein and salad for dinner, sometimes adds a carb. Getting tired of salads. Discussed trying to mix dinner up throughout the week. Not much activity yet. Pt feels her depression is improving. She made some cupcakes for her daughters birthday earlier this week and enjoyed baking again. It is around the anniversary of her mothers passing. Having some anxiety due to housing concerns. She forgets to test her BG sometimes and would like to be more  consistent with this. Yohana discussed medication with her today. No changes at this time, but plan to discuss again next time.    Goals Addressed as of 3/9/2022        Monitoring (pt-stated)     Added 3/9/22 by Taisha Corral RD      To be more consistent with testing BG            Patient's most recent   Lab Results   Component Value Date    A1C 7.6 02/16/2022    is not meeting goal of <7.0    PLAN  See Patient Instructions for co-developed, patient-stated behavior change goals.  AVS printed and provided to patient today. See Follow-Up section for recommended follow-up.    Follow up on 4/6/22 at 2:30pm  Time Spent: 60 minutes  Encounter Type: Individual    Any diabetes medication dose changes were made via the CDE Protocol and Collaborative Practice Agreement with the patient's primary care provider. A copy of this encounter was shared with the provider.            Sincerely,        Taisha Corral RD

## 2022-03-10 ENCOUNTER — TELEPHONE (OUTPATIENT)
Dept: FAMILY MEDICINE | Facility: OTHER | Age: 47
End: 2022-03-10
Payer: MEDICARE

## 2022-03-10 DIAGNOSIS — E03.9 ACQUIRED HYPOTHYROIDISM: Primary | ICD-10-CM

## 2022-03-10 ASSESSMENT — ANXIETY QUESTIONNAIRES: GAD7 TOTAL SCORE: 18

## 2022-03-10 NOTE — PROGRESS NOTES
"Diabetes Self-Management Education & Support    Presents for: Follow-up (BG review)    SUBJECTIVE/OBJECTIVE:  Presents for: Follow-up (BG review)  Accompanied by: Self  Diabetes education in the past 24mo: No  Focus of Visit: Patient Unsure, Diabetes Pathophysiology, Healthy Eating, Healthy Coping  Diabetes type: Type 2  Date of diagnosis: 11/4/21  Disease course: Other  How confident are you filling out medical forms by yourself:: Quite a bit  Diabetes management related comments/concerns: Affording supplies  Transportation concerns: Yes  Difficulty affording diabetes medication?: No  Difficulty affording diabetes testing supplies?: Sometimes (possibly)  Other concerns:: Glasses, Physical impairment  Cultural Influences/Ethnic Background:  American      Diabetes Symptoms & Complications:  Fatigue: Yes  Neuropathy: Sometimes  Polydipsia: Sometimes  Polyphagia: Sometimes  Polyuria: No  Visual change: No  Slow healing wounds: No  Symptom course: Improving  Weight trend: Stable  Complications assessed today?: No    Patient Problem List and Family Medical History reviewed for relevant medical history, current medical status, and diabetes risk factors.    Vitals:  /70 (BP Location: Right arm, Patient Position: Sitting, Cuff Size: Adult Large)   Pulse 102   Ht 1.588 m (5' 2.5\")   Wt 130.3 kg (287 lb 4.8 oz)   BMI 51.71 kg/m    Estimated body mass index is 51.71 kg/m  as calculated from the following:    Height as of this encounter: 1.588 m (5' 2.5\").    Weight as of this encounter: 130.3 kg (287 lb 4.8 oz).   Last 3 BP:   BP Readings from Last 3 Encounters:   03/09/22 128/70   03/09/22 128/70   02/16/22 124/79       History   Smoking Status     Current Every Day Smoker     Packs/day: 0.50     Years: 32.00     Types: Cigarettes     Start date: 1/1/1989   Smokeless Tobacco     Never Used     Comment: pt declines       Labs:  Lab Results   Component Value Date    A1C 7.6 02/16/2022     Lab Results   Component Value " Date     01/06/2022     05/19/2021     Lab Results   Component Value Date     01/06/2022     01/29/2020     HDL Cholesterol   Date Value Ref Range Status   01/29/2020 87 >49 mg/dL Final     Direct Measure HDL   Date Value Ref Range Status   01/06/2022 65 >=50 mg/dL Final   ]  GFR Estimate   Date Value Ref Range Status   01/06/2022 >90 >60 mL/min/1.73m2 Final     Comment:     Effective December 21, 2021 eGFRcr in adults is calculated using the 2021 CKD-EPI creatinine equation which includes age and gender (Samy et al., NEJM, DOI: 10.1056/[id number])   05/19/2021 >90 >60 mL/min/[1.73_m2] Final     Comment:     Non  GFR Calc  Starting 12/18/2018, serum creatinine based estimated GFR (eGFR) will be   calculated using the Chronic Kidney Disease Epidemiology Collaboration   (CKD-EPI) equation.       GFR Estimate If Black   Date Value Ref Range Status   05/19/2021 >90 >60 mL/min/[1.73_m2] Final     Comment:      GFR Calc  Starting 12/18/2018, serum creatinine based estimated GFR (eGFR) will be   calculated using the Chronic Kidney Disease Epidemiology Collaboration   (CKD-EPI) equation.       Lab Results   Component Value Date    CR 0.69 01/06/2022    CR 0.78 05/19/2021     No results found for: MICROALBUMIN    Healthy Eating:  Healthy Eating Assessed Today: Yes  Cultural/Lutheran diet restrictions?: No  Meal planning/habits: Carb counting, Frequent snacking  How many times a week on average do you eat food made away from home (restaurant/take-out)?: 4 (3-4 times a week)  Meals include: Dinner, Evening Snack (isn't eating until 1 or 2, supper around 5:50/6)  Breakfast: coffee- meds around 11:30  Lunch: moms meals- diabetes friendly  Dinner: 2 burgers wtih salad, chicken with salad, glass of milk  Snacks: peice of cheese, sometimes cereal  Beverages: Diet soda, Water, Milk, Coffee (prabhjot in water, 1%, cream and splenda in coffee)  Has patient met with a  dietitian in the past?: Yes (with gestational diabetes)    Being Active:  Being Active Assessed Today: Yes  Exercise:: Currently not exercising (not much movement since mom passed)  Barrier to exercise: Physical limitation (chronic pain)    Monitoring:  Monitoring Assessed Today: Yes  Did patient bring glucose meter to appointment? : Yes  Blood Glucose Meter: Accu-chek (guide me)  Times checking blood sugar at home (number): 2 (forgetting at time)  Times checking blood sugar at home (per): Day  Blood glucose trend: Other (unable to load meter today)    Average- 143  Week of 3/3/22- 3/9/22: 128-167    Taking Medications:  Diabetes Medication(s)     Biguanides       metFORMIN (GLUCOPHAGE-XR) 500 MG 24 hr tablet    Take 2  - 500 mg tablets twice a day.          Taking Medication Assessed Today: Yes  Current Treatments: Oral Medication (taken by mouth) (metformin 1000 with dinner)  Problems taking diabetes medications regularly?: No  Diabetes medication side effects?: No    Problem Solving:  Problem Solving Assessed Today: Yes  Is the patient at risk for hypoglycemia?: No  Is the patient at risk for DKA?: No  Does patient have severe weather/disaster plan for diabetes management?: Not Needed  Does patient have sick day plan for diabetes management?: Not Needed        Reducing Risks:  Diabetes Risks: Age over 45 years, History of gestational diabetes, Sedentary Lifestyle  CAD Risks: Sedentary lifestyle, Tobacco exposure, Stress, Obesity, Family history, Diabetes Mellitus  Has dilated eye exam at least once a year?: No (working with insurance on finding doctor)  Sees dentist every 6 months?: No (working with insuance on finding a dentist)  Feet checked by healthcare provider in the last year?: No    Healthy Coping:  Healthy Coping Assessed Today: Yes  Emotional response to diabetes: Ready to learn, Depression (depression due to mom passing)  Informal Support system:: Family (mental health practitioner, [facility], case  manager)  Stage of change: ACTION (Actively working towards change)  Support resources: In-person Offerings  Patient Activation Measure Survey Score:  KEN Score (Last Two) 11/28/2018 3/7/2019   KEN Raw Score 27 27   Activation Score 47.4 47.4   KEN Level 2 2       Diabetes knowledge and skills assessment:   Patient is knowledgeable in diabetes management concepts related to: Healthy Eating, Being Active, Monitoring and Taking Medication    Patient needs further education on the following diabetes management concepts: Healthy Eating, Being Active, Monitoring, Taking Medication, Problem Solving, Reducing Risks and Healthy Coping    Based on learning assessment above, most appropriate setting for further diabetes education would be: Individual setting.      INTERVENTIONS:    Education provided today on:  AADE Self-Care Behaviors:  Healthy Eating: consistency in amount, composition, and timing of food intake  Being Active: relationship to blood glucose and describe appropriate activity program  Monitoring: frequency of monitoring    Opportunities for ongoing education and support in diabetes-self management were discussed.    Pt verbalized understanding of concepts discussed and recommendations provided today.       Education Materials Provided:  No new materials provided today      ASSESSMENT:  Pt's food choices have been pretty stable. Still receiving Mom's meals and making herself some protein and salad for dinner, sometimes adds a carb. Getting tired of salads. Discussed trying to mix dinner up throughout the week. Not much activity yet. Pt feels her depression is improving. She made some cupcakes for her daughters birthday earlier this week and enjoyed baking again. It is around the anniversary of her mothers passing. Having some anxiety due to housing concerns. She forgets to test her BG sometimes and would like to be more consistent with this. Yohana discussed medication with her today. No changes at this time, but  plan to discuss again next time.    Goals Addressed as of 3/9/2022        Monitoring (pt-stated)     Added 3/9/22 by Taisha Corral RD      To be more consistent with testing BG            Patient's most recent   Lab Results   Component Value Date    A1C 7.6 02/16/2022    is not meeting goal of <7.0    PLAN  See Patient Instructions for co-developed, patient-stated behavior change goals.  AVS printed and provided to patient today. See Follow-Up section for recommended follow-up.    Follow up on 4/6/22 at 2:30pm  Time Spent: 60 minutes  Encounter Type: Individual    Any diabetes medication dose changes were made via the CDE Protocol and Collaborative Practice Agreement with the patient's primary care provider. A copy of this encounter was shared with the provider.

## 2022-03-29 DIAGNOSIS — G89.4 CHRONIC PAIN SYNDROME: ICD-10-CM

## 2022-03-29 DIAGNOSIS — E66.01 MORBID OBESITY (H): ICD-10-CM

## 2022-03-29 DIAGNOSIS — R60.0 BILATERAL LEG EDEMA: ICD-10-CM

## 2022-03-30 NOTE — TELEPHONE ENCOUNTER
baclofen      Last Written Prescription Date:  3/2/22  Last Fill Quantity: 90,   # refills: 0  Last Office Visit: 3/9/22  Future Office visit:    Next 5 appointments (look out 90 days)    Jun 09, 2022  2:30 PM  (Arrive by 2:15 PM)  SHORT with SE[facility] Pandey  [facility] ([facility] [address] [facility] [address]  [phone]       lasix      Last Written Prescription Date:  3/2/22  Last Fill Quantity: 60,   # refills: 0  Last Office Visit: 3/9/22  Future Office visit:      phentermine      Last Written Prescription Date:  3/2/22  Last Fill Quantity: 30,   # refills: 0  Last Office Visit: 3/9/22  Future Office visit:

## 2022-03-31 RX ORDER — PHENTERMINE HYDROCHLORIDE 30 MG/1
CAPSULE ORAL
Qty: 30 CAPSULE | Refills: 0 | Status: SHIPPED | OUTPATIENT
Start: 2022-03-31 | End: 2022-04-27

## 2022-03-31 RX ORDER — BACLOFEN 10 MG/1
TABLET ORAL
Qty: 90 TABLET | Refills: 0 | Status: SHIPPED | OUTPATIENT
Start: 2022-03-31 | End: 2022-04-21

## 2022-03-31 RX ORDER — FUROSEMIDE 20 MG
TABLET ORAL
Qty: 60 TABLET | Refills: 0 | Status: SHIPPED | OUTPATIENT
Start: 2022-03-31 | End: 2022-04-29

## 2022-04-06 ENCOUNTER — HOSPITAL ENCOUNTER (OUTPATIENT)
Dept: EDUCATION SERVICES | Facility: HOSPITAL | Age: 47
Discharge: HOME | End: 2022-04-06
Attending: NURSE PRACTITIONER | Admitting: NURSE PRACTITIONER
Payer: MEDICARE

## 2022-04-06 VITALS
SYSTOLIC BLOOD PRESSURE: 133 MMHG | WEIGHT: 289 LBS | BODY MASS INDEX: 53.18 KG/M2 | HEIGHT: 62 IN | RESPIRATION RATE: 16 BRPM | HEART RATE: 105 BPM | DIASTOLIC BLOOD PRESSURE: 83 MMHG | OXYGEN SATURATION: 98 %

## 2022-04-06 PROCEDURE — G0108 DIAB MANAGE TRN  PER INDIV: HCPCS | Performed by: DIETITIAN, REGISTERED

## 2022-04-06 RX ORDER — ASENAPINE MALEATE 2.5 MG/1
2.5 TABLET SUBLINGUAL DAILY
COMMUNITY

## 2022-04-06 ASSESSMENT — PAIN SCALES - GENERAL: PAINLEVEL: EXTREME PAIN (9)

## 2022-04-06 NOTE — LETTER
"    4/6/2022        RE: Autumn BATISTA Hnatko  [address] [facility] [address]        Diabetes Self-Management Education & Support    Presents for: Follow-up (BG review)    SUBJECTIVE/OBJECTIVE:  Presents for: Follow-up (BG review)  Accompanied by: Self  Diabetes education in the past 24mo: No  Focus of Visit: Patient Unsure, Diabetes Pathophysiology, Healthy Eating, Healthy Coping  Diabetes type: Type 2  Date of diagnosis: 11/4/21  Disease course: Other  How confident are you filling out medical forms by yourself:: Quite a bit  Diabetes management related comments/concerns: Affording supplies  Transportation concerns: Yes  Difficulty affording diabetes medication?: No  Difficulty affording diabetes testing supplies?: Sometimes (possibly)  Other concerns:: Glasses, Physical impairment  Cultural Influences/Ethnic Background:  American      Diabetes Symptoms & Complications:  Fatigue: Yes  Neuropathy: Sometimes  Polydipsia: Sometimes  Polyphagia: Sometimes  Polyuria: Sometimes (on a diuretic)  Visual change: No (eye pain sometimes)  Slow healing wounds: No  Other: No  Symptom course: Improving  Weight trend: Stable  Complications assessed today?: No    Patient Problem List and Family Medical History reviewed for relevant medical history, current medical status, and diabetes risk factors.    Vitals:  /83   Pulse 105   Resp 16   Ht 1.578 m (5' 2.12\")   Wt 131.1 kg (289 lb)   SpO2 98%   BMI 52.65 kg/m    Estimated body mass index is 52.65 kg/m  as calculated from the following:    Height as of this encounter: 1.578 m (5' 2.12\").    Weight as of this encounter: 131.1 kg (289 lb).   Last 3 BP:   BP Readings from Last 3 Encounters:   04/06/22 133/83   03/09/22 128/70   03/09/22 128/70       History   Smoking Status     Current Every Day Smoker     Packs/day: 0.50     Years: 32.00     Types: Cigarettes     Start date: 1/1/1989   Smokeless Tobacco     Never Used     Comment: pt declines       Labs:  Lab Results "   Component Value Date    A1C 7.6 02/16/2022     Lab Results   Component Value Date     01/06/2022     05/19/2021     Lab Results   Component Value Date     01/06/2022     01/29/2020     HDL Cholesterol   Date Value Ref Range Status   01/29/2020 87 >49 mg/dL Final     Direct Measure HDL   Date Value Ref Range Status   01/06/2022 65 >=50 mg/dL Final   ]  GFR Estimate   Date Value Ref Range Status   01/06/2022 >90 >60 mL/min/1.73m2 Final     Comment:     Effective December 21, 2021 eGFRcr in adults is calculated using the 2021 CKD-EPI creatinine equation which includes age and gender (Samy et al., NEJM, DOI: 10.1056/[id number])   05/19/2021 >90 >60 mL/min/[1.73_m2] Final     Comment:     Non  GFR Calc  Starting 12/18/2018, serum creatinine based estimated GFR (eGFR) will be   calculated using the Chronic Kidney Disease Epidemiology Collaboration   (CKD-EPI) equation.       GFR Estimate If Black   Date Value Ref Range Status   05/19/2021 >90 >60 mL/min/[1.73_m2] Final     Comment:      GFR Calc  Starting 12/18/2018, serum creatinine based estimated GFR (eGFR) will be   calculated using the Chronic Kidney Disease Epidemiology Collaboration   (CKD-EPI) equation.       Lab Results   Component Value Date    CR 0.69 01/06/2022    CR 0.78 05/19/2021     No results found for: MICROALBUMIN    Healthy Eating:  Healthy Eating Assessed Today: Yes  Cultural/Yarsanism diet restrictions?: No  Meal planning/habits: Carb counting, Frequent snacking  How many times a week on average do you eat food made away from home (restaurant/take-out)?: 1 (1x in the past month)  Meals include: Dinner, Evening Snack, Lunch (isn't eating until 1 or 2, supper around 5:50/6)  Breakfast: coffee- meds around 11:30  Lunch: moms meals- diabetes friendly  Dinner: salads with protein, sometimes rice, or frozen veggie with meat, chicken wings  Snacks: orange or granola bar with yogurt, pudding  cup  Beverages: Diet soda, Water, Milk, Coffee (prabhjot in water, 1%, cream and splenda in coffee)  Has patient met with a dietitian in the past?: Yes (with gestational diabetes)    Being Active:  Being Active Assessed Today: Yes  Exercise:: Currently not exercising (not much during winter, more active in spring and summer)  Barrier to exercise: Physical limitation (legs hurt, chronic pain)    Monitoring:  Monitoring Assessed Today: Yes  Did patient bring glucose meter to appointment? : Yes  Blood Glucose Meter: Accu-chek (guide me)  Times checking blood sugar at home (number): 2 (forgetting at time)  Times checking blood sugar at home (per): Day  Blood glucose trend: Other (unable to load meter today)    BG per meter  Average: 147  3/31 - : early mornin-156, afternoon: 150-152, night: 129-197  3/24-3/30: early mornin-149, afternoon: 122, evening/night: 176-188    Taking Medications:  Diabetes Medication(s)     Biguanides       metFORMIN (GLUCOPHAGE-XR) 500 MG 24 hr tablet    Take 2  - 500 mg tablets twice a day.          Taking Medication Assessed Today: Yes  Current Treatments: Oral Medication (taken by mouth) (metformin 1000 with dinner)  Problems taking diabetes medications regularly?: Yes (forgetting sometimes, taking them close together but gets 4 tablets in)  Diabetes medication side effects?: No (once in a while, may be from the food she is getting)    Problem Solving:  Problem Solving Assessed Today: Yes  Is the patient at risk for hypoglycemia?: No  Is the patient at risk for DKA?: No  Does patient have severe weather/disaster plan for diabetes management?: Not Needed  Does patient have sick day plan for diabetes management?: Not Needed      Reducing Risks:  Reducing Risks Assessed Today: Yes  Diabetes Risks: Age over 45 years, History of gestational diabetes, Sedentary Lifestyle  CAD Risks: Sedentary lifestyle, Tobacco exposure, Stress, Obesity, Family history, Diabetes Mellitus  Has dilated  eye exam at least once a year?: No (forgetting to get it set up)  Sees dentist every 6 months?: No (needs to find a dentist, chipped a tooth yesterday)  Feet checked by healthcare provider in the last year?: No    Healthy Coping:  Healthy Coping Assessed Today: Yes  Emotional response to diabetes: Ready to learn, Depression (depression due to mom passing)  Informal Support system:: Family (mental health practitioner, AHRMS, )  Stage of change: ACTION (Actively working towards change)  Support resources: In-person Offerings  Patient Activation Measure Survey Score:  KEN Score (Last Two) 11/28/2018 3/7/2019   KEN Raw Score 27 27   Activation Score 47.4 47.4   KEN Level 2 2       Diabetes knowledge and skills assessment:   Patient is knowledgeable in diabetes management concepts related to: Healthy Eating, Being Active, Monitoring, Taking Medication, Problem Solving and Reducing Risks    Patient needs further education on the following diabetes management concepts: Healthy Eating, Being Active, Monitoring, Taking Medication, Problem Solving, Reducing Risks and Healthy Coping    Based on learning assessment above, most appropriate setting for further diabetes education would be: Individual setting.      INTERVENTIONS:    Education provided today on:  AADE Self-Care Behaviors:  Healthy Eating: consistency in amount, composition, and timing of food intake and portion control  Being Active: relationship to blood glucose, describe appropriate activity program and precautions to take  Monitoring: frequency of monitoring  Taking Medication: action of prescribed medication, drawing up, administering and storing injectable diabetes medications, proper site selection and rotation for injections, side effects of prescribed medications and when to take medications    Opportunities for ongoing education and support in diabetes-self management were discussed.    Pt verbalized understanding of concepts discussed and  recommendations provided today.       Education Materials Provided:  No new materials provided today      ASSESSMENT:  Pt seen back in diabetes education for BG review. BG levels are looking pretty good. Food choices are stable. 2 meals a day, mom's meals for lunch, dinner is typically a veggie and protein, sometimes starchy food. Some snacks. No regular activity yet. Discussion on starting some light movement such as stretching. She is having a hard time remembering to take metformin, sometimes takes the 2 does close together. She would like to talk about different meds. Yohana discussed medications with pt. May try to pursue Trulicity.    Goals Addressed as of 4/6/2022 4/6/22       Increase physical activity (pt-stated)       Added 4/6/22 by Taisha Corral RD      Be more active around the house. Stretching while watching TV.          Monitoring (pt-stated)   80%    Added 3/9/22 by Taisha Corral RD      To be more consistent with testing BG            Patient's most recent   Lab Results   Component Value Date    A1C 7.6 02/16/2022    is not meeting goal of <7.0    PLAN  See Patient Instructions for co-developed, patient-stated behavior change goals.  AVS printed and provided to patient today. See Follow-Up section for recommended follow-up.    Follow up PRN    Time Spent: 60 minutes  Encounter Type: Individual    Any diabetes medication dose changes were made via the CDE Protocol and Collaborative Practice Agreement with the patient's referring provider. A copy of this encounter was shared with the provider.            Sincerely,        Taisha Corral RD

## 2022-04-07 NOTE — PROGRESS NOTES
"Diabetes Self-Management Education & Support    Presents for: Follow-up (BG review)    SUBJECTIVE/OBJECTIVE:  Presents for: Follow-up (BG review)  Accompanied by: Self  Diabetes education in the past 24mo: No  Focus of Visit: Patient Unsure, Diabetes Pathophysiology, Healthy Eating, Healthy Coping  Diabetes type: Type 2  Date of diagnosis: 11/4/21  Disease course: Other  How confident are you filling out medical forms by yourself:: Quite a bit  Diabetes management related comments/concerns: Affording supplies  Transportation concerns: Yes  Difficulty affording diabetes medication?: No  Difficulty affording diabetes testing supplies?: Sometimes (possibly)  Other concerns:: Glasses, Physical impairment  Cultural Influences/Ethnic Background:  American      Diabetes Symptoms & Complications:  Fatigue: Yes  Neuropathy: Sometimes  Polydipsia: Sometimes  Polyphagia: Sometimes  Polyuria: Sometimes (on a diuretic)  Visual change: No (eye pain sometimes)  Slow healing wounds: No  Other: No  Symptom course: Improving  Weight trend: Stable  Complications assessed today?: No    Patient Problem List and Family Medical History reviewed for relevant medical history, current medical status, and diabetes risk factors.    Vitals:  /83   Pulse 105   Resp 16   Ht 1.578 m (5' 2.12\")   Wt 131.1 kg (289 lb)   SpO2 98%   BMI 52.65 kg/m    Estimated body mass index is 52.65 kg/m  as calculated from the following:    Height as of this encounter: 1.578 m (5' 2.12\").    Weight as of this encounter: 131.1 kg (289 lb).   Last 3 BP:   BP Readings from Last 3 Encounters:   04/06/22 133/83   03/09/22 128/70   03/09/22 128/70       History   Smoking Status     Current Every Day Smoker     Packs/day: 0.50     Years: 32.00     Types: Cigarettes     Start date: 1/1/1989   Smokeless Tobacco     Never Used     Comment: pt declines       Labs:  Lab Results   Component Value Date    A1C 7.6 02/16/2022     Lab Results   Component Value Date    "  01/06/2022     05/19/2021     Lab Results   Component Value Date     01/06/2022     01/29/2020     HDL Cholesterol   Date Value Ref Range Status   01/29/2020 87 >49 mg/dL Final     Direct Measure HDL   Date Value Ref Range Status   01/06/2022 65 >=50 mg/dL Final   ]  GFR Estimate   Date Value Ref Range Status   01/06/2022 >90 >60 mL/min/1.73m2 Final     Comment:     Effective December 21, 2021 eGFRcr in adults is calculated using the 2021 CKD-EPI creatinine equation which includes age and gender (Samy et al., NEJ, DOI: 10.1056/[id number])   05/19/2021 >90 >60 mL/min/[1.73_m2] Final     Comment:     Non  GFR Calc  Starting 12/18/2018, serum creatinine based estimated GFR (eGFR) will be   calculated using the Chronic Kidney Disease Epidemiology Collaboration   (CKD-EPI) equation.       GFR Estimate If Black   Date Value Ref Range Status   05/19/2021 >90 >60 mL/min/[1.73_m2] Final     Comment:      GFR Calc  Starting 12/18/2018, serum creatinine based estimated GFR (eGFR) will be   calculated using the Chronic Kidney Disease Epidemiology Collaboration   (CKD-EPI) equation.       Lab Results   Component Value Date    CR 0.69 01/06/2022    CR 0.78 05/19/2021     No results found for: MICROALBUMIN    Healthy Eating:  Healthy Eating Assessed Today: Yes  Cultural/Quaker diet restrictions?: No  Meal planning/habits: Carb counting, Frequent snacking  How many times a week on average do you eat food made away from home (restaurant/take-out)?: 1 (1x in the past month)  Meals include: Dinner, Evening Snack, Lunch (isn't eating until 1 or 2, supper around 5:50/6)  Breakfast: coffee- meds around 11:30  Lunch: moms meals- diabetes friendly  Dinner: salads with protein, sometimes rice, or frozen veggie with meat, chicken wings  Snacks: orange or granola bar with yogurt, pudding cup  Beverages: Diet soda, Water, Milk, Coffee (prabhjot in water, 1%, cream and splenda in  coffee)  Has patient met with a dietitian in the past?: Yes (with gestational diabetes)    Being Active:  Being Active Assessed Today: Yes  Exercise:: Currently not exercising (not much during winter, more active in spring and summer)  Barrier to exercise: Physical limitation (legs hurt, chronic pain)    Monitoring:  Monitoring Assessed Today: Yes  Did patient bring glucose meter to appointment? : Yes  Blood Glucose Meter: Accu-chek (guide me)  Times checking blood sugar at home (number): 2 (forgetting at time)  Times checking blood sugar at home (per): Day  Blood glucose trend: Other (unable to load meter today)    BG per meter  Average: 147  3/31 - : early mornin-156, afternoon: 150-152, night: 129-197  3/24-3/30: early mornin-149, afternoon: 122, evening/night: 176-188    Taking Medications:  Diabetes Medication(s)     Biguanides       metFORMIN (GLUCOPHAGE-XR) 500 MG 24 hr tablet    Take 2  - 500 mg tablets twice a day.          Taking Medication Assessed Today: Yes  Current Treatments: Oral Medication (taken by mouth) (metformin 1000 with dinner)  Problems taking diabetes medications regularly?: Yes (forgetting sometimes, taking them close together but gets 4 tablets in)  Diabetes medication side effects?: No (once in a while, may be from the food she is getting)    Problem Solving:  Problem Solving Assessed Today: Yes  Is the patient at risk for hypoglycemia?: No  Is the patient at risk for DKA?: No  Does patient have severe weather/disaster plan for diabetes management?: Not Needed  Does patient have sick day plan for diabetes management?: Not Needed      Reducing Risks:  Reducing Risks Assessed Today: Yes  Diabetes Risks: Age over 45 years, History of gestational diabetes, Sedentary Lifestyle  CAD Risks: Sedentary lifestyle, Tobacco exposure, Stress, Obesity, Family history, Diabetes Mellitus  Has dilated eye exam at least once a year?: No (forgetting to get it set up)  Sees dentist every 6  months?: No (needs to find a dentist, chipped a tooth yesterday)  Feet checked by healthcare provider in the last year?: No    Healthy Coping:  Healthy Coping Assessed Today: Yes  Emotional response to diabetes: Ready to learn, Depression (depression due to mom passing)  Informal Support system:: Family (mental health practitioner, AHRMS, )  Stage of change: ACTION (Actively working towards change)  Support resources: In-person Offerings  Patient Activation Measure Survey Score:  KEN Score (Last Two) 11/28/2018 3/7/2019   KEN Raw Score 27 27   Activation Score 47.4 47.4   KEN Level 2 2       Diabetes knowledge and skills assessment:   Patient is knowledgeable in diabetes management concepts related to: Healthy Eating, Being Active, Monitoring, Taking Medication, Problem Solving and Reducing Risks    Patient needs further education on the following diabetes management concepts: Healthy Eating, Being Active, Monitoring, Taking Medication, Problem Solving, Reducing Risks and Healthy Coping    Based on learning assessment above, most appropriate setting for further diabetes education would be: Individual setting.      INTERVENTIONS:    Education provided today on:  AADE Self-Care Behaviors:  Healthy Eating: consistency in amount, composition, and timing of food intake and portion control  Being Active: relationship to blood glucose, describe appropriate activity program and precautions to take  Monitoring: frequency of monitoring  Taking Medication: action of prescribed medication, drawing up, administering and storing injectable diabetes medications, proper site selection and rotation for injections, side effects of prescribed medications and when to take medications    Opportunities for ongoing education and support in diabetes-self management were discussed.    Pt verbalized understanding of concepts discussed and recommendations provided today.       Education Materials Provided:  No new materials  provided today      ASSESSMENT:  Pt seen back in diabetes education for BG review. BG levels are looking pretty good. Food choices are stable. 2 meals a day, mom's meals for lunch, dinner is typically a veggie and protein, sometimes starchy food. Some snacks. No regular activity yet. Discussion on starting some light movement such as stretching. She is having a hard time remembering to take metformin, sometimes takes the 2 does close together. She would like to talk about different meds. Yohana discussed medications with pt. May try to pursue Trulicity.    Goals Addressed as of 4/6/2022 4/6/22       Increase physical activity (pt-stated)       Added 4/6/22 by Taisha Corral RD      Be more active around the house. Stretching while watching TV.          Monitoring (pt-stated)   80%    Added 3/9/22 by Taisha Corral RD      To be more consistent with testing BG            Patient's most recent   Lab Results   Component Value Date    A1C 7.6 02/16/2022    is not meeting goal of <7.0    PLAN  See Patient Instructions for co-developed, patient-stated behavior change goals.  AVS printed and provided to patient today. See Follow-Up section for recommended follow-up.    Follow up PRN    Time Spent: 60 minutes  Encounter Type: Individual    Any diabetes medication dose changes were made via the CDE Protocol and Collaborative Practice Agreement with the patient's referring provider. A copy of this encounter was shared with the provider.

## 2022-04-11 ENCOUNTER — TELEPHONE (OUTPATIENT)
Dept: FAMILY MEDICINE | Facility: OTHER | Age: 47
End: 2022-04-11
Payer: MEDICARE

## 2022-04-11 NOTE — TELEPHONE ENCOUNTER
Solange from [facility] called, requesting VO to continue once a week nursing visits. Approved. Amelia will fax to PCP to sign.    [phone] ok to leave detailed VM

## 2022-04-14 ENCOUNTER — TELEPHONE (OUTPATIENT)
Dept: EDUCATION SERVICES | Facility: HOSPITAL | Age: 47
End: 2022-04-14
Payer: MEDICARE

## 2022-04-14 DIAGNOSIS — E11.65 TYPE 2 DIABETES MELLITUS WITH HYPERGLYCEMIA, WITHOUT LONG-TERM CURRENT USE OF INSULIN (H): Primary | ICD-10-CM

## 2022-04-14 NOTE — TELEPHONE ENCOUNTER
We met with Autumn last week. She is struggling with taking her Metformin and may benefit from an easier regime.    I did talk to her about Trulicity - benefits, actions, side effects. I showed her the Trulicity pen. She is open to trying this medication.    I have pended a prescription for Trulicity in this encounter for your signature if you agree.    Thanks!    It may be that we can try a Patient Assistance Program if it is denied by her insurance.

## 2022-04-15 RX ORDER — DULAGLUTIDE 0.75 MG/.5ML
0.75 INJECTION, SOLUTION SUBCUTANEOUS
Qty: 2 ML | Refills: 3 | Status: SHIPPED | OUTPATIENT
Start: 2022-04-15 | End: 2022-07-22

## 2022-04-18 ENCOUNTER — TELEPHONE (OUTPATIENT)
Dept: EDUCATION SERVICES | Facility: HOSPITAL | Age: 47
End: 2022-04-18
Payer: MEDICARE

## 2022-04-18 NOTE — TELEPHONE ENCOUNTER
Called Autumn as the Trulicity prescription is signed. Autumn has already picked up the Trilicity and will be starting it today when her home care nurse visits her.    Will follow by phone on Friday.

## 2022-04-20 DIAGNOSIS — R60.0 BILATERAL EDEMA OF LOWER EXTREMITY: ICD-10-CM

## 2022-04-20 DIAGNOSIS — G89.4 CHRONIC PAIN SYNDROME: ICD-10-CM

## 2022-04-21 RX ORDER — POTASSIUM CHLORIDE 750 MG/1
TABLET, EXTENDED RELEASE ORAL
Qty: 30 TABLET | Refills: 1 | Status: SHIPPED | OUTPATIENT
Start: 2022-04-21 | End: 2022-07-07

## 2022-04-21 RX ORDER — BACLOFEN 10 MG/1
TABLET ORAL
Qty: 90 TABLET | Refills: 1 | Status: SHIPPED | OUTPATIENT
Start: 2022-04-21 | End: 2022-06-30

## 2022-04-21 NOTE — TELEPHONE ENCOUNTER
Baclofen       Last Written Prescription Date:  3-31-22  Last Fill Quantity: 90,   # refills: 0  Last Office Visit: 3-9-22  Future Office visit:    Next 5 appointments (look out 90 days)    Jun 09, 2022  2:30 PM  (Arrive by 2:15 PM)  SHORT with SEVEN Pandey  [facility] [place] ([facility] [place] ) [address]  [place] [address]  [phone]         Potassium       Last Written Prescription Date:  2-2-22  Last Fill Quantity: 30,   # refills: 0  Last Office Visit: 3-9-22  Future Office visit:    Next 5 appointments (look out 90 days)    Jun 09, 2022  2:30 PM  (Arrive by 2:15 PM)  SHORT with SEVEN Pandey  [facility] [place] ([facility] [place] ) [address]  [place] [address]  [phone]

## 2022-04-22 DIAGNOSIS — I10 BENIGN ESSENTIAL HYPERTENSION: ICD-10-CM

## 2022-04-22 DIAGNOSIS — E66.01 MORBID OBESITY (H): ICD-10-CM

## 2022-04-26 NOTE — TELEPHONE ENCOUNTER
lov 03/09/22  hydrochlorothiazide (HYDRODIURIL) 25 MG tablet 90 tablet 0 1/27/2022       phentermine (ADIPEX-P) 30 MG capsule 30 capsule 0 3/31/2022

## 2022-04-27 DIAGNOSIS — R60.0 BILATERAL LEG EDEMA: ICD-10-CM

## 2022-04-27 RX ORDER — PHENTERMINE HYDROCHLORIDE 30 MG/1
CAPSULE ORAL
Qty: 30 CAPSULE | Refills: 0 | Status: SHIPPED | OUTPATIENT
Start: 2022-04-27 | End: 2022-05-26

## 2022-04-27 RX ORDER — HYDROCHLOROTHIAZIDE 25 MG/1
TABLET ORAL
Qty: 90 TABLET | Refills: 0 | Status: SHIPPED | OUTPATIENT
Start: 2022-04-27 | End: 2022-07-27

## 2022-04-29 ENCOUNTER — TELEPHONE (OUTPATIENT)
Dept: FAMILY MEDICINE | Facility: OTHER | Age: 47
End: 2022-04-29
Payer: MEDICARE

## 2022-04-29 RX ORDER — FUROSEMIDE 20 MG
TABLET ORAL
Qty: 60 TABLET | Refills: 0 | Status: SHIPPED | OUTPATIENT
Start: 2022-04-29 | End: 2022-06-01

## 2022-04-29 NOTE — TELEPHONE ENCOUNTER
Call from PJ Flores with [facility] -  requesting Verbal Orders to Continue SNV's 1 x per week x 9 weeks.     Notified ok for verbal orders per SEVEN Best.

## 2022-04-29 NOTE — TELEPHONE ENCOUNTER
furosemide (LASIX) 20 MG tablet      Last Written Prescription Date:  3-31-22  Last Fill Quantity: 60,   # refills: 0  Last Office Visit: 3-9-22  Future Office visit:    Next 5 appointments (look out 90 days)    Jun 09, 2022  2:30 PM  (Arrive by 2:15 PM)  SHORT with SEVEN Pandey  [facility] ([facility] ) [address]  [place] [address]  [phone]           Routing refill request to provider for review/approval because:   Normal serum potassium on file in past 12 months  Creatinine   Date Value Ref Range Status   01/06/2022 0.69 0.52 - 1.04 mg/dL Final   05/19/2021 0.78 0.52 - 1.04 mg/dL Final

## 2022-05-16 DIAGNOSIS — Z53.9 PERSONS ENCOUNTERING HEALTH SERVICES FOR SPECIFIC PROCEDURES, NOT CARRIED OUT: Primary | ICD-10-CM

## 2022-05-24 DIAGNOSIS — E66.01 MORBID OBESITY (H): ICD-10-CM

## 2022-05-26 RX ORDER — PHENTERMINE HYDROCHLORIDE 30 MG/1
CAPSULE ORAL
Qty: 30 CAPSULE | Refills: 0 | Status: SHIPPED | OUTPATIENT
Start: 2022-05-26 | End: 2022-06-30

## 2022-05-26 NOTE — TELEPHONE ENCOUNTER
adipex       Last Written Prescription Date:  4-27-22  Last Fill Quantity: 30,   # refills: 0  Last Office Visit: 3-9-22  Future Office visit:    Next 5 appointments (look out 90 days)    Jun 09, 2022  2:30 PM  (Arrive by 2:15 PM)  SHORT with SEVEN Pandey  [facility] ([facility] ) [address] [place] [address]  [phone]   Jul 01, 2022  3:00 PM  (Arrive by 2:45 PM)  PHYSICAL with SEVEN Pandey  [facility] ([facility] ) [address] [place] [address]  [phone]

## 2022-05-31 DIAGNOSIS — R60.0 BILATERAL LEG EDEMA: ICD-10-CM

## 2022-06-01 ENCOUNTER — HOSPITAL ENCOUNTER (OUTPATIENT)
Dept: EDUCATION SERVICES | Facility: HOSPITAL | Age: 47
Discharge: HOME | End: 2022-06-01
Attending: PHYSICIAN ASSISTANT | Admitting: PHYSICIAN ASSISTANT
Payer: MEDICARE

## 2022-06-01 ENCOUNTER — HOSPITAL ENCOUNTER (EMERGENCY)
Facility: HOSPITAL | Age: 47
Discharge: HOME | End: 2022-06-01
Attending: NURSE PRACTITIONER | Admitting: NURSE PRACTITIONER
Payer: MEDICARE

## 2022-06-01 VITALS
RESPIRATION RATE: 16 BRPM | DIASTOLIC BLOOD PRESSURE: 71 MMHG | HEART RATE: 78 BPM | TEMPERATURE: 96.5 F | SYSTOLIC BLOOD PRESSURE: 153 MMHG | OXYGEN SATURATION: 100 %

## 2022-06-01 VITALS
WEIGHT: 276.5 LBS | RESPIRATION RATE: 16 BRPM | DIASTOLIC BLOOD PRESSURE: 72 MMHG | HEIGHT: 62 IN | OXYGEN SATURATION: 97 % | BODY MASS INDEX: 50.88 KG/M2 | HEART RATE: 83 BPM | SYSTOLIC BLOOD PRESSURE: 112 MMHG

## 2022-06-01 DIAGNOSIS — L03.90 CELLULITIS: ICD-10-CM

## 2022-06-01 DIAGNOSIS — E11.65 TYPE 2 DIABETES MELLITUS WITH HYPERGLYCEMIA, WITHOUT LONG-TERM CURRENT USE OF INSULIN (H): Primary | ICD-10-CM

## 2022-06-01 DIAGNOSIS — L03.116 CELLULITIS OF LEFT LOWER EXTREMITY: ICD-10-CM

## 2022-06-01 LAB — HBA1C MFR BLD: 6.9 % (ref 0–5.7)

## 2022-06-01 PROCEDURE — 99213 OFFICE O/P EST LOW 20 MIN: CPT | Performed by: NURSE PRACTITIONER

## 2022-06-01 PROCEDURE — G0463 HOSPITAL OUTPT CLINIC VISIT: HCPCS

## 2022-06-01 PROCEDURE — 83036 HEMOGLOBIN GLYCOSYLATED A1C: CPT | Performed by: NURSE PRACTITIONER

## 2022-06-01 PROCEDURE — G0108 DIAB MANAGE TRN  PER INDIV: HCPCS | Performed by: DIETITIAN, REGISTERED

## 2022-06-01 RX ORDER — CLINDAMYCIN HCL 300 MG
300 CAPSULE ORAL 4 TIMES DAILY
Qty: 40 CAPSULE | Refills: 0 | Status: SHIPPED | OUTPATIENT
Start: 2022-06-01 | End: 2022-06-11

## 2022-06-01 ASSESSMENT — ENCOUNTER SYMPTOMS
NUMBNESS: 0
COLOR CHANGE: 1
DIZZINESS: 0
FEVER: 0
MYALGIAS: 1
PSYCHIATRIC NEGATIVE: 1

## 2022-06-01 ASSESSMENT — PAIN SCALES - GENERAL: PAINLEVEL: EXTREME PAIN (9)

## 2022-06-01 NOTE — ED PROVIDER NOTES
History     Chief Complaint   Patient presents with     Cellulitis     HPI  Autumn Holbrook is a 46 year old female who felt lower left leg itching/scratched at it through the night. Now leg red from ankle to mid anterior leg. Red, warm, firm to touch. Has 4 areas that look like bug bites along sock line. .  No Open areas . No fever      Allergies:  Allergies   Allergen Reactions     Depakote [Valproic Acid]      Gabapentin Swelling     Propofol Unknown     Got very stiff and tight.        Problem List:    Patient Active Problem List    Diagnosis Date Noted     Diabetes mellitus, type 2 (H) 12/09/2021     Priority: Medium     Grief 01/18/2021     Priority: Medium     GERD (gastroesophageal reflux disease) 12/13/2019     Priority: Medium     Morbid obesity (H) 12/13/2019     Priority: Medium     Obesity 09/21/2018     Priority: Medium     Trigger finger of both hands 05/17/2018     Priority: Medium     IMO Regulatory Load OCT 2020       Routine general medical examination at a health care facility 04/03/2017     Priority: Medium     Advance Care Planning 4/3/2017: ACP Review of Chart / Resources Provided:  Reviewed chart for advance care plan.  Ilene Holbrook has no plan or code status on file. Discussed available resources and provided with information.   Added by Annette Araiza          Overview:   Pap smear:5-21-08  Cholesterol 06-09-08       Tear of lateral cartilage or meniscus of knee, current, right, initial encounter 04/03/2017     Priority: Medium     Chronic pain syndrome 04/03/2017     Priority: Medium     Opioid dependence in remission (H) 04/03/2017     Priority: Medium     On suboxone.        PTSD (post-traumatic stress disorder) 04/03/2017     Priority: Medium     COY (generalized anxiety disorder) 04/03/2017     Priority: Medium     Moderate episode of recurrent major depressive disorder (H) 04/03/2017     Priority: Medium     Bilateral edema of lower extremity 04/03/2017     Priority: Medium      Hypothyroid 2017     Priority: Medium     Mild mixed bipolar I disorder (H) 2010     Priority: Medium        Past Medical History:    Past Medical History:   Diagnosis Date     Anxiety      Arthritis      Depressive disorder      Migraine      Osteoarthritis      Thyroid disease        Past Surgical History:    Past Surgical History:   Procedure Laterality Date     CHOLECYSTECTOMY       GYN SURGERY       SECTION [id number]     GYN SURGERY  2004    LEEP       Family History:    Family History   Problem Relation Age of Onset     Cerebrovascular Disease Mother      Alcoholism Mother      Cancer Mother      Depression Mother      Eczema Mother      Hypertension Mother      Migraines Mother      Mental Illness Mother      Osteoarthritis Mother      Osteoporosis Mother      Alcoholism Father      Cancer Father      Hyperlipidemia Father      Hypertension Father      Myocardial Infarction Father      Mental Illness Sister      Migraines Sister        Social History:  Marital Status:   [5]  Social History     Tobacco Use     Smoking status: Current Every Day Smoker     Packs/day: 0.50     Years: 32.00     Pack years: 16.00     Types: Cigarettes     Start date: 1989     Smokeless tobacco: Never Used     Tobacco comment: pt declines   Substance Use Topics     Alcohol use: No     Drug use: Yes     Types: Marijuana     Comment: daily-medical        Medications:    Acetaminophen (TYLENOL PO)  asenapine (SAPHRIS) 2.5 MG SUBL sublingual tablet  asenapine (SAPHRIS) 5 MG SUBL sublingual tablet  atomoxetine (STRATTERA) 60 MG capsule  baclofen (LIORESAL) 10 MG tablet  blood glucose (NO BRAND SPECIFIED) lancets standard  blood glucose (NO BRAND SPECIFIED) test strip  blood glucose monitoring (NO BRAND SPECIFIED) meter device kit  blood glucose monitoring (SOFTCLIX) lancets  buprenorphine HCl-naloxone HCl (SUBOXONE) 2-0.5 MG per film  clindamycin (CLEOCIN) 300 MG capsule  diclofenac (VOLTAREN) 1 %  topical gel  dulaglutide (TRULICITY) 0.75 MG/0.5ML pen  furosemide (LASIX) 20 MG tablet  hydrochlorothiazide (HYDRODIURIL) 25 MG tablet  HYDROXYZINE HCL PO  hylan (SYNVISC) 16 MG/2ML injection  ibuprofen (ADVIL/MOTRIN) 800 MG tablet  levothyroxine (SYNTHROID/LEVOTHROID) 100 MCG tablet  levothyroxine (SYNTHROID/LEVOTHROID) 88 MCG tablet  Melatonin 10 MG CAPS  NARCAN 4 MG/0.1ML nasal spray  oxybutynin (DITROPAN) 5 MG tablet  phentermine (ADIPEX-P) 30 MG capsule  potassium chloride ER (K-TAB/KLOR-CON) 10 MEQ CR tablet  rosuvastatin (CRESTOR) 5 MG tablet  sertraline (ZOLOFT) 50 MG tablet  topiramate (TOPAMAX) 50 MG tablet  VRAYLAR 3 MG CAPS capsule  NEW MED          Review of Systems   Constitutional: Negative for fever.   Musculoskeletal: Positive for myalgias.   Skin: Positive for color change.        Has red, firm itchy area to above left ankle. Now painful.     Neurological: Negative for dizziness and numbness.   Psychiatric/Behavioral: Negative.        Physical Exam   BP: 153/71  Pulse: 78  Temp: (!) 96.5  F (35.8  C)  Resp: 16  SpO2: 100 %      Physical Exam  Constitutional:       General: She is not in acute distress.  Musculoskeletal:         General: Swelling and tenderness present. Normal range of motion.      Right lower leg: No edema.      Comments: Left lower leg swollen and painful to palpation .    Skin:     General: Skin is warm and dry.      Capillary Refill: Capillary refill takes less than 2 seconds.      Findings: Erythema present.      Comments: 4 inch ring  Of redness around lower left leg. Warm and firm to touch. This area is swollen.  Does not look like a blood clot     Neurological:      General: No focal deficit present.      Mental Status: She is alert and oriented to person, place, and time.      Sensory: No sensory deficit.   Psychiatric:         Mood and Affect: Mood normal.         Behavior: Behavior normal.         ED Course                 Procedures                  Results for orders  placed or performed during the hospital encounter of 06/01/22 (from the past 24 hour(s))   Hemoglobin A1c POCT   Result Value Ref Range    Hemoglobin A1C POCT 6.9 (A) <5.7 %       Medications - No data to display    Assessments & Plan (with Medical Decision Making)     I have reviewed the nursing notes.    I have reviewed the findings, diagnosis, plan and need for follow up with the patient.      Discharge Medication List as of 6/1/2022  3:43 PM      START taking these medications    Details   clindamycin (CLEOCIN) 300 MG capsule Take 1 capsule (300 mg) by mouth 4 times daily for 10 days, Disp-40 capsule, R-0, E-Prescribe             Final diagnoses:   Cellulitis     ASSESSMENT / PLAN:  (L03.90) Cellulitis  Comment:   Plan:  1. Clindamycin 300mg  4 times a day for 10 days  2. Warm pack to Left leg cellulitis  for 10-15min  3 times a day  3. Drink plenty fluids  4. No scratching.   5. Ibuprofen for discomfort with food.       6/1/2022   [facility]     Benjy Felix NP  06/01/22 1959       Benjy Felix NP  06/01/22 [id number]

## 2022-06-01 NOTE — ED TRIAGE NOTES
Patient normally has bilateral lower leg edema and redness. Left lower leg started to become painful around 0200. Around 0400 started becoming redder. Patient has been scratching for the last 2 days

## 2022-06-01 NOTE — TELEPHONE ENCOUNTER
Furosemide      Last Written Prescription Date:  4/9/2022  Last Fill Quantity: 60,   # refills: 0  Last Office Visit: 3/9/2022  Future Office visit:    Next 5 appointments (look out 90 days)    Jun 09, 2022  2:30 PM  (Arrive by 2:15 PM)  SHORT with SEVEN Pandey  [facility] [place] ([facility] [place] ) [address]  [place] [address]  [phone]   Jul 01, 2022  3:00 PM  (Arrive by 2:45 PM)  PHYSICAL with SEVEN Pandey  [facility] [place] ([facility] [place] ) [address]  [place] [address]  [phone]

## 2022-06-01 NOTE — PROGRESS NOTES
Met with Autumn today. She that she sometimes has some nausea with her Trulicity, but wishes to continue it.    See Taisha Corral's note for education provided.    Yohana Ruiz RN, [facility].

## 2022-06-01 NOTE — LETTER
"    6/1/2022        RE: Autumn BATISTA Hnatko  [address] [facility] [address]        Diabetes Self-Management Education & Support    Presents for: Follow-up (BG review)    SUBJECTIVE/OBJECTIVE:  Presents for: Follow-up (BG review)  Accompanied by: Self  Diabetes education in the past 24mo: No  Focus of Visit: Patient Unsure, Diabetes Pathophysiology, Healthy Eating, Healthy Coping  Diabetes type: Type 2  Date of diagnosis: 11/4/21  Disease course: Other  How confident are you filling out medical forms by yourself:: Quite a bit  Diabetes management related comments/concerns: Affording supplies  Transportation concerns: Yes  Difficulty affording diabetes medication?: No  Difficulty affording diabetes testing supplies?: Sometimes (possibly)  Other concerns:: Glasses, Physical impairment  Cultural Influences/Ethnic Background:  Not  or       Diabetes Symptoms & Complications:  Fatigue: Yes  Neuropathy: Yes (worsening)  Polydipsia: Sometimes  Polyphagia: No  Polyuria: Sometimes (on a diuretic)  Visual change: No (eye pain sometimes)  Slow healing wounds: No  Other: No  Symptom course: Improving  Weight trend: Stable  Complications assessed today?: No    Patient Problem List and Family Medical History reviewed for relevant medical history, current medical status, and diabetes risk factors.    Vitals:  /72   Pulse 83   Resp 16   Ht 1.578 m (5' 2.12\")   Wt 125.4 kg (276 lb 8 oz)   SpO2 97%   BMI 50.38 kg/m    Estimated body mass index is 50.38 kg/m  as calculated from the following:    Height as of this encounter: 1.578 m (5' 2.12\").    Weight as of this encounter: 125.4 kg (276 lb 8 oz).   Last 3 BP:   BP Readings from Last 3 Encounters:   06/01/22 153/71   06/01/22 112/72   04/06/22 133/83       History   Smoking Status     Current Every Day Smoker     Packs/day: 0.50     Years: 32.00     Types: Cigarettes     Start date: 1/1/1989   Smokeless Tobacco     Never Used     Comment: pt declines "       Labs:  Lab Results   Component Value Date    A1C 6.9 06/01/2022     Lab Results   Component Value Date     01/06/2022     05/19/2021     Lab Results   Component Value Date     01/06/2022     01/29/2020     HDL Cholesterol   Date Value Ref Range Status   01/29/2020 87 >49 mg/dL Final     Direct Measure HDL   Date Value Ref Range Status   01/06/2022 65 >=50 mg/dL Final   ]  GFR Estimate   Date Value Ref Range Status   01/06/2022 >90 >60 mL/min/1.73m2 Final     Comment:     Effective December 21, 2021 eGFRcr in adults is calculated using the 2021 CKD-EPI creatinine equation which includes age and gender (Samy et al., NEJM, DOI: 10.1056/DWZUjt5995558)   05/19/2021 >90 >60 mL/min/[1.73_m2] Final     Comment:     Non  GFR Calc  Starting 12/18/2018, serum creatinine based estimated GFR (eGFR) will be   calculated using the Chronic Kidney Disease Epidemiology Collaboration   (CKD-EPI) equation.       GFR Estimate If Black   Date Value Ref Range Status   05/19/2021 >90 >60 mL/min/[1.73_m2] Final     Comment:      GFR Calc  Starting 12/18/2018, serum creatinine based estimated GFR (eGFR) will be   calculated using the Chronic Kidney Disease Epidemiology Collaboration   (CKD-EPI) equation.       Lab Results   Component Value Date    CR 0.69 01/06/2022    CR 0.78 05/19/2021     No results found for: MICROALBUMIN    Healthy Eating:  Healthy Eating Assessed Today: Yes  Cultural/Mormon diet restrictions?: No  Meal planning/habits: Smaller portions  How many times a week on average do you eat food made away from home (restaurant/take-out)?: 1 (1x in the past month)  Meals include: Dinner, Evening Snack, Lunch (isn't eating until 1 or 2, supper around 5:50/6)  Breakfast: coffee- meds around 11:30  Lunch: moms meals- diabetes friendly- around 3-4  Dinner: protein and veg- later in the evening- but feels sick eating later  Snacks: not snacking as much  Beverages:  Diet soda, Water, Milk, Coffee (prabhjot in water, 1%, cream and splenda in coffee)  Has patient met with a dietitian in the past?: Yes (with gestational diabetes)    Being Active:  Being Active Assessed Today: Yes  Exercise:: Currently not exercising (cleaning the house out, going outside more)  Barrier to exercise: Physical limitation (legs hurt, chronic pain)    Monitoring:  Monitoring Assessed Today: Yes  Did patient bring glucose meter to appointment? : Yes  Blood Glucose Meter: Accu-chek (guide me)  Times checking blood sugar at home (number): 2 (forgetting at time)  Times checking blood sugar at home (per): Day  Blood glucose trend: Other (unable to load meter today)    BG per meter review  Average: 136  5/26- 6/1: Fasting 115-121, post meal   5/19- 5/25: Fasting 115-167, post meal     Taking Medications:  Diabetes Medication(s)     Incretin Mimetic Agents (GLP-1 Receptor Agonists)       dulaglutide (TRULICITY) 0.75 MG/0.5ML pen    Inject 0.75 mg Subcutaneous every 7 days          Taking Medication Assessed Today: Yes  Current Treatments: Oral Medication (taken by mouth) (metformin 1000 with dinner)  Problems taking diabetes medications regularly?: Yes (forgetting sometimes, taking them close together but gets 4 tablets in)  Diabetes medication side effects?: No (once in a while, may be from the food she is getting)    Problem Solving:  Problem Solving Assessed Today: Yes  Is the patient at risk for hypoglycemia?: No  Is the patient at risk for DKA?: No  Does patient have severe weather/disaster plan for diabetes management?: Not Needed  Does patient have sick day plan for diabetes management?: Not Needed      Reducing Risks:  Reducing Risks Assessed Today: Yes  Diabetes Risks: Age over 45 years, History of gestational diabetes, Sedentary Lifestyle  CAD Risks: Sedentary lifestyle, Tobacco exposure, Stress, Obesity, Family history, Diabetes Mellitus  Has dilated eye exam at least once a year?: No  (forgetting to get it set up)  Sees dentist every 6 months?: No (needs to find a dentist, chipped a tooth yesterday)  Feet checked by healthcare provider in the last year?: No    Healthy Coping:  Healthy Coping Assessed Today: Yes  Emotional response to diabetes: Ready to learn, Depression (depression due to mom passing)  Informal Support system:: Family (mental health practitioner, AHRMS, )  Stage of change: ACTION (Actively working towards change)  Support resources: In-person Offerings  Patient Activation Measure Survey Score:  KEN Score (Last Two) 11/28/2018 3/7/2019   KEN Raw Score 27 27   Activation Score 47.4 47.4   KEN Level 2 2       Diabetes knowledge and skills assessment:   Patient is knowledgeable in diabetes management concepts related to: Healthy Eating, Being Active, Monitoring, Taking Medication, Problem Solving, Reducing Risks and Healthy Coping    Patient needs further education on the following diabetes management concepts: Healthy Eating, Being Active, Monitoring, Taking Medication, Problem Solving, Reducing Risks and Healthy Coping    Based on learning assessment above, most appropriate setting for further diabetes education would be: Individual setting.      INTERVENTIONS:    Education provided today on:  AADE Self-Care Behaviors:  Healthy Eating: consistency in amount, composition, and timing of food intake and weight reduction  Being Active: describe appropriate activity program and precautions to take  Problem Solving: low blood glucose - causes, signs/symptoms, treatment and prevention and carrying a carbohydrate source at all times  Healthy Coping: benefits of making appropriate lifestyle changes and utilize support systems    Opportunities for ongoing education and support in diabetes-self management were discussed.    Pt verbalized understanding of concepts discussed and recommendations provided today.       Education Materials Provided:  No new materials provided  today      ASSESSMENT:  Autumn reports feeling better. She has lost about 11lbs since last visit. Eating about 2 meals a day. Still using Mom's meals for lunch. Less snacking. Moving more with the weather being warmer. Feeling symptoms of low BG around 115 and below, will eat something to feel better. A1C checked today, down to 6.9. She is happy to see it go down. Discussion on trying to eat a little sooner in the morning to help with the feeling of low BG, however does get nausea with eating in the first few hours of when she wakes up. Pt has been itching her lower leg, the area is now very red and painful. She believes she has cellulitis. She plans to visit urgent care after this appointment today.    Patient's most recent   Lab Results   Component Value Date    A1C 6.9 06/01/2022    is meeting goal of <7.0    PLAN  See Patient Instructions for co-developed, patient-stated behavior change goals.  AVS printed and provided to patient today. See Follow-Up section for recommended follow-up.    Follow up on 7/20/22 at 1:30pm  Time Spent: 30 minutes  Encounter Type: Individual    Any diabetes medication dose changes were made via the CDE Protocol and Collaborative Practice Agreement with the patient's primary care provider. A copy of this encounter was shared with the provider.        Met with Autumn today.        Sincerely,        Taisha Corral RD

## 2022-06-01 NOTE — ED TRIAGE NOTES
Pt presents with possible cellulitis in left leg. Pt states hx of cellulitis. Pt denies numbness/tingling. Pt states pain at 9/10.

## 2022-06-01 NOTE — DISCHARGE INSTRUCTIONS
Clindamycin 300mg  4 times a day for 10 days  Warm pack to Left leg cellulitis  for 10-15min  3 times a day  Drink plenty fluids  No scratching.   Ibuprofen for discomfort with food.

## 2022-06-02 NOTE — PROGRESS NOTES
"Diabetes Self-Management Education & Support    Presents for: Follow-up (BG review)    SUBJECTIVE/OBJECTIVE:  Presents for: Follow-up (BG review)  Accompanied by: Self  Diabetes education in the past 24mo: No  Focus of Visit: Patient Unsure, Diabetes Pathophysiology, Healthy Eating, Healthy Coping  Diabetes type: Type 2  Date of diagnosis: 11/4/21  Disease course: Other  How confident are you filling out medical forms by yourself:: Quite a bit  Diabetes management related comments/concerns: Affording supplies  Transportation concerns: Yes  Difficulty affording diabetes medication?: No  Difficulty affording diabetes testing supplies?: Sometimes (possibly)  Other concerns:: Glasses, Physical impairment  Cultural Influences/Ethnic Background:  Not  or       Diabetes Symptoms & Complications:  Fatigue: Yes  Neuropathy: Yes (worsening)  Polydipsia: Sometimes  Polyphagia: No  Polyuria: Sometimes (on a diuretic)  Visual change: No (eye pain sometimes)  Slow healing wounds: No  Other: No  Symptom course: Improving  Weight trend: Stable  Complications assessed today?: No    Patient Problem List and Family Medical History reviewed for relevant medical history, current medical status, and diabetes risk factors.    Vitals:  /72   Pulse 83   Resp 16   Ht 1.578 m (5' 2.12\")   Wt 125.4 kg (276 lb 8 oz)   SpO2 97%   BMI 50.38 kg/m    Estimated body mass index is 50.38 kg/m  as calculated from the following:    Height as of this encounter: 1.578 m (5' 2.12\").    Weight as of this encounter: 125.4 kg (276 lb 8 oz).   Last 3 BP:   BP Readings from Last 3 Encounters:   06/01/22 153/71   06/01/22 112/72   04/06/22 133/83       History   Smoking Status     Current Every Day Smoker     Packs/day: 0.50     Years: 32.00     Types: Cigarettes     Start date: 1/1/1989   Smokeless Tobacco     Never Used     Comment: pt declines       Labs:  Lab Results   Component Value Date    A1C 6.9 06/01/2022     Lab Results "   Component Value Date     01/06/2022     05/19/2021     Lab Results   Component Value Date     01/06/2022     01/29/2020     HDL Cholesterol   Date Value Ref Range Status   01/29/2020 87 >49 mg/dL Final     Direct Measure HDL   Date Value Ref Range Status   01/06/2022 65 >=50 mg/dL Final   ]  GFR Estimate   Date Value Ref Range Status   01/06/2022 >90 >60 mL/min/1.73m2 Final     Comment:     Effective December 21, 2021 eGFRcr in adults is calculated using the 2021 CKD-EPI creatinine equation which includes age and gender (Samy et al., NEJM, DOI: 10.1056/LXNYaz9754815)   05/19/2021 >90 >60 mL/min/[1.73_m2] Final     Comment:     Non  GFR Calc  Starting 12/18/2018, serum creatinine based estimated GFR (eGFR) will be   calculated using the Chronic Kidney Disease Epidemiology Collaboration   (CKD-EPI) equation.       GFR Estimate If Black   Date Value Ref Range Status   05/19/2021 >90 >60 mL/min/[1.73_m2] Final     Comment:      GFR Calc  Starting 12/18/2018, serum creatinine based estimated GFR (eGFR) will be   calculated using the Chronic Kidney Disease Epidemiology Collaboration   (CKD-EPI) equation.       Lab Results   Component Value Date    CR 0.69 01/06/2022    CR 0.78 05/19/2021     No results found for: MICROALBUMIN    Healthy Eating:  Healthy Eating Assessed Today: Yes  Cultural/Baptism diet restrictions?: No  Meal planning/habits: Smaller portions  How many times a week on average do you eat food made away from home (restaurant/take-out)?: 1 (1x in the past month)  Meals include: Dinner, Evening Snack, Lunch (isn't eating until 1 or 2, supper around 5:50/6)  Breakfast: coffee- meds around 11:30  Lunch: moms meals- diabetes friendly- around 3-4  Dinner: protein and veg- later in the evening- but feels sick eating later  Snacks: not snacking as much  Beverages: Diet soda, Water, Milk, Coffee (prabhjot in water, 1%, cream and splenda in coffee)  Has  patient met with a dietitian in the past?: Yes (with gestational diabetes)    Being Active:  Being Active Assessed Today: Yes  Exercise:: Currently not exercising (cleaning the house out, going outside more)  Barrier to exercise: Physical limitation (legs hurt, chronic pain)    Monitoring:  Monitoring Assessed Today: Yes  Did patient bring glucose meter to appointment? : Yes  Blood Glucose Meter: Accu-chek (guide me)  Times checking blood sugar at home (number): 2 (forgetting at time)  Times checking blood sugar at home (per): Day  Blood glucose trend: Other (unable to load meter today)    BG per meter review  Average: 136  5/26- 6/1: Fasting 115-121, post meal   5/19- 5/25: Fasting 115-167, post meal     Taking Medications:  Diabetes Medication(s)     Incretin Mimetic Agents (GLP-1 Receptor Agonists)       dulaglutide (TRULICITY) 0.75 MG/0.5ML pen    Inject 0.75 mg Subcutaneous every 7 days          Taking Medication Assessed Today: Yes  Current Treatments: Oral Medication (taken by mouth) (metformin 1000 with dinner)  Problems taking diabetes medications regularly?: Yes (forgetting sometimes, taking them close together but gets 4 tablets in)  Diabetes medication side effects?: No (once in a while, may be from the food she is getting)    Problem Solving:  Problem Solving Assessed Today: Yes  Is the patient at risk for hypoglycemia?: No  Is the patient at risk for DKA?: No  Does patient have severe weather/disaster plan for diabetes management?: Not Needed  Does patient have sick day plan for diabetes management?: Not Needed      Reducing Risks:  Reducing Risks Assessed Today: Yes  Diabetes Risks: Age over 45 years, History of gestational diabetes, Sedentary Lifestyle  CAD Risks: Sedentary lifestyle, Tobacco exposure, Stress, Obesity, Family history, Diabetes Mellitus  Has dilated eye exam at least once a year?: No (forgetting to get it set up)  Sees dentist every 6 months?: No (needs to find a dentist,  chipped a tooth yesterday)  Feet checked by healthcare provider in the last year?: No    Healthy Coping:  Healthy Coping Assessed Today: Yes  Emotional response to diabetes: Ready to learn, Depression (depression due to mom passing)  Informal Support system:: Family (mental health practitioner, [facility], )  Stage of change: ACTION (Actively working towards change)  Support resources: In-person Offerings  Patient Activation Measure Survey Score:  KEN Score (Last Two) 11/28/2018 3/7/2019   KEN Raw Score 27 27   Activation Score 47.4 47.4   KEN Level 2 2       Diabetes knowledge and skills assessment:   Patient is knowledgeable in diabetes management concepts related to: Healthy Eating, Being Active, Monitoring, Taking Medication, Problem Solving, Reducing Risks and Healthy Coping    Patient needs further education on the following diabetes management concepts: Healthy Eating, Being Active, Monitoring, Taking Medication, Problem Solving, Reducing Risks and Healthy Coping    Based on learning assessment above, most appropriate setting for further diabetes education would be: Individual setting.      INTERVENTIONS:    Education provided today on:  AADE Self-Care Behaviors:  Healthy Eating: consistency in amount, composition, and timing of food intake and weight reduction  Being Active: describe appropriate activity program and precautions to take  Problem Solving: low blood glucose - causes, signs/symptoms, treatment and prevention and carrying a carbohydrate source at all times  Healthy Coping: benefits of making appropriate lifestyle changes and utilize support systems    Opportunities for ongoing education and support in diabetes-self management were discussed.    Pt verbalized understanding of concepts discussed and recommendations provided today.       Education Materials Provided:  No new materials provided today      ASSESSMENT:  Autumn reports feeling better. She has lost about 11lbs since last visit.  Eating about 2 meals a day. Still using Mom's meals for lunch. Less snacking. Moving more with the weather being warmer. Feeling symptoms of low BG around 115 and below, will eat something to feel better. A1C checked today, down to 6.9. She is happy to see it go down. Discussion on trying to eat a little sooner in the morning to help with the feeling of low BG, however does get nausea with eating in the first few hours of when she wakes up. Pt has been itching her lower leg, the area is now very red and painful. She believes she has cellulitis. She plans to visit urgent care after this appointment today.    Patient's most recent   Lab Results   Component Value Date    A1C 6.9 06/01/2022    is meeting goal of <7.0    PLAN  See Patient Instructions for co-developed, patient-stated behavior change goals.  AVS printed and provided to patient today. See Follow-Up section for recommended follow-up.    Follow up on 7/20/22 at 1:30pm  Time Spent: 30 minutes  Encounter Type: Individual    Any diabetes medication dose changes were made via the CDE Protocol and Collaborative Practice Agreement with the patient's primary care provider. A copy of this encounter was shared with the provider.

## 2022-06-03 RX ORDER — FUROSEMIDE 20 MG
TABLET ORAL
Qty: 60 TABLET | Refills: 0 | Status: SHIPPED | OUTPATIENT
Start: 2022-06-03 | End: 2022-06-09

## 2022-06-07 NOTE — PROGRESS NOTES
Assessment & Plan     Wound infection  Scratching her bug bites until there is cellulitis was treated with clindamycin.   - chlorhexidine (HIBICLENS) 4 % liquid; Was affected areas in show every other day    Encounter for screening mammogram for breast cancer  She is going to get a mammogram . Has a small irritated tag onher left breast areola region    - MA Screen Bilateral w/Yung; Future    Acquired hypothyroidism  Referral. Wants to see if there is anything with her thryoid that can be improved. Weight loss is started on Trulicity and she is very motivated to get her weight down so she can get her knee taken care of.  Has neuropathy   - Adult Endocrinology  Referral; Future  - TSH with free T4 reflex; Future    Special screening for malignant neoplasms, colon  Due for colon screening. Will be referred.   - Adult General Surg Referral    Type 2 diabetes mellitus without complication, without long-term current use of insulin (H)  She is going to be seeing diabetic Ed. They are working on weight loss and seeing diabetic Ed with Trulicity occasional high sugars over 200.  Her a1c is rising a bit.       Review of external notes as documented elsewhere in note  Ordering of each unique test  Prescription drug management  10 minutes spent on the date of the encounter doing chart review, history and exam, documentation and further activities per the note       Tobacco Cessation:   reports that she has been smoking cigarettes. She started smoking about 33 years ago. She has a 16.00 pack-year smoking history. She has never used smokeless tobacco.      See Patient Instructions    No follow-ups on file.    SEVEN Bryant  [facility] - GLORIA Leyva is a 46 year old who presents for the following health issues     HPI     Diabetes Follow-up    How often are you checking your blood sugar? Two times daily  Blood sugar testing frequency justification:  Adjustment of  medication(s)  What time of day are you checking your blood sugars (select all that apply)?  Before meals and After meals  Have you had any blood sugars above 200?  Yes   Have you had any blood sugars below 70?  No    What symptoms do you notice when your blood sugar is low?  Dizzy    What concerns do you have today about your diabetes? None and Blood sugar is often over 200     Do you have any of these symptoms? (Select all that apply)  Numbness in feet and Burning in feet    Have you had a diabetic eye exam in the last 12 months? No        BP Readings from Last 2 Encounters:   06/09/22 130/80   06/01/22 153/71     Hemoglobin A1C POCT (%)   Date Value   06/01/2022 6.9 (A)   02/16/2022 7.6 (A)     LDL Cholesterol Calculated (mg/dL)   Date Value   01/06/2022 104 (H)   01/29/2020 155 (H)   11/28/2018 125 (H)         Hypothyroidism Follow-up      Since last visit, patient describes the following symptoms: Weight stable, no hair loss, no skin changes, no constipation, no loose stools      Depression and Anxiety Follow-Up    How are you doing with your depression since your last visit? Improved     How are you doing with your anxiety since your last visit?  No change    Are you having other symptoms that might be associated with depression or anxiety? No    Have you had a significant life event? Grief or Loss     Do you have any concerns with your use of alcohol or other drugs? No    Social History     Tobacco Use     Smoking status: Current Every Day Smoker     Packs/day: 0.50     Years: 32.00     Pack years: 16.00     Types: Cigarettes     Start date: 1/1/1989     Smokeless tobacco: Never Used     Tobacco comment: pt declines   Substance Use Topics     Alcohol use: No     Drug use: Yes     Types: Marijuana     Comment: daily-medical     PHQ 12/9/2021 3/9/2022 6/9/2022   PHQ-9 Total Score 21 15 9   Q9: Thoughts of better off dead/self-harm past 2 weeks Not at all Not at all Not at all   F/U: Thoughts of suicide or  self-harm - - -   F/U: Self harm-plan - - -   F/U: Self-harm action - - -   F/U: Safety concerns - - -   PHQ-A Total Score - - -   PHQ-A Depressed most days in past year - - -   PHQ-A Mood affect on daily activities - - -   PHQ-A Suicide Ideation past 2 weeks - - -   PHQ-A Suicide Ideation past month - - -   PHQ-A Previous suicide attempt - - -     COY-7 SCORE 12/9/2021 3/9/2022 6/9/2022   Total Score 20 18 21     Last PHQ-9 6/9/2022   1.  Little interest or pleasure in doing things 1   2.  Feeling down, depressed, or hopeless 1   3.  Trouble falling or staying asleep, or sleeping too much 3   4.  Feeling tired or having little energy 1   5.  Poor appetite or overeating 0   6.  Feeling bad about yourself 0   7.  Trouble concentrating 3   8.  Moving slowly or restless 0   Q9: Thoughts of better off dead/self-harm past 2 weeks 0   PHQ-9 Total Score 9   Difficulty at work, home, or with people -   In the past two weeks have you had thoughts of suicide or self harm? -   Do you have concerns about your personal safety or the safety of others? -   In the past 2 weeks have you thought about a plan or had intention to harm yourself? -   In the past 2 weeks have you acted on these thoughts in any way? -     COY-7  6/9/2022   1. Feeling nervous, anxious, or on edge 3   2. Not being able to stop or control worrying 3   3. Worrying too much about different things 3   4. Trouble relaxing 3   5. Being so restless that it is hard to sit still 3   6. Becoming easily annoyed or irritable 3   7. Feeling afraid, as if something awful might happen 3   COY-7 Total Score 21   If you checked any problems, how difficult have they made it for you to do your work, take care of things at home, or get along with other people? -       Suicide Assessment Five-step Evaluation and Treatment (SAFE-T)        Review of Systems   Constitutional, HEENT, cardiovascular, pulmonary, gi and gu systems are negative, except as otherwise noted.      Objective  "   /80 (BP Location: Left arm, Patient Position: Sitting, Cuff Size: Adult Regular)   Pulse 90   Temp 97.5  F (36.4  C) (Tympanic)   Ht 1.578 m (5' 2.12\")   Wt 125.2 kg (276 lb)   SpO2 99%   BMI 50.29 kg/m    Body mass index is 50.29 kg/m .  Physical Exam   GENERAL: healthy, alert and no distress  EYES: Eyes grossly normal to inspection, PERRL and conjunctivae and sclerae normal  HENT: ear canals and TM's normal, nose and mouth without ulcers or lesions  NECK: no adenopathy, no asymmetry, masses, or scars and thyroid normal to palpation  RESP: lungs clear to auscultation - no rales, rhonchi or wheezes  CV: regular rate and rhythm, normal S1 S2, no S3 or S4, no murmur, click or rub, no peripheral edema and peripheral pulses strong  ABDOMEN: obese   MS: OA changes limited ROM and can't stand with out cane.  Limited movement.   SKIN: no suspicious lesions or rashes  NEURO: Normal strength and tone, mentation intact and speech normal  BACK: no CVA tenderness, no paralumbar tenderness  PSYCH: mentation appears normal, affect normal/bright  LYMPH: no cervical, supraclavicular, axillary, or inguinal adenopathy    Hospital Outpatient Visit on 06/01/2022   Component Date Value Ref Range Status     Hemoglobin A1C POCT 06/01/2022 6.9 (A) <5.7 % Final               "

## 2022-06-09 ENCOUNTER — OFFICE VISIT (OUTPATIENT)
Dept: FAMILY MEDICINE | Facility: OTHER | Age: 47
End: 2022-06-09
Attending: PHYSICIAN ASSISTANT
Payer: MEDICARE

## 2022-06-09 VITALS
BODY MASS INDEX: 50.79 KG/M2 | OXYGEN SATURATION: 99 % | TEMPERATURE: 97.5 F | WEIGHT: 276 LBS | DIASTOLIC BLOOD PRESSURE: 80 MMHG | SYSTOLIC BLOOD PRESSURE: 130 MMHG | HEART RATE: 90 BPM | HEIGHT: 62 IN

## 2022-06-09 DIAGNOSIS — T14.8XXA WOUND INFECTION: Primary | ICD-10-CM

## 2022-06-09 DIAGNOSIS — L08.9 WOUND INFECTION: Primary | ICD-10-CM

## 2022-06-09 DIAGNOSIS — E03.9 ACQUIRED HYPOTHYROIDISM: ICD-10-CM

## 2022-06-09 DIAGNOSIS — Z12.31 ENCOUNTER FOR SCREENING MAMMOGRAM FOR BREAST CANCER: ICD-10-CM

## 2022-06-09 DIAGNOSIS — E11.9 TYPE 2 DIABETES MELLITUS WITHOUT COMPLICATION, WITHOUT LONG-TERM CURRENT USE OF INSULIN (H): ICD-10-CM

## 2022-06-09 DIAGNOSIS — E11.65 TYPE 2 DIABETES MELLITUS WITH HYPERGLYCEMIA, WITHOUT LONG-TERM CURRENT USE OF INSULIN (H): ICD-10-CM

## 2022-06-09 DIAGNOSIS — Z12.11 SPECIAL SCREENING FOR MALIGNANT NEOPLASMS, COLON: ICD-10-CM

## 2022-06-09 LAB
T4 FREE SERPL-MCNC: 1.46 NG/DL (ref 0.76–1.46)
TSH SERPL DL<=0.005 MIU/L-ACNC: <0.01 MU/L (ref 0.4–4)

## 2022-06-09 PROCEDURE — 36415 COLL VENOUS BLD VENIPUNCTURE: CPT | Mod: ZL | Performed by: PHYSICIAN ASSISTANT

## 2022-06-09 PROCEDURE — 99214 OFFICE O/P EST MOD 30 MIN: CPT | Performed by: PHYSICIAN ASSISTANT

## 2022-06-09 PROCEDURE — G0463 HOSPITAL OUTPT CLINIC VISIT: HCPCS

## 2022-06-09 PROCEDURE — 84439 ASSAY OF FREE THYROXINE: CPT | Mod: ZL | Performed by: PHYSICIAN ASSISTANT

## 2022-06-09 PROCEDURE — 84443 ASSAY THYROID STIM HORMONE: CPT | Mod: ZL | Performed by: PHYSICIAN ASSISTANT

## 2022-06-09 ASSESSMENT — ANXIETY QUESTIONNAIRES
GAD7 TOTAL SCORE: 21
4. TROUBLE RELAXING: NEARLY EVERY DAY
7. FEELING AFRAID AS IF SOMETHING AWFUL MIGHT HAPPEN: NEARLY EVERY DAY
1. FEELING NERVOUS, ANXIOUS, OR ON EDGE: NEARLY EVERY DAY
3. WORRYING TOO MUCH ABOUT DIFFERENT THINGS: NEARLY EVERY DAY
GAD7 TOTAL SCORE: 21
6. BECOMING EASILY ANNOYED OR IRRITABLE: NEARLY EVERY DAY
2. NOT BEING ABLE TO STOP OR CONTROL WORRYING: NEARLY EVERY DAY
5. BEING SO RESTLESS THAT IT IS HARD TO SIT STILL: NEARLY EVERY DAY

## 2022-06-09 ASSESSMENT — PATIENT HEALTH QUESTIONNAIRE - PHQ9: SUM OF ALL RESPONSES TO PHQ QUESTIONS 1-9: 9

## 2022-06-09 NOTE — COMMUNITY RESOURCES LIST (ENGLISH)
06/09/2022   [facility] - Outpatient Clinics  Kenya Tamia  For questions about this resource list or additional care needs, please contact your primary care clinic or care manager.  Phone: [phone]   Email: N/A   Address: [address] [place] [id number]   Hours: N/A        Mental Health       Individual counseling  1  [facility] - [place] Distance: 5.55 miles      COVID-19 Status: Regular Operations   [address] [facility] [address] [place] [address]  Language: English  Hours: Mon - Thu 9:00 AM - 8:00 PM , Fri - Sat 9:00 AM - 5:00 PM Appt. Only  Fees: Insurance, Self Pay   Phone: [phone] Ext.1 Website: [email or web address]     2  [facility]. - [place] Distance: 5.67 miles      COVID-19 Status: Regular Operations, COVID-19 Status: Phone/Virtual   [address] [facility] [address] [place] [address]  Language: English  Hours: Mon - Thu 8:00 AM - 5:00 PM  Fees: Insurance, Self Pay, Sliding Fee   Phone: [phone] Website: [email or web address]          Important Numbers & Websites       Emergency Services   911  City Services   311  Poison Control   (481) 722-6698  Suicide Prevention Lifeline   (205) 148-2950 (TALK)  Child Abuse Hotline   (300) 747-7626 (4-A-Child)  Sexual Assault Hotline   (768) 904-9899 (HOPE)  National Runaway Safeline   (265) 381-9855 (RUNAWAY)  All-Options Talkline   (231) 585-7287  Substance Abuse Referral   (555) 416-4933 (HELP)

## 2022-06-09 NOTE — NURSING NOTE
"Chief Complaint   Patient presents with     Diabetes     Thyroid Problem     Depression     Anxiety       Initial /80 (BP Location: Left arm, Patient Position: Sitting, Cuff Size: Adult Regular)   Pulse 90   Temp 97.5  F (36.4  C) (Tympanic)   Ht 1.578 m (5' 2.12\")   Wt 125.2 kg (276 lb)   SpO2 99%   BMI 50.29 kg/m   Estimated body mass index is 50.29 kg/m  as calculated from the following:    Height as of this encounter: 1.578 m (5' 2.12\").    Weight as of this encounter: 125.2 kg (276 lb).  Medication Reconciliation: complete  Kenya Mendes LPN  "

## 2022-06-10 DIAGNOSIS — E03.9 ACQUIRED HYPOTHYROIDISM: ICD-10-CM

## 2022-06-10 RX ORDER — LEVOTHYROXINE SODIUM 175 UG/1
TABLET ORAL
Qty: 90 TABLET | Refills: 0 | Status: SHIPPED | OUTPATIENT
Start: 2022-06-10 | End: 2022-08-31

## 2022-06-13 ENCOUNTER — TRANSCRIBE ORDERS (OUTPATIENT)
Dept: OTHER | Age: 47
End: 2022-06-13
Payer: MEDICARE

## 2022-06-13 DIAGNOSIS — E03.9 ACQUIRED HYPOTHYROIDISM: Primary | ICD-10-CM

## 2022-06-20 DIAGNOSIS — F33.2 SEVERE EPISODE OF RECURRENT MAJOR DEPRESSIVE DISORDER, WITHOUT PSYCHOTIC FEATURES (H): ICD-10-CM

## 2022-06-20 NOTE — TELEPHONE ENCOUNTER
Topiramate (Topamax) 50 mg tablet  Take 1 to 2 tablets by mouth once daily at bedtime     Last Written Prescription Date:  2-2-2022  Last Fill Quantity: 60 tablet,   # refills: 0  Last Office Visit: 6-9-2022  Future Office visit:    Next 5 appointments (look out 90 days)    Jul 01, 2022  3:00 PM  (Arrive by 2:45 PM)  PHYSICAL with SEVEN Pandey  [facility] [place] ([facility] [place] ) [address]  [place] [address]  [phone]

## 2022-06-26 RX ORDER — TOPIRAMATE 50 MG/1
TABLET, FILM COATED ORAL
Qty: 60 TABLET | Refills: 3 | Status: SHIPPED | OUTPATIENT
Start: 2022-06-26 | End: 2022-09-02

## 2022-06-28 DIAGNOSIS — E66.01 MORBID OBESITY (H): ICD-10-CM

## 2022-06-28 DIAGNOSIS — G89.4 CHRONIC PAIN SYNDROME: ICD-10-CM

## 2022-06-29 ENCOUNTER — TELEPHONE (OUTPATIENT)
Dept: FAMILY MEDICINE | Facility: OTHER | Age: 47
End: 2022-06-29

## 2022-06-30 RX ORDER — BACLOFEN 10 MG/1
TABLET ORAL
Qty: 90 TABLET | Refills: 0 | Status: SHIPPED | OUTPATIENT
Start: 2022-06-30 | End: 2022-08-03

## 2022-06-30 RX ORDER — PHENTERMINE HYDROCHLORIDE 30 MG/1
CAPSULE ORAL
Qty: 30 CAPSULE | Refills: 0 | Status: SHIPPED | OUTPATIENT
Start: 2022-06-30 | End: 2022-07-27

## 2022-06-30 NOTE — TELEPHONE ENCOUNTER
Phentermine       Last Written Prescription Date:  5-26-22  Last Fill Quantity: 30,   # refills: 0  Last Office Visit: 6-9-22  Future Office visit:       Baclofen       Last Written Prescription Date:  4-21-22  Last Fill Quantity: 90,   # refills: 1

## 2022-07-01 ENCOUNTER — MEDICAL CORRESPONDENCE (OUTPATIENT)
Dept: HEALTH INFORMATION MANAGEMENT | Facility: HOSPITAL | Age: 47
End: 2022-07-01

## 2022-07-01 ENCOUNTER — ANCILLARY PROCEDURE (OUTPATIENT)
Dept: MAMMOGRAPHY | Facility: OTHER | Age: 47
End: 2022-07-01
Attending: PHYSICIAN ASSISTANT
Payer: MEDICARE

## 2022-07-01 ENCOUNTER — TELEPHONE (OUTPATIENT)
Dept: MAMMOGRAPHY | Facility: OTHER | Age: 47
End: 2022-07-01

## 2022-07-01 DIAGNOSIS — Z12.31 ENCOUNTER FOR SCREENING MAMMOGRAM FOR BREAST CANCER: ICD-10-CM

## 2022-07-01 PROCEDURE — 77067 SCR MAMMO BI INCL CAD: CPT | Mod: TC

## 2022-07-01 NOTE — TELEPHONE ENCOUNTER
7/5/22: Spoke with patient and updated on mammogram results. PANDA  _______  Attempted to call, no voicemail set up. Will attempt to call next week. PANDA

## 2022-07-05 DIAGNOSIS — R60.0 BILATERAL EDEMA OF LOWER EXTREMITY: ICD-10-CM

## 2022-07-07 DIAGNOSIS — R60.0 BILATERAL LEG EDEMA: ICD-10-CM

## 2022-07-07 RX ORDER — FUROSEMIDE 20 MG
TABLET ORAL
Qty: 60 TABLET | Refills: 0 | Status: SHIPPED | OUTPATIENT
Start: 2022-07-07 | End: 2022-08-12

## 2022-07-07 RX ORDER — POTASSIUM CHLORIDE 750 MG/1
TABLET, EXTENDED RELEASE ORAL
Qty: 30 TABLET | Refills: 0 | Status: SHIPPED | OUTPATIENT
Start: 2022-07-07 | End: 2022-08-04

## 2022-07-07 NOTE — TELEPHONE ENCOUNTER
Potassium      Last Written Prescription Date:  6.1.22  Last Fill Quantity: #30,   # refills: 0  Last Office Visit: 6.9.22  Future Office visit:       Routing refill request to provider for review/approval because:

## 2022-07-07 NOTE — TELEPHONE ENCOUNTER
Lasix      Last Written Prescription Date:  02/22/22  Last Fill Quantity: 60,   # refills: 0  Last Office Visit: 06/09/22  Future Office visit:

## 2022-07-08 ENCOUNTER — NURSE TRIAGE (OUTPATIENT)
Dept: FAMILY MEDICINE | Facility: OTHER | Age: 47
End: 2022-07-08

## 2022-07-08 NOTE — TELEPHONE ENCOUNTER
"Patient does not have an appetite and only eats bland food, like crackers and soup.    Reason for Disposition    MILD to MODERATE vomiting (e.g., 1-5 times/day) and lasts > 48 hours (2 days)    Answer Assessment - Initial Assessment Questions  1. COVID-19 DIAGNOSIS: \"Who made your COVID-19 diagnosis?\" \"Was it confirmed by a positive lab test or self-test?\" If not diagnosed by a doctor (or NP/PA), ask \"Are there lots of cases (community spread) where you live?\" Note: See [facility] health department website, if unsure.      no  2. COVID-19 EXPOSURE: \"Was there any known exposure to COVID before the symptoms began?\" CDC Definition of close contact: within 6 feet (2 meters) for a total of 15 minutes or more over a 24-hour period.       no  3. ONSET: \"When did the COVID-19 symptoms start?\"       2 weeks ago  4. WORST SYMPTOM: \"What is your worst symptom?\" (e.g., cough, fever, shortness of breath, muscle aches)      Vomiting and diarrhea  5. COUGH: \"Do you have a cough?\" If Yes, ask: \"How bad is the cough?\"        no  6. FEVER: \"Do you have a fever?\" If Yes, ask: \"What is your temperature, how was it measured, and when did it start?\"      no  7. RESPIRATORY STATUS: \"Describe your breathing?\" (e.g., shortness of breath, wheezing, unable to speak)       no  8. BETTER-SAME-WORSE: \"Are you getting better, staying the same or getting worse compared to yesterday?\"  If getting worse, ask, \"In what way?\"      same  9. HIGH RISK DISEASE: \"Do you have any chronic medical problems?\" (e.g., asthma, heart or lung disease, weak immune system, obesity, etc.)      no  10. VACCINE: \"Have you had the COVID-19 vaccine?\" If Yes, ask: \"Which one, how many shots, when did you get it?\"        moderna  11. BOOSTER: \"Have you received your COVID-19 booster?\" If Yes, ask: \"Which one and when did you get it?\"        One booster  12. PREGNANCY: \"Is there any chance you are pregnant?\" \"When was your last menstrual period?\"        no  13. OTHER SYMPTOMS: " "\"Do you have any other symptoms?\"  (e.g., chills, fatigue, headache, loss of smell or taste, muscle pain, sore throat)        no  14. O2 SATURATION MONITOR:  \"Do you use an oxygen saturation monitor (pulse oximeter) at home?\" If Yes, ask \"What is your reading (oxygen level) today?\" \"What is your usual oxygen saturation reading?\" (e.g., 95%)        no    Answer Assessment - Initial Assessment Questions  1. VOMITING SEVERITY: \"How many times have you vomited in the past 24 hours?\"      - MILD:  1 - 2 times/day     - MODERATE: 3 - 5 times/day, decreased oral intake without significant weight loss or symptoms of dehydration     - SEVERE: 6 or more times/day, vomits everything or nearly everything, with significant weight loss, symptoms of dehydration       6/7 time a day every 3 days  2. ONSET: \"When did the vomiting begin?\"       2 weeks ago  3. FLUIDS: \"What fluids or food have you vomited up today?\" \"Have you been able to keep any fluids down?\"      no  4. ABDOMINAL PAIN: \"Are your having any abdominal pain?\" If yes : \"How bad is it and what does it feel like?\" (e.g., crampy, dull, intermittent, constant)       burning  5. DIARRHEA: \"Is there any diarrhea?\" If so, ask: \"How many times today?\"       Yes 6/7  6. CONTACTS: \"Is there anyone else in the family with the same symptoms?\"       no  7. CAUSE: \"What do you think is causing your vomiting?\"      unknown  8. HYDRATION STATUS: \"Any signs of dehydration?\" (e.g., dry mouth [not only dry lips], too weak to stand) \"When did you last urinate?\"      no  9. OTHER SYMPTOMS: \"Do you have any other symptoms?\" (e.g., fever, headache, vertigo, vomiting blood or coffee grounds, recent head injury)      Vomiting bile  10. PREGNANCY: \"Is there any chance you are pregnant?\" \"When was your last menstrual period?\"        no    Protocols used: VOMITING-A-OH, CORONAVIRUS (COVID-19) DIAGNOSED OR [id number] 1.18.2022      "

## 2022-07-11 ENCOUNTER — TELEPHONE (OUTPATIENT)
Dept: FAMILY MEDICINE | Facility: OTHER | Age: 47
End: 2022-07-11

## 2022-07-11 ENCOUNTER — OFFICE VISIT (OUTPATIENT)
Dept: FAMILY MEDICINE | Facility: OTHER | Age: 47
End: 2022-07-11
Attending: NURSE PRACTITIONER
Payer: MEDICARE

## 2022-07-11 VITALS
WEIGHT: 256 LBS | HEART RATE: 98 BPM | DIASTOLIC BLOOD PRESSURE: 80 MMHG | TEMPERATURE: 97 F | SYSTOLIC BLOOD PRESSURE: 126 MMHG | OXYGEN SATURATION: 99 % | BODY MASS INDEX: 46.64 KG/M2

## 2022-07-11 DIAGNOSIS — R10.12 LUQ ABDOMINAL PAIN: ICD-10-CM

## 2022-07-11 DIAGNOSIS — K29.70 GASTRITIS WITHOUT BLEEDING, UNSPECIFIED CHRONICITY, UNSPECIFIED GASTRITIS TYPE: Primary | ICD-10-CM

## 2022-07-11 DIAGNOSIS — R10.13 ABDOMINAL PAIN, EPIGASTRIC: ICD-10-CM

## 2022-07-11 DIAGNOSIS — E87.6 HYPOKALEMIA: ICD-10-CM

## 2022-07-11 LAB
ALBUMIN SERPL-MCNC: 3.3 G/DL (ref 3.4–5)
ALP SERPL-CCNC: 80 U/L (ref 40–150)
ALT SERPL W P-5'-P-CCNC: 64 U/L (ref 0–50)
ANION GAP SERPL CALCULATED.3IONS-SCNC: 4 MMOL/L (ref 3–14)
AST SERPL W P-5'-P-CCNC: 29 U/L (ref 0–45)
BASOPHILS # BLD AUTO: 0.1 10E3/UL (ref 0–0.2)
BASOPHILS NFR BLD AUTO: 1 %
BILIRUB SERPL-MCNC: 0.4 MG/DL (ref 0.2–1.3)
BUN SERPL-MCNC: 9 MG/DL (ref 7–30)
CALCIUM SERPL-MCNC: 9.8 MG/DL (ref 8.5–10.1)
CHLORIDE BLD-SCNC: 103 MMOL/L (ref 94–109)
CO2 SERPL-SCNC: 29 MMOL/L (ref 20–32)
CREAT SERPL-MCNC: 0.82 MG/DL (ref 0.52–1.04)
CRP SERPL-MCNC: 9.8 MG/L (ref 0–8)
EOSINOPHIL # BLD AUTO: 0.2 10E3/UL (ref 0–0.7)
EOSINOPHIL NFR BLD AUTO: 1 %
ERYTHROCYTE [DISTWIDTH] IN BLOOD BY AUTOMATED COUNT: 14.6 % (ref 10–15)
GFR SERPL CREATININE-BSD FRML MDRD: 88 ML/MIN/1.73M2
GLUCOSE BLD-MCNC: 138 MG/DL (ref 70–99)
HCT VFR BLD AUTO: 47.3 % (ref 35–47)
HGB BLD-MCNC: 15.8 G/DL (ref 11.7–15.7)
IMM GRANULOCYTES # BLD: 0.1 10E3/UL
IMM GRANULOCYTES NFR BLD: 1 %
LIPASE SERPL-CCNC: 238 U/L (ref 73–393)
LYMPHOCYTES # BLD AUTO: 3.2 10E3/UL (ref 0.8–5.3)
LYMPHOCYTES NFR BLD AUTO: 19 %
MAGNESIUM SERPL-MCNC: 2.2 MG/DL (ref 1.6–2.3)
MCH RBC QN AUTO: 29.2 PG (ref 26.5–33)
MCHC RBC AUTO-ENTMCNC: 33.4 G/DL (ref 31.5–36.5)
MCV RBC AUTO: 87 FL (ref 78–100)
MONOCYTES # BLD AUTO: 0.9 10E3/UL (ref 0–1.3)
MONOCYTES NFR BLD AUTO: 5 %
NEUTROPHILS # BLD AUTO: 12.2 10E3/UL (ref 1.6–8.3)
NEUTROPHILS NFR BLD AUTO: 73 %
NRBC # BLD AUTO: 0 10E3/UL
NRBC BLD AUTO-RTO: 0 /100
PLATELET # BLD AUTO: 327 10E3/UL (ref 150–450)
POTASSIUM BLD-SCNC: 2.9 MMOL/L (ref 3.4–5.3)
PROT SERPL-MCNC: 7 G/DL (ref 6.8–8.8)
RBC # BLD AUTO: 5.42 10E6/UL (ref 3.8–5.2)
SODIUM SERPL-SCNC: 136 MMOL/L (ref 133–144)
WBC # BLD AUTO: 16.7 10E3/UL (ref 4–11)

## 2022-07-11 PROCEDURE — 85004 AUTOMATED DIFF WBC COUNT: CPT | Mod: ZL | Performed by: NURSE PRACTITIONER

## 2022-07-11 PROCEDURE — 83735 ASSAY OF MAGNESIUM: CPT | Mod: ZL | Performed by: NURSE PRACTITIONER

## 2022-07-11 PROCEDURE — 36415 COLL VENOUS BLD VENIPUNCTURE: CPT | Mod: ZL | Performed by: NURSE PRACTITIONER

## 2022-07-11 PROCEDURE — G0463 HOSPITAL OUTPT CLINIC VISIT: HCPCS | Mod: 25

## 2022-07-11 PROCEDURE — 86140 C-REACTIVE PROTEIN: CPT | Mod: ZL | Performed by: NURSE PRACTITIONER

## 2022-07-11 PROCEDURE — 82374 ASSAY BLOOD CARBON DIOXIDE: CPT | Mod: ZL | Performed by: NURSE PRACTITIONER

## 2022-07-11 PROCEDURE — 99215 OFFICE O/P EST HI 40 MIN: CPT | Performed by: NURSE PRACTITIONER

## 2022-07-11 PROCEDURE — G0463 HOSPITAL OUTPT CLINIC VISIT: HCPCS

## 2022-07-11 PROCEDURE — 83690 ASSAY OF LIPASE: CPT | Mod: ZL | Performed by: NURSE PRACTITIONER

## 2022-07-11 PROCEDURE — 82310 ASSAY OF CALCIUM: CPT | Mod: ZL | Performed by: NURSE PRACTITIONER

## 2022-07-11 RX ORDER — OMEPRAZOLE 40 MG/1
40 CAPSULE, DELAYED RELEASE ORAL DAILY
Qty: 30 CAPSULE | Refills: 0 | Status: SHIPPED | OUTPATIENT
Start: 2022-07-11 | End: 2022-07-27

## 2022-07-11 RX ORDER — POTASSIUM CHLORIDE 1500 MG/1
TABLET, EXTENDED RELEASE ORAL
Qty: 18 TABLET | Refills: 0 | Status: SHIPPED | OUTPATIENT
Start: 2022-07-11 | End: 2022-08-04

## 2022-07-11 ASSESSMENT — PAIN SCALES - GENERAL: PAINLEVEL: NO PAIN (0)

## 2022-07-11 NOTE — PROGRESS NOTES
Assessment & Plan     Gastritis without bleeding, unspecified chronicity, unspecified gastritis type  Concerns for gastritis   Reviewed labs   Abdominal exam epigastric pain  Plan to treat with omeprazole   - omeprazole (PRILOSEC) 40 MG DR capsule; Take 1 capsule (40 mg) by mouth daily    Abdominal pain, epigastric  LUQ abdominal pain  Gastritis vs withdrawal from suboxone (she has weaned off suboxone)   - Comprehensive metabolic panel (BMP + Alb, Alk Phos, ALT, AST, Total. Bili, TP); Future  - CBC with platelets and differential; Future  - CRP, inflammation; Future  - Lipase; Future  - Comprehensive metabolic panel (BMP + Alb, Alk Phos, ALT, AST, Total. Bili, TP)  - CBC with platelets and differential  - CRP, inflammation  - Lipase    Hypokalemia  Noted low potassium and normal magnesium.    Potassium as prescribed.  Encouraged to eat with potassium to reduce upset stomach.  Plan for repeat labs and clinic visit on Friday   - Magnesium; Future  - potassium chloride ER (K-TAB) 20 MEQ CR tablet; 20 meq every 2 hours for 4 doses, then 2 times a day for 5 days  - Magnesium      I spent a total of 46 minutes on the day of the visit.   Time spent doing chart review, history and exam, documentation and further activities per the note       Tobacco Cessation:   reports that she has been smoking cigarettes. She started smoking about 33 years ago. She has a 16.00 pack-year smoking history. She has never used smokeless tobacco.      See Patient Instructions    No follow-ups on file.    [facility] - GLORIA Leyva is a 46 year old, presenting for the following health issues:  Vomiting and Diarrhea      HPI     Diarrhea and vomiting  Onset/Duration: 2 weeks  Description:       Consistency of stool: loose       Blood in stool: No       Number of loose stools past 24 hours: 2  Vomiting last night- on and off for 2-3 days and then vomiting 1-2   Diarrhea slowly  improving   Progression of Symptoms: improving  Accompanying signs and symptoms:       Fever: No       Nausea/Vomiting: YES       Abdominal pain: YES burning       Weight loss: YES       Episodes of constipation: No  History   Ill contacts: No  Recent use of antibiotics: yes.  Clindamycin in June   Recent travels: No  Recent medication-new or changes(Rx or OTC): No  Precipitating or alleviating factors: None  Therapies tried and outcome: PeptoBismol  Weaning down on suboxone - she is only taking 1/4 strip every 2 days - she is following direction from provider - she has not used in 7 days now   Reviewed PDMP last fill of suboxone was 1/19/22 and phentermine 6/30/22  Started Trulicitiy in April 2022    She is eating mom's meals diabetic friendly meals - she states very limited carbs   She does not have her gallbladder - this feels similar to symptoms after having with vomiting and diarrhea - previously on prilosec   She is taking tums and pepto with some relief           Review of Systems   CONSTITUTIONAL: NEGATIVE for fever, chills, change in weight  INTEGUMENTARY/SKIN: NEGATIVE for worrisome rashes, moles or lesions  ENT/MOUTH: NEGATIVE for ear, mouth and throat problems  RESP: NEGATIVE for significant cough or SOB  CV: NEGATIVE for chest pain, palpitations or peripheral edema  GI: loose stools, heart burn and vomiting  : denies dysuria   MUSCULOSKELETAL: NEGATIVE for significant arthralgias or myalgia  NEURO: POSITIVE for feeling weak  ENDOCRINE: Hx diabetes and Hx thyroid disease  PSYCHIATRIC: HX anxiety and HX depression      Objective    /80   Pulse 98   Temp 97  F (36.1  C) (Tympanic)   Wt 116.1 kg (256 lb)   SpO2 99%   BMI 46.64 kg/m    Body mass index is 46.64 kg/m .  Physical Exam   GENERAL: alert, no distress and obese  NECK: no adenopathy, no asymmetry, masses, or scars and thyroid normal to palpation  RESP: lungs clear to auscultation - no rales, rhonchi or wheezes  CV: regular rate and  rhythm, normal S1 S2, no S3 or S4, no murmur, click or rub, no peripheral edema and peripheral pulses strong  ABDOMEN: tenderness epigastric and bowel sounds normal  MS: no gross musculoskeletal defects noted, no edema  SKIN: no suspicious lesions or rashes  NEURO: Normal strength and tone, mentation intact and speech normal  PSYCH: mentation appears normal, affect normal/bright    Results for orders placed or performed in visit on 07/11/22   Comprehensive metabolic panel (BMP + Alb, Alk Phos, ALT, AST, Total. Bili, TP)     Status: Abnormal   Result Value Ref Range    Sodium 136 133 - 144 mmol/L    Potassium 2.9 (LL) 3.4 - 5.3 mmol/L    Chloride 103 94 - 109 mmol/L    Carbon Dioxide (CO2) 29 20 - 32 mmol/L    Anion Gap 4 3 - 14 mmol/L    Urea Nitrogen 9 7 - 30 mg/dL    Creatinine 0.82 0.52 - 1.04 mg/dL    Calcium 9.8 8.5 - 10.1 mg/dL    Glucose 138 (H) 70 - 99 mg/dL    Alkaline Phosphatase 80 40 - 150 U/L    AST 29 0 - 45 U/L    ALT 64 (H) 0 - 50 U/L    Protein Total 7.0 6.8 - 8.8 g/dL    Albumin 3.3 (L) 3.4 - 5.0 g/dL    Bilirubin Total 0.4 0.2 - 1.3 mg/dL    GFR Estimate 88 >60 mL/min/1.73m2   CRP, inflammation     Status: Abnormal   Result Value Ref Range    CRP Inflammation 9.8 (H) 0.0 - 8.0 mg/L   Lipase     Status: Normal   Result Value Ref Range    Lipase 238 73 - 393 U/L   CBC with platelets and differential     Status: Abnormal   Result Value Ref Range    WBC Count 16.7 (H) 4.0 - 11.0 10e3/uL    RBC Count 5.42 (H) 3.80 - 5.20 10e6/uL    Hemoglobin 15.8 (H) 11.7 - 15.7 g/dL    Hematocrit 47.3 (H) 35.0 - 47.0 %    MCV 87 78 - 100 fL    MCH 29.2 26.5 - 33.0 pg    MCHC 33.4 31.5 - 36.5 g/dL    RDW 14.6 10.0 - 15.0 %    Platelet Count 327 150 - 450 10e3/uL    % Neutrophils 73 %    % Lymphocytes 19 %    % Monocytes 5 %    % Eosinophils 1 %    % Basophils 1 %    % Immature Granulocytes 1 %    NRBCs per 100 WBC 0 <1 /100    Absolute Neutrophils 12.2 (H) 1.6 - 8.3 10e3/uL    Absolute Lymphocytes 3.2 0.8 - 5.3  10e3/uL    Absolute Monocytes 0.9 0.0 - 1.3 10e3/uL    Absolute Eosinophils 0.2 0.0 - 0.7 10e3/uL    Absolute Basophils 0.1 0.0 - 0.2 10e3/uL    Absolute Immature Granulocytes 0.1 <=0.4 10e3/uL    Absolute NRBCs 0.0 10e3/uL   Magnesium     Status: Normal   Result Value Ref Range    Magnesium 2.2 1.6 - 2.3 mg/dL   CBC with platelets and differential     Status: Abnormal    Narrative    The following orders were created for panel order CBC with platelets and differential.  Procedure                               Abnormality         Status                     ---------                               -----------         ------                     CBC with platelets and d...[[id number]]  Abnormal            Final result                 Please view results for these tests on the individual orders.                   .  ..

## 2022-07-11 NOTE — TELEPHONE ENCOUNTER
Please call the patient back.  She states the pharmacy will not fill her test strips until the diabetic nurse sends a report to medicare.      blood glucose (NO BRAND SPECIFIED) test strip 50 strip 11 12/15/2021  --   Sig: Use to test blood sugar 1 time daily   Sent to pharmacy as: Glucose Blood In Vitro Strip (NO BRAND SPECIFIED)   Class: E-Prescribe   Order: [id number]   E-Prescribing Status: Receipt confirmed by pharmacy (12/15/2021  1:15 PM CST)

## 2022-07-12 DIAGNOSIS — Z53.9 PERSONS ENCOUNTERING HEALTH SERVICES FOR SPECIFIC PROCEDURES, NOT CARRIED OUT: Primary | ICD-10-CM

## 2022-07-15 ENCOUNTER — TELEPHONE (OUTPATIENT)
Dept: FAMILY MEDICINE | Facility: OTHER | Age: 47
End: 2022-07-15

## 2022-07-15 DIAGNOSIS — E87.6 HYPOKALEMIA: Primary | ICD-10-CM

## 2022-07-15 NOTE — TELEPHONE ENCOUNTER
Has a family emergency cannot come in today.  States she is taking more potassium and will preethi next week for appointment with another providier

## 2022-07-19 ENCOUNTER — TELEPHONE (OUTPATIENT)
Dept: EDUCATION SERVICES | Facility: HOSPITAL | Age: 47
End: 2022-07-19

## 2022-07-19 DIAGNOSIS — E11.65 TYPE 2 DIABETES MELLITUS WITH HYPERGLYCEMIA, WITHOUT LONG-TERM CURRENT USE OF INSULIN (H): ICD-10-CM

## 2022-07-20 ENCOUNTER — HOSPITAL ENCOUNTER (OUTPATIENT)
Dept: EDUCATION SERVICES | Facility: HOSPITAL | Age: 47
Discharge: HOME | End: 2022-07-20
Attending: NURSE PRACTITIONER | Admitting: PHYSICIAN ASSISTANT
Payer: MEDICARE

## 2022-07-20 VITALS
SYSTOLIC BLOOD PRESSURE: 110 MMHG | HEIGHT: 62 IN | HEART RATE: 99 BPM | BODY MASS INDEX: 47.86 KG/M2 | WEIGHT: 260.1 LBS | DIASTOLIC BLOOD PRESSURE: 86 MMHG | RESPIRATION RATE: 16 BRPM | OXYGEN SATURATION: 98 %

## 2022-07-20 PROCEDURE — G0108 DIAB MANAGE TRN  PER INDIV: HCPCS | Performed by: DIETITIAN, REGISTERED

## 2022-07-20 ASSESSMENT — PAIN SCALES - GENERAL: PAINLEVEL: SEVERE PAIN (7)

## 2022-07-20 NOTE — LETTER
"    7/20/2022        RE: Autumn BATISTA Hnatko  [address] [facility] [address]        Diabetes Self-Management Education & Support    Presents for: Follow-up (BG review)    SUBJECTIVE/OBJECTIVE:  Presents for: Follow-up (BG review)  Accompanied by: Self  Diabetes education in the past 24mo: No  Focus of Visit: Patient Unsure, Diabetes Pathophysiology, Healthy Eating, Healthy Coping  Diabetes type: Type 2  Date of diagnosis: 11/4/21  Disease course: Other  How confident are you filling out medical forms by yourself:: Quite a bit  Diabetes management related comments/concerns: Affording supplies  Transportation concerns: Yes  Difficulty affording diabetes medication?: No  Difficulty affording diabetes testing supplies?: Sometimes (possibly)  Other concerns:: Glasses, Physical impairment  Cultural Influences/Ethnic Background:  Not  or       Diabetes Symptoms & Complications:  Fatigue: Yes  Neuropathy: Yes (worsening)  Polydipsia: Sometimes  Polyphagia: No  Polyuria: Sometimes (on a diuretic)  Visual change: No (eye pain sometimes)  Slow healing wounds: No  Other: No  Symptom course: Improving  Weight trend: Stable  Complications assessed today?: No    Patient Problem List and Family Medical History reviewed for relevant medical history, current medical status, and diabetes risk factors.    Vitals:  /86   Pulse 99   Resp 16   Ht 1.578 m (5' 2.12\")   Wt 118 kg (260 lb 1.6 oz)   SpO2 98%   BMI 47.39 kg/m    Estimated body mass index is 47.39 kg/m  as calculated from the following:    Height as of this encounter: 1.578 m (5' 2.12\").    Weight as of this encounter: 118 kg (260 lb 1.6 oz).   Last 3 BP:   BP Readings from Last 3 Encounters:   07/20/22 110/86   07/11/22 126/80   06/09/22 130/80       History   Smoking Status     Current Every Day Smoker     Packs/day: 0.50     Years: 32.00     Types: Cigarettes     Start date: 1/1/1989   Smokeless Tobacco     Never Used     Comment: pt declines "       Labs:  Lab Results   Component Value Date    A1C 6.9 06/01/2022     Lab Results   Component Value Date     07/11/2022     05/19/2021     Lab Results   Component Value Date     01/06/2022     01/29/2020     HDL Cholesterol   Date Value Ref Range Status   01/29/2020 87 >49 mg/dL Final     Direct Measure HDL   Date Value Ref Range Status   01/06/2022 65 >=50 mg/dL Final   ]  GFR Estimate   Date Value Ref Range Status   07/11/2022 88 >60 mL/min/1.73m2 Final     Comment:     Effective December 21, 2021 eGFRcr in adults is calculated using the 2021 CKD-EPI creatinine equation which includes age and gender (Samy et al., NEJM, DOI: 10.1056/GZPVmf3913797)   05/19/2021 >90 >60 mL/min/[1.73_m2] Final     Comment:     Non  GFR Calc  Starting 12/18/2018, serum creatinine based estimated GFR (eGFR) will be   calculated using the Chronic Kidney Disease Epidemiology Collaboration   (CKD-EPI) equation.       GFR Estimate If Black   Date Value Ref Range Status   05/19/2021 >90 >60 mL/min/[1.73_m2] Final     Comment:      GFR Calc  Starting 12/18/2018, serum creatinine based estimated GFR (eGFR) will be   calculated using the Chronic Kidney Disease Epidemiology Collaboration   (CKD-EPI) equation.       Lab Results   Component Value Date    CR 0.82 07/11/2022    CR 0.78 05/19/2021     No results found for: MICROALBUMIN    Healthy Eating:  Healthy Eating Assessed Today: Yes  Cultural/Judaism diet restrictions?: No  Meal planning/habits: Smaller portions  How many times a week on average do you eat food made away from home (restaurant/take-out)?: 1 (1x in the past month)  Meals include: Dinner, Evening Snack, Lunch (isn't eating until 1 or 2, supper around 5:50/6)  Breakfast: coffee- meds around 11:30  Lunch: moms meals- diabetes friendly- around 3-4pm  Dinner: protein and veg- later in the evening- but feels sick eating later  Snacks: not snacking as much  Beverages:  Diet soda, Water, Milk, Coffee (prabhjot in water, 1%, cream and splenda in coffee)  Has patient met with a dietitian in the past?: Yes (with gestational diabetes)    Being Active:  Being Active Assessed Today: Yes  Exercise:: Currently not exercising (moving more after being sick, cleaning out dad's house)  Barrier to exercise: Physical limitation (legs hurt, chronic pain)    Monitoring:  Monitoring Assessed Today: Yes  Did patient bring glucose meter to appointment? : Yes  Blood Glucose Meter: Accu-chek (guide me)  Times checking blood sugar at home (number): 2 (forgetting at time)  Times checking blood sugar at home (per): Day  Blood glucose trend: Other (unable to load meter today)    BG per meter-   Average 153  7/14 - 7/20: 112-155  7/7- 7/13: 154-190      Taking Medications:  Diabetes Medication(s)     Incretin Mimetic Agents (GLP-1 Receptor Agonists)       dulaglutide (TRULICITY) 0.75 MG/0.5ML pen    Inject 0.75 mg Subcutaneous every 7 days          Taking Medication Assessed Today: Yes  Current Treatments: Oral Medication, non-insulin injectables (trulicity)  Problems taking diabetes medications regularly?: No  Diabetes medication side effects?: No       Problem Solving:  Problem Solving Assessed Today: Yes  Is the patient at risk for hypoglycemia?: No  Is the patient at risk for DKA?: No  Does patient have severe weather/disaster plan for diabetes management?: Not Needed  Does patient have sick day plan for diabetes management?: Not Needed      Reducing Risks:  Reducing Risks Assessed Today: Yes  Diabetes Risks: Age over 45 years, History of gestational diabetes, Sedentary Lifestyle  CAD Risks: Sedentary lifestyle, Tobacco exposure, Stress, Obesity, Family history, Diabetes Mellitus  Has dilated eye exam at least once a year?: No (forgetting to get it set up)  Sees dentist every 6 months?: No (needs to find a dentist, chipped a tooth yesterday)  Feet checked by healthcare provider in the last year?:  No    Healthy Coping:  Healthy Coping Assessed Today: Yes  Emotional response to diabetes: Ready to learn, Depression (depression due to mom passing)  Informal Support system:: Family (mental health practitioner, [facility], )  Stage of change: ACTION (Actively working towards change)  Support resources: In-person Offerings  Patient Activation Measure Survey Score:  KEN Score (Last Two) 11/28/2018 3/7/2019   KEN Raw Score 27 27   Activation Score 47.4 47.4   KEN Level 2 2       Diabetes knowledge and skills assessment:   Patient is knowledgeable in diabetes management concepts related to: Healthy Eating, Being Active, Monitoring, Taking Medication, Problem Solving, Reducing Risks and Healthy Coping    Patient needs further education on the following diabetes management concepts: Healthy Eating, Being Active, Monitoring, Taking Medication, Problem Solving, Reducing Risks and Healthy Coping    Based on learning assessment above, most appropriate setting for further diabetes education would be: Individual setting.      INTERVENTIONS:    Education provided today on:  AADE Self-Care Behaviors:  Healthy Eating: consistency in amount, composition, and timing of food intake and portion control  Healthy Coping: benefits of making appropriate lifestyle changes    Opportunities for ongoing education and support in diabetes-self management were discussed.    Pt verbalized understanding of concepts discussed and recommendations provided today.       Education Materials Provided:  No new materials provided today      ASSESSMENT:  Pt seen for BG review. She reports she has been sick from the end of June to mid July- gastroenteritis. Has been feeling better for about 1.5 weeks now. Went camping and had a good time, forgot to bring her meter. Doing well with trulicity. Lost about 15lbs since last visit, could be related to illness. Moving more during the summer, no regular activity.       Patient's most recent   Lab Results    Component Value Date    A1C 6.9 06/01/2022    is meeting goal of <7.0    PLAN  See Patient Instructions for co-developed, patient-stated behavior change goals.  AVS printed and provided to patient today. See Follow-Up section for recommended follow-up.    Follow up on 9/1/22 at 2pm  Time Spent: 30 minutes  Encounter Type: Individual    Any diabetes medication dose changes were made via the CDE Protocol and Collaborative Practice Agreement with the patient's primary care provider. A copy of this encounter was shared with the provider.            Sincerely,        Taisha Corral RD

## 2022-07-21 ENCOUNTER — LAB (OUTPATIENT)
Dept: LAB | Facility: OTHER | Age: 47
End: 2022-07-21
Payer: MEDICARE

## 2022-07-21 DIAGNOSIS — E87.6 HYPOKALEMIA: Primary | ICD-10-CM

## 2022-07-21 DIAGNOSIS — E87.6 HYPOKALEMIA: ICD-10-CM

## 2022-07-21 LAB — POTASSIUM BLD-SCNC: 2.9 MMOL/L (ref 3.4–5.3)

## 2022-07-21 PROCEDURE — 36415 COLL VENOUS BLD VENIPUNCTURE: CPT | Mod: ZL

## 2022-07-21 PROCEDURE — 84132 ASSAY OF SERUM POTASSIUM: CPT | Mod: ZL

## 2022-07-21 RX ORDER — POTASSIUM CHLORIDE 1500 MG/1
TABLET, EXTENDED RELEASE ORAL
Qty: 32 TABLET | Refills: 0 | Status: SHIPPED | OUTPATIENT
Start: 2022-07-21 | End: 2023-05-12

## 2022-07-21 NOTE — PROGRESS NOTES
Chronically low K  Mg wnl  On hydrochlorothiazide 25mg qd and lasix / Kcl 10mEq prn   KCl of 80mEq x1day followed by 20mEq bid for 5 days which would have been completed on 7/17/2022 resulting in K of 2.9 (unchanged)    Rx sent for KCl 40mg mEq bid x1 day, followed by 40mEq every day   Recheck one week with lab  Renal function normal.    Left  for call back July 21, 2022 5:19 PM     Isabel Hdez MD

## 2022-07-21 NOTE — TELEPHONE ENCOUNTER
"Potassium lab pended to PCP, writer scheduled lab appt today. Pt reports \"I'm feeling much better\". Pt reported taking potassium tablets as prescribed. Pt reports \"I will have to call back to schedule an appointment with Apryl because I'm driving now\".     1:55 PM Per PCP potassium lab check only. Per PCP scheduled 8/1/22 to f/u. Pt scheduled. Left VM.   "

## 2022-07-21 NOTE — PROGRESS NOTES
"Diabetes Self-Management Education & Support    Presents for: Follow-up (BG review)    SUBJECTIVE/OBJECTIVE:  Presents for: Follow-up (BG review)  Accompanied by: Self  Diabetes education in the past 24mo: No  Focus of Visit: Patient Unsure, Diabetes Pathophysiology, Healthy Eating, Healthy Coping  Diabetes type: Type 2  Date of diagnosis: 11/4/21  Disease course: Other  How confident are you filling out medical forms by yourself:: Quite a bit  Diabetes management related comments/concerns: Affording supplies  Transportation concerns: Yes  Difficulty affording diabetes medication?: No  Difficulty affording diabetes testing supplies?: Sometimes (possibly)  Other concerns:: Glasses, Physical impairment  Cultural Influences/Ethnic Background:  Not  or       Diabetes Symptoms & Complications:  Fatigue: Yes  Neuropathy: Yes (worsening)  Polydipsia: Sometimes  Polyphagia: No  Polyuria: Sometimes (on a diuretic)  Visual change: No (eye pain sometimes)  Slow healing wounds: No  Other: No  Symptom course: Improving  Weight trend: Stable  Complications assessed today?: No    Patient Problem List and Family Medical History reviewed for relevant medical history, current medical status, and diabetes risk factors.    Vitals:  /86   Pulse 99   Resp 16   Ht 1.578 m (5' 2.12\")   Wt 118 kg (260 lb 1.6 oz)   SpO2 98%   BMI 47.39 kg/m    Estimated body mass index is 47.39 kg/m  as calculated from the following:    Height as of this encounter: 1.578 m (5' 2.12\").    Weight as of this encounter: 118 kg (260 lb 1.6 oz).   Last 3 BP:   BP Readings from Last 3 Encounters:   07/20/22 110/86   07/11/22 126/80   06/09/22 130/80       History   Smoking Status     Current Every Day Smoker     Packs/day: 0.50     Years: 32.00     Types: Cigarettes     Start date: 1/1/1989   Smokeless Tobacco     Never Used     Comment: pt declines       Labs:  Lab Results   Component Value Date    A1C 6.9 06/01/2022     Lab Results "   Component Value Date     07/11/2022     05/19/2021     Lab Results   Component Value Date     01/06/2022     01/29/2020     HDL Cholesterol   Date Value Ref Range Status   01/29/2020 87 >49 mg/dL Final     Direct Measure HDL   Date Value Ref Range Status   01/06/2022 65 >=50 mg/dL Final   ]  GFR Estimate   Date Value Ref Range Status   07/11/2022 88 >60 mL/min/1.73m2 Final     Comment:     Effective December 21, 2021 eGFRcr in adults is calculated using the 2021 CKD-EPI creatinine equation which includes age and gender (Samy et al., NEJM, DOI: 10.1056/ZHTIwq6817131)   05/19/2021 >90 >60 mL/min/[1.73_m2] Final     Comment:     Non  GFR Calc  Starting 12/18/2018, serum creatinine based estimated GFR (eGFR) will be   calculated using the Chronic Kidney Disease Epidemiology Collaboration   (CKD-EPI) equation.       GFR Estimate If Black   Date Value Ref Range Status   05/19/2021 >90 >60 mL/min/[1.73_m2] Final     Comment:      GFR Calc  Starting 12/18/2018, serum creatinine based estimated GFR (eGFR) will be   calculated using the Chronic Kidney Disease Epidemiology Collaboration   (CKD-EPI) equation.       Lab Results   Component Value Date    CR 0.82 07/11/2022    CR 0.78 05/19/2021     No results found for: MICROALBUMIN    Healthy Eating:  Healthy Eating Assessed Today: Yes  Cultural/Jewish diet restrictions?: No  Meal planning/habits: Smaller portions  How many times a week on average do you eat food made away from home (restaurant/take-out)?: 1 (1x in the past month)  Meals include: Dinner, Evening Snack, Lunch (isn't eating until 1 or 2, supper around 5:50/6)  Breakfast: coffee- meds around 11:30  Lunch: moms meals- diabetes friendly- around 3-4pm  Dinner: protein and veg- later in the evening- but feels sick eating later  Snacks: not snacking as much  Beverages: Diet soda, Water, Milk, Coffee (prabhjot in water, 1%, cream and splenda in coffee)  Has  patient met with a dietitian in the past?: Yes (with gestational diabetes)    Being Active:  Being Active Assessed Today: Yes  Exercise:: Currently not exercising (moving more after being sick, cleaning out dad's house)  Barrier to exercise: Physical limitation (legs hurt, chronic pain)    Monitoring:  Monitoring Assessed Today: Yes  Did patient bring glucose meter to appointment? : Yes  Blood Glucose Meter: Accu-chek (guide me)  Times checking blood sugar at home (number): 2 (forgetting at time)  Times checking blood sugar at home (per): Day  Blood glucose trend: Other (unable to load meter today)    BG per meter-   Average 153  7/14 - 7/20: 112-155  7/7- 7/13: 154-190      Taking Medications:  Diabetes Medication(s)     Incretin Mimetic Agents (GLP-1 Receptor Agonists)       dulaglutide (TRULICITY) 0.75 MG/0.5ML pen    Inject 0.75 mg Subcutaneous every 7 days          Taking Medication Assessed Today: Yes  Current Treatments: Oral Medication, non-insulin injectables (trulicity)  Problems taking diabetes medications regularly?: No  Diabetes medication side effects?: No       Problem Solving:  Problem Solving Assessed Today: Yes  Is the patient at risk for hypoglycemia?: No  Is the patient at risk for DKA?: No  Does patient have severe weather/disaster plan for diabetes management?: Not Needed  Does patient have sick day plan for diabetes management?: Not Needed      Reducing Risks:  Reducing Risks Assessed Today: Yes  Diabetes Risks: Age over 45 years, History of gestational diabetes, Sedentary Lifestyle  CAD Risks: Sedentary lifestyle, Tobacco exposure, Stress, Obesity, Family history, Diabetes Mellitus  Has dilated eye exam at least once a year?: No (forgetting to get it set up)  Sees dentist every 6 months?: No (needs to find a dentist, chipped a tooth yesterday)  Feet checked by healthcare provider in the last year?: No    Healthy Coping:  Healthy Coping Assessed Today: Yes  Emotional response to diabetes:  Ready to learn, Depression (depression due to mom passing)  Informal Support system:: Family (mental health practitioner, [facility], )  Stage of change: ACTION (Actively working towards change)  Support resources: In-person Offerings  Patient Activation Measure Survey Score:  KEN Score (Last Two) 11/28/2018 3/7/2019   KEN Raw Score 27 27   Activation Score 47.4 47.4   KEN Level 2 2       Diabetes knowledge and skills assessment:   Patient is knowledgeable in diabetes management concepts related to: Healthy Eating, Being Active, Monitoring, Taking Medication, Problem Solving, Reducing Risks and Healthy Coping    Patient needs further education on the following diabetes management concepts: Healthy Eating, Being Active, Monitoring, Taking Medication, Problem Solving, Reducing Risks and Healthy Coping    Based on learning assessment above, most appropriate setting for further diabetes education would be: Individual setting.      INTERVENTIONS:    Education provided today on:  AADE Self-Care Behaviors:  Healthy Eating: consistency in amount, composition, and timing of food intake and portion control  Healthy Coping: benefits of making appropriate lifestyle changes    Opportunities for ongoing education and support in diabetes-self management were discussed.    Pt verbalized understanding of concepts discussed and recommendations provided today.       Education Materials Provided:  No new materials provided today      ASSESSMENT:  Pt seen for BG review. She reports she has been sick from the end of June to mid July- gastroenteritis. Has been feeling better for about 1.5 weeks now. Went camping and had a good time, forgot to bring her meter. Doing well with trulicity. Lost about 15lbs since last visit, could be related to illness. Moving more during the summer, no regular activity.       Patient's most recent   Lab Results   Component Value Date    A1C 6.9 06/01/2022    is meeting goal of <7.0    PLAN  See Patient  Instructions for co-developed, patient-stated behavior change goals.  AVS printed and provided to patient today. See Follow-Up section for recommended follow-up.    Follow up on 9/1/22 at 2pm  Time Spent: 30 minutes  Encounter Type: Individual    Any diabetes medication dose changes were made via the CDE Protocol and Collaborative Practice Agreement with the patient's primary care provider. A copy of this encounter was shared with the provider.

## 2022-07-22 ENCOUNTER — TELEPHONE (OUTPATIENT)
Dept: EDUCATION SERVICES | Facility: HOSPITAL | Age: 47
End: 2022-07-22

## 2022-07-22 DIAGNOSIS — E11.65 TYPE 2 DIABETES MELLITUS WITH HYPERGLYCEMIA, WITHOUT LONG-TERM CURRENT USE OF INSULIN (H): ICD-10-CM

## 2022-07-22 DIAGNOSIS — E11.65 TYPE 2 DIABETES MELLITUS WITH HYPERGLYCEMIA, WITHOUT LONG-TERM CURRENT USE OF INSULIN (H): Primary | ICD-10-CM

## 2022-07-22 RX ORDER — DULAGLUTIDE 0.75 MG/.5ML
0.75 INJECTION, SOLUTION SUBCUTANEOUS
Qty: 2 ML | Refills: 3 | Status: ON HOLD | OUTPATIENT
Start: 2022-07-22 | End: 2022-09-29

## 2022-07-22 RX ORDER — BLOOD SUGAR DIAGNOSTIC
STRIP MISCELLANEOUS
Qty: 50 STRIP | Refills: 3 | Status: SHIPPED | OUTPATIENT
Start: 2022-07-22 | End: 2023-02-28

## 2022-07-22 NOTE — TELEPHONE ENCOUNTER
Prescription for test strips sent to [facility] for testy strips. Significant delay in getting them from [facility] due to Medicare audit. We have sent forms/visit notes x3 to Medicare  since June 2022.

## 2022-07-27 DIAGNOSIS — K29.70 GASTRITIS WITHOUT BLEEDING, UNSPECIFIED CHRONICITY, UNSPECIFIED GASTRITIS TYPE: ICD-10-CM

## 2022-07-27 DIAGNOSIS — R10.13 ABDOMINAL PAIN, EPIGASTRIC: ICD-10-CM

## 2022-07-27 DIAGNOSIS — I10 BENIGN ESSENTIAL HYPERTENSION: ICD-10-CM

## 2022-07-27 DIAGNOSIS — E66.01 MORBID OBESITY (H): ICD-10-CM

## 2022-07-27 RX ORDER — HYDROCHLOROTHIAZIDE 25 MG/1
TABLET ORAL
Qty: 90 TABLET | Refills: 0 | Status: SHIPPED | OUTPATIENT
Start: 2022-07-27 | End: 2022-10-27

## 2022-07-27 RX ORDER — OMEPRAZOLE 40 MG/1
CAPSULE, DELAYED RELEASE ORAL
Qty: 30 CAPSULE | Refills: 0 | Status: SHIPPED | OUTPATIENT
Start: 2022-07-27 | End: 2022-08-17

## 2022-07-27 RX ORDER — PHENTERMINE HYDROCHLORIDE 30 MG/1
CAPSULE ORAL
Qty: 30 CAPSULE | Refills: 0 | Status: SHIPPED | OUTPATIENT
Start: 2022-07-27 | End: 2022-08-31

## 2022-07-27 NOTE — TELEPHONE ENCOUNTER
Prilosec       Last Written Prescription Date:  7/11/22  Last Fill Quantity: 30,   # refills: 0  Last Office Visit: 7/11/22  Future Office visit:    Next 5 appointments (look out 90 days)    Aug 01, 2022  8:45 AM  (Arrive by 8:30 AM)  SHORT with SEVEN Pandey  [facility] [place] ([facility] [place] ) [address]  [place] [address]  [phone]

## 2022-07-27 NOTE — TELEPHONE ENCOUNTER
Adipex       Last Written Prescription Date:  6/30/22  Last Fill Quantity: 30,   # refills: 0  Last Office Visit: 7/11/22  Future Office visit:    Next 5 appointments (look out 90 days)    Aug 01, 2022  8:45 AM  (Arrive by 8:30 AM)  SHORT with SEVEN Pandey  [facility] [place] ([facility] [place] ) [address]  [place] [address]  [phone]

## 2022-07-27 NOTE — TELEPHONE ENCOUNTER
Hydrochlorothiazide (Hydrodiuril) 25 mg tablet  Take 1 tablet by mouth once daily   Last Written Prescription Date:  4-  Last Fill Quantity: 90 tablet,   # refills: 0  Last Office Visit: 7-  Future Office visit:    Next 5 appointments (look out 90 days)    Aug 01, 2022  8:45 AM  (Arrive by 8:30 AM)  SHORT with SEVEN Pandey  [facility] [place] ([facility] [place] ) [address]  [place] [address]  [phone]

## 2022-08-01 ENCOUNTER — OFFICE VISIT (OUTPATIENT)
Dept: FAMILY MEDICINE | Facility: OTHER | Age: 47
End: 2022-08-01
Attending: PHYSICIAN ASSISTANT
Payer: COMMERCIAL

## 2022-08-01 VITALS
WEIGHT: 264.8 LBS | HEIGHT: 62 IN | SYSTOLIC BLOOD PRESSURE: 114 MMHG | RESPIRATION RATE: 16 BRPM | HEART RATE: 86 BPM | TEMPERATURE: 98.7 F | DIASTOLIC BLOOD PRESSURE: 80 MMHG | BODY MASS INDEX: 48.73 KG/M2 | OXYGEN SATURATION: 98 %

## 2022-08-01 DIAGNOSIS — N39.45 CONTINUOUS LEAKAGE OF URINE: ICD-10-CM

## 2022-08-01 DIAGNOSIS — E11.65 TYPE 2 DIABETES MELLITUS WITH HYPERGLYCEMIA, WITHOUT LONG-TERM CURRENT USE OF INSULIN (H): ICD-10-CM

## 2022-08-01 DIAGNOSIS — E86.0 DEHYDRATION: Primary | ICD-10-CM

## 2022-08-01 DIAGNOSIS — E87.6 HYPOKALEMIA: ICD-10-CM

## 2022-08-01 DIAGNOSIS — E11.9 TYPE 2 DIABETES MELLITUS WITHOUT COMPLICATION, WITHOUT LONG-TERM CURRENT USE OF INSULIN (H): ICD-10-CM

## 2022-08-01 LAB
ALBUMIN UR-MCNC: NEGATIVE MG/DL
AMORPH CRY #/AREA URNS HPF: ABNORMAL /HPF
ANION GAP SERPL CALCULATED.3IONS-SCNC: 5 MMOL/L (ref 3–14)
APPEARANCE UR: ABNORMAL
BACTERIA #/AREA URNS HPF: ABNORMAL /HPF
BASOPHILS # BLD AUTO: 0.1 10E3/UL (ref 0–0.2)
BASOPHILS NFR BLD AUTO: 1 %
BILIRUB UR QL STRIP: NEGATIVE
BUN SERPL-MCNC: 11 MG/DL (ref 7–30)
CALCIUM SERPL-MCNC: 9.2 MG/DL (ref 8.5–10.1)
CHLORIDE BLD-SCNC: 105 MMOL/L (ref 94–109)
CO2 SERPL-SCNC: 27 MMOL/L (ref 20–32)
COLOR UR AUTO: YELLOW
CREAT SERPL-MCNC: 0.68 MG/DL (ref 0.52–1.04)
EOSINOPHIL # BLD AUTO: 0.4 10E3/UL (ref 0–0.7)
EOSINOPHIL NFR BLD AUTO: 3 %
ERYTHROCYTE [DISTWIDTH] IN BLOOD BY AUTOMATED COUNT: 15.5 % (ref 10–15)
EST. AVERAGE GLUCOSE BLD GHB EST-MCNC: 143 MG/DL
GFR SERPL CREATININE-BSD FRML MDRD: >90 ML/MIN/1.73M2
GLUCOSE BLD-MCNC: 144 MG/DL (ref 70–99)
GLUCOSE UR STRIP-MCNC: NEGATIVE MG/DL
HBA1C MFR BLD: 6.6 % (ref 0–5.6)
HCT VFR BLD AUTO: 42.6 % (ref 35–47)
HGB BLD-MCNC: 14.4 G/DL (ref 11.7–15.7)
HGB UR QL STRIP: NEGATIVE
IMM GRANULOCYTES # BLD: 0.2 10E3/UL
IMM GRANULOCYTES NFR BLD: 1 %
KETONES UR STRIP-MCNC: NEGATIVE MG/DL
LEUKOCYTE ESTERASE UR QL STRIP: NEGATIVE
LYMPHOCYTES # BLD AUTO: 2.6 10E3/UL (ref 0.8–5.3)
LYMPHOCYTES NFR BLD AUTO: 19 %
MCH RBC QN AUTO: 29.5 PG (ref 26.5–33)
MCHC RBC AUTO-ENTMCNC: 33.8 G/DL (ref 31.5–36.5)
MCV RBC AUTO: 87 FL (ref 78–100)
MONOCYTES # BLD AUTO: 0.8 10E3/UL (ref 0–1.3)
MONOCYTES NFR BLD AUTO: 6 %
MUCOUS THREADS #/AREA URNS LPF: PRESENT /LPF
NEUTROPHILS # BLD AUTO: 9.6 10E3/UL (ref 1.6–8.3)
NEUTROPHILS NFR BLD AUTO: 70 %
NITRATE UR QL: NEGATIVE
NRBC # BLD AUTO: 0 10E3/UL
NRBC BLD AUTO-RTO: 0 /100
PH UR STRIP: 7.5 [PH] (ref 4.7–8)
PLATELET # BLD AUTO: 308 10E3/UL (ref 150–450)
POTASSIUM BLD-SCNC: 2.8 MMOL/L (ref 3.4–5.3)
RBC # BLD AUTO: 4.88 10E6/UL (ref 3.8–5.2)
RBC URINE: 0 /HPF
SODIUM SERPL-SCNC: 137 MMOL/L (ref 133–144)
SP GR UR STRIP: 1.01 (ref 1–1.03)
SQUAMOUS EPITHELIAL: 0 /HPF
UROBILINOGEN UR STRIP-MCNC: NORMAL MG/DL
WBC # BLD AUTO: 13.6 10E3/UL (ref 4–11)
WBC URINE: 3 /HPF

## 2022-08-01 PROCEDURE — 83036 HEMOGLOBIN GLYCOSYLATED A1C: CPT | Mod: ZL | Performed by: PHYSICIAN ASSISTANT

## 2022-08-01 PROCEDURE — 80048 BASIC METABOLIC PNL TOTAL CA: CPT | Mod: ZL | Performed by: PHYSICIAN ASSISTANT

## 2022-08-01 PROCEDURE — 99214 OFFICE O/P EST MOD 30 MIN: CPT | Performed by: PHYSICIAN ASSISTANT

## 2022-08-01 PROCEDURE — 85025 COMPLETE CBC W/AUTO DIFF WBC: CPT | Mod: ZL | Performed by: PHYSICIAN ASSISTANT

## 2022-08-01 PROCEDURE — G0463 HOSPITAL OUTPT CLINIC VISIT: HCPCS | Performed by: PHYSICIAN ASSISTANT

## 2022-08-01 PROCEDURE — 81001 URINALYSIS AUTO W/SCOPE: CPT | Mod: ZL | Performed by: PHYSICIAN ASSISTANT

## 2022-08-01 PROCEDURE — 36415 COLL VENOUS BLD VENIPUNCTURE: CPT | Mod: ZL | Performed by: PHYSICIAN ASSISTANT

## 2022-08-01 RX ORDER — OXYBUTYNIN CHLORIDE 5 MG/1
5 TABLET, EXTENDED RELEASE ORAL DAILY
Qty: 30 TABLET | Refills: 1 | Status: SHIPPED | OUTPATIENT
Start: 2022-08-01 | End: 2022-10-03

## 2022-08-01 RX ORDER — DULAGLUTIDE 1.5 MG/.5ML
1.5 INJECTION, SOLUTION SUBCUTANEOUS
Qty: 2 ML | Refills: 3 | Status: SHIPPED | OUTPATIENT
Start: 2022-08-01 | End: 2022-11-23

## 2022-08-01 ASSESSMENT — PAIN SCALES - GENERAL: PAINLEVEL: NO PAIN (0)

## 2022-08-01 ASSESSMENT — ANXIETY QUESTIONNAIRES
GAD7 TOTAL SCORE: 8
4. TROUBLE RELAXING: SEVERAL DAYS
2. NOT BEING ABLE TO STOP OR CONTROL WORRYING: SEVERAL DAYS
1. FEELING NERVOUS, ANXIOUS, OR ON EDGE: SEVERAL DAYS
5. BEING SO RESTLESS THAT IT IS HARD TO SIT STILL: SEVERAL DAYS
GAD7 TOTAL SCORE: 8
7. FEELING AFRAID AS IF SOMETHING AWFUL MIGHT HAPPEN: SEVERAL DAYS
IF YOU CHECKED OFF ANY PROBLEMS ON THIS QUESTIONNAIRE, HOW DIFFICULT HAVE THESE PROBLEMS MADE IT FOR YOU TO DO YOUR WORK, TAKE CARE OF THINGS AT HOME, OR GET ALONG WITH OTHER PEOPLE: SOMEWHAT DIFFICULT
6. BECOMING EASILY ANNOYED OR IRRITABLE: MORE THAN HALF THE DAYS
3. WORRYING TOO MUCH ABOUT DIFFERENT THINGS: SEVERAL DAYS

## 2022-08-01 ASSESSMENT — PATIENT HEALTH QUESTIONNAIRE - PHQ9: SUM OF ALL RESPONSES TO PHQ QUESTIONS 1-9: 0

## 2022-08-01 NOTE — PROGRESS NOTES
Assessment & Plan     Dehydration  She has had low potassium we are going to check and see how things are going.   - Basic metabolic panel; Future  - CBC with platelets and differential; Future    Type 2 diabetes mellitus without complication, without long-term current use of insulin (H)  She is going to be given recheck on glucose.  Urinary issues making derek not her blood sugar.  Also her alc last time was improving but weight is going back up.  He has to get her eye exam and we will see her back in 3 months as we bumped her dose of Trulicity to 1.5 mg  - Hemoglobin A1c; Future    Review of external notes as documented elsewhere in note  Ordering of each unique test  Prescription drug management  10 minutes spent on the date of the encounter doing chart review, history and exam, documentation and further activities per the note       Nicotine/Tobacco Cessation:  She reports that she has been smoking cigarettes. She started smoking about 33 years ago. She has a 16.00 pack-year smoking history. She has never used smokeless tobacco.  Nicotine/Tobacco Cessation Plan:   Information offered: Patient not interested at this time      See Patient Instructions    No follow-ups on file.    SEVEN Bryant  [facility] [place] Leyva is a 46 year old accompanied by her Self, presenting for the following health issues:  No chief complaint on file.      HPI     Concern - Hypokalemia  Onset: 7/11/22  Description: follow up  Intensity: moderate  Progression of Symptoms:  NA  Accompanying Signs & Symptoms: Edema  Previous history of similar problem: Yes  Precipitating factors:        Worsened by: NA  Alleviating factors:        Improved by: NA  Therapies tried and outcome: Potassium 20 MEQ    Diabetes Follow-up    How often are you checking your blood sugar? One time daily  What time of day are you checking your blood sugars (select all that apply)?  Before meals  Have you had any blood sugars  "above 200?  No  Have you had any blood sugars below 70?  No    What symptoms do you notice when your blood sugar is low?  None    What concerns do you have today about your diabetes? None     Do you have any of these symptoms? (Select all that apply)  Numbness in feet and Weight gain    Have you had a diabetic eye exam in the last 12 months? Yes- Date of last eye exam: ulises sheikh ,  Location: [place]         BP Readings from Last 2 Encounters:   08/01/22 114/80   07/20/22 110/86     Hemoglobin A1C POCT (%)   Date Value   06/01/2022 6.9 (A)   02/16/2022 7.6 (A)     LDL Cholesterol Calculated (mg/dL)   Date Value   01/06/2022 104 (H)   01/29/2020 155 (H)   11/28/2018 125 (H)           How many servings of fruits and vegetables do you eat daily?  2-3    On average, how many sweetened beverages do you drink each day (Examples: soda, juice, sweet tea, etc.  Do NOT count diet or artificially sweetened beverages)?   3    How many days per week do you exercise enough to make your heart beat faster? 6    How many minutes a day do you exercise enough to make your heart beat faster? 20 - 29    How many days per week do you miss taking your medication? 0      Review of Systems   CONSTITUTIONAL:has gained 5 # and legs are swollen   INTEGUMENTARY/SKIN: NEGATIVE for worrisome rashes, moles or lesions  EYES: NEGATIVE for vision changes or irritation  ENT/MOUTH: NEGATIVE for ear, mouth and throat problems  RESP:NEGATIVE for significant cough or SOB  CV: NEGATIVE for chest pain, palpitations or peripheral edema  MUSCULOSKELETAL: NEGATIVE for significant arthralgias or myalgia  NEURO: NEGATIVE for weakness, dizziness or paresthesias  PSYCHIATRIC: NEGATIVE for changes in mood or affect      Objective    /80 (BP Location: Left arm, Patient Position: Sitting, Cuff Size: Adult Large)   Pulse 86   Temp 98.7  F (37.1  C) (Tympanic)   Resp 16   Ht 1.578 m (5' 2.12\")   Wt 120.1 kg (264 lb 12.8 oz)   SpO2 98%   BMI 48.25 kg/m  "   Body mass index is 48.25 kg/m .  Physical Exam   GENERAL: healthy, alert and no distress  EYES: Eyes grossly normal to inspection, PERRL and conjunctivae and sclerae normal  HENT: ear canals and TM's normal, nose and mouth without ulcers or lesions  NECK: no adenopathy, no asymmetry, masses, or scars and thyroid normal to palpation  RESP: lungs clear to auscultation - no rales, rhonchi or wheezes  CV: regular rate and rhythm, normal S1 S2, no S3 or S4, no murmur, click or rub, no peripheral edema and peripheral pulses strong  ABDOMEN: soft, nontender, no hepatosplenomegaly, no masses and bowel sounds normal  MS: no gross musculoskeletal defects noted, no edema  SKIN: no suspicious lesions or rashes  PSYCH: mentation appears normal, affect normal/bright  Diabetic foot exam: feet are very dirty was camping. Callous on both sides of her feet.  Pulses are good. No pain in feet. Sensation is limited on her lateal side. On bottom is good.     Lab on 07/21/2022   Component Date Value Ref Range Status     Potassium 07/21/2022 2.9 (A) 3.4 - 5.3 mmol/L Final                   .  ..

## 2022-08-02 DIAGNOSIS — G89.4 CHRONIC PAIN SYNDROME: ICD-10-CM

## 2022-08-03 RX ORDER — BACLOFEN 10 MG/1
TABLET ORAL
Qty: 90 TABLET | Refills: 0 | Status: SHIPPED | OUTPATIENT
Start: 2022-08-03 | End: 2022-08-31

## 2022-08-03 NOTE — TELEPHONE ENCOUNTER
baclofen      Last Written Prescription Date:  6/30/22  Last Fill Quantity: 90,   # refills: 0  Last Office Visit: 8/1/22  Future Office visit:

## 2022-08-04 ENCOUNTER — TELEPHONE (OUTPATIENT)
Dept: FAMILY MEDICINE | Facility: OTHER | Age: 47
End: 2022-08-04

## 2022-08-04 DIAGNOSIS — E87.6 HYPOKALEMIA: ICD-10-CM

## 2022-08-04 DIAGNOSIS — R60.0 BILATERAL EDEMA OF LOWER EXTREMITY: ICD-10-CM

## 2022-08-04 RX ORDER — POTASSIUM CHLORIDE 750 MG/1
TABLET, EXTENDED RELEASE ORAL
Qty: 30 TABLET | Refills: 3 | Status: SHIPPED | OUTPATIENT
Start: 2022-08-04 | End: 2022-09-26

## 2022-08-04 RX ORDER — POTASSIUM CHLORIDE 1500 MG/1
TABLET, EXTENDED RELEASE ORAL
Qty: 90 TABLET | Refills: 3 | Status: SHIPPED | OUTPATIENT
Start: 2022-08-04 | End: 2022-09-26

## 2022-08-04 NOTE — CONFIDENTIAL NOTE
LVM for return call. How much potassium is patient taking? Was supposed to double.  Mary Bond LPN

## 2022-08-04 NOTE — CONFIDENTIAL NOTE
Patient notified to take 1 20 meq and 1 10 meq from now on. Recheck BMP lab scheduled 8/15. Refills and lab pended.  Mary Bond LPN

## 2022-08-04 NOTE — TELEPHONE ENCOUNTER
Please verify potassium dose and how long patient should take that dose and notify patient. There is a note in her lab result.

## 2022-08-08 ENCOUNTER — TELEPHONE (OUTPATIENT)
Dept: SURGERY | Facility: OTHER | Age: 47
End: 2022-08-08

## 2022-08-08 NOTE — TELEPHONE ENCOUNTER
Referral received for colonoscopy for special screening for malignant neoplasms, colon.   This patient was NOT approved by [employer] surgery education RN for meet and greet colonoscopy without preop appointment.   1st attempt to call patient to schedule. No answer.

## 2022-08-08 NOTE — TELEPHONE ENCOUNTER
Diagnoses: Hypothyroidism   DATE RECEIVED: 10.18.22   NOTES (FOR ALL VISITS) STATUS DETAILS   OFFICE NOTES from referring provider Internal 6.13.22, 3.9.22, 1.18.21, 12.4.20, 12.13.19 DARRELL Hathaway Shipman   OFFICE NOTES from other specialist     ED NOTES     OPERATIVE REPORT  (thyroid, pituitary, adrenal, parathyroid)     MEDICATION LIST Internal    IMAGING      DEXASCAN     MRI (BRAIN)     XR (Chest)     CT (HEAD/NECK/CHEST/ABDOMEN) Internal 12.14.20 ab pelvis   NUCLEAR      ULTRASOUND (HEAD/NECK)     LABS     DIABETES: HBGA1C, CREATININE, FASTING LIPIDS, MICROALBUMIN URINE, POTASSIUM, TSH, T4    THYROID: TSH, T4, CBC, THYRODLONULIN, TOTAL T3, FREE T4, CALCITONIN, CEA Internal

## 2022-08-09 ENCOUNTER — TELEPHONE (OUTPATIENT)
Dept: SURGERY | Facility: OTHER | Age: 47
End: 2022-08-09

## 2022-08-09 ENCOUNTER — PREP FOR PROCEDURE (OUTPATIENT)
Dept: SURGERY | Facility: OTHER | Age: 47
End: 2022-08-09

## 2022-08-09 DIAGNOSIS — Z12.11 SPECIAL SCREENING FOR MALIGNANT NEOPLASMS, COLON: Primary | ICD-10-CM

## 2022-08-09 NOTE — TELEPHONE ENCOUNTER
Patient has been scheduled for surgery on 9/29/22 with Dr. Zhu. Patient needs pre op within 30 days prior to procedure. If no appointments are available please contact patient's PCP and myself.  Thank you!  Lesley Palacios LPN

## 2022-08-09 NOTE — TELEPHONE ENCOUNTER
Patient returned call to schedule colonoscopy.  This patient was NOT approved by [employer], surgery education RN for meet and greet colonoscopy without preop appointment.   Patient scheduled for colonoscopy on 9/29/22 with Dr. Zhu at [facility] with golytely bowel prep.   Instructions given via phone and sent to patient via mail.   COVID-19 test scheduled 9/27/22 at 11:00.  Preop: TBD forwarded to family practice for scheduling    Please sign RX for colon prep in this encounter.

## 2022-08-10 RX ORDER — BISACODYL 5 MG/1
TABLET, DELAYED RELEASE ORAL
Qty: 4 TABLET | Refills: 0 | Status: SHIPPED | OUTPATIENT
Start: 2022-08-10 | End: 2022-11-16

## 2022-08-11 DIAGNOSIS — R60.0 BILATERAL LEG EDEMA: ICD-10-CM

## 2022-08-11 DIAGNOSIS — E11.9 TYPE 2 DIABETES MELLITUS WITHOUT COMPLICATION, WITHOUT LONG-TERM CURRENT USE OF INSULIN (H): ICD-10-CM

## 2022-08-12 ENCOUNTER — TELEPHONE (OUTPATIENT)
Dept: FAMILY MEDICINE | Facility: OTHER | Age: 47
End: 2022-08-12

## 2022-08-12 RX ORDER — ROSUVASTATIN CALCIUM 5 MG/1
TABLET, COATED ORAL
Qty: 90 TABLET | Refills: 3 | Status: SHIPPED | OUTPATIENT
Start: 2022-08-12 | End: 2023-08-29

## 2022-08-12 RX ORDER — FUROSEMIDE 20 MG
TABLET ORAL
Qty: 60 TABLET | Refills: 0 | Status: SHIPPED | OUTPATIENT
Start: 2022-08-12 | End: 2022-09-08

## 2022-08-12 NOTE — TELEPHONE ENCOUNTER
Received a PA from [employer] for Topiramate 50MG tablets. Submitted on CMM. Waiting for a response.

## 2022-08-12 NOTE — TELEPHONE ENCOUNTER
Lasix       Last Written Prescription Date:  7/7/22  Last Fill Quantity: 60,   # refills: 0  Last Office Visit: 8/1/22  Future Office visit:    Next 5 appointments (look out 90 days)    Sep 26, 2022  9:15 AM  (Arrive by 9:00 AM)  Pre-Op physical with SEVEN Pandey  [facility] [place] ([facility] [place] ) [address]  [place] [address]  [phone]   Sep 27, 2022 11:00 AM  (Arrive by 10:45 AM)  Nurse Only with HC COLLECTION  [facility] [place] ([facility] [place] ) [address]  [place] [address]  [phone]   Nov 02, 2022  2:00 PM  (Arrive by 1:45 PM)  PHYSICAL with SEVEN Pandey  [facility] [place] ([facility] [place] ) [address]  [place] [address]  [phone]

## 2022-08-15 ENCOUNTER — LAB (OUTPATIENT)
Dept: LAB | Facility: OTHER | Age: 47
End: 2022-08-15
Payer: COMMERCIAL

## 2022-08-15 DIAGNOSIS — E87.6 HYPOKALEMIA: ICD-10-CM

## 2022-08-15 LAB
ANION GAP SERPL CALCULATED.3IONS-SCNC: 5 MMOL/L (ref 3–14)
BUN SERPL-MCNC: 10 MG/DL (ref 7–30)
CALCIUM SERPL-MCNC: 9.8 MG/DL (ref 8.5–10.1)
CHLORIDE BLD-SCNC: 104 MMOL/L (ref 94–109)
CO2 SERPL-SCNC: 26 MMOL/L (ref 20–32)
CREAT SERPL-MCNC: 0.76 MG/DL (ref 0.52–1.04)
GFR SERPL CREATININE-BSD FRML MDRD: >90 ML/MIN/1.73M2
GLUCOSE BLD-MCNC: 135 MG/DL (ref 70–99)
POTASSIUM BLD-SCNC: 3.3 MMOL/L (ref 3.4–5.3)
SODIUM SERPL-SCNC: 135 MMOL/L (ref 133–144)

## 2022-08-15 PROCEDURE — 36415 COLL VENOUS BLD VENIPUNCTURE: CPT | Mod: ZL

## 2022-08-15 PROCEDURE — 82310 ASSAY OF CALCIUM: CPT | Mod: ZL

## 2022-08-15 NOTE — TELEPHONE ENCOUNTER
Received a DENIAL from [facility] for Topiramate 50mg tablets.     Forms scanned to [facility].

## 2022-08-17 DIAGNOSIS — K29.70 GASTRITIS WITHOUT BLEEDING, UNSPECIFIED CHRONICITY, UNSPECIFIED GASTRITIS TYPE: ICD-10-CM

## 2022-08-17 DIAGNOSIS — R10.13 ABDOMINAL PAIN, EPIGASTRIC: ICD-10-CM

## 2022-08-17 RX ORDER — OMEPRAZOLE 40 MG/1
CAPSULE, DELAYED RELEASE ORAL
Qty: 30 CAPSULE | Refills: 0 | Status: SHIPPED | OUTPATIENT
Start: 2022-08-17 | End: 2022-10-06

## 2022-08-17 NOTE — TELEPHONE ENCOUNTER
Prilosec      Last Written Prescription Date:  07/27/2022  Last Fill Quantity: 30,   # refills: 0  Last Office Visit: 08/01/2022

## 2022-08-29 DIAGNOSIS — E03.9 ACQUIRED HYPOTHYROIDISM: ICD-10-CM

## 2022-08-29 DIAGNOSIS — G89.4 CHRONIC PAIN SYNDROME: ICD-10-CM

## 2022-08-29 DIAGNOSIS — E66.01 MORBID OBESITY (H): ICD-10-CM

## 2022-08-30 ENCOUNTER — MEDICAL CORRESPONDENCE (OUTPATIENT)
Dept: HEALTH INFORMATION MANAGEMENT | Facility: HOSPITAL | Age: 47
End: 2022-08-30

## 2022-08-30 ENCOUNTER — TELEPHONE (OUTPATIENT)
Dept: FAMILY MEDICINE | Facility: OTHER | Age: 47
End: 2022-08-30

## 2022-08-30 NOTE — TELEPHONE ENCOUNTER
Sandra with [facility] [place] calling for verbal orders to continue skilled nursing for nine weeks. Verbal orders given per protocol.

## 2022-08-31 RX ORDER — PHENTERMINE HYDROCHLORIDE 30 MG/1
CAPSULE ORAL
Qty: 30 CAPSULE | Refills: 0 | Status: SHIPPED | OUTPATIENT
Start: 2022-08-31 | End: 2022-10-04

## 2022-08-31 RX ORDER — BACLOFEN 10 MG/1
TABLET ORAL
Qty: 90 TABLET | Refills: 0 | Status: SHIPPED | OUTPATIENT
Start: 2022-08-31 | End: 2022-10-06

## 2022-08-31 RX ORDER — LEVOTHYROXINE SODIUM 175 UG/1
TABLET ORAL
Qty: 90 TABLET | Refills: 0 | Status: SHIPPED | OUTPATIENT
Start: 2022-08-31 | End: 2023-01-24

## 2022-08-31 NOTE — TELEPHONE ENCOUNTER
levothyroxine (SYNTHROID/LEVOTHROID) 175 MCG tablet        Last Written Prescription Date:  6/10/22  Last Fill Quantity: 90,   # refills: 0  Last Office Visit: 8/1/22  Future Office visit:    Next 5 appointments (look out 90 days)    Sep 26, 2022  9:15 AM  (Arrive by 9:00 AM)  Pre-Op physical with SEVEN Pandey  [facility] [place] ([facility] [place] ) [address]  [place] [address]  [phone]   Sep 27, 2022 11:00 AM  (Arrive by 10:45 AM)  Nurse Only with HC COLLECTION  [facility] [place] ([facility] [place] ) [address]  [place] [address]  [phone]   Nov 02, 2022  2:00 PM  (Arrive by 1:45 PM)  PHYSICAL with SEVEN Pandey  [facility] [place] ([facility] [place] ) [address]  [place] [address]  [phone]           Routing refill request to provider for review/approval because:     Normal TSH on file in past 12 months    baclofen (LIORESAL) 10 MG tablet        Last Written Prescription Date:  8/3/22  Last Fill Quantity: 90,   # refills: 0  Last Office Visit: 8/1/22  Future Office visit:    Next 5 appointments (look out 90 days)    Sep 26, 2022  9:15 AM  (Arrive by 9:00 AM)  Pre-Op physical with SEVEN Pandey  [facility] [place] ([facility] [place] ) [address]  [place] [address]  [phone]   Sep 27, 2022 11:00 AM  (Arrive by 10:45 AM)  Nurse Only with HC COLLECTION  [facility] [place] ([facility] [place] ) [address]  [place] [address]  [phone]   Nov 02, 2022  2:00 PM  (Arrive by 1:45 PM)  PHYSICAL with SEVEN Pandey  [facility] [place] ([facility] [place] ) [address]  [place] [address]  [phone]           Routing refill request to provider for review/approval because:  Drug not on the [facility], [facility] or [facility] refill protocol or controlled substance    phentermine  (ADIPEX-P) 30 MG capsule        Last Written Prescription Date:  7/27/22  Last Fill Quantity: 30,   # refills: 0  Last Office Visit: 8/1/22  Future Office visit:    Next 5 appointments (look out 90 days)    Sep 26, 2022  9:15 AM  (Arrive by 9:00 AM)  Pre-Op physical with SEVEN Pandey  [facility] [place] ([facility] [place] ) [address]  [place] [address]  [phone]   Sep 27, 2022 11:00 AM  (Arrive by 10:45 AM)  Nurse Only with HC COLLECTION  [facility] [place] ([facility] [place] ) [address]  [place] [address]  [phone]   Nov 02, 2022  2:00 PM  (Arrive by 1:45 PM)  PHYSICAL with SEVEN Pandey  [facility] [place] ([facility] [place] ) [address]  [place] [address]  [phone]           Routing refill request to provider for review/approval because:  Drug not on the [facility], [facility] or [facility] refill protocol or controlled substance

## 2022-09-01 ENCOUNTER — TRANSFERRED RECORDS (OUTPATIENT)
Dept: MULTI SPECIALTY CLINIC | Facility: CLINIC | Age: 47
End: 2022-09-01

## 2022-09-01 ENCOUNTER — HOSPITAL ENCOUNTER (OUTPATIENT)
Dept: EDUCATION SERVICES | Facility: HOSPITAL | Age: 47
Discharge: HOME | End: 2022-09-01
Attending: NURSE PRACTITIONER | Admitting: NURSE PRACTITIONER
Payer: COMMERCIAL

## 2022-09-01 DIAGNOSIS — E11.65 TYPE 2 DIABETES MELLITUS WITH HYPERGLYCEMIA, WITHOUT LONG-TERM CURRENT USE OF INSULIN (H): Primary | ICD-10-CM

## 2022-09-01 LAB — RETINOPATHY: NORMAL

## 2022-09-01 PROCEDURE — G0108 DIAB MANAGE TRN  PER INDIV: HCPCS | Performed by: DIETITIAN, REGISTERED

## 2022-09-01 ASSESSMENT — PAIN SCALES - GENERAL: PAINLEVEL: EXTREME PAIN (8)

## 2022-09-01 NOTE — PROGRESS NOTES
Diabetes Self-Management Education & Support    Presents for: Follow-up (BG review)    CDE VISIT MODE: In Person    Assessment Type:   ASSESSMENT:  Pt seen for BG review. Her biggest concern is trying to test more. Interested in seeing if new insurance can either let her have more test strips or a CGM. She has periods of feeling low- shaky, light headed, nauseous, irritable. Often does not test since previous insurance was only 1 test a day. When she has tested it, BG was 96, 108. Pt is maintaining eating habits. Less of an appetite since started taking Trulicity. See Yohana's note about CGM.    No new BG- difficulty with meter download.    Patient's most recent   Lab Results   Component Value Date    A1C 6.6 08/01/2022    A1C 6.9 06/01/2022     is meeting goal of <7.0    Diabetes knowledge and skills assessment:   Patient is knowledgeable in diabetes management concepts related to: Healthy Eating, Being Active, Monitoring, Taking Medication, Problem Solving, Reducing Risks and Healthy Coping    Continue education with the following diabetes management concepts: Monitoring, Taking Medication and Problem Solving    Based on learning assessment above, most appropriate setting for further diabetes education would be: Individual setting.      PLAN    Topics to cover at upcoming visits: Monitoring and Taking Medication    Follow-up: When CGM comes in    See Care Plan for co-developed, patient-state behavior change goals.  AVS provided for patient today.    Education Materials Provided:  No new materials provided today      SUBJECTIVE/OBJECTIVE:  Presents for: Follow-up (BG review)  Accompanied by: Self  Diabetes education in the past 24mo: No  Focus of Visit: Patient Unsure, Diabetes Pathophysiology, Healthy Eating, Healthy Coping  Diabetes type: Type 2  Date of diagnosis: 11/4/21  Disease course: Other  How confident are you filling out medical forms by yourself:: Quite a bit  Diabetes management related  "comments/concerns: Affording supplies  Transportation concerns: Yes  Difficulty affording diabetes medication?: No  Difficulty affording diabetes testing supplies?: Sometimes (possibly)  Other concerns:: Glasses, Physical impairment  Cultural Influences/Ethnic Background:  Not  or       Diabetes Symptoms & Complications:  Fatigue: Sometimes (improved)  Neuropathy: Sometimes (hand, feet, legs)  Polydipsia: Yes (some meds might be contributing)  Polyphagia: No (not much of an appetite)  Polyuria: Sometimes (on a diuretic)  Visual change: No  Slow healing wounds: Sometimes (a little bit)  Other: No  Symptom course: Improving  Weight trend: Stable  Complications assessed today?: No    Patient Problem List and Family Medical History reviewed for relevant medical history, current medical status, and diabetes risk factors.    Vitals:  /86 (BP Location: Left arm, Patient Position: Sitting, Cuff Size: Adult Large)   Pulse 93   SpO2 99%   Estimated body mass index is 48.25 kg/m  as calculated from the following:    Height as of 8/1/22: 1.578 m (5' 2.12\").    Weight as of 8/1/22: 120.1 kg (264 lb 12.8 oz).   Last 3 BP:   BP Readings from Last 3 Encounters:   09/01/22 104/86   08/01/22 114/80   07/20/22 110/86       History   Smoking Status     Current Every Day Smoker     Packs/day: 0.50     Years: 32.00     Types: Cigarettes     Start date: 1/1/1989   Smokeless Tobacco     Never Used     Comment: pt declines       Labs:  Lab Results   Component Value Date    A1C 6.6 08/01/2022    A1C 6.9 06/01/2022     Lab Results   Component Value Date     08/15/2022     05/19/2021     Lab Results   Component Value Date     01/06/2022     01/29/2020     HDL Cholesterol   Date Value Ref Range Status   01/29/2020 87 >49 mg/dL Final     Direct Measure HDL   Date Value Ref Range Status   01/06/2022 65 >=50 mg/dL Final   ]  GFR Estimate   Date Value Ref Range Status   08/15/2022 >90 >60 " mL/min/1.73m2 Final     Comment:     Effective December 21, 2021 eGFRcr in adults is calculated using the 2021 CKD-EPI creatinine equation which includes age and gender (Samy bryant al., [place], DOI: 10.1056/IUVNgm3295018)   05/19/2021 >90 >60 mL/min/[1.73_m2] Final     Comment:     Non  GFR Calc  Starting 12/18/2018, serum creatinine based estimated GFR (eGFR) will be   calculated using the Chronic Kidney Disease Epidemiology Collaboration   (CKD-EPI) equation.       GFR Estimate If Black   Date Value Ref Range Status   05/19/2021 >90 >60 mL/min/[1.73_m2] Final     Comment:      GFR Calc  Starting 12/18/2018, serum creatinine based estimated GFR (eGFR) will be   calculated using the Chronic Kidney Disease Epidemiology Collaboration   (CKD-EPI) equation.       Lab Results   Component Value Date    CR 0.76 08/15/2022    CR 0.78 05/19/2021     No results found for: MICROALBUMIN    Healthy Eating:  Healthy Eating Assessed Today: Yes  Cultural/Religion diet restrictions?: No  Meal planning/habits: Smaller portions  How many times a week on average do you eat food made away from home (restaurant/take-out)?: 1 (1x in the past month)  Meals include: Dinner, Evening Snack, Lunch (isn't eating until 1 or 2, supper around 5:50/6)  Breakfast: coffee- meds around 11:30  Lunch: 1:30/2pm moms meals- diabetes friendly  Dinner: protein and veg- later in the evening  Snacks: not as much, tortilla with pb with milk  Beverages: Water, Milk, Coffee (prabhjot in water, 1%, cream and splenda in coffee)  Has patient met with a dietitian in the past?: Yes (with gestational diabetes)    Being Active:  Being Active Assessed Today: Yes  Exercise:: Currently not exercising (house chores, riding lawnmower)  Barrier to exercise: Physical limitation (legs hurt, chronic pain)    Monitoring:  Monitoring Assessed Today: Yes  Did patient bring glucose meter to appointment? : Yes  Blood Glucose Meter: Accu-chek (guide me)  Times  checking blood sugar at home (number): 2 (forgetting more often)  Times checking blood sugar at home (per): Day  Blood glucose trend: Other (unable to load meter today)        Taking Medications:  Diabetes Medication(s)     Incretin Mimetic Agents (GLP-1 Receptor Agonists)       dulaglutide (TRULICITY) 0.75 MG/0.5ML pen    Inject 0.75 mg Subcutaneous every 7 days     dulaglutide (TRULICITY) 1.5 MG/0.5ML pen    Inject 1.5 mg Subcutaneous every 7 days          Taking Medication Assessed Today: Yes  Current Treatments: Non-insulin Injectables (trulicity 1.5)  Problems taking diabetes medications regularly?: No  Diabetes medication side effects?: No (feels sick with eating too many carbs, hot and flushed, careful with sun and alcohol.)    Problem Solving:  Problem Solving Assessed Today: Yes  Is the patient at risk for hypoglycemia?: No  Is the patient at risk for DKA?: No  Does patient have severe weather/disaster plan for diabetes management?: Not Needed  Does patient have sick day plan for diabetes management?: Not Needed              Reducing Risks:  Reducing Risks Assessed Today: Yes  Diabetes Risks: Age over 45 years, History of gestational diabetes, Sedentary Lifestyle  CAD Risks: Sedentary lifestyle, Tobacco exposure, Stress, Obesity, Family history, Diabetes Mellitus  Has dilated eye exam at least once a year?: No (forgetting to get it set up)  Sees dentist every 6 months?: No (needs to find a dentist, chipped a tooth yesterday)  Feet checked by healthcare provider in the last year?: No    Healthy Coping:  Healthy Coping Assessed Today: Yes  Emotional response to diabetes: Ready to learn, Depression (depression due to mom passing)  Informal Support system:: Family (mental health practitioner, [facility], )  Stage of change: ACTION (Actively working towards change)  Support resources: In-person Offerings  Patient Activation Measure Survey Score:  KEN Score (Last Two) 11/28/2018 3/7/2019   KEN Raw Score 27  27   Activation Score 47.4 47.4   KEN Level 2 2         Care Plan and Education Provided:  Care Plan: Diabetes   Updates made by Taisha Corral RD since 9/2/2022 12:00 AM      Problem: Diabetes Self-Management Education Needed to Optimize Self-Care Behaviors       Goal: Being Active - get regular physical activity, working up to at least 150 minutes per week       Task: Provide education on relationship of activity to glucose and precautions to take if at risk for low glucose Completed 9/2/2022   Responsible User: Taisha Corral RD      Goal: Problem Solving - know how to prevent and manage short-term diabetes complications       Task: Provide education on low blood glucose - causes, signs/symptoms, prevention, treatment, carrying a carbohydrate source at all times, and medical identification Completed 9/2/2022   Responsible User: Taisha Corral RD      Goal: Reducing Risks - know how to prevent and treat long-term diabetes complications       Task: Provide education on Hemoglobin A1c - goals and relationship to blood glucose levels Completed 9/2/2022   Responsible User: Taisha Corral RD            Time Spent: 30 minutes  Encounter Type: Individual    Any diabetes medication dose changes were made via the CDE Protocol per the patient's primary care provider. A copy of this encounter was shared with the provider.

## 2022-09-01 NOTE — LETTER
9/1/2022        RE: Autumn BATISTA Hnatko  [address] [facility] [address]        Met with Autumn while she is here today for education. Please see LORETO Alfredo's note for education provided.    Autumn has new insurance and is wondering if she might be able to get a Wendy personal CGM.    Will send prescriptions for Libre2 system.    Yohana Ruiz RN, [facility]    Diabetes Self-Management Education & Support    Presents for: Follow-up (BG review)    CDE VISIT MODE: In Person    Assessment Type:   ASSESSMENT:  Pt seen for BG review. Her biggest concern is trying to test more. Interested in seeing if new insurance can either let her have more test strips or a CGM. She has periods of feeling low- shaky, light headed, nauseous, irritable. Often does not test since previous insurance was only 1 test a day. When she has tested it, BG was 96, 108. Pt is maintaining eating habits. Less of an appetite since started taking Trulicity. See Yohana's note about CGM.    No new BG- difficulty with meter download.    Patient's most recent   Lab Results   Component Value Date    A1C 6.6 08/01/2022    A1C 6.9 06/01/2022     is meeting goal of <7.0    Diabetes knowledge and skills assessment:   Patient is knowledgeable in diabetes management concepts related to: Healthy Eating, Being Active, Monitoring, Taking Medication, Problem Solving, Reducing Risks and Healthy Coping    Continue education with the following diabetes management concepts: Monitoring, Taking Medication and Problem Solving    Based on learning assessment above, most appropriate setting for further diabetes education would be: Individual setting.      PLAN    Topics to cover at upcoming visits: Monitoring and Taking Medication    Follow-up: When CGM comes in    See Care Plan for co-developed, patient-state behavior change goals.  AVS provided for patient today.    Education Materials Provided:  No new materials provided today      SUBJECTIVE/OBJECTIVE:  Presents for:  "Follow-up (BG review)  Accompanied by: Self  Diabetes education in the past 24mo: No  Focus of Visit: Patient Unsure, Diabetes Pathophysiology, Healthy Eating, Healthy Coping  Diabetes type: Type 2  Date of diagnosis: 11/4/21  Disease course: Other  How confident are you filling out medical forms by yourself:: Quite a bit  Diabetes management related comments/concerns: Affording supplies  Transportation concerns: Yes  Difficulty affording diabetes medication?: No  Difficulty affording diabetes testing supplies?: Sometimes (possibly)  Other concerns:: Glasses, Physical impairment  Cultural Influences/Ethnic Background:  Not  or       Diabetes Symptoms & Complications:  Fatigue: Sometimes (improved)  Neuropathy: Sometimes (hand, feet, legs)  Polydipsia: Yes (some meds might be contributing)  Polyphagia: No (not much of an appetite)  Polyuria: Sometimes (on a diuretic)  Visual change: No  Slow healing wounds: Sometimes (a little bit)  Other: No  Symptom course: Improving  Weight trend: Stable  Complications assessed today?: No    Patient Problem List and Family Medical History reviewed for relevant medical history, current medical status, and diabetes risk factors.    Vitals:  /86 (BP Location: Left arm, Patient Position: Sitting, Cuff Size: Adult Large)   Pulse 93   SpO2 99%   Estimated body mass index is 48.25 kg/m  as calculated from the following:    Height as of 8/1/22: 1.578 m (5' 2.12\").    Weight as of 8/1/22: 120.1 kg (264 lb 12.8 oz).   Last 3 BP:   BP Readings from Last 3 Encounters:   09/01/22 104/86   08/01/22 114/80   07/20/22 110/86       History   Smoking Status     Current Every Day Smoker     Packs/day: 0.50     Years: 32.00     Types: Cigarettes     Start date: 1/1/1989   Smokeless Tobacco     Never Used     Comment: pt declines       Labs:  Lab Results   Component Value Date    A1C 6.6 08/01/2022    A1C 6.9 06/01/2022     Lab Results   Component Value Date     08/15/2022 "     05/19/2021     Lab Results   Component Value Date     01/06/2022     01/29/2020     HDL Cholesterol   Date Value Ref Range Status   01/29/2020 87 >49 mg/dL Final     Direct Measure HDL   Date Value Ref Range Status   01/06/2022 65 >=50 mg/dL Final   ]  GFR Estimate   Date Value Ref Range Status   08/15/2022 >90 >60 mL/min/1.73m2 Final     Comment:     Effective December 21, 2021 eGFRcr in adults is calculated using the 2021 CKD-EPI creatinine equation which includes age and gender (Samy et al., [place], DOI: 10.1056/RHBDxf4881213)   05/19/2021 >90 >60 mL/min/[1.73_m2] Final     Comment:     Non  GFR Calc  Starting 12/18/2018, serum creatinine based estimated GFR (eGFR) will be   calculated using the Chronic Kidney Disease Epidemiology Collaboration   (CKD-EPI) equation.       GFR Estimate If Black   Date Value Ref Range Status   05/19/2021 >90 >60 mL/min/[1.73_m2] Final     Comment:      GFR Calc  Starting 12/18/2018, serum creatinine based estimated GFR (eGFR) will be   calculated using the Chronic Kidney Disease Epidemiology Collaboration   (CKD-EPI) equation.       Lab Results   Component Value Date    CR 0.76 08/15/2022    CR 0.78 05/19/2021     No results found for: MICROALBUMIN    Healthy Eating:  Healthy Eating Assessed Today: Yes  Cultural/Muslim diet restrictions?: No  Meal planning/habits: Smaller portions  How many times a week on average do you eat food made away from home (restaurant/take-out)?: 1 (1x in the past month)  Meals include: Dinner, Evening Snack, Lunch (isn't eating until 1 or 2, supper around 5:50/6)  Breakfast: coffee- meds around 11:30  Lunch: 1:30/2pm moms meals- diabetes friendly  Dinner: protein and veg- later in the evening  Snacks: not as much, tortilla with pb with milk  Beverages: Water, Milk, Coffee (prabhjot in water, 1%, cream and splenda in coffee)  Has patient met with a dietitian in the past?: Yes (with gestational  diabetes)    Being Active:  Being Active Assessed Today: Yes  Exercise:: Currently not exercising (house chores, riding lawnmower)  Barrier to exercise: Physical limitation (legs hurt, chronic pain)    Monitoring:  Monitoring Assessed Today: Yes  Did patient bring glucose meter to appointment? : Yes  Blood Glucose Meter: Accu-chek (guide me)  Times checking blood sugar at home (number): 2 (forgetting more often)  Times checking blood sugar at home (per): Day  Blood glucose trend: Other (unable to load meter today)        Taking Medications:  Diabetes Medication(s)     Incretin Mimetic Agents (GLP-1 Receptor Agonists)       dulaglutide (TRULICITY) 0.75 MG/0.5ML pen    Inject 0.75 mg Subcutaneous every 7 days     dulaglutide (TRULICITY) 1.5 MG/0.5ML pen    Inject 1.5 mg Subcutaneous every 7 days          Taking Medication Assessed Today: Yes  Current Treatments: Non-insulin Injectables (trulicity 1.5)  Problems taking diabetes medications regularly?: No  Diabetes medication side effects?: No (feels sick with eating too many carbs, hot and flushed, careful with sun and alcohol.)    Problem Solving:  Problem Solving Assessed Today: Yes  Is the patient at risk for hypoglycemia?: No  Is the patient at risk for DKA?: No  Does patient have severe weather/disaster plan for diabetes management?: Not Needed  Does patient have sick day plan for diabetes management?: Not Needed              Reducing Risks:  Reducing Risks Assessed Today: Yes  Diabetes Risks: Age over 45 years, History of gestational diabetes, Sedentary Lifestyle  CAD Risks: Sedentary lifestyle, Tobacco exposure, Stress, Obesity, Family history, Diabetes Mellitus  Has dilated eye exam at least once a year?: No (forgetting to get it set up)  Sees dentist every 6 months?: No (needs to find a dentist, chipped a tooth yesterday)  Feet checked by healthcare provider in the last year?: No    Healthy Coping:  Healthy Coping Assessed Today: Yes  Emotional response to  diabetes: Ready to learn, Depression (depression due to mom passing)  Informal Support system:: Family (mental health practitioner, [facility], )  Stage of change: ACTION (Actively working towards change)  Support resources: In-person Offerings  Patient Activation Measure Survey Score:  KEN Score (Last Two) 11/28/2018 3/7/2019   KEN Raw Score 27 27   Activation Score 47.4 47.4   KEN Level 2 2         Care Plan and Education Provided:  Care Plan: Diabetes   Updates made by Taisha Corral RD since 9/2/2022 12:00 AM      Problem: Diabetes Self-Management Education Needed to Optimize Self-Care Behaviors       Goal: Being Active - get regular physical activity, working up to at least 150 minutes per week       Task: Provide education on relationship of activity to glucose and precautions to take if at risk for low glucose Completed 9/2/2022   Responsible User: Taisha Corral RD      Goal: Problem Solving - know how to prevent and manage short-term diabetes complications       Task: Provide education on low blood glucose - causes, signs/symptoms, prevention, treatment, carrying a carbohydrate source at all times, and medical identification Completed 9/2/2022   Responsible User: Taisha Corral RD      Goal: Reducing Risks - know how to prevent and treat long-term diabetes complications       Task: Provide education on Hemoglobin A1c - goals and relationship to blood glucose levels Completed 9/2/2022   Responsible User: Taisha Corral RD            Time Spent: 30 minutes  Encounter Type: Individual    Any diabetes medication dose changes were made via the CDE Protocol per the patient's primary care provider. A copy of this encounter was shared with the provider.          Sincerely,        Yohana Ruiz RN

## 2022-09-01 NOTE — PROGRESS NOTES
Met with Autumn while she is here today for education. Please see LORETO Alfredo's note for education provided.    Autumn has new insurance and is wondering if she might be able to get a Wendy personal CGM.    Will send prescriptions for LibrAcsis system.    Yohana Ruiz RN, [facility]   110

## 2022-09-02 VITALS
WEIGHT: 254.6 LBS | HEART RATE: 93 BPM | BODY MASS INDEX: 46.85 KG/M2 | DIASTOLIC BLOOD PRESSURE: 86 MMHG | SYSTOLIC BLOOD PRESSURE: 104 MMHG | OXYGEN SATURATION: 99 % | HEIGHT: 62 IN

## 2022-09-02 DIAGNOSIS — F33.2 SEVERE EPISODE OF RECURRENT MAJOR DEPRESSIVE DISORDER, WITHOUT PSYCHOTIC FEATURES (H): ICD-10-CM

## 2022-09-02 DIAGNOSIS — Z53.9 PERSONS ENCOUNTERING HEALTH SERVICES FOR SPECIFIC PROCEDURES, NOT CARRIED OUT: Primary | ICD-10-CM

## 2022-09-02 RX ORDER — TOPIRAMATE 50 MG/1
TABLET, FILM COATED ORAL
Qty: 60 TABLET | Refills: 3 | Status: SHIPPED | OUTPATIENT
Start: 2022-09-02 | End: 2023-04-20

## 2022-09-02 NOTE — TELEPHONE ENCOUNTER
Received another PA from [facility] for Topiramate 50mg tablets. This was already DENIED. It is not covered with the diagnosis that you are using. Some common uses are for seizures and migraine headaches.

## 2022-09-02 NOTE — TELEPHONE ENCOUNTER
TOPAMAX      Last Written Prescription Date:  06/26/2022  Last Fill Quantity: 60,   # refills: 3  Last Office Visit: 08/01/2022

## 2022-09-06 DIAGNOSIS — R60.0 BILATERAL LEG EDEMA: ICD-10-CM

## 2022-09-08 RX ORDER — FUROSEMIDE 20 MG
TABLET ORAL
Qty: 60 TABLET | Refills: 0 | Status: SHIPPED | OUTPATIENT
Start: 2022-09-08 | End: 2022-10-06

## 2022-09-08 NOTE — TELEPHONE ENCOUNTER
Lasix       Last Written Prescription Date:  8/12/22  Last Fill Quantity: 60,   # refills: 0  Last Office Visit: 8/1/22  Future Office visit:    Next 5 appointments (look out 90 days)    Sep 26, 2022  9:15 AM  (Arrive by 9:00 AM)  Pre-Op physical with SEVEN Pandey  [facility] [place] ([facility] [place] ) [address]  [place] [address]  [phone]   Sep 27, 2022 11:00 AM  (Arrive by 10:45 AM)  Nurse Only with HC COLLECTION  [facility] ([facility] [place] ) [address]  [place] [address]  [phone]   Nov 02, 2022  2:00 PM  (Arrive by 1:45 PM)  PHYSICAL with SEVEN Pandey  [facility] [place] ([facility] [place] ) [address]  [place] [address]  [phone]

## 2022-09-14 ENCOUNTER — TELEPHONE (OUTPATIENT)
Dept: FAMILY MEDICINE | Facility: OTHER | Age: 47
End: 2022-09-14

## 2022-09-14 NOTE — TELEPHONE ENCOUNTER
lvm for pt to call back to schedule appt with Kinga siegel and Alba lawrence (at same time) for BG review/ CGM training-60 min

## 2022-09-14 NOTE — TELEPHONE ENCOUNTER
----- Message from Tanya Sweet LPN sent at 9/14/2022  3:26 PM CDT -----  Please call Autumn to schedule BG review/CGM training with Kinga Beckwith and Taisha Corral. She sees them at the same time in the clinic under [facility] Di[facility]. Thank you! Shara

## 2022-09-21 ENCOUNTER — HOSPITAL ENCOUNTER (OUTPATIENT)
Dept: EDUCATION SERVICES | Facility: HOSPITAL | Age: 47
Discharge: HOME | End: 2022-09-21
Attending: NURSE PRACTITIONER | Admitting: NURSE PRACTITIONER
Payer: COMMERCIAL

## 2022-09-21 VITALS
OXYGEN SATURATION: 98 % | HEART RATE: 78 BPM | SYSTOLIC BLOOD PRESSURE: 122 MMHG | DIASTOLIC BLOOD PRESSURE: 84 MMHG | BODY MASS INDEX: 48.75 KG/M2 | WEIGHT: 264.9 LBS | HEIGHT: 62 IN | RESPIRATION RATE: 16 BRPM

## 2022-09-21 PROCEDURE — G0108 DIAB MANAGE TRN  PER INDIV: HCPCS | Performed by: DIETITIAN, REGISTERED

## 2022-09-21 ASSESSMENT — PAIN SCALES - GENERAL: PAINLEVEL: EXTREME PAIN (8)

## 2022-09-21 NOTE — LETTER
"    9/21/2022        RE: Autumn Holbrook  [address] [facility] [address]        Diabetes Self-Management Education & Support    CGM, personal Wendy 2 teaching.     2 week BG report Ave bg 115  Lowest 88 highest 148   Readings 0.8/day  accu-check guide    Low sx: Nausea, dizzy, 'cranky\", treats with eating food.    a1c 6.6 8/1/2022     Diabetes Self-Management Education & Support  Personal Continuous Glucose Monitor Insertion    SUBJECTIVE/OBJECTIVE:    Autumn Holbrook presents for personal Continuous Glucose Monitor Insertion.     Patient comments/concerns:  Excited for new cgm.     Lab Results:  Lab Results   Component Value Date    A1C 6.6 08/01/2022    A1C 6.9 06/01/2022      Lab Results   Component Value Date     08/15/2022     05/19/2021       Medication:  Diabetes Medication(s)     Incretin Mimetic Agents (GLP-1 Receptor Agonists)       dulaglutide (TRULICITY) 0.75 MG/0.5ML pen    Inject 0.75 mg Subcutaneous every 7 days     dulaglutide (TRULICITY) 1.5 MG/0.5ML pen    Inject 1.5 mg Subcutaneous every 7 days        INTERVENTION:   Personal sensor started today. Step by step instruction with patient inserting sensor.   Sensor was inserted with no resistance or bleeding at insertion site.     Patient verbalizes understanding of how to remove sensor, if needed, and all instructions provided.     Educational and other materials:  Contact information for DRC.     Follow-up:    Follow up on Nov 2 for BG review.  Kinga Beckwith RN Diabetes Educator,  [phone]  9/21/2022 at 3:05 PM'          Diabetes Self-Management Education & Support    Presents for: Follow-up (BG review/ CGM)    Type of Service: In Person Visit    Assessment Type:   Sensor was inserted with no resistance or bleeding at insertion site.    CGM-specific education: Provided by PJ Donato  Freestyle Wendy sensor: insertion technique, sensor site location and rotation, insulin administration in relation to sensor placement, sensor wear, " reasons to remove sensor (MRI, CT, diathermy), Vitamin C & Aspirin effects on sensor         ASSESSMENT:  Pt reports she has been maintaining habit changes. Haiving a hard time remembering testing BG. Feels like she is having low BG- nausea, irritable, dizzy. Pt knowledgeable of how to problem solve low BG.  In today for CGM teaching from PJ Donato.    Pt verbalized understanding of concepts discussed and recommendations provided today.    Continue education with the following diabetes management concepts: Healthy Eating, Being Active, Monitoring, Taking Medication, Problem Solving, Reducing Risks and Healthy Coping    PLAN  Topics to cover at upcoming visits: Healthy Eating, Being Active and Monitoring    Follow-up: 11/2/22 1pm    See Care Plan for co-developed, patient-state behavior change goals.  AVS provided for patient today.    Education Materials Provided:  No new materials provided today      SUBJECTIVE/OBJECTIVE:  Presents for: Follow-up (BG review/ CGM)  Accompanied by: Self  Diabetes education in the past 24mo: No  Focus of Visit: Patient Unsure, Diabetes Pathophysiology, Healthy Eating, Healthy Coping  Diabetes type: Type 2  Date of diagnosis: 11/4/21  Disease course: Other  How confident are you filling out medical forms by yourself:: Quite a bit  Diabetes management related comments/concerns: Affording supplies  Transportation concerns: Yes  Difficulty affording diabetes medication?: No  Difficulty affording diabetes testing supplies?: Sometimes (possibly)  Other concerns:: Glasses, Physical impairment  Cultural Influences/Ethnic Background:  Not  or       Diabetes Symptoms & Complications:  Fatigue: Sometimes (improved)  Neuropathy: Sometimes (hand, feet, legs)  Polydipsia: Sometimes (some meds might be contributing)  Polyphagia: No (not much of an appetite)  Polyuria: Sometimes (on a diuretic)  Visual change: No  Slow healing wounds: Sometimes (a little bit)  Other: No  Symptom course:  "Improving  Weight trend: Stable  Complications assessed today?: No    Patient Problem List and Family Medical History reviewed for relevant medical history, current medical status, and diabetes risk factors.    Vitals:  /84   Pulse 78   Resp 16   Ht 1.578 m (5' 2.12\")   Wt 120.2 kg (264 lb 14.4 oz)   SpO2 98%   BMI 48.26 kg/m    Estimated body mass index is 48.26 kg/m  as calculated from the following:    Height as of this encounter: 1.578 m (5' 2.12\").    Weight as of this encounter: 120.2 kg (264 lb 14.4 oz).   Last 3 BP:   BP Readings from Last 3 Encounters:   09/21/22 122/84   09/01/22 104/86   08/01/22 114/80       History   Smoking Status     Current Every Day Smoker     Packs/day: 0.50     Years: 32.00     Types: Cigarettes     Start date: 1/1/1989   Smokeless Tobacco     Never Used     Comment: pt declines       Labs:  Lab Results   Component Value Date    A1C 6.6 08/01/2022    A1C 6.9 06/01/2022     Lab Results   Component Value Date     08/15/2022     05/19/2021     Lab Results   Component Value Date     01/06/2022     01/29/2020     HDL Cholesterol   Date Value Ref Range Status   01/29/2020 87 >49 mg/dL Final     Direct Measure HDL   Date Value Ref Range Status   01/06/2022 65 >=50 mg/dL Final   ]  GFR Estimate   Date Value Ref Range Status   08/15/2022 >90 >60 mL/min/1.73m2 Final     Comment:     Effective December 21, 2021 eGFRcr in adults is calculated using the 2021 CKD-EPI creatinine equation which includes age and gender (Samy bryant al., NEJM, DOI: 10.1056/WOIOjm0983367)   05/19/2021 >90 >60 mL/min/[1.73_m2] Final     Comment:     Non  GFR Calc  Starting 12/18/2018, serum creatinine based estimated GFR (eGFR) will be   calculated using the Chronic Kidney Disease Epidemiology Collaboration   (CKD-EPI) equation.       GFR Estimate If Black   Date Value Ref Range Status   05/19/2021 >90 >60 mL/min/[1.73_m2] Final     Comment:      " GFR Calc  Starting 12/18/2018, serum creatinine based estimated GFR (eGFR) will be   calculated using the Chronic Kidney Disease Epidemiology Collaboration   (CKD-EPI) equation.       Lab Results   Component Value Date    CR 0.76 08/15/2022    CR 0.78 05/19/2021     No results found for: MICROALBUMIN    Healthy Eating:  Healthy Eating Assessed Today: Yes  Cultural/Latter day diet restrictions?: No  Meal planning/habits: Smaller portions  How many times a week on average do you eat food made away from home (restaurant/take-out)?: 1 (1x in the past month)  Meals include: Dinner, Evening Snack, Lunch (isn't eating until 1 or 2, supper around 5:50/6)  Breakfast: coffee- meds around 11:30  Lunch: 1:30/2pm moms meals- diabetes friendly  Dinner: protein and veg- later in the evening  Snacks: not as much, tortilla with pb with milk  Beverages: Water, Milk, Coffee (prabhjot in water, 1%, cream and splenda in coffee)  Has patient met with a dietitian in the past?: Yes (with gestational diabetes)    Being Active:  Being Active Assessed Today: Yes  Exercise:: Currently not exercising (house chores, riding lawnmower)  Barrier to exercise: Physical limitation (legs hurt, chronic pain)    Monitoring:  Monitoring Assessed Today: Yes  Did patient bring glucose meter to appointment? : Yes  Blood Glucose Meter: Accu-chek (guide me)  Times checking blood sugar at home (number): 2 (forgetting more often)  Times checking blood sugar at home (per): Day  Blood glucose trend: Other (unable to load meter today)        Taking Medications:  Diabetes Medication(s)     Incretin Mimetic Agents (GLP-1 Receptor Agonists)       dulaglutide (TRULICITY) 0.75 MG/0.5ML pen    Inject 0.75 mg Subcutaneous every 7 days     dulaglutide (TRULICITY) 1.5 MG/0.5ML pen    Inject 1.5 mg Subcutaneous every 7 days          Taking Medication Assessed Today: Yes  Current Treatments: Non-insulin Injectables (trulicity 1.5)  Problems taking diabetes medications regularly?:  No  Diabetes medication side effects?: No (feels sick with eating too many carbs, hot and flushed, careful with sun and alcohol.)    Problem Solving:  Problem Solving Assessed Today: Yes  Is the patient at risk for hypoglycemia?: No  Is the patient at risk for DKA?: No  Does patient have severe weather/disaster plan for diabetes management?: Not Needed  Does patient have sick day plan for diabetes management?: Not Needed      Reducing Risks:  Reducing Risks Assessed Today: Yes  Diabetes Risks: Age over 45 years, History of gestational diabetes, Sedentary Lifestyle  CAD Risks: Sedentary lifestyle, Tobacco exposure, Stress, Obesity, Family history, Diabetes Mellitus  Has dilated eye exam at least once a year?: No (forgetting to get it set up)  Sees dentist every 6 months?: No (needs to find a dentist, chipped a tooth yesterday)  Feet checked by healthcare provider in the last year?: No    Healthy Coping:  Healthy Coping Assessed Today: Yes  Emotional response to diabetes: Ready to learn, Depression (depression due to mom passing)  Informal Support system:: Family (mental health practitioner, [facility], )  Stage of change: ACTION (Actively working towards change)  Support resources: In-person Offerings  Patient Activation Measure Survey Score:  KEN Score (Last Two) 11/28/2018 3/7/2019   KEN Raw Score 27 27   Activation Score 47.4 47.4   KEN Level 2 2         Care Plan and Education Provided:  Care Plan: Diabetes   Updates made by Taisha Corral RD since 9/22/2022 12:00 AM      Problem: Diabetes Self-Management Education Needed to Optimize Self-Care Behaviors       Goal: Monitoring - monitor glucose and ketones as directed    Note:    Discussing option of CGM     Task: Provide education on continuous glucose monitoring (sensor placement, use of dany or /reader, understanding glucose trends, alerts and alarms, differences between sensor glucose and blood glucose) Completed 9/22/2022   Responsible  User: Taisha Corral RD            Time Spent: 60 minutes  Encounter Type: Individual    Any diabetes medication dose changes were made via the CDE Protocol per the patient's primary care provider. A copy of this encounter was shared with the provider.          Sincerely,        Taisha Corral RD

## 2022-09-21 NOTE — PROGRESS NOTES
"Diabetes Self-Management Education & Support    CGM, personal Wendy 2 teaching.     2 week BG report Ave bg 115  Lowest 88 highest 148   Readings 0.8/day  accu-check guide    Low sx: Nausea, dizzy, 'cranky\", treats with eating food.    a1c 6.6 8/1/2022     Diabetes Self-Management Education & Support  Personal Continuous Glucose Monitor Insertion    SUBJECTIVE/OBJECTIVE:    Autumn Holbrook presents for personal Continuous Glucose Monitor Insertion.     Patient comments/concerns:  Excited for new cgm.     Lab Results:  Lab Results   Component Value Date    A1C 6.6 08/01/2022    A1C 6.9 06/01/2022      Lab Results   Component Value Date     08/15/2022     05/19/2021       Medication:  Diabetes Medication(s)     Incretin Mimetic Agents (GLP-1 Receptor Agonists)       dulaglutide (TRULICITY) 0.75 MG/0.5ML pen    Inject 0.75 mg Subcutaneous every 7 days     dulaglutide (TRULICITY) 1.5 MG/0.5ML pen    Inject 1.5 mg Subcutaneous every 7 days        INTERVENTION:   Personal sensor started today. Step by step instruction with patient inserting sensor.   Sensor was inserted with no resistance or bleeding at insertion site.     Patient verbalizes understanding of how to remove sensor, if needed, and all instructions provided.     Educational and other materials:  Contact information for DRC.     Follow-up:    Follow up on Nov 2 for BG review.  Kinga Beckwith RN Diabetes Educator,  [phone]  9/21/2022 at 3:05 PM'        "

## 2022-09-22 ENCOUNTER — ANESTHESIA EVENT (OUTPATIENT)
Dept: SURGERY | Facility: HOSPITAL | Age: 47
End: 2022-09-22
Payer: COMMERCIAL

## 2022-09-22 ASSESSMENT — LIFESTYLE VARIABLES: TOBACCO_USE: 1

## 2022-09-22 NOTE — PROGRESS NOTES
Diabetes Self-Management Education & Support    Presents for: Follow-up (BG review/ CGM)    Type of Service: In Person Visit    Assessment Type:   Sensor was inserted with no resistance or bleeding at insertion site.    CGM-specific education: Provided by PJ Donato  Freestyle Wendy sensor: insertion technique, sensor site location and rotation, insulin administration in relation to sensor placement, sensor wear, reasons to remove sensor (MRI, CT, diathermy), Vitamin C & Aspirin effects on sensor         ASSESSMENT:  Pt reports she has been maintaining habit changes. Haiving a hard time remembering testing BG. Feels like she is having low BG- nausea, irritable, dizzy. Pt knowledgeable of how to problem solve low BG.  In today for CGM teaching from PJ Donato.    BG per meter  Average 115  BG ranging from . Fasting around , middle of the night 125-148    Pt verbalized understanding of concepts discussed and recommendations provided today.    Continue education with the following diabetes management concepts: Healthy Eating, Being Active, Monitoring, Taking Medication, Problem Solving, Reducing Risks and Healthy Coping    PLAN  Topics to cover at upcoming visits: Healthy Eating, Being Active and Monitoring    Follow-up: 11/2/22 1pm    See Care Plan for co-developed, patient-state behavior change goals.  AVS provided for patient today.    Education Materials Provided:  No new materials provided today      SUBJECTIVE/OBJECTIVE:  Presents for: Follow-up (BG review/ CGM)  Accompanied by: Self  Diabetes education in the past 24mo: No  Focus of Visit: Patient Unsure, Diabetes Pathophysiology, Healthy Eating, Healthy Coping  Diabetes type: Type 2  Date of diagnosis: 11/4/21  Disease course: Other  How confident are you filling out medical forms by yourself:: Quite a bit  Diabetes management related comments/concerns: Affording supplies  Transportation concerns: Yes  Difficulty affording diabetes medication?:  "No  Difficulty affording diabetes testing supplies?: Sometimes (possibly)  Other concerns:: Glasses, Physical impairment  Cultural Influences/Ethnic Background:  Not  or       Diabetes Symptoms & Complications:  Fatigue: Sometimes (improved)  Neuropathy: Sometimes (hand, feet, legs)  Polydipsia: Sometimes (some meds might be contributing)  Polyphagia: No (not much of an appetite)  Polyuria: Sometimes (on a diuretic)  Visual change: No  Slow healing wounds: Sometimes (a little bit)  Other: No  Symptom course: Improving  Weight trend: Stable  Complications assessed today?: No    Patient Problem List and Family Medical History reviewed for relevant medical history, current medical status, and diabetes risk factors.    Vitals:  /84   Pulse 78   Resp 16   Ht 1.578 m (5' 2.12\")   Wt 120.2 kg (264 lb 14.4 oz)   SpO2 98%   BMI 48.26 kg/m    Estimated body mass index is 48.26 kg/m  as calculated from the following:    Height as of this encounter: 1.578 m (5' 2.12\").    Weight as of this encounter: 120.2 kg (264 lb 14.4 oz).   Last 3 BP:   BP Readings from Last 3 Encounters:   09/21/22 122/84   09/01/22 104/86   08/01/22 114/80       History   Smoking Status     Current Every Day Smoker     Packs/day: 0.50     Years: 32.00     Types: Cigarettes     Start date: 1/1/1989   Smokeless Tobacco     Never Used     Comment: pt declines       Labs:  Lab Results   Component Value Date    A1C 6.6 08/01/2022    A1C 6.9 06/01/2022     Lab Results   Component Value Date     08/15/2022     05/19/2021     Lab Results   Component Value Date     01/06/2022     01/29/2020     HDL Cholesterol   Date Value Ref Range Status   01/29/2020 87 >49 mg/dL Final     Direct Measure HDL   Date Value Ref Range Status   01/06/2022 65 >=50 mg/dL Final   ]  GFR Estimate   Date Value Ref Range Status   08/15/2022 >90 >60 mL/min/1.73m2 Final     Comment:     Effective December 21, 2021 eGFRcr in adults is " calculated using the 2021 CKD-EPI creatinine equation which includes age and gender (Samy bryant al., NE, DOI: 10.1056/[id number])   05/19/2021 >90 >60 mL/min/[1.73_m2] Final     Comment:     Non  GFR Calc  Starting 12/18/2018, serum creatinine based estimated GFR (eGFR) will be   calculated using the Chronic Kidney Disease Epidemiology Collaboration   (CKD-EPI) equation.       GFR Estimate If Black   Date Value Ref Range Status   05/19/2021 >90 >60 mL/min/[1.73_m2] Final     Comment:      GFR Calc  Starting 12/18/2018, serum creatinine based estimated GFR (eGFR) will be   calculated using the Chronic Kidney Disease Epidemiology Collaboration   (CKD-EPI) equation.       Lab Results   Component Value Date    CR 0.76 08/15/2022    CR 0.78 05/19/2021     No results found for: MICROALBUMIN    Healthy Eating:  Healthy Eating Assessed Today: Yes  Cultural/Presybeterian diet restrictions?: No  Meal planning/habits: Smaller portions  How many times a week on average do you eat food made away from home (restaurant/take-out)?: 1 (1x in the past month)  Meals include: Dinner, Evening Snack, Lunch (isn't eating until 1 or 2, supper around 5:50/6)  Breakfast: coffee- meds around 11:30  Lunch: 1:30/2pm moms meals- diabetes friendly  Dinner: protein and veg- later in the evening  Snacks: not as much, tortilla with pb with milk  Beverages: Water, Milk, Coffee (prabhjot in water, 1%, cream and splenda in coffee)  Has patient met with a dietitian in the past?: Yes (with gestational diabetes)    Being Active:  Being Active Assessed Today: Yes  Exercise:: Currently not exercising (house chores, riding lawnmower)  Barrier to exercise: Physical limitation (legs hurt, chronic pain)    Monitoring:  Monitoring Assessed Today: Yes  Did patient bring glucose meter to appointment? : Yes  Blood Glucose Meter: Accu-chek (guide me)  Times checking blood sugar at home (number): 2 (forgetting more often)  Times checking  blood sugar at home (per): Day  Blood glucose trend: Other (unable to load meter today)        Taking Medications:  Diabetes Medication(s)     Incretin Mimetic Agents (GLP-1 Receptor Agonists)       dulaglutide (TRULICITY) 0.75 MG/0.5ML pen    Inject 0.75 mg Subcutaneous every 7 days     dulaglutide (TRULICITY) 1.5 MG/0.5ML pen    Inject 1.5 mg Subcutaneous every 7 days          Taking Medication Assessed Today: Yes  Current Treatments: Non-insulin Injectables (trulicity 1.5)  Problems taking diabetes medications regularly?: No  Diabetes medication side effects?: No (feels sick with eating too many carbs, hot and flushed, careful with sun and alcohol.)    Problem Solving:  Problem Solving Assessed Today: Yes  Is the patient at risk for hypoglycemia?: No  Is the patient at risk for DKA?: No  Does patient have severe weather/disaster plan for diabetes management?: Not Needed  Does patient have sick day plan for diabetes management?: Not Needed      Reducing Risks:  Reducing Risks Assessed Today: Yes  Diabetes Risks: Age over 45 years, History of gestational diabetes, Sedentary Lifestyle  CAD Risks: Sedentary lifestyle, Tobacco exposure, Stress, Obesity, Family history, Diabetes Mellitus  Has dilated eye exam at least once a year?: No (forgetting to get it set up)  Sees dentist every 6 months?: No (needs to find a dentist, chipped a tooth yesterday)  Feet checked by healthcare provider in the last year?: No    Healthy Coping:  Healthy Coping Assessed Today: Yes  Emotional response to diabetes: Ready to learn, Depression (depression due to mom passing)  Informal Support system:: Family (mental health practitioner, [facility], )  Stage of change: ACTION (Actively working towards change)  Support resources: In-person Offerings  Patient Activation Measure Survey Score:  KEN Score (Last Two) 11/28/2018 3/7/2019   KEN Raw Score 27 27   Activation Score 47.4 47.4   KEN Level 2 2         Care Plan and Education  Provided:  Care Plan: Diabetes   Updates made by Taisha Corral RD since 9/22/2022 12:00 AM      Problem: Diabetes Self-Management Education Needed to Optimize Self-Care Behaviors       Goal: Monitoring - monitor glucose and ketones as directed    Note:    Discussing option of CGM     Task: Provide education on continuous glucose monitoring (sensor placement, use of dany or /reader, understanding glucose trends, alerts and alarms, differences between sensor glucose and blood glucose) Completed 9/22/2022   Responsible User: Taisha Corral RD            Time Spent: 60 minutes  Encounter Type: Individual    Any diabetes medication dose changes were made via the CDE Protocol per the patient's primary care provider. A copy of this encounter was shared with the provider.

## 2022-09-22 NOTE — ANESTHESIA PREPROCEDURE EVALUATION
Anesthesia Pre-Procedure Evaluation    Patient: Autumn Holbrook   MRN: [id number] : 1975        Procedure : Procedure(s):  Colonscopy, possible biospy, possible polypectomy          Past Medical History:   Diagnosis Date     Anxiety      Arthritis      Depressive disorder      Migraine      Osteoarthritis      Thyroid disease       Past Surgical History:   Procedure Laterality Date     CHOLECYSTECTOMY       GYN SURGERY       SECTION [id number]     GYN SURGERY  2004    LEEP      Allergies   Allergen Reactions     Depakote [Valproic Acid]      Gabapentin Swelling     Propofol Unknown     Got very stiff and tight.       Social History     Tobacco Use     Smoking status: Current Every Day Smoker     Packs/day: 0.50     Years: 32.00     Pack years: 16.00     Types: Cigarettes     Start date: 1989     Smokeless tobacco: Never Used     Tobacco comment: pt declines   Substance Use Topics     Alcohol use: No      Wt Readings from Last 1 Encounters:   22 120.2 kg (264 lb 14.4 oz)        Anesthesia Evaluation   Pt has had prior anesthetic.         ROS/MED HX  ENT/Pulmonary:     (+) MARCO A risk factors, obese, tobacco use, Current use,     Neurologic:     (+) migraines,     Cardiovascular: Comment: BLE edema    (+) Dyslipidemia hypertension-----    METS/Exercise Tolerance:     Hematologic: Comments: recent hypokalemia      Musculoskeletal:   (+) arthritis,     GI/Hepatic:     (+) GERD, bowel prep,     Renal/Genitourinary:  - neg Renal ROS     Endo: Comment: wears continuous glucose monitor    (+) type II DM, Last HgA1c: 6.6, date: 2022, Not using insulin, thyroid problem, hypothyroidism, Obesity,     Psychiatric/Substance Use:     (+) psychiatric history anxiety, depression, other (comment) and bipolar (PTSD) H/O chronic opiod use  (in remission, on Suboxone). Recreational drug usage: Cannabis.    Infectious Disease:  - neg infectious disease ROS     Malignancy:  - neg malignancy ROS     Other:       (+) , H/O Chronic Pain,        Physical Exam    Airway  airway exam normal      Mallampati: II   TM distance: > 3 FB    Mouth opening: > 3 cm    Respiratory Devices and Support         Dental  no notable dental history     (+) chipped    B=Bridge, C=Chipped, L=Loose, M=Missing    Cardiovascular   cardiovascular exam normal       Rhythm and rate: regular and normal     Pulmonary   pulmonary exam normal        breath sounds clear to auscultation           OUTSIDE LABS:  CBC:   Lab Results   Component Value Date    WBC 13.6 (H) 08/01/2022    WBC 16.7 (H) 07/11/2022    HGB 14.4 08/01/2022    HGB 15.8 (H) 07/11/2022    HCT 42.6 08/01/2022    HCT 47.3 (H) 07/11/2022     08/01/2022     07/11/2022     BMP:   Lab Results   Component Value Date     08/15/2022     08/01/2022    POTASSIUM 3.3 (L) 08/15/2022    POTASSIUM 2.8 (LL) 08/01/2022    CHLORIDE 104 08/15/2022    CHLORIDE 105 08/01/2022    CO2 26 08/15/2022    CO2 27 08/01/2022    BUN 10 08/15/2022    BUN 11 08/01/2022    CR 0.76 08/15/2022    CR 0.68 08/01/2022     (H) 08/15/2022     (H) 08/01/2022     COAGS:   Lab Results   Component Value Date    INR 1.11 05/19/2021     POC: No results found for: BGM, HCG, HCGS  HEPATIC:   Lab Results   Component Value Date    ALBUMIN 3.3 (L) 07/11/2022    PROTTOTAL 7.0 07/11/2022    ALT 64 (H) 07/11/2022    AST 29 07/11/2022    ALKPHOS 80 07/11/2022    BILITOTAL 0.4 07/11/2022     OTHER:   Lab Results   Component Value Date    A1C 6.6 (H) 08/01/2022    EUSEBIO 9.8 08/15/2022    MAG 2.2 07/11/2022    LIPASE 238 07/11/2022    TSH <0.01 (L) 06/09/2022    T4 1.46 06/09/2022    CRP 9.8 (H) 07/11/2022       Anesthesia Plan    ASA Status:  3   NPO Status:  NPO Appropriate    Anesthesia Type: MAC.     - Reason for MAC: straight local not clinically adequate   Induction: Intravenous, Propofol.   Maintenance: Balanced.        Consents    Anesthesia Plan(s) and associated risks, benefits, and realistic  alternatives discussed. Questions answered and patient/representative(s) expressed understanding.     - Discussed: Risks, Benefits and Alternatives for BOTH SEDATION and the PROCEDURE were discussed     - Discussed with:  Patient      - Extended Intubation/Ventilatory Support Discussed: No.      - Patient is DNR/DNI Status: No    Use of blood products discussed: No .     Postoperative Care            Comments:    Other Comments: H&P 9/26 [place]    Recent hypokalemia, potassium ordered for DOS            Janay Galindo, APRN CRNA

## 2022-09-23 NOTE — PROGRESS NOTES
[facility] [place]  [address]  [place] [address]  Phone: [phone]  Primary Provider: Apryl Blackburn  Pre-op Performing Provider: APRYL BLACKBURN      PREOPERATIVE EVALUATION:  Today's date: 9/26/2022    Autumn Holbrook is a 47 year old female who presents for a preoperative evaluation.    Surgical Information:  Surgery/Procedure: Colonoscopy  [place] [facility] [place] Dr. Zhu   Surgery Date: 9/29/22  Time of Surgery:   Where patient plans to recover: At home with family  Fax number for surgical facility: Note does not need to be faxed, will be available electronically in Epic.    Type of Anesthesia Anticipated: Local with MAC    Assessment & Plan     The proposed surgical procedure is considered LOW risk.    Preop general physical exam  preop for colon screening negative family hx.  No bowel changes, screening is being done.        Implanted Device:   - Type of device: librae Patient advised to bring device information on day of surgery.      Risks and Recommendations:  The patient has the following additional risks and recommendations for perioperative complications:   - Morbid obesity (BMI >40)  Diabetes:  - Patient is not on insulin therapy: regular NPO guidelines can be followed.     Medication Instructions:   - GLP-1 Injectable (exenitide, liraglutide, semaglutide, dulaglutide, etc.): HOLD day of surgery    - ibuprofen (Advil, Motrin): HOLD 1 day before surgery.     RECOMMENDATION:  APPROVAL GIVEN to proceed with proposed procedure, without further diagnostic evaluation.    Review of external notes as documented above   Review of the result(s) of each unique test - her a1c and consult from diabetic Ed.     Labs are pending.       10  minutes spent on the date of the encounter doing chart review, history and exam, documentation and further activities per the note        Subjective     HPI related to upcoming procedure: pt will be having screening colonoscopy.  Has many  complicated medical mental health conditions.  Review of medications and today reviewed medications and if she can take them pre op.       Preop Questions 9/26/2022   1. Have you ever had a heart attack or stroke? No   2. Have you ever had surgery on your heart or blood vessels, such as a stent placement, a coronary artery bypass, or surgery on an artery in your head, neck, heart, or legs? No   3. Do you have chest pain with activity? No   4. Do you have a history of  heart failure? No   5. Do you currently have a cold, bronchitis or symptoms of other infection? No   6. Do you have a cough, shortness of breath, or wheezing? No   7. Do you or anyone in your family have previous history of blood clots? No   8. Do you or does anyone in your family have a serious bleeding problem such as prolonged bleeding following surgeries or cuts? YES - Mom- Anemia    9. Have you ever had problems with anemia or been told to take iron pills? No   10. Have you had any abnormal blood loss such as black, tarry or bloody stools, or abnormal vaginal bleeding? No   11. Have you ever had a blood transfusion? YES - Plasma   11a. Have you ever had a transfusion reaction? No   12. Are you willing to have a blood transfusion if it is medically needed before, during, or after your surgery? Yes   13. Have you or any of your relatives ever had problems with anesthesia? YES - Gets very tense after waking up like stone    14. Do you have sleep apnea, excessive snoring or daytime drowsiness? No   15. Do you have any artifical heart valves or other implanted medical devices like a pacemaker, defibrillator, or continuous glucose monitor? YES - Wendy 2 glucose monitor    15a. What type of device do you have? Wendy 2   15b. Name of the clinic that manages your device:  [facility]   16. Do you have artificial joints? No   17. Are you allergic to latex? No   18. Is there any chance that you may be pregnant? No     Health Care Directive:  Patient does not  have a Health Care Directive or Living Will: Discussed advance care planning with patient; however, patient declined at this time.    Preoperative Review of :   reviewed - controlled substances reflected in medication list.      Status of Chronic Conditions:  See problem list for active medical problems.  Problems all longstanding and stable, except as noted/documented.  See ROS for pertinent symptoms related to these conditions.    DEPRESSION - Patient has a long history of Depression of moderate severity requiring medication for control with recent symptoms being stable..Current symptoms of depression include depressed mood, fatigue, feelings of guilt, difficulty with concentration, anxiety, sleep disturbance taken off her medications. .     DIABETES - Patient has a longstanding history of DiabetesType Type II . Patient is being treated with diet, oral agents, exercise and GLP 1 and denies significant side effects. Control has been good. Complicating factors include but are not limited to: hypertension, hyperlipidemia, morbid obesity  and tobacco use.     HYPERTENSION - Patient has longstanding history of HTN , currently denies any symptoms referable to elevated blood pressure. Specifically denies chest pain, palpitations, dyspnea, orthopnea, PND or peripheral edema. Blood pressure readings have been in normal range. Current medication regimen is as listed below. Patient denies any side effects of medication.     HYPOTHYROIDISM - Patient has a longstanding history of chronic Hypothyroidism. Patient has been doing well, noting no tremor, insomnia, hair loss or changes in skin texture. Continues to take medications as directed, without adverse reactions or side effects. Last TSH   Lab Results   Component Value Date    TSH <0.01 (L) 06/09/2022   .      Recheck on TSH today. Dose change in July.     Review of Systems  CONSTITUTIONAL: NEGATIVE for fever, chills, change in weight  INTEGUMENTARY/SKIN: NEGATIVE for  worrisome rashes, moles or lesions  ENT/MOUTH: NEGATIVE for ear, mouth and throat problems  RESP: NEGATIVE for significant cough or SOB  CV: NEGATIVE for chest pain, palpitations or peripheral edema  MUSCULOSKELETAL: NEGATIVE for significant arthralgias or myalgia  NEURO: NEGATIVE for weakness, dizziness or paresthesias  PSYCHIATRIC: NEGATIVE for changes in mood or affect    Patient Active Problem List    Diagnosis Date Noted     Diabetes mellitus, type 2 (H) 12/09/2021     Priority: Medium     Grief 01/18/2021     Priority: Medium     GERD (gastroesophageal reflux disease) 12/13/2019     Priority: Medium     Morbid obesity (H) 12/13/2019     Priority: Medium     Obesity 09/21/2018     Priority: Medium     Trigger finger of both hands 05/17/2018     Priority: Medium     IMO Regulatory Load OCT 2020       Routine general medical examination at a health care facility 04/03/2017     Priority: Medium     Advance Care Planning 4/3/2017: ACP Review of Chart / Resources Provided:  Reviewed chart for advance care plan.  Ilene BATISTA Sheron has no plan or code status on file. Discussed available resources and provided with information.   Added by Annette Araiza          Overview:   Pap smear:5-21-08  Cholesterol 06-09-08       Tear of lateral cartilage or meniscus of knee, current, right, initial encounter 04/03/2017     Priority: Medium     Chronic pain syndrome 04/03/2017     Priority: Medium     Opioid dependence in remission (H) 04/03/2017     Priority: Medium     On suboxone.        PTSD (post-traumatic stress disorder) 04/03/2017     Priority: Medium     COY (generalized anxiety disorder) 04/03/2017     Priority: Medium     Moderate episode of recurrent major depressive disorder (H) 04/03/2017     Priority: Medium     Bilateral edema of lower extremity 04/03/2017     Priority: Medium     Hypothyroid 04/03/2017     Priority: Medium     Mild mixed bipolar I disorder (H) 01/13/2010     Priority: Medium      Past Medical  History:   Diagnosis Date     Anxiety      Arthritis      Depressive disorder      Migraine      Osteoarthritis      Thyroid disease      Past Surgical History:   Procedure Laterality Date     CHOLECYSTECTOMY       GYN SURGERY       SECTION [id number]     GYN SURGERY  2004    LEEP     Current Outpatient Medications   Medication Sig Dispense Refill     Acetaminophen (TYLENOL PO) Take 500 mg by mouth every 4 hours as needed for mild pain or fever       asenapine (SAPHRIS) 2.5 MG SUBL sublingual tablet Place 2.5 mg under the tongue daily       atomoxetine (STRATTERA) 60 MG capsule Take 60 mg by mouth daily       baclofen (LIORESAL) 10 MG tablet TAKE 1 TABLET BY MOUTH THREE TIMES DAILY 90 tablet 0     bisacodyl (DULCOLAX) 5 MG EC tablet Take 2 tabs at bedtime 2 nights prior to procedure. Take 2 tabs at 3pm the day before procedure. 4 tablet 0     blood glucose (ACCU-CHEK GUIDE) test strip Use to test blood sugar 1 times daily 50 strip 3     blood glucose (NO BRAND SPECIFIED) lancets standard Use to test blood sugar 1 times daily 100 each 5     blood glucose (NO BRAND SPECIFIED) test strip Use to test blood sugar 1 time daily 50 strip 5     blood glucose monitoring (NO BRAND SPECIFIED) meter device kit Use to test blood sugar 1 time daily 1 kit 0     blood glucose monitoring (SOFTCLIX) lancets USE 1 TO CHECK GLUCOSE ONCE DAILY       buprenorphine HCl-naloxone HCl (SUBOXONE) 2-0.5 MG per film Place 1 Film under the tongue daily   0     chlorhexidine (HIBICLENS) 4 % liquid Was affected areas in show every other day 236 mL 4     Continuous Blood Gluc  (FREESTYLE POLO 2 READER) RENA 1 each continuous 1 each 0     Continuous Blood Gluc Sensor (FREESTYLE POLO 2 SENSOR) MISC 1 each every 14 days 2 each 11     diclofenac (VOLTAREN) 1 % topical gel APPLY TOPICALLY TO KNEES AS DIRECTED       dulaglutide (TRULICITY) 0.75 MG/0.5ML pen Inject 0.75 mg Subcutaneous every 7 days 2 mL 3     dulaglutide (TRULICITY)  1.5 MG/0.5ML pen Inject 1.5 mg Subcutaneous every 7 days 2 mL 3     EUTHYROX 175 MCG tablet Take 1 tablet by mouth once daily 90 tablet 0     furosemide (LASIX) 20 MG tablet TAKE 2 TABLETS BY MOUTH ONCE DAILY AS NEEDED LEG  SWELLING 60 tablet 0     hydrochlorothiazide (HYDRODIURIL) 25 MG tablet Take 1 tablet by mouth once daily 90 tablet 0     HYDROXYZINE HCL PO Take 25 mg by mouth 4 times daily as needed for itching       hylan (SYNVISC) 16 MG/2ML injection 16 mg by INTRA-ARTICULAR route every 6 months       ibuprofen (ADVIL/MOTRIN) 800 MG tablet Take 1 tablet (800 mg) by mouth every 8 hours as needed for moderate pain 90 tablet 0     Melatonin 10 MG CAPS Take 10 mg by mouth daily       NARCAN 4 MG/0.1ML nasal spray   0     NEW MED 0.5 mLs Tangerine Vaporizer Oil Cartridge 0.5ml Inhale 1 to 3 puffs up to 5x per day as needed       omeprazole (PRILOSEC) 40 MG DR capsule Take 1 capsule by mouth once daily 30 capsule 0     oxybutynin ER (DITROPAN XL) 5 MG 24 hr tablet Take 1 tablet (5 mg) by mouth daily 30 tablet 1     phentermine (ADIPEX-P) 30 MG capsule Take 1 capsule by mouth in the morning 30 capsule 0     polyethylene glycol (GOLYTELY) 236 g suspension Drink 8 ounces every 10 minutes until the jug is half-empty at 6pm the night before procedure. Refrigerate. Repeat 6 hours prior to procedure. 4000 mL 0     potassium chloride ER (K-TAB) 20 MEQ CR tablet 20 meq every 2 hours for 4 doses, then 2 times a day for 5 days 90 tablet 3     potassium chloride ER (K-TAB/KLOR-CON) 10 MEQ CR tablet TAKE 1 TABLET BY MOUTH ONCE DAILY WHEN  TAKING  LASIX  (FUROSEMIDE) 30 tablet 3     rosuvastatin (CRESTOR) 5 MG tablet Take 1 tablet by mouth once daily 90 tablet 3     sertraline (ZOLOFT) 50 MG tablet TAKE 1 TABLET BY MOUTH ONCE DAILY IN THE MORNING       topiramate (TOPAMAX) 50 MG tablet TAKE 1 TO 2 TABLETS BY MOUTH ONCE DAILY AT BEDTIME 60 tablet 3     VRAYLAR 3 MG CAPS capsule TAKE 1 CAPSULE BY MOUTH ONCE DAILY    "      Allergies   Allergen Reactions     Depakote [Valproic Acid]      Gabapentin Swelling     Propofol Unknown     Got very stiff and tight.         Social History     Tobacco Use     Smoking status: Current Every Day Smoker     Packs/day: 0.50     Years: 32.00     Pack years: 16.00     Types: Cigarettes     Start date: 1/1/1989     Smokeless tobacco: Never Used     Tobacco comment: pt declines   Substance Use Topics     Alcohol use: No     Family History   Problem Relation Age of Onset     Cerebrovascular Disease Mother      Alcoholism Mother      Cancer Mother      Depression Mother      Eczema Mother      Hypertension Mother      Migraines Mother      Mental Illness Mother      Osteoarthritis Mother      Osteoporosis Mother      Alcoholism Father      Cancer Father      Hyperlipidemia Father      Hypertension Father      Myocardial Infarction Father      Mental Illness Sister      Migraines Sister      History   Drug Use     Types: Marijuana     Comment: daily-medical         Objective     /80 (BP Location: Right arm, Patient Position: Chair)   Pulse 80   Temp 97.4  F (36.3  C)   Resp 16   Ht 1.6 m (5' 3\")   Wt 119.7 kg (264 lb)   SpO2 98%   BMI 46.77 kg/m      Physical Exam    GENERAL APPEARANCE: healthy, alert and no distress     EYES: EOMI, PERRL     HENT: ear canals and TM's normal and nose and mouth without ulcers or lesions     NECK: no adenopathy, no asymmetry, masses, or scars and thyroid normal to palpation     RESP: lungs clear to auscultation - no rales, rhonchi or wheezes     CV: regular rates and rhythm, normal S1 S2, no S3 or S4 and no murmur, click or rub     ABDOMEN:  soft, nontender, no HSM or masses and bowel sounds normal     MS: extremities normal- no gross deformities noted, no evidence of inflammation in joints, FROM in all extremities.     SKIN: no suspicious lesions or rashes     NEURO: Normal strength and tone, sensory exam grossly normal, mentation intact and speech " normal     PSYCH: mentation appears normal. and affect normal/bright     LYMPHATICS: No cervical adenopathy     Has leg swelling.       Recent Labs   Lab Test 08/15/22  1104 08/01/22  0954 07/21/22  1614 07/11/22  1157 06/01/22  1355 07/26/21  1720 05/19/21  0137   HGB  --  14.4  --  15.8*  --   --  13.5   PLT  --  308  --  327  --   --  306   INR  --   --   --   --   --   --  1.11    137  --  136  --    < > 137   POTASSIUM 3.3* 2.8*   < > 2.9*  --    < > 3.5   CR 0.76 0.68  --  0.82  --    < > 0.78   A1C  --  6.6*  --   --  6.9*   < >  --     < > = values in this interval not displayed.        Diagnostics:  Labs pending at this time.  Results will be reviewed when available.       Revised Cardiac Risk Index (RCRI):  The patient has the following serious cardiovascular risks for perioperative complications:   - No serious cardiac risks = 0 points     RCRI Interpretation: 0 points: Class I (very low risk - 0.4% complication rate)           Signed Electronically by: SEVEN Bryant  Copy of this evaluation report is provided to requesting physician.

## 2022-09-23 NOTE — H&P (VIEW-ONLY)
[facility] [place]  [address]  [place] [address]  Phone: [phone]  Primary Provider: Apryl Blackburn  Pre-op Performing Provider: APRYL BLACKBURN      PREOPERATIVE EVALUATION:  Today's date: 9/26/2022    Autumn Holbrook is a 47 year old female who presents for a preoperative evaluation.    Surgical Information:  Surgery/Procedure: Colonoscopy  [place] [facility] [place]: Dr. Zhu   Surgery Date: 9/29/22  Time of Surgery:   Where patient plans to recover: At home with family  Fax number for surgical facility: Note does not need to be faxed, will be available electronically in Epic.    Type of Anesthesia Anticipated: Local with MAC    Assessment & Plan     The proposed surgical procedure is considered LOW risk.    Preop general physical exam  preop for colon screening negative family hx.  No bowel changes, screening is being done.        Implanted Device:   - Type of device: librae Patient advised to bring device information on day of surgery.      Risks and Recommendations:  The patient has the following additional risks and recommendations for perioperative complications:   - Morbid obesity (BMI >40)  Diabetes:  - Patient is not on insulin therapy: regular NPO guidelines can be followed.     Medication Instructions:   - GLP-1 Injectable (exenitide, liraglutide, semaglutide, dulaglutide, etc.): HOLD day of surgery    - ibuprofen (Advil, Motrin): HOLD 1 day before surgery.     RECOMMENDATION:  APPROVAL GIVEN to proceed with proposed procedure, without further diagnostic evaluation.    Review of external notes as documented above   Review of the result(s) of each unique test - her a1c and consult from diabetic Ed.     Labs are pending.       10  minutes spent on the date of the encounter doing chart review, history and exam, documentation and further activities per the note        Subjective     HPI related to upcoming procedure: pt will be having screening colonoscopy.  Has many  complicated medical mental health conditions.  Review of medications and today reviewed medications and if she can take them pre op.       Preop Questions 9/26/2022   1. Have you ever had a heart attack or stroke? No   2. Have you ever had surgery on your heart or blood vessels, such as a stent placement, a coronary artery bypass, or surgery on an artery in your head, neck, heart, or legs? No   3. Do you have chest pain with activity? No   4. Do you have a history of  heart failure? No   5. Do you currently have a cold, bronchitis or symptoms of other infection? No   6. Do you have a cough, shortness of breath, or wheezing? No   7. Do you or anyone in your family have previous history of blood clots? No   8. Do you or does anyone in your family have a serious bleeding problem such as prolonged bleeding following surgeries or cuts? YES - Mom- Anemia    9. Have you ever had problems with anemia or been told to take iron pills? No   10. Have you had any abnormal blood loss such as black, tarry or bloody stools, or abnormal vaginal bleeding? No   11. Have you ever had a blood transfusion? YES - Plasma   11a. Have you ever had a transfusion reaction? No   12. Are you willing to have a blood transfusion if it is medically needed before, during, or after your surgery? Yes   13. Have you or any of your relatives ever had problems with anesthesia? YES - Gets very tense after waking up like stone    14. Do you have sleep apnea, excessive snoring or daytime drowsiness? No   15. Do you have any artifical heart valves or other implanted medical devices like a pacemaker, defibrillator, or continuous glucose monitor? YES - Wendy 2 glucose monitor    15a. What type of device do you have? Wendy 2   15b. Name of the clinic that manages your device:  [facility]   16. Do you have artificial joints? No   17. Are you allergic to latex? No   18. Is there any chance that you may be pregnant? No     Health Care Directive:  Patient does not  have a Health Care Directive or Living Will: Discussed advance care planning with patient; however, patient declined at this time.    Preoperative Review of :   reviewed - controlled substances reflected in medication list.      Status of Chronic Conditions:  See problem list for active medical problems.  Problems all longstanding and stable, except as noted/documented.  See ROS for pertinent symptoms related to these conditions.    DEPRESSION - Patient has a long history of Depression of moderate severity requiring medication for control with recent symptoms being stable..Current symptoms of depression include depressed mood, fatigue, feelings of guilt, difficulty with concentration, anxiety, sleep disturbance taken off her medications. .     DIABETES - Patient has a longstanding history of DiabetesType Type II . Patient is being treated with diet, oral agents, exercise and GLP 1 and denies significant side effects. Control has been good. Complicating factors include but are not limited to: hypertension, hyperlipidemia, morbid obesity  and tobacco use.     HYPERTENSION - Patient has longstanding history of HTN , currently denies any symptoms referable to elevated blood pressure. Specifically denies chest pain, palpitations, dyspnea, orthopnea, PND or peripheral edema. Blood pressure readings have been in normal range. Current medication regimen is as listed below. Patient denies any side effects of medication.     HYPOTHYROIDISM - Patient has a longstanding history of chronic Hypothyroidism. Patient has been doing well, noting no tremor, insomnia, hair loss or changes in skin texture. Continues to take medications as directed, without adverse reactions or side effects. Last TSH   Lab Results   Component Value Date    TSH <0.01 (L) 06/09/2022   .      Recheck on TSH today. Dose change in July.     Review of Systems  CONSTITUTIONAL: NEGATIVE for fever, chills, change in weight  INTEGUMENTARY/SKIN: NEGATIVE for  worrisome rashes, moles or lesions  ENT/MOUTH: NEGATIVE for ear, mouth and throat problems  RESP: NEGATIVE for significant cough or SOB  CV: NEGATIVE for chest pain, palpitations or peripheral edema  MUSCULOSKELETAL: NEGATIVE for significant arthralgias or myalgia  NEURO: NEGATIVE for weakness, dizziness or paresthesias  PSYCHIATRIC: NEGATIVE for changes in mood or affect    Patient Active Problem List    Diagnosis Date Noted     Diabetes mellitus, type 2 (H) 12/09/2021     Priority: Medium     Grief 01/18/2021     Priority: Medium     GERD (gastroesophageal reflux disease) 12/13/2019     Priority: Medium     Morbid obesity (H) 12/13/2019     Priority: Medium     Obesity 09/21/2018     Priority: Medium     Trigger finger of both hands 05/17/2018     Priority: Medium     IMO Regulatory Load OCT 2020       Routine general medical examination at a health care facility 04/03/2017     Priority: Medium     Advance Care Planning 4/3/2017: ACP Review of Chart / Resources Provided:  Reviewed chart for advance care plan.  Ilene BATISTA Sheron has no plan or code status on file. Discussed available resources and provided with information.   Added by Annette Araiza          Overview:   Pap smear:5-21-08  Cholesterol 06-09-08       Tear of lateral cartilage or meniscus of knee, current, right, initial encounter 04/03/2017     Priority: Medium     Chronic pain syndrome 04/03/2017     Priority: Medium     Opioid dependence in remission (H) 04/03/2017     Priority: Medium     On suboxone.        PTSD (post-traumatic stress disorder) 04/03/2017     Priority: Medium     COY (generalized anxiety disorder) 04/03/2017     Priority: Medium     Moderate episode of recurrent major depressive disorder (H) 04/03/2017     Priority: Medium     Bilateral edema of lower extremity 04/03/2017     Priority: Medium     Hypothyroid 04/03/2017     Priority: Medium     Mild mixed bipolar I disorder (H) 01/13/2010     Priority: Medium      Past Medical  History:   Diagnosis Date     Anxiety      Arthritis      Depressive disorder      Migraine      Osteoarthritis      Thyroid disease      Past Surgical History:   Procedure Laterality Date     CHOLECYSTECTOMY       GYN SURGERY       SECTION 4537-2163     GYN SURGERY  2004    LEEP     Current Outpatient Medications   Medication Sig Dispense Refill     Acetaminophen (TYLENOL PO) Take 500 mg by mouth every 4 hours as needed for mild pain or fever       asenapine (SAPHRIS) 2.5 MG SUBL sublingual tablet Place 2.5 mg under the tongue daily       atomoxetine (STRATTERA) 60 MG capsule Take 60 mg by mouth daily       baclofen (LIORESAL) 10 MG tablet TAKE 1 TABLET BY MOUTH THREE TIMES DAILY 90 tablet 0     bisacodyl (DULCOLAX) 5 MG EC tablet Take 2 tabs at bedtime 2 nights prior to procedure. Take 2 tabs at 3pm the day before procedure. 4 tablet 0     blood glucose (ACCU-CHEK GUIDE) test strip Use to test blood sugar 1 times daily 50 strip 3     blood glucose (NO BRAND SPECIFIED) lancets standard Use to test blood sugar 1 times daily 100 each 5     blood glucose (NO BRAND SPECIFIED) test strip Use to test blood sugar 1 time daily 50 strip 5     blood glucose monitoring (NO BRAND SPECIFIED) meter device kit Use to test blood sugar 1 time daily 1 kit 0     blood glucose monitoring (SOFTCLIX) lancets USE 1 TO CHECK GLUCOSE ONCE DAILY       buprenorphine HCl-naloxone HCl (SUBOXONE) 2-0.5 MG per film Place 1 Film under the tongue daily   0     chlorhexidine (HIBICLENS) 4 % liquid Was affected areas in show every other day 236 mL 4     Continuous Blood Gluc  (FREESTYLE POLO 2 READER) RENA 1 each continuous 1 each 0     Continuous Blood Gluc Sensor (FREESTYLE POLO 2 SENSOR) MISC 1 each every 14 days 2 each 11     diclofenac (VOLTAREN) 1 % topical gel APPLY TOPICALLY TO KNEES AS DIRECTED       dulaglutide (TRULICITY) 0.75 MG/0.5ML pen Inject 0.75 mg Subcutaneous every 7 days 2 mL 3     dulaglutide (TRULICITY)  1.5 MG/0.5ML pen Inject 1.5 mg Subcutaneous every 7 days 2 mL 3     EUTHYROX 175 MCG tablet Take 1 tablet by mouth once daily 90 tablet 0     furosemide (LASIX) 20 MG tablet TAKE 2 TABLETS BY MOUTH ONCE DAILY AS NEEDED LEG  SWELLING 60 tablet 0     hydrochlorothiazide (HYDRODIURIL) 25 MG tablet Take 1 tablet by mouth once daily 90 tablet 0     HYDROXYZINE HCL PO Take 25 mg by mouth 4 times daily as needed for itching       hylan (SYNVISC) 16 MG/2ML injection 16 mg by INTRA-ARTICULAR route every 6 months       ibuprofen (ADVIL/MOTRIN) 800 MG tablet Take 1 tablet (800 mg) by mouth every 8 hours as needed for moderate pain 90 tablet 0     Melatonin 10 MG CAPS Take 10 mg by mouth daily       NARCAN 4 MG/0.1ML nasal spray   0     NEW MED 0.5 mLs Tangerine Vaporizer Oil Cartridge 0.5ml Inhale 1 to 3 puffs up to 5x per day as needed       omeprazole (PRILOSEC) 40 MG DR capsule Take 1 capsule by mouth once daily 30 capsule 0     oxybutynin ER (DITROPAN XL) 5 MG 24 hr tablet Take 1 tablet (5 mg) by mouth daily 30 tablet 1     phentermine (ADIPEX-P) 30 MG capsule Take 1 capsule by mouth in the morning 30 capsule 0     polyethylene glycol (GOLYTELY) 236 g suspension Drink 8 ounces every 10 minutes until the jug is half-empty at 6pm the night before procedure. Refrigerate. Repeat 6 hours prior to procedure. 4000 mL 0     potassium chloride ER (K-TAB) 20 MEQ CR tablet 20 meq every 2 hours for 4 doses, then 2 times a day for 5 days 90 tablet 3     potassium chloride ER (K-TAB/KLOR-CON) 10 MEQ CR tablet TAKE 1 TABLET BY MOUTH ONCE DAILY WHEN  TAKING  LASIX  (FUROSEMIDE) 30 tablet 3     rosuvastatin (CRESTOR) 5 MG tablet Take 1 tablet by mouth once daily 90 tablet 3     sertraline (ZOLOFT) 50 MG tablet TAKE 1 TABLET BY MOUTH ONCE DAILY IN THE MORNING       topiramate (TOPAMAX) 50 MG tablet TAKE 1 TO 2 TABLETS BY MOUTH ONCE DAILY AT BEDTIME 60 tablet 3     VRAYLAR 3 MG CAPS capsule TAKE 1 CAPSULE BY MOUTH ONCE DAILY    "      Allergies   Allergen Reactions     Depakote [Valproic Acid]      Gabapentin Swelling     Propofol Unknown     Got very stiff and tight.         Social History     Tobacco Use     Smoking status: Current Every Day Smoker     Packs/day: 0.50     Years: 32.00     Pack years: 16.00     Types: Cigarettes     Start date: 1/1/1989     Smokeless tobacco: Never Used     Tobacco comment: pt declines   Substance Use Topics     Alcohol use: No     Family History   Problem Relation Age of Onset     Cerebrovascular Disease Mother      Alcoholism Mother      Cancer Mother      Depression Mother      Eczema Mother      Hypertension Mother      Migraines Mother      Mental Illness Mother      Osteoarthritis Mother      Osteoporosis Mother      Alcoholism Father      Cancer Father      Hyperlipidemia Father      Hypertension Father      Myocardial Infarction Father      Mental Illness Sister      Migraines Sister      History   Drug Use     Types: Marijuana     Comment: daily-medical         Objective     /80 (BP Location: Right arm, Patient Position: Chair)   Pulse 80   Temp 97.4  F (36.3  C)   Resp 16   Ht 1.6 m (5' 3\")   Wt 119.7 kg (264 lb)   SpO2 98%   BMI 46.77 kg/m      Physical Exam    GENERAL APPEARANCE: healthy, alert and no distress     EYES: EOMI, PERRL     HENT: ear canals and TM's normal and nose and mouth without ulcers or lesions     NECK: no adenopathy, no asymmetry, masses, or scars and thyroid normal to palpation     RESP: lungs clear to auscultation - no rales, rhonchi or wheezes     CV: regular rates and rhythm, normal S1 S2, no S3 or S4 and no murmur, click or rub     ABDOMEN:  soft, nontender, no HSM or masses and bowel sounds normal     MS: extremities normal- no gross deformities noted, no evidence of inflammation in joints, FROM in all extremities.     SKIN: no suspicious lesions or rashes     NEURO: Normal strength and tone, sensory exam grossly normal, mentation intact and speech " normal     PSYCH: mentation appears normal. and affect normal/bright     LYMPHATICS: No cervical adenopathy     Has leg swelling.       Recent Labs   Lab Test 08/15/22  1104 08/01/22  0954 07/21/22  1614 07/11/22  1157 06/01/22  1355 07/26/21  1720 05/19/21  0137   HGB  --  14.4  --  15.8*  --   --  13.5   PLT  --  308  --  327  --   --  306   INR  --   --   --   --   --   --  1.11    137  --  136  --    < > 137   POTASSIUM 3.3* 2.8*   < > 2.9*  --    < > 3.5   CR 0.76 0.68  --  0.82  --    < > 0.78   A1C  --  6.6*  --   --  6.9*   < >  --     < > = values in this interval not displayed.        Diagnostics:  Labs pending at this time.  Results will be reviewed when available.       Revised Cardiac Risk Index (RCRI):  The patient has the following serious cardiovascular risks for perioperative complications:   - No serious cardiac risks = 0 points     RCRI Interpretation: 0 points: Class I (very low risk - 0.4% complication rate)           Signed Electronically by: SEVEN Bryant  Copy of this evaluation report is provided to requesting physician.

## 2022-09-26 ENCOUNTER — OFFICE VISIT (OUTPATIENT)
Dept: FAMILY MEDICINE | Facility: OTHER | Age: 47
End: 2022-09-26
Attending: PHYSICIAN ASSISTANT
Payer: COMMERCIAL

## 2022-09-26 ENCOUNTER — TELEPHONE (OUTPATIENT)
Dept: EDUCATION SERVICES | Facility: HOSPITAL | Age: 47
End: 2022-09-26

## 2022-09-26 VITALS
WEIGHT: 264 LBS | TEMPERATURE: 97.4 F | SYSTOLIC BLOOD PRESSURE: 115 MMHG | OXYGEN SATURATION: 98 % | HEIGHT: 63 IN | BODY MASS INDEX: 46.78 KG/M2 | HEART RATE: 80 BPM | RESPIRATION RATE: 16 BRPM | DIASTOLIC BLOOD PRESSURE: 80 MMHG

## 2022-09-26 DIAGNOSIS — Z01.818 PREOP GENERAL PHYSICAL EXAM: Primary | ICD-10-CM

## 2022-09-26 DIAGNOSIS — E03.9 ACQUIRED HYPOTHYROIDISM: Primary | ICD-10-CM

## 2022-09-26 DIAGNOSIS — E03.9 ACQUIRED HYPOTHYROIDISM: ICD-10-CM

## 2022-09-26 DIAGNOSIS — E87.6 HYPOKALEMIA: ICD-10-CM

## 2022-09-26 DIAGNOSIS — Z86.69 HISTORY OF MIGRAINE HEADACHES: ICD-10-CM

## 2022-09-26 DIAGNOSIS — R60.0 BILATERAL EDEMA OF LOWER EXTREMITY: ICD-10-CM

## 2022-09-26 LAB
ANION GAP SERPL CALCULATED.3IONS-SCNC: 4 MMOL/L (ref 3–14)
BUN SERPL-MCNC: 14 MG/DL (ref 7–30)
CALCIUM SERPL-MCNC: 8.6 MG/DL (ref 8.5–10.1)
CHLORIDE BLD-SCNC: 103 MMOL/L (ref 94–109)
CO2 SERPL-SCNC: 28 MMOL/L (ref 20–32)
CREAT SERPL-MCNC: 0.7 MG/DL (ref 0.52–1.04)
GFR SERPL CREATININE-BSD FRML MDRD: >90 ML/MIN/1.73M2
GLUCOSE BLD-MCNC: 105 MG/DL (ref 70–99)
POTASSIUM BLD-SCNC: 3.3 MMOL/L (ref 3.4–5.3)
SODIUM SERPL-SCNC: 135 MMOL/L (ref 133–144)
T4 FREE SERPL-MCNC: 1.3 NG/DL (ref 0.76–1.46)
TSH SERPL DL<=0.005 MIU/L-ACNC: 0.01 MU/L (ref 0.4–4)

## 2022-09-26 PROCEDURE — 93005 ELECTROCARDIOGRAM TRACING: CPT | Performed by: PHYSICIAN ASSISTANT

## 2022-09-26 PROCEDURE — 36415 COLL VENOUS BLD VENIPUNCTURE: CPT | Mod: ZL | Performed by: PHYSICIAN ASSISTANT

## 2022-09-26 PROCEDURE — 84443 ASSAY THYROID STIM HORMONE: CPT | Mod: ZL | Performed by: PHYSICIAN ASSISTANT

## 2022-09-26 PROCEDURE — 93010 ELECTROCARDIOGRAM REPORT: CPT | Performed by: INTERNAL MEDICINE

## 2022-09-26 PROCEDURE — 82310 ASSAY OF CALCIUM: CPT | Mod: ZL | Performed by: PHYSICIAN ASSISTANT

## 2022-09-26 PROCEDURE — G0463 HOSPITAL OUTPT CLINIC VISIT: HCPCS

## 2022-09-26 PROCEDURE — 99214 OFFICE O/P EST MOD 30 MIN: CPT | Mod: 25 | Performed by: PHYSICIAN ASSISTANT

## 2022-09-26 PROCEDURE — 84439 ASSAY OF FREE THYROXINE: CPT | Mod: ZL | Performed by: PHYSICIAN ASSISTANT

## 2022-09-26 RX ORDER — POTASSIUM CHLORIDE 1500 MG/1
TABLET, EXTENDED RELEASE ORAL
Qty: 90 TABLET | Refills: 3 | Status: SHIPPED | OUTPATIENT
Start: 2022-09-26 | End: 2023-12-19

## 2022-09-26 RX ORDER — LEVOTHYROXINE SODIUM 150 UG/1
150 TABLET ORAL DAILY
Qty: 90 TABLET | Refills: 0 | Status: ON HOLD | OUTPATIENT
Start: 2022-09-26 | End: 2022-09-29

## 2022-09-26 RX ORDER — POTASSIUM CHLORIDE 750 MG/1
TABLET, EXTENDED RELEASE ORAL
Qty: 30 TABLET | Refills: 3 | Status: SHIPPED | OUTPATIENT
Start: 2022-09-26 | End: 2023-05-31

## 2022-09-26 ASSESSMENT — PAIN SCALES - GENERAL: PAINLEVEL: EXTREME PAIN (8)

## 2022-09-26 NOTE — TELEPHONE ENCOUNTER
Pt called she put her Wendy sensor on last week and bumped it on her cupboard and is now unable to read it on her phone. She would like advice and has an insurance issue to discuss regarding this.

## 2022-09-26 NOTE — LETTER
"[facility] [place]  [address]  [place] [address]  Phone: [phone]    September 26, 2022        Autumn Holbrook  [address] [facility] [place] [address]          To whom it may concern:    RE: Autumn Leyva has been on Topamax for over 2 years. She started taking this for her chronic pain and sleep as well as weight control.  She was taken off this by the insurance company as it is not to be used for weight loss.  Since going off this she has had very poor sleeping as well as return of her headaches she had previously.  This past week had 3 headaches and one day was spend in bed the entire day due to pain and throbbing of her \"migraine\"  .  We did not have Migraine listed on the list of problems it was included in Chronic pain .   Please reconsider giving Autumn back her Topamax 50 mg to 100 mg at bedtime for her chronic pain and Migraine prevention.      Please contact me for questions or concerns.      Sincerely,        SEVEN Bryant  "

## 2022-09-26 NOTE — NURSING NOTE
"Chief Complaint   Patient presents with     Pre-Op Exam       Initial /80 (BP Location: Right arm, Patient Position: Chair)   Pulse 80   Temp 97.4  F (36.3  C)   Resp 16   Ht 1.6 m (5' 3\")   Wt 119.7 kg (264 lb)   SpO2 98%   BMI 46.77 kg/m   Estimated body mass index is 46.77 kg/m  as calculated from the following:    Height as of this encounter: 1.6 m (5' 3\").    Weight as of this encounter: 119.7 kg (264 lb).  Medication Reconciliation: complete  Randi Richard LPN    "

## 2022-09-27 ENCOUNTER — ALLIED HEALTH/NURSE VISIT (OUTPATIENT)
Dept: FAMILY MEDICINE | Facility: OTHER | Age: 47
End: 2022-09-27
Attending: PHYSICIAN ASSISTANT
Payer: COMMERCIAL

## 2022-09-27 DIAGNOSIS — Z01.812 ENCOUNTER FOR PREPROCEDURE SCREENING LABORATORY TESTING FOR COVID-19: Primary | ICD-10-CM

## 2022-09-27 DIAGNOSIS — Z11.52 ENCOUNTER FOR PREPROCEDURE SCREENING LABORATORY TESTING FOR COVID-19: Primary | ICD-10-CM

## 2022-09-27 DIAGNOSIS — E66.01 MORBID OBESITY (H): ICD-10-CM

## 2022-09-27 DIAGNOSIS — N39.45 CONTINUOUS LEAKAGE OF URINE: ICD-10-CM

## 2022-09-27 LAB — SARS-COV-2 RNA RESP QL NAA+PROBE: NEGATIVE

## 2022-09-27 PROCEDURE — U0003 INFECTIOUS AGENT DETECTION BY NUCLEIC ACID (DNA OR RNA); SEVERE ACUTE RESPIRATORY SYNDROME CORONAVIRUS 2 (SARS-COV-2) (CORONAVIRUS DISEASE [COVID-19]), AMPLIFIED PROBE TECHNIQUE, MAKING USE OF HIGH THROUGHPUT TECHNOLOGIES AS DESCRIBED BY CMS-2020-01-R: HCPCS | Mod: ZL

## 2022-09-27 NOTE — TELEPHONE ENCOUNTER
Attempted return call.   No answer, generic return call message left on voicemail.   Pt is reporting her sensor is not working after it was bumped. Pt should contact [employer]- [phone].     Pt has insurance issue she would like to discuss.   Will await for return call.    Kinga Beckwith RN Diabetes Educator,  [phone]  9/27/2022 at 3:17 PM

## 2022-09-28 NOTE — TELEPHONE ENCOUNTER
Spoke with Autumn today RE: Wendy sensor.   She plans to remove the current sensor as she is not getting any readings from the sensor after bumping her arm. Has been checking with glucometer.   Will replace with the sensor she has at home.   Pt will call customer service to see if they are able to help with a replacement sensor.     Pt shares she was told her insurance only gave this as a temporary use. CGM is covered under Part B but does not have Part B.     Tammy HAWKINS.    PJ to follow up.   Kinga Beckwith RN Diabetes Educator,  [phone]  9/28/2022 at 2:11 PM

## 2022-09-29 ENCOUNTER — ANESTHESIA (OUTPATIENT)
Dept: SURGERY | Facility: HOSPITAL | Age: 47
End: 2022-09-29
Payer: COMMERCIAL

## 2022-09-29 ENCOUNTER — APPOINTMENT (OUTPATIENT)
Dept: LAB | Facility: HOSPITAL | Age: 47
End: 2022-09-29
Attending: SURGERY
Payer: COMMERCIAL

## 2022-09-29 ENCOUNTER — HOSPITAL ENCOUNTER (OUTPATIENT)
Facility: HOSPITAL | Age: 47
Discharge: HOME | End: 2022-09-29
Attending: SURGERY | Admitting: SURGERY
Payer: COMMERCIAL

## 2022-09-29 VITALS
HEART RATE: 86 BPM | SYSTOLIC BLOOD PRESSURE: 115 MMHG | OXYGEN SATURATION: 96 % | HEIGHT: 63 IN | RESPIRATION RATE: 16 BRPM | WEIGHT: 260 LBS | TEMPERATURE: 97.2 F | DIASTOLIC BLOOD PRESSURE: 74 MMHG | BODY MASS INDEX: 46.07 KG/M2

## 2022-09-29 LAB
HCG UR QL: NEGATIVE
POTASSIUM BLD-SCNC: 3.1 MMOL/L (ref 3.4–5.3)

## 2022-09-29 PROCEDURE — 45378 DIAGNOSTIC COLONOSCOPY: CPT | Performed by: NURSE ANESTHETIST, CERTIFIED REGISTERED

## 2022-09-29 PROCEDURE — 84132 ASSAY OF SERUM POTASSIUM: CPT | Performed by: NURSE ANESTHETIST, CERTIFIED REGISTERED

## 2022-09-29 PROCEDURE — 360N000075 HC SURGERY LEVEL 2, PER MIN: Performed by: SURGERY

## 2022-09-29 PROCEDURE — 999N000141 HC STATISTIC PRE-PROCEDURE NURSING ASSESSMENT: Performed by: SURGERY

## 2022-09-29 PROCEDURE — 370N000017 HC ANESTHESIA TECHNICAL FEE, PER MIN: Performed by: SURGERY

## 2022-09-29 PROCEDURE — G0121 COLON CA SCRN NOT HI RSK IND: HCPCS | Performed by: SURGERY

## 2022-09-29 PROCEDURE — 250N000011 HC RX IP 250 OP 636: Performed by: NURSE ANESTHETIST, CERTIFIED REGISTERED

## 2022-09-29 PROCEDURE — 36415 COLL VENOUS BLD VENIPUNCTURE: CPT | Performed by: NURSE ANESTHETIST, CERTIFIED REGISTERED

## 2022-09-29 PROCEDURE — 710N000012 HC RECOVERY PHASE 2, PER MINUTE: Performed by: SURGERY

## 2022-09-29 PROCEDURE — 250N000009 HC RX 250: Performed by: NURSE ANESTHETIST, CERTIFIED REGISTERED

## 2022-09-29 PROCEDURE — 81025 URINE PREGNANCY TEST: CPT | Performed by: NURSE ANESTHETIST, CERTIFIED REGISTERED

## 2022-09-29 RX ORDER — ONDANSETRON 2 MG/ML
4 INJECTION INTRAMUSCULAR; INTRAVENOUS EVERY 30 MIN PRN
Status: DISCONTINUED | OUTPATIENT
Start: 2022-09-29 | End: 2022-09-29 | Stop reason: HOSPADM

## 2022-09-29 RX ORDER — ONDANSETRON 4 MG/1
4 TABLET, ORALLY DISINTEGRATING ORAL EVERY 30 MIN PRN
Status: DISCONTINUED | OUTPATIENT
Start: 2022-09-29 | End: 2022-09-29 | Stop reason: HOSPADM

## 2022-09-29 RX ORDER — PROPOFOL 10 MG/ML
INJECTION, EMULSION INTRAVENOUS PRN
Status: DISCONTINUED | OUTPATIENT
Start: 2022-09-29 | End: 2022-09-29

## 2022-09-29 RX ORDER — SODIUM CHLORIDE, SODIUM LACTATE, POTASSIUM CHLORIDE, CALCIUM CHLORIDE 600; 310; 30; 20 MG/100ML; MG/100ML; MG/100ML; MG/100ML
INJECTION, SOLUTION INTRAVENOUS CONTINUOUS
Status: DISCONTINUED | OUTPATIENT
Start: 2022-09-29 | End: 2022-09-29 | Stop reason: HOSPADM

## 2022-09-29 RX ORDER — LIDOCAINE HYDROCHLORIDE 20 MG/ML
INJECTION, SOLUTION INFILTRATION; PERINEURAL PRN
Status: DISCONTINUED | OUTPATIENT
Start: 2022-09-29 | End: 2022-09-29

## 2022-09-29 RX ORDER — LIDOCAINE 40 MG/G
CREAM TOPICAL
Status: DISCONTINUED | OUTPATIENT
Start: 2022-09-29 | End: 2022-09-29 | Stop reason: HOSPADM

## 2022-09-29 RX ADMIN — LIDOCAINE HYDROCHLORIDE 80 MG: 20 INJECTION, SOLUTION INFILTRATION; PERINEURAL at 09:36

## 2022-09-29 RX ADMIN — PROPOFOL 100 MG: 10 INJECTION, EMULSION INTRAVENOUS at 09:41

## 2022-09-29 RX ADMIN — PROPOFOL 100 MG: 10 INJECTION, EMULSION INTRAVENOUS at 09:36

## 2022-09-29 RX ADMIN — PROPOFOL 50 MG: 10 INJECTION, EMULSION INTRAVENOUS at 09:48

## 2022-09-29 RX ADMIN — PROPOFOL 50 MG: 10 INJECTION, EMULSION INTRAVENOUS at 09:43

## 2022-09-29 RX ADMIN — MIDAZOLAM 2 MG: 1 INJECTION INTRAMUSCULAR; INTRAVENOUS at 09:41

## 2022-09-29 RX ADMIN — PROPOFOL 50 MG: 10 INJECTION, EMULSION INTRAVENOUS at 09:46

## 2022-09-29 ASSESSMENT — ACTIVITIES OF DAILY LIVING (ADL): ADLS_ACUITY_SCORE: 33

## 2022-09-29 NOTE — DISCHARGE INSTRUCTIONS
UPPER ENDOSCOPY    AFTER THE PROCEDURE  You will return to Same Day Surgery to rest for about an hour before you go home.  The doctor will talk with you and your family.  A family member/friend may visit with you.  You may burp up any air remaining in your stomach.  You may feel dizzy or light-headed from the medicine.  Your nurse will go over the discharge instructions with you and your caregiver and answer any of your questions.    You will be contacted the next day to check on how you are doing.  If biopsies were taken, you will be contacted with the results usually within 3 days.  BACK AT HOME  Rest for an hour or two after you get home.  When your throat is no longer numb and you have a gag reflex, take a few sips of cool water.  If you can swallow comfortably, you may start eating again.  You may have a mild sore throat for the rest of the day.  You will be contacted with results by the surgeons office within a week. If not contacted in one week, call the surgeons office.  WHAT TO WATCH FOR:  Problems rarely occur after the exam, but it is important to be aware of the early signs of a complication.  Call your doctor immediately if you have:  Difficulty swallowing or breathing  Unusual pain in your stomach or chest  Vomiting blood or dark material that looks like coffee grounds  Black or tarry stools  Temperature over 101.5 degrees    MORE QUESTIONS?  Please ask your doctor or nurse before the exam begins  or call your doctor at the clinic.    IF YOU MUST CANCEL YOUR PROCEDURE THE EVENING/NIGHT BEFORE, PLEASE CALL HOSPITAL ADMITTING -[phone] OR TOLL FREE [phone], [phone].    Phone Numbers:  [facility] - [phone]  [facility] - [phone]  Surgery Patient Education - [phone] or toll free [phone]    INSTRUCTIONS AFTER COLONOSCOPY    WHEN YOU ARE BACK HOME:  Plan to rest for an hour or two after you get home.  You may have some cramping or pressure until you  pass gas.  You may resume your regular medications.  Eat a small, light meal at first, and then gradually return to normal meal sizes.  You will be contacted the next day to see how you are doing.  If you had a polyp removed:  Slight bleeding may occur.  You may have a slight blood stain on the toilet paper after a bowel movement.  To lessen the chance of bleeding, avoid heavy exercise for ONE WEEK.  This includes heavy lifting, vigorous sport activities, and heavy physical labor.  You may resume your normal sexual activity.    Avoid aspirin or aspirin products if instructed by your doctor.  If there is a polyp or biopsy, you will be contacted with results within one week.     WHAT TO WATCH FOR:  Problems rarely occur after the exam; however, it is important for you to watch for early signs of possible problems.  If you have   Unusual pain in your abdomen  Nausea and vomiting that persists  Excessive bleeding  Black or bloody bowel movements  Fever or temperature above 100.6 F  Please call your doctor ([facility] [phone]) or go to the nearest hospital emergency room.    Post-Anesthesia Patient Instructions    IMMEDIATELY FOLLOWING SURGERY:  Do not drive or operate machinery for the first twenty four hours after surgery.  Do not make any important decisions for twenty four hours after surgery or while taking narcotic pain medications or sedatives.  If you develop intractable nausea and vomiting or a severe headache please notify your doctor immediately.    FOLLOW-UP:  Please make an appointment with your surgeon as instructed. You do not need to follow up with anesthesia unless specifically instructed to do so.    WOUND CARE INSTRUCTIONS (if applicable):  Keep a dry clean dressing on the anesthesia/puncture wound site if there is drainage.  Once the wound has quit draining you may leave it open to air.  Generally you should leave the bandage intact for twenty four hours unless there is drainage.  If the  epidural site drains for more than 36-48 hours please call the anesthesia department.    QUESTIONS?:  Please feel free to call your physician or the hospital  if you have any questions, and they will be happy to assist you.

## 2022-09-29 NOTE — INTERVAL H&P NOTE
"I have reviewed the surgical (or preoperative) H&P that is linked to this encounter, and examined the patient. There are no significant changes    Clinical Conditions Present on Arrival:  Clinically Significant Risk Factors Present on Admission                   # DMII: A1C = N/A within past 3 months  # Severe Obesity: Estimated body mass index is 46.06 kg/m  as calculated from the following:    Height as of this encounter: 1.6 m (5' 3\").    Weight as of this encounter: 117.9 kg (260 lb).       "

## 2022-09-29 NOTE — OP NOTE
REPORT OF OPERATION  DATE OF PROCEDURE: 9/29/2022    PATIENT: Autumn BATISTA Hnkumar    SURGERY PERFORMED: Colonoscopy    PREOPERATIVE DIAGNOSIS: Screening colonoscopy    POSTOPERATIVE DIAGNOSIS:    Same   Normal colonoscopy   Diverticulosis was not identified.   Hemorrhoids  were  identified.    SURGEON: Deep Zhu MD    ASSISTANTS: None    ANESTHESIA: Monitored Anesthesia Care    COMPLICATIONS: None apparent    TRANSFUSIONS: None    TISSUE TO PATHOLOGY: None    FINDINGS: Normal colonoscopy.  Diverticulosis was not identified.  Hemorrhoids  were  identified.    INDICATIONS: This is a 47 year old female in need of a colonoscopy for Screening colonoscopy.  The patient will be taken to the endoscopy suite for that procedure.    DESCRIPTIONS OF PROCEDURE IN DETAIL: After consent was obtained the patient was taken to the endoscopy suite and placed in the left lateral decubitus position.  The patient was identified and the correct patient was confirmed.  Monitored Anesthesia Care was given.  A time out was performed verifying the correct patient and the correct procedure.  The entire operative team was in agreement.  All necessary equipment and supplies were in the room.    Rectal exam was performed and no lesions of the anal canal were noted.  The colonoscope was inserted into the anus and passed without difficulty to the cecum.  The cecum was identified by the ileocecal valve, the coalescence of the tinea and the appendiceal orifice.  Upon withdrawal all walls of the colon were visualized.  There were no polyps, masses or evidence of colitis seen.  Diverticulosis was not seen.  Upon reaching the rectum the scope was retroflexed and internal hemorrhoids  were  seen.  The scope was straightened back out and removed from the patient.  The patient was then taken to the recovery room in stable condition tolerating the procedure well.      Prep: fair    Withdrawal time was 6 minutes.    It is recommended that the patient  have another colonoscopy in 10 years.

## 2022-09-29 NOTE — ANESTHESIA CARE TRANSFER NOTE
Patient: Autumn Mainatmirela    Procedure: Procedure(s):  Colonscopy       Diagnosis: Special screening for malignant neoplasms, colon [Z12.11]  Diagnosis Additional Information: No value filed.    Anesthesia Type:   MAC     Note:    Oropharynx: oropharynx clear of all foreign objects and spontaneously breathing  Level of Consciousness: drowsy  Oxygen Supplementation: room air    Independent Airway: airway patency satisfactory and stable  Dentition: dentition unchanged  Vital Signs Stable: post-procedure vital signs reviewed and stable  Report to RN Given: handoff report given  Patient transferred to: Phase II    Handoff Report: Identifed the Patient, Identified the Reponsible Provider, Reviewed the pertinent medical history, Discussed the surgical course, Reviewed Intra-OP anesthesia mangement and issues during anesthesia, Set expectations for post-procedure period and Allowed opportunity for questions and acknowledgement of understanding      Vitals:  Vitals Value Taken Time   BP     Temp     Pulse     Resp     SpO2         Electronically Signed By: ERIN DAVIS CRNA  September 29, 2022  9:56 AM

## 2022-09-29 NOTE — ANESTHESIA POSTPROCEDURE EVALUATION
Patient: Autumn Holbrook    Procedure: Procedure(s):  Colonscopy       Anesthesia Type:  MAC    Note:  Disposition: Outpatient   Postop Pain Control: Uneventful            Sign Out: Well controlled pain   PONV: No   Neuro/Psych: Uneventful            Sign Out: Acceptable/Baseline neuro status   Airway/Respiratory: Uneventful            Sign Out: Acceptable/Baseline resp. status   CV/Hemodynamics: Uneventful            Sign Out: Acceptable CV status; No obvious hypovolemia; No obvious fluid overload   Other NRE: NONE   DID A NON-ROUTINE EVENT OCCUR? No           Last vitals:  Vitals Value Taken Time   /74 09/29/22 1030   Temp 97.2  F (36.2  C) 09/29/22 1030   Pulse 86 09/29/22 1025   Resp 16 09/29/22 1030   SpO2 96 % 09/29/22 1030       Electronically Signed By: ERIN Orantes CRNA  September 29, 2022  11:23 AM

## 2022-09-29 NOTE — PROVIDER NOTIFICATION
Discharge instructions given to patient and patient's spouse. No questions. Ambulated out of unit. Denies pain, nausea or dizziness  Ethel 16  Iv dced after eating and drinking without nausea or vomioing.

## 2022-10-03 DIAGNOSIS — K29.70 GASTRITIS WITHOUT BLEEDING, UNSPECIFIED CHRONICITY, UNSPECIFIED GASTRITIS TYPE: ICD-10-CM

## 2022-10-03 DIAGNOSIS — R60.0 BILATERAL LEG EDEMA: ICD-10-CM

## 2022-10-03 DIAGNOSIS — G89.4 CHRONIC PAIN SYNDROME: ICD-10-CM

## 2022-10-03 DIAGNOSIS — R10.13 ABDOMINAL PAIN, EPIGASTRIC: ICD-10-CM

## 2022-10-03 RX ORDER — OXYBUTYNIN CHLORIDE 5 MG/1
TABLET, EXTENDED RELEASE ORAL
Qty: 30 TABLET | Refills: 5 | Status: SHIPPED | OUTPATIENT
Start: 2022-10-03 | End: 2023-02-22

## 2022-10-03 NOTE — TELEPHONE ENCOUNTER
phentermine (ADIPEX-P) 30 MG capsule        Last Written Prescription Date:  8/31/22  Last Fill Quantity: 30,   # refills: 0  Last Office Visit: 9/26/22  Future Office visit:    Next 5 appointments (look out 90 days)    Nov 02, 2022  2:00 PM  (Arrive by 1:45 PM)  PHYSICAL with SEVEN Pandey  [facility] [place] ([facility] [place] ) [address]  [place] [address]  [phone]           Routing refill request to provider for review/approval because:    Drug not on the [facility], [facility] or [facility] refill protocol or controlled substance

## 2022-10-04 ENCOUNTER — TELEPHONE (OUTPATIENT)
Dept: FAMILY MEDICINE | Facility: OTHER | Age: 47
End: 2022-10-04

## 2022-10-04 RX ORDER — PHENTERMINE HYDROCHLORIDE 30 MG/1
CAPSULE ORAL
Qty: 30 CAPSULE | Refills: 0 | Status: SHIPPED | OUTPATIENT
Start: 2022-10-04 | End: 2022-10-27

## 2022-10-04 NOTE — TELEPHONE ENCOUNTER
PA Initiation    Medication: Continuous Blood Gluc Sensor (FREESTYLE POLO 2 SENSOR) [facility] [place]   [place] Company: [employer] - Phone [phone] Fax [phone]  Pharmacy Filling the Rx: [facility] - [place], [place] - [address]  Filling Pharmacy Phone: [phone]  Filling Pharmacy Fax:    Start Date: 10/4/2022

## 2022-10-04 NOTE — TELEPHONE ENCOUNTER
Reached out to  Diabetes Care, Pharmacy group. Pending response.     Given rx was sent to retail pharmacy, may need to sent to [facility], DME order.  Will await Diabetes Care Services Team recommendations with ALEXIA Beckwith RN Diabetes Educator,  [phone]  10/4/2022 at 3:49 PM

## 2022-10-04 NOTE — TELEPHONE ENCOUNTER
{Screenshot of Product, Insurance, and Cardholder ID)    Product: Freestyle Wendy 2 Sensor  Insurance: [employer]   Cardholder ID: [id number]          Please route determinations to the Pharm Diabetes pool ([id number]).      Thank you!    Diabetes Care Services Team   [facility]  [address] [place] [address]

## 2022-10-04 NOTE — TELEPHONE ENCOUNTER
PRIOR AUTHORIZATION DENIED    Medication: Continuous Blood Gluc Sensor (FREESTYLE POLO 2 SENSOR) MISC--DENIED    Denial Date: 10/4/2022    Denial Rational: Patient is not injecting insulin 3 or more times daily    Appeal Information:

## 2022-10-05 NOTE — TELEPHONE ENCOUNTER
Call to patient, follow up from the PA, denial for insurance coverage.     Pt will check her BG with her meter at this time. Liked the CGM, helped her make better food choices.     A1C and BG reports previously are within target.    Pt denies further questions/concerns at this time.  Kinga Beckwith RN Diabetes Educator,  [phone]  10/5/2022 at 11:19 AM

## 2022-10-06 RX ORDER — OMEPRAZOLE 40 MG/1
CAPSULE, DELAYED RELEASE ORAL
Qty: 30 CAPSULE | Refills: 0 | Status: SHIPPED | OUTPATIENT
Start: 2022-10-06 | End: 2022-11-02

## 2022-10-06 RX ORDER — FUROSEMIDE 20 MG
TABLET ORAL
Qty: 60 TABLET | Refills: 0 | Status: SHIPPED | OUTPATIENT
Start: 2022-10-06 | End: 2022-11-02

## 2022-10-06 RX ORDER — BACLOFEN 10 MG/1
TABLET ORAL
Qty: 90 TABLET | Refills: 0 | Status: SHIPPED | OUTPATIENT
Start: 2022-10-06 | End: 2022-11-02

## 2022-10-06 NOTE — TELEPHONE ENCOUNTER
Omeprazole      Last Written Prescription Date:  8/17/22  Last Fill Quantity: 30,   # refills: 0  Last Office Visit: 9/26/22  Future Office visit:    Next 5 appointments (look out 90 days)    Nov 02, 2022  2:00 PM  (Arrive by 1:45 PM)  PHYSICAL with SEVEN Pandey  [facility] [place] ([facility] [place] ) [address]  [place] [address]  [phone]           Routing refill request to provider for review/approval because:      Baclofen      Last Written Prescription Date:  8/31/22  Last Fill Quantity: 90,   # refills: 0  Last Office Visit: 9/26/22  Future Office visit:    Next 5 appointments (look out 90 days)    Nov 02, 2022  2:00 PM  (Arrive by 1:45 PM)  PHYSICAL with SEVEN Pandey  [facility] [place] ([facility] [place] ) [address]  [place] [address]  [phone]           Routing refill request to provider for review/approval because:      Lasix      Last Written Prescription Date:  9/8/22  Last Fill Quantity: 60,   # refills: 0  Last Office Visit: 9/26/22  Future Office visit:    Next 5 appointments (look out 90 days)    Nov 02, 2022  2:00 PM  (Arrive by 1:45 PM)  PHYSICAL with SEVEN Pandey  [facility] [place] ([facility] [place] ) [address]  [place] [address]  [phone]           Routing refill request to provider for review/approval because:

## 2022-10-17 ENCOUNTER — MEDICAL CORRESPONDENCE (OUTPATIENT)
Dept: HEALTH INFORMATION MANAGEMENT | Facility: HOSPITAL | Age: 47
End: 2022-10-17

## 2022-10-18 ENCOUNTER — TRANSFERRED RECORDS (OUTPATIENT)
Dept: HEALTH INFORMATION MANAGEMENT | Facility: CLINIC | Age: 47
End: 2022-10-18

## 2022-10-18 ENCOUNTER — VIRTUAL VISIT (OUTPATIENT)
Dept: ENDOCRINOLOGY | Facility: CLINIC | Age: 47
End: 2022-10-18
Attending: PHYSICIAN ASSISTANT
Payer: COMMERCIAL

## 2022-10-18 ENCOUNTER — PRE VISIT (OUTPATIENT)
Dept: ENDOCRINOLOGY | Facility: CLINIC | Age: 47
End: 2022-10-18

## 2022-10-18 DIAGNOSIS — E03.9 ACQUIRED HYPOTHYROIDISM: ICD-10-CM

## 2022-10-18 DIAGNOSIS — E66.01 MORBID OBESITY (H): Primary | ICD-10-CM

## 2022-10-18 PROCEDURE — 99205 OFFICE O/P NEW HI 60 MIN: CPT | Mod: 95 | Performed by: STUDENT IN AN ORGANIZED HEALTH CARE EDUCATION/TRAINING PROGRAM

## 2022-10-18 NOTE — PROGRESS NOTES
Autumn Holbrook  is being evaluated via a billable video visit.      How would you like to obtain your AVS? Chibwe  For the video visit, send the invitation by: Text to cell phone: [phone]  Will anyone else be joining your video visit? No

## 2022-10-18 NOTE — LETTER
10/18/2022       RE: Autumn Holbrook  [address] [facility] [address]     Dear Colleague,    Thank you for referring your patient, Autumn Holbrook, to the [facility] [place] at [facility]. Please see a copy of my visit note below.    Endocrinology Clinic Visit 10/18/2022    Video-Visit Details     Type of service:  Video Visit     Video Start Time: 10:30 am   Video End Time: 11:20 am     I spent a total of 60 minutes on the date of encounter reviewing medical records, evaluating the patient, coordinating care and documenting in the EHR, as detailed above.        Platform used for Video Visit: Relypsa    Patient location: home  Provider location: off site     NAME:  Autumn Holbrook  PCP:  Apryl Hathaway  MRN:  [id number]  Reason for Consult:  hypothyroidism  Requesting Provider:  Apryl Hathaway    Chief Complaint     Chief Complaint   Patient presents with     New Patient     Video Visit       History of Present Illness     Autumn Holbrook is a 47 year old female who is seen in video visit for hypothyroidism    # hypothyroidism  She had hypothyroidism diagnosed long time ago and has been on LT4 replacement since.  She was on lt4 150 mcg since 9/26/22, prior to that she was on LT4 175 mcg. She said she has been on higher doses up to 225 mcg and is being lowered over the past couple years. Her TFT showed iatrogenic hyperthyroidism with low TSH since 2/2020. Most recent TFT : TSH 0.01 and ft4 1.3.    Symptoms and concens:  inability to loose wt despite changing diet. Hair loss, cold intolerance. No change in bowel habits. She is still getting her periods, it has been irregular for several years, she does not think she is going in menopause.    # morbid obesity, BMI 45  She was diagnosed with DM2 on 11/2021 with a1c 7. She was started on Trulicity by her PCP 5/2022. Most recent a1c 8/1/22 was 6.6. she lost wt right after starting trulicity  12 lbs but regained it.    About 5 years ago, she thinks she gained significant amount of wt after starting suboxone for Vicodin addiction.  She cut down on carbs and bad fat. She lost 20 lbs over last summer but that was in the setting of N/V/D ? Infection or withdrawal from suboxone. She regained 10lbs and she thinks it is water weight. She has swelling in her legs worse at night ( dependent edema).    She was started on topamax and phentermine 2 years ago. topamax was stopped august due to  insurance not covering. She lost 15 lbs when she started the combination phentermine/topamax then plateau.    Otherwise ROS: weak, tired, intermittent chest pain and SOb on exertion, she said she had an ecg done by her PCP which was unremarkable.  she noticed easy bruising. Wt is central around belly. She has light purple stretch marks ( about an inch in width and not very long) started couple years ago.  She has low potassium since 7/2021, she is also on hydrochlorothiazide for maybe 5 years, on lasix for ? 3 years.    Social:  She is unemployed; she is on disability because of knee osteoporosis. She has 2 daughters. She is a smoker ( trying to quit). Alcohol very occasional. She smoke medical cannabis   Problem List     Patient Active Problem List   Diagnosis     Routine general medical examination at a health care facility     Tear of lateral cartilage or meniscus of knee, current, right, initial encounter     Chronic pain syndrome     Opioid dependence in remission (H)     PTSD (post-traumatic stress disorder)     COY (generalized anxiety disorder)     Moderate episode of recurrent major depressive disorder (H)     Bilateral edema of lower extremity     Hypothyroid     Obesity     GERD (gastroesophageal reflux disease)     Mild mixed bipolar I disorder (H)     Trigger finger of both hands     Morbid obesity (H)     Grief     Diabetes mellitus, type 2 (H)     History of migraine headaches        Medications     Current  Outpatient Medications   Medication     Acetaminophen (TYLENOL PO)     asenapine (SAPHRIS) 2.5 MG SUBL sublingual tablet     atomoxetine (STRATTERA) 60 MG capsule     baclofen (LIORESAL) 10 MG tablet     bisacodyl (DULCOLAX) 5 MG EC tablet     blood glucose (ACCU-CHEK GUIDE) test strip     blood glucose (NO BRAND SPECIFIED) lancets standard     blood glucose (NO BRAND SPECIFIED) test strip     blood glucose monitoring (NO BRAND SPECIFIED) meter device kit     blood glucose monitoring (SOFTCLIX) lancets     buprenorphine HCl-naloxone HCl (SUBOXONE) 2-0.5 MG per film     chlorhexidine (HIBICLENS) 4 % liquid     Continuous Blood Gluc  (FREESTYLE POLO 2 READER) RENA     Continuous Blood Gluc Sensor (FREESTYLE POLO 2 SENSOR) MISC     diclofenac (VOLTAREN) 1 % topical gel     dulaglutide (TRULICITY) 1.5 MG/0.5ML pen     EUTHYROX 175 MCG tablet     furosemide (LASIX) 20 MG tablet     hydrochlorothiazide (HYDRODIURIL) 25 MG tablet     HYDROXYZINE HCL PO     hylan (SYNVISC) 16 MG/2ML injection     ibuprofen (ADVIL/MOTRIN) 800 MG tablet     Melatonin 10 MG CAPS     NARCAN 4 MG/0.1ML nasal spray     NEW MED     omeprazole (PRILOSEC) 40 MG DR capsule     oxybutynin ER (DITROPAN XL) 5 MG 24 hr tablet     phentermine (ADIPEX-P) 30 MG capsule     polyethylene glycol (GOLYTELY) 236 g suspension     potassium chloride ER (K-TAB) 20 MEQ CR tablet     potassium chloride ER (K-TAB/KLOR-CON) 10 MEQ CR tablet     rosuvastatin (CRESTOR) 5 MG tablet     sertraline (ZOLOFT) 50 MG tablet     topiramate (TOPAMAX) 50 MG tablet     VRAYLAR 3 MG CAPS capsule     No current facility-administered medications for this visit.        Allergies     Allergies   Allergen Reactions     Depakote [Valproic Acid]      Gabapentin Swelling     Propofol Unknown     Got very stiff and tight.        Medical / Surgical History     Past Medical History:   Diagnosis Date     Anxiety      Arthritis      Depressive disorder      Migraine      Osteoarthritis       Thyroid disease      Past Surgical History:   Procedure Laterality Date     CHOLECYSTECTOMY       COLONOSCOPY N/A 2022    Procedure: Colonscopy;  Surgeon: Deep Zhu MD;  Location: [facility]     GYN SURGERY       SECTION [id number]     GYN SURGERY  [address] [place] [address]       Social History     Social History     Socioeconomic History     Marital status:      Spouse name: Not on file     Number of children: 2     Years of education: Not on file     Highest education level: Not on file   Occupational History     Not on file   Tobacco Use     Smoking status: Every Day     Packs/day: 0.50     Years: 32.00     Pack years: 16.00     Types: Cigarettes     Start date: 1989     Smokeless tobacco: Never     Tobacco comments:     pt declines   Substance and Sexual Activity     Alcohol use: No     Drug use: Yes     Types: Marijuana     Comment: daily-medical     Sexual activity: Not Currently   Other Topics Concern     Parent/sibling w/ CABG, MI or angioplasty before 65F 55M? Not Asked   Social History Narrative     Not on file     Social Determinants of Health     Financial Resource Strain: Not on file   Food Insecurity: Not on file   Transportation Needs: Not on file   Physical Activity: Not on file   Stress: Not on file   Social Connections: Not on file   Intimate Partner Violence: Not on file   Housing Stability: Not on file       Family History     Family History   Problem Relation Age of Onset     Cerebrovascular Disease Mother      Alcoholism Mother      Cancer Mother      Depression Mother      Eczema Mother      Hypertension Mother      Migraines Mother      Mental Illness Mother      Osteoarthritis Mother      Osteoporosis Mother      Alcoholism Father      Cancer Father      Hyperlipidemia Father      Hypertension Father      Myocardial Infarction Father      Mental Illness Sister      Migraines Sister        ROS     12 ROS completed, pertinent positive and negative in HPI    Physical  Exam   LMP 09/21/2022 (Approximate)    GENERAL: Healthy, alert and no distress  EYES: Eyes grossly normal to inspection.  No discharge or erythema, or obvious scleral/conjunctival abnormalities.  RESP: No audible wheeze, cough, or visible cyanosis.  No visible retractions or increased work of breathing.    SKIN: Visible skin clear. No significant rash, abnormal pigmentation or lesions.  NEURO: Cranial nerves grossly intact.  Mentation and speech appropriate for age.  PSYCH: Mentation appears normal, affect normal/bright, judgement and insight intact, normal speech and appearance well-groomed.     Labs/Imaging     Pertinent Labs were reviewed and updated in EPIC and discussed briefly.  Radiology Results were  reviewed and updated in EPIC and discussed briefly.    Summary of recent findings:   Lab Results   Component Value Date    A1C 6.6 08/01/2022    A1C 6.9 06/01/2022    A1C 7.6 02/16/2022    A1C 7.0 11/04/2021    A1C 6.0 01/29/2020    A1C 6.2 07/01/2019    A1C 5.9 12/19/2018       TSH   Date Value Ref Range Status   09/26/2022 0.01 (L) 0.40 - 4.00 mU/L Final   06/09/2022 <0.01 (L) 0.40 - 4.00 mU/L Final   03/09/2022 0.15 (L) 0.40 - 4.00 mU/L Final   11/04/2021 0.31 (L) 0.40 - 4.00 mU/L Final   09/08/2021 0.21 (L) 0.40 - 4.00 mU/L Final   05/05/2021 0.30 (L) 0.40 - 4.00 mU/L Final   03/01/2021 0.14 (L) 0.40 - 4.00 mU/L Final   01/18/2021 0.15 (L) 0.40 - 4.00 mU/L Final   12/04/2020 0.10 (L) 0.40 - 4.00 mU/L Final   05/28/2020 0.44 0.40 - 4.00 mU/L Final     T4 Free   Date Value Ref Range Status   05/05/2021 1.35 0.76 - 1.46 ng/dL Final   03/01/2021 1.49 (H) 0.76 - 1.46 ng/dL Final   01/18/2021 1.35 0.76 - 1.46 ng/dL Final   12/04/2020 1.47 (H) 0.76 - 1.46 ng/dL Final   02/27/2020 1.82 (H) 0.76 - 1.46 ng/dL Final     Free T4   Date Value Ref Range Status   09/26/2022 1.30 0.76 - 1.46 ng/dL Final   06/09/2022 1.46 0.76 - 1.46 ng/dL Final   03/09/2022 1.43 0.76 - 1.46 ng/dL Final   11/04/2021 1.38 0.76 - 1.46 ng/dL  Final   09/08/2021 1.32 0.76 - 1.46 ng/dL Final       Creatinine   Date Value Ref Range Status   09/26/2022 0.70 0.52 - 1.04 mg/dL Final   05/19/2021 0.78 0.52 - 1.04 mg/dL Final       Recent Labs   Lab Test 01/06/22  1528 01/29/20  1351   CHOL 191 252*   HDL 65 87   * 155*   TRIG 111 52       No results found for: [id number], [id number], [id number]    I personally reviewed the patient's outside records from [employer] EMR. Summary of pertinent findings in HPI.    Impression / Plan     1.  Chronic hypothyroidism  2.  Iatrogenic thyrotoxicosis due to over treated hypothyroidism with L T4  She has chronic history of hypothyroidism and FT4 replacement.  Her TFT shows evidence of overtreatment with L T4 replacement at least since February 2020.  We discussed the importance of keeping her TSH in the normal range while on LT 4 replacement as well for treatment can cause on the long run decrease in bone mineral density and increased risk of heart arrhythmias.  We discussed that her symptoms especially inability to lose weight is not due to her underlying thyroid disease.  Her L T4 was adjusted based on her most recent TFT on 9/26/2022.  Due for TFT on 11/14/2022    3.  Weight gain/inability to lose weight despite medical treatment  She is concerned that her weight gain and inability to lose weight is due to an underlying hormonal problem,?  Cushing.  Although she has clinical symptoms of subclinical Cushing, she does not have any overt symptoms of Cushing and it is unlikely that her inability to lose weight is due to an underlying endocrine problem.  We discussed screening for Cushing with 24-hour urine collection for cortisol.  She will continue medical treatment for her obesity by her primary care doctor.  We briefly discussed the indication for bariatric surgery in morbid obesity, she is interested in knowing more especially that she would like to undergo knee replacement.  - Referral to bariatric clinic      Test  and/or medications prescribed today:  No orders of the defined types were placed in this encounter.        Follow up: 3 month      Jazmín Wallace MD  Endocrinology, Diabetes and Metabolism  [facility]    Autumn Holbrook  is being evaluated via a billable video visit.      How would you like to obtain your AVS? MyChart  For the video visit, send the invitation by: Text to cell phone: [phone]  Will anyone else be joining your video visit? No

## 2022-10-18 NOTE — PATIENT INSTRUCTIONS
24 Hour Urine Collection instruction    Decide a day you want to collect the urine for 24 hours.   On the day of collection, discard the first void urine in the morning.   Start collecting all the urine in the provided container for the entire day and over night.   The next morning when you wake up collect the first void urine in the container and then submit the container to the lab.  Store in the refrigerator    -------------------------------------------------------------------------- 3 motn

## 2022-10-18 NOTE — PROGRESS NOTES
Endocrinology Clinic Visit 10/18/2022    Video-Visit Details     Type of service:  Video Visit     Video Start Time: 10:30 am   Video End Time: 11:20 am     I spent a total of 60 minutes on the date of encounter reviewing medical records, evaluating the patient, coordinating care and documenting in the EHR, as detailed above.        Platform used for Video Visit: [email or web address]    Patient location: home  Provider location: off site     NAME:  Autumn Holbrook  PCP:  Apryl Hathaway  MRN:  [id number]  Reason for Consult:  hypothyroidism  Requesting Provider:  Apryl Hathaway    Chief Complaint     Chief Complaint   Patient presents with     New Patient     Video Visit       History of Present Illness     Autumn Holbrook is a 47 year old female who is seen in video visit for hypothyroidism    # hypothyroidism  She had hypothyroidism diagnosed long time ago and has been on LT4 replacement since.  She was on lt4 150 mcg since 9/26/22, prior to that she was on LT4 175 mcg. She said she has been on higher doses up to 225 mcg and is being lowered over the past couple years. Her TFT showed iatrogenic hyperthyroidism with low TSH since 2/2020. Most recent TFT : TSH 0.01 and ft4 1.3.    Symptoms and concens:  inability to loose wt despite changing diet. Hair loss, cold intolerance. No change in bowel habits. She is still getting her periods, it has been irregular for several years, she does not think she is going in menopause.    # morbid obesity, BMI 45  She was diagnosed with DM2 on 11/2021 with a1c 7. She was started on Trulicity by her PCP 5/2022. Most recent a1c 8/1/22 was 6.6. she lost wt right after starting trulicity 12 lbs but regained it.    About 5 years ago, she thinks she gained significant amount of wt after starting suboxone for Vicodin addiction.  She cut down on carbs and bad fat. She lost 20 lbs over last summer but that was in the setting of N/V/D ? Infection or withdrawal from suboxone. She regained 10lbs and  she thinks it is water weight. She has swelling in her legs worse at night ( dependent edema).    She was started on topamax and phentermine 2 years ago. topamax was stopped august due to  insurance not covering. She lost 15 lbs when she started the combination phentermine/topamax then plateau.    Otherwise ROS: weak, tired, intermittent chest pain and SOb on exertion, she said she had an ecg done by her PCP which was unremarkable.  she noticed easy bruising. Wt is central around belly. She has light purple stretch marks ( about an inch in width and not very long) started couple years ago.  She has low potassium since 7/2021, she is also on hydrochlorothiazide for maybe 5 years, on lasix for ? 3 years.    Social:  She is unemployed; she is on disability because of knee osteoporosis. She has 2 daughters. She is a smoker ( trying to quit). Alcohol very occasional. She smoke medical cannabis   Problem List     Patient Active Problem List   Diagnosis     Routine general medical examination at a health care facility     Tear of lateral cartilage or meniscus of knee, current, right, initial encounter     Chronic pain syndrome     Opioid dependence in remission (H)     PTSD (post-traumatic stress disorder)     COY (generalized anxiety disorder)     Moderate episode of recurrent major depressive disorder (H)     Bilateral edema of lower extremity     Hypothyroid     Obesity     GERD (gastroesophageal reflux disease)     Mild mixed bipolar I disorder (H)     Trigger finger of both hands     Morbid obesity (H)     Grief     Diabetes mellitus, type 2 (H)     History of migraine headaches        Medications     Current Outpatient Medications   Medication     Acetaminophen (TYLENOL PO)     asenapine (SAPHRIS) 2.5 MG SUBL sublingual tablet     atomoxetine (STRATTERA) 60 MG capsule     baclofen (LIORESAL) 10 MG tablet     bisacodyl (DULCOLAX) 5 MG EC tablet     blood glucose (ACCU-CHEK GUIDE) test strip     blood glucose (NO BRAND  SPECIFIED) lancets standard     blood glucose (NO BRAND SPECIFIED) test strip     blood glucose monitoring (NO BRAND SPECIFIED) meter device kit     blood glucose monitoring (SOFTCLIX) lancets     buprenorphine HCl-naloxone HCl (SUBOXONE) 2-0.5 MG per film     chlorhexidine (HIBICLENS) 4 % liquid     Continuous Blood Gluc  (FREESTYLE POLO 2 READER) RENA     Continuous Blood Gluc Sensor (FREESTYLE POLO 2 SENSOR) MISC     diclofenac (VOLTAREN) 1 % topical gel     dulaglutide (TRULICITY) 1.5 MG/0.5ML pen     EUTHYROX 175 MCG tablet     furosemide (LASIX) 20 MG tablet     hydrochlorothiazide (HYDRODIURIL) 25 MG tablet     HYDROXYZINE HCL PO     hylan (SYNVISC) 16 MG/2ML injection     ibuprofen (ADVIL/MOTRIN) 800 MG tablet     Melatonin 10 MG CAPS     NARCAN 4 MG/0.1ML nasal spray     NEW MED     omeprazole (PRILOSEC) 40 MG DR capsule     oxybutynin ER (DITROPAN XL) 5 MG 24 hr tablet     phentermine (ADIPEX-P) 30 MG capsule     polyethylene glycol (GOLYTELY) 236 g suspension     potassium chloride ER (K-TAB) 20 MEQ CR tablet     potassium chloride ER (K-TAB/KLOR-CON) 10 MEQ CR tablet     rosuvastatin (CRESTOR) 5 MG tablet     sertraline (ZOLOFT) 50 MG tablet     topiramate (TOPAMAX) 50 MG tablet     VRAYLAR 3 MG CAPS capsule     No current facility-administered medications for this visit.        Allergies     Allergies   Allergen Reactions     Depakote [Valproic Acid]      Gabapentin Swelling     Propofol Unknown     Got very stiff and tight.        Medical / Surgical History     Past Medical History:   Diagnosis Date     Anxiety      Arthritis      Depressive disorder      Migraine      Osteoarthritis      Thyroid disease      Past Surgical History:   Procedure Laterality Date     CHOLECYSTECTOMY       COLONOSCOPY N/A 2022    Procedure: Colonscopy;  Surgeon: Deep Zhu MD;  Location: [facility]     GYN SURGERY       SECTION [id number]     GYN SURGERY  2004    [facility]       Social History      Social History     Socioeconomic History     Marital status:      Spouse name: Not on file     Number of children: 2     Years of education: Not on file     Highest education level: Not on file   Occupational History     Not on file   Tobacco Use     Smoking status: Every Day     Packs/day: 0.50     Years: 32.00     Pack years: 16.00     Types: Cigarettes     Start date: 1/1/1989     Smokeless tobacco: Never     Tobacco comments:     pt declines   Substance and Sexual Activity     Alcohol use: No     Drug use: Yes     Types: Marijuana     Comment: daily-medical     Sexual activity: Not Currently   Other Topics Concern     Parent/sibling w/ CABG, MI or angioplasty before 65F 55M? Not Asked   Social History Narrative     Not on file     Social Determinants of Health     Financial Resource Strain: Not on file   Food Insecurity: Not on file   Transportation Needs: Not on file   Physical Activity: Not on file   Stress: Not on file   Social Connections: Not on file   Intimate Partner Violence: Not on file   Housing Stability: Not on file       Family History     Family History   Problem Relation Age of Onset     Cerebrovascular Disease Mother      Alcoholism Mother      Cancer Mother      Depression Mother      Eczema Mother      Hypertension Mother      Migraines Mother      Mental Illness Mother      Osteoarthritis Mother      Osteoporosis Mother      Alcoholism Father      Cancer Father      Hyperlipidemia Father      Hypertension Father      Myocardial Infarction Father      Mental Illness Sister      Migraines Sister        ROS     12 ROS completed, pertinent positive and negative in HPI    Physical Exam   LMP 09/21/2022 (Approximate)    GENERAL: Healthy, alert and no distress  EYES: Eyes grossly normal to inspection.  No discharge or erythema, or obvious scleral/conjunctival abnormalities.  RESP: No audible wheeze, cough, or visible cyanosis.  No visible retractions or increased work of breathing.     SKIN: Visible skin clear. No significant rash, abnormal pigmentation or lesions.  NEURO: Cranial nerves grossly intact.  Mentation and speech appropriate for age.  PSYCH: Mentation appears normal, affect normal/bright, judgement and insight intact, normal speech and appearance well-groomed.     Labs/Imaging     Pertinent Labs were reviewed and updated in EPIC and discussed briefly.  Radiology Results were  reviewed and updated in EPIC and discussed briefly.    Summary of recent findings:   Lab Results   Component Value Date    A1C 6.6 08/01/2022    A1C 6.9 06/01/2022    A1C 7.6 02/16/2022    A1C 7.0 11/04/2021    A1C 6.0 01/29/2020    A1C 6.2 07/01/2019    A1C 5.9 12/19/2018       TSH   Date Value Ref Range Status   09/26/2022 0.01 (L) 0.40 - 4.00 mU/L Final   06/09/2022 <0.01 (L) 0.40 - 4.00 mU/L Final   03/09/2022 0.15 (L) 0.40 - 4.00 mU/L Final   11/04/2021 0.31 (L) 0.40 - 4.00 mU/L Final   09/08/2021 0.21 (L) 0.40 - 4.00 mU/L Final   05/05/2021 0.30 (L) 0.40 - 4.00 mU/L Final   03/01/2021 0.14 (L) 0.40 - 4.00 mU/L Final   01/18/2021 0.15 (L) 0.40 - 4.00 mU/L Final   12/04/2020 0.10 (L) 0.40 - 4.00 mU/L Final   05/28/2020 0.44 0.40 - 4.00 mU/L Final     T4 Free   Date Value Ref Range Status   05/05/2021 1.35 0.76 - 1.46 ng/dL Final   03/01/2021 1.49 (H) 0.76 - 1.46 ng/dL Final   01/18/2021 1.35 0.76 - 1.46 ng/dL Final   12/04/2020 1.47 (H) 0.76 - 1.46 ng/dL Final   02/27/2020 1.82 (H) 0.76 - 1.46 ng/dL Final     Free T4   Date Value Ref Range Status   09/26/2022 1.30 0.76 - 1.46 ng/dL Final   06/09/2022 1.46 0.76 - 1.46 ng/dL Final   03/09/2022 1.43 0.76 - 1.46 ng/dL Final   11/04/2021 1.38 0.76 - 1.46 ng/dL Final   09/08/2021 1.32 0.76 - 1.46 ng/dL Final       Creatinine   Date Value Ref Range Status   09/26/2022 0.70 0.52 - 1.04 mg/dL Final   05/19/2021 0.78 0.52 - 1.04 mg/dL Final       Recent Labs   Lab Test 01/06/22  1528 01/29/20  1351   CHOL 191 252*   HDL 65 87   * 155*   TRIG 111 52       No  results found for: [id number], [id number], [id number]    I personally reviewed the patient's outside records from [facility] EMR. Summary of pertinent findings in HPI.    Impression / Plan     1.  Chronic hypothyroidism  2.  Iatrogenic thyrotoxicosis due to over treated hypothyroidism with L T4  She has chronic history of hypothyroidism and FT4 replacement.  Her TFT shows evidence of overtreatment with L T4 replacement at least since February 2020.  We discussed the importance of keeping her TSH in the normal range while on LT 4 replacement as well for treatment can cause on the long run decrease in bone mineral density and increased risk of heart arrhythmias.  We discussed that her symptoms especially inability to lose weight is not due to her underlying thyroid disease.  Her L T4 was adjusted based on her most recent TFT on 9/26/2022.  Due for TFT on 11/14/2022    3.  Weight gain/inability to lose weight despite medical treatment  She is concerned that her weight gain and inability to lose weight is due to an underlying hormonal problem,?  Cushing.  Although she has clinical symptoms of subclinical Cushing, she does not have any overt symptoms of Cushing and it is unlikely that her inability to lose weight is due to an underlying endocrine problem.  We discussed screening for Cushing with 24-hour urine collection for cortisol.  She will continue medical treatment for her obesity by her primary care doctor.  We briefly discussed the indication for bariatric surgery in morbid obesity, she is interested in knowing more especially that she would like to undergo knee replacement.  - Referral to bariatric clinic      Test and/or medications prescribed today:  No orders of the defined types were placed in this encounter.        Follow up: 3 month      Jazmín Wallace MD  Endocrinology, Diabetes and Metabolism  [facility]

## 2022-10-25 DIAGNOSIS — E66.01 MORBID OBESITY (H): ICD-10-CM

## 2022-10-25 DIAGNOSIS — I10 BENIGN ESSENTIAL HYPERTENSION: ICD-10-CM

## 2022-10-27 RX ORDER — HYDROCHLOROTHIAZIDE 25 MG/1
TABLET ORAL
Qty: 90 TABLET | Refills: 0 | Status: SHIPPED | OUTPATIENT
Start: 2022-10-27 | End: 2023-01-05

## 2022-10-27 RX ORDER — PHENTERMINE HYDROCHLORIDE 30 MG/1
CAPSULE ORAL
Qty: 30 CAPSULE | Refills: 0 | Status: SHIPPED | OUTPATIENT
Start: 2022-10-27 | End: 2022-11-28

## 2022-10-27 NOTE — TELEPHONE ENCOUNTER
Adipex       Last Written Prescription Date:  10/4/22  Last Fill Quantity: 30,   # refills: 0    Hydrodiuril       Last Written Prescription Date:  7/27/22  Last Fill Quantity: 90,   # refills: 0  Last Office Visit: 9/26/22  Future Office visit:    Next 5 appointments (look out 90 days)    Nov 02, 2022  2:00 PM  (Arrive by 1:45 PM)  PHYSICAL with SEVEN Pandey  [facility] ([facility] [place] ) [address]  [place] [address]  [phone]

## 2022-10-29 ENCOUNTER — MEDICAL CORRESPONDENCE (OUTPATIENT)
Dept: HEALTH INFORMATION MANAGEMENT | Facility: HOSPITAL | Age: 47
End: 2022-10-29

## 2022-10-31 ENCOUNTER — TELEPHONE (OUTPATIENT)
Dept: FAMILY MEDICINE | Facility: OTHER | Age: 47
End: 2022-10-31

## 2022-10-31 DIAGNOSIS — K29.70 GASTRITIS WITHOUT BLEEDING, UNSPECIFIED CHRONICITY, UNSPECIFIED GASTRITIS TYPE: ICD-10-CM

## 2022-10-31 DIAGNOSIS — R10.13 ABDOMINAL PAIN, EPIGASTRIC: ICD-10-CM

## 2022-10-31 DIAGNOSIS — G89.4 CHRONIC PAIN SYNDROME: ICD-10-CM

## 2022-10-31 DIAGNOSIS — R60.0 BILATERAL LEG EDEMA: ICD-10-CM

## 2022-10-31 NOTE — TELEPHONE ENCOUNTER
Sandra called and gave VO for continuation orders for skilled nursing 1x/week for 9 weeks.    Please note.    Yusra Brown RN

## 2022-11-01 ENCOUNTER — TELEPHONE (OUTPATIENT)
Dept: FAMILY MEDICINE | Facility: OTHER | Age: 47
End: 2022-11-01

## 2022-11-01 NOTE — TELEPHONE ENCOUNTER
Received a PA from [facility] for Phentermine HCl 30MG capsules. Submitted on CMM. Waiting for a response.

## 2022-11-02 ENCOUNTER — OFFICE VISIT (OUTPATIENT)
Dept: FAMILY MEDICINE | Facility: OTHER | Age: 47
End: 2022-11-02
Attending: PHYSICIAN ASSISTANT
Payer: COMMERCIAL

## 2022-11-02 VITALS
DIASTOLIC BLOOD PRESSURE: 72 MMHG | HEART RATE: 85 BPM | TEMPERATURE: 97.3 F | HEIGHT: 62 IN | RESPIRATION RATE: 16 BRPM | WEIGHT: 263 LBS | SYSTOLIC BLOOD PRESSURE: 106 MMHG | OXYGEN SATURATION: 98 % | BODY MASS INDEX: 48.4 KG/M2

## 2022-11-02 DIAGNOSIS — Z53.9 PERSONS ENCOUNTERING HEALTH SERVICES FOR SPECIFIC PROCEDURES, NOT CARRIED OUT: Primary | ICD-10-CM

## 2022-11-02 DIAGNOSIS — Z23 NEED FOR PROPHYLACTIC VACCINATION AND INOCULATION AGAINST INFLUENZA: ICD-10-CM

## 2022-11-02 DIAGNOSIS — Z00.00 ENCOUNTER FOR MEDICARE ANNUAL WELLNESS EXAM: Primary | ICD-10-CM

## 2022-11-02 DIAGNOSIS — Z12.4 PAP SMEAR FOR CERVICAL CANCER SCREENING: ICD-10-CM

## 2022-11-02 PROCEDURE — G0123 SCREEN CERV/VAG THIN LAYER: HCPCS | Mod: ZL | Performed by: PHYSICIAN ASSISTANT

## 2022-11-02 PROCEDURE — 99396 PREV VISIT EST AGE 40-64: CPT | Performed by: PHYSICIAN ASSISTANT

## 2022-11-02 PROCEDURE — 90686 IIV4 VACC NO PRSV 0.5 ML IM: CPT

## 2022-11-02 PROCEDURE — 87624 HPV HI-RISK TYP POOLED RSLT: CPT | Mod: ZL | Performed by: PHYSICIAN ASSISTANT

## 2022-11-02 RX ORDER — BISACODYL 5 MG/1
TABLET, DELAYED RELEASE ORAL
COMMUNITY
Start: 2022-08-10 | End: 2022-11-16

## 2022-11-02 RX ORDER — OMEPRAZOLE 40 MG/1
CAPSULE, DELAYED RELEASE ORAL
Qty: 30 CAPSULE | Refills: 9 | Status: SHIPPED | OUTPATIENT
Start: 2022-11-02 | End: 2023-08-29

## 2022-11-02 RX ORDER — BACLOFEN 10 MG/1
TABLET ORAL
Qty: 90 TABLET | Refills: 0 | Status: SHIPPED | OUTPATIENT
Start: 2022-11-02 | End: 2022-12-01

## 2022-11-02 RX ORDER — FUROSEMIDE 20 MG
TABLET ORAL
Qty: 60 TABLET | Refills: 0 | Status: SHIPPED | OUTPATIENT
Start: 2022-11-02 | End: 2022-12-01

## 2022-11-02 ASSESSMENT — ANXIETY QUESTIONNAIRES
GAD7 TOTAL SCORE: 16
3. WORRYING TOO MUCH ABOUT DIFFERENT THINGS: MORE THAN HALF THE DAYS
GAD7 TOTAL SCORE: 16
6. BECOMING EASILY ANNOYED OR IRRITABLE: MORE THAN HALF THE DAYS
7. FEELING AFRAID AS IF SOMETHING AWFUL MIGHT HAPPEN: MORE THAN HALF THE DAYS
4. TROUBLE RELAXING: NEARLY EVERY DAY
2. NOT BEING ABLE TO STOP OR CONTROL WORRYING: MORE THAN HALF THE DAYS
5. BEING SO RESTLESS THAT IT IS HARD TO SIT STILL: MORE THAN HALF THE DAYS
IF YOU CHECKED OFF ANY PROBLEMS ON THIS QUESTIONNAIRE, HOW DIFFICULT HAVE THESE PROBLEMS MADE IT FOR YOU TO DO YOUR WORK, TAKE CARE OF THINGS AT HOME, OR GET ALONG WITH OTHER PEOPLE: SOMEWHAT DIFFICULT
1. FEELING NERVOUS, ANXIOUS, OR ON EDGE: NEARLY EVERY DAY

## 2022-11-02 ASSESSMENT — ENCOUNTER SYMPTOMS
WEAKNESS: 1
DIZZINESS: 1
COUGH: 1
MYALGIAS: 1
CONSTIPATION: 0
HEMATURIA: 0
ARTHRALGIAS: 1
FREQUENCY: 1
SORE THROAT: 0
JOINT SWELLING: 1
BREAST MASS: 0
PARESTHESIAS: 0
ABDOMINAL PAIN: 0
HEMATOCHEZIA: 0
HEADACHES: 1
SHORTNESS OF BREATH: 0
NERVOUS/ANXIOUS: 1
DIARRHEA: 0
CHILLS: 0
NAUSEA: 0
EYE PAIN: 0
HEARTBURN: 0
FEVER: 0
PALPITATIONS: 0
DYSURIA: 0

## 2022-11-02 ASSESSMENT — ACTIVITIES OF DAILY LIVING (ADL): CURRENT_FUNCTION: NO ASSISTANCE NEEDED

## 2022-11-02 ASSESSMENT — PATIENT HEALTH QUESTIONNAIRE - PHQ9: SUM OF ALL RESPONSES TO PHQ QUESTIONS 1-9: 14

## 2022-11-02 ASSESSMENT — PAIN SCALES - GENERAL: PAINLEVEL: SEVERE PAIN (7)

## 2022-11-02 NOTE — PATIENT INSTRUCTIONS
Patient Education   Personalized Prevention Plan  You are due for the preventive services outlined below.  Your care team is available to assist you in scheduling these services.  If you have already completed any of these items, please share that information with your care team to update in your medical record.  Health Maintenance Due   Topic Date Due     Diabetic Foot Exam  Never done     URINE DRUG SCREEN  Never done     Eye Exam  Never done     Hepatitis B Vaccine (2 of 3 - 3-dose series) 09/11/2020     Pneumococcal Vaccine (2 - PCV) 03/13/2021     HPV Screening  07/06/2022     PAP Smear  07/06/2022     COVID-19 Vaccine (5 - Booster for Moderna series) 08/13/2022     Flu Vaccine (1) Never done     Your Health Risk Assessment indicates you feel you are not in good health    A healthy lifestyle helps keep the body fit and the mind alert. It helps protect you from disease, helps you fight disease, and helps prevent chronic disease (disease that doesn't go away) from getting worse. This is important as you get older and begin to notice twinges in muscles and joints and a decline in the strength and stamina you once took for granted. A healthy lifestyle includes good healthcare, good nutrition, weight control, recreation, and regular exercise. Avoid harmful substances and do what you can to keep safe. Another part of a healthy lifestyle is stay mentally active and socially involved.    Good healthcare     Have a wellness visit every year.     If you have new symptoms, let us know right away. Don't wait until the next checkup.     Take medicines exactly as prescribed and keep your medicines in a safe place. Tell us if your medicine causes problems.   Healthy diet and weight control     Eat 3 or 4 small, nutritious, low-fat, high-fiber meals a day. Include a variety of fruits, vegetables, and whole-grain foods.     Make sure you get enough calcium in your diet. Calcium, vitamin D, and exercise help prevent  osteoporosis (bone thinning).     If you live alone, try eating with others when you can. That way you get a good meal and have company while you eat it.     Try to keep a healthy weight. If you eat more calories than your body uses for energy, it will be stored as fat and you will gain weight.     Recreation   Recreation is not limited to sports and team events. It includes any activity that provides relaxation, interest, enjoyment, and exercise. Recreation provides an outlet for physical, mental, and social energy. It can give a sense of worth and achievement. It can help you stay healthy.    Mental Exercise and Social Involvement  Mental and emotional health is as important as physical health. Keep in touch with friends and family. Stay as active as possible. Continue to learn and challenge yourself.   Things you can do to stay mentally active are:    Learn something new, like a foreign language or musical instrument.     Play SCRABBLE or do crossword puzzles. If you cannot find people to play these games with you at home, you can play them with others on your computer through the Internet.     Join a games club--anything from card games to chess or checkers or lawn bowling.     Start a new hobby.     Go back to school.     Volunteer.     Read.   Keep up with world events.    Exercise for a Healthier Heart  You may wonder how you can improve the health of your heart. If you re thinking about exercise, you re on the right track. You don t need to become an athlete. But you do need a certain amount of brisk exercise to help strengthen your heart. If you have been diagnosed with a heart condition, your healthcare provider may advise exercise to help stabilize your condition. To help make exercise a habit, choose safe, fun activities.      Exercise with a friend. When activity is fun, you're more likely to stick with it.   Before you start  Check with your healthcare provider before starting an exercise program. This  is especially important if you have not been active for a while. It's also important if you have a long-term (chronic) health problem such as heart disease, diabetes, or obesity. Or if you are at high risk for having these problems.   Why exercise?  Exercising regularly offers many healthy rewards. It can help you do all of the following:     Improve your blood cholesterol level to help prevent further heart trouble    Lower your blood pressure to help prevent a stroke or heart attack    Control diabetes, or reduce your risk of getting this disease    Improve your heart and lung function    Reach and stay at a healthy weight    Make your muscles stronger so you can stay active    Prevent falls and fractures by slowing the loss of bone mass (osteoporosis)    Manage stress better    Reduce your blood pressure    Improve your sense of self and your body image  Exercise tips      Ease into your routine. Set small goals. Then build on them. If you are not sure what your activity level should be, talk with your healthcare provider first before starting an exercise routine.    Exercise on most days. Aim for a total of 150 minutes (2 hours and 30 minutes) or more of moderate-intensity aerobic activity each week. Or 75 minutes (1 hour and 15 minutes) or more of vigorous-intensity aerobic activity each week. Or try for a combination of both. Moderate activity means that you breathe heavier and your heart rate increases but you can still talk. Think about doing 40 minutes of moderate exercise, 3 to 4 times a week. For best results, activity should last for about 40 minutes to lower blood pressure and cholesterol. It's OK to work up to the 40-minute period over time. Examples of moderate-intensity activity are walking 1 mile in 15 minutes. Or doing 30 to 45 minutes of yard work.    Step up your daily activity level.  Along with your exercise program, try being more active the whole day. Walk instead of drive. Or park further  away so that you take more steps each day. Do more household tasks or yard work. You may not be able to meet the advised mount of physical activity. But doing some moderate- or vigorous-intensity aerobic activity can help reduce your risk for heart disease. Your healthcare provider can help you figure out what is best for you.    Choose 1 or more activities you enjoy.  Walking is one of the easiest things you can do. You can also try swimming, riding a bike, dancing, or taking an exercise class.    When to call your healthcare provider  Call your healthcare provider if you have any of these:     Chest pain or feel dizzy or lightheaded    Burning, tightness, pressure, or heaviness in your chest, neck, shoulders, back, or arms    Abnormal shortness of breath    More joint or muscle pain    A very fast or irregular heartbeat (palpitations)  [employer] last reviewed this educational content on 7/1/2019 2000-2021 [employer]. All rights reserved. This information is not intended as a substitute for professional medical care. Always follow your healthcare professional's instructions.          Understanding USDA MyPlate  The USDA has guidelines to help you make healthy food choices. These are called MyPlate. MyPlate shows the food groups that make up healthy meals using the image of a place setting. Before you eat, think about the healthiest choices for what to put on your plate or in your cup or bowl. To learn more about building a healthy plate, visit www.choosemyplate.gov.    The food groups    Fruits. Any fruit or 100% fruit juice counts as part of the Fruit Group. Fruits may be fresh, canned, frozen, or dried, and may be whole, cut-up, or pureed. Make 1/2 of your plate fruits and vegetables.    Vegetables. Any vegetable or 100% vegetable juice counts as a member of the Vegetable Group. Vegetables may be fresh, frozen, canned, or dried. They can be served raw or cooked and may be whole, cut-up, or  mashed. Make 1/2 of your plate fruits and vegetables.    Grains. All foods made from grains are part of the Grains Group. These include wheat, rice, oats, cornmeal, and barley. Grains are often used to make foods such as bread, pasta, oatmeal, cereal, tortillas, and grits. Grains should be no more than 1/4 of your plate. At least half of your grains should be whole grains.    Protein. This group includes meat, poultry, seafood, beans and peas, eggs, processed soy products (such as tofu), nuts (including nut butters), and seeds. Make protein choices no more than 1/4 of your plate. Meat and poultry choices should be lean or low fat.    Dairy. The Dairy Group includes all fluid milk products and foods made from milk that contain calcium, such as yogurt and cheese. (Foods that have little calcium, such as cream, butter, and cream cheese, are not part of this group.) Most dairy choices should be low-fat or fat-free.    Oils. Oils aren't a food group, but they do contain essential nutrients. However it's important to watch your intake of oils. These are fats that are liquid at room temperature. They include canola, corn, olive, soybean, vegetable, and sunflower oil. Foods that are mainly oil include mayonnaise, certain salad dressings, and soft margarines. You likely already get your daily oil allowance from the foods you eat.  Things to limit  Eating healthy also means limiting these things in your diet:       Salt (sodium). Many processed foods have a lot of sodium. To keep sodium intake down, eat fresh vegetables, meats, poultry, and seafood when possible. Purchase low-sodium, reduced-sodium, or no-salt-added food products at the store. And don't add salt to your meals at home. Instead, season them with herbs and spices such as dill, oregano, cumin, and paprika. Or try adding flavor with lemon or lime zest and juice.    Saturated fat. Saturated fats are most often found in animal products such as beef, pork, and  chicken. They are often solid at room temperature, such as butter. To reduce your saturated fat intake, choose leaner cuts of meat and poultry. And try healthier cooking methods such as grilling, broiling, roasting, or baking. For a simple lower-fat swap, use plain nonfat yogurt instead of mayonnaise when making potato salad or macaroni salad.    Added sugars. These are sugars added to foods. They are in foods such as ice cream, candy, soda, fruit drinks, sports drinks, energy drinks, cookies, pastries, jams, and syrups. Cut down on added sugars by sharing sweet treats with a family member or friend. You can also choose fruit for dessert, and drink water or other unsweetened beverages.     [employer] last reviewed this educational content on 6/1/2020 2000-2021 [employer]. All rights reserved. This information is not intended as a substitute for professional medical care. Always follow your healthcare professional's instructions.          Urinary Incontinence, Female (Adult)   Urinary incontinence means loss of bladder control. This problem affects many women, especially as they get older. If you have incontinence, you may be embarrassed to ask for help. But know that this problem can be treated.   Types of Incontinence  There are different types of incontinence. Two of the main types are described here. You can have more than one type.     Stress incontinence. With this type, urine leaks when pressure (stress) is put on the bladder. This may happen when you cough, sneeze, or laugh. Stress incontinence most often occurs because the pelvic floor muscles that support the bladder and urethra are weak. This can happen after pregnancy and vaginal childbirth or a hysterectomy. It can also be due to excess body weight or hormone changes.    Urge incontinence (also called overactive bladder). With this type, a sudden urge to urinate is felt often. This may happen even though there may not be much urine in the  bladder. The need to urinate often during the night is common. Urge incontinence most often occurs because of bladder spasms. This may be due to bladder irritation or infection. Damage to bladder nerves or pelvic muscles, constipation, and certain medicines can also lead to urge incontinence.  Treatment depends on the cause. Further evaluation is needed to find the type you have. This will likely include an exam and certain tests. Based on the results, you and your healthcare provider can then plan treatment. Until a diagnosis is made, the home care tips below can help ease symptoms.   Home care    Do pelvic floor muscle exercises, if they are prescribed. The pelvic floor muscles help support the bladder and urethra. Many women find that their symptoms improve when doing special exercises that strengthen these muscles. To do the exercises, contract the muscles you would use to stop your stream of urine. But do this when you re not urinating. Hold for 10 seconds, then relax. Repeat 10 to 20 times in a row, at least 3 times a day. Your healthcare provider may give you other instructions for how to do the exercises and how often.    Keep a bladder diary. This helps track how often and how much you urinate over a set period of time. Bring this diary with you to your next visit with the provider. The information can help your provider learn more about your bladder problem.    Lose weight, if advised to by your provider. Extra weight puts pressure on the bladder. Your provider can help you create a weight-loss plan that s right for you. This may include exercising more and making certain diet changes.    Don't have foods and drinks that may irritate the bladder. These can include alcohol and caffeinated drinks.    Quit smoking. Smoking and other tobacco use can lead to a long-term (chronic) cough that strains the pelvic floor muscles. Smoking may also damage the bladder and urethra. Talk with your provider about  treatments or methods you can use to quit smoking.    If drinking large amounts of fluid makes you have symptoms, you may be advised to limit your fluid intake. You may also be advised to drink most of your fluids during the day and to limit fluids at night.    If you re worried about urine leakage or accidents, you may wear absorbent pads to catch urine. Change the pads often. This helps reduce discomfort. It may also reduce the risk of skin or bladder infections.    Follow-up care  Follow up with your healthcare provider, or as directed. It may take some to find the right treatment for your problem. But healthy lifestyle changes can be made right away. These include such things as exercising on a regular basis, eating a healthy diet, losing weight (if needed), and quitting smoking. Your treatment plan may include special therapies or medicines. Certain procedures or surgery may also be options. Talk about any questions you have with your provider.   When to seek medical advice  Call the healthcare provider right away if any of these occur:    Fever of 100.4 F (38 C) or higher, or as directed by your provider    Bladder pain or fullness    Belly swelling    Nausea or vomiting    Back pain    Weakness, dizziness, or fainting  [employer] last reviewed this educational content on 1/1/2020 2000-2021 [employer]. All rights reserved. This information is not intended as a substitute for professional medical care. Always follow your healthcare professional's instructions.        Your Health Risk Assessment indicates you feel you are not in good emotional health.    Recreation   Recreation is not limited to sports and team events. It includes any activity that provides relaxation, interest, enjoyment, and exercise. Recreation provides an outlet for physical, mental, and social energy. It can give a sense of worth and achievement. It can help you stay healthy.    Mental Exercise and Social Involvement  Mental  "and emotional health is as important as physical health. Keep in touch with friends and family. Stay as active as possible. Continue to learn and challenge yourself.   Things you can do to stay mentally active are:    Learn something new, like a foreign language or musical instrument.     Play SCRABBLE or do crossword puzzles. If you cannot find people to play these games with you at home, you can play them with others on your computer through the Internet.     Join a games club--anything from card games to chess or checkers or lawn bowling.     Start a new hobby.     Go back to school.     Volunteer.     Read.   Keep up with world events.    Depression and Suicide in Older Adults    Nearly 2 million older Americans have some type of depression. Some of them even take their own lives. Yet depression among older adults is often ignored. Learn the warning signs. You may help spare a loved one needless pain. You may also save a life.   What is depression?  Depression is a common and serious illness that affects the way you think and feel. It is not a normal part of aging, nor is it a sign of weakness, a character flaw, or something you can snap out of. Most people with depression need treatment to get better. The most common symptom is a feeling of deep sadness. People who are depressed also may seem tired and listless. And nothing seems to give them pleasure. It s normal to grieve or be sad sometimes. But sadness lessens or passes with time. Depression rarely goes away or improves on its own. A person with clinical depression can't \"snap out of it.\" Other symptoms of depression are:     Sleeping more or less than normal    Eating more or less than normal    Having headaches, stomachaches, or other pains that don t go away    Feeling nervous,  empty,  or worthless    Crying a great deal    Thinking or talking about suicide or death    Loss of interest in activities previously enjoyed    Social isolation    Feeling " confused or forgetful  What causes it?  The causes of depression aren t fully known. But it is thought to result from a complex blend of these factors:     Biochemistry. Certain chemicals in the brain play a role.    Genes. Depression does run in families.    Life stress. Life stresses can also trigger depression in some people. Older adults often face many stressors, such as death of friends or a spouse, health problems, and financial concerns.    Chronic conditions. This includes conditions such as diabetes, heart disease, or cancer. These can cause symptoms of depression. Medicine side effects can cause changes in thoughts and behaviors.  How you can help  Often, depressed people may not want to ask for help. When they do, they may be ignored. Or, they may receive the wrong treatment. You can help by showing parents and older friends love and support. If they seem depressed, don t lecture the person, ignore the symptoms, or discount the symptoms as a  normal  part of aging -which they are not. Get involved, listen, and show interest and support.   Help them understand that depression is a treatable illness. Tell them you can help them find the right treatment. Offer to go to their healthcare provider's appointment with them for support when the symptoms are discussed. With their approval, contact a local mental health center, social service agency, or hospital about services.   You can be an advocate for him or her at healthcare appointments. Many older adults have chronic illnesses that can cause symptoms of depression. Medicine side effects can change thoughts and behaviors. You can help make sure that the healthcare provider looks at all of these factors. He or she should refer your family member or friend to a mental healthcare provider when needed. in some cases, untreated depression can lead to a misdiagnosis. A person may be diagnosed with a brain disorder such as dementia. If the healthcare provider does  not take the issue of depression seriously, help your family member or friend to find another provider.   Don't be afraid to ask  If you think an older person you care about could be suicidal, ask,  Have you thought about suicide?  Most people will tell you the truth. If they say  yes,  they may already have a plan for how and when they will attempt it. Find out as much as you can. The more detailed the plan, and the easier it is to carry out, the more danger the person is in right now. Tell the person you are there for them and do not want them to harm him or herself. Don't wait to get help for the person. Call the person's healthcare provider, local hospital, or emergency services.   To learn more    National Suicide Prevention Lifeline (crisis hotline) 477-912-ECGM (856-851-0633)    National [place] of Mental Ucrnsi591-293-9117tqa.[facility].nih.gov    National [place] on Mental Kwkaows230-121-7178ygi.young.org    Mental Health Rgepcdu568-206-4834jeg.[facility].org    National Suicide Hocckvl933-PWCLKCY (181-851-1711)    Call 911  Never leave the person alone. A person who is actively suicidal needs psychiatric care right away. They will need constant supervision. Never leave the person out of sight. Call 911 or the national 24-hour suicide crisis hotline at 791-286-XHNV (833-193-2815). You can also take the person to the closest emergency room.   [employer] (?????) last reviewed this educational content on 5/1/2020 2000-2021 [employer]. All rights reserved. This information is not intended as a substitute for professional medical care. Always follow your healthcare professional's instructions.

## 2022-11-02 NOTE — PROGRESS NOTES
"SUBJECTIVE:   Autumn is a 47 year old who presents for Preventive Visit.      Patient has been advised of split billing requirements and indicates understanding: Yes  Are you in the first 12 months of your Medicare coverage?  No    Healthy Habits:     In general, how would you rate your overall health?  Fair    Frequency of exercise:  None    Do you usually eat at least 4 servings of fruit and vegetables a day, include whole grains    & fiber and avoid regularly eating high fat or \"junk\" foods?  No    Taking medications regularly:  Yes    Medication side effects:  Not applicable    Ability to successfully perform activities of daily living:  No assistance needed    Home Safety:  Throw rugs in the hallway and lack of grab bars in the bathroom    Hearing Impairment:  No hearing concerns    In the past 6 months, have you been bothered by leaking of urine? Yes    In general, how would you rate your overall mental or emotional health?  Fair      PHQ-2 Total Score: 2    Additional concerns today:  No    Do you feel safe in your environment? Yes    Have you ever done Advance Care Planning? (For example, a Health Directive, POLST, or a discussion with a medical provider or your loved ones about your wishes): Yes, patient states has an Advance Care Planning document and will bring a copy to the clinic.       Fall risk  Fallen 2 or more times in the past year?: No  Any fall with injury in the past year?: No    Cognitive Screening   1) Repeat 3 items (Leader, Season, Table)    2) Clock draw: NORMAL  3) 3 item recall: Recalls 3 objects  Results: 3 items recalled: COGNITIVE IMPAIRMENT LESS LIKELY    Mini-CogTM Copyright AUSTIN Cope. Licensed by the author for use in [facility]; reprinted with permission (evgeny@.[email or web address]). All rights reserved.      Do you have sleep apnea, excessive snoring or daytime drowsiness?: no    Reviewed and updated as needed this visit by clinical staff   Tobacco  Allergies  Meds   Med Hx  " Surg Hx  Fam Hx          Reviewed and updated as needed this visit by Provider                 Social History     Tobacco Use     Smoking status: Every Day     Packs/day: 0.50     Years: 32.00     Pack years: 16.00     Types: Cigarettes     Start date: 1/1/1989     Passive exposure: Past     Smokeless tobacco: Never     Tobacco comments:     pt declines   Substance Use Topics     Alcohol use: No     If you drink alcohol do you typically have >3 drinks per day or >7 drinks per week? No    Alcohol Use 11/2/2022   Prescreen: >3 drinks/day or >7 drinks/week? No   Prescreen: >3 drinks/day or >7 drinks/week? -               Current providers sharing in care for this patient include:   Patient Care Team:  Apryl Hathaway PA as PCP - General (Family Practice)  Apryl Hathaway PA as Assigned PCP  Tanya Sweet LPN as Taisha Rocha RD as Diabetes Educator (Diabetes Education)  Jazmín Wallace MD as MD (Endocrinology, Diabetes, and Metabolism)  Kinga Beckwith RN as Diabetes Educator (Diabetes Education)  Jazmín Wallace MD as Assigned Endocrinology Provider    The following health maintenance items are reviewed in Epic and correct as of today:  Health Maintenance   Topic Date Due     DIABETIC FOOT EXAM  Never done     URINE DRUG SCREEN  Never done     EYE EXAM  Never done     HEPATITIS B IMMUNIZATION (2 of 3 - 3-dose series) 09/11/2020     MEDICARE ANNUAL WELLNESS VISIT  01/30/2021     Pneumococcal Vaccine: Pediatrics (0 to 5 Years) and At-Risk Patients (6 to 64 Years) (2 - PCV) 03/13/2021     ADVANCE CARE PLANNING  04/03/2022     HPV TEST  07/06/2022     PAP  07/06/2022     COVID-19 Vaccine (5 - Booster for Moderna series) 08/13/2022     INFLUENZA VACCINE (1) Never done     LIPID  01/06/2023     MICROALBUMIN  01/06/2023     A1C  02/01/2023     MAMMO SCREENING  07/01/2023     NICOTINE/TOBACCO CESSATION COUNSELING Q 1 YR  08/01/2023     ZOSTER IMMUNIZATION (1 of 2) 08/27/2023     BMP  09/26/2023      TSH W/FREE T4 REFLEX  2023     DTAP/TDAP/TD IMMUNIZATION (3 - Td or Tdap) 2030     COLORECTAL CANCER SCREENING  2032     HEPATITIS C SCREENING  Completed     HIV SCREENING  Completed     IPV IMMUNIZATION  Aged Out     MENINGITIS IMMUNIZATION  Aged Out     Lab work is in process  Labs reviewed in EPIC  BP Readings from Last 3 Encounters:   22 106/72   22 115/74   22 115/80    Wt Readings from Last 3 Encounters:   22 119.3 kg (263 lb)   22 117.9 kg (260 lb)   22 119.7 kg (264 lb)                  Patient Active Problem List   Diagnosis     Routine general medical examination at a health care facility     Tear of lateral cartilage or meniscus of knee, current, right, initial encounter     Chronic pain syndrome     Opioid dependence in remission (H)     PTSD (post-traumatic stress disorder)     COY (generalized anxiety disorder)     Moderate episode of recurrent major depressive disorder (H)     Bilateral edema of lower extremity     Hypothyroid     Obesity     GERD (gastroesophageal reflux disease)     Mild mixed bipolar I disorder (H)     Trigger finger of both hands     Morbid obesity (H)     Grief     Diabetes mellitus, type 2 (H)     History of migraine headaches     Past Surgical History:   Procedure Laterality Date     CHOLECYSTECTOMY       COLONOSCOPY N/A 2022    Procedure: Colonscopy;  Surgeon: Deep Zhu MD;  Location: [facility]     GYN SURGERY       SECTION [id number]     GYN SURGERY  [address] [place] [address]       Social History     Tobacco Use     Smoking status: Every Day     Packs/day: 0.50     Years: 32.00     Pack years: 16.00     Types: Cigarettes     Start date: 1989     Passive exposure: Past     Smokeless tobacco: Never     Tobacco comments:     pt declines   Substance Use Topics     Alcohol use: No     Family History   Problem Relation Age of Onset     Cerebrovascular Disease Mother      Alcoholism Mother      Cancer Mother       Depression Mother      Eczema Mother      Hypertension Mother      Migraines Mother      Mental Illness Mother      Osteoarthritis Mother      Osteoporosis Mother      Alcoholism Father      Cancer Father      Hyperlipidemia Father      Hypertension Father      Myocardial Infarction Father      Mental Illness Sister      Migraines Sister          Current Outpatient Medications   Medication Sig Dispense Refill     Acetaminophen (TYLENOL PO) Take 500 mg by mouth every 4 hours as needed for mild pain or fever       asenapine (SAPHRIS) 2.5 MG SUBL sublingual tablet Place 2.5 mg under the tongue daily       atomoxetine (STRATTERA) 60 MG capsule Take 60 mg by mouth daily       baclofen (LIORESAL) 10 MG tablet TAKE 1 TABLET BY MOUTH THREE TIMES DAILY 90 tablet 0     bisacodyl (DULCOLAX) 5 MG EC tablet TAKE TWO TABLETS BY MOUTH TWO NIGHTS PRIOR TO PROCEDURE, AND THEN TWO TABLETS AT 3 PM THE DAY BEFORE PROCEDURE       bisacodyl (DULCOLAX) 5 MG EC tablet Take 2 tabs at bedtime 2 nights prior to procedure. Take 2 tabs at 3pm the day before procedure. 4 tablet 0     blood glucose (ACCU-CHEK GUIDE) test strip Use to test blood sugar 1 times daily 50 strip 3     blood glucose (NO BRAND SPECIFIED) lancets standard Use to test blood sugar 1 times daily 100 each 5     blood glucose (NO BRAND SPECIFIED) test strip Use to test blood sugar 1 time daily 50 strip 5     blood glucose monitoring (NO BRAND SPECIFIED) meter device kit Use to test blood sugar 1 time daily 1 kit 0     blood glucose monitoring (SOFTCLIX) lancets USE 1 TO CHECK GLUCOSE ONCE DAILY       buprenorphine HCl-naloxone HCl (SUBOXONE) 2-0.5 MG per film Place 1 Film under the tongue daily   0     chlorhexidine (HIBICLENS) 4 % liquid Was affected areas in show every other day 236 mL 4     Continuous Blood Gluc  (FREESTYLE POLO 2 READER) RENA 1 each continuous 1 each 0     Continuous Blood Gluc Sensor (FREESTYLE POLO 2 SENSOR) [facility] 1 each every 14 days 2 each 11      diclofenac (VOLTAREN) 1 % topical gel APPLY TOPICALLY TO KNEES AS DIRECTED       dulaglutide (TRULICITY) 1.5 MG/0.5ML pen Inject 1.5 mg Subcutaneous every 7 days 2 mL 3     EUTHYROX 175 MCG tablet Take 1 tablet by mouth once daily 90 tablet 0     furosemide (LASIX) 20 MG tablet TAKE 2 TABLETS BY MOUTH ONCE DAILY AS NEEDED FOR  LEG  SWELLING 60 tablet 0     hydrochlorothiazide (HYDRODIURIL) 25 MG tablet Take 1 tablet by mouth once daily 90 tablet 0     HYDROXYZINE HCL PO Take 25 mg by mouth 4 times daily as needed for itching       hylan (SYNVISC) 16 MG/2ML injection 16 mg by INTRA-ARTICULAR route every 6 months       ibuprofen (ADVIL/MOTRIN) 800 MG tablet Take 1 tablet (800 mg) by mouth every 8 hours as needed for moderate pain 90 tablet 0     Melatonin 10 MG CAPS Take 10 mg by mouth daily       NARCAN 4 MG/0.1ML nasal spray   0     NEW MED 0.5 mLs Tangerine Vaporizer Oil Cartridge 0.5ml Inhale 1 to 3 puffs up to 5x per day as needed       omeprazole (PRILOSEC) 40 MG DR capsule Take 1 capsule by mouth once daily 30 capsule 9     oxybutynin ER (DITROPAN XL) 5 MG 24 hr tablet Take 1 tablet by mouth once daily 30 tablet 5     phentermine (ADIPEX-P) 30 MG capsule Take 1 capsule by mouth in the morning 30 capsule 0     polyethylene glycol (GOLYTELY) 236 g suspension Drink 8 ounces every 10 minutes until the jug is half-empty at 6pm the night before procedure. Refrigerate. Repeat 6 hours prior to procedure. 4000 mL 0     potassium chloride ER (K-TAB) 20 MEQ CR tablet 20 meq along with a 10 mg pill .  This should only be taken if taking lasix. 90 tablet 3     potassium chloride ER (K-TAB/KLOR-CON) 10 MEQ CR tablet TAKE 1 TABLET BY MOUTH ONCE DAILY WHEN  TAKING  LASIX  (FUROSEMIDE) take along with the 20 mg potassium pill 30 tablet 3     rosuvastatin (CRESTOR) 5 MG tablet Take 1 tablet by mouth once daily 90 tablet 3     sertraline (ZOLOFT) 50 MG tablet TAKE 1 TABLET BY MOUTH ONCE DAILY IN THE MORNING       topiramate  (TOPAMAX) 50 MG tablet TAKE 1 TO 2 TABLETS BY MOUTH ONCE DAILY AT BEDTIME 60 tablet 3     VRAYLAR 3 MG CAPS capsule TAKE 1 CAPSULE BY MOUTH ONCE DAILY       Allergies   Allergen Reactions     Depakote [Valproic Acid]      Gabapentin Swelling     Propofol Unknown     Got very stiff and tight.      Recent Labs   Lab Test 09/29/22  0853 09/26/22  1005 08/15/22  1104 08/01/22  0954 07/21/22  1614 07/11/22  1157 06/09/22  1549 06/01/22  1355 03/09/22  1535 02/16/22  1355 01/06/22  1528 07/26/21  1720 05/19/21  0137 03/01/21  1252 01/18/21  1215 02/27/20  1634 01/29/20  1351 12/19/18  1155 11/28/18  1215   A1C  --   --   --  6.6*  --   --   --  6.9*  --  7.6*  --    < >  --   --   --   --  6.0*   < >  --    LDL  --   --   --   --   --   --   --   --   --   --  104*  --   --   --   --   --  155*  --  125*   HDL  --   --   --   --   --   --   --   --   --   --  65  --   --   --   --   --  87  --  90   TRIG  --   --   --   --   --   --   --   --   --   --  111  --   --   --   --   --  52  --  64   ALT  --   --   --   --   --  64*  --   --   --   --   --   --  31  --  33  --  28   < > 28   CR  --  0.70 0.76 0.68  --  0.82  --   --   --   --  0.69   < > 0.78  --  0.92   < > 0.98   < > 0.67   GFRESTIMATED  --  >90 >90 >90  --  88  --   --   --   --  >90   < > >90  --  74   < > 69   < > >90   GFRESTBLACK  --   --   --   --   --   --   --   --   --   --   --   --  >90  --  85   < > 80   < > >90   POTASSIUM 3.1* 3.3* 3.3* 2.8*   < > 2.9*  --   --   --   --  3.0*   < > 3.5  --  3.8   < > 3.8   < > 4.0   TSH  --  0.01*  --   --   --   --  <0.01*  --    < >  --   --    < >  --    < > 0.15*   < > 18.84*   < > 0.85    < > = values in this interval not displayed.      Pneumonia Vaccine:For adults 65 years or older who do not have an immunocompromising condition, cerebrospinal fluid leak, or cochlear implant and want to receive PPSV23 ONLY: Administer 1 dose of PPSV23. Anyone who received any doses of PPSV23 before age 65 should receive  "1 final dose of the vaccine at age 65 or older. Administer this last dose at least 5 years after the prior PPSV23 dose.  Mammogram Screening: Mammogram Screening: Recommended mammography every 1-2 years with patient discussion and risk factor consideration  History of abnormal Pap smear:   Last 3 Pap Results:   PAP (no units)   Date Value   07/06/2017 NIL     Last 3 Pap and HPV Results:   PAP / HPV Latest Ref Rng & Units 7/6/2017   PAP (Historical) - NIL   HPV16 NEG Negative   HPV18 NEG Negative   HRHPV NEG Negative       Breast CA Risk Assessment (FHS-7) 11/2/2022   Do you have a family history of breast, colon, or ovarian cancer? No / Unknown         Mammogram Screening: Recommended annual mammography  Pertinent mammograms are reviewed under the imaging tab.    Review of Systems  Constitutional, HEENT, cardiovascular, pulmonary, gi and gu systems are negative, except as otherwise noted.    OBJECTIVE:   /72   Pulse 85   Temp 97.3  F (36.3  C) (Tympanic)   Resp 16   Ht 1.575 m (5' 2\")   Wt 119.3 kg (263 lb)   LMP 09/21/2022 (Approximate)   SpO2 98%   BMI 48.10 kg/m   Estimated body mass index is 48.1 kg/m  as calculated from the following:    Height as of this encounter: 1.575 m (5' 2\").    Weight as of this encounter: 119.3 kg (263 lb).  Physical Exam  GENERAL: healthy, alert and no distress  EYES: Eyes grossly normal to inspection, PERRL and conjunctivae and sclerae normal  HENT: ear canals and TM's normal, nose and mouth without ulcers or lesions  NECK: no adenopathy, no asymmetry, masses, or scars and thyroid normal to palpation  RESP: lungs clear to auscultation - no rales, rhonchi or wheezes  BREAST: normal without masses, tenderness or nipple discharge and no palpable axillary masses or adenopathy  CV: regular rate and rhythm, normal S1 S2, no S3 or S4, no murmur, click or rub, no peripheral edema and peripheral pulses strong  ABDOMEN: soft, nontender, no hepatosplenomegaly, no masses and bowel " "sounds normal  MS: no gross musculoskeletal defects noted, no edema  SKIN: no suspicious lesions or rashes  NEURO: Normal strength and tone, mentation intact and speech normal  PSYCH: mentation appears normal, affect normal/bright        ASSESSMENT / PLAN:   1. Encounter for Medicare annual wellness exam  Has female incontinence issues.  She will be referred to see the urologist.  Given Oxybiotin not helpful at all.  Leaking and wearing pad al day long.   No pain in bladder. No odor.    - Adult Urology  Referral; Future    2. Pap smear for cervical cancer screening  Given pap smear/   - A pap thin layer screen with  HPV - recommended age 30 - 65 years            COUNSELING:  Reviewed preventive health counseling, as reflected in patient instructions       Regular exercise       Healthy diet/nutrition       Vision screening       Hearing screening       Dental care       Bladder control       Fall risk prevention       Immunizations    Vaccinated for: Influenza             Safe sex practices/STD prevention       Consider lung cancer screening for ages 55-80 years (77 for Medicare) and 20 pack-year smoking history      Estimated body mass index is 48.1 kg/m  as calculated from the following:    Height as of this encounter: 1.575 m (5' 2\").    Weight as of this encounter: 119.3 kg (263 lb).    Weight management plan: Discussed healthy diet and exercise guidelines    She reports that she has been smoking cigarettes. She started smoking about 33 years ago. She has a 16.00 pack-year smoking history. She has been exposed to tobacco smoke. She has never used smokeless tobacco.  Nicotine/Tobacco Cessation Plan:   Phone counseling: Place order for Quit Partner Referral      Appropriate preventive services were discussed with this patient, including applicable screening as appropriate for cardiovascular disease, diabetes, osteopenia/osteoporosis, and glaucoma.  As appropriate for age/gender, discussed screening for " colorectal cancer, prostate cancer, breast cancer, and cervical cancer. Checklist reviewing preventive services available has been given to the patient.    Reviewed patients plan of care and provided an AVS. The Intermediate Care Plan ( asthma action plan, low back pain action plan, and migraine action plan) for Autumn meets the Care Plan requirement. This Care Plan has been established and reviewed with the Patient.    Counseling Resources:  ATP IV Guidelines  Pooled Cohorts Equation Calculator  Breast Cancer Risk Calculator  Breast Cancer: Medication to Reduce Risk  FRAX Risk Assessment  ICSI Preventive Guidelines  Dietary Guidelines for Americans, 2010  [employer]'s MyPlate  ASA Prophylaxis  Lung CA Screening    [facility]    Identified Health Risks:    The patient was provided with suggestions to help her develop a healthy physical lifestyle.  She is at risk for lack of exercise and has been provided with information to increase physical activity for the benefit of her well-being.  The patient was counseled and encouraged to consider modifying their diet and eating habits. She was provided with information on recommended healthy diet options.  Information on urinary incontinence and treatment options given to patient.  The patient was provided with suggestions to help her develop a healthy emotional lifestyle.  The patient s PHQ-9 score is consistent with moderate depression. She was provided with information regarding depression and was advised to schedule a follow up appointment in 10 weeks to further address this issue.

## 2022-11-02 NOTE — TELEPHONE ENCOUNTER
Received an APPROVAL from [facility] for Phentermine HCI 30mg capsules. Effective 10/02/2022 to 11/01/2023. Forms scanned to [facility].

## 2022-11-04 ENCOUNTER — MEDICAL CORRESPONDENCE (OUTPATIENT)
Dept: HEALTH INFORMATION MANAGEMENT | Facility: HOSPITAL | Age: 47
End: 2022-11-04

## 2022-11-08 LAB
BKR LAB AP GYN ADEQUACY: NORMAL
BKR LAB AP GYN INTERPRETATION: NORMAL
BKR LAB AP HPV REFLEX: NORMAL
BKR LAB AP LMP: NORMAL
BKR LAB AP PREVIOUS ABNORMAL: NORMAL
PATH REPORT.COMMENTS IMP SPEC: NORMAL
PATH REPORT.COMMENTS IMP SPEC: NORMAL
PATH REPORT.RELEVANT HX SPEC: NORMAL

## 2022-11-09 LAB
HUMAN PAPILLOMA VIRUS 16 DNA: NEGATIVE
HUMAN PAPILLOMA VIRUS 18 DNA: NEGATIVE
HUMAN PAPILLOMA VIRUS FINAL DIAGNOSIS: NORMAL
HUMAN PAPILLOMA VIRUS OTHER HR: NEGATIVE

## 2022-11-16 ENCOUNTER — HOSPITAL ENCOUNTER (OUTPATIENT)
Dept: EDUCATION SERVICES | Facility: HOSPITAL | Age: 47
Discharge: HOME | End: 2022-11-16
Attending: NURSE PRACTITIONER | Admitting: NURSE PRACTITIONER
Payer: COMMERCIAL

## 2022-11-16 VITALS
DIASTOLIC BLOOD PRESSURE: 86 MMHG | HEIGHT: 62 IN | WEIGHT: 267 LBS | RESPIRATION RATE: 16 BRPM | BODY MASS INDEX: 49.13 KG/M2 | HEART RATE: 92 BPM | SYSTOLIC BLOOD PRESSURE: 117 MMHG | OXYGEN SATURATION: 98 %

## 2022-11-16 DIAGNOSIS — E11.9 TYPE 2 DIABETES MELLITUS WITHOUT COMPLICATION, WITHOUT LONG-TERM CURRENT USE OF INSULIN (H): ICD-10-CM

## 2022-11-16 DIAGNOSIS — E11.9 DIABETES MELLITUS, TYPE 2 (H): Primary | ICD-10-CM

## 2022-11-16 LAB — HBA1C MFR BLD: 6.5 % (ref 4.3–?)

## 2022-11-16 PROCEDURE — G0108 DIAB MANAGE TRN  PER INDIV: HCPCS | Performed by: DIETITIAN, REGISTERED

## 2022-11-16 PROCEDURE — 83036 HEMOGLOBIN GLYCOSYLATED A1C: CPT | Performed by: PHYSICIAN ASSISTANT

## 2022-11-16 RX ORDER — ASENAPINE 5 MG/1
5 TABLET SUBLINGUAL DAILY
COMMUNITY

## 2022-11-16 ASSESSMENT — PAIN SCALES - GENERAL: PAINLEVEL: SEVERE PAIN (7)

## 2022-11-16 NOTE — PROGRESS NOTES
Diabetes Self-Management Education & Support    Met with Autumn and Alba QIU for individual review/BG review, A1C.   Refer to UYEN for additional information.     Autumn, 47 year old female. Pleased wiuth A1C today of 6.5. Continues Trulicity, denies side effects. Shares she would like to loose weight. Explored exercise options, Autumn has pain in her knees so unable to tolerate walking for durations of time. Liked to do yoga, discussed modified movements and water walking. Does not like to swim but is open to water walking.      Wt Readings from Last 10 Encounters:   11/16/22 121.1 kg (267 lb)   11/02/22 119.3 kg (263 lb)   09/29/22 117.9 kg (260 lb)   09/26/22 119.7 kg (264 lb)   09/21/22 120.2 kg (264 lb 14.4 oz)   09/01/22 115.5 kg (254 lb 9.6 oz)   08/01/22 120.1 kg (264 lb 12.8 oz)   07/20/22 118 kg (260 lb 1.6 oz)   07/11/22 116.1 kg (256 lb)   06/09/22 125.2 kg (276 lb)     Pt is ready to start tobacco cessation.     Pt shares she has ingrown nail, left great toe. Onset, about a month.  She says it is red without drainage. It is painful.  Requests assistance with podiatry appointment as she shares she has insurance that will cover foot care, podiatry. Attempted to schedule visit, clinic scheduling unable to schedule. Podiatry scheduling number provided.     Advised if she has any difficulty with scheduling an appointment to contact writer.     Plan- pt will return in 3 months for next BG/A1C .  Scheduled 2/22/2023.      Kinga Beckwith RN Diabetes Educator,  [phone]  11/17/2022 at 5:52 AM

## 2022-11-16 NOTE — PROGRESS NOTES
Diabetes Self-Management Education & Support    Presents for: Follow-up (BG review/ A1C)    Type of Service: In Person Visit    Assessment Type:   ASSESSMENT:  Pt seen for BG review/A1C. Pt reports she has been doing well with food choices. Eats lunch and dinner daily, night snack of cereal. Weight has been fairly stable. Would like to work on losing more weight- she has lost 20lbs since first starting in DM education. Denies side effects with taking Trulicity. A1C rechecked today- 6.5, last value was 6.6. Reviewed some recommendations for weight loss- eating small frequent meals and adding some muscle strengthening exercises. Looked up water walking times at the high school, she felt that would be something she could try.    BG per meter  Average 109  Fasting , 120, 137, 143    Patient's most recent   Lab Results   Component Value Date    A1C 6.6 08/01/2022    A1C 6.9 06/01/2022    HEMOGLOBINA1 6.5 11/16/2022     is meeting goal of <7.0    Diabetes knowledge and skills assessment:   Patient is knowledgeable in diabetes management concepts related to: Healthy Eating, Being Active, Monitoring, Taking Medication, Problem Solving, Reducing Risks and Healthy Coping    Continue education with the following diabetes management concepts: Healthy Eating, Being Active, Problem Solving, Reducing Risks and Healthy Coping    Based on learning assessment above, most appropriate setting for further diabetes education would be: Individual setting.      PLAN      Topics to cover at upcoming visits: Healthy Eating, Being Active, Problem Solving, Reducing Risks and Healthy Coping    Follow-up: 2/22/23 at 2pm    See Care Plan for co-developed, patient-state behavior change goals.  AVS provided for patient today.    Education Materials Provided:  No new materials provided today      SUBJECTIVE/OBJECTIVE:  Presents for: Follow-up (BG review/ CGM)  Accompanied by: Self  Diabetes education in the past 24mo: No  Focus of Visit:  "Patient Unsure, Diabetes Pathophysiology, Healthy Eating, Healthy Coping  Diabetes type: Type 2  Date of diagnosis: 11/4/21  Disease course: Other  How confident are you filling out medical forms by yourself:: Quite a bit  Diabetes management related comments/concerns: Affording supplies  Transportation concerns: Yes  Difficulty affording diabetes medication?: No  Difficulty affording diabetes testing supplies?: Sometimes (possibly)  Other concerns:: Glasses, Physical impairment  Cultural Influences/Ethnic Background:  Not  or       Diabetes Symptoms & Complications:  Fatigue: Sometimes (improved)  Neuropathy: Sometimes (hand, feet)  Polydipsia: Yes (some meds might be contributing)  Polyphagia: No (not much of an appetite)  Polyuria: Sometimes (on a diuretic)  Visual change: No  Slow healing wounds: Yes (a little bit)  Other: No  Symptom course: Improving  Weight trend: Stable  Complications assessed today?: No    Patient Problem List and Family Medical History reviewed for relevant medical history, current medical status, and diabetes risk factors.    Vitals:  /86   Pulse 92   Resp 16   Ht 1.578 m (5' 2.12\")   Wt 121.1 kg (267 lb)   LMP 09/21/2022 (Approximate)   SpO2 98%   BMI 48.65 kg/m    Estimated body mass index is 48.65 kg/m  as calculated from the following:    Height as of this encounter: 1.578 m (5' 2.12\").    Weight as of this encounter: 121.1 kg (267 lb).   Last 3 BP:   BP Readings from Last 3 Encounters:   11/16/22 117/86   11/02/22 106/72   09/29/22 115/74       History   Smoking Status     Every Day     Packs/day: 0.50     Years: 32.00     Types: Cigarettes     Start date: 1/1/1989   Smokeless Tobacco     Never       Labs:  Lab Results   Component Value Date    A1C 6.6 08/01/2022    A1C 6.9 06/01/2022    HEMOGLOBINA1 6.5 11/16/2022     Lab Results   Component Value Date     09/26/2022     05/19/2021     Lab Results   Component Value Date     01/06/2022 "     01/29/2020     HDL Cholesterol   Date Value Ref Range Status   01/29/2020 87 >49 mg/dL Final     Direct Measure HDL   Date Value Ref Range Status   01/06/2022 65 >=50 mg/dL Final   ]  GFR Estimate   Date Value Ref Range Status   09/26/2022 >90 >60 mL/min/1.73m2 Final     Comment:     Effective December 21, 2021 eGFRcr in adults is calculated using the 2021 CKD-EPI creatinine equation which includes age and gender (Samy et al., [place], DOI: 10.1056/SGVGzd8633447)   05/19/2021 >90 >60 mL/min/[1.73_m2] Final     Comment:     Non  GFR Calc  Starting 12/18/2018, serum creatinine based estimated GFR (eGFR) will be   calculated using the Chronic Kidney Disease Epidemiology Collaboration   (CKD-EPI) equation.       GFR Estimate If Black   Date Value Ref Range Status   05/19/2021 >90 >60 mL/min/[1.73_m2] Final     Comment:      GFR Calc  Starting 12/18/2018, serum creatinine based estimated GFR (eGFR) will be   calculated using the Chronic Kidney Disease Epidemiology Collaboration   (CKD-EPI) equation.       Lab Results   Component Value Date    CR 0.70 09/26/2022    CR 0.78 05/19/2021     No results found for: MICROALBUMIN    Healthy Eating:  Healthy Eating Assessed Today: Yes  Cultural/Adventism diet restrictions?: No  Meal planning/habits: Smaller portions  How many times a week on average do you eat food made away from home (restaurant/take-out)?: 1 (1x in the past month)  Meals include: Dinner, Evening Snack, Lunch (isn't eating until 1 or 2, supper around 5:50/6)  Breakfast: coffee- meds around 11:30  Lunch: 1:30/2pm moms meals- diabetes friendly  Dinner: protein and veg- later in the evening  Snacks: not as much, small bowl of cereal  Beverages: Water, Milk, Coffee (prabhjot in water, 1%, cream and splenda in coffee)  Has patient met with a dietitian in the past?: Yes (with gestational diabetes)    Being Active:  Being Active Assessed Today: Yes  Exercise:: Currently not exercising  (house chores, riding lawnmower)  Barrier to exercise: Physical limitation (legs hurt, chronic pain)    Monitoring:  Monitoring Assessed Today: Yes  Did patient bring glucose meter to appointment? : Yes  Blood Glucose Meter: Accu-chek (guide me)  Times checking blood sugar at home (number): 2 (forgetting more often)  Times checking blood sugar at home (per): Day  Blood glucose trend: Other (unable to load meter today)        Taking Medications:  Diabetes Medication(s)     Incretin Mimetic Agents       dulaglutide (TRULICITY) 1.5 MG/0.5ML pen    Inject 1.5 mg Subcutaneous every 7 days          Taking Medication Assessed Today: Yes  Current Treatments: Non-insulin Injectables (trulicity 1.5)  Problems taking diabetes medications regularly?: No  Diabetes medication side effects?: No (feels sick with eating too many carbs, hot and flushed, careful with sun and alcohol.)    Problem Solving:  Problem Solving Assessed Today: Yes  Is the patient at risk for hypoglycemia?: No  Is the patient at risk for DKA?: No  Does patient have severe weather/disaster plan for diabetes management?: Not Needed  Does patient have sick day plan for diabetes management?: Not Needed              Reducing Risks:  Reducing Risks Assessed Today: Yes  Diabetes Risks: Age over 45 years, History of gestational diabetes, Sedentary Lifestyle  CAD Risks: Sedentary lifestyle, Tobacco exposure, Stress, Obesity, Family history, Diabetes Mellitus  Has dilated eye exam at least once a year?: No (forgetting to get it set up)  Sees dentist every 6 months?: No (needs to find a dentist, chipped a tooth yesterday)  Feet checked by healthcare provider in the last year?: No    Healthy Coping:  Healthy Coping Assessed Today: Yes  Emotional response to diabetes: Ready to learn, Depression (depression due to mom passing)  Informal Support system:: Family (mental health practitioner, [facility], )  Stage of change: ACTION (Actively working towards  change)  Support resources: In-person Offerings  Patient Activation Measure Survey Score:  KEN Score (Last Two) 11/28/2018 3/7/2019   KEN Raw Score 27 27   Activation Score 47.4 47.4   KEN Level 2 2         Care Plan and Education Provided:  Care Plan: Diabetes   Updates made by Taisha Corral RD since 11/16/2022 12:00 AM      Problem: Diabetes Self-Management Education Needed to Optimize Self-Care Behaviors       Goal: Being Active - get regular physical activity, working up to at least 150 minutes per week       Task: Discuss barriers to physical activity with patient Completed 11/16/2022   Responsible User: Taisha Corral RD      Task: Explore community resources including walking groups, assistance programs, and home videos Completed 11/16/2022   Responsible User: Taisha Corral RD      Goal: Healthy Coping - use available resources to cope with the challenges of managing diabetes       Task: Provide education on the benefits of making appropriate lifestyle changes Completed 11/16/2022   Responsible User: Taisha Corral RD              Time Spent: 60 minutes  Encounter Type: Individual    Any diabetes medication dose changes were made via the CDE Protocol per the patient's primary care provider. A copy of this encounter was shared with the provider.

## 2022-11-16 NOTE — LETTER
11/16/2022        RE: Autumn BATISTA Hnatko  [address] [facility] [address]        Diabetes Self-Management Education & Support    Met with Autumn and Alba QIU for individual review/BG review, A1C.   Refer to UYEN for additional information.     Autumn, 47 year old female. Pleased wiuth A1C today of 6.5. Continues Trulicity, denies side effects. Shares she would like to loose weight. Explored exercise options, Autumn has pain in her knees so unable to tolerate walking for durations of time. Liked to do yoga, discussed modified movements and water walking. Does not like to swim but is open to water walking.      Wt Readings from Last 10 Encounters:   11/16/22 121.1 kg (267 lb)   11/02/22 119.3 kg (263 lb)   09/29/22 117.9 kg (260 lb)   09/26/22 119.7 kg (264 lb)   09/21/22 120.2 kg (264 lb 14.4 oz)   09/01/22 115.5 kg (254 lb 9.6 oz)   08/01/22 120.1 kg (264 lb 12.8 oz)   07/20/22 118 kg (260 lb 1.6 oz)   07/11/22 116.1 kg (256 lb)   06/09/22 125.2 kg (276 lb)     Pt is ready to start tobacco cessation.     Pt shares she has ingrown nail, left great toe. Onset, about a month.  She says it is red without drainage. It is painful.  Requests assistance with podiatry appointment as she shares she has insurance that will cover foot care, podiatry. Attempted to schedule visit, clinic scheduling unable to schedule. Podiatry scheduling number provided.     Advised if she has any difficulty with scheduling an appointment to contact writer.     Plan- pt will return in 3 months for next BG/A1C .  Scheduled 2/22/2023.      Kinga Beckwith RN Diabetes Educator,  [phone]  11/17/2022 at 5:52 AM      Diabetes Self-Management Education & Support    Presents for: Follow-up (BG review/ A1C)    Type of Service: In Person Visit    Assessment Type:   ASSESSMENT:  Pt seen for BG review/A1C. Pt reports she has been doing well with food choices. Eats lunch and dinner daily, night snack of cereal. Weight has been fairly stable. Would like to  work on losing more weight- she has lost 20lbs since first starting in DM education. Denies side effects with taking Trulicity. A1C rechecked today- 6.5, last value was 6.6. Reviewed some recommendations for weight loss- eating small frequent meals and adding some muscle strengthening exercises. Looked up water walking times at the high school, she felt that would be something she could try.    BG per meter  Average 109  Fasting , 120, 137, 143    Patient's most recent   Lab Results   Component Value Date    A1C 6.6 08/01/2022    A1C 6.9 06/01/2022    HEMOGLOBINA1 6.5 11/16/2022     is meeting goal of <7.0    Diabetes knowledge and skills assessment:   Patient is knowledgeable in diabetes management concepts related to: Healthy Eating, Being Active, Monitoring, Taking Medication, Problem Solving, Reducing Risks and Healthy Coping    Continue education with the following diabetes management concepts: Healthy Eating, Being Active, Problem Solving, Reducing Risks and Healthy Coping    Based on learning assessment above, most appropriate setting for further diabetes education would be: Individual setting.      PLAN      Topics to cover at upcoming visits: Healthy Eating, Being Active, Problem Solving, Reducing Risks and Healthy Coping    Follow-up: 2/22/23 at 2pm    See Care Plan for co-developed, patient-state behavior change goals.  AVS provided for patient today.    Education Materials Provided:  No new materials provided today      SUBJECTIVE/OBJECTIVE:  Presents for: Follow-up (BG review/ CGM)  Accompanied by: Self  Diabetes education in the past 24mo: No  Focus of Visit: Patient Unsure, Diabetes Pathophysiology, Healthy Eating, Healthy Coping  Diabetes type: Type 2  Date of diagnosis: 11/4/21  Disease course: Other  How confident are you filling out medical forms by yourself:: Quite a bit  Diabetes management related comments/concerns: Affording supplies  Transportation concerns: Yes  Difficulty affording  "diabetes medication?: No  Difficulty affording diabetes testing supplies?: Sometimes (possibly)  Other concerns:: Glasses, Physical impairment  Cultural Influences/Ethnic Background:  Not  or       Diabetes Symptoms & Complications:  Fatigue: Sometimes (improved)  Neuropathy: Sometimes (hand, feet)  Polydipsia: Yes (some meds might be contributing)  Polyphagia: No (not much of an appetite)  Polyuria: Sometimes (on a diuretic)  Visual change: No  Slow healing wounds: Yes (a little bit)  Other: No  Symptom course: Improving  Weight trend: Stable  Complications assessed today?: No    Patient Problem List and Family Medical History reviewed for relevant medical history, current medical status, and diabetes risk factors.    Vitals:  /86   Pulse 92   Resp 16   Ht 1.578 m (5' 2.12\")   Wt 121.1 kg (267 lb)   LMP 09/21/2022 (Approximate)   SpO2 98%   BMI 48.65 kg/m    Estimated body mass index is 48.65 kg/m  as calculated from the following:    Height as of this encounter: 1.578 m (5' 2.12\").    Weight as of this encounter: 121.1 kg (267 lb).   Last 3 BP:   BP Readings from Last 3 Encounters:   11/16/22 117/86   11/02/22 106/72   09/29/22 115/74       History   Smoking Status     Every Day     Packs/day: 0.50     Years: 32.00     Types: Cigarettes     Start date: 1/1/1989   Smokeless Tobacco     Never       Labs:  Lab Results   Component Value Date    A1C 6.6 08/01/2022    A1C 6.9 06/01/2022    HEMOGLOBINA1 6.5 11/16/2022     Lab Results   Component Value Date     09/26/2022     05/19/2021     Lab Results   Component Value Date     01/06/2022     01/29/2020     HDL Cholesterol   Date Value Ref Range Status   01/29/2020 87 >49 mg/dL Final     Direct Measure HDL   Date Value Ref Range Status   01/06/2022 65 >=50 mg/dL Final   ]  GFR Estimate   Date Value Ref Range Status   09/26/2022 >90 >60 mL/min/1.73m2 Final     Comment:     Effective December 21, 2021 eGFRcr in adults " is calculated using the 2021 CKD-EPI creatinine equation which includes age and gender (Samy bryant al., [place], DOI: 10.1056/LSYLfp4679659)   05/19/2021 >90 >60 mL/min/[1.73_m2] Final     Comment:     Non  GFR Calc  Starting 12/18/2018, serum creatinine based estimated GFR (eGFR) will be   calculated using the Chronic Kidney Disease Epidemiology Collaboration   (CKD-EPI) equation.       GFR Estimate If Black   Date Value Ref Range Status   05/19/2021 >90 >60 mL/min/[1.73_m2] Final     Comment:      GFR Calc  Starting 12/18/2018, serum creatinine based estimated GFR (eGFR) will be   calculated using the Chronic Kidney Disease Epidemiology Collaboration   (CKD-EPI) equation.       Lab Results   Component Value Date    CR 0.70 09/26/2022    CR 0.78 05/19/2021     No results found for: MICROALBUMIN    Healthy Eating:  Healthy Eating Assessed Today: Yes  Cultural/Sikhism diet restrictions?: No  Meal planning/habits: Smaller portions  How many times a week on average do you eat food made away from home (restaurant/take-out)?: 1 (1x in the past month)  Meals include: Dinner, Evening Snack, Lunch (isn't eating until 1 or 2, supper around 5:50/6)  Breakfast: coffee- meds around 11:30  Lunch: 1:30/2pm moms meals- diabetes friendly  Dinner: protein and veg- later in the evening  Snacks: not as much, small bowl of cereal  Beverages: Water, Milk, Coffee (prabhjot in water, 1%, cream and splenda in coffee)  Has patient met with a dietitian in the past?: Yes (with gestational diabetes)    Being Active:  Being Active Assessed Today: Yes  Exercise:: Currently not exercising (house chores, riding lawnmower)  Barrier to exercise: Physical limitation (legs hurt, chronic pain)    Monitoring:  Monitoring Assessed Today: Yes  Did patient bring glucose meter to appointment? : Yes  Blood Glucose Meter: Accu-chek (guide me)  Times checking blood sugar at home (number): 2 (forgetting more often)  Times checking blood  sugar at home (per): Day  Blood glucose trend: Other (unable to load meter today)        Taking Medications:  Diabetes Medication(s)     Incretin Mimetic Agents       dulaglutide (TRULICITY) 1.5 MG/0.5ML pen    Inject 1.5 mg Subcutaneous every 7 days          Taking Medication Assessed Today: Yes  Current Treatments: Non-insulin Injectables (trulicity 1.5)  Problems taking diabetes medications regularly?: No  Diabetes medication side effects?: No (feels sick with eating too many carbs, hot and flushed, careful with sun and alcohol.)    Problem Solving:  Problem Solving Assessed Today: Yes  Is the patient at risk for hypoglycemia?: No  Is the patient at risk for DKA?: No  Does patient have severe weather/disaster plan for diabetes management?: Not Needed  Does patient have sick day plan for diabetes management?: Not Needed              Reducing Risks:  Reducing Risks Assessed Today: Yes  Diabetes Risks: Age over 45 years, History of gestational diabetes, Sedentary Lifestyle  CAD Risks: Sedentary lifestyle, Tobacco exposure, Stress, Obesity, Family history, Diabetes Mellitus  Has dilated eye exam at least once a year?: No (forgetting to get it set up)  Sees dentist every 6 months?: No (needs to find a dentist, chipped a tooth yesterday)  Feet checked by healthcare provider in the last year?: No    Healthy Coping:  Healthy Coping Assessed Today: Yes  Emotional response to diabetes: Ready to learn, Depression (depression due to mom passing)  Informal Support system:: Family (mental health practitioner, [facility], )  Stage of change: ACTION (Actively working towards change)  Support resources: In-person Offerings  Patient Activation Measure Survey Score:  KEN Score (Last Two) 11/28/2018 3/7/2019   KEN Raw Score 27 27   Activation Score 47.4 47.4   KEN Level 2 2         Care Plan and Education Provided:  Care Plan: Diabetes   Updates made by Taisha Corral RD since 11/16/2022 12:00 AM      Problem: Diabetes  Self-Management Education Needed to Optimize Self-Care Behaviors       Goal: Being Active - get regular physical activity, working up to at least 150 minutes per week       Task: Discuss barriers to physical activity with patient Completed 11/16/2022   Responsible User: Taisha Corral RD      Task: Explore community resources including walking groups, assistance programs, and home videos Completed 11/16/2022   Responsible User: Taisha Corral RD      Goal: Healthy Coping - use available resources to cope with the challenges of managing diabetes       Task: Provide education on the benefits of making appropriate lifestyle changes Completed 11/16/2022   Responsible User: Taisha Corral RD              Time Spent: 60 minutes  Encounter Type: Individual    Any diabetes medication dose changes were made via the CDE Protocol per the patient's primary care provider. A copy of this encounter was shared with the provider.          Sincerely,        Taisha Corral RD

## 2022-11-18 ENCOUNTER — TRANSFERRED RECORDS (OUTPATIENT)
Dept: HEALTH INFORMATION MANAGEMENT | Facility: CLINIC | Age: 47
End: 2022-11-18

## 2022-11-18 LAB — RETINOPATHY: NEGATIVE

## 2022-11-22 DIAGNOSIS — E11.9 TYPE 2 DIABETES MELLITUS WITHOUT COMPLICATION, WITHOUT LONG-TERM CURRENT USE OF INSULIN (H): ICD-10-CM

## 2022-11-22 DIAGNOSIS — E66.01 MORBID OBESITY (H): ICD-10-CM

## 2022-11-23 RX ORDER — DULAGLUTIDE 1.5 MG/.5ML
INJECTION, SOLUTION SUBCUTANEOUS
Qty: 4 ML | Refills: 1 | Status: SHIPPED | OUTPATIENT
Start: 2022-11-23 | End: 2023-01-19

## 2022-11-25 NOTE — TELEPHONE ENCOUNTER
Phentermine       Last Written Prescription Date:  10/27/2022  Last Fill Quantity: 30,   # refills: 0  Last Office Visit: 11/02/2022  Future Office visit:       Routing refill request to provider for review/approval because:

## 2022-11-28 DIAGNOSIS — G89.4 CHRONIC PAIN SYNDROME: ICD-10-CM

## 2022-11-28 DIAGNOSIS — R60.0 BILATERAL LEG EDEMA: ICD-10-CM

## 2022-11-28 RX ORDER — PHENTERMINE HYDROCHLORIDE 30 MG/1
CAPSULE ORAL
Qty: 30 CAPSULE | Refills: 0 | Status: SHIPPED | OUTPATIENT
Start: 2022-11-28 | End: 2022-12-29

## 2022-11-29 DIAGNOSIS — N32.81 OVERACTIVE BLADDER: Primary | ICD-10-CM

## 2022-11-30 NOTE — TELEPHONE ENCOUNTER
Baclofen, Lasix      Last Written Prescription Date:  11.2.22  Last Fill Quantity: #90, #60,   # refills: 0  Last Office Visit: 11.2.22  Future Office visit:       Routing refill request to provider for review/approval because:  Drug not on the [facility], [facility] or [facility] refill protocol or controlled substance     Patient

## 2022-12-01 RX ORDER — BACLOFEN 10 MG/1
TABLET ORAL
Qty: 90 TABLET | Refills: 0 | Status: SHIPPED | OUTPATIENT
Start: 2022-12-01 | End: 2023-01-05

## 2022-12-01 RX ORDER — FUROSEMIDE 20 MG
TABLET ORAL
Qty: 60 TABLET | Refills: 0 | Status: SHIPPED | OUTPATIENT
Start: 2022-12-01 | End: 2023-01-05

## 2022-12-02 ENCOUNTER — TELEPHONE (OUTPATIENT)
Dept: FAMILY MEDICINE | Facility: OTHER | Age: 47
End: 2022-12-02

## 2022-12-02 NOTE — TELEPHONE ENCOUNTER
Form received.  Cervical CA screening Insurance form.  Please call patient at [phone] when complete, and she will pick it up.

## 2022-12-05 DIAGNOSIS — E03.9 ACQUIRED HYPOTHYROIDISM: ICD-10-CM

## 2022-12-07 NOTE — TELEPHONE ENCOUNTER
Please call pt and confirm her Synthroid dose.  Request is for 150mcg, but 175mcg is on current list.

## 2022-12-15 RX ORDER — LEVOTHYROXINE SODIUM 150 UG/1
TABLET ORAL
Qty: 90 TABLET | Refills: 0 | Status: SHIPPED | OUTPATIENT
Start: 2022-12-15 | End: 2023-02-20

## 2022-12-22 ENCOUNTER — TRANSFERRED RECORDS (OUTPATIENT)
Dept: HEALTH INFORMATION MANAGEMENT | Facility: CLINIC | Age: 47
End: 2022-12-22

## 2022-12-22 NOTE — TELEPHONE ENCOUNTER
Pharmacy calling to state test strips are not covered by new insurance.  Patient does not have a history or filling at [facility]  Caller states new Insurance will cover one touch ultra  Order will be needed for new BG testing kit & strips  Writer dialed patients phone number 2x - rings twice then goes to dead air.    Tried alternate contacts number - No VM option  Updated pharmacy no option to contact patient  Will clarify with patient if/when she calls the clinic

## 2022-12-27 DIAGNOSIS — E66.01 MORBID OBESITY (H): ICD-10-CM

## 2022-12-28 NOTE — TELEPHONE ENCOUNTER
Phentermine HCl 30 MG Oral Capsule       Last Written Prescription Date:  11/28/22  Last Fill Quantity: 30,   # refills: 0  Last Office Visit: 11/2/22  Future Office visit:       Routing refill request to provider for review/approval because:    Drug not on the [facility], [facility] or [facility] refill protocol or controlled substance

## 2022-12-29 RX ORDER — PHENTERMINE HYDROCHLORIDE 30 MG/1
CAPSULE ORAL
Qty: 30 CAPSULE | Refills: 0 | Status: SHIPPED | OUTPATIENT
Start: 2022-12-29 | End: 2023-01-25

## 2023-01-03 ENCOUNTER — TELEPHONE (OUTPATIENT)
Dept: FAMILY MEDICINE | Facility: OTHER | Age: 48
End: 2023-01-03

## 2023-01-03 NOTE — TELEPHONE ENCOUNTER
Angelica with [facility] calling and needs VO continuation order for skilled nurse in home 1x/week for 8 weeks.Gave VO for continuation.    Yusra Borwn RN

## 2023-01-06 DIAGNOSIS — Z53.9 PERSONS ENCOUNTERING HEALTH SERVICES FOR SPECIFIC PROCEDURES, NOT CARRIED OUT: Primary | ICD-10-CM

## 2023-01-16 ENCOUNTER — TELEPHONE (OUTPATIENT)
Dept: EDUCATION SERVICES | Facility: HOSPITAL | Age: 48
End: 2023-01-16

## 2023-01-16 DIAGNOSIS — E11.9 TYPE 2 DIABETES MELLITUS WITHOUT COMPLICATION, WITHOUT LONG-TERM CURRENT USE OF INSULIN (H): Primary | ICD-10-CM

## 2023-01-16 NOTE — TELEPHONE ENCOUNTER
Spoke with patient RE: will need new meter and test strips sent to [facility]. Change in formulary.     Pt requests to reschedule visit 2/22.   R/S 2/24 at 2 pm.    Pt has no further questions at this time.   Kinga Beckwith RN Diabetes Educator,  [phone]  1/16/2023 at 4:38 PM

## 2023-01-16 NOTE — TELEPHONE ENCOUNTER
Pt called and left a message her test strips/meter are no longer covered by her insurance company. Pt needs an Rx for a different brand.

## 2023-01-18 ENCOUNTER — LAB (OUTPATIENT)
Dept: LAB | Facility: OTHER | Age: 48
End: 2023-01-18
Payer: COMMERCIAL

## 2023-01-18 DIAGNOSIS — E03.9 ACQUIRED HYPOTHYROIDISM: ICD-10-CM

## 2023-01-18 DIAGNOSIS — E11.9 TYPE 2 DIABETES MELLITUS WITHOUT COMPLICATION, WITHOUT LONG-TERM CURRENT USE OF INSULIN (H): ICD-10-CM

## 2023-01-18 DIAGNOSIS — N32.81 OVERACTIVE BLADDER: ICD-10-CM

## 2023-01-18 LAB
ALBUMIN UR-MCNC: NEGATIVE MG/DL
APPEARANCE UR: CLEAR
BACTERIA #/AREA URNS HPF: ABNORMAL /HPF
BILIRUB UR QL STRIP: NEGATIVE
COLOR UR AUTO: ABNORMAL
GLUCOSE UR STRIP-MCNC: NEGATIVE MG/DL
HGB UR QL STRIP: ABNORMAL
HYALINE CASTS: 4 /LPF
KETONES UR STRIP-MCNC: NEGATIVE MG/DL
LEUKOCYTE ESTERASE UR QL STRIP: NEGATIVE
NITRATE UR QL: NEGATIVE
PH UR STRIP: 6.5 [PH] (ref 4.7–8)
RBC URINE: 1 /HPF
SP GR UR STRIP: 1.01 (ref 1–1.03)
SQUAMOUS EPITHELIAL: 0 /HPF
UROBILINOGEN UR STRIP-MCNC: NORMAL MG/DL
WBC URINE: <1 /HPF

## 2023-01-18 PROCEDURE — 82530 CORTISOL FREE: CPT | Mod: ZL

## 2023-01-18 PROCEDURE — 81001 URINALYSIS AUTO W/SCOPE: CPT | Mod: ZL

## 2023-01-19 RX ORDER — DULAGLUTIDE 1.5 MG/.5ML
INJECTION, SOLUTION SUBCUTANEOUS
Qty: 4 ML | Refills: 0 | Status: SHIPPED | OUTPATIENT
Start: 2023-01-19 | End: 2023-02-13

## 2023-01-19 NOTE — TELEPHONE ENCOUNTER
trulicity 1.5 mg/0.5 ml      Last Written Prescription Date:  11-23-22  Last Fill Quantity: 4ML,   # refills: 1  Last Office Visit: 11-2-2022

## 2023-01-20 DIAGNOSIS — Z53.9 PERSONS ENCOUNTERING HEALTH SERVICES FOR SPECIFIC PROCEDURES, NOT CARRIED OUT: Primary | ICD-10-CM

## 2023-01-23 DIAGNOSIS — E66.01 MORBID OBESITY (H): ICD-10-CM

## 2023-01-24 ENCOUNTER — VIRTUAL VISIT (OUTPATIENT)
Dept: ENDOCRINOLOGY | Facility: CLINIC | Age: 48
End: 2023-01-24
Payer: COMMERCIAL

## 2023-01-24 DIAGNOSIS — E66.01 MORBID OBESITY (H): Primary | ICD-10-CM

## 2023-01-24 DIAGNOSIS — G89.4 CHRONIC PAIN SYNDROME: ICD-10-CM

## 2023-01-24 PROCEDURE — 99214 OFFICE O/P EST MOD 30 MIN: CPT | Mod: 95 | Performed by: STUDENT IN AN ORGANIZED HEALTH CARE EDUCATION/TRAINING PROGRAM

## 2023-01-24 RX ORDER — IBUPROFEN 800 MG/1
800 TABLET, FILM COATED ORAL EVERY 8 HOURS PRN
Qty: 90 TABLET | Refills: 0 | Status: SHIPPED | OUTPATIENT
Start: 2023-01-24

## 2023-01-24 NOTE — LETTER
1/24/2023       RE: Autumn Holbrook  [address] [facility] [address]     Dear Colleague,    Thank you for referring your patient, Autumn Holbrook, to the [facility] ENDOCRINOLOGY CLINIC [place] at [facility]. Please see a copy of my visit note below.    Autumn Holbrook  is being evaluated via a billable video visit.      How would you like to obtain your AVS? Mail a copy  For the video visit, send the invitation by: Send to e-mail at: [email or web address]  Will anyone else be joining your video visit? No      Louann England, [facility]      Endocrinology Clinic Visit     Video-Visit Details     Type of service:  Video Visit     Joined the call at 1/24/2023, 11:36:08 am.  Left the call at 1/24/2023, 12:04:48 pm.  You were on the call for 28 minutes 40 seconds .    I spent a total of 35 minutes on the date of encounter reviewing medical records, evaluating the patient, coordinating care and documenting in the EHR, as detailed above.        Platform used for Video Visit: Yorn    Patient location: home  Provider location: off site     NAME:  Autumn Holbrook  PCP:  Apryl Hathaway  MRN:  [id number]  Reason for Consult:  hypothyroidism  Requesting Provider:  Apryl Hathaway    Chief Complaint     Chief Complaint   Patient presents with     Follow Up       History of Present Illness     Autumn Holbrook is a 47 year old female who is seen in video visit for hypothyroidism. Last seen 10/18/2022    # hypothyroidism  She had hypothyroidism diagnosed long time ago and has been on LT4 replacement since.  She is on lt4 150 mcg since 9/26/22, prior to that she was on LT4 175 mcg. She said she has been on higher doses up to 225 mcg and is being lowered over the past couple years. Her TFT showed iatrogenic hyperthyroidism with low TSH since 2/2020. Most recent TFT : 9/2022 TSH 0.01 and ft4 1.3.     Interval hx: no new concerns or complaints. Feeling well except for knee pain (  planning for surgery but needs to loose wt first). she forgot to get blood draw for TFT.        # morbid obesity, BMI 45  She was diagnosed with DM2 on 11/2021 with a1c 7. She was started on Trulicity by her PCP 5/2022. Most recent a1c 8/1/22 was 6.6. she lost wt right after starting trulicity 12 lbs but regained it.     About 5 years ago, she thinks she gained significant amount of wt after starting suboxone for Vicodin addiction.     She was started on topamax and phentermine 2 years ago. topamax was stopped august due to  insurance not covering. She lost 15 lbs when she started the combination phentermine/topamax then plateau.    Otherwise ROS: weak, tired, intermittent chest pain and SOb on exertion, she said she had an ecg done by her PCP which was unremarkable.  she noticed easy bruising. Wt is central around belly. She has light purple stretch marks ( about an inch in width and not very long) started couple years ago.  She has low potassium since 7/2021, she is also on hydrochlorothiazide for maybe 5 years, on lasix for ? 3 years.  Knee pain due to OA get steroid intraarticular injections    Interval hx: wt has been stable. Lost 3 lbs then gained them again. She remains on trulicity 1.5 mg weekly.    Social:  She is unemployed; she is on disability because of knee OA. She has 2 daughters. She is a smoker ( trying to quit). Alcohol very occasional. She smoke medical cannabis   Problem List     Patient Active Problem List   Diagnosis     Routine general medical examination at a health care facility     Tear of lateral cartilage or meniscus of knee, current, right, initial encounter     Chronic pain syndrome     Opioid dependence in remission (H)     PTSD (post-traumatic stress disorder)     COY (generalized anxiety disorder)     Moderate episode of recurrent major depressive disorder (H)     Bilateral edema of lower extremity     Hypothyroid     Obesity     GERD (gastroesophageal reflux disease)     Mild mixed  bipolar I disorder (H)     Trigger finger of both hands     Morbid obesity (H)     Grief     Diabetes mellitus, type 2 (H)     History of migraine headaches        Medications     Current Outpatient Medications   Medication     Acetaminophen (TYLENOL PO)     asenapine (SAPHRIS) 2.5 MG SUBL sublingual tablet     asenapine (SAPHRIS) 5 MG SUBL sublingual tablet     atomoxetine (STRATTERA) 60 MG capsule     baclofen (LIORESAL) 10 MG tablet     blood glucose (ACCU-CHEK GUIDE) test strip     blood glucose (NO BRAND SPECIFIED) lancets standard     blood glucose (NO BRAND SPECIFIED) test strip     blood glucose (NO BRAND SPECIFIED) test strip     blood glucose monitoring (NO BRAND SPECIFIED) meter device kit     blood glucose monitoring (NO BRAND SPECIFIED) meter device kit     blood glucose monitoring (SOFTCLIX) lancets     chlorhexidine (HIBICLENS) 4 % liquid     diclofenac (VOLTAREN) 1 % topical gel     EUTHYROX 175 MCG tablet     furosemide (LASIX) 20 MG tablet     hydrochlorothiazide (HYDRODIURIL) 25 MG tablet     HYDROXYZINE HCL PO     hylan (SYNVISC) 16 MG/2ML injection     ibuprofen (ADVIL/MOTRIN) 800 MG tablet     levothyroxine (SYNTHROID/LEVOTHROID) 150 MCG tablet     Melatonin 10 MG CAPS     NARCAN 4 MG/0.1ML nasal spray     NEW MED     omeprazole (PRILOSEC) 40 MG DR capsule     oxybutynin ER (DITROPAN XL) 5 MG 24 hr tablet     phentermine (ADIPEX-P) 30 MG capsule     potassium chloride ER (K-TAB) 20 MEQ CR tablet     potassium chloride ER (K-TAB/KLOR-CON) 10 MEQ CR tablet     rosuvastatin (CRESTOR) 5 MG tablet     sertraline (ZOLOFT) 50 MG tablet     TRULICITY 1.5 MG/0.5ML pen     VRAYLAR 3 MG CAPS capsule     Continuous Blood Gluc  (FREESTYLE POLO 2 READER) RENA     Continuous Blood Gluc Sensor (FREESTYLE POLO 2 SENSOR) MISC     polyethylene glycol (GOLYTELY) 236 g suspension     topiramate (TOPAMAX) 50 MG tablet     No current facility-administered medications for this visit.        Allergies      Allergies   Allergen Reactions     Depakote [Valproic Acid]      Gabapentin Swelling     Propofol Unknown     Got very stiff and tight.        Medical / Surgical History     Past Medical History:   Diagnosis Date     Anxiety      Arthritis      Depressive disorder      Migraine      Osteoarthritis      Thyroid disease      Past Surgical History:   Procedure Laterality Date     CHOLECYSTECTOMY       COLONOSCOPY N/A 2022    Procedure: Colonscopy;  Surgeon: Deep Zhu MD;  Location: [facility] OR     GYN SURGERY       SECTION [id number]     GYN SURGERY  2004    [facility]       Social History     Social History     Socioeconomic History     Marital status:      Spouse name: Not on file     Number of children: 2     Years of education: Not on file     Highest education level: Not on file   Occupational History     Not on file   Tobacco Use     Smoking status: Every Day     Packs/day: 0.50     Years: 32.00     Pack years: 16.00     Types: Cigarettes     Start date: 1989     Passive exposure: Past     Smokeless tobacco: Never     Tobacco comments:     pt declines   Substance and Sexual Activity     Alcohol use: No     Drug use: Yes     Types: Marijuana     Comment: daily-medical     Sexual activity: Not Currently   Other Topics Concern     Parent/sibling w/ CABG, MI or angioplasty before 65F 55M? Not Asked   Social History Narrative     Not on file     Social Determinants of Health     Financial Resource Strain: Not on file   Food Insecurity: Not on file   Transportation Needs: Not on file   Physical Activity: Not on file   Stress: Not on file   Social Connections: Not on file   Intimate Partner Violence: Not on file   Housing Stability: Not on file       Family History     Family History   Problem Relation Age of Onset     Cerebrovascular Disease Mother      Alcoholism Mother      Cancer Mother      Depression Mother      Eczema Mother      Hypertension Mother      Migraines Mother       Mental Illness Mother      Osteoarthritis Mother      Osteoporosis Mother      Alcoholism Father      Cancer Father      Hyperlipidemia Father      Hypertension Father      Myocardial Infarction Father      Mental Illness Sister      Migraines Sister        ROS     12 ROS completed, pertinent positive and negative in HPI    Physical Exam   There were no vitals taken for this visit.   GENERAL: Healthy, alert and no distress  EYES: Eyes grossly normal to inspection.  No discharge or erythema, or obvious scleral/conjunctival abnormalities.  RESP: No audible wheeze, cough, or visible cyanosis.  No visible retractions or increased work of breathing.    SKIN: Visible skin clear. No significant rash, abnormal pigmentation or lesions.  NEURO: Cranial nerves grossly intact.  Mentation and speech appropriate for age.  PSYCH: Mentation appears normal, affect normal/bright, judgement and insight intact, normal speech and appearance well-groomed.     Labs/Imaging     Pertinent Labs were reviewed and updated in EPIC and discussed briefly.  Radiology Results were  reviewed and updated in EPIC and discussed briefly.    Summary of recent findings:   Lab Results   Component Value Date    A1C 6.6 08/01/2022    A1C 6.9 06/01/2022    A1C 7.6 02/16/2022    A1C 7.0 11/04/2021    A1C 6.0 01/29/2020    A1C 6.2 07/01/2019    A1C 5.9 12/19/2018       TSH   Date Value Ref Range Status   09/26/2022 0.01 (L) 0.40 - 4.00 mU/L Final   06/09/2022 <0.01 (L) 0.40 - 4.00 mU/L Final   03/09/2022 0.15 (L) 0.40 - 4.00 mU/L Final   11/04/2021 0.31 (L) 0.40 - 4.00 mU/L Final   09/08/2021 0.21 (L) 0.40 - 4.00 mU/L Final   05/05/2021 0.30 (L) 0.40 - 4.00 mU/L Final   03/01/2021 0.14 (L) 0.40 - 4.00 mU/L Final   01/18/2021 0.15 (L) 0.40 - 4.00 mU/L Final   12/04/2020 0.10 (L) 0.40 - 4.00 mU/L Final   05/28/2020 0.44 0.40 - 4.00 mU/L Final     T4 Free   Date Value Ref Range Status   05/05/2021 1.35 0.76 - 1.46 ng/dL Final   03/01/2021 1.49 (H) 0.76 - 1.46  ng/dL Final   01/18/2021 1.35 0.76 - 1.46 ng/dL Final   12/04/2020 1.47 (H) 0.76 - 1.46 ng/dL Final   02/27/2020 1.82 (H) 0.76 - 1.46 ng/dL Final     Free T4   Date Value Ref Range Status   09/26/2022 1.30 0.76 - 1.46 ng/dL Final   06/09/2022 1.46 0.76 - 1.46 ng/dL Final   03/09/2022 1.43 0.76 - 1.46 ng/dL Final   11/04/2021 1.38 0.76 - 1.46 ng/dL Final   09/08/2021 1.32 0.76 - 1.46 ng/dL Final       Creatinine   Date Value Ref Range Status   09/26/2022 0.70 0.52 - 1.04 mg/dL Final   05/19/2021 0.78 0.52 - 1.04 mg/dL Final       Recent Labs   Lab Test 01/06/22  1528 01/29/20  1351   CHOL 191 252*   HDL 65 87   * 155*   TRIG 111 52       No results found for: [id number], [id number], [id number]    I personally reviewed the patient's outside records from [employer] EMR. Summary of pertinent findings in HPI.    Impression / Plan     1.  Chronic hypothyroidism  2.  Iatrogenic thyrotoxicosis due to over treated hypothyroidism with L T4  Her L T4 was adjusted based on her most recent TFT on 9/26/2022. Current dose 150 mcg daily.  Due for TFT    3.  Weight gain/inability to lose weight despite medical treatment  She is concerned that her weight gain and inability to lose weight is due to an underlying hormonal problem,?  Cushing.  Although she has clinical symptoms of subclinical Cushing, she does not have any overt symptoms of Cushing and it is unlikely that her inability to lose weight is due to an underlying endocrine problem.  We discussed screening for Cushing with 24-hour urine collection for cortisol, completed and result in process. Of note she used to get intraarticular steroid injections  She missed the call from wt management clinic and would like to establish with them.  We briefly discussed increasing trulicity to help with wt loss, she will be following with her PCP.          Follow up: 1 year      Jazmín Wallace MD  Endocrinology, Diabetes and Metabolism  [facility]

## 2023-01-24 NOTE — TELEPHONE ENCOUNTER
Ibuprofen      Last Written Prescription Date:  12/13/2019  Last Fill Quantity: 90,   # refills: 0  Last Office Visit: 11/02/2022  Future Office visit:       Routing refill request to provider for review/approval because:

## 2023-01-24 NOTE — PROGRESS NOTES
Autumn Holbrook  is being evaluated via a billable video visit.      How would you like to obtain your AVS? Mail a copy  For the video visit, send the invitation by: Send to e-mail at: [email or web address]  Will anyone else be joining your video visit? No      Louann England CMA

## 2023-01-24 NOTE — PROGRESS NOTES
Endocrinology Clinic Visit     Video-Visit Details     Type of service:  Video Visit     Joined the call at 1/24/2023, 11:36:08 am.  Left the call at 1/24/2023, 12:04:48 pm.  You were on the call for 28 minutes 40 seconds .    I spent a total of 35 minutes on the date of encounter reviewing medical records, evaluating the patient, coordinating care and documenting in the EHR, as detailed above.        Platform used for Video Visit: Adatao    Patient location: home  Provider location: off site     NAME:  Autumn Holbrook  PCP:  Apryl Hathaway  MRN:  [id number]  Reason for Consult:  hypothyroidism  Requesting Provider:  Apryl Hathaway    Chief Complaint     Chief Complaint   Patient presents with     Follow Up       History of Present Illness     Autumn Holbrook is a 47 year old female who is seen in video visit for hypothyroidism. Last seen 10/18/2022    # hypothyroidism  She had hypothyroidism diagnosed long time ago and has been on LT4 replacement since.  She is on lt4 150 mcg since 9/26/22, prior to that she was on LT4 175 mcg. She said she has been on higher doses up to 225 mcg and is being lowered over the past couple years. Her TFT showed iatrogenic hyperthyroidism with low TSH since 2/2020. Most recent TFT : 9/2022 TSH 0.01 and ft4 1.3.     Interval hx: no new concerns or complaints. Feeling well except for knee pain ( planning for surgery but needs to loose wt first). she forgot to get blood draw for TFT.        # morbid obesity, BMI 45  She was diagnosed with DM2 on 11/2021 with a1c 7. She was started on Trulicity by her PCP 5/2022. Most recent a1c 8/1/22 was 6.6. she lost wt right after starting trulicity 12 lbs but regained it.     About 5 years ago, she thinks she gained significant amount of wt after starting suboxone for Vicodin addiction.     She was started on topamax and phentermine 2 years ago. topamax was stopped august due to  insurance not covering. She lost 15 lbs when she started the  combination phentermine/topamax then plateau.    Otherwise ROS: weak, tired, intermittent chest pain and SOb on exertion, she said she had an ecg done by her PCP which was unremarkable.  she noticed easy bruising. Wt is central around belly. She has light purple stretch marks ( about an inch in width and not very long) started couple years ago.  She has low potassium since 7/2021, she is also on hydrochlorothiazide for maybe 5 years, on lasix for ? 3 years.  Knee pain due to OA get steroid intraarticular injections    Interval hx: wt has been stable. Lost 3 lbs then gained them again. She remains on trulicity 1.5 mg weekly.    Social:  She is unemployed; she is on disability because of knee OA. She has 2 daughters. She is a smoker ( trying to quit). Alcohol very occasional. She smoke medical cannabis   Problem List     Patient Active Problem List   Diagnosis     Routine general medical examination at a health care facility     Tear of lateral cartilage or meniscus of knee, current, right, initial encounter     Chronic pain syndrome     Opioid dependence in remission (H)     PTSD (post-traumatic stress disorder)     COY (generalized anxiety disorder)     Moderate episode of recurrent major depressive disorder (H)     Bilateral edema of lower extremity     Hypothyroid     Obesity     GERD (gastroesophageal reflux disease)     Mild mixed bipolar I disorder (H)     Trigger finger of both hands     Morbid obesity (H)     Grief     Diabetes mellitus, type 2 (H)     History of migraine headaches        Medications     Current Outpatient Medications   Medication     Acetaminophen (TYLENOL PO)     asenapine (SAPHRIS) 2.5 MG SUBL sublingual tablet     asenapine (SAPHRIS) 5 MG SUBL sublingual tablet     atomoxetine (STRATTERA) 60 MG capsule     baclofen (LIORESAL) 10 MG tablet     blood glucose (ACCU-CHEK GUIDE) test strip     blood glucose (NO BRAND SPECIFIED) lancets standard     blood glucose (NO BRAND SPECIFIED) test strip      blood glucose (NO BRAND SPECIFIED) test strip     blood glucose monitoring (NO BRAND SPECIFIED) meter device kit     blood glucose monitoring (NO BRAND SPECIFIED) meter device kit     blood glucose monitoring (SOFTCLIX) lancets     chlorhexidine (HIBICLENS) 4 % liquid     diclofenac (VOLTAREN) 1 % topical gel     EUTHYROX 175 MCG tablet     furosemide (LASIX) 20 MG tablet     hydrochlorothiazide (HYDRODIURIL) 25 MG tablet     HYDROXYZINE HCL PO     hylan (SYNVISC) 16 MG/2ML injection     ibuprofen (ADVIL/MOTRIN) 800 MG tablet     levothyroxine (SYNTHROID/LEVOTHROID) 150 MCG tablet     Melatonin 10 MG CAPS     NARCAN 4 MG/0.1ML nasal spray     NEW MED     omeprazole (PRILOSEC) 40 MG DR capsule     oxybutynin ER (DITROPAN XL) 5 MG 24 hr tablet     phentermine (ADIPEX-P) 30 MG capsule     potassium chloride ER (K-TAB) 20 MEQ CR tablet     potassium chloride ER (K-TAB/KLOR-CON) 10 MEQ CR tablet     rosuvastatin (CRESTOR) 5 MG tablet     sertraline (ZOLOFT) 50 MG tablet     TRULICITY 1.5 MG/0.5ML pen     VRAYLAR 3 MG CAPS capsule     Continuous Blood Gluc  (FREESTYLE POLO 2 READER) RENA     Continuous Blood Gluc Sensor (FREESTYLE POLO 2 SENSOR) MISC     polyethylene glycol (GOLYTELY) 236 g suspension     topiramate (TOPAMAX) 50 MG tablet     No current facility-administered medications for this visit.        Allergies     Allergies   Allergen Reactions     Depakote [Valproic Acid]      Gabapentin Swelling     Propofol Unknown     Got very stiff and tight.        Medical / Surgical History     Past Medical History:   Diagnosis Date     Anxiety      Arthritis      Depressive disorder      Migraine      Osteoarthritis      Thyroid disease      Past Surgical History:   Procedure Laterality Date     CHOLECYSTECTOMY       COLONOSCOPY N/A 2022    Procedure: Colonscopy;  Surgeon: Deep Zhu MD;  Location: [facility] OR     GYN SURGERY       SECTION [id number]     GYN SURGERY  2004    [facility]        Social History     Social History     Socioeconomic History     Marital status:      Spouse name: Not on file     Number of children: 2     Years of education: Not on file     Highest education level: Not on file   Occupational History     Not on file   Tobacco Use     Smoking status: Every Day     Packs/day: 0.50     Years: 32.00     Pack years: 16.00     Types: Cigarettes     Start date: 1/1/1989     Passive exposure: Past     Smokeless tobacco: Never     Tobacco comments:     pt declines   Substance and Sexual Activity     Alcohol use: No     Drug use: Yes     Types: Marijuana     Comment: daily-medical     Sexual activity: Not Currently   Other Topics Concern     Parent/sibling w/ CABG, MI or angioplasty before 65F 55M? Not Asked   Social History Narrative     Not on file     Social Determinants of Health     Financial Resource Strain: Not on file   Food Insecurity: Not on file   Transportation Needs: Not on file   Physical Activity: Not on file   Stress: Not on file   Social Connections: Not on file   Intimate Partner Violence: Not on file   Housing Stability: Not on file       Family History     Family History   Problem Relation Age of Onset     Cerebrovascular Disease Mother      Alcoholism Mother      Cancer Mother      Depression Mother      Eczema Mother      Hypertension Mother      Migraines Mother      Mental Illness Mother      Osteoarthritis Mother      Osteoporosis Mother      Alcoholism Father      Cancer Father      Hyperlipidemia Father      Hypertension Father      Myocardial Infarction Father      Mental Illness Sister      Migraines Sister        ROS     12 ROS completed, pertinent positive and negative in HPI    Physical Exam   There were no vitals taken for this visit.   GENERAL: Healthy, alert and no distress  EYES: Eyes grossly normal to inspection.  No discharge or erythema, or obvious scleral/conjunctival abnormalities.  RESP: No audible wheeze, cough, or visible cyanosis.   No visible retractions or increased work of breathing.    SKIN: Visible skin clear. No significant rash, abnormal pigmentation or lesions.  NEURO: Cranial nerves grossly intact.  Mentation and speech appropriate for age.  PSYCH: Mentation appears normal, affect normal/bright, judgement and insight intact, normal speech and appearance well-groomed.     Labs/Imaging     Pertinent Labs were reviewed and updated in EPIC and discussed briefly.  Radiology Results were  reviewed and updated in EPIC and discussed briefly.    Summary of recent findings:   Lab Results   Component Value Date    A1C 6.6 08/01/2022    A1C 6.9 06/01/2022    A1C 7.6 02/16/2022    A1C 7.0 11/04/2021    A1C 6.0 01/29/2020    A1C 6.2 07/01/2019    A1C 5.9 12/19/2018       TSH   Date Value Ref Range Status   09/26/2022 0.01 (L) 0.40 - 4.00 mU/L Final   06/09/2022 <0.01 (L) 0.40 - 4.00 mU/L Final   03/09/2022 0.15 (L) 0.40 - 4.00 mU/L Final   11/04/2021 0.31 (L) 0.40 - 4.00 mU/L Final   09/08/2021 0.21 (L) 0.40 - 4.00 mU/L Final   05/05/2021 0.30 (L) 0.40 - 4.00 mU/L Final   03/01/2021 0.14 (L) 0.40 - 4.00 mU/L Final   01/18/2021 0.15 (L) 0.40 - 4.00 mU/L Final   12/04/2020 0.10 (L) 0.40 - 4.00 mU/L Final   05/28/2020 0.44 0.40 - 4.00 mU/L Final     T4 Free   Date Value Ref Range Status   05/05/2021 1.35 0.76 - 1.46 ng/dL Final   03/01/2021 1.49 (H) 0.76 - 1.46 ng/dL Final   01/18/2021 1.35 0.76 - 1.46 ng/dL Final   12/04/2020 1.47 (H) 0.76 - 1.46 ng/dL Final   02/27/2020 1.82 (H) 0.76 - 1.46 ng/dL Final     Free T4   Date Value Ref Range Status   09/26/2022 1.30 0.76 - 1.46 ng/dL Final   06/09/2022 1.46 0.76 - 1.46 ng/dL Final   03/09/2022 1.43 0.76 - 1.46 ng/dL Final   11/04/2021 1.38 0.76 - 1.46 ng/dL Final   09/08/2021 1.32 0.76 - 1.46 ng/dL Final       Creatinine   Date Value Ref Range Status   09/26/2022 0.70 0.52 - 1.04 mg/dL Final   05/19/2021 0.78 0.52 - 1.04 mg/dL Final       Recent Labs   Lab Test 01/06/22  1528 01/29/20  1351   CHOL 191  252*   HDL 65 87   * 155*   TRIG 111 52       No results found for: [id number], [id number], [id number]    I personally reviewed the patient's outside records from [facility] EMR. Summary of pertinent findings in HPI.    Impression / Plan     1.  Chronic hypothyroidism  2.  Iatrogenic thyrotoxicosis due to over treated hypothyroidism with L T4  Her L T4 was adjusted based on her most recent TFT on 9/26/2022. Current dose 150 mcg daily.  Due for TFT    3.  Weight gain/inability to lose weight despite medical treatment  She is concerned that her weight gain and inability to lose weight is due to an underlying hormonal problem,?  Cushing.  Although she has clinical symptoms of subclinical Cushing, she does not have any overt symptoms of Cushing and it is unlikely that her inability to lose weight is due to an underlying endocrine problem.  We discussed screening for Cushing with 24-hour urine collection for cortisol, completed and result in process. Of note she used to get intraarticular steroid injections  She missed the call from wt management clinic and would like to establish with them.  We briefly discussed increasing trulicity to help with wt loss, she will be following with her PCP.          Follow up: 1 year      Jazmín Wallace MD  Endocrinology, Diabetes and Metabolism  [facility]

## 2023-01-25 RX ORDER — PHENTERMINE HYDROCHLORIDE 30 MG/1
CAPSULE ORAL
Qty: 30 CAPSULE | Refills: 0 | Status: SHIPPED | OUTPATIENT
Start: 2023-01-25 | End: 2023-02-17

## 2023-01-25 NOTE — TELEPHONE ENCOUNTER
Phentermine      Last Written Prescription Date:  12.30.22  Last Fill Quantity: #30,   # refills: 0  Last Office Visit: 11.2.22  Future Office visit:       Routing refill request to provider for review/approval because:  Drug not on the G, P or [facility] refill protocol or controlled substance

## 2023-01-26 LAB
COLLECT DURATION TIME UR: 24 H
CORTIS 24H UR-MRATE: 18 MCG/24 H (ref 3.5–45)
CORTISONE 24H UR-MRATE: 68 MCG/24 H (ref 17–129)
SPECIMEN VOL 24H UR: 2075 ML

## 2023-01-31 NOTE — TELEPHONE ENCOUNTER
Sandra calling for VO to continue skilled nursing one time a week.Gave verbal order.    Sandra call back [phone]    Yusra Brown RN     
Yes

## 2023-02-13 DIAGNOSIS — E11.9 TYPE 2 DIABETES MELLITUS WITHOUT COMPLICATION, WITHOUT LONG-TERM CURRENT USE OF INSULIN (H): ICD-10-CM

## 2023-02-13 RX ORDER — DULAGLUTIDE 1.5 MG/.5ML
INJECTION, SOLUTION SUBCUTANEOUS
Qty: 4 ML | Refills: 0 | Status: SHIPPED | OUTPATIENT
Start: 2023-02-13 | End: 2023-02-28 | Stop reason: DRUGHIGH

## 2023-02-16 ENCOUNTER — TELEPHONE (OUTPATIENT)
Dept: FAMILY MEDICINE | Facility: OTHER | Age: 48
End: 2023-02-16

## 2023-02-16 DIAGNOSIS — E11.9 TYPE 2 DIABETES MELLITUS WITHOUT COMPLICATION, WITHOUT LONG-TERM CURRENT USE OF INSULIN (H): Primary | ICD-10-CM

## 2023-02-17 ENCOUNTER — OFFICE VISIT (OUTPATIENT)
Dept: FAMILY MEDICINE | Facility: OTHER | Age: 48
End: 2023-02-17
Attending: PHYSICIAN ASSISTANT
Payer: COMMERCIAL

## 2023-02-17 VITALS
BODY MASS INDEX: 50.83 KG/M2 | HEART RATE: 80 BPM | WEIGHT: 279 LBS | OXYGEN SATURATION: 98 % | TEMPERATURE: 98.1 F | RESPIRATION RATE: 18 BRPM | SYSTOLIC BLOOD PRESSURE: 110 MMHG | DIASTOLIC BLOOD PRESSURE: 85 MMHG

## 2023-02-17 DIAGNOSIS — E11.9 TYPE 2 DIABETES MELLITUS WITHOUT COMPLICATION, WITHOUT LONG-TERM CURRENT USE OF INSULIN (H): ICD-10-CM

## 2023-02-17 DIAGNOSIS — M25.50 ARTHRALGIA, UNSPECIFIED JOINT: ICD-10-CM

## 2023-02-17 DIAGNOSIS — S83.281A TEAR OF LATERAL CARTILAGE OR MENISCUS OF KNEE, CURRENT, RIGHT, INITIAL ENCOUNTER: Primary | ICD-10-CM

## 2023-02-17 DIAGNOSIS — E03.9 ACQUIRED HYPOTHYROIDISM: ICD-10-CM

## 2023-02-17 DIAGNOSIS — E66.01 MORBID OBESITY (H): ICD-10-CM

## 2023-02-17 DIAGNOSIS — E55.9 VITAMIN D DEFICIENCY DISEASE: ICD-10-CM

## 2023-02-17 LAB
ALBUMIN SERPL BCG-MCNC: 4.1 G/DL (ref 3.5–5.2)
ALP SERPL-CCNC: 99 U/L (ref 35–104)
ALT SERPL W P-5'-P-CCNC: 25 U/L (ref 10–35)
AMPHETAMINES UR QL: DETECTED
ANION GAP SERPL CALCULATED.3IONS-SCNC: 11 MMOL/L (ref 7–15)
AST SERPL W P-5'-P-CCNC: 24 U/L (ref 10–35)
BARBITURATES UR QL SCN: NOT DETECTED
BENZODIAZ UR QL SCN: NOT DETECTED
BILIRUB SERPL-MCNC: 0.2 MG/DL
BUN SERPL-MCNC: 15.2 MG/DL (ref 6–20)
BUPRENORPHINE UR QL: NOT DETECTED
CALCIUM SERPL-MCNC: 9.9 MG/DL (ref 8.6–10)
CANNABINOIDS UR QL: DETECTED
CHLORIDE SERPL-SCNC: 98 MMOL/L (ref 98–107)
COCAINE UR QL SCN: NOT DETECTED
CREAT SERPL-MCNC: 0.81 MG/DL (ref 0.51–0.95)
CREAT UR-MCNC: 61.1 MG/DL
D-METHAMPHET UR QL: NOT DETECTED
DEPRECATED HCO3 PLAS-SCNC: 27 MMOL/L (ref 22–29)
EST. AVERAGE GLUCOSE BLD GHB EST-MCNC: 143 MG/DL
GFR SERPL CREATININE-BSD FRML MDRD: 88 ML/MIN/1.73M2
GLUCOSE SERPL-MCNC: 111 MG/DL (ref 70–99)
HBA1C MFR BLD: 6.6 %
METHADONE UR QL SCN: NOT DETECTED
MICROALBUMIN UR-MCNC: <12 MG/L
MICROALBUMIN/CREAT UR: NORMAL MG/G{CREAT}
OPIATES UR QL SCN: NOT DETECTED
OXYCODONE UR QL SCN: NOT DETECTED
PCP UR QL SCN: NOT DETECTED
POTASSIUM SERPL-SCNC: 3.8 MMOL/L (ref 3.4–5.3)
PROPOXYPH UR QL: NOT DETECTED
PROT SERPL-MCNC: 7.2 G/DL (ref 6.4–8.3)
SODIUM SERPL-SCNC: 136 MMOL/L (ref 136–145)
T4 FREE SERPL-MCNC: 1.02 NG/DL (ref 0.9–1.7)
TRICYCLICS UR QL SCN: NOT DETECTED
TSH SERPL DL<=0.005 MIU/L-ACNC: 21.09 UIU/ML (ref 0.3–4.2)

## 2023-02-17 PROCEDURE — 82570 ASSAY OF URINE CREATININE: CPT | Mod: ZL | Performed by: PHYSICIAN ASSISTANT

## 2023-02-17 PROCEDURE — G0463 HOSPITAL OUTPT CLINIC VISIT: HCPCS

## 2023-02-17 PROCEDURE — 80306 DRUG TEST PRSMV INSTRMNT: CPT | Mod: ZL | Performed by: PHYSICIAN ASSISTANT

## 2023-02-17 PROCEDURE — 83036 HEMOGLOBIN GLYCOSYLATED A1C: CPT | Mod: ZL | Performed by: PHYSICIAN ASSISTANT

## 2023-02-17 PROCEDURE — 84443 ASSAY THYROID STIM HORMONE: CPT | Mod: ZL | Performed by: PHYSICIAN ASSISTANT

## 2023-02-17 PROCEDURE — 84439 ASSAY OF FREE THYROXINE: CPT | Mod: ZL | Performed by: PHYSICIAN ASSISTANT

## 2023-02-17 PROCEDURE — 36415 COLL VENOUS BLD VENIPUNCTURE: CPT | Mod: ZL | Performed by: PHYSICIAN ASSISTANT

## 2023-02-17 PROCEDURE — 99214 OFFICE O/P EST MOD 30 MIN: CPT | Performed by: PHYSICIAN ASSISTANT

## 2023-02-17 PROCEDURE — 80053 COMPREHEN METABOLIC PANEL: CPT | Mod: ZL | Performed by: PHYSICIAN ASSISTANT

## 2023-02-17 PROCEDURE — 82306 VITAMIN D 25 HYDROXY: CPT | Mod: ZL | Performed by: PHYSICIAN ASSISTANT

## 2023-02-17 RX ORDER — PHENTERMINE HYDROCHLORIDE 30 MG/1
30 CAPSULE ORAL EVERY MORNING
Qty: 30 CAPSULE | Refills: 0 | Status: SHIPPED | OUTPATIENT
Start: 2023-02-17 | End: 2023-03-22

## 2023-02-17 RX ORDER — DULAGLUTIDE 3 MG/.5ML
3 INJECTION, SOLUTION SUBCUTANEOUS
Qty: 1.5 ML | Refills: 3 | Status: SHIPPED | OUTPATIENT
Start: 2023-02-17 | End: 2023-04-20

## 2023-02-17 ASSESSMENT — PATIENT HEALTH QUESTIONNAIRE - PHQ9: SUM OF ALL RESPONSES TO PHQ QUESTIONS 1-9: 12

## 2023-02-17 ASSESSMENT — PAIN SCALES - GENERAL: PAINLEVEL: SEVERE PAIN (7)

## 2023-02-17 NOTE — PROGRESS NOTES
Assessment & Plan     Morbid obesity (H)  Refill of phentermine.  Autumn pretty much maintains a BMI of well over 40 on this medication but without it she has been into the 50s.  Movement is very difficult for her due to her knees and that is what we are working to do is to get her weight down so she can get her knees replaced.  - phentermine (ADIPEX-P) 30 MG capsule; Take 1 capsule (30 mg) by mouth every morning    Type 2 diabetes mellitus without complication, without long-term current use of insulin (H)  She is given a refill of medication.  Has had minimal success  - Dulaglutide (TRULICITY) 3 MG/0.5ML SOPN; Inject 3 mg Subcutaneous every 7 days  - Hemoglobin A1c; Future  - Comprehensive metabolic panel (BMP + Alb, Alk Phos, ALT, AST, Total. Bili, TP); Future  - Albumin Random Urine Quantitative with Creat Ratio; Future    Tear of lateral cartilage or meniscus of knee, current, right, initial encounter  We have been working to keep her knees in moderate pain.  We have worked with injections they have been limited in success at this point I have recommended she see orthopedics for injections of Synvisc.  - Urine Drugs of Abuse Screen (Tox13); Future    Acquired hypothyroidism  Recheck her TSH.  - TSH with free T4 reflex; Future    Arthralgia, unspecified joint  She is no longer on Suboxone she did complete this.  History of addiction but she has been very well controlled and not using narcotics with her visits with me.  - Urine Drugs of Abuse Screen (Tox13); Future    Vitamin D deficiency disease  Recheck the vitamin D level.  - Vitamin D Deficiency; Future    Review of external notes as documented elsewhere in note  Ordering of each unique test  Prescription drug management  15 minutes spent on the date of the encounter doing chart review, history and exam, documentation and further activities per the note       Regular exercise  See Patient Instructions    No follow-ups on file.    Apryl Hathaway,  PA  [facility] - [place]    Debra Leyva is a 47 year old, presenting for the following health issues:  Thyroid Problem      HPI     Hypothyroidism Follow-up      Since last visit, patient describes the following symptoms: Weight stable, no hair loss, no skin changes, no constipation, no loose stools, weight gain of 20 lbs, dry skin, anxiety, depression, fatigue and hair loss      Diabetes Follow-up      How often are you checking your blood sugar? Not at all    What concerns do you have today about your diabetes? None     Do you have any of these symptoms? (Select all that apply)  No numbness or tingling in feet.  No redness, sores or blisters on feet.  No complaints of excessive thirst.  No reports of blurry vision.  No significant changes to weight.    Have you had a diabetic eye exam in the last 12 months? Yes- Date of last eye exam: september,  Location: with Dr. sheikh office.         BP Readings from Last 2 Encounters:   02/17/23 110/85   11/16/22 117/86     Hemoglobin A1C (%)   Date Value   08/01/2022 6.6 (H)   06/01/2022 6.9 (A)   02/16/2022 7.6 (A)     LDL Cholesterol Calculated (mg/dL)   Date Value   01/06/2022 104 (H)   01/29/2020 155 (H)   11/28/2018 125 (H)         Hypertension Follow-up      Do you check your blood pressure regularly outside of the clinic? Yes     Are you following a low salt diet? Yes    Are your blood pressures ever more than 140 on the top number (systolic) OR more   than 90 on the bottom number (diastolic), for example 140/90? No    Depression and Anxiety Follow-Up    How are you doing with your depression since your last visit? No change    How are you doing with your anxiety since your last visit?  No change    Are you having other symptoms that might be associated with depression or anxiety? No    Have you had a significant life event? No     Do you have any concerns with your use of alcohol or other drugs? No    Social History     Tobacco Use     Smoking  status: Every Day     Packs/day: 0.50     Years: 32.00     Pack years: 16.00     Types: Cigarettes     Start date: 1/1/1989     Passive exposure: Past     Smokeless tobacco: Never     Tobacco comments:     pt declines   Vaping Use     Vaping Use: Some days     Substances: Nicotine, Flavoring     Devices: Disposable   Substance Use Topics     Alcohol use: No     Drug use: Yes     Types: Marijuana     Comment: daily-medical     PHQ 8/1/2022 11/2/2022 2/17/2023   PHQ-9 Total Score 0 14 12   Q9: Thoughts of better off dead/self-harm past 2 weeks Not at all Not at all Not at all   F/U: Thoughts of suicide or self-harm - - -   F/U: Self harm-plan - - -   F/U: Self-harm action - - -   F/U: Safety concerns - - -   PHQ-A Total Score - - -   PHQ-A Depressed most days in past year - - -   PHQ-A Mood affect on daily activities - - -   PHQ-A Suicide Ideation past 2 weeks - - -   PHQ-A Suicide Ideation past month - - -   PHQ-A Previous suicide attempt - - -     COY-7 SCORE 6/9/2022 8/1/2022 11/2/2022   Total Score 21 8 16     Last PHQ-9 2/17/2023   1.  Little interest or pleasure in doing things 1   2.  Feeling down, depressed, or hopeless 0   3.  Trouble falling or staying asleep, or sleeping too much 3   4.  Feeling tired or having little energy 1   5.  Poor appetite or overeating 1   6.  Feeling bad about yourself 1   7.  Trouble concentrating 3   8.  Moving slowly or restless 2   Q9: Thoughts of better off dead/self-harm past 2 weeks 0   PHQ-9 Total Score 12   Difficulty at work, home, or with people Very difficult   In the past two weeks have you had thoughts of suicide or self harm? -   Do you have concerns about your personal safety or the safety of others? -   In the past 2 weeks have you thought about a plan or had intention to harm yourself? -   In the past 2 weeks have you acted on these thoughts in any way? -     COY-7  11/2/2022   1. Feeling nervous, anxious, or on edge 3   2. Not being able to stop or control  worrying 2   3. Worrying too much about different things 2   4. Trouble relaxing 3   5. Being so restless that it is hard to sit still 2   6. Becoming easily annoyed or irritable 2   7. Feeling afraid, as if something awful might happen 2   COY-7 Total Score 16   If you checked any problems, how difficult have they made it for you to do your work, take care of things at home, or get along with other people? Somewhat difficult       Suicide Assessment Five-step Evaluation and Treatment (SAFE-T)    Hypothyroidism Follow-up      Since last visit, patient describes the following symptoms: Weight stable, no hair loss, no skin changes, no constipation, no loose stools      How many servings of fruits and vegetables do you eat daily?  2-3    On average, how many sweetened beverages do you drink each day (Examples: soda, juice, sweet tea, etc.  Do NOT count diet or artificially sweetened beverages)?   2    How many days per week do you exercise enough to make your heart beat faster? 4    How many minutes a day do you exercise enough to make your heart beat faster? 20 - 29  How many days per week do you miss taking your medication? 3    What makes it hard for you to take your medications?  remembering to take      Review of Systems   CONSTITUTIONAL:has to drop weight.  She is aware looking at he  [facility] program was referred.   INTEGUMENTARY/SKIN: had injections in her knees. Help mildly.  Needs to lose weight to get the replacement.   EYES: NEGATIVE for vision changes or irritation  ENT/MOUTH: NEGATIVE for ear, mouth and throat problems  RESP:NEGATIVE for significant cough or SOB  MUSCULOSKELETAL: NEGATIVE for significant arthralgias or myalgia  NEURO: NEGATIVE for weakness, dizziness or paresthesias  PSYCHIATRIC: still active with Therapy and in hypomnesis now for smoking cessation.       Objective    /85 (BP Location: Left arm, Patient Position: Sitting, Cuff Size: Adult Large)   Pulse 80   Temp 98.1  F (36.7  C)  (Tympanic)   Resp 18   Wt 126.6 kg (279 lb)   LMP 02/16/2023 (Exact Date)   SpO2 98%   BMI 50.83 kg/m    Body mass index is 50.83 kg/m .  Physical Exam   GENERAL: healthy, alert and no distress  EYES: Eyes grossly normal to inspection, PERRL and conjunctivae and sclerae normal  HENT: ear canals and TM's normal, nose and mouth without ulcers or lesions  NECK: no adenopathy, no asymmetry, masses, or scars and thyroid normal to palpation  RESP: lungs clear to auscultation - no rales, rhonchi or wheezes  CV: regular rate and rhythm, normal S1 S2, no S3 or S4, no murmur, click or rub, no peripheral edema and peripheral pulses strong  ABDOMEN: soft, nontender, no hepatosplenomegaly, no masses and bowel sounds normal  MS: no gross musculoskeletal defects noted, no edema    Lab on 01/18/2023   Component Date Value Ref Range Status     Color Urine 01/18/2023 Straw  Colorless, Straw, Light Yellow, Yellow Final     Appearance Urine 01/18/2023 Clear  Clear Final     Glucose Urine 01/18/2023 Negative  Negative mg/dL Final     Bilirubin Urine 01/18/2023 Negative  Negative Final     Ketones Urine 01/18/2023 Negative  Negative mg/dL Final     Specific Gravity Urine 01/18/2023 1.007  1.003 - 1.035 Final     Blood Urine 01/18/2023 Moderate (A)  Negative Final     pH Urine 01/18/2023 6.5  4.7 - 8.0 Final     Protein Albumin Urine 01/18/2023 Negative  Negative mg/dL Final     Urobilinogen Urine 01/18/2023 Normal  Normal, 2.0 mg/dL Final     Nitrite Urine 01/18/2023 Negative  Negative Final     Leukocyte Esterase Urine 01/18/2023 Negative  Negative Final     Bacteria Urine 01/18/2023 Few (A)  None Seen /HPF Final     RBC Urine 01/18/2023 1  <=2 /HPF Final     WBC Urine 01/18/2023 <1  <=5 /HPF Final     Squamous Epithelials Urine 01/18/2023 0  <=1 /HPF Final     Hyaline Casts Urine 01/18/2023 4 (H)  <=2 /LPF Final     Collection Duration 01/18/2023 24  h Final     Urine Volume 01/18/2023 2075  mL Final        -------------------ADDITIONAL INFORMATION-------------------  This test was developed and its performance characteristics   determined by [facility] in a manner consistent with CLIA   requirements. This test has not been cleared or approved by   the U.S. Food and Drug Administration.     Test Performed by:  [facility] [place] [address]  : Jean Morales M.D. Ph.D.; CLIA# [id number]     Cortisol Urine 01/18/2023 18  3.5 - 45 mcg/24 h Final     Cortisone Urine 01/18/2023 68  17 - 129 mcg/24 h Final

## 2023-02-18 NOTE — TELEPHONE ENCOUNTER
phentermine      Last Written Prescription Date:  9/6/19  Last Fill Quantity: 30,   # refills: 0  Last Office Visit: 7/1/19  Future Office visit:       Routing refill request to provider for review/approval because:  Drug not on the G, P or [facility] refill protocol or controlled substance     Calm

## 2023-02-20 ENCOUNTER — TELEPHONE (OUTPATIENT)
Dept: ENDOCRINOLOGY | Facility: CLINIC | Age: 48
End: 2023-02-20
Payer: COMMERCIAL

## 2023-02-20 DIAGNOSIS — E03.9 ACQUIRED HYPOTHYROIDISM: ICD-10-CM

## 2023-02-20 DIAGNOSIS — N32.81 OVERACTIVE BLADDER: Primary | ICD-10-CM

## 2023-02-20 LAB — DEPRECATED CALCIDIOL+CALCIFEROL SERPL-MC: 41 UG/L (ref 20–75)

## 2023-02-20 RX ORDER — LEVOTHYROXINE SODIUM 175 UG/1
175 TABLET ORAL DAILY
Qty: 90 TABLET | Refills: 1 | Status: SHIPPED | OUTPATIENT
Start: 2023-02-20 | End: 2023-05-17

## 2023-02-21 NOTE — RESULT ENCOUNTER NOTE
Call pt let her know new script sent to pharmacy on her thyroid.  We will get this rechecked in 2 months.  Also her sugars are stable. Did not improved but not worse. And her Vit D is good. She has been taking this daily now.

## 2023-02-22 ENCOUNTER — OFFICE VISIT (OUTPATIENT)
Dept: UROLOGY | Facility: OTHER | Age: 48
End: 2023-02-22
Attending: PHYSICIAN ASSISTANT
Payer: COMMERCIAL

## 2023-02-22 ENCOUNTER — LAB (OUTPATIENT)
Dept: LAB | Facility: OTHER | Age: 48
End: 2023-02-22
Attending: PHYSICIAN ASSISTANT
Payer: COMMERCIAL

## 2023-02-22 VITALS
SYSTOLIC BLOOD PRESSURE: 120 MMHG | DIASTOLIC BLOOD PRESSURE: 84 MMHG | BODY MASS INDEX: 52.4 KG/M2 | WEIGHT: 287.6 LBS | RESPIRATION RATE: 18 BRPM

## 2023-02-22 DIAGNOSIS — N32.81 OVERACTIVE BLADDER: ICD-10-CM

## 2023-02-22 DIAGNOSIS — N32.81 OVERACTIVE BLADDER: Primary | ICD-10-CM

## 2023-02-22 LAB
ALBUMIN UR-MCNC: NEGATIVE MG/DL
APPEARANCE UR: CLEAR
BILIRUB UR QL STRIP: NEGATIVE
COLOR UR AUTO: NORMAL
GLUCOSE UR STRIP-MCNC: NEGATIVE MG/DL
HGB UR QL STRIP: NEGATIVE
KETONES UR STRIP-MCNC: NEGATIVE MG/DL
LEUKOCYTE ESTERASE UR QL STRIP: NEGATIVE
NITRATE UR QL: NEGATIVE
PH UR STRIP: 7 [PH] (ref 4.7–8)
SP GR UR STRIP: 1.01 (ref 1–1.03)
UROBILINOGEN UR STRIP-MCNC: NORMAL MG/DL

## 2023-02-22 PROCEDURE — G0463 HOSPITAL OUTPT CLINIC VISIT: HCPCS | Mod: 25

## 2023-02-22 PROCEDURE — 99204 OFFICE O/P NEW MOD 45 MIN: CPT | Performed by: UROLOGY

## 2023-02-22 PROCEDURE — 81003 URINALYSIS AUTO W/O SCOPE: CPT | Mod: ZL

## 2023-02-22 PROCEDURE — 51798 US URINE CAPACITY MEASURE: CPT | Performed by: UROLOGY

## 2023-02-22 RX ORDER — OXYBUTYNIN CHLORIDE 10 MG/1
10 TABLET, EXTENDED RELEASE ORAL DAILY
Qty: 90 TABLET | Refills: 3 | Status: SHIPPED | OUTPATIENT
Start: 2023-02-22 | End: 2024-02-08

## 2023-02-22 ASSESSMENT — PAIN SCALES - GENERAL: PAINLEVEL: SEVERE PAIN (7)

## 2023-02-22 NOTE — NURSING NOTE
Pt presents to clinic for incontinence consult  Review of Systems:    Weight loss:    No     Recent fever/chills:  No   Night sweats:   No  Current skin rash:  No   Recent hair loss:  No  Heat intolerance:  No   Cold intolerance:  Yes  Chest pain:   No   Palpitations:   No  Shortness of breath:  No   Wheezing:   No  Constipation:    No   Diarrhea:   No   Nausea:   No   Vomiting:   No   Kidney/side pain:  No   Back pain:   Yes  Frequent headaches:  Yes   Dizziness:     No  Leg swelling:   Yes   Calf pain:    No    Parents, brothers or sisters with history of kidney cancer:   Yes  Father  Parents, brothers or sisters with history of bladder cancer: No  Father or brother with history of prostate cancer:  No    Post-Void Residual  A post-void residual was measured by ultrasonic bladder scanner.  108 mL

## 2023-02-22 NOTE — PROGRESS NOTES
Type of Visit  NPV    Chief Complaint  Incontinence    HPI  Ms. Holbrook is a 47 year old female who presents with incontinence.  Patient leaks urine about several times a day.  Volume of incontinence is described as medium.  Overall, leaking urine interferes (bother score) with daily living approximately 10/10.  Patient uses 6 pads per day.  Onset was 3 years ago.  Patient denies: dysuria and gross hematuria.    Patient has been taking oxybutynin XL 5mg daily for about the past 3 years.  She denies any significant problems with dry eyes, dry mouth, constipation, confusion.  She reports only minimal benefit with the medication.    Leakage of urine occurs with urgency? Yes  Leakage of urine occurs with valsalva? Yes  Leakage of urine occurs without sensation? Yes      Past Medical History  She  has a past medical history of Anxiety, Arthritis, Depressive disorder, Migraine, Osteoarthritis, and Thyroid disease.  Patient Active Problem List   Diagnosis     Routine general medical examination at a health care facility     Tear of lateral cartilage or meniscus of knee, current, right, initial encounter     Chronic pain syndrome     Opioid dependence in remission (H)     PTSD (post-traumatic stress disorder)     COY (generalized anxiety disorder)     Moderate episode of recurrent major depressive disorder (H)     Bilateral edema of lower extremity     Hypothyroid     Obesity     GERD (gastroesophageal reflux disease)     Mild mixed bipolar I disorder (H)     Trigger finger of both hands     Morbid obesity (H)     Grief     Diabetes mellitus, type 2 (H)     History of migraine headaches       Past Surgical History  She  has a past surgical history that includes Cholecystectomy (1999); GYN surgery; GYN surgery (2004); and Colonoscopy (N/A, 9/29/2022).    Medications  She has a current medication list which includes the following prescription(s): oxybutynin er, acetaminophen, asenapine, asenapine, atomoxetine, baclofen,  accu-chek guide, blood glucose, blood glucose, blood glucose, blood glucose monitoring, blood glucose monitoring, blood glucose monitoring, chlorhexidine, freestyle steven 2 reader, freestyle steven 2 sensor, diclofenac, trulicity, furosemide, hydrochlorothiazide, hydroxyzine hcl, hylan, ibuprofen, levothyroxine, melatonin, narcan, NEW MED, omeprazole, phentermine, polyethylene glycol, potassium chloride er, potassium chloride er, rosuvastatin, sertraline, topiramate, trulicity, and vraylar.    Allergies  Allergies   Allergen Reactions     Depakote [Valproic Acid]      Gabapentin Swelling     Propofol Unknown     Got very stiff and tight.        Social History  She  reports that she has been smoking cigarettes. She started smoking about 34 years ago. She has a 16.00 pack-year smoking history. She has been exposed to tobacco smoke. She has never used smokeless tobacco. She reports current drug use. Drug: Marijuana. She reports that she does not drink alcohol.  No drug abuse.    Family History  Family History   Problem Relation Age of Onset     Cerebrovascular Disease Mother      Alcoholism Mother      Cancer Mother      Depression Mother      Eczema Mother      Hypertension Mother      Migraines Mother      Mental Illness Mother      Osteoarthritis Mother      Osteoporosis Mother      Alcoholism Father      Cancer Father      Hyperlipidemia Father      Hypertension Father      Myocardial Infarction Father      Mental Illness Sister      Migraines Sister        Review of Systems  I personally reviewed the ROS with the patient.    Nursing Notes:   Mary Aranda LPN  2/22/2023  1:23 PM  Addendum  Pt presents to clinic for incontinence consult  Review of Systems:    Weight loss:    No     Recent fever/chills:  No   Night sweats:   No  Current skin rash:  No   Recent hair loss:  No  Heat intolerance:  No   Cold intolerance:  Yes  Chest pain:   No   Palpitations:   No  Shortness of  breath:  No   Wheezing:   No  Constipation:    No   Diarrhea:   No   Nausea:   No   Vomiting:   No   Kidney/side pain:  No   Back pain:   Yes  Frequent headaches:  Yes   Dizziness:     No  Leg swelling:   Yes   Calf pain:    No    Parents, brothers or sisters with history of kidney cancer:   Yes  Father  Parents, brothers or sisters with history of bladder cancer: No  Father or brother with history of prostate cancer:  No    Post-Void Residual  A post-void residual was measured by ultrasonic bladder scanner.  108 mL          Physical Exam  Vitals:    02/22/23 1316   BP: 120/84   BP Location: Right arm   Patient Position: Sitting   Cuff Size: Adult Large   Resp: 18   Weight: 130.5 kg (287 lb 9.6 oz)     Constitutional: NAD, WDWN  Head: NCAT  Eyes: Conjunctivae normal  Cardiovascular: Regular rate  Pulmonary/Chest: Respirations are even and non-labored bilaterally  Abdominal: Soft with no distension, tenderness, masses, guarding or CVA tenderness  Neurological: A + O x 3,  cranial nerves II-XII grossly intact  Extremities: REJI x 4, warm, no clubbing, no cyanosis  Skin: Pink, warm, dry with no rash  Psychiatric:  Normal mood and affect  Genitourinary: Nonpalpable bladder    Labs   02/22/23 12:46   Color Urine Straw   Appearance Urine Clear   Glucose Urine Negative   Bilirubin Urine Negative   Ketones Urine Negative   Specific Gravity Urine 1.009   pH Urine 7.0   Protein Albumin Urine Negative   Urobilinogen mg/dL Normal   Nitrite Urine Negative   Blood Urine Negative   Leukocyte Esterase Urine Negative      02/17/23 15:45   Sodium 136   Potassium 3.8   Chloride 98   Carbon Dioxide (CO2) 27   Urea Nitrogen 15.2   Creatinine 0.81   GFR Estimate 88   Calcium 9.9   Anion Gap 11   Albumin 4.1   Protein Total 7.2   Alkaline Phosphatase 99   ALT 25   AST 24   Albumin Urine mg/g Cr See Comment   Albumin Urine mg/L <12.0   Bilirubin Total 0.2   Creatinine Urine 61.1   Glucose 111 (H)   Hemoglobin A1C 6.6 (H)   Estimated Average  Glucose 143   T4 Free 1.02   TSH 21.09 (H)   Vitamin D Deficiency screening 41     Post-Void Residual  A post-void residual was measured by ultrasonic bladder scanner.  108 mL today    Assessment  Ms. Holbrook is a 47 year old female who presents with clinically predominate urge incontinence.  Reviewed the past notes and labs and PVR from today.    She has not experienced benefit to meet her goals of therapy with low-dose oxybutynin.  I first recommended we increase the dose of oxybutynin to 10 Mg.  We did discuss the significant side effects of anticholinergic medication including dry eyes dry mouth constipation and confusion.  Ideally we could avoid anticholinergics however given she is tolerating the 5 mg dose I suggested we could try the 10 mg dose and reassess side effects.    If this is ineffective we discussed Myrbetriq/Gemtesa and I encouraged her to reach out to her insurance to see if any of these medications can be covered to replace oxybutynin.    Finally, we discussed Botox as an additional treatment option for patients who would like to avoid the anticholinergic side effects of long-term use.    Plan  Increase oxybutynin to oxybutynin XL 10 Mg once daily  Depending on the results of this medication adjustment she will follow-up

## 2023-02-23 ENCOUNTER — TELEPHONE (OUTPATIENT)
Dept: FAMILY MEDICINE | Facility: OTHER | Age: 48
End: 2023-02-23

## 2023-02-23 NOTE — TELEPHONE ENCOUNTER
Sandra Hankins calling for verbal orders to continue skilled nursing once weekly. Verbal orders given per protocol.

## 2023-02-23 NOTE — TELEPHONE ENCOUNTER
REFERRAL INFORMATION:    Referring Provider:  Apryl Hathaway PA    Referring Clinic:  [facility]     Reason for Visit/Diagnosis: new MWM / bmi 46 / advised to confirm with ins / aware of paperwork / sent myc link via email / ref: Jazmín Wallace MD       FUTURE VISIT INFORMATION:    Appointment Date: 3/1/23    Appointment Time: 2PM     NOTES RECORD STATUS  DETAILS   OFFICE NOTE from Referring Provider Internal 2/16/23 referral    OFFICE NOTE from Other Specialists Internal 1/24/23 OV Jazmín Wallace MD   IMAGING (CT, MRI, US, XR)  Internal XR ABD: 5/24/18  CT ABD: 12/14/2020

## 2023-02-26 DIAGNOSIS — Z53.9 PERSONS ENCOUNTERING HEALTH SERVICES FOR SPECIFIC PROCEDURES, NOT CARRIED OUT: Primary | ICD-10-CM

## 2023-02-28 ENCOUNTER — HOSPITAL ENCOUNTER (OUTPATIENT)
Dept: EDUCATION SERVICES | Facility: HOSPITAL | Age: 48
Discharge: HOME | End: 2023-02-28
Attending: PHYSICIAN ASSISTANT | Admitting: PHYSICIAN ASSISTANT
Payer: COMMERCIAL

## 2023-02-28 VITALS
RESPIRATION RATE: 16 BRPM | WEIGHT: 287.4 LBS | DIASTOLIC BLOOD PRESSURE: 86 MMHG | SYSTOLIC BLOOD PRESSURE: 122 MMHG | BODY MASS INDEX: 52.89 KG/M2 | HEART RATE: 96 BPM | HEIGHT: 62 IN | OXYGEN SATURATION: 99 %

## 2023-02-28 DIAGNOSIS — E11.9 TYPE 2 DIABETES MELLITUS WITHOUT COMPLICATION, WITHOUT LONG-TERM CURRENT USE OF INSULIN (H): Primary | ICD-10-CM

## 2023-02-28 PROCEDURE — G0108 DIAB MANAGE TRN  PER INDIV: HCPCS | Performed by: DIETITIAN, REGISTERED

## 2023-02-28 RX ORDER — LISDEXAMFETAMINE DIMESYLATE 40 MG/1
40 CAPSULE ORAL EVERY MORNING
COMMUNITY
End: 2024-05-08

## 2023-02-28 ASSESSMENT — PAIN SCALES - GENERAL: PAINLEVEL: NO PAIN (0)

## 2023-02-28 NOTE — PROGRESS NOTES
"Diabetes Self-Management Education & Support     Autumn comes to [facility] today for follow up with  RN, RD. Refer to RD for additional visit information.   A1C recently 6.6- stable.   2 week average 133.  range.  8/14 days tested.     Feels lately has been \"up and down\". Shares she hurt her knee 1 m ago- planning for surgery. Needs to loss weight.   About 20# weight gain in that time. Has appt with medical weight loss team tomorrow- virtual.     Shares seen Endocrine and recommended increasing Trulicity to 3 mg once weekly. PCP ordered. DRC RN removed 1.5 mg dose from med list.     Has been at daughters eating chili, snacks. Not following strict diet. Usually eats Moms meals at lunch and dinner. Feels weight is better managed following \"strict\" diet. Usually feels satisfied with Moms meals.     Activity- stagnant, not as active with knee injury. Legs/knees buckle. Feels stiff.     Working on tobacco cessation-8/day smokes 1/2 cigarette. Had hypnosis for smoking cessation, feels this has helped.   Does have sudden hunger.   Pt and RD discussed making a food log and daily goals to help with motivation and work towards goals     Medication list cleaned up: removed meters, testing supplies no longer using.      Wt Readings from Last 10 Encounters:   02/28/23 130.4 kg (287 lb 6.4 oz)   02/22/23 130.5 kg (287 lb 9.6 oz)   02/17/23 126.6 kg (279 lb)   11/16/22 121.1 kg (267 lb)   11/02/22 119.3 kg (263 lb)   09/29/22 117.9 kg (260 lb)   09/26/22 119.7 kg (264 lb)   09/21/22 120.2 kg (264 lb 14.4 oz)   09/01/22 115.5 kg (254 lb 9.6 oz)   08/01/22 120.1 kg (264 lb 12.8 oz)     Return:   3 months for [facility] RN, RD.   Kinga Beckwith RN Diabetes Educator,  [phone]  2/28/2023 at 2:01 PM      "

## 2023-02-28 NOTE — PROGRESS NOTES
Diabetes Self-Management Education & Support    Presents for: Follow-up (BG review)    Type of Service: In Person Visit    Assessment Type:   ASSESSMENT:  Pt seen for BG review with Kinga RN. A1C checked earlier this month, stable at 6.6. Pt reports she injured her knee and has been moving less. She has been gaining weight (about 20lbs since our last visit in 11/2022), due to a decrease in activity and has been falling off track with food choices on and off. More alcohol lately- 2-3 drinks a few nights a week (sugary). Needs knee surgery, but needs to lose weight. Has an appointment with medical weight management tomorrow. Trulicity was increased to 3mg by her primary. She is having a hard time remembering to check her BG 2 hours after after eating, ends up being 5-6 hours after. She has now set a reminder to help. Voices she needs to get moving more- knees a barrier. Discussed working upper body with resistance bands. Working with therapist on on smoking cessation, down to 8x a day and often a half cigarette. Feels a overwhelmed making many lifestyle changes. Discussion on logging her food intake and writing out daily goals.    BG per meter  Average 133  Fasting-   Post meal 132-158, 174    Patient's most recent   Lab Results   Component Value Date    A1C 6.6 02/17/2023    A1C 6.9 06/01/2022    HEMOGLOBINA1 6.5 11/16/2022     is meeting goal of <7.0    Diabetes knowledge and skills assessment:   Patient is knowledgeable in diabetes management concepts related to: Healthy Eating, Being Active, Monitoring, Taking Medication, Problem Solving, Reducing Risks and Healthy Coping    Continue education with the following diabetes management concepts: Healthy Eating, Being Active, Monitoring, Problem Solving, Reducing Risks and Healthy Coping    Based on learning assessment above, most appropriate setting for further diabetes education would be: Individual setting.      PLAN    Notebooks to journal food intake and  "setting daily goals.    Increase activity as able. Set daily goal for exercise.     Healthy snacks- fruit, hb egg, veggies, string cheese, yogurt, cottage cheese    Positive outlook even if you have a bad day. Be patient with yourself.    Follow up: 5/31/23 at 1pm    Topics to cover at upcoming visits: Healthy Eating, Being Active, Monitoring, Problem Solving, Reducing Risks and Healthy Coping    See Care Plan for co-developed, patient-state behavior change goals.  AVS provided for patient today.    Education Materials Provided:  No new materials provided today      SUBJECTIVE/OBJECTIVE:  Presents for: Follow-up (BG review)  Accompanied by: Self  Diabetes education in the past 24mo: No  Focus of Visit: Patient Unsure, Diabetes Pathophysiology, Healthy Eating, Healthy Coping  Diabetes type: Type 2  Date of diagnosis: 11/4/21  Disease course: Other  How confident are you filling out medical forms by yourself:: Quite a bit  Diabetes management related comments/concerns: Affording supplies  Transportation concerns: Yes  Difficulty affording diabetes medication?: No  Difficulty affording diabetes testing supplies?: Sometimes (possibly)  Other concerns:: Glasses, Physical impairment  Cultural Influences/Ethnic Background:  Not  or       Diabetes Symptoms & Complications:  Fatigue: Sometimes (improved)  Neuropathy: Sometimes (hand, feet. improving)  Polydipsia: Yes (some meds might be contributing)  Polyphagia: Sometimes  Polyuria: Sometimes (on a diuretic)  Visual change: No  Slow healing wounds: Yes (a little bit)  Other: No  Symptom course: Improving  Weight trend: Increasing  Complications assessed today?: Yes    Patient Problem List and Family Medical History reviewed for relevant medical history, current medical status, and diabetes risk factors.    Vitals:  /86   Pulse 96   Resp 16   Ht 1.578 m (5' 2.12\")   Wt 130.4 kg (287 lb 6.4 oz)   LMP 02/16/2023 (Exact Date)   SpO2 99%   BMI 52.36 " "kg/m    Estimated body mass index is 52.36 kg/m  as calculated from the following:    Height as of this encounter: 1.578 m (5' 2.12\").    Weight as of this encounter: 130.4 kg (287 lb 6.4 oz).   Last 3 BP:   BP Readings from Last 3 Encounters:   02/28/23 122/86   02/22/23 120/84   02/17/23 110/85       History   Smoking Status     Every Day     Packs/day: 0.50     Years: 32.00     Types: Cigarettes     Start date: 1/1/1989   Smokeless Tobacco     Never       Labs:  Lab Results   Component Value Date    A1C 6.6 02/17/2023    A1C 6.9 06/01/2022    HEMOGLOBINA1 6.5 11/16/2022     Lab Results   Component Value Date     02/17/2023     09/26/2022     05/19/2021     Lab Results   Component Value Date     01/06/2022     01/29/2020     HDL Cholesterol   Date Value Ref Range Status   01/29/2020 87 >49 mg/dL Final     Direct Measure HDL   Date Value Ref Range Status   01/06/2022 65 >=50 mg/dL Final   ]  GFR Estimate   Date Value Ref Range Status   02/17/2023 88 >60 mL/min/1.73m2 Final     Comment:     eGFR calculated using 2021 CKD-EPI equation.   05/19/2021 >90 >60 mL/min/[1.73_m2] Final     Comment:     Non  GFR Calc  Starting 12/18/2018, serum creatinine based estimated GFR (eGFR) will be   calculated using the Chronic Kidney Disease Epidemiology Collaboration   (CKD-EPI) equation.       GFR Estimate If Black   Date Value Ref Range Status   05/19/2021 >90 >60 mL/min/[1.73_m2] Final     Comment:      GFR Calc  Starting 12/18/2018, serum creatinine based estimated GFR (eGFR) will be   calculated using the Chronic Kidney Disease Epidemiology Collaboration   (CKD-EPI) equation.       Lab Results   Component Value Date    CR 0.81 02/17/2023    CR 0.78 05/19/2021     No results found for: MICROALBUMIN    Healthy Eating:  Healthy Eating Assessed Today: Yes  Cultural/Adventist diet restrictions?: No  Meal planning/habits: Smaller portions  How many times a week on " average do you eat food made away from home (restaurant/take-out)?: 1 (1x in the past month)  Meals include: Dinner, Evening Snack, Lunch, Breakfast (isn't eating until 1 or 2, supper around 5:50/6)  Breakfast: smaller- less than a cup of oatmeal or half a bagel with cream cheese/butter or cereal with milk or slice of pizza, small amount of oatmeal  Lunch: moms meals- diabetes friendly  Dinner: mom's meals  Snacks: ww cracker or half a sandwich (turkey and cheese) or granola bar. Hasn't been eating fruit lately  Beverages: Water, Milk, Coffee, Alcohol (prabhjot in water, 1%, cream and splenda in coffee)  Has patient met with a dietitian in the past?: Yes (with gestational diabetes)    Being Active:  Being Active Assessed Today: Yes  Exercise:: Currently not exercising (house chores, riding lawnmower)  Barrier to exercise: Physical limitation (legs hurt, chronic pain, knee injury)    Monitoring:  Monitoring Assessed Today: Yes  Did patient bring glucose meter to appointment? : Yes  Blood Glucose Meter: Accu-chek, One Touch (guide me not covered, has a one touch she will start using)  Times checking blood sugar at home (number): 3 (forgetting more often)  Times checking blood sugar at home (per): Week  Blood glucose trend: No change (stable)        Taking Medications:  Diabetes Medication(s)     Incretin Mimetic Agents       Dulaglutide (TRULICITY) 3 MG/0.5ML SOPN    Inject 3 mg Subcutaneous every 7 days          Taking Medication Assessed Today: Yes  Current Treatments: Non-insulin Injectables (trulicity 3mg)  Problems taking diabetes medications regularly?: No  Diabetes medication side effects?: No (feels sick with eating too many carbs, hot and flushed, careful with sun and alcohol.)    Problem Solving:  Problem Solving Assessed Today: Yes  Is the patient at risk for hypoglycemia?: No  Is the patient at risk for DKA?: No  Does patient have severe weather/disaster plan for diabetes management?: Not Needed  Does patient  have sick day plan for diabetes management?: Not Needed              Reducing Risks:  Reducing Risks Assessed Today: Yes  Diabetes Risks: Age over 45 years, History of gestational diabetes, Sedentary Lifestyle  CAD Risks: Sedentary lifestyle, Tobacco exposure, Stress, Obesity, Family history, Diabetes Mellitus  Has dilated eye exam at least once a year?: Yes  Sees dentist every 6 months?: No (needs to find a dentist,)  Feet checked by healthcare provider in the last year?: Yes    Healthy Coping:  Healthy Coping Assessed Today: Yes  Emotional response to diabetes: Ready to learn, Depression (depression due to mom passing)  Informal Support system:: Family (mental health practitioner, [facility], )  Stage of change: ACTION (Actively working towards change)  Support resources: In-person Offerings  Patient Activation Measure Survey Score:  KEN Score (Last Two) 11/28/2018 3/7/2019   KEN Raw Score 27 27   Activation Score 47.4 47.4   KEN Level 2 2         Care Plan and Education Provided:  Care Plan: Diabetes   Updates made by Taisha Corral RD since 2/28/2023 12:00 AM      Problem: Diabetes Self-Management Education Needed to Optimize Self-Care Behaviors       Goal: Healthy Eating - follow a healthy eating pattern for diabetes       Task: Provide education on weight management Completed 2/28/2023   Responsible User: Taisha Corral RD          Time Spent: 60 minutes  Encounter Type: Individual    Any diabetes medication dose changes were made via the CDE Protocol per the patient's primary care provider. A copy of this encounter was shared with the provider.  I

## 2023-02-28 NOTE — LETTER
"    2/28/2023        RE: Autumn BATISTA Hnatko  [address] [place] [facility] [address]        Diabetes Self-Management Education & Support     Autumn comes to [facility] today for follow up with AMAURY RN, RD. Refer to RD for additional visit information.   A1C recently 6.6- stable.   2 week average 133.  range.  8/14 days tested.     Feels lately has been \"up and down\". Shares she hurt her knee 1 m ago- planning for surgery. Needs to loss weight.   About 20# weight gain in that time. Has appt with medical weight loss team tomorrow- virtual.     Shares seen Endocrine and recommended increasing Trulicity to 3 mg once weekly. PCP ordered. AMAURY RN removed 1.5 mg dose from med list.     Has been at daughters eating chili, snacks. Not following strict diet. Usually eats Moms meals at lunch and dinner. Feels weight is better managed following \"strict\" diet. Usually feels satisfied with Moms meals.     Activity- stagnant, not as active with knee injury. Legs/knees buckle. Feels stiff.     Working on tobacco cessation-8/day smokes 1/2 cigarette. Had hypnosis for smoking cessation, feels this has helped.   Does have sudden hunger.   Pt and RD discussed making a food log and daily goals to help with motivation and work towards goals     Medication list cleaned up: removed meters, testing supplies no longer using.      Wt Readings from Last 10 Encounters:   02/28/23 130.4 kg (287 lb 6.4 oz)   02/22/23 130.5 kg (287 lb 9.6 oz)   02/17/23 126.6 kg (279 lb)   11/16/22 121.1 kg (267 lb)   11/02/22 119.3 kg (263 lb)   09/29/22 117.9 kg (260 lb)   09/26/22 119.7 kg (264 lb)   09/21/22 120.2 kg (264 lb 14.4 oz)   09/01/22 115.5 kg (254 lb 9.6 oz)   08/01/22 120.1 kg (264 lb 12.8 oz)     Return:   3 months for AMAURY RN, RD.   Kinga Beckwith RN Diabetes Educator,  [phone]  2/28/2023 at 2:01 PM        Diabetes Self-Management Education & Support    Presents for: Follow-up (BG review)    Type of Service: In Person Visit    Assessment Type: "   ASSESSMENT:  Pt seen for BG review with PJ Donato. A1C checked earlier this month, stable at 6.6. Pt reports she injured her knee and has been moving less. She has been gaining weight (about 20lbs since our last visit in 11/2022), due to a decrease in activity and has been falling off track with food choices on and off. More alcohol lately- 2-3 drinks a few nights a week (sugary). Needs knee surgery, but needs to lose weight. Has an appointment with medical weight management tomorrow. Trulicity was increased to 3mg by her primary. She is having a hard time remembering to check her BG 2 hours after after eating, ends up being 5-6 hours after. She has now set a reminder to help. Voices she needs to get moving more- knees a barrier. Discussed working upper body with resistance bands. Working with therapist on on smoking cessation, down to 8x a day and often a half cigarette. Feels a overwhelmed making many lifestyle changes. Discussion on logging her food intake and writing out daily goals.    BG per meter  Average 133  Fasting-   Post meal 132-158, 174    Patient's most recent   Lab Results   Component Value Date    A1C 6.6 02/17/2023    A1C 6.9 06/01/2022    HEMOGLOBINA1 6.5 11/16/2022     is meeting goal of <7.0    Diabetes knowledge and skills assessment:   Patient is knowledgeable in diabetes management concepts related to: Healthy Eating, Being Active, Monitoring, Taking Medication, Problem Solving, Reducing Risks and Healthy Coping    Continue education with the following diabetes management concepts: Healthy Eating, Being Active, Monitoring, Problem Solving, Reducing Risks and Healthy Coping    Based on learning assessment above, most appropriate setting for further diabetes education would be: Individual setting.      PLAN    Notebooks to journal food intake and setting daily goals.    Increase activity as able. Set daily goal for exercise.     Healthy snacks- fruit, hb egg, veggies, string cheese,  "yogurt, cottage cheese    Positive outlook even if you have a bad day. Be patient with yourself.    Follow up: 5/31/23 at 1pm    Topics to cover at upcoming visits: Healthy Eating, Being Active, Monitoring, Problem Solving, Reducing Risks and Healthy Coping    See Care Plan for co-developed, patient-state behavior change goals.  AVS provided for patient today.    Education Materials Provided:  No new materials provided today      SUBJECTIVE/OBJECTIVE:  Presents for: Follow-up (BG review)  Accompanied by: Self  Diabetes education in the past 24mo: No  Focus of Visit: Patient Unsure, Diabetes Pathophysiology, Healthy Eating, Healthy Coping  Diabetes type: Type 2  Date of diagnosis: 11/4/21  Disease course: Other  How confident are you filling out medical forms by yourself:: Quite a bit  Diabetes management related comments/concerns: Affording supplies  Transportation concerns: Yes  Difficulty affording diabetes medication?: No  Difficulty affording diabetes testing supplies?: Sometimes (possibly)  Other concerns:: Glasses, Physical impairment  Cultural Influences/Ethnic Background:  Not  or       Diabetes Symptoms & Complications:  Fatigue: Sometimes (improved)  Neuropathy: Sometimes (hand, feet. improving)  Polydipsia: Yes (some meds might be contributing)  Polyphagia: Sometimes  Polyuria: Sometimes (on a diuretic)  Visual change: No  Slow healing wounds: Yes (a little bit)  Other: No  Symptom course: Improving  Weight trend: Increasing  Complications assessed today?: Yes    Patient Problem List and Family Medical History reviewed for relevant medical history, current medical status, and diabetes risk factors.    Vitals:  /86   Pulse 96   Resp 16   Ht 1.578 m (5' 2.12\")   Wt 130.4 kg (287 lb 6.4 oz)   LMP 02/16/2023 (Exact Date)   SpO2 99%   BMI 52.36 kg/m    Estimated body mass index is 52.36 kg/m  as calculated from the following:    Height as of this encounter: 1.578 m (5' 2.12\").    " Weight as of this encounter: 130.4 kg (287 lb 6.4 oz).   Last 3 BP:   BP Readings from Last 3 Encounters:   02/28/23 122/86   02/22/23 120/84   02/17/23 110/85       History   Smoking Status     Every Day     Packs/day: 0.50     Years: 32.00     Types: Cigarettes     Start date: 1/1/1989   Smokeless Tobacco     Never       Labs:  Lab Results   Component Value Date    A1C 6.6 02/17/2023    A1C 6.9 06/01/2022    HEMOGLOBINA1 6.5 11/16/2022     Lab Results   Component Value Date     02/17/2023     09/26/2022     05/19/2021     Lab Results   Component Value Date     01/06/2022     01/29/2020     HDL Cholesterol   Date Value Ref Range Status   01/29/2020 87 >49 mg/dL Final     Direct Measure HDL   Date Value Ref Range Status   01/06/2022 65 >=50 mg/dL Final   ]  GFR Estimate   Date Value Ref Range Status   02/17/2023 88 >60 mL/min/1.73m2 Final     Comment:     eGFR calculated using 2021 CKD-EPI equation.   05/19/2021 >90 >60 mL/min/[1.73_m2] Final     Comment:     Non  GFR Calc  Starting 12/18/2018, serum creatinine based estimated GFR (eGFR) will be   calculated using the Chronic Kidney Disease Epidemiology Collaboration   (CKD-EPI) equation.       GFR Estimate If Black   Date Value Ref Range Status   05/19/2021 >90 >60 mL/min/[1.73_m2] Final     Comment:      GFR Calc  Starting 12/18/2018, serum creatinine based estimated GFR (eGFR) will be   calculated using the Chronic Kidney Disease Epidemiology Collaboration   (CKD-EPI) equation.       Lab Results   Component Value Date    CR 0.81 02/17/2023    CR 0.78 05/19/2021     No results found for: MICROALBUMIN    Healthy Eating:  Healthy Eating Assessed Today: Yes  Cultural/Rastafari diet restrictions?: No  Meal planning/habits: Smaller portions  How many times a week on average do you eat food made away from home (restaurant/take-out)?: 1 (1x in the past month)  Meals include: Dinner, Evening Snack, Lunch,  Breakfast (isn't eating until 1 or 2, supper around 5:50/6)  Breakfast: smaller- less than a cup of oatmeal or half a bagel with cream cheese/butter or cereal with milk or slice of pizza, small amount of oatmeal  Lunch: moms meals- diabetes friendly  Dinner: mom's meals  Snacks: ww cracker or half a sandwich (turkey and cheese) or granola bar. Hasn't been eating fruit lately  Beverages: Water, Milk, Coffee, Alcohol (prabhjot in water, 1%, cream and splenda in coffee)  Has patient met with a dietitian in the past?: Yes (with gestational diabetes)    Being Active:  Being Active Assessed Today: Yes  Exercise:: Currently not exercising (house chores, riding lawnmower)  Barrier to exercise: Physical limitation (legs hurt, chronic pain, knee injury)    Monitoring:  Monitoring Assessed Today: Yes  Did patient bring glucose meter to appointment? : Yes  Blood Glucose Meter: Accu-chek, One Touch (guide me not covered, has a one touch she will start using)  Times checking blood sugar at home (number): 3 (forgetting more often)  Times checking blood sugar at home (per): Week  Blood glucose trend: No change (stable)        Taking Medications:  Diabetes Medication(s)     Incretin Mimetic Agents       Dulaglutide (TRULICITY) 3 MG/0.5ML SOPN    Inject 3 mg Subcutaneous every 7 days          Taking Medication Assessed Today: Yes  Current Treatments: Non-insulin Injectables (trulicity 3mg)  Problems taking diabetes medications regularly?: No  Diabetes medication side effects?: No (feels sick with eating too many carbs, hot and flushed, careful with sun and alcohol.)    Problem Solving:  Problem Solving Assessed Today: Yes  Is the patient at risk for hypoglycemia?: No  Is the patient at risk for DKA?: No  Does patient have severe weather/disaster plan for diabetes management?: Not Needed  Does patient have sick day plan for diabetes management?: Not Needed              Reducing Risks:  Reducing Risks Assessed Today: Yes  Diabetes Risks:  Age over 45 years, History of gestational diabetes, Sedentary Lifestyle  CAD Risks: Sedentary lifestyle, Tobacco exposure, Stress, Obesity, Family history, Diabetes Mellitus  Has dilated eye exam at least once a year?: Yes  Sees dentist every 6 months?: No (needs to find a dentist,)  Feet checked by healthcare provider in the last year?: Yes    Healthy Coping:  Healthy Coping Assessed Today: Yes  Emotional response to diabetes: Ready to learn, Depression (depression due to mom passing)  Informal Support system:: Family (mental health practitioner, [facility], )  Stage of change: ACTION (Actively working towards change)  Support resources: In-person Offerings  Patient Activation Measure Survey Score:  KEN Score (Last Two) 11/28/2018 3/7/2019   KEN Raw Score 27 27   Activation Score 47.4 47.4   KEN Level 2 2         Care Plan and Education Provided:  Care Plan: Diabetes   Updates made by Taisha Corral RD since 2/28/2023 12:00 AM      Problem: Diabetes Self-Management Education Needed to Optimize Self-Care Behaviors       Goal: Healthy Eating - follow a healthy eating pattern for diabetes       Task: Provide education on weight management Completed 2/28/2023   Responsible User: Taisha Corral RD          Time Spent: 60 minutes  Encounter Type: Individual    Any diabetes medication dose changes were made via the CDE Protocol per the patient's primary care provider. A copy of this encounter was shared with the provider.  I        Sincerely,        Taisha Corral RD

## 2023-02-28 NOTE — PATIENT INSTRUCTIONS
Notebooks to journal food intake and setting daily goals.    Increase activity as able. Set daily goal for exercise.     Healthy snacks- fruit, hb egg, veggies, string cheese, yogurt, cottage cheese    Positive outlook even if you have a bad day. Be patient with yourself.    Follow up: 5/31/23 at 1pm

## 2023-03-01 ENCOUNTER — MEDICAL CORRESPONDENCE (OUTPATIENT)
Dept: HEALTH INFORMATION MANAGEMENT | Facility: HOSPITAL | Age: 48
End: 2023-03-01

## 2023-03-01 ENCOUNTER — PRE VISIT (OUTPATIENT)
Dept: ENDOCRINOLOGY | Facility: CLINIC | Age: 48
End: 2023-03-01

## 2023-03-01 ENCOUNTER — VIRTUAL VISIT (OUTPATIENT)
Dept: ENDOCRINOLOGY | Facility: CLINIC | Age: 48
End: 2023-03-01
Payer: COMMERCIAL

## 2023-03-01 VITALS — WEIGHT: 284 LBS | BODY MASS INDEX: 50.32 KG/M2 | HEIGHT: 63 IN

## 2023-03-01 DIAGNOSIS — E11.9 TYPE 2 DIABETES MELLITUS WITHOUT COMPLICATION, WITHOUT LONG-TERM CURRENT USE OF INSULIN (H): ICD-10-CM

## 2023-03-01 DIAGNOSIS — E66.813 CLASS 3 OBESITY: Primary | ICD-10-CM

## 2023-03-01 DIAGNOSIS — E66.9 OBESITY: ICD-10-CM

## 2023-03-01 DIAGNOSIS — Z71.3 NUTRITIONAL COUNSELING: Primary | ICD-10-CM

## 2023-03-01 DIAGNOSIS — Z86.69 HISTORY OF MIGRAINE HEADACHES: ICD-10-CM

## 2023-03-01 PROCEDURE — 97802 MEDICAL NUTRITION INDIV IN: CPT | Mod: VID | Performed by: DIETITIAN, REGISTERED

## 2023-03-01 PROCEDURE — 99203 OFFICE O/P NEW LOW 30 MIN: CPT | Mod: VID | Performed by: PHYSICIAN ASSISTANT

## 2023-03-01 RX ORDER — SEMAGLUTIDE 1.34 MG/ML
0.5 INJECTION, SOLUTION SUBCUTANEOUS
Qty: 1.5 ML | Refills: 3 | Status: SHIPPED | OUTPATIENT
Start: 2023-03-01 | End: 2023-04-20

## 2023-03-01 RX ORDER — TOPIRAMATE 25 MG/1
TABLET, FILM COATED ORAL
Qty: 90 TABLET | Refills: 1 | Status: SHIPPED | OUTPATIENT
Start: 2023-03-01 | End: 2023-04-20

## 2023-03-01 NOTE — PATIENT INSTRUCTIONS
MEDICATION STARTED AT THIS APPOINTMENT  We are starting topiramate at bedtime.  Start one tab, 25 mg, for a week. Go up to 50 mg (2 tabs) for the next week. At the third week, take   3 tabs (75 mg).  Stay at 3 tabs until you are seen again. Contact the nurse via [facility] or call [phone] if you have any questions or concerns. (Do not stop taking it if you don't think it's working. For some people it works even though they do not feel much different.)    Topiramate (Topamax) is a medication that is used most often to treat migraine headaches or for seizures. It has also been found to help with weight loss. Although it's not currently FDA approved for weight loss, it has been used safely for a number of years to help people who are carrying extra weight.     Just how topiramate helps with weight loss has not been exactly determined. However it seems to work on areas of the brain to quiet down signals related to eating.      Topiramate may make you:    >feel less interest in eating in between meals   >think less about food and eating   >find it easier to push the plate away   >find giving up pop easier    >have an easier time eating less    For some of our patients, the pills work right away. They feel and think quite differently about food. Other patients don't feel much of a change but find in fact they have lost weight! Like all weight loss medications, topiramate works best when you help it work.  This means:    1) Have less tempting high calorie (fattening) food around the house or office    2) Have lower calorie food (fruits, vegetables,low fat meats and dairy) for snacks    3) Eat out only one time or less each week.   4) Eat your meals at a table with the TV or computer off.    Side-effects. Topiramate is generally well tolerated. The main side-effects we see are:   Tingling in hands,feet, or face (usually not very troublesome)   Mental confusion and word finding trouble (about 10% of patients have this.)      Feeling sleepy or a bit dopey- this goes away very soon after starting.    One of the dangers of topiramate is the possibility of birth defects--if you get pregnant when you are on it, there is the risk that your baby will be born with a cleft lip or palate.  If you are on topiramate and of child bearing age, you need to be on a reliable form of birth control or refrain from sexual intercourse.     Please refer to the pharmacy insert for more information on side-effects. Since many pharmacists are not familiar with the use of topiramate in weight loss, calling the clinic will get you the most accurate information on the use of this medication for weight loss.     In order to get refills of this or any medication we prescribe you must be seen in the medical weight mgmt clinic every 2-3 months.
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
## 2023-03-01 NOTE — LETTER
"3/1/2023       RE: Autumn Holbrook  [address] [facility] [address]     Dear Colleague,    Thank you for referring your patient, Autumn Holbrook, to the [facility] [place] at [facility]. Please see a copy of my visit note below.    Autumn Holbrook is a 47 year old female who is being evaluated via a billable video visit.      The patient has been notified of following:     \"This video visit will be conducted via a call between you and your physician/provider. We have found that certain health care needs can be provided without the need for an in-person physical exam.  This service lets us provide the care you need with a video conversation.  If a prescription is necessary we can send it directly to your pharmacy.  If lab work is needed we can place an order for that and you can then stop by our lab to have the test done at a later time.    Video visits are billed at different rates depending on your insurance coverage.  Please reach out to your insurance provider with any questions.    If during the course of the call the physician/provider feels a video visit is not appropriate, you will not be charged for this service.\"    Patient has given verbal consent for Video visit? Yes  How would you like to obtain your AVS? MyChart  If you are dropped from the video visit, the video invite should be resent to: Text to cell phone: [phone]  Will anyone else be joining your video visit? No  {If patient encounters technical issues they should call [phone]      Video-Visit Details    Type of service:  Video Visit    Video Start Time: 2:40 PM  Video End Time: 2:55 PM    Originating Location (pt. Location): Home    Distant Location (provider location):  Offsite (providers home)     Platform used for Video Visit: Kutuan    During this virtual visit the patient is located in [place], patient verifies this as the location during the entirety of " "this visit.       New Weight Management Nutrition Consultation    Autumn Holbrook is a 47 year old female presents today for new weight management nutrition consultation.  Patient referred by SEVEN Nickerson on March 1, 2023.    Patient with Co-morbidities of obesity including:  Type 2 Diabetes, GERD, Knee Pain, Back Pain    Anthropometrics:  Estimated body mass index is 52.36 kg/m  as calculated from the following:    Height as of 2/28/23: 1.578 m (5' 2.12\").    Weight as of 2/28/23: 130.4 kg (287 lb 6.4 oz).     Current weight: 284 lbs     Medications for Weight Loss:  Topamax    NUTRITION HISTORY  Food allergies: none  Food intolerances: avocados  Supplements: Vitamin D3, MVI, potassium (prescribed by PCP)   Previous methods of diet modification for weight loss: Watching Portions or Calories, Exercise, Weight Watchers, Medications, Fasting    Pt reports she has been heavy for most of her life. Thyroid issues and Hasimotos make it difficult to lose weight. Has been trying to decrease carbs d/t T2DM. Often craves sugar at night. Has [employer] Meals delivered- diabetic friendly and pre-portioned. Physical activity is limited d/t knee pain.     Recent food recall:  Breakfast: skips  Lunch: [employer] Meals  Dinner: [employer] Meals  Snacks: Granola bar, cottage cheese and fruit, yogurt, chips, chocolate  Beverages: water, diet soda     Physical Activity:  Has a lot of knee pain    Nutrition Prescription  Recommended energy/nutrient modification.    Nutrition Diagnosis  Obesity r/t long history of positive energy balance aeb BMI >30.    Nutrition Intervention  Materials/education provided on hypocaloric diet for weight loss. Discussed 1700 calorie/day diet, Volumetric eating to help satiety level on fewer calories; portion control and healthy food choices (Plate Method and Volumetrics handouts), 100 calorie snack choices, meal and snack planning and websites, sample meal plans     Patient demonstrates understanding.    Expected " "Engagement: good    Nutrition Goals  1) Follow 1700 calorie/day plan    -Carb goal: 170 grams or less (make sure you are spreading carbs out throughout the day and not eating a large portion all at one meal)   -www.eatthismuch.com    -MyPlate Calorie Counter   2) Restaurant tips: keep portion sizes small (split in half), include a protein and vegetable at each meal, eat protein first and carb portion last, keep carb portion to 1/2 cup or less  3) 100 calorie sweets: Smart Sweets, Dr. Posadas's Xylitol candy, Fiber One desserts, Fit and Active 100 calorie snack sweets at [employer]; [employer] 100 calorie pre-portioned cookies, sugar-free pudding, sugar-free jello.  4) 9\" Plate method (1/2 plate non-starchy vegetables/fruit, 1/4 plate lean protein, 1/4 plate whole grain starch - no more than 1/2 cup carb/meal)  5) Eat 3 meals per day  6) Consume 60-90 g protein per day  7) Avoid snacking as able. If snack is needed use lean protein and/or fruit/vegetable. Examples:   - 2 cup popcorn   - 1 cup mixed berries   - 15 almonds, walnuts, cashews   - carrot/celery sticks and 2 tbsp low-fat ranch   - 1 hard boiled egg   - Part-skim mozzarella cheese stick   - Low-fat, low-sugar greek yogurt with 1/2 cup berries   - Med apple or pear   - sliced bell peppers with 1/2 cup salsa   - 1/2 cup roasted chickpeas   - sliced cucumbers with vinegar    Snack Recipes:  - Banana and creamy PB dip (https://www.diabetesfoodhub.org/recipes/sweet-peanut-buttery-dip.html?home-category_id=23)  - Roasted chickpeas (https://www.diabetesfoodhub.org/recipes/roasted-and-spiced-chickpeas.html?home-category_id=23)  - Lemon Raspberry hali seed pudding (https://www.diabetesfoodhub.org/recipes/lemon-raspberry- hali-seed-pudding.html?home-category_id=23)  - Black bean hummus with carrot and celery sticks (https://www.diabetesfoodhub.org/recipes/black-bean-hummus.html?home-category_id=23)  - Greek yogurt chocolate mouse " (https://www.diabetesfoodhub.org/recipes/greek-yogurt-chocolate-mousse.html?home-category_id=23)   - Broccoli Cheese Bites  (https://www.diabetesfoodhub.org/recipes/broccoli-cheese-bites.html?home-category_id=20)  - Chicken Satay with peanut sauce  (https://www.diabetesfoodSolio.org/recipes/blueberry-almond-chicken-salad-lettuce-wraps.html?home-category_id=20)  - Deviled Eggs  (https://www.diabetesfoSlicethepie.org/recipes/devilled-eggs.html?home-category_id=20)      Meal Replacement Shake Options:   *Protein Shake Criteria: no more than 210 Calories, at least 20 grams of protein, and less than 10 grams of sugar   [facility] smoothie (160 Calories, 20 g protein)   Premier Protein (160 Calories, 30 g protein)  Slim Fast Advanced Nutrition (180 Calories, 20 g protein)  Muscle Milk, lactose-free, 17 oz bottle (210 Calories, 30 g protein)  Integrated Supplements, no artificial sugars (110 Calories, 20 g protein)  Genepro, unflavored protein powder (60 Calories, 30 g protein)  Boost/Ensure Max (160 calories, 30 gm protein)   [facility] Core Power (170 calories, 26 gm protein)  [employer]i's Elevation Protein Powder (180 calories, 30 gm protein)     The Plate Method  [email or web address]    Protein Sources   [email or web address] [email or web address]     Carbohydrates  [email or web address]     Mindful Eating  [email or web address]://Breathing [email or web address]     Summary of Volumetrics Eating Plan  [email or web address]     Follow-Up:  1 month, PRN    Time spent with patient: 15 minutes.  DEJAH GIVENS RD, LD

## 2023-03-01 NOTE — PATIENT INSTRUCTIONS
"John Chilelne!    Follow-up with RD in 1 month    Thank you,    Allison Salmeron, UYEN, LD  If you would like to schedule or reschedule an appointment with the RD, please call [phone]    Nutrition Goals  1) Follow 1700 calorie/day plan    -Carb goal: 170 grams or less (make sure you are spreading carbs out throughout the day and not eating a large portion all at one meal)   -www.eatthismuch.com    -MyPlate Calorie Counter   2) Restaurant tips: keep portion sizes small (split in half), include a protein and vegetable at each meal, eat protein first and carb portion last, keep carb portion to 1/2 cup or less  3) 100 calorie sweets: Smart Sweets, Dr. Posadas's Xylitol candy, Fiber One desserts, Fit and Active 100 calorie snack sweets at [employer]; [employer] 100 calorie pre-portioned cookies, sugar-free pudding, sugar-free jello.  4) 9\" Plate method (1/2 plate non-starchy vegetables/fruit, 1/4 plate lean protein, 1/4 plate whole grain starch - no more than 1/2 cup carb/meal)  5) Eat 3 meals per day  6) Consume 60-90 g protein per day  7) Avoid snacking as able. If snack is needed use lean protein and/or fruit/vegetable. Examples:   - 2 cup popcorn   - 1 cup mixed berries   - 15 almonds, walnuts, cashews   - carrot/celery sticks and 2 tbsp low-fat ranch   - 1 hard boiled egg   - Part-skim mozzarella cheese stick   - Low-fat, low-sugar greek yogurt with 1/2 cup berries   - Med apple or pear   - sliced bell peppers with 1/2 cup salsa   - 1/2 cup roasted chickpeas   - sliced cucumbers with vinegar    Snack Recipes:  - Banana and creamy PB dip (https://www.diabetesfoodhub.org/recipes/sweet-peanut-buttery-dip.html?home-category_id=23)  - Roasted chickpeas (https://www.diabetesfoodhub.org/recipes/roasted-and-spiced-chickpeas.html?home-category_id=23)  - Lemon Raspberry hali seed pudding (https://www.diabetesfoodhub.org/recipes/lemon-raspberry- hali-seed-pudding.html?home-category_id=23)  - Black bean hummus with carrot and celery sticks " (https://www.diabetesfoodEat Club.org/recipes/black-bean-hummus.html?home-category_id=23)  - Greek yogurt chocolate mouse (https://www.diabetesfoodEat Club.org/recipes/greek-yogurt-chocolate-mousse.html?home-category_id=23)   - Broccoli Cheese Bites  (https://www.diabetesfoOtometrix Medical Technologies.org/recipes/broccoli-cheese-bites.html?home-category_id=20)  - Chicken Satay with peanut sauce  (https://www.diabetesfoOtometrix Medical Technologies.org/recipes/blueberry-almond-chicken-salad-lettuce-wraps.html?home-category_id=20)  - Deviled Eggs  (https://www.JobSpice.org/recipes/devilled-eggs.html?home-category_id=20)      Meal Replacement Shake Options:   *Protein Shake Criteria: no more than 210 Calories, at least 20 grams of protein, and less than 10 grams of sugar   [facility] smoothie (160 Calories, 20 g protein)   Premier Protein (160 Calories, 30 g protein)  Slim Fast Advanced Nutrition (180 Calories, 20 g protein)  Muscle Milk, lactose-free, 17 oz bottle (210 Calories, 30 g protein)  Integrated Supplements, no artificial sugars (110 Calories, 20 g protein)  Genepro, unflavored protein powder (60 Calories, 30 g protein)  Boost/Ensure Max (160 calories, 30 gm protein)   [facility] Core Power (170 calories, 26 gm protein)  [employer]i's Elevation Protein Powder (180 calories, 30 gm protein)     The Plate Method  [email or web address]    Protein Sources   http://Lending a Helping Hand/[email or web address]     Carbohydrates  [email or web address]     Mindful Eating  http://Lending a Helping Hand/334464.pdf     Summary of Volumetrics Eating Plan  [email or web address]       Interested in working with a health ? Health coaches work with you to improve your overall health and wellbeing. They look at the whole person, and may involve discussion of different areas of life, including, but not limited to the four pillars of health (sleep, exercise, nutrition, and stress management). Discuss with your care team if you would like to start working a health .    Health  Coaching-3 Pack:    $99 for three health coaching visits    Visits may be done in person or via phone    Coaching is a partnership between the  and the client; Coaches do not prescribe or diagnose    Coaching helps inspire the client to reach his/her personal goals    COMPREHENSIVE WEIGHT MANAGEMENT PROGRAM  VIRTUAL SUPPORT GROUPS    For Support Group Information:      We offer support groups for patients who are working on weight loss and considering, preparing for or have had weight loss surgery.   There is no cost for this opportunity.  You are invited to attend the?Virtual Support Groups?provided by any of the following locations:    [facility] via Microsoft Teams with Daxa Mariano RN  2.   [facility] via [employer] with Ignacio Mattson, PhD, LP  3.   [facility] via [employer] with Annette Nair RN  4.   [facility] via Microsoft Teams with Annette Roberto [facility]    The following Support Group information can also be found on our website:  https://www.[facility].org/treatments/weight-loss-surgery-support-groups    https://www.[facility].org/treatments/weight-loss-and-weight-loss-surgery-support-groups    [facility] Weight Loss Surgery Support Group    [facility] Weight Loss Surgery Support Group  The support group is a patient-lead forum that meets monthly to share experiences, encouragement and education. It is open to those who have had weight loss surgery, are scheduled for surgery, or are considering surgery.   WHEN: This group meets on the 3rd Wednesday of each month from 5:00PM - 6:00PM virtually using Microsoft Teams.   FACILITATOR: Led by Daxa Toscano RD, LD, RN, the program's Clinical Coordinator.   TO REGISTER: Please contact the clinic via [employer] or call the nurse line directly at [phone] to inform our staff that you would like an invite sent to you and to let us know the email you would like the invite sent to. Prior to the meeting, a  "link with directions on how to join the meeting will be sent to you.    2023 Meetings (speakers to be determined)  January 18: \"Let's Talk\" a time for the group to share.  February 15: \"Let's Talk\" a time for the group to share.  March 15: \"Let's Talk\" a time for the group to share.  April 19: \"Let's Talk\" a time for the group to share.  May 17: \"Let's Talk\" a time for the group to share.  June 21: \"Let's Talk\" a time for the group to share.    [facility] and [facility] Support Groups    [facility] Bariatric Care Support Group?  This is open to all pre- and post- operative bariatric surgery patients as well as their support system.   WHEN: This group meets the 2nd Tuesday of each month from 6:30 PM - 8:00 PM virtually using Microsoft Teams.   FACILITATOR: Led by Ignacio Mattson, Ph.D who is a Licensed Psychologist with the [facility] Comprehensive Weight Management Program.   TO REGISTER: Please send an email to Ignacio Mattson, Ph.D., LP at?[email or web address]?if you would like an invitation to the group and to learn about using Microsoft Teams.    2023 Meetings  January 10: George Calvo, PharmD, Pharmacy Resident, \"Medications and Bariatric Surgery\"  February 14:  March 14:  April 11:  May 9:  June 11:    [facility]ctions Post-Operative Bariatric Surgery Support Group  This is a support group for [facility] bariatric patients (and those external to [facility]) who have had bariatric surgery and are at least 3 months post-surgery.  WHEN: This support group meets the 4th Wednesday of the month from 11:00 AM - 12:00 PM virtually using Microsoft Teams.   FACILITATOR: Led by Certified Bariatric Nurse, Annette Nair RN.   TO REGISTER: Please send an email to Annette at [email or web address] if you would like an invitation to the group and to learn about using Microsoft Teams.    2023 Meetings  January 25  February 22  March 22  April 26  May 24  Kassy 28      [facility]" "[facility] Healthy Lifestyle Virtual Support Group    Healthy Lifestyle Virtual Support Group?  This is 60 minutes of small group guided discussion, support and resources. All are welcome who want a healthy lifestyle.  WHEN: This group meets monthly on a Friday from 12:30 PM - 1:30 PM virtually using Microsoft Teams.   FACILITATOR: Led by National Board Certified Health and , Annette Roberto [facility].   TO REGISTER: Please send an email to Annette [email or web address] to receive monthly invites to the group or if you have any questions about having a health .  Prior to the meeting, a link with directions on how to join the meeting will be sent to you.    2023 Meetings  January 20: \"Let's Talk\" a time for the group to share  February 17: Guest Speaker, Bonny Apodaca RD, Registered Dietician, \"Tips to Maximize Your Metabolism\"  March 17: Let's Talk\" a time for the group to share  April 14: Guest Speaker, Allison Salmeron RD, Registered Dietician, \"Heart Health\"  May 19: \"Let's Talk\" a time for the group to share  June: To be announced.      "
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
## 2023-03-01 NOTE — NURSING NOTE
"Chief Complaint   Patient presents with     Consult     New consultation for weight management.         Vitals:    03/01/23 1308   Weight: 284 lb   Height: 5' 3\"       Body mass index is 50.31 kg/m .      Jannette Cortez, EMT  [facility]                      " Vital Signs Last 24 Hrs  T(C): 36.7 (13 Dec 2021 05:04), Max: 36.7 (12 Dec 2021 13:00)  T(F): 98.1 (13 Dec 2021 05:04), Max: 98.1 (13 Dec 2021 05:04)  HR: 72 (13 Dec 2021 05:04) (67 - 75)  BP: 155/82 (13 Dec 2021 05:04) (117/69 - 155/82)  BP(mean): --  RR: 18 (13 Dec 2021 05:04) (18 - 18)  SpO2: 97% (13 Dec 2021 05:04) (95% - 97%)

## 2023-03-01 NOTE — PROGRESS NOTES
"Autumn Holbrook is a 47 year old female who is being evaluated via a billable video visit.      The patient has been notified of following:     \"This video visit will be conducted via a call between you and your physician/provider. We have found that certain health care needs can be provided without the need for an in-person physical exam.  This service lets us provide the care you need with a video conversation.  If a prescription is necessary we can send it directly to your pharmacy.  If lab work is needed we can place an order for that and you can then stop by our lab to have the test done at a later time.    Video visits are billed at different rates depending on your insurance coverage.  Please reach out to your insurance provider with any questions.    If during the course of the call the physician/provider feels a video visit is not appropriate, you will not be charged for this service.\"    Patient has given verbal consent for Video visit? Yes  How would you like to obtain your AVS? MyChart  If you are dropped from the video visit, the video invite should be resent to: Text to cell phone: [phone]  Will anyone else be joining your video visit? No  {If patient encounters technical issues they should call [phone]      Video-Visit Details    Type of service:  Video Visit    Video Start Time: 2:40 PM  Video End Time: 2:55 PM    Originating Location (pt. Location): Home    Distant Location (provider location):  Offsite (providers home)     Platform used for Video Visit: STYLIGHT    During this virtual visit the patient is located in [place], patient verifies this as the location during the entirety of this visit.       New Weight Management Nutrition Consultation    Autumn Holbrook is a 47 year old female presents today for new weight management nutrition consultation.  Patient referred by SEVEN Nickerson on March 1, 2023.    Patient with Co-morbidities of obesity including:  Type 2 Diabetes, GERD, Knee Pain, " "Back Pain    Anthropometrics:  Estimated body mass index is 52.36 kg/m  as calculated from the following:    Height as of 2/28/23: 1.578 m (5' 2.12\").    Weight as of 2/28/23: 130.4 kg (287 lb 6.4 oz).     Current weight: 284 lbs     Medications for Weight Loss:  Topamax    NUTRITION HISTORY  Food allergies: none  Food intolerances: avocados  Supplements: Vitamin D3, MVI, potassium (prescribed by PCP)   Previous methods of diet modification for weight loss: Watching Portions or Calories, Exercise, Weight Watchers, Medications, Fasting    Pt reports she has been heavy for most of her life. Thyroid issues and Hasimotos make it difficult to lose weight. Has been trying to decrease carbs d/t T2DM. Often craves sugar at night. Has Moms Meals delivered- diabetic friendly and pre-portioned. Physical activity is limited d/t knee pain.     Recent food recall:  Breakfast: skips  Lunch: Moms Meals  Dinner: Moms Meals  Snacks: Granola bar, cottage cheese and fruit, yogurt, chips, chocolate  Beverages: water, diet soda     Physical Activity:  Has a lot of knee pain    Nutrition Prescription  Recommended energy/nutrient modification.    Nutrition Diagnosis  Obesity r/t long history of positive energy balance aeb BMI >30.    Nutrition Intervention  Materials/education provided on hypocaloric diet for weight loss. Discussed 1700 calorie/day diet, Volumetric eating to help satiety level on fewer calories; portion control and healthy food choices (Plate Method and Volumetrics handouts), 100 calorie snack choices, meal and snack planning and websites, sample meal plans     Patient demonstrates understanding.    Expected Engagement: good    Nutrition Goals  1) Follow 1700 calorie/day plan    -Carb goal: 170 grams or less (make sure you are spreading carbs out throughout the day and not eating a large portion all at one meal)   -www.[employer].com    -MyPlate Calorie Counter   2) Restaurant tips: keep portion sizes small (split in " "half), include a protein and vegetable at each meal, eat protein first and carb portion last, keep carb portion to 1/2 cup or less  3) 100 calorie sweets: Smart Sweets, Dr. Batess Xylitol candy, Fiber One desserts, Fit and Active 100 calorie snack sweets at [employer]; [employer] 100 calorie pre-portioned cookies, sugar-free pudding, sugar-free jello.  4) 9\" Plate method (1/2 plate non-starchy vegetables/fruit, 1/4 plate lean protein, 1/4 plate whole grain starch - no more than 1/2 cup carb/meal)  5) Eat 3 meals per day  6) Consume 60-90 g protein per day  7) Avoid snacking as able. If snack is needed use lean protein and/or fruit/vegetable. Examples:   - 2 cup popcorn   - 1 cup mixed berries   - 15 almonds, walnuts, cashews   - carrot/celery sticks and 2 tbsp low-fat ranch   - 1 hard boiled egg   - Part-skim mozzarella cheese stick   - Low-fat, low-sugar greek yogurt with 1/2 cup berries   - Med apple or pear   - sliced bell peppers with 1/2 cup salsa   - 1/2 cup roasted chickpeas   - sliced cucumbers with vinegar    Snack Recipes:  - Banana and creamy PB dip (https://www.diabetesfoodhub.org/recipes/sweet-peanut-buttery-dip.html?home-category_id=23)  - Roasted chickpeas (https://www.diabetesfoodhub.org/recipes/roasted-and-spiced-chickpeas.html?home-category_id=23)  - Lemon Raspberry hali seed pudding (https://www.diabetesfoodhub.org/recipes/lemon-raspberry- hali-seed-pudding.html?home-category_id=23)  - Black bean hummus with carrot and celery sticks (https://www.diabetesfoodhub.org/recipes/black-bean-hummus.html?home-category_id=23)  - Greek yogurt chocolate mouse (https://www.diabetesfoodhub.org/recipes/greek-yogurt-chocolate-mousse.html?home-category_id=23)   - Broccoli Cheese Bites  (https://www.diabetesfoodhub.org/recipes/broccoli-cheese-bites.html?home-category_id=20)  - Chicken Satay with peanut sauce  (https://www.diabetesfoodhub.org/recipes/blueberry-almond-chicken-salad-lettuce-wraps.html?home-category_id=20)  - " Deviled Eggs  (https://www.diabetesfoodhub.org/recipes/devilled-eggs.html?home-category_id=20)      Meal Replacement Shake Options:   *Protein Shake Criteria: no more than 210 Calories, at least 20 grams of protein, and less than 10 grams of sugar   [facility] smoothie (160 Calories, 20 g protein)   Premier Protein (160 Calories, 30 g protein)  Slim Fast Advanced Nutrition (180 Calories, 20 g protein)  Muscle Milk, lactose-free, 17 oz bottle (210 Calories, 30 g protein)  Integrated Supplements, no artificial sugars (110 Calories, 20 g protein)  Genepro, unflavored protein powder (60 Calories, 30 g protein)  Boost/Ensure Max (160 calories, 30 gm protein)   Fairlife Core Power (170 calories, 26 gm protein)  [employer]'s Elevation Protein Powder (180 calories, 30 gm protein)     The Plate Method  [email or web address]    Protein Sources   [email or web address]     Carbohydrates  [email or web address]     Mindful Eating  [email or web address]     Summary of Volumetrics Eating Plan  [email or web address]     Follow-Up:  1 month, PRN    Time spent with patient: 15 minutes.  DEJAH GIVENS RD, LD

## 2023-03-08 ENCOUNTER — MEDICAL CORRESPONDENCE (OUTPATIENT)
Dept: HEALTH INFORMATION MANAGEMENT | Facility: CLINIC | Age: 48
End: 2023-03-08

## 2023-03-09 ENCOUNTER — MEDICAL CORRESPONDENCE (OUTPATIENT)
Dept: HEALTH INFORMATION MANAGEMENT | Facility: CLINIC | Age: 48
End: 2023-03-09

## 2023-03-20 DIAGNOSIS — E66.01 MORBID OBESITY (H): ICD-10-CM

## 2023-03-22 RX ORDER — PHENTERMINE HYDROCHLORIDE 30 MG/1
CAPSULE ORAL
Qty: 30 CAPSULE | Refills: 0 | Status: SHIPPED | OUTPATIENT
Start: 2023-03-22 | End: 2023-04-21

## 2023-03-22 NOTE — TELEPHONE ENCOUNTER
Phentermine      Last Written Prescription Date:  2.22.23  Last Fill Quantity: #30,   # refills: 0  Last Office Visit: 2.17.23  Future Office visit:    Next 5 appointments (look out 90 days)    Apr 20, 2023  2:00 PM  LEXI New with Lauren Turner Bloch, RPH  [facility] ([facility] ) [address] [place] [id number] [phone]   May 17, 2023  2:00 PM  (Arrive by 1:45 PM)  SHORT with SEVEN Pandey  [facility] ([facility] ) [address]  [place] [address]  [phone]           Routing refill request to provider for review/approval because:  Drug not on the [facility], [facility] or [facility] refill protocol or controlled substance

## 2023-04-04 ENCOUNTER — MEDICAL CORRESPONDENCE (OUTPATIENT)
Dept: HEALTH INFORMATION MANAGEMENT | Facility: CLINIC | Age: 48
End: 2023-04-04

## 2023-04-05 ENCOUNTER — DOCUMENTATION ONLY (OUTPATIENT)
Dept: FAMILY MEDICINE | Facility: OTHER | Age: 48
End: 2023-04-05

## 2023-04-05 DIAGNOSIS — R39.81 FUNCTIONAL URINARY INCONTINENCE: ICD-10-CM

## 2023-04-05 DIAGNOSIS — N39.46 MIXED INCONTINENCE URGE AND STRESS (MALE)(FEMALE): Primary | ICD-10-CM

## 2023-04-11 ENCOUNTER — VIRTUAL VISIT (OUTPATIENT)
Dept: ENDOCRINOLOGY | Facility: CLINIC | Age: 48
End: 2023-04-11
Payer: COMMERCIAL

## 2023-04-11 DIAGNOSIS — Z71.3 NUTRITIONAL COUNSELING: Primary | ICD-10-CM

## 2023-04-11 DIAGNOSIS — E66.9 OBESITY: ICD-10-CM

## 2023-04-11 DIAGNOSIS — E11.9 TYPE 2 DIABETES MELLITUS WITHOUT COMPLICATION, WITHOUT LONG-TERM CURRENT USE OF INSULIN (H): ICD-10-CM

## 2023-04-11 PROCEDURE — 97803 MED NUTRITION INDIV SUBSEQ: CPT | Mod: VID | Performed by: DIETITIAN, REGISTERED

## 2023-04-11 PROCEDURE — 99207 PR NO CHARGE LOS: CPT | Mod: VID | Performed by: DIETITIAN, REGISTERED

## 2023-04-11 NOTE — PROGRESS NOTES
"Autumn Holbrook is a 47 year old female who is being evaluated via a billable video visit.      The patient has been notified of following:     \"This video visit will be conducted via a call between you and your physician/provider. We have found that certain health care needs can be provided without the need for an in-person physical exam.  This service lets us provide the care you need with a video conversation.  If a prescription is necessary we can send it directly to your pharmacy.  If lab work is needed we can place an order for that and you can then stop by our lab to have the test done at a later time.    Video visits are billed at different rates depending on your insurance coverage.  Please reach out to your insurance provider with any questions.    If during the course of the call the physician/provider feels a video visit is not appropriate, you will not be charged for this service.\"    Patient has given verbal consent for Video visit? Yes  How would you like to obtain your AVS? MyChart  If you are dropped from the video visit, the video invite should be resent to: Text to cell phone: [phone]  Will anyone else be joining your video visit? No  {If patient encounters technical issues they should call [phone]      Video-Visit Details    Type of service:  Video Visit    Video Start Time: 2:45 PM  Video End Time: 2:57 PM    Originating Location (pt. Location): Home    Distant Location (provider location):  Offsite (providers home)     Platform used for Video Visit: Game Digital    During this virtual visit the patient is located in [place], patient verifies this as the location during the entirety of this visit.       Return Weight Management Nutrition Consultation    Autumn Holbrook is a 47 year old female presents today for a return weight management nutrition consultation.  Patient referred by SEVEN Nickerson on March 1, 2023.    Patient with Co-morbidities of obesity including:  Type 2 Diabetes, GERD, " "Knee Pain, Back Pain    Anthropometrics:  Estimated body mass index is 50.31 kg/m  as calculated from the following:    Height as of 3/1/23: 1.6 m (5' 3\").    Weight as of 3/1/23: 128.8 kg (284 lb).     Current weight: 286 lbs     Medications for Weight Loss:  Ozempic    NUTRITION HISTORY  Food allergies: none  Food intolerances: avocados  Supplements: Vitamin D3, MVI, potassium (prescribed by PCP)   Previous methods of diet modification for weight loss: Watching Portions or Calories, Exercise, Weight Watchers, Medications, Fasting    Pt reports she has been heavy for most of her life. Thyroid issues and Hasimotos make it difficult to lose weight. Has been trying to decrease carbs d/t T2DM. Often craves sugar at night. Has Moms Meals delivered- diabetic friendly and pre-portioned. Physical activity is limited d/t knee pain.     4/11/23: Pt started Ozempic about a month ago. Has not noticed much of a change since taking it. Still craving sweets. Still using Mom's Meals 2x per day- when she cooks she makes a veggie and a protein, sometimes a starch. Struggling some with drinking enough water. Working hard to eat earlier in the day (sometimes isn't hungry until noon).    Recent food recall:  Breakfast: skips  Lunch: Moms Meals  Dinner: Moms Meals  Snacks: Granola bar, cottage cheese and fruit, yogurt, chips, chocolate  Beverages: water, diet soda     Physical Activity:  Has a lot of knee pain    Progress on Previous Goals  1) Follow 1700 calorie/day plan -did not use this month   -Carb goal: 170 grams or less (make sure you are spreading carbs out throughout the day and not eating a large portion all at one meal)   -www.[employer]com    -MyPlate Calorie Counter   2) Restaurant tips: keep portion sizes small (split in half), include a protein and vegetable at each meal, eat protein first and carb portion last, keep carb portion to 1/2 cup or less  3) 100 calorie sweets: Smart Sweets, Dr. Posadas's Xylitol candy, Fiber " "One desserts, Fit and Active 100 calorie snack sweets at [employer]; [employer] 100 calorie pre-portioned cookies, sugar-free pudding, sugar-free jello.  4) 9\" Plate method (1/2 plate non-starchy vegetables/fruit, 1/4 plate lean protein, 1/4 plate whole grain starch - no more than 1/2 cup carb/meal) met, continues   5) Eat 3 meals per day improving, continues   6) Consume 60-90 g protein per day met, continues   7) Avoid snacking as able. If snack is needed use lean protein and/or fruit/vegetable  Improving, continues     Nutrition Prescription  Recommended energy/nutrient modification.    Nutrition Diagnosis  Obesity r/t long history of positive energy balance aeb BMI >30.    Nutrition Intervention  Materials/education provided on hypocaloric diet for weight loss. Discussed 1700 calorie/day diet, Volumetric eating to help satiety level on fewer calories; portion control and healthy food choices (Plate Method and Volumetrics handouts), 100 calorie snack choices, meal and snack planning and websites, sample meal plans     Patient demonstrates understanding.    Expected Engagement: good    Nutrition Goals  1) 9\" Plate method (1/2 plate non-starchy vegetables/fruit, 1/4 plate lean protein, 1/4 plate whole grain starch - no more than 1/2 cup carb/meal)    2) Eat 3 meals per day  3) Consume 60-90 g protein per day   4) Avoid snacking as able. If snack is needed use lean protein and/or fruit/vegetable    The Plate Method  [email or web address]    Protein Sources   [email or web address]     Carbohydrates  [email or web address]     Mindful Eating  [email or web address]     Summary of Volumetrics Eating Plan  [email or web address]     Follow-Up:  1 month, PRN    Time spent with patient: 12 minutes.  DEJAH GIVENS RD, LD      "

## 2023-04-11 NOTE — LETTER
"4/11/2023       RE: Autumn Holbrook  [address] [facility] [address]     Dear Colleague,    Thank you for referring your patient, Autumn Holbrook, to the [facility] [place] [facility]. Please see a copy of my visit note below.    Autumn Holbrook is a 47 year old female who is being evaluated via a billable video visit.      The patient has been notified of following:     \"This video visit will be conducted via a call between you and your physician/provider. We have found that certain health care needs can be provided without the need for an in-person physical exam.  This service lets us provide the care you need with a video conversation.  If a prescription is necessary we can send it directly to your pharmacy.  If lab work is needed we can place an order for that and you can then stop by our lab to have the test done at a later time.    Video visits are billed at different rates depending on your insurance coverage.  Please reach out to your insurance provider with any questions.    If during the course of the call the physician/provider feels a video visit is not appropriate, you will not be charged for this service.\"    Patient has given verbal consent for Video visit? Yes  How would you like to obtain your AVS? MyChart  If you are dropped from the video visit, the video invite should be resent to: Text to cell phone: [phone]  Will anyone else be joining your video visit? No  {If patient encounters technical issues they should call [phone]      Video-Visit Details    Type of service:  Video Visit    Video Start Time: 2:45 PM  Video End Time: 2:57 PM    Originating Location (pt. Location): Home    Distant Location (provider location):  Offsite (providers home)     Platform used for Video Visit: PhoneGuard    During this virtual visit the patient is located in [place], patient verifies this as the location during the entirety of " "this visit.       Return Weight Management Nutrition Consultation    Autumn Holbrook is a 47 year old female presents today for a return weight management nutrition consultation.  Patient referred by SEVEN Nickerson on March 1, 2023.    Patient with Co-morbidities of obesity including:  Type 2 Diabetes, GERD, Knee Pain, Back Pain    Anthropometrics:  Estimated body mass index is 50.31 kg/m  as calculated from the following:    Height as of 3/1/23: 1.6 m (5' 3\").    Weight as of 3/1/23: 128.8 kg (284 lb).     Current weight: 286 lbs     Medications for Weight Loss:  Ozempic    NUTRITION HISTORY  Food allergies: none  Food intolerances: avocados  Supplements: Vitamin D3, MVI, potassium (prescribed by PCP)   Previous methods of diet modification for weight loss: Watching Portions or Calories, Exercise, Weight Watchers, Medications, Fasting    Pt reports she has been heavy for most of her life. Thyroid issues and Hasimotos make it difficult to lose weight. Has been trying to decrease carbs d/t T2DM. Often craves sugar at night. Has Moms Meals delivered- diabetic friendly and pre-portioned. Physical activity is limited d/t knee pain.     4/11/23: Pt started Ozempic about a month ago. Has not noticed much of a change since taking it. Still craving sweets. Still using Mom's Meals 2x per day- when she cooks she makes a veggie and a protein, sometimes a starch. Struggling some with drinking enough water. Working hard to eat earlier in the day (sometimes isn't hungry until noon).    Recent food recall:  Breakfast: skips  Lunch: Moms Meals  Dinner: Moms Meals  Snacks: Granola bar, cottage cheese and fruit, yogurt, chips, chocolate  Beverages: water, diet soda     Physical Activity:  Has a lot of knee pain    Progress on Previous Goals  1) Follow 1700 calorie/day plan -did not use this month   -Carb goal: 170 grams or less (make sure you are spreading carbs out throughout the day and not eating a large portion all at one " "meal)   -www.eatthismuch.com    -MyPlate Calorie Counter   2) Restaurant tips: keep portion sizes small (split in half), include a protein and vegetable at each meal, eat protein first and carb portion last, keep carb portion to 1/2 cup or less  3) 100 calorie sweets: Smart Sweets, Dr. Velasco Xylitol candy, Fiber One desserts, Fit and Active 100 calorie snack sweets at [employer]; [employer] 100 calorie pre-portioned cookies, sugar-free pudding, sugar-free jello.  4) 9\" Plate method (1/2 plate non-starchy vegetables/fruit, 1/4 plate lean protein, 1/4 plate whole grain starch - no more than 1/2 cup carb/meal) met, continues   5) Eat 3 meals per day improving, continues   6) Consume 60-90 g protein per day met, continues   7) Avoid snacking as able. If snack is needed use lean protein and/or fruit/vegetable  Improving, continues     Nutrition Prescription  Recommended energy/nutrient modification.    Nutrition Diagnosis  Obesity r/t long history of positive energy balance aeb BMI >30.    Nutrition Intervention  Materials/education provided on hypocaloric diet for weight loss. Discussed 1700 calorie/day diet, Volumetric eating to help satiety level on fewer calories; portion control and healthy food choices (Plate Method and Volumetrics handouts), 100 calorie snack choices, meal and snack planning and websites, sample meal plans     Patient demonstrates understanding.    Expected Engagement: good    Nutrition Goals  1) 9\" Plate method (1/2 plate non-starchy vegetables/fruit, 1/4 plate lean protein, 1/4 plate whole grain starch - no more than 1/2 cup carb/meal)    2) Eat 3 meals per day  3) Consume 60-90 g protein per day   4) Avoid snacking as able. If snack is needed use lean protein and/or fruit/vegetable    The Plate Method  [email or web address]    Protein Sources   [email or web address]     Carbohydrates  [email or web address]     Mindful Eating  [email or web address]     Summary of " Volumetrics Eating Plan  [email or web address]     Follow-Up:  1 month, PRN    Time spent with patient: 12 minutes.  DEJAH GIVENS RD, LD

## 2023-04-13 NOTE — PATIENT INSTRUCTIONS
"John Leyva!    Follow-up with RD in 1 month    Thank you,    Allison Salmeron, RD, LD  If you would like to schedule or reschedule an appointment with the RD, please call [phone]    Nutrition Goals  1) 9\" Plate method (1/2 plate non-starchy vegetables/fruit, 1/4 plate lean protein, 1/4 plate whole grain starch - no more than 1/2 cup carb/meal)    2) Eat 3 meals per day  3) Consume 60-90 g protein per day   4) Avoid snacking as able. If snack is needed use lean protein and/or fruit/vegetable    Interested in working with a health ? Health coaches work with you to improve your overall health and wellbeing. They look at the whole person, and may involve discussion of different areas of life, including, but not limited to the four pillars of health (sleep, exercise, nutrition, and stress management). Discuss with your care team if you would like to start working a health .    Health Coaching-3 Pack:    $99 for three health coaching visits    Visits may be done in person or via phone    Coaching is a partnership between the  and the client; Coaches do not prescribe or diagnose    Coaching helps inspire the client to reach his/her personal goals    COMPREHENSIVE WEIGHT MANAGEMENT PROGRAM  VIRTUAL SUPPORT GROUPS    For Support Group Information:      We offer support groups for patients who are working on weight loss and considering, preparing for or have had weight loss surgery.   There is no cost for this opportunity.  You are invited to attend the?Virtual Support Groups?provided by any of the following locations:    [facility] via [employer] Teams with Daxa Mariano RN  2.   [place] via [employer] Teams with Ignacio Mattson, PhD, LP  3.   [place] via [employer] Teams with Annette Nair RN  4.   [facility] via [employer] Teams with Annette Roberto, [facility]    The following Support Group information can also be found on our " "website:  [email or web address]    [email or web address]    [facility] Weight Loss Surgery Support Group    [facility] Weight Loss Surgery Support Group  The support group is a patient-lead forum that meets monthly to share experiences, encouragement and education. It is open to those who have had weight loss surgery, are scheduled for surgery, or are considering surgery.   WHEN: This group meets on the 3rd Wednesday of each month from 5:00PM - 6:00PM virtually using [employer] Teams.   FACILITATOR: Led by Daxa Toscano RD, LD, RN, the program's Clinical Coordinator.   TO REGISTER: Please contact the clinic via [employer] or call the nurse line directly at [phone] to inform our staff that you would like an invite sent to you and to let us know the email you would like the invite sent to. Prior to the meeting, a link with directions on how to join the meeting will be sent to you.    2023 Meetings (speakers to be determined)  January 18: \"Let's Talk\" a time for the group to share.  February 15: \"Let's Talk\" a time for the group to share.  March 15: \"Let's Talk\" a time for the group to share.  April 19: \"Let's Talk\" a time for the group to share.  May 17: \"Let's Talk\" a time for the group to share.  June 21: \"Let's Talk\" a time for the group to share.    [facility] Support Groups    Connections Bariatric Care Support Group?  This is open to all pre- and post- operative bariatric surgery patients as well as their support system.   WHEN: This group meets the 2nd Tuesday of each month from 6:30 PM - 8:00 PM virtually using Microsoft Teams.   FACILITATOR: Led by Ignacio Mattson, Ph.D who is a Licensed Psychologist with the [facility] Comprehensive Weight Management Program.   TO REGISTER: Please send an email to Ignacio" "Twila, Ph.D., LP [email or web address]?if you would like an invitation to the group and to learn about using Microsoft Teams.    2023 Meetings  January 10: George Calvo, PharmD, Pharmacy Resident, \"Medications and Bariatric Surgery\"  February 14:  March 14:  April 11:  May 9:  June 11:    Connections Post-Operative Bariatric Surgery Support Group  This is a support group for [facility] bariatric patients (and those external to [facility]) who have had bariatric surgery and are at least 3 months post-surgery.  WHEN: This support group meets the 4th Wednesday of the month from 11:00 AM - 12:00 PM virtually using Microsoft Teams.   FACILITATOR: Led by Certified Bariatric Nurse, Annette Nair RN.   TO REGISTER: Please send an email to Annette at [email or web address] if you would like an invitation to the group and to learn about using [employer] Teams.    2023 Meetings  January 25  February 22  March 22  April 26  May 24  Kassy 28      [facility] Healthy Lifestyle Virtual Support Group    Healthy Lifestyle Virtual Support Group?  This is 60 minutes of small group guided discussion, support and resources. All are welcome who want a healthy lifestyle.  WHEN: This group meets monthly on a Friday from 12:30 PM - 1:30 PM virtually using Microsoft Teams.   FACILITATOR: Led by National Board Certified Health and , Annette [facility].   TO REGISTER: Please send an email to Annette [email or web address] to receive monthly invites to the group or if you have any questions about having a health .  Prior to the meeting, a link with directions on how to join the meeting will be sent to you.    2023 Meetings January 20: \"Let's Talk\" a time for the group to share  February 17: Guest Speaker, Bonny Apodaca RD, Registered Dietician, \"Tips to Maximize Your Metabolism\"  March 17: Let's Talk\" a time for the group to share  April 14: Guest Speaker, " "Allison Salmeron RD, Registered Dietician, \"Heart Health\"  May 19: \"Let's Talk\" a time for the group to share  June: To be announced.      "

## 2023-04-14 ENCOUNTER — MEDICAL CORRESPONDENCE (OUTPATIENT)
Dept: HEALTH INFORMATION MANAGEMENT | Facility: HOSPITAL | Age: 48
End: 2023-04-14

## 2023-04-19 DIAGNOSIS — E66.01 MORBID OBESITY (H): ICD-10-CM

## 2023-04-20 ENCOUNTER — VIRTUAL VISIT (OUTPATIENT)
Dept: PHARMACY | Facility: CLINIC | Age: 48
End: 2023-04-20
Payer: COMMERCIAL

## 2023-04-20 DIAGNOSIS — R60.0 BILATERAL EDEMA OF LOWER EXTREMITY: ICD-10-CM

## 2023-04-20 DIAGNOSIS — E66.813 CLASS 3 OBESITY: ICD-10-CM

## 2023-04-20 DIAGNOSIS — E03.9 ACQUIRED HYPOTHYROIDISM: ICD-10-CM

## 2023-04-20 DIAGNOSIS — F90.9 ATTENTION DEFICIT HYPERACTIVITY DISORDER (ADHD), UNSPECIFIED ADHD TYPE: ICD-10-CM

## 2023-04-20 DIAGNOSIS — N32.81 OVERACTIVE BLADDER: ICD-10-CM

## 2023-04-20 DIAGNOSIS — Z78.9 TAKES DIETARY SUPPLEMENTS: ICD-10-CM

## 2023-04-20 DIAGNOSIS — K21.9 GASTROESOPHAGEAL REFLUX DISEASE WITHOUT ESOPHAGITIS: ICD-10-CM

## 2023-04-20 DIAGNOSIS — I10 BENIGN ESSENTIAL HYPERTENSION: ICD-10-CM

## 2023-04-20 DIAGNOSIS — G89.4 CHRONIC PAIN SYNDROME: ICD-10-CM

## 2023-04-20 DIAGNOSIS — E11.9 TYPE 2 DIABETES MELLITUS WITHOUT COMPLICATION, WITHOUT LONG-TERM CURRENT USE OF INSULIN (H): Primary | ICD-10-CM

## 2023-04-20 DIAGNOSIS — F31.9 BIPOLAR I DISORDER (H): ICD-10-CM

## 2023-04-20 DIAGNOSIS — E78.5 HYPERLIPIDEMIA LDL GOAL <100: ICD-10-CM

## 2023-04-20 PROCEDURE — 99607 MTMS BY PHARM ADDL 15 MIN: CPT | Mod: VID | Performed by: PHARMACIST

## 2023-04-20 PROCEDURE — 99605 MTMS BY PHARM NP 15 MIN: CPT | Mod: VID | Performed by: PHARMACIST

## 2023-04-20 RX ORDER — MULTIPLE VITAMINS W/ MINERALS TAB 9MG-400MCG
1 TAB ORAL DAILY
COMMUNITY

## 2023-04-20 NOTE — LETTER
May 4, 2023  Autumn LYNNE Sheron  [address] [facility] [place] [address]    Dear LYNNE Venegas [facility]     Thank you for talking with me on Apr 20, 2023 about your health and medications. As a follow-up to our conversation, I have included two documents:      1. Your Recommended To-Do List has steps you should take to get the best results from your medications.  2. Your Medication List will help you keep track of your medications and how to take them.    If you want to talk about these documents, please call Lauren T. Bloch, RPH at phone: [phone], Monday-Friday 8-4:30pm.    I look forward to working with you and your doctors to make sure your medications work well for you.    Sincerely,  Lauren T. Bloch, RPH  [facility] Pharmacist, [facility]

## 2023-04-20 NOTE — LETTER
"Recommended To-Do List      Prepared on: Apr 20, 2023       You can get the best results from your medications by completing the items on this \"To-Do List.\"      Bring your To-Do List when you go to your doctor. And, share it with your family or caregivers.    My To-Do List:  What we talked about: What I should do:   Your medication dosage being too low    Increase your dosage of Ozempic to 1 mg subcutaneously weekly           What we talked about: What I should do:   A medication that is not working    Stop taking phentermine (ADIPEX-P).           What we talked about: What I should do:                       "

## 2023-04-20 NOTE — PROGRESS NOTES
Medication Therapy Management (MTM) Encounter    ASSESSMENT:                            Medication Adherence/Access: No issues identified    Type 2 Diabetes/Obesity:  A1c < 7%. AM fasting not at goal  mg/dL. Would benefit from considering holding phentermine due to potential lack of efficacy. Would benefit from increasing Ozempic to assist weight loss benefits and potential to improve fasting labs.     Hyperlipidemia: LDL not at goal < 100. Would benefit from repeat lab as started statin since last lab, already ordered.     Hypothyroidism: TSH not at goal, T4 is at goal. Would benefit from thyroid lab repeat due to change of dose since last lab as planned.     Lower Extremity Swelling: Stable.     Hypertension: blood pressure at goal.     ADHD: Stable.     GERD: Stable.     Overactive Bladder: Unimproved. Follow up with Urology regarding next steps.     Bipolar I Disorder: Stable.      Knee Pain: Stable.     Supplements: Stable.     PLAN:                            1. Trial off phentermine. Approved by Ivone Moses PA-C     2. Increase Ozempic to 1mg weekly.     Follow-up: 2 months with Ivone Moses PA-C and 4-6 months with Lauren Bloch, PharmD, MTM Pharmacist    SUBJECTIVE/OBJECTIVE:                          Autumn Holbrook is a 47 year old female contacted via secure video for an initial visit. She was referred to me from Ivone Moses PA-C.      Reason for visit: Ozempic start follow up.    Allergies/ADRs: Reviewed in chart  Past Medical History: Reviewed in chart  Tobacco: She reports that she has been smoking cigarettes. She started smoking about 34 years ago. She has a 16.00 pack-year smoking history. She has been exposed to tobacco smoke. She has never used smokeless tobacco.Nicotine/Tobacco Cessation Plan:   Information offered: Patient not interested at this time  8-10 cigarettes per day.   Alcohol: 1-3 beverages / week    Medication Adherence/Access:   Patient uses pill box(es).  Patient takes  medications 3 time(s) per day, morning afternoon and bedtime.   Per patient, misses medication 3 times per month, usually the afternoon if anything.   Medication barriers: affording a medication in particular.     Type 2 Diabetes/Obesity:    Ozempic 0.5 mg weekly     Weight Loss Medication:   Phentermine 30 mg daily morning   Topiramate titation to 75 mg daily - not currently taking     She is working to lose weight to have knee surgery, has to get to 220 lb in order to have surgery. Was switched from Trulicity 3 mg weekly to Ozempic 0.5 mg weekly. She is wanting to increase to 1 mg weekly if possible. Patient is not experiencing side effects. She is noticing that sugars are higher since switching but smaller portions and pimentel more quickly at meals. She is unsure of continued efficacy of phentermine.    Blood sugar monitorin time(s) daily. Ranges (patient reported): Fasting- 130s-160s  Symptoms of low blood sugar? none  Symptoms of high blood sugar? none  Eye exam: up to date, 2022.   Foot exam: up to date  Diet/Exercise: getting in 2-3 meals per day with light snacking in between.   Aspirin: No  Statin: Yes: rosuvastatin 5 mg daily   ACEi/ARB: No.   Urine Albumin:   Lab Results   Component Value Date    ALCR  2023      Comment:      Unable to calculate, urine albumin and/or urine creatinine is outside detectable limits.  Microalbuminuria is defined as an albumin:creatinine ratio of 17 to 299 for males and 25 to 299 for females. A ratio of albumin:creatinine of 300 or higher is indicative of overt proteinuria.  Due to biologic variability, positive results should be confirmed by a second, first-morning random or 24-hour timed urine specimen. If there is discrepancy, a third specimen is recommended. When 2 out of 3 results are in the microalbuminuria range, this is evidence for incipient nephropathy and warrants increased efforts at glucose control, blood pressure control, and institution of  "therapy with an angiotensin-converting-enzyme (ACE) inhibitor (if the patient can tolerate it).        Lab Results   Component Value Date    A1C 6.6 02/17/2023    A1C 6.6 08/01/2022    A1C 6.9 06/01/2022    A1C 7.6 02/16/2022    A1C 7.0 11/04/2021    A1C 6.0 01/29/2020    A1C 6.2 07/01/2019    A1C 5.9 12/19/2018     Wt Readings from Last 4 Encounters:   03/01/23 284 lb (128.8 kg)   02/28/23 287 lb 6.4 oz (130.4 kg)   02/22/23 287 lb 9.6 oz (130.5 kg)   02/17/23 279 lb (126.6 kg)     Estimated body mass index is 50.31 kg/m  as calculated from the following:    Height as of 3/1/23: 5' 3\" (1.6 m).    Weight as of 3/1/23: 284 lb (128.8 kg).    Hyperlipidemia:   Rosuvastatin 5mg daily.      Patient reports no significant myalgias or other side effects.    Recent Labs   Lab Test 01/06/22  1528 01/29/20  1351   CHOL 191 252*   HDL 65 87   * 155*   TRIG 111 52     Hypothyroidism:   Levothyroxine 175 mcg daily.     Reports that dose was increased after last lab and has repeat scheduled next week. Patient is having the following symptoms: none.   TSH   Date Value Ref Range Status   02/17/2023 21.09 (H) 0.30 - 4.20 uIU/mL Final   09/26/2022 0.01 (L) 0.40 - 4.00 mU/L Final   05/05/2021 0.30 (L) 0.40 - 4.00 mU/L Final     T4 Free   Date Value Ref Range Status   05/05/2021 1.35 0.76 - 1.46 ng/dL Final     Free T4   Date Value Ref Range Status   02/17/2023 1.02 0.90 - 1.70 ng/dL Final     Lower Extremity Swelling:   Furosemide 40 mg daily as needed, taking daily as of now    Potassium ER 30 mg daily as she has been taking furosemide daily     Reports that she has been using furosemide daily, more the right leg than left leg. She does report pitting. She thinks that she is not drinking enough water. Swelling has been going on for a couple weeks.     Hypertension:   hydrochlorothiazide 25 mg daily in AM     Patient does not self-monitor blood pressure.      Patient reports no current medication side effects.  BP Readings from " "Last 3 Encounters:   02/28/23 122/86   02/22/23 120/84   02/17/23 110/85     ADHD:   Vyvanse 40 mg daily    Has been effectively helping with ADHD symptoms. No medication side effects.   GERD:   Prilosec (omeprazole) 40 mg once daily.   TUMs as needed, doesn't have to use often.     Patient reports no current symptoms.  Patient feels that current regimen is effective.    Overactive Bladder:   Oxybutynin ER 10 mg daily     Works \"okay\". She does has to follow up with Urologist. She is considering increasing oxybutynin or consider Botox injections in bladder.     Bipolar I Disorder:   Vraylar 3 mg daily   Saphris 7.5 mg daily   Sertraline 75 mg daily   Melatonin 10 mg nightly as needed   Hydroxyzine 25 mg as needed     She reports that the current regimen has been effective to manage her bipolar disorder, She cannot remember the last manic episode. Finds that sleep is \"okay\" for the most part. Variable at times. Takes the melatonin and hydroxyzine to sleep. Follows with psychiatrist. Follows with therapist, meeting weekly.     Knee Pain:   acetaminophen 500-100 mg as needed ~4 times per week  Ibuprofen 400-800 mg as needed ~ 3 times per week   Diclofenac 1% gel as needed knee pain  Baclofen 10 mg three times daily     She tries to use acetaminophen first then if not effective will use ibuprofen. Applies diclofenac at times, not daily. Works for short period of time. Uses baclofen throughout the day. No medication side effects.     Supplements  Vitamin D 5000 international unit(s) daily   Multivitamin daily     No concerns.   Vitamin D Deficiency Screening Results:  Lab Results   Component Value Date    VITDT 41 02/17/2023    VITDT 35 01/29/2020         ----------------      I spent 30 minutes with this patient today. All changes were made via collaborative practice agreement with Ivone Moses PA-C. A copy of the visit note was provided to the patient's provider(s).    A summary of these recommendations was sent via " clinic portal.    Lauren Bloch, PharmD, BCACP   Medication Therapy Management Pharmacist   [facility] [place]    Telemedicine Visit Details  Type of service:  Telephone visit  Start Time: 2:00 PM  End Time: 2:30 PM     Medication Therapy Recommendations  Diabetes mellitus, type 2 (H)    Rationale: Dose too low - Dosage too low - Effectiveness   Recommendation: Increase Dose - Ozempic (1 MG/DOSE) 4 MG/3ML Sopn   Status: Accepted per CPA         Obesity    Current Medication: phentermine (ADIPEX-P) 30 MG capsule   Rationale: Condition refractory to medication - Ineffective medication - Effectiveness   Recommendation: Discontinue Medication   Status: Accepted per Provider

## 2023-04-20 NOTE — LETTER
_  Medication List        Prepared on: Apr 20, 2023     Bring your Medication List when you go to the doctor, hospital, or   emergency room. And, share it with your family or caregivers.     Note any changes to how you take your medications.  Cross out medications when you no longer use them.    Medication How I take it Why I use it Prescriber   Acetaminophen (TYLENOL PO) Take 500 mg by mouth every 4 hours as needed for mild pain or fever  Pain Patient Reported   asenapine (SAPHRIS) 2.5 MG SUBL sublingual tablet Place 2.5 mg under the tongue daily (2.5 mg + 5 mg = 7.5 mg) Manic-Depression Zac Lucero NP   asenapine (SAPHRIS) 5 MG SUBL sublingual tablet Place 5 mg under the tongue daily (2.5 mg + 5 mg = 7.5 mg) Manic-Depression Zac Lucero NP   baclofen (LIORESAL) 10 MG tablet TAKE 1 TABLET BY MOUTH THREE TIMES DAILY Chronic Pain Syndrome SEVEN Pandey   blood glucose (NO BRAND SPECIFIED) lancets standard Use to test blood sugar 1 times daily Type 2 diabetes mellitus with hyperglycemia, without long-term current use of insulin (H) Giselle Hearn, APRN CNP   blood glucose (NO BRAND SPECIFIED) test strip Use to test blood sugar 1 time daily Type 2 diabetes mellitus without complication, without long-term current use of insulin (H) SEVEN Pandey   blood glucose monitoring (NO BRAND SPECIFIED) meter device kit Use to test blood sugar 1 time daily Type 2 diabetes mellitus without complication, without long-term current use of insulin (H) SEVEN Pandey   diclofenac (VOLTAREN) 1 % topical gel Apply 2 g topically 4 times daily as needed for moderate pain Joint Damage causing Pain and Loss of Function Patient Reported   furosemide (LASIX) 20 MG tablet TAKE 2 TABLETS BY MOUTH ONCE DAILY AS NEEDED FOR  LEG  SWELLING Bilateral Leg Edema SEVEN Pandey   hydrochlorothiazide (HYDRODIURIL) 25 MG tablet Take 1 tablet by mouth once daily Benign Essential Hypertension SEVEN Pandey   hydrOXYzine  (ATARAX) 25 MG tablet Take 25 mg by mouth 4 times daily as needed for anxiety Feeling Anxious Zac Lucero NP   ibuprofen (ADVIL/MOTRIN) 800 MG tablet Take 1 tablet (800 mg) by mouth every 8 hours as needed for moderate pain (4-6) Chronic Pain Syndrome Laura Duffy MD   levothyroxine (SYNTHROID/LEVOTHROID) 175 MCG tablet Take 1 tablet (175 mcg) by mouth daily Acquired Hypothyroidism Hind MD Rodrigo   lisdexamfetamine (VYVANSE) 40 MG capsule Take 40 mg by mouth every morning Attention Deficit Hyperactivity Disorder Zac Lucero NP   Melatonin 10 MG CAPS Take 10 mg by mouth nightly as needed (insomnia)  Sleep Zac Lucero NP   multivitamin w/minerals (THERA-VIT-M) tablet Take 1 tablet by mouth daily Nutritional Support Patient Reported   omeprazole (PRILOSEC) 40 MG DR capsule Take 1 capsule by mouth once daily Gastritis without bleeding, unspecified chronicity, unspecified gastritis type; Abdominal Pain, Epigastric SEVEN Pandey   oxybutynin ER (DITROPAN XL) 10 MG 24 hr tablet Take 1 tablet (10 mg) by mouth daily Overactive Bladder Raad Monteiro MD   phentermine (ADIPEX-P) 30 MG capsule Take 1 capsule by mouth in the morning Morbid Obesity (H) SEVEN Pandey   potassium chloride ER (K-TAB) 20 MEQ CR tablet 20 meq along with a 10 mg pill .  This should only be taken if taking lasix. Hypokalemia SEVEN Pandey   potassium chloride ER (K-TAB/KLOR-CON) 10 MEQ CR tablet TAKE 1 TABLET BY MOUTH ONCE DAILY WHEN  TAKING  LASIX  (FUROSEMIDE) take along with the 20 mg potassium pill Bilateral edema of lower extremity SEVEN Pandey   rosuvastatin (CRESTOR) 5 MG tablet Take 1 tablet by mouth once daily Type 2 diabetes mellitus without complication, without long-term current use of insulin (H) SEVEN Pandey   Semaglutide, 1 MG/DOSE, (OZEMPIC) 4 MG/3ML pen Inject 1 mg Subcutaneous every 7 days Type 2 diabetes mellitus without complication, without long-term current use of insulin (H)  Ivone Moses PA-C   sertraline (ZOLOFT) 50 MG tablet Take 75 mg by mouth daily Generalized Anxiety Disorder; Bipolar I Disorder Zac Lucero NP   Vitamin D3 (CHOLECALCIFEROL) 125 MCG (5000 UT) tablet Take 1 tablet by mouth daily Vitamin D Deficiency Patient Reported   VRAYLAR 3 MG CAPS capsule Take 3 mg by mouth daily Depressive Phase of Manic-Depression Zac Lucero NP         Add new medications, over-the-counter drugs, herbals, vitamins, or  minerals in the blank rows below.    Medication How I take it Why I use it Prescriber                                      Allergies:      depakote [valproic acid]; gabapentin; propofol        Side effects I have had:               Other Information:              My notes and questions:

## 2023-04-21 RX ORDER — PHENTERMINE HYDROCHLORIDE 30 MG/1
CAPSULE ORAL
Qty: 30 CAPSULE | Refills: 0 | Status: SHIPPED | OUTPATIENT
Start: 2023-04-21 | End: 2023-05-17 | Stop reason: ALTCHOICE

## 2023-04-21 NOTE — TELEPHONE ENCOUNTER
Phentermine      Last Written Prescription Date:  3.22.23  Last Fill Quantity: #30,   # refills: 0  Last Office Visit: 2.17.23  Future Office visit:    Next 5 appointments (look out 90 days)    May 17, 2023  2:00 PM  (Arrive by 1:45 PM)  SHORT with SEVEN Pandey  [facility] ([facility] [address]  [place] [address]  [phone]           Routing refill request to provider for review/approval because:  Drug not on the [facility], P or [facility] refill protocol or controlled substance

## 2023-04-27 ENCOUNTER — MEDICAL CORRESPONDENCE (OUTPATIENT)
Dept: HEALTH INFORMATION MANAGEMENT | Facility: HOSPITAL | Age: 48
End: 2023-04-27

## 2023-04-27 DIAGNOSIS — Z53.9 PERSONS ENCOUNTERING HEALTH SERVICES FOR SPECIFIC PROCEDURES, NOT CARRIED OUT: Primary | ICD-10-CM

## 2023-04-27 PROCEDURE — G0179 MD RECERTIFICATION HHA PT: HCPCS | Performed by: PHYSICIAN ASSISTANT

## 2023-04-28 ENCOUNTER — TELEPHONE (OUTPATIENT)
Dept: FAMILY MEDICINE | Facility: OTHER | Age: 48
End: 2023-04-28

## 2023-04-28 NOTE — TELEPHONE ENCOUNTER
Sandra Hankins      Verbal order to continue home care 1 x  week    Verbal order given    Karyna Sky RN

## 2023-04-29 NOTE — PATIENT INSTRUCTIONS
"Recommendations from today's MTM visit:                                                    MTM (medication therapy management) is a service provided by a clinical pharmacist designed to help you get the most of out of your medicines.   Today we reviewed what your medicines are for, how to know if they are working, that your medicines are safe and how to make your medicine regimen as easy as possible.      1. Trial off phentermine. Approved by Ivone Moses PA-C     2. Increase Ozempic to 1mg weekly.     Follow-up: 2 months with Ivone Moses PA-C and 4-6 months with Lauren Bloch, PharmD, MTM Pharmacist    It was great speaking with you today.  I value your experience and would be very thankful for your time in providing feedback in our clinic survey. In the next few days, you may receive an email or text message from [employer] with a link to a survey related to your  clinical pharmacist.\"     To schedule another appointment with your MTM pharmacist, please call [facility] Management Scheduling at [phone].     My Clinical Pharmacist's contact information:                                                      Please feel free to contact me with any questions or concerns you have.      Lauren Bloch, [place]  Medication Therapy Management Pharmacist   [facility]             "

## 2023-05-01 ENCOUNTER — MEDICAL CORRESPONDENCE (OUTPATIENT)
Dept: HEALTH INFORMATION MANAGEMENT | Facility: HOSPITAL | Age: 48
End: 2023-05-01

## 2023-05-09 DIAGNOSIS — E87.6 HYPOKALEMIA: ICD-10-CM

## 2023-05-11 NOTE — TELEPHONE ENCOUNTER
Potassium       Last Written Prescription Date:  9/26/2022  Last Fill Quantity: 90,   # refills: 3  Last Office Visit: 2/17/2023  Future Office visit:    Next 5 appointments (look out 90 days)    May 17, 2023  2:00 PM  (Arrive by 1:45 PM)  SHORT with SEVEN Pandey  [facility] [place] ([facility] [place] ) [address]  [place] [address]  [phone]

## 2023-05-12 RX ORDER — POTASSIUM CHLORIDE 1500 MG/1
TABLET, EXTENDED RELEASE ORAL
Qty: 32 TABLET | Refills: 0 | Status: SHIPPED | OUTPATIENT
Start: 2023-05-12 | End: 2023-05-17 | Stop reason: ALTCHOICE

## 2023-05-17 ENCOUNTER — OFFICE VISIT (OUTPATIENT)
Dept: FAMILY MEDICINE | Facility: OTHER | Age: 48
End: 2023-05-17
Attending: PHYSICIAN ASSISTANT
Payer: COMMERCIAL

## 2023-05-17 VITALS
TEMPERATURE: 97.4 F | BODY MASS INDEX: 51.91 KG/M2 | HEIGHT: 63 IN | OXYGEN SATURATION: 98 % | DIASTOLIC BLOOD PRESSURE: 76 MMHG | SYSTOLIC BLOOD PRESSURE: 111 MMHG | WEIGHT: 293 LBS | HEART RATE: 80 BPM

## 2023-05-17 DIAGNOSIS — R60.0 LOWER LEG EDEMA: ICD-10-CM

## 2023-05-17 DIAGNOSIS — F33.1 MODERATE EPISODE OF RECURRENT MAJOR DEPRESSIVE DISORDER (H): ICD-10-CM

## 2023-05-17 DIAGNOSIS — S02.5XXA CLOSED FRACTURE OF TOOTH, INITIAL ENCOUNTER: ICD-10-CM

## 2023-05-17 DIAGNOSIS — E11.9 TYPE 2 DIABETES MELLITUS WITHOUT COMPLICATION, WITHOUT LONG-TERM CURRENT USE OF INSULIN (H): Primary | ICD-10-CM

## 2023-05-17 DIAGNOSIS — E03.9 ACQUIRED HYPOTHYROIDISM: ICD-10-CM

## 2023-05-17 DIAGNOSIS — Z30.2 ENCOUNTER FOR STERILIZATION: ICD-10-CM

## 2023-05-17 DIAGNOSIS — E87.6 HYPOKALEMIA: ICD-10-CM

## 2023-05-17 LAB
ANION GAP SERPL CALCULATED.3IONS-SCNC: 9 MMOL/L (ref 7–15)
BUN SERPL-MCNC: 9.5 MG/DL (ref 6–20)
CALCIUM SERPL-MCNC: 9.1 MG/DL (ref 8.6–10)
CHLORIDE SERPL-SCNC: 100 MMOL/L (ref 98–107)
CREAT SERPL-MCNC: 0.84 MG/DL (ref 0.51–0.95)
DEPRECATED HCO3 PLAS-SCNC: 28 MMOL/L (ref 22–29)
GFR SERPL CREATININE-BSD FRML MDRD: 85 ML/MIN/1.73M2
GLUCOSE SERPL-MCNC: 124 MG/DL (ref 70–99)
POTASSIUM SERPL-SCNC: 3.8 MMOL/L (ref 3.4–5.3)
SODIUM SERPL-SCNC: 137 MMOL/L (ref 136–145)
TSH SERPL DL<=0.005 MIU/L-ACNC: 1.36 UIU/ML (ref 0.3–4.2)

## 2023-05-17 PROCEDURE — 82310 ASSAY OF CALCIUM: CPT | Mod: ZL | Performed by: PHYSICIAN ASSISTANT

## 2023-05-17 PROCEDURE — 36415 COLL VENOUS BLD VENIPUNCTURE: CPT | Mod: ZL | Performed by: PHYSICIAN ASSISTANT

## 2023-05-17 PROCEDURE — 99214 OFFICE O/P EST MOD 30 MIN: CPT | Performed by: PHYSICIAN ASSISTANT

## 2023-05-17 PROCEDURE — 83721 ASSAY OF BLOOD LIPOPROTEIN: CPT | Mod: ZL | Performed by: PHYSICIAN ASSISTANT

## 2023-05-17 PROCEDURE — G0463 HOSPITAL OUTPT CLINIC VISIT: HCPCS

## 2023-05-17 PROCEDURE — 84443 ASSAY THYROID STIM HORMONE: CPT | Mod: ZL | Performed by: PHYSICIAN ASSISTANT

## 2023-05-17 RX ORDER — LEVOTHYROXINE SODIUM 175 UG/1
175 TABLET ORAL DAILY
Qty: 90 TABLET | Refills: 1 | Status: SHIPPED | OUTPATIENT
Start: 2023-05-17 | End: 2024-02-22

## 2023-05-17 RX ORDER — BLOOD-GLUCOSE METER
KIT MISCELLANEOUS
COMMUNITY
Start: 2023-01-16 | End: 2023-09-11

## 2023-05-17 ASSESSMENT — PATIENT HEALTH QUESTIONNAIRE - PHQ9: SUM OF ALL RESPONSES TO PHQ QUESTIONS 1-9: 9

## 2023-05-17 ASSESSMENT — PAIN SCALES - GENERAL: PAINLEVEL: EXTREME PAIN (8)

## 2023-05-17 NOTE — PROGRESS NOTES
"  Assessment & Plan     Acquired hypothyroidism  She is going to get a refill once we  Get her level back.  Last check was at 21. We will see if adjustment improved things.   - levothyroxine (SYNTHROID/LEVOTHROID) 175 MCG tablet; Take 1 tablet (175 mcg) by mouth daily  - TSH with free T4 reflex    Type 2 diabetes mellitus without complication, without long-term current use of insulin (H)  She is going to have A1C done. Fasting are much improved.  She is near 100 in the morning. Not as hungry. But wanting to have an increase in this due to fatigue.    - Hemoglobin A1c; Future    Closed fracture of tooth, initial encounter  Just saw dentist on Monday.  Will be having dental extraction and bridge placed.      Hypokalemia  Recheck remains on KCL 20 meq. Is in a good range.   - Basic metabolic panel; Future    Moderate episode of recurrent major depressive disorder (H)  Doing better with her parents death and nephew all in just a short time. This was about a year ago and thinks she is handling this better.        Review of external notes as documented elsewhere in note  Ordering of each unique test  Prescription drug management  10  minutes spent by me on the date of the encounter doing chart review, history and exam, documentation and further activities per the note       BMI:   Estimated body mass index is 51.92 kg/m  as calculated from the following:    Height as of this encounter: 1.6 m (5' 3\").    Weight as of this encounter: 132.9 kg (293 lb 1.6 oz).   Weight management plan: Discussed healthy diet and exercise guidelines    See Patient Instructions    No follow-ups on file.    SEVEN Bryant  [facility] - GLORIA Leyva is a 47 year old, presenting for the following health issues:  Follow Up         View : No data to display.              HPI     Diabetes Follow-up    How often are you checking your blood sugar? One time daily  What time of day are you checking your blood sugars " (select all that apply)?  Before and after meals  Have you had any blood sugars above 200?  No  Have you had any blood sugars below 70?  No    What symptoms do you notice when your blood sugar is low?  Shaky, Dizzy, Weak and nausea, blurred vision, confusion, angry     What concerns do you have today about your diabetes? None     Do you have any of these symptoms? (Select all that apply)  Numbness in feet, Burning in feet and Excessive thirst      BP Readings from Last 2 Encounters:   05/17/23 111/76   02/28/23 122/86     Hemoglobin A1C (%)   Date Value   02/17/2023 6.6 (H)   08/01/2022 6.6 (H)   06/01/2022 6.9 (A)   02/16/2022 7.6 (A)     LDL Cholesterol Calculated (mg/dL)   Date Value   01/06/2022 104 (H)   01/29/2020 155 (H)   11/28/2018 125 (H)         Hypothyroidism Follow-up      Since last visit, patient describes the following symptoms: anxiety and fatigue    Pain History:  When did you first notice your pain? Ongoing    Have you seen this provider for your pain in the past?   Yes   Where in your body do you have pain? Both knees   Are you seeing anyone else for your pain? Yes - physical therapy         11/2/2022     2:00 PM 2/17/2023     2:22 PM 5/17/2023     1:58 PM   PHQ-9 SCORE   PHQ-9 Total Score 14 12 9           6/9/2022     2:00 PM 8/1/2022     8:00 AM 11/2/2022     2:00 PM   COY-7 SCORE   Total Score 21 8 16               11/2/2022     2:00 PM 2/17/2023     2:22 PM 5/17/2023     1:58 PM   PHQ-9 SCORE   PHQ-9 Total Score 14 12 9         6/9/2022     2:00 PM 8/1/2022     8:00 AM 11/2/2022     2:00 PM   COY-7 SCORE   Total Score 21 8 16       Chronic Pain Follow Up:    Location of pain: both knees   Analgesia/pain control:    - Recent changes:  Gotten worse     - Overall control: Inadequate pain control    - Current treatments: physical therapy, knee braces, getting cortisone shots on Friday    Adherence:     - Do you ever take more pain medicine than prescribed? No    - When did you take your last  "dose of pain medicine?  This morning at about 930- ibuprofen 800    Adverse effects: No   PDMP Review       Value Time User    State PDMP site checked  Yes 9/26/2022  9:33 AM Apryl Hathaway PA        Last CSA Agreement:   CSA -- Patient Level:    CSA: None found at the patient level.       Last UDS: 2/17/2023                    Review of Systems   CONSTITUTIONAL: NEGATIVE for fever, chills, change in weight  INTEGUMENTARY/SKIN: NEGATIVE for worrisome rashes, moles or lesions  EYES: NEGATIVE for vision changes or irritation  ENT/MOUTH: Positive for dental fractures and needing dental work.   RESP: NEGATIVE for significant cough or SOB  BREAST: NEGATIVE for masses, tenderness or discharge  CV: NEGATIVE for chest pain, palpitations or peripheral edema  GI: NEGATIVE for nausea, abdominal pain, heartburn, or change in bowel habits  : NEGATIVE for frequency, dysuria, or hematuria  MUSCULOSKELETAL: both knees are very tender.  Can't flex or extend her fully.  Lower back is painful .  Both hands are arthritis.   NEURO: NEGATIVE for weakness, dizziness or paresthesias  ENDOCRINE: NEGATIVE for temperature intolerance, skin/hair changes  HEME: NEGATIVE for bleeding problems  PSYCHIATRIC: she is doing much better .         Objective    /76 (BP Location: Left arm, Patient Position: Sitting, Cuff Size: Adult Large)   Pulse 80   Temp 97.4  F (36.3  C) (Tympanic)   Ht 1.6 m (5' 3\")   Wt 132.9 kg (293 lb 1.6 oz)   LMP 04/30/2023   SpO2 98%   BMI 51.92 kg/m    Body mass index is 51.92 kg/m .  Physical Exam   GENERAL: healthy, alert and no distress  EYES: Eyes grossly normal to inspection, PERRL and conjunctivae and sclerae normal  HENT: ear canals and TM's normal, nose and mouth without ulcers or lesions  HENT: fractured front teeth.   NECK: no adenopathy, no asymmetry, masses, or scars and thyroid normal to palpation  RESP: lungs clear to auscultation - no rales, rhonchi or wheezes  CV: regular rate and rhythm, " normal S1 S2, no S3 or S4, no murmur, click or rub, no peripheral edema and peripheral pulses strong  ABDOMEN: soft, nontender, no hepatosplenomegaly, no masses and bowel sounds normal  MS: both lower legs are swollen and discolored 1/3 of the way down.   SKIN: no suspicious lesions or rashes  NEURO: Normal strength and tone, mentation intact and speech normal  PSYCH: mentation appears normal, affect normal/bright    No results found for any visits on 05/17/23.

## 2023-05-19 LAB — LDLC SERPL DIRECT ASSAY-MCNC: 74 MG/DL

## 2023-05-24 ENCOUNTER — MEDICAL CORRESPONDENCE (OUTPATIENT)
Dept: HEALTH INFORMATION MANAGEMENT | Facility: HOSPITAL | Age: 48
End: 2023-05-24

## 2023-05-26 DIAGNOSIS — R60.0 BILATERAL EDEMA OF LOWER EXTREMITY: ICD-10-CM

## 2023-05-30 ENCOUNTER — OFFICE VISIT (OUTPATIENT)
Dept: OBGYN | Facility: OTHER | Age: 48
End: 2023-05-30
Attending: OBSTETRICS & GYNECOLOGY
Payer: COMMERCIAL

## 2023-05-30 VITALS
WEIGHT: 293 LBS | RESPIRATION RATE: 16 BRPM | SYSTOLIC BLOOD PRESSURE: 118 MMHG | HEART RATE: 60 BPM | BODY MASS INDEX: 51.91 KG/M2 | HEIGHT: 63 IN | DIASTOLIC BLOOD PRESSURE: 70 MMHG

## 2023-05-30 DIAGNOSIS — Z30.2 ENCOUNTER FOR STERILIZATION: Primary | ICD-10-CM

## 2023-05-30 DIAGNOSIS — Z72.0 TOBACCO ABUSE: ICD-10-CM

## 2023-05-30 DIAGNOSIS — E66.01 OBESITY, MORBID, BMI 50 OR HIGHER (H): ICD-10-CM

## 2023-05-30 PROBLEM — R60.0 BILATERAL EDEMA OF LOWER EXTREMITY: Status: RESOLVED | Noted: 2017-04-03 | Resolved: 2023-05-30

## 2023-05-30 PROBLEM — N39.41 URGE INCONTINENCE OF URINE: Status: ACTIVE | Noted: 2023-05-30

## 2023-05-30 PROBLEM — M65.30 TRIGGER FINGER OF LEFT HAND, UNSPECIFIED FINGER: Status: RESOLVED | Noted: 2018-05-17 | Resolved: 2023-05-30

## 2023-05-30 PROBLEM — S83.281A TEAR OF LATERAL CARTILAGE OR MENISCUS OF KNEE, CURRENT, RIGHT, INITIAL ENCOUNTER: Status: RESOLVED | Noted: 2017-04-03 | Resolved: 2023-05-30

## 2023-05-30 PROBLEM — E66.813 CLASS 3 OBESITY: Status: RESOLVED | Noted: 2019-12-13 | Resolved: 2023-05-30

## 2023-05-30 PROBLEM — Z00.00 ROUTINE GENERAL MEDICAL EXAMINATION AT A HEALTH CARE FACILITY: Status: RESOLVED | Noted: 2017-04-03 | Resolved: 2023-05-30

## 2023-05-30 PROCEDURE — G0463 HOSPITAL OUTPT CLINIC VISIT: HCPCS

## 2023-05-30 PROCEDURE — 99203 OFFICE O/P NEW LOW 30 MIN: CPT | Performed by: OBSTETRICS & GYNECOLOGY

## 2023-05-30 ASSESSMENT — PAIN SCALES - GENERAL: PAINLEVEL: SEVERE PAIN (7)

## 2023-05-30 NOTE — PROGRESS NOTES
GYN CONSULT NOTE    CHIEF COMPLAINT / REASON FOR VISIT  Patient presents for Gynecology consultation at the request of her primary provider, Dr. Apryl Hathaway, to discuss options for sterilization.    HISTORY OF PRESENT ILLNESS  Autumn Holbrook is a 47 year old  with Patient's last menstrual period was 2023. who presents for Gynecology consultation to discuss options for sterilization.  She desires no more children and would like permanent sterilization. Her menses are irregular every 30-60 days, lasting 5 days. Her current contraception is condoms.  She has had 2 prior  sections and her prior provider recommended that she not get pregnant again.  She has a new boyfriend and would like permanent sterilization. She smokes tobacco 1/2 pack/day.  She has morbid obesity, hypertension, type 2 diabetes, hypothyroidism, chronic pain syndrome, urge incontinence, depression and anxiety.    MENSTRUAL HISTORY  Menarche was at age 9.  Menses: irregular q 30-60 days.  Menses last: 5 days.  Heavy menses: Denies.  Intermenstrual bleeding: Denies.  Dysmenorrhea: Minimal.  She is sexually active and denies issues with intercourse.   Dyspareunia: Denies.  Postcoital spotting: Denies.  Current contraception: condoms.  STD History: No STD history.  Last Pap smear history: Normal.  Mammogram history: Normal.    ALLERGIES     Allergies   Allergen Reactions     Depakote [Valproic Acid]      Gabapentin Swelling     Propofol Unknown     Got very stiff and tight.        MEDICATIONS    Current Outpatient Medications:      Acetaminophen (TYLENOL PO), Take 500 mg by mouth every 4 hours as needed for mild pain or fever, Disp: , Rfl:      asenapine (SAPHRIS) 2.5 MG SUBL sublingual tablet, Place 2.5 mg under the tongue daily (2.5 mg + 5 mg = 7.5 mg), Disp: , Rfl:      asenapine (SAPHRIS) 5 MG SUBL sublingual tablet, Place 5 mg under the tongue daily (2.5 mg + 5 mg = 7.5 mg), Disp: , Rfl:      baclofen (LIORESAL) 10 MG  tablet, TAKE 1 TABLET BY MOUTH THREE TIMES DAILY, Disp: 90 tablet, Rfl: 4     blood glucose (NO BRAND SPECIFIED) lancets standard, Use to test blood sugar 1 times daily, Disp: 100 each, Rfl: 5     blood glucose (NO BRAND SPECIFIED) test strip, Use to test blood sugar 1 time daily, Disp: 50 strip, Rfl: 5     blood glucose monitoring (NO BRAND SPECIFIED) meter device kit, Use to test blood sugar 1 time daily, Disp: 1 kit, Rfl: 0     blood glucose monitoring (SOFTCLIX) lancets, USE 1 TO CHECK GLUCOSE ONCE DAILY, Disp: , Rfl:      Blood Glucose Monitoring Suppl (ONETOUCH VERIO REFLECT) w/Device KIT, USE TO TEST BLOOD SUGAR ONCE DAILY, Disp: , Rfl:      diclofenac (VOLTAREN) 1 % topical gel, Apply 2 g topically 4 times daily as needed for moderate pain, Disp: , Rfl:      furosemide (LASIX) 20 MG tablet, TAKE 2 TABLETS BY MOUTH ONCE DAILY AS NEEDED FOR  LEG  SWELLING, Disp: 60 tablet, Rfl: 4     hydrochlorothiazide (HYDRODIURIL) 25 MG tablet, Take 1 tablet by mouth once daily, Disp: 90 tablet, Rfl: 4     hydrOXYzine (ATARAX) 25 MG tablet, Take 25 mg by mouth 4 times daily as needed for anxiety, Disp: , Rfl:      hylan (SYNVISC ONE) 48 MG/6ML injection, 48 mg by INTRA-ARTICULAR route, Disp: , Rfl:      ibuprofen (ADVIL/MOTRIN) 800 MG tablet, Take 1 tablet (800 mg) by mouth every 8 hours as needed for moderate pain (4-6), Disp: 90 tablet, Rfl: 0     levothyroxine (SYNTHROID/LEVOTHROID) 175 MCG tablet, Take 1 tablet (175 mcg) by mouth daily, Disp: 90 tablet, Rfl: 1     lisdexamfetamine (VYVANSE) 40 MG capsule, Take 40 mg by mouth every morning, Disp: , Rfl:      Melatonin 10 MG CAPS, Take 10 mg by mouth nightly as needed (insomnia), Disp: , Rfl:      multivitamin w/minerals (THERA-VIT-M) tablet, Take 1 tablet by mouth daily, Disp: , Rfl:      nicotine (NICORETTE) 2 MG gum, CHEW 1-2 PIECES PER HOUR OR 10-12 PIECES PER DAY--TAPER AS TOLERATED, Disp: , Rfl:      omeprazole (PRILOSEC) 40 MG DR capsule, Take 1 capsule by mouth  once daily, Disp: 30 capsule, Rfl: 9     oxybutynin ER (DITROPAN XL) 10 MG 24 hr tablet, Take 1 tablet (10 mg) by mouth daily, Disp: 90 tablet, Rfl: 3     potassium chloride ER (K-TAB) 20 MEQ CR tablet, 20 meq along with a 10 mg pill .  This should only be taken if taking lasix., Disp: 90 tablet, Rfl: 3     potassium chloride ER (K-TAB/KLOR-CON) 10 MEQ CR tablet, TAKE 1 TABLET BY MOUTH ONCE DAILY WHEN  TAKING  LASIX  (FUROSEMIDE) take along with the 20 mg potassium pill, Disp: 30 tablet, Rfl: 3     rosuvastatin (CRESTOR) 5 MG tablet, Take 1 tablet by mouth once daily, Disp: 90 tablet, Rfl: 3     Semaglutide, 2 MG/DOSE, (OZEMPIC) 8 MG/3ML pen, Inject 2 mg Subcutaneous every 7 days, Disp: 9 mL, Rfl: 3     sertraline (ZOLOFT) 50 MG tablet, Take 75 mg by mouth daily, Disp: , Rfl:      Vitamin D3 (CHOLECALCIFEROL) 125 MCG (5000 UT) tablet, Take 1 tablet by mouth daily, Disp: , Rfl:      VRAYLAR 3 MG CAPS capsule, Take 3 mg by mouth daily, Disp: , Rfl:     REVIEW OF SYSTEMS  As per the HPI, otherwise negative.    Past Medical History:   Diagnosis Date     Anxiety      Arthritis      Chronic pain syndrome 2017     Diabetes mellitus, type 2 (H) 2021     COY (generalized anxiety disorder) 2017     GERD (gastroesophageal reflux disease) 2019     Grief 2021     History of migraine headaches 2022     Hypothyroid 2017     Migraine      Mild mixed bipolar I disorder (H) 2010     Moderate episode of recurrent major depressive disorder (H) 2017     Obesity, morbid, BMI 50 or higher (H) 2018     Opioid dependence in remission (H) 2017    On suboxone.      Osteoarthritis      PTSD (post-traumatic stress disorder) 2017     Thyroid disease      Tobacco abuse 2023     Trigger finger of both hands 2018    IMO Regulatory Load OCT 2020     Urge incontinence of urine 2023       Past Surgical History:   Procedure Laterality Date      SECTION   "1995      SECTION  2002     CHOLECYSTECTOMY  1999     COLONOSCOPY N/A 2022    Procedure: Colonscopy;  Surgeon: Deep Zhu MD;  Location: [facility]     KNEE ARTHROSCOPY AND ARTHROTOMY Right 2019    right knee arthroscopy, lateral menisectomy     LEEP TX, CERVICAL  2004       OB History    Para Term  AB Living   3 2 2 0 1 2   SAB IAB Ectopic Multiple Live Births   1 0 0 0 2      # Outcome Date GA Lbr Tono/2nd Weight Sex Delivery Anes PTL Lv   3 Term 02    F CS-Unspec   EDWINA   2 SAB      SAB   FD   1 Term 95    F CS-Unspec   EDWINA       Social History     Tobacco Use     Smoking status: Every Day     Packs/day: 0.50     Years: 32.00     Pack years: 16.00     Types: Cigarettes     Start date: 1989     Passive exposure: Past     Smokeless tobacco: Never     Tobacco comments:     pt declines   Vaping Use     Vaping status: Some Days     Substances: Nicotine, Flavoring     Devices: Disposable   Substance Use Topics     Alcohol use: No     History   Sexual Activity     Sexual activity: Yes     Partners: Male     Birth control/ protection: Condom       Family History   Problem Relation Age of Onset     Cerebrovascular Disease Mother      Alcoholism Mother      Cancer Mother      Depression Mother      Eczema Mother      Hypertension Mother      Migraines Mother      Mental Illness Mother      Osteoarthritis Mother      Osteoporosis Mother      Alcoholism Father      Cancer Father      Hyperlipidemia Father      Hypertension Father      Myocardial Infarction Father      Mental Illness Sister      Migraines Sister      OBJECTIVE  /70   Pulse 60   Resp 16   Ht 1.6 m (5' 3\")   Wt 132.9 kg (293 lb)   LMP 2023   BMI 51.90 kg/m      General:  Well-developed, well-nourished morbid obese female in no apparent distress.  Neurological: Alert and oriented x3.  Lungs:  Clear to auscultation bilaterally with good inspiratory effort.  No wheezing " rhonchi or rales noted. Breathing nonlabored.  Heart:  Regular rate and rhythm without murmur. No peripheral vascular disease.  Abdomen: Soft, morbidly obese, nontender, nondistended, positive bowel sounds.  No organomegaly. No rebound, no guarding.  Pelvic exam:  Deferred.  Extremities:  No clubbing cyanosis or edema. Nontender bilaterally.    DIAGNOSTICS  2022 Pap NILM, negative HPV    ASSESSMENT / PLAN  Autumn Holbrook is a 47 year old  who presents in consultation to discuss sterilization options.    1 Request elective sterilization.  2 Obesity BMI 52  3 Tobacco abuse  4 Type 2 diabetes  5 Hypertension  6 Hypothyroidism  7 Depression and anxiety  8 Urge incontinence    - I reviewed the risk, benefits, alternatives, and indications of elective sterilization with the patient. I reviewed the sterilization options available to the patient including vasectomy, laparoscopic bilateral tubal fulguration, laparoscopic tubal occlusion with Filshie clips, and laparoscopic bilateral tubal salpingectomy.  - Essure tubal occlusion is no longer available.  - I reviewed the permanent nature of sterilization. I reviewed the sterilization failure rate of up to 1%. I reviewed the risk for ectopic pregnancy after sterilization.  I reviewed the increased risk for post-sterilization depression and regret, especially for women under the age of 30. I discussed the recovery period and the expected discomfort. All of the patient's questions were answered.  - I reviewed the [place] sterilization consent form with the patient.  - The [place] sterilization consent form was signed on 2023.  - I gave the patient literature on sterilization.  - I discussed contraception options available to the patient including oral contraceptive pills both continuous and cyclic, Ortho Evra patch, NuvaRing, Depo-Provera injections, Nexplanon, Mirena IUD, Stephanie IUD, ParaGard IUD, and condoms.  I reviewed the risks, benefits,  alternatives, indications and side effects of each of these contraception options with the patient.  - The patient desires laparoscopic bilateral tubal salpingectomy for sterilization.  - Unfortunately, we are not able to perform a laparoscopic surgery on the patient due to her morbid obesity here at our [facility].  As the patient's BMI is greater than 50, she will need to have surgical procedure performed at a tertiary care center.  The patient verbalizes understanding and desires consult to Gabriella.  - I placed a consult for the patient to see [facility] gynecology department Gabriella to discuss possible surgical sterilization options.  They will contact patient directly to schedule.    - Problem list reviewed and updated.  - Follow up as needed.    Cuauhtemoc Zhu MD  Obstetrics and Gynecology      CC: PRIMARY CARE PROVIDER: Apryl Perez

## 2023-05-31 ENCOUNTER — HOSPITAL ENCOUNTER (OUTPATIENT)
Dept: EDUCATION SERVICES | Facility: HOSPITAL | Age: 48
Discharge: HOME | End: 2023-05-31
Attending: NURSE PRACTITIONER | Admitting: PHYSICIAN ASSISTANT
Payer: COMMERCIAL

## 2023-05-31 VITALS
HEIGHT: 62 IN | BODY MASS INDEX: 51.8 KG/M2 | OXYGEN SATURATION: 96 % | SYSTOLIC BLOOD PRESSURE: 113 MMHG | WEIGHT: 281.5 LBS | RESPIRATION RATE: 16 BRPM | DIASTOLIC BLOOD PRESSURE: 80 MMHG | HEART RATE: 90 BPM

## 2023-05-31 DIAGNOSIS — E11.9 TYPE 2 DIABETES MELLITUS WITHOUT COMPLICATION, WITHOUT LONG-TERM CURRENT USE OF INSULIN (H): Primary | ICD-10-CM

## 2023-05-31 LAB — HBA1C MFR BLD: 6.7 % (ref 4.3–?)

## 2023-05-31 PROCEDURE — 83036 HEMOGLOBIN GLYCOSYLATED A1C: CPT | Performed by: PHYSICIAN ASSISTANT

## 2023-05-31 PROCEDURE — G0108 DIAB MANAGE TRN  PER INDIV: HCPCS

## 2023-05-31 RX ORDER — POTASSIUM CHLORIDE 750 MG/1
TABLET, EXTENDED RELEASE ORAL
Qty: 30 TABLET | Refills: 0 | Status: SHIPPED | OUTPATIENT
Start: 2023-05-31 | End: 2023-06-23

## 2023-05-31 ASSESSMENT — PAIN SCALES - GENERAL: PAINLEVEL: SEVERE PAIN (7)

## 2023-05-31 NOTE — TELEPHONE ENCOUNTER
potassium chloride ER (K-TAB/KLOR-CON) 10 MEQ CR tablet 30 tablet 3 9/26/2022   Last Office Visit: 05/17/2023  Future Office visit:    Next 5 appointments (look out 90 days)    Aug 16, 2023  2:00 PM  (Arrive by 1:45 PM)  SHORT with SEVEN Pandey  [facility] [place] ([facility] [place] ) [address]  [place] [address]  [phone]           Routing refill request to provider for review/approval because:

## 2023-05-31 NOTE — PROGRESS NOTES
Diabetes Self-Management Education & Support    Presents for: Follow-up    Type of Service: In Person Visit    Assessment Type:   ASSESSMENT:   Autumn 47 year old, returns to [facility] for A1C/BG review.    Pt of SEVEN Best .     Monitoring:  A1C:  6.6 in Feb. Today- 6.7%. Meeting target <7.0%.   Meter Report: not able to download report. Reviewed history: 131, 181, 148, 95, 111.     Diabetes Medications:Ozempic 2 mg once weekly, increased, first dose yesterday (5/30/2023). Sometimes has nausea/vomiting, usually day after injection- she feels this is tolerable. Decreased appetite, feeling pimentel.  Was on Trulicity previously. Metformin previously.     Health Eating: Mom's meals, twice daily, diabetes friendly and portion controlled, sometimes not finishing meal. Did eat out with sister 3 nights in a row-contributes Ozempic SE with eating out: larger portions and more fat than normal.     Activity:  No formal exercise. Has been increasing activity outside/yard work.     Reducing Risks:  /80. Statin use. No asa- defer to PCP for recommendations. Foot exam due. Eye exam- current. a1c meeting <7.0% target.   Tobacco use- encourage cessation.     Weight is trending down, noticing clothes are starting to get a little looser.     Wt Readings from Last 10 Encounters:   05/31/23 127.7 kg (281 lb 8 oz)   05/30/23 132.9 kg (293 lb)   05/17/23 132.9 kg (293 lb 1.6 oz)   03/01/23 128.8 kg (284 lb)   02/28/23 130.4 kg (287 lb 6.4 oz)   02/22/23 130.5 kg (287 lb 9.6 oz)   02/17/23 126.6 kg (279 lb)   11/16/22 121.1 kg (267 lb)   11/02/22 119.3 kg (263 lb)   09/29/22 117.9 kg (260 lb)       Problem Solving: considering portion sizes and fatty foods with GLP side effects.      Allergies   Allergen Reactions     Depakote [Valproic Acid]      Gabapentin Swelling     Propofol Unknown     Got very stiff and tight.       Patient's most recent   Lab Results   Component Value Date    A1C 6.6 02/17/2023    A1C 6.9 06/01/2022     HEMOGLOBINA1 6.7 05/31/2023     is meeting goal of <7.0    Diabetes knowledge and skills assessment:   Patient is knowledgeable in diabetes management concepts related to: Healthy Eating, Monitoring and Taking Medication    Continue education with the following diabetes management concepts: Healthy Eating, Being Active, Monitoring, Taking Medication, Problem Solving, Reducing Risks and Healthy Coping    Based on learning assessment above, most appropriate setting for further diabetes education would be: Individual setting.      PLAN      Monitoring:  Your A1C was 6.7 today. Goal is less than 7%   Previous A1C: 6.6   Next A1C: in September.     Check blood sugars 1 x/day.   Fasting in the morning or 2 hours after your largest meal are good times to check.    Target blood sugar: Fasting or before meals target is /140. 2 hours after meal <180.    We only need 1/2 of these numbers to be within target then your A1c will be within target.    Medications:   Continue:Ozempic 2 mg once weekly.    Healthy Eating:  diabetesfoodhub.org     Target Carb:   Women 45 grams/meal 15-30 grams carbs optional snack.   Less if working towards weight loss.     Make sure you always have a protein with any all carb option      Mediterranean Lifestyle: Fish/seafood 2 times a week, vegetables, fruit, nuts, legumes, whole grains, water, regular activity.    Eat a Healthy diet  a. Eat more vegetables/plants in your diet  b. Eat healthy fats  i. Olive oil  ii. Avocados  c. Eat healthy proteins  i. Poultry without the skin  ii. Fish  iii. Limit red meat     Limit higher carbohydrate foods such as: rice, breads, cereal, pasta, sweets.   Avoid sugary drinks: regular soda/pop, juice, sports drinks. (Sugar free, zero are okay).     Snacks: Try to  work on healthy, low carbohydrate food choices.   Good snack examples: meat, cheese, cottage cheese, nuts, hard boiled egg, veggies, fruit/berries, yogurt (Greek yogurt/protein).      Activity/Exercise:   Try to walk or be active 5-10 minutes after eating any meal     Continue working on healthy eating and moving (start low and slow, work up to 30 min, 5x/week). Increase exercise as tolerated, even multiple short times during your day helps.Slowly work up to 30 minutes of physical activity most days of the week    Adult goal is 150 minutes/week =  30 minutes 5 days of the week.   i. Aerobic activity 150 minute a week  ii. 2 days of resistance exercising      Healthy Coping:   Practicing health promotion can help improve diabetes (suggested by the ADA, March 2019)              Meditation                          Apps: for [place] (by isePankur)hone and AndWatch Over Me                          -Calm                          -Headspace                          -Insight Timer              Yoga              Mindful eating         Other:   Foot exam: yearly  Eye exam: yearly, November  Encourage regular dental check ups.    Great work! Congratulations!    Follow up: 9/1/2023 at 1 pm for A1C.   Please bring your meter to your appointment.     If you have any questions or concerns, please call me at [phone] (Shara, Nurse directly) or send [employer] message.    Scheduling Number: [phone]  Topics to cover at upcoming visits: Healthy Eating, Being Active, Monitoring, Taking Medication, Problem Solving, Reducing Risks and Healthy Coping    See Care Plan for co-developed, patient-state behavior change goals.  AVS provided for patient today.    Education Materials Provided:  No new materials provided today      SUBJECTIVE/OBJECTIVE:  Presents for: Follow-up  Accompanied by: Self  Diabetes education in the past 24mo: Yes  Focus of Visit: Patient Unsure, Diabetes Pathophysiology, Healthy Eating, Healthy Coping, Monitoring, Taking Medication  Diabetes type: Type 2  Date of diagnosis: 11/4/21  Disease course: Stable  How confident are you filling out medical forms by yourself:: Quite a bit  Diabetes management related  "comments/concerns: a1c due. Ozempic was increased to 2 mg, first dose yesterday.  Transportation concerns: Yes  Difficulty affording diabetes medication?: No  Difficulty affording diabetes testing supplies?: Sometimes (possibly)  Other concerns:: Glasses, Physical impairment  Cultural Influences/Ethnic Background:  Not  or     Diabetes Symptoms & Complications:  Fatigue: Yes (worse- doesn't handle heat in summer time.)  Neuropathy: Sometimes (hand, feet. improving- about the same. \"stings\"- little spots.)  Polydipsia: Yes (some meds might be contributing)  Polyphagia: No (decreasing with Ozempic.)  Polyuria: Sometimes (on a diuretic)  Visual change: No  Slow healing wounds: No (a little bit)  Other: No  Symptom course: Improving  Weight trend: Decreasing  Complications assessed today?: Yes  Autonomic neuropathy: No  CVA: No (mom.)  Heart disease: No (dads side.)  Nephropathy: No  Peripheral neuropathy: Yes  Peripheral Vascular Disease: No  Retinopathy: No    Patient Problem List and Family Medical History reviewed for relevant medical history, current medical status, and diabetes risk factors.    Vitals:  /80   Pulse 90   Resp 16   Ht 1.578 m (5' 2.12\")   Wt 127.7 kg (281 lb 8 oz)   LMP 05/22/2023   SpO2 96%   BMI 51.29 kg/m    Estimated body mass index is 51.29 kg/m  as calculated from the following:    Height as of this encounter: 1.578 m (5' 2.12\").    Weight as of this encounter: 127.7 kg (281 lb 8 oz).   Last 3 BP:   BP Readings from Last 3 Encounters:   05/31/23 113/80   05/30/23 118/70   05/17/23 111/76       History   Smoking Status     Every Day     Packs/day: 0.50     Years: 32.00     Types: Cigarettes     Start date: 1/1/1989   Smokeless Tobacco     Never       Labs:  Lab Results   Component Value Date    A1C 6.6 02/17/2023    A1C 6.9 06/01/2022    HEMOGLOBINA1 6.7 05/31/2023     Lab Results   Component Value Date     05/17/2023     09/26/2022     05/19/2021 "     Lab Results   Component Value Date    LDL 74 05/17/2023     01/06/2022     01/29/2020     HDL Cholesterol   Date Value Ref Range Status   01/29/2020 87 >49 mg/dL Final     Direct Measure HDL   Date Value Ref Range Status   01/06/2022 65 >=50 mg/dL Final   ]  GFR Estimate   Date Value Ref Range Status   05/17/2023 85 >60 mL/min/1.73m2 Final     Comment:     eGFR calculated using 2021 CKD-EPI equation.   05/19/2021 >90 >60 mL/min/[1.73_m2] Final     Comment:     Non  GFR Calc  Starting 12/18/2018, serum creatinine based estimated GFR (eGFR) will be   calculated using the Chronic Kidney Disease Epidemiology Collaboration   (CKD-EPI) equation.       GFR Estimate If Black   Date Value Ref Range Status   05/19/2021 >90 >60 mL/min/[1.73_m2] Final     Comment:      GFR Calc  Starting 12/18/2018, serum creatinine based estimated GFR (eGFR) will be   calculated using the Chronic Kidney Disease Epidemiology Collaboration   (CKD-EPI) equation.       Lab Results   Component Value Date    CR 0.84 05/17/2023    CR 0.78 05/19/2021     No results found for: MICROALBUMIN    Healthy Eating:  Healthy Eating Assessed Today: Yes  Cultural/Voodoo diet restrictions?: No  Meal planning/habits: Smaller portions, Low carb, Other (tries to eat healthy fats and fiber.)  How many times a week on average do you eat food made away from home (restaurant/take-out)?: 1 (1x in the past month)  Meals include: Dinner, Evening Snack, Lunch, Breakfast (isn't eating until 1 or 2, supper around 5:50/6)  Breakfast: smaller portions- less than a cup of oatmeal or half a bagel with cream cheese/butter or cereal with milk or slice of pizza, small amount of oatmeal  Lunch: moms meals- diabetes friendly  Dinner: mom's meals  Snacks: ww cracker or half a sandwich (turkey and cheese) or granola bar. berries: straberry, blue, rasp and bananas.  Beverages: Water, Milk, Coffee, Alcohol, Diet soda (prabhjot in water, 1%,  cream and splenda in coffee. diet pepsi- 1-2 cans a day.)  Has patient met with a dietitian in the past?: Yes (with gestational diabetes)    Being Active:  Being Active Assessed Today: Yes  Exercise:: Currently not exercising (house chores, riding lawnmower. yard work.)  Barrier to exercise: Physical limitation (legs hurt, chronic pain, knee injury. considering PT for legs.)    Monitoring:  Monitoring Assessed Today: Yes  Did patient bring glucose meter to appointment? : Yes  Blood Glucose Meter: Accu-chek, One Touch (guide me not covered, has a one touch she will start using)  Times checking blood sugar at home (number): 2 (forgetting more often)  Times checking blood sugar at home (per): Week  Blood glucose trend: No change (stable)    Taking Medications:  Diabetes Medication(s)     Incretin Mimetic Agents       Semaglutide, 2 MG/DOSE, (OZEMPIC) 8 MG/3ML pen    Inject 2 mg Subcutaneous every 7 days          Taking Medication Assessed Today: Yes  Current Treatments: Non-insulin Injectables (Ozempic 2 mg once weekly,)  Problems taking diabetes medications regularly?: No  Diabetes medication side effects?: No (feels sick with eating too many carbs, hot and flushed, careful with sun and alcohol.)    Problem Solving:  Problem Solving Assessed Today: Yes  Is the patient at risk for hypoglycemia?: No  Is the patient at risk for DKA?: No  Does patient have severe weather/disaster plan for diabetes management?: Not Needed  Does patient have sick day plan for diabetes management?: Not Needed    Reducing Risks:  Reducing Risks Assessed Today: Yes  Diabetes Risks: Age over 45 years, History of gestational diabetes, Sedentary Lifestyle  CAD Risks: Sedentary lifestyle, Tobacco exposure, Stress, Obesity, Family history, Diabetes Mellitus  Has dilated eye exam at least once a year?: Yes  Sees dentist every 6 months?: No (needs to find a dentist,)  Feet checked by healthcare provider in the last year?: No    Healthy  Coping:  Healthy Coping Assessed Today: Yes  Emotional response to diabetes: Ready to learn, Depression (depression due to mom passing)  Informal Support system:: Family (mental health practitioner, [facility], )  Stage of change: MAINTENANCE (Working to maintain change, with risk of relapse)  Support resources: In-person Offerings  Patient Activation Measure Survey Score:      11/28/2018    12:00 PM 3/7/2019     4:00 PM   KEN Score (Last Two)   KEN Raw Score 27 27   Activation Score 47.4 47.4   KEN Level 2 2         Care Plan and Education Provided:  Care Plan: Diabetes   Updates made by Kinga Beckwith, RN since 6/2/2023 12:00 AM      Problem: Diabetes Self-Management Education Needed to Optimize Self-Care Behaviors       Goal: Understand diabetes pathophysiology and disease progression    This Visit's Progress: 100%      Task: Provide education on diabetes pathophysiology and disease progression specfic to patient's diabetes type Completed 6/2/2023   Responsible User: Taisha Corral RD      Goal: Healthy Eating - follow a healthy eating pattern for diabetes    This Visit's Progress: On track   Note:    Working with weight .        Goal: Being Active - get regular physical activity, working up to at least 150 minutes per week    Note:    Increase as tolerated.   Adult goal 150 minutes/week.        Goal: Monitoring - monitor glucose and ketones as directed    This Visit's Progress: 50%   Note:    BG testing 2-3 times/week. A1C stable.      Goal: Taking Medication - patient is consistently taking medications as directed    This Visit's Progress: 100%   Note:    GLP currently.        Goal: Reducing Risks - know how to prevent and treat long-term diabetes complications    Note:    ADA BP target <130/80- meeting.   A1C target <7.0%- meeting.   Statin use  Asa- defer to PCP for recommendations.   Foot exam due  Eye exam current  Encourage tobacco cessation.      Task: Provide education on  recommended care for dental, eye and foot health Completed 6/2/2023   Responsible User: Taisha Corral RD      Goal: Healthy Coping - use available resources to cope with the challenges of managing diabetes    This Visit's Progress: On track      Task: Discuss recognizing feelings about having diabetes Completed 6/2/2023   Responsible User: Taisha Corral RD      Task: Provide education on benefits of utilizing support systems Completed 6/2/2023   Responsible User: Taisha Corral RD        Time Spent: 30 minutes  Encounter Type: Individual    Any diabetes medication dose changes were made via the CDE Protocol per the patient's primary care provider. A copy of this encounter was shared with the provider.     Kinga Beckwith RN Diabetes Educator,  [phone]

## 2023-05-31 NOTE — PATIENT INSTRUCTIONS
Recap of our visit:     Monitoring:  Your A1C was 6.7 today. Goal is less than 7%   Previous A1C: 6.6   Next A1C: in September.     Check blood sugars 1 x/day.   Fasting in the morning or 2 hours after your largest meal are good times to check.    Target blood sugar: Fasting or before meals target is /140. 2 hours after meal <180.    We only need 1/2 of these numbers to be within target then your A1c will be within target.    Medications:   Continue:Ozempic 2 mg once weekly.    Healthy Eating:  diabetesfoodhub.org     Target Carb:   Women 45 grams/meal 15-30 grams carbs optional snack.   Less if working towards weight loss.     Make sure you always have a protein with any all carb option      Mediterranean Lifestyle: Fish/seafood 2 times a week, vegetables, fruit, nuts, legumes, whole grains, water, regular activity.    Eat a Healthy diet  Eat more vegetables/plants in your diet  Eat healthy fats  Olive oil  Avocados  Eat healthy proteins  Poultry without the skin  Fish  Limit red meat     Limit higher carbohydrate foods such as: rice, breads, cereal, pasta, sweets.   Avoid sugary drinks: regular soda/pop, juice, sports drinks. (Sugar free, zero are okay).     Snacks: Try to  work on healthy, low carbohydrate food choices.   Good snack examples: meat, cheese, cottage cheese, nuts, hard boiled egg, veggies, fruit/berries, yogurt (Greek yogurt/protein).     Activity/Exercise:   Try to walk or be active 5-10 minutes after eating any meal     Continue working on healthy eating and moving (start low and slow, work up to 30 min, 5x/week). Increase exercise as tolerated, even multiple short times during your day helps.Slowly work up to 30 minutes of physical activity most days of the week    Adult goal is 150 minutes/week =  30 minutes 5 days of the week.   Aerobic activity 150 minute a week  2 days of resistance exercising      Healthy Coping:   Practicing health promotion can help improve diabetes (suggested by the  ADA, March 2019)              Meditation                          Apps: for IPhone and Android                          -Calm                          -Headspace                          -Insight Timer              Yoga              Mindful eating         Other:   Foot exam: yearly  Eye exam: yearly, November  Encourage regular dental check ups.    Great work! Congratulations!    Follow up: 9/1/2023 at 1 pm for A1C.   Please bring your meter to your appointment.     If you have any questions or concerns, please call me at [phone] (Shara, Nurse directly) or send [employer] message.    Scheduling Number: [phone]    Thank you for coming in today!  Kinga Beckwith RN Diabetes Educator

## 2023-06-07 NOTE — TELEPHONE ENCOUNTER
phentermine      Last Written Prescription Date:  11/4/2020  Last Fill Quantity: 30,   # refills: 0  Last Office Visit: 8/14/2020  Future Office visit:    Next 5 appointments (look out 90 days)    Dec 04, 2020  3:00 PM  (Arrive by 2:45 PM)  SHORT with SEVEN Pandey  [facility] ([facility] ) [address]  [place] [address]  [phone]         
show

## 2023-06-12 ENCOUNTER — VIRTUAL VISIT (OUTPATIENT)
Dept: ENDOCRINOLOGY | Facility: CLINIC | Age: 48
End: 2023-06-12
Payer: COMMERCIAL

## 2023-06-12 VITALS — HEIGHT: 63 IN | BODY MASS INDEX: 49.72 KG/M2 | WEIGHT: 280.6 LBS

## 2023-06-12 DIAGNOSIS — Z71.3 NUTRITIONAL COUNSELING: Primary | ICD-10-CM

## 2023-06-12 DIAGNOSIS — E11.9 TYPE 2 DIABETES MELLITUS WITHOUT COMPLICATION, WITHOUT LONG-TERM CURRENT USE OF INSULIN (H): ICD-10-CM

## 2023-06-12 DIAGNOSIS — E66.9 OBESITY: ICD-10-CM

## 2023-06-12 PROCEDURE — 97803 MED NUTRITION INDIV SUBSEQ: CPT | Mod: 95 | Performed by: DIETITIAN, REGISTERED

## 2023-06-12 PROCEDURE — 99207 PR NO CHARGE LOS: CPT | Mod: 95 | Performed by: DIETITIAN, REGISTERED

## 2023-06-12 ASSESSMENT — PAIN SCALES - GENERAL: PAINLEVEL: EXTREME PAIN (8)

## 2023-06-12 NOTE — LETTER
"6/12/2023       RE: Autumn Holbrook  [address] [facility] [address]     Dear Colleague,    Thank you for referring your patient, Autumn Holbrook, to the [facility] [place] [facility]. Please see a copy of my visit note below.    Autumn Holbrook is a 47 year old female who is being evaluated via a billable video visit.      The patient has been notified of following:     \"This video visit will be conducted via a call between you and your physician/provider. We have found that certain health care needs can be provided without the need for an in-person physical exam.  This service lets us provide the care you need with a video conversation.  If a prescription is necessary we can send it directly to your pharmacy.  If lab work is needed we can place an order for that and you can then stop by our lab to have the test done at a later time.    Video visits are billed at different rates depending on your insurance coverage.  Please reach out to your insurance provider with any questions.    If during the course of the call the physician/provider feels a video visit is not appropriate, you will not be charged for this service.\"    Patient has given verbal consent for Video visit? Yes  How would you like to obtain your AVS? MyChart  If you are dropped from the video visit, the video invite should be resent to: Text to cell phone: [phone]  Will anyone else be joining your video visit? No  {If patient encounters technical issues they should call [phone]      Video-Visit Details    Type of service:  Video Visit    Video Start Time: 1:29 pm  Video End Time: 1:45 pm    Originating Location (pt. Location): Home    Distant Location (provider location):  Offsite (providers home)     Platform used for Video Visit: DesignPax    During this virtual visit the patient is located in [place], patient verifies this as the location during the entirety of " "this visit.       Return Weight Management Nutrition Consultation    Autumn Holbrook is a 47 year old female presents today for a return weight management nutrition consultation.  Patient referred by SEVEN Nickerson on March 1, 2023.    Patient with Co-morbidities of obesity including:  Type 2 Diabetes, GERD, Knee Pain, Back Pain    Anthropometrics:  Weight 2/28/23: 287 lbs with BMI 52.36    Estimated body mass index is 49.71 kg/m  as calculated from the following:    Height as of this encounter: 1.6 m (5' 3\").    Weight as of this encounter: 127.3 kg (280 lb 9.6 oz).     Current weight: 280 lbs, pt report    Medications for Weight Loss:  Ozempic    NUTRITION HISTORY  Food allergies: none  Food intolerances: avocados  Supplements: Vitamin D3, MVI, potassium (prescribed by PCP)   Previous methods of diet modification for weight loss: Watching Portions or Calories, Exercise, Weight Watchers, Medications, Fasting    Pt reports she has been heavy for most of her life. Thyroid issues/Hasimotos make it difficult to lose weight. Has been trying to decrease carbs d/t T2DM. Often craves sugar at night. Has Moms Meals delivered- diabetic friendly and pre-portioned. Physical activity is limited d/t knee pain.     4/11/23: Pt started Ozempic about a month ago. Has not noticed much of a change since taking it. Still craving sweets. Still using Mom's Meals 2x per day- when she cooks she makes a veggie and a protein, sometimes a starch. Struggling some with drinking enough water. Working hard to eat earlier in the day (sometimes isn't hungry until noon).    Recent food recall:  Breakfast: skips  Lunch: Moms Meals  Dinner: Moms Meals  Snacks: Granola bar, cottage cheese and fruit, yogurt, chips, chocolate  Beverages: water, diet soda     June 2023:  First time meeting pt, worked with Allison previously.     Pt reports some recent weight gain - sister was in town and more eating out.     Earlier satiety - smaller portions. Focusing " "on protein/fiber and healthy fats.  Feels eating is going well in general but movement is limited. Continues with 2 MOM meals/day. If rice is about 1/4 to 1/2 cup portion per pt. Always has vegetables.     Had pizza over weekend. Feels going out to eat is hard to navigate. Tries to look at menu ahead of time. Taking home leftovers.    Snack - Might have cereal with some milk at night time if hungry.    Hydration: water with prabhjot, diet pepsi, at least 1 glass of milk/day  - Working on decreasing diet pepsi intake    PA: yard work, house cleaning  - Limited by knee pain    Previous goals:  1) 9\" Plate method (1/2 plate non-starchy vegetables/fruit, 1/4 plate lean protein, 1/4 plate whole grain starch - no more than 1/2 cup carb/meal)    2) Eat 3 meals per day - Getting at least 2 meals/day  3) Consume 60-90 g protein per day - Doing well with protein focused meals.  4) Avoid snacking as able. If snack is needed use lean protein and/or fruit/vegetable - Met, feeling satisfied on smaller portions now. Might have part of or a whole granola bar. Also keeping gluose tabs on her for prn if low Blood sugars.    Nutrition Prescription  Recommended energy/nutrient modification.    Nutrition Diagnosis  Obesity r/t long history of positive energy balance aeb BMI >30.    Nutrition Intervention  Materials/education provided on hypocaloric diet for weight loss. Discussed 1700 calorie/day diet, Volumetric eating to help satiety level on fewer calories; portion control and healthy food choices (Plate Method and Volumetrics handouts), 100 calorie snack choices, meal and snack planning and websites, sample meal plans     Patient demonstrates understanding.    Expected Engagement: good    Nutrition Goals  1. Could consider portioning out meal right away at restaurant and putting part away before starting to eat  2. Continue working on hydration and decreasing pop intake  3. Could consider adding vegetables such as spinach or zucchini to " smoothies. Recommend berries more often than banana (lower sugar)  4. Aim to increase activity as able/tolerated. Reach out to Ortho to discusses recommendations for knees    The Plate Method  [email or web address]    Protein Sources   [email or web address]     Carbohydrates  [email or web address]     Mindful Eating  [email or web address]     Summary of Volumetrics Eating Plan  [email or web address]     Follow-Up:  Wednesday, July 19th at 11:30 am     Time spent with patient: 16 minutes.  STEVO Gil, RD, LD

## 2023-06-12 NOTE — PROGRESS NOTES
"Autumn Holbrook is a 47 year old female who is being evaluated via a billable video visit.      The patient has been notified of following:     \"This video visit will be conducted via a call between you and your physician/provider. We have found that certain health care needs can be provided without the need for an in-person physical exam.  This service lets us provide the care you need with a video conversation.  If a prescription is necessary we can send it directly to your pharmacy.  If lab work is needed we can place an order for that and you can then stop by our lab to have the test done at a later time.    Video visits are billed at different rates depending on your insurance coverage.  Please reach out to your insurance provider with any questions.    If during the course of the call the physician/provider feels a video visit is not appropriate, you will not be charged for this service.\"    Patient has given verbal consent for Video visit? Yes  How would you like to obtain your AVS? MyChart  If you are dropped from the video visit, the video invite should be resent to: Text to cell phone: [phone]  Will anyone else be joining your video visit? No  {If patient encounters technical issues they should call [phone]      Video-Visit Details    Type of service:  Video Visit    Video Start Time: 1:29 pm  Video End Time: 1:45 pm    Originating Location (pt. Location): Home    Distant Location (provider location):  Offsite (providers home)     Platform used for Video Visit: hyaqu    During this virtual visit the patient is located in [place], patient verifies this as the location during the entirety of this visit.       Return Weight Management Nutrition Consultation    Autumn Holbrook is a 47 year old female presents today for a return weight management nutrition consultation.  Patient referred by SEVEN Nickerson on March 1, 2023.    Patient with Co-morbidities of obesity including:  Type 2 Diabetes, GERD, " "Knee Pain, Back Pain    Anthropometrics:  Weight 2/28/23: 287 lbs with BMI 52.36    Estimated body mass index is 49.71 kg/m  as calculated from the following:    Height as of this encounter: 1.6 m (5' 3\").    Weight as of this encounter: 127.3 kg (280 lb 9.6 oz).     Current weight: 280 lbs, pt report    Medications for Weight Loss:  Ozempic    NUTRITION HISTORY  Food allergies: none  Food intolerances: avocados  Supplements: Vitamin D3, MVI, potassium (prescribed by PCP)   Previous methods of diet modification for weight loss: Watching Portions or Calories, Exercise, Weight Watchers, Medications, Fasting    Pt reports she has been heavy for most of her life. Thyroid issues/Hasimotos make it difficult to lose weight. Has been trying to decrease carbs d/t T2DM. Often craves sugar at night. Has [employer] Meals delivered- diabetic friendly and pre-portioned. Physical activity is limited d/t knee pain.     4/11/23: Pt started Ozempic about a month ago. Has not noticed much of a change since taking it. Still craving sweets. Still using [employer] Meals 2x per day- when she cooks she makes a veggie and a protein, sometimes a starch. Struggling some with drinking enough water. Working hard to eat earlier in the day (sometimes isn't hungry until noon).    Recent food recall:  Breakfast: skips  Lunch: Moms Meals  Dinner: [employer]oms Meals  Snacks: Granola bar, cottage cheese and fruit, yogurt, chips, chocolate  Beverages: water, diet soda     June 2023:  First time meeting pt, worked with Allison previously.     Pt reports some recent weight gain - sister was in town and more eating out.     Earlier satiety - smaller portions. Focusing on protein/fiber and healthy fats.  Feels eating is going well in general but movement is limited. Continues with 2 MOM meals/day. If rice is about 1/4 to 1/2 cup portion per pt. Always has vegetables.     Had pizza over weekend. Feels going out to eat is hard to navigate. Tries to look at menu ahead of time. " "Taking home leftovers.    Snack - Might have cereal with some milk at night time if hungry.    Hydration: water with prabhjot, diet pepsi, at least 1 glass of milk/day  - Working on decreasing diet pepsi intake    PA: yard work, house cleaning  - Limited by knee pain    Previous goals:  1) 9\" Plate method (1/2 plate non-starchy vegetables/fruit, 1/4 plate lean protein, 1/4 plate whole grain starch - no more than 1/2 cup carb/meal)    2) Eat 3 meals per day - Getting at least 2 meals/day  3) Consume 60-90 g protein per day - Doing well with protein focused meals.  4) Avoid snacking as able. If snack is needed use lean protein and/or fruit/vegetable - Met, feeling satisfied on smaller portions now. Might have part of or a whole granola bar. Also keeping gluose tabs on her for prn if low Blood sugars.    Nutrition Prescription  Recommended energy/nutrient modification.    Nutrition Diagnosis  Obesity r/t long history of positive energy balance aeb BMI >30.    Nutrition Intervention  Materials/education provided on hypocaloric diet for weight loss. Discussed 1700 calorie/day diet, Volumetric eating to help satiety level on fewer calories; portion control and healthy food choices (Plate Method and Volumetrics handouts), 100 calorie snack choices, meal and snack planning and websites, sample meal plans     Patient demonstrates understanding.    Expected Engagement: good    Nutrition Goals  1. Could consider portioning out meal right away at restaurant and putting part away before starting to eat  2. Continue working on hydration and decreasing pop intake  3. Could consider adding vegetables such as spinach or zucchini to smoothies. Recommend berries more often than banana (lower sugar)  4. Aim to increase activity as able/tolerated. Reach out to Ortho to discusses recommendations for knees    The Plate Method  [email or web address]    Protein Sources   [email or web address] "     Carbohydrates  [email or web address]     Mindful Eating  [email or web address]     Summary of Volumetrics Eating Plan  [email or web address]     Follow-Up:  Wednesday, July 19th at 11:30 am     Time spent with patient: 16 minutes.  STEVO Gil, RD, LD

## 2023-06-12 NOTE — PATIENT INSTRUCTIONS
Nutrition Goals  1. Could consider portioning out meal right away at restaurant and putting part away before starting to eat  2. Continue working on hydration and decreasing pop intake  3. Could consider adding vegetables such as spinach or zucchini to smoothies. Recommend berries more often than banana (lower sugar)  4. Aim to increase activity as able/tolerated. Reach out to Ortho to discusses recommendations for knees    The Plate Method  [email or web address]    Protein Sources   [email or web address]     Carbohydrates  [email or web address]     Mindful Eating  [email or web address]     Summary of Volumetrics Eating Plan  [email or web address]     Follow-Up:  Wednesday, July 19th at 11:30 am       STEVO Calvo, RD, LD  Clinic #: [phone]

## 2023-06-12 NOTE — NURSING NOTE
Is the patient currently in the state of [place]? YES    Visit mode:VIDEO    If the visit is dropped, the patient can be reconnected by: VIDEO VISIT: Text to cell phone: [phone]    Will anyone else be joining the visit? NO      How would you like to obtain your AVS? MyChart    Are changes needed to the allergy or medication list? NO    Reason for visit: TANGELA Salas, VF

## 2023-06-13 DIAGNOSIS — G89.4 CHRONIC PAIN SYNDROME: ICD-10-CM

## 2023-06-14 NOTE — TELEPHONE ENCOUNTER
Baclofen      Last Written Prescription Date:  5.9.23  Last Fill Quantity: #90,   # refills: 0  Last Office Visit: 5.17.23  Future Office visit:    Next 5 appointments (look out 90 days)    Aug 16, 2023  2:00 PM  (Arrive by 1:45 PM)  SHORT with SEVEN Pandey  [facility] ([facility] ) [address]  [place] [address]  [phone]           Routing refill request to provider for review/approval because:  Drug not on the [facility], P or [facility] refill protocol or controlled substance

## 2023-06-15 RX ORDER — BACLOFEN 10 MG/1
TABLET ORAL
Qty: 90 TABLET | Refills: 0 | Status: SHIPPED | OUTPATIENT
Start: 2023-06-15 | End: 2023-07-10

## 2023-06-20 DIAGNOSIS — R60.0 BILATERAL EDEMA OF LOWER EXTREMITY: ICD-10-CM

## 2023-06-23 ENCOUNTER — TELEPHONE (OUTPATIENT)
Dept: FAMILY MEDICINE | Facility: OTHER | Age: 48
End: 2023-06-23

## 2023-06-23 RX ORDER — POTASSIUM CHLORIDE 750 MG/1
TABLET, EXTENDED RELEASE ORAL
Qty: 30 TABLET | Refills: 3 | Status: SHIPPED | OUTPATIENT
Start: 2023-06-23 | End: 2023-09-11

## 2023-06-23 NOTE — TELEPHONE ENCOUNTER
potassium chloride ER (K-TAB/KLOR-CON) 10 MEQ CR tablet 30 tablet 0 5/31/2023     Last Office Visit: 5.17.23  Future Office visit:    Next 5 appointments (look out 90 days)    Aug 16, 2023  2:00 PM  (Arrive by 1:45 PM)  SHORT with SEVEN Pandey  [facility] [place] ([facility] [place] ) [address]  [place] [address]  [phone]

## 2023-06-23 NOTE — TELEPHONE ENCOUNTER
Marion from [facility]  [phone]    Verbal order to continue skilled nurse one time a week    Verbal order given    Karyna Sky RN

## 2023-06-26 DIAGNOSIS — Z53.9 PERSONS ENCOUNTERING HEALTH SERVICES FOR SPECIFIC PROCEDURES, NOT CARRIED OUT: Primary | ICD-10-CM

## 2023-06-26 PROCEDURE — G0179 MD RECERTIFICATION HHA PT: HCPCS | Performed by: PHYSICIAN ASSISTANT

## 2023-07-06 ENCOUNTER — VIRTUAL VISIT (OUTPATIENT)
Dept: ENDOCRINOLOGY | Facility: CLINIC | Age: 48
End: 2023-07-06
Payer: COMMERCIAL

## 2023-07-06 VITALS — HEIGHT: 63 IN | WEIGHT: 280 LBS | BODY MASS INDEX: 49.61 KG/M2

## 2023-07-06 DIAGNOSIS — E66.813 CLASS 3 SEVERE OBESITY WITH SERIOUS COMORBIDITY AND BODY MASS INDEX (BMI) OF 45.0 TO 49.9 IN ADULT, UNSPECIFIED OBESITY TYPE (H): Primary | ICD-10-CM

## 2023-07-06 DIAGNOSIS — E11.9 TYPE 2 DIABETES MELLITUS WITHOUT COMPLICATION, WITHOUT LONG-TERM CURRENT USE OF INSULIN (H): ICD-10-CM

## 2023-07-06 DIAGNOSIS — R60.0 BILATERAL LEG EDEMA: ICD-10-CM

## 2023-07-06 DIAGNOSIS — E66.01 CLASS 3 SEVERE OBESITY WITH SERIOUS COMORBIDITY AND BODY MASS INDEX (BMI) OF 45.0 TO 49.9 IN ADULT, UNSPECIFIED OBESITY TYPE (H): Primary | ICD-10-CM

## 2023-07-06 PROCEDURE — 99212 OFFICE O/P EST SF 10 MIN: CPT | Mod: 95 | Performed by: PHYSICIAN ASSISTANT

## 2023-07-06 ASSESSMENT — PAIN SCALES - GENERAL: PAINLEVEL: SEVERE PAIN (7)

## 2023-07-06 NOTE — NURSING NOTE
Is the patient currently in the state of [place]? YES    Visit mode:VIDEO    If the visit is dropped, the patient can be reconnected by: VIDEO VISIT: Text to cell phone: [phone]    Will anyone else be joining the visit? NO      How would you like to obtain your AVS? MyChart    Are changes needed to the allergy or medication list? NO    Reason for visit: Follow Up           No restrictions

## 2023-07-06 NOTE — LETTER
"2023       RE: Autumn Holbrook  [address] [facility] [address]     Dear Colleague,    Thank you for referring your patient, Autumn Holbrook, to the [facility] [place] [facility]. Please see a copy of my visit note below.      Return Medical Weight Management Note     Autumn Holbrook  MRN:  [id number]  :  1975  JALYN:  2023    Dear SEVEN Bryant,    I had the pleasure of seeing your patient Autumn Holbrook. She is a 47 year old female who I am continuing to see for treatment of obesity related to:        3/1/2023     1:32 PM   --   I have the following health issues associated with obesity Type II Diabetes    GERD (Reflux)    Stress Incontinence    Osteoarthritis (joint disease)    Hypothyroidism   I have the following symptoms associated with obesity Knee Pain    Depression    Lower Extremity Swelling    Back Pain    Fatigue    Irregular Menstral Cycle       Assessment & Plan   Problem List Items Addressed This Visit          Endocrine Diagnoses    Diabetes mellitus, type 2 (H) -    Class 3 obesity Primary        Weight mgmt follow up  Continue ozempic 2mg  Try to get topiramate re-started for migraine headaches.    Follow up 3-4 mo Ivone    INTERVAL HISTORY:  \" Highest wt in life: 325 lbs. Lost weight over the last 2 years with phentermine. Weight gain associated with psychiatric medications started 7 years ago. Weight prior was 180 lbs. \"      Anti-obesity medications:     Current:   Ozempic 2mg, switched from trulicity and helping with preventing overeating.     Failed/contraindicated:   Topiramate, tried to restart but not covered by [facility] despite having migraines that it was helping.   Metformin in the past not effective and upset stomach  Psychiatrist: off since taking vyvanse also.     CURRENT WEIGHT:   280 lbs 0 oz    Highest in life: 325 lbs  Initial Weight (lbs): 284 lbs  Last Visits Weight: 127.3 " kg (280 lb 9.6 oz)  Cumulative weight loss (lbs): 4  Weight Loss Percentage: 1.41%        7/6/2023    12:22 PM   Changes and Difficulties   I have made the following changes to my diet since my last visit: Less carbs more veggies n fruits   With regards to my diet, I am still struggling with: Salt, fried foods   I have made the following changes to my activity/exercise since my last visit: More activity   With regards to my activity/exercise, I am still struggling with: Motivation         MEDICATIONS:   Current Outpatient Medications   Medication Sig Dispense Refill    Acetaminophen (TYLENOL PO) Take 500 mg by mouth every 4 hours as needed for mild pain or fever      asenapine (SAPHRIS) 2.5 MG SUBL sublingual tablet Place 2.5 mg under the tongue daily (2.5 mg + 5 mg = 7.5 mg)      asenapine (SAPHRIS) 5 MG SUBL sublingual tablet Place 5 mg under the tongue daily (2.5 mg + 5 mg = 7.5 mg)      baclofen (LIORESAL) 10 MG tablet TAKE 1 TABLET BY MOUTH THREE TIMES DAILY 90 tablet 0    blood glucose (NO BRAND SPECIFIED) lancets standard Use to test blood sugar 1 times daily 100 each 5    blood glucose (NO BRAND SPECIFIED) test strip Use to test blood sugar 1 time daily 50 strip 5    blood glucose monitoring (NO BRAND SPECIFIED) meter device kit Use to test blood sugar 1 time daily 1 kit 0    blood glucose monitoring (SOFTCLIX) lancets USE 1 TO CHECK GLUCOSE ONCE DAILY      Blood Glucose Monitoring Suppl (ONETOUCH VERIO REFLECT) w/Device KIT USE TO TEST BLOOD SUGAR ONCE DAILY      diclofenac (VOLTAREN) 1 % topical gel Apply 2 g topically 4 times daily as needed for moderate pain      furosemide (LASIX) 20 MG tablet TAKE 2 TABLETS BY MOUTH ONCE DAILY AS NEEDED FOR  LEG  SWELLING 60 tablet 4    hydrochlorothiazide (HYDRODIURIL) 25 MG tablet Take 1 tablet by mouth once daily 90 tablet 4    hydrOXYzine (ATARAX) 25 MG tablet Take 25 mg by mouth 4 times daily as needed for anxiety      hylan (SYNVISC ONE) 48 MG/6ML injection 48 mg  "by INTRA-ARTICULAR route      ibuprofen (ADVIL/MOTRIN) 800 MG tablet Take 1 tablet (800 mg) by mouth every 8 hours as needed for moderate pain (4-6) 90 tablet 0    levothyroxine (SYNTHROID/LEVOTHROID) 175 MCG tablet Take 1 tablet (175 mcg) by mouth daily 90 tablet 1    lisdexamfetamine (VYVANSE) 40 MG capsule Take 40 mg by mouth every morning      Melatonin 10 MG CAPS Take 10 mg by mouth nightly as needed (insomnia)      multivitamin w/minerals (THERA-VIT-M) tablet Take 1 tablet by mouth daily      nicotine (NICORETTE) 2 MG gum CHEW 1-2 PIECES PER HOUR OR 10-12 PIECES PER DAY--TAPER AS TOLERATED      omeprazole (PRILOSEC) 40 MG DR capsule Take 1 capsule by mouth once daily 30 capsule 9    oxybutynin ER (DITROPAN XL) 10 MG 24 hr tablet Take 1 tablet (10 mg) by mouth daily 90 tablet 3    potassium chloride ER (K-TAB) 20 MEQ CR tablet 20 meq along with a 10 mg pill .  This should only be taken if taking lasix. 90 tablet 3    potassium chloride ER (K-TAB/KLOR-CON) 10 MEQ CR tablet TAKE 1 TABLET BY MOUTH ONCE DAILY WHEN  TAKING  LASIX  (FUROSEMIDE) 30 tablet 3    rosuvastatin (CRESTOR) 5 MG tablet Take 1 tablet by mouth once daily 90 tablet 3    Semaglutide, 2 MG/DOSE, (OZEMPIC) 8 MG/3ML pen Inject 2 mg Subcutaneous every 7 days 9 mL 3    sertraline (ZOLOFT) 50 MG tablet Take 75 mg by mouth daily      Vitamin D3 (CHOLECALCIFEROL) 125 MCG (5000 UT) tablet Take 1 tablet by mouth daily      VRAYLAR 3 MG CAPS capsule Take 3 mg by mouth daily             7/6/2023    12:22 PM   Weight Loss Medication History Reviewed With Patient   Which weight loss medications are you currently taking on a regular basis? Ozempic    Vyvanse   Are you having any side effects from the weight loss medication that we have prescribed you? No       Hospital Outpatient Visit on 05/31/2023   Component Date Value Ref Range Status    Hemoglobin A1C POCT 05/31/2023 6.7 (A)  <5.7 % Final       PHYSICAL EXAM:  Objective    Ht 1.6 m (5' 2.99\")   Wt 127 kg " (280 lb)   LMP 05/22/2023   BMI 49.61 kg/m      Vitals - Patient Reported  Pain Score: Severe Pain (7)  Pain Loc: Knee        GENERAL: Healthy, alert and no distress  EYES: Eyes grossly normal to inspection.  No discharge or erythema, or obvious scleral/conjunctival abnormalities.  RESP: No audible wheeze, cough, or visible cyanosis.  No visible retractions or increased work of breathing.    SKIN: Visible skin clear. No significant rash, abnormal pigmentation or lesions.  NEURO: Cranial nerves grossly intact.  Mentation and speech appropriate for age.  PSYCH: Mentation appears normal, affect normal/bright, judgement and insight intact, normal speech and appearance well-groomed.        Sincerely,    Ivone Moses PA-C      10 minutes spent by me on the date of the encounter doing chart review, history and exam, documentation and further activities per the note      Virtual Visit Details    Type of service:  Video Visit   Video Start Time: 145  Video End Time:1:53 PM    Originating Location (pt. Location): Home  Distant Location (provider location):  Off-site  Platform used for Video Visit: Bell

## 2023-07-06 NOTE — PROGRESS NOTES
"  Return Medical Weight Management Note     Autumn Holbrook  MRN:  [id number]  :  1975  JALYN:  2023    Dear SEVEN Bryant,    I had the pleasure of seeing your patient Autumn Holbrook. She is a 47 year old female who I am continuing to see for treatment of obesity related to:        3/1/2023     1:32 PM   --   I have the following health issues associated with obesity Type II Diabetes    GERD (Reflux)    Stress Incontinence    Osteoarthritis (joint disease)    Hypothyroidism   I have the following symptoms associated with obesity Knee Pain    Depression    Lower Extremity Swelling    Back Pain    Fatigue    Irregular Menstral Cycle       Assessment & Plan   Problem List Items Addressed This Visit        Endocrine Diagnoses    Diabetes mellitus, type 2 (H) -    Class 3 obesity Primary        Weight mgmt follow up  Continue ozempic 2mg  Try to get topiramate re-started for migraine headaches.    Follow up 3-4 mo Ivone    INTERVAL HISTORY:  \" Highest wt in life: 325 lbs. Lost weight over the last 2 years with phentermine. Weight gain associated with psychiatric medications started 7 years ago. Weight prior was 180 lbs. \"      Anti-obesity medications:     Current:   Ozempic 2mg, switched from trulicity and helping with preventing overeating.     Failed/contraindicated:   Topiramate, tried to restart but not covered by [facility] despite having migraines that it was helping.   Metformin in the past not effective and upset stomach  Psychiatrist: off since taking vyvanse also.     CURRENT WEIGHT:   280 lbs 0 oz    Highest in life: 325 lbs  Initial Weight (lbs): 284 lbs  Last Visits Weight: 127.3 kg (280 lb 9.6 oz)  Cumulative weight loss (lbs): 4  Weight Loss Percentage: 1.41%        2023    12:22 PM   Changes and Difficulties   I have made the following changes to my diet since my last visit: Less carbs more veggies n fruits   With regards to my diet, I am still struggling with: Salt, fried foods   I " have made the following changes to my activity/exercise since my last visit: More activity   With regards to my activity/exercise, I am still struggling with: Motivation         MEDICATIONS:   Current Outpatient Medications   Medication Sig Dispense Refill     Acetaminophen (TYLENOL PO) Take 500 mg by mouth every 4 hours as needed for mild pain or fever       asenapine (SAPHRIS) 2.5 MG SUBL sublingual tablet Place 2.5 mg under the tongue daily (2.5 mg + 5 mg = 7.5 mg)       asenapine (SAPHRIS) 5 MG SUBL sublingual tablet Place 5 mg under the tongue daily (2.5 mg + 5 mg = 7.5 mg)       baclofen (LIORESAL) 10 MG tablet TAKE 1 TABLET BY MOUTH THREE TIMES DAILY 90 tablet 0     blood glucose (NO BRAND SPECIFIED) lancets standard Use to test blood sugar 1 times daily 100 each 5     blood glucose (NO BRAND SPECIFIED) test strip Use to test blood sugar 1 time daily 50 strip 5     blood glucose monitoring (NO BRAND SPECIFIED) meter device kit Use to test blood sugar 1 time daily 1 kit 0     blood glucose monitoring (SOFTCLIX) lancets USE 1 TO CHECK GLUCOSE ONCE DAILY       Blood Glucose Monitoring Suppl (ONETOUCH VERIO REFLECT) w/Device KIT USE TO TEST BLOOD SUGAR ONCE DAILY       diclofenac (VOLTAREN) 1 % topical gel Apply 2 g topically 4 times daily as needed for moderate pain       furosemide (LASIX) 20 MG tablet TAKE 2 TABLETS BY MOUTH ONCE DAILY AS NEEDED FOR  LEG  SWELLING 60 tablet 4     hydrochlorothiazide (HYDRODIURIL) 25 MG tablet Take 1 tablet by mouth once daily 90 tablet 4     hydrOXYzine (ATARAX) 25 MG tablet Take 25 mg by mouth 4 times daily as needed for anxiety       hylan (SYNVISC ONE) 48 MG/6ML injection 48 mg by INTRA-ARTICULAR route       ibuprofen (ADVIL/MOTRIN) 800 MG tablet Take 1 tablet (800 mg) by mouth every 8 hours as needed for moderate pain (4-6) 90 tablet 0     levothyroxine (SYNTHROID/LEVOTHROID) 175 MCG tablet Take 1 tablet (175 mcg) by mouth daily 90 tablet 1     lisdexamfetamine (VYVANSE)  "40 MG capsule Take 40 mg by mouth every morning       Melatonin 10 MG CAPS Take 10 mg by mouth nightly as needed (insomnia)       multivitamin w/minerals (THERA-VIT-M) tablet Take 1 tablet by mouth daily       nicotine (NICORETTE) 2 MG gum CHEW 1-2 PIECES PER HOUR OR 10-12 PIECES PER DAY--TAPER AS TOLERATED       omeprazole (PRILOSEC) 40 MG DR capsule Take 1 capsule by mouth once daily 30 capsule 9     oxybutynin ER (DITROPAN XL) 10 MG 24 hr tablet Take 1 tablet (10 mg) by mouth daily 90 tablet 3     potassium chloride ER (K-TAB) 20 MEQ CR tablet 20 meq along with a 10 mg pill .  This should only be taken if taking lasix. 90 tablet 3     potassium chloride ER (K-TAB/KLOR-CON) 10 MEQ CR tablet TAKE 1 TABLET BY MOUTH ONCE DAILY WHEN  TAKING  LASIX  (FUROSEMIDE) 30 tablet 3     rosuvastatin (CRESTOR) 5 MG tablet Take 1 tablet by mouth once daily 90 tablet 3     Semaglutide, 2 MG/DOSE, (OZEMPIC) 8 MG/3ML pen Inject 2 mg Subcutaneous every 7 days 9 mL 3     sertraline (ZOLOFT) 50 MG tablet Take 75 mg by mouth daily       Vitamin D3 (CHOLECALCIFEROL) 125 MCG (5000 UT) tablet Take 1 tablet by mouth daily       VRAYLAR 3 MG CAPS capsule Take 3 mg by mouth daily             7/6/2023    12:22 PM   Weight Loss Medication History Reviewed With Patient   Which weight loss medications are you currently taking on a regular basis? Ozempic    Vyvanse   Are you having any side effects from the weight loss medication that we have prescribed you? No       Hospital Outpatient Visit on 05/31/2023   Component Date Value Ref Range Status     Hemoglobin A1C POCT 05/31/2023 6.7 (A)  <5.7 % Final       PHYSICAL EXAM:  Objective    Ht 1.6 m (5' 2.99\")   Wt 127 kg (280 lb)   LMP 05/22/2023   BMI 49.61 kg/m      Vitals - Patient Reported  Pain Score: Severe Pain (7)  Pain Loc: Knee        GENERAL: Healthy, alert and no distress  EYES: Eyes grossly normal to inspection.  No discharge or erythema, or obvious scleral/conjunctival " abnormalities.  RESP: No audible wheeze, cough, or visible cyanosis.  No visible retractions or increased work of breathing.    SKIN: Visible skin clear. No significant rash, abnormal pigmentation or lesions.  NEURO: Cranial nerves grossly intact.  Mentation and speech appropriate for age.  PSYCH: Mentation appears normal, affect normal/bright, judgement and insight intact, normal speech and appearance well-groomed.        Sincerely,    Ivone Moses PA-C      10 minutes spent by me on the date of the encounter doing chart review, history and exam, documentation and further activities per the note      Virtual Visit Details    Type of service:  Video Visit   Video Start Time: 145  Video End Time:1:53 PM    Originating Location (pt. Location): Home  Distant Location (provider location):  Off-site  Platform used for Video Visit: [email or web address]

## 2023-07-06 NOTE — TELEPHONE ENCOUNTER
Furosemide (Lasix) 20 MG tablet    Last Written Prescription Date:  01/05/2023  Last Fill Quantity: 60,   # refills: 0  Last Office Visit: 05/17/2023

## 2023-07-06 NOTE — Clinical Note
Last visit I tried to restart topiramate. She had been on in the past with PCP for migraines.  She said insurance just stopped covering it.  I restarted and pt says that it still wasn't covered despite me listing migraines as dx.  She said she appealed on her own too and denied.  I've never seen this.  Any ideas?

## 2023-07-07 DIAGNOSIS — G43.909 MIGRAINE WITHOUT STATUS MIGRAINOSUS, NOT INTRACTABLE, UNSPECIFIED MIGRAINE TYPE: Primary | ICD-10-CM

## 2023-07-07 RX ORDER — FUROSEMIDE 20 MG
TABLET ORAL
Qty: 60 TABLET | Refills: 9 | Status: SHIPPED | OUTPATIENT
Start: 2023-07-07 | End: 2024-05-13

## 2023-07-07 RX ORDER — TOPIRAMATE 25 MG/1
TABLET, FILM COATED ORAL
Qty: 90 TABLET | Refills: 1 | Status: SHIPPED | OUTPATIENT
Start: 2023-07-07 | End: 2023-08-31

## 2023-07-09 DIAGNOSIS — G89.4 CHRONIC PAIN SYNDROME: ICD-10-CM

## 2023-07-10 RX ORDER — BACLOFEN 10 MG/1
TABLET ORAL
Qty: 90 TABLET | Refills: 0 | Status: SHIPPED | OUTPATIENT
Start: 2023-07-10 | End: 2023-08-23

## 2023-07-10 NOTE — TELEPHONE ENCOUNTER
Baclofen      Last Written Prescription Date:  6.15.23  Last Fill Quantity: #90,   # refills: 0  Last Office Visit: 5.17.23  Future Office visit:    Next 5 appointments (look out 90 days)    Aug 16, 2023  2:00 PM  (Arrive by 1:45 PM)  SHORT with SEVEN Pandey  [facility] ([facility] ) [address]  [place] [address]  [phone]           Routing refill request to provider for review/approval because:  Drug not on the [facility], [facility] or [facility] refill protocol or controlled substance

## 2023-07-19 ENCOUNTER — VIRTUAL VISIT (OUTPATIENT)
Dept: ENDOCRINOLOGY | Facility: CLINIC | Age: 48
End: 2023-07-19
Payer: COMMERCIAL

## 2023-07-19 VITALS — WEIGHT: 287 LBS | HEIGHT: 63 IN | BODY MASS INDEX: 50.85 KG/M2

## 2023-07-19 DIAGNOSIS — Z71.3 NUTRITIONAL COUNSELING: Primary | ICD-10-CM

## 2023-07-19 DIAGNOSIS — E11.9 TYPE 2 DIABETES MELLITUS WITHOUT COMPLICATION, WITHOUT LONG-TERM CURRENT USE OF INSULIN (H): ICD-10-CM

## 2023-07-19 DIAGNOSIS — E66.9 OBESITY: ICD-10-CM

## 2023-07-19 PROCEDURE — 97803 MED NUTRITION INDIV SUBSEQ: CPT | Mod: 95 | Performed by: DIETITIAN, REGISTERED

## 2023-07-19 PROCEDURE — 99207 PR NO CHARGE LOS: CPT | Mod: 95 | Performed by: DIETITIAN, REGISTERED

## 2023-07-19 ASSESSMENT — PAIN SCALES - GENERAL: PAINLEVEL: SEVERE PAIN (7)

## 2023-07-19 NOTE — PROGRESS NOTES
"Autumn Holbrook is a 47 year old female who is being evaluated via a billable video visit.      The patient has been notified of following:     \"This video visit will be conducted via a call between you and your physician/provider. We have found that certain health care needs can be provided without the need for an in-person physical exam.  This service lets us provide the care you need with a video conversation.  If a prescription is necessary we can send it directly to your pharmacy.  If lab work is needed we can place an order for that and you can then stop by our lab to have the test done at a later time.    Video visits are billed at different rates depending on your insurance coverage.  Please reach out to your insurance provider with any questions.    If during the course of the call the physician/provider feels a video visit is not appropriate, you will not be charged for this service.\"    Patient has given verbal consent for Video visit? Yes  How would you like to obtain your AVS? MyChart  If you are dropped from the video visit, the video invite should be resent to: Text to cell phone: [phone]  Will anyone else be joining your video visit? No  {If patient encounters technical issues they should call [phone]    Video-Visit Details    Type of service:  Video Visit    Video Start Time: 11:38 am  Video End Time: 11:52 am    Originating Location (pt. Location): Home    Distant Location (provider location):  Offsite (providers home)     Platform used for Video Visit: [employer]    During this virtual visit the patient is located in [place], patient verifies this as the location during the entirety of this visit.       Return Weight Management Nutrition Consultation    Autumn Holbrook is a 47 year old female presents today for a return weight management nutrition consultation.  Patient referred by SEVEN Nickerson on March 1, 2023.    Patient with Co-morbidities of obesity including:  Type 2 Diabetes, GERD, " "Knee Pain, Back Pain    Anthropometrics:  Weight 2/28/23: 287 lbs with BMI 52.36    Estimated body mass index is 50.85 kg/m  as calculated from the following:    Height as of this encounter: 1.6 m (5' 2.99\").    Weight as of this encounter: 130.2 kg (287 lb).     Current weight: 287 lbs, pt report   - Up 7 lbs from last appt    Medications for Weight Loss:  Ozempic    NUTRITION HISTORY  Food allergies: none  Food intolerances: avocados  Supplements: Vitamin D3, MVI, potassium (prescribed by PCP)   Previous methods of diet modification for weight loss: Watching Portions or Calories, Exercise, Weight Watchers, Medications, Fasting    Pt reports she has been heavy for most of her life. Thyroid issues/Hasimotos make it difficult to lose weight. Has been trying to decrease carbs d/t T2DM. Often craves sugar at night. Has [employer] Meals delivered- diabetic friendly and pre-portioned. Physical activity is limited d/t knee pain.     4/11/23: Pt started Ozempic about a month ago. Has not noticed much of a change since taking it. Still craving sweets. Still using [employer]'s Meals 2x per day- when she cooks she makes a veggie and a protein, sometimes a starch. Struggling some with drinking enough water. Working hard to eat earlier in the day (sometimes isn't hungry until noon).    Recent food recall:  Breakfast: skips  Lunch: [employer]oms Meals  Dinner: Moms Meals  Snacks: Granola bar, cottage cheese and fruit, yogurt, chips, chocolate  Beverages: water, diet soda     June 2023:  First time meeting pt, worked with Allison previously.     Pt reports some recent weight gain - sister was in town and more eating out.     Earlier satiety - smaller portions. Focusing on protein/fiber and healthy fats.  Feels eating is going well in general but movement is limited. Continues with 2 MOM meals/day. If rice is about 1/4 to 1/2 cup portion per pt. Always has vegetables.     Had pizza over weekend. Feels going out to eat is hard to navigate. Tries to look " at menu ahead of time. Taking home leftovers.  Snack - Might have cereal with some milk at night time if hungry.    Hydration: water with prabhjot, diet pepsi, at least 1 glass of milk/day  - Working on decreasing diet pepsi intake    PA: yard work, house cleaning  - Limited by knee pain    July 2023:    Pt reports her sister is in town currently. Eating out more and more carbs than normal - pad mackenzie, pizza.    Noticing earlier satiety with Ozempic.     Occ eating past fullness. Found self munching on fried cauliflower and fries last night despite feeling very full/sick. Endorses feeling bored.    Hydration: water, 1-2 diet pops/day    Previous goals:  1. Could consider portioning out meal right away at restaurant and putting part away before starting to eat - Met, found herself picking at her leftovers while waiting for others to finish meal despite fullness.   2. Continue working on hydration and decreasing pop intake - In progress  3. Could consider adding vegetables such as spinach or zucchini to smoothies. Recommend berries more often than banana (lower sugar) - Has not added vegetables yet but has added yogurt and berries.   4. Aim to increase activity as able/tolerated. Reach out to Ortho to discusses recommendations for knees - Has not reached out to Ortho yet. Plans to get a leg/arm peddler through insurance if possible     Nutrition Prescription  Recommended energy/nutrient modification.    Nutrition Diagnosis  Obesity r/t long history of positive energy balance aeb BMI >30.    Nutrition Intervention  Materials/education provided on hypocaloric diet for weight loss. Discussed 1700 calorie/day diet, Volumetric eating to help satiety level on fewer calories; portion control and healthy food choices (Plate Method and Volumetrics handouts), 100 calorie snack choices, meal and snack planning and websites, sample meal plans     Patient demonstrates understanding.    Expected Engagement: good    Nutrition Goals  1.  Consider reflecting on how you feel when eating past fullness to help with prevention in the moment  2. Recommend trying to delay the urge to eat when feeling full - wait 5 minutes, drink some water first, clean, tend to dog, go outside.   3. Continue staying well hydrated  4. Recommend something in the morning as opposed to nothing    - Toast with peanut butter   - Hard boiled egg   - Scrambled egg   - Oatmeal   - Cottage cheese   - Yogurt    The Plate Method  [email or web address]    Protein Sources   [email or web address]     Carbohydrates  [email or web address]     Mindful Eating  [email or web address]     Summary of Volumetrics Eating Plan  [email or web address]     Follow-Up:  Wednesday, September 13th at 11:30 am    Time spent with patient: 14 minutes.  STEVO Gil, RD, LD

## 2023-07-19 NOTE — NURSING NOTE
Is the patient currently in the state of [place]? YES    Visit mode:VIDEO    If the visit is dropped, the patient can be reconnected by: VIDEO VISIT: Text to cell phone: [phone]    Will anyone else be joining the visit? NO      How would you like to obtain your AVS? MyChart    Are changes needed to the allergy or medication list? NO    Reason for visit: Follow Up

## 2023-07-19 NOTE — LETTER
"7/19/2023       RE: Autumn Holbrook  [address] [facility] [address]     Dear Colleague,    Thank you for referring your patient, Autumn Holbrook, to the [facility] [place] at [facility]. Please see a copy of my visit note below.    Autumn Holbrook is a 47 year old female who is being evaluated via a billable video visit.      The patient has been notified of following:     \"This video visit will be conducted via a call between you and your physician/provider. We have found that certain health care needs can be provided without the need for an in-person physical exam.  This service lets us provide the care you need with a video conversation.  If a prescription is necessary we can send it directly to your pharmacy.  If lab work is needed we can place an order for that and you can then stop by our lab to have the test done at a later time.    Video visits are billed at different rates depending on your insurance coverage.  Please reach out to your insurance provider with any questions.    If during the course of the call the physician/provider feels a video visit is not appropriate, you will not be charged for this service.\"    Patient has given verbal consent for Video visit? Yes  How would you like to obtain your AVS? MyChart  If you are dropped from the video visit, the video invite should be resent to: Text to cell phone: [phone]  Will anyone else be joining your video visit? No  {If patient encounters technical issues they should call [phone]    Video-Visit Details    Type of service:  Video Visit    Video Start Time: 11:38 am  Video End Time: 11:52 am    Originating Location (pt. Location): Home    Distant Location (provider location):  Offsite (providers home)     Platform used for Video Visit: [employer]    During this virtual visit the patient is located in [place], patient verifies this as the location during the entirety of " "this visit.       Return Weight Management Nutrition Consultation    Autumn Holbrook is a 47 year old female presents today for a return weight management nutrition consultation.  Patient referred by SEVEN Nickerson on March 1, 2023.    Patient with Co-morbidities of obesity including:  Type 2 Diabetes, GERD, Knee Pain, Back Pain    Anthropometrics:  Weight 2/28/23: 287 lbs with BMI 52.36    Estimated body mass index is 50.85 kg/m  as calculated from the following:    Height as of this encounter: 1.6 m (5' 2.99\").    Weight as of this encounter: 130.2 kg (287 lb).     Current weight: 287 lbs, pt report   - Up 7 lbs from last appt    Medications for Weight Loss:  Ozempic    NUTRITION HISTORY  Food allergies: none  Food intolerances: avocados  Supplements: Vitamin D3, MVI, potassium (prescribed by PCP)   Previous methods of diet modification for weight loss: Watching Portions or Calories, Exercise, Weight Watchers, Medications, Fasting    Pt reports she has been heavy for most of her life. Thyroid issues/Hasimotos make it difficult to lose weight. Has been trying to decrease carbs d/t T2DM. Often craves sugar at night. Has [employer]oms Meals delivered- diabetic friendly and pre-portioned. Physical activity is limited d/t knee pain.     4/11/23: Pt started Ozempic about a month ago. Has not noticed much of a change since taking it. Still craving sweets. Still using Mom's Meals 2x per day- when she cooks she makes a veggie and a protein, sometimes a starch. Struggling some with drinking enough water. Working hard to eat earlier in the day (sometimes isn't hungry until noon).    Recent food recall:  Breakfast: skips  Lunch: Moms Meals  Dinner: Moms Meals  Snacks: Granola bar, cottage cheese and fruit, yogurt, chips, chocolate  Beverages: water, diet soda     June 2023:  First time meeting pt, worked with Allison previously.     Pt reports some recent weight gain - sister was in town and more eating out.     Earlier satiety - " smaller portions. Focusing on protein/fiber and healthy fats.  Feels eating is going well in general but movement is limited. Continues with 2 MOM meals/day. If rice is about 1/4 to 1/2 cup portion per pt. Always has vegetables.     Had pizza over weekend. Feels going out to eat is hard to navigate. Tries to look at menu ahead of time. Taking home leftovers.  Snack - Might have cereal with some milk at night time if hungry.    Hydration: water with prabhjot, diet pepsi, at least 1 glass of milk/day  - Working on decreasing diet pepsi intake    PA: yard work, house cleaning  - Limited by knee pain    July 2023:    Pt reports her sister is in town currently. Eating out more and more carbs than normal - pad Swedish, pizza.    Noticing earlier satiety with Ozempic.     Occ eating past fullness. Found self munching on fried cauliflower and fries last night despite feeling very full/sick. Endorses feeling bored.    Hydration: water, 1-2 diet pops/day    Previous goals:  1. Could consider portioning out meal right away at restaurant and putting part away before starting to eat - Met, found herself picking at her leftovers while waiting for others to finish meal despite fullness.   2. Continue working on hydration and decreasing pop intake - In progress  3. Could consider adding vegetables such as spinach or zucchini to smoothies. Recommend berries more often than banana (lower sugar) - Has not added vegetables yet but has added yogurt and berries.   4. Aim to increase activity as able/tolerated. Reach out to Ortho to discusses recommendations for knees - Has not reached out to Ortho yet. Plans to get a leg/arm peddler through insurance if possible     Nutrition Prescription  Recommended energy/nutrient modification.    Nutrition Diagnosis  Obesity r/t long history of positive energy balance aeb BMI >30.    Nutrition Intervention  Materials/education provided on hypocaloric diet for weight loss. Discussed 1700 calorie/day diet,  Volumetric eating to help satiety level on fewer calories; portion control and healthy food choices (Plate Method and Volumetrics handouts), 100 calorie snack choices, meal and snack planning and websites, sample meal plans     Patient demonstrates understanding.    Expected Engagement: good    Nutrition Goals  1. Consider reflecting on how you feel when eating past fullness to help with prevention in the moment  2. Recommend trying to delay the urge to eat when feeling full - wait 5 minutes, drink some water first, clean, tend to dog, go outside.   3. Continue staying well hydrated  4. Recommend something in the morning as opposed to nothing    - Toast with peanut butter   - Hard boiled egg   - Scrambled egg   - Oatmeal   - Cottage cheese   - Yogurt    The Plate Method  [email or web address]    Protein Sources   [email or web address]     Carbohydrates  [email or web address]     Mindful Eating  [email or web address]     Summary of Volumetrics Eating Plan  [email or web address]     Follow-Up:  Wednesday, September 13th at 11:30 am    Time spent with patient: 14 minutes.  STEVO Gil, RD, LD

## 2023-07-19 NOTE — PATIENT INSTRUCTIONS
Nutrition Goals  1. Consider reflecting on how you feel when eating past fullness to help with prevention in the moment  2. Recommend trying to delay the urge to eat when feeling full - wait 5 minutes, drink some water first, clean, tend to dog, go outside.   3. Continue staying well hydrated  4. Recommend something in the morning as opposed to nothing    - Toast with peanut butter   - Hard boiled egg   - Scrambled egg   - Oatmeal   - Cottage cheese   - Yogurt    The Plate Method  [email or web address]    Protein Sources   [email or web address]     Carbohydrates  [email or web address]     Mindful Eating  [email or web address]     Summary of Volumetrics Eating Plan  [email or web address]     Follow-Up:  Wednesday, September 13th at 11:30 am    STEVO Calvo, RD, LD  Clinic #: [phone]

## 2023-08-21 ENCOUNTER — TELEPHONE (OUTPATIENT)
Dept: FAMILY MEDICINE | Facility: OTHER | Age: 48
End: 2023-08-21

## 2023-08-21 DIAGNOSIS — G89.4 CHRONIC PAIN SYNDROME: ICD-10-CM

## 2023-08-21 NOTE — TELEPHONE ENCOUNTER
Taisha Hankins   [phone]    Verbal order to continue skilled nursing for health assessment and medication management 1 x week    Verbal order given:    Karyna Sky RN

## 2023-08-23 RX ORDER — BACLOFEN 10 MG/1
TABLET ORAL
Qty: 90 TABLET | Refills: 0 | Status: SHIPPED | OUTPATIENT
Start: 2023-08-23 | End: 2023-09-22

## 2023-08-23 NOTE — TELEPHONE ENCOUNTER
Baclofen      Last Written Prescription Date:  7.11.23  Last Fill Quantity: #90,   # refills: 0  Last Office Visit: 5.17.23  Future Office visit:    Next 5 appointments (look out 90 days)      Sep 11, 2023 11:15 AM  (Arrive by 11:00 AM)  SHORT with SEVEN Pandey  [facility] ([facility] ) [address]  [place] [address]  [phone]             Routing refill request to provider for review/approval because:  Drug not on the [facility], P or [facility] refill protocol or controlled substance

## 2023-08-28 DIAGNOSIS — E11.9 TYPE 2 DIABETES MELLITUS WITHOUT COMPLICATION, WITHOUT LONG-TERM CURRENT USE OF INSULIN (H): ICD-10-CM

## 2023-08-28 DIAGNOSIS — K29.70 GASTRITIS WITHOUT BLEEDING, UNSPECIFIED CHRONICITY, UNSPECIFIED GASTRITIS TYPE: ICD-10-CM

## 2023-08-28 DIAGNOSIS — G43.909 MIGRAINE WITHOUT STATUS MIGRAINOSUS, NOT INTRACTABLE, UNSPECIFIED MIGRAINE TYPE: ICD-10-CM

## 2023-08-28 DIAGNOSIS — R10.13 ABDOMINAL PAIN, EPIGASTRIC: ICD-10-CM

## 2023-08-29 RX ORDER — ROSUVASTATIN CALCIUM 5 MG/1
TABLET, COATED ORAL
Qty: 90 TABLET | Refills: 1 | Status: SHIPPED | OUTPATIENT
Start: 2023-08-29 | End: 2024-03-15

## 2023-08-29 RX ORDER — OMEPRAZOLE 40 MG/1
CAPSULE, DELAYED RELEASE ORAL
Qty: 90 CAPSULE | Refills: 1 | Status: SHIPPED | OUTPATIENT
Start: 2023-08-29 | End: 2024-02-08

## 2023-08-31 RX ORDER — TOPIRAMATE 25 MG/1
75 TABLET, FILM COATED ORAL AT BEDTIME
Qty: 90 TABLET | Refills: 3 | Status: SHIPPED | OUTPATIENT
Start: 2023-08-31 | End: 2023-11-09

## 2023-08-31 NOTE — TELEPHONE ENCOUNTER
Patient has appointment with Ivone Moses PA-C in November. Plan from last visit in July 2023 is shown below. Routed to RN/JERALD for sign off.     Weight mgmt follow up  Continue ozempic 2mg  Try to get topiramate re-started for migraine headaches.     Follow up 3-4 mo Ivone

## 2023-09-01 ENCOUNTER — HOSPITAL ENCOUNTER (OUTPATIENT)
Dept: EDUCATION SERVICES | Facility: HOSPITAL | Age: 48
Discharge: HOME | End: 2023-09-01
Attending: NURSE PRACTITIONER | Admitting: NURSE PRACTITIONER
Payer: COMMERCIAL

## 2023-09-01 VITALS
OXYGEN SATURATION: 98 % | DIASTOLIC BLOOD PRESSURE: 84 MMHG | BODY MASS INDEX: 49.42 KG/M2 | HEART RATE: 81 BPM | HEIGHT: 63 IN | WEIGHT: 278.9 LBS | SYSTOLIC BLOOD PRESSURE: 116 MMHG

## 2023-09-01 DIAGNOSIS — E11.9 TYPE 2 DIABETES MELLITUS WITHOUT COMPLICATION, WITHOUT LONG-TERM CURRENT USE OF INSULIN (H): Primary | ICD-10-CM

## 2023-09-01 LAB — HBA1C MFR BLD: 6.4 % (ref 4.3–?)

## 2023-09-01 PROCEDURE — G0108 DIAB MANAGE TRN  PER INDIV: HCPCS | Performed by: DIETITIAN, REGISTERED

## 2023-09-01 PROCEDURE — 83036 HEMOGLOBIN GLYCOSYLATED A1C: CPT | Performed by: PHYSICIAN ASSISTANT

## 2023-09-01 ASSESSMENT — PAIN SCALES - GENERAL: PAINLEVEL: EXTREME PAIN (8)

## 2023-09-01 NOTE — PROGRESS NOTES
Diabetes Self-Management Education & Support    Presents for:      Type of Service: In Person Visit    Assessment Type:   ASSESSMENT:  Pt seen with RD and RN in diabetes education. Pt reports eating smaller portions, having less of an appetite. Greasy foods are now unappetizing. Trying to move more- walking in the woods, camping. Ozempic 2mg weekly, no noted side effects other than increased stomach acid with eating greasy foods. A1C rechecked today- down to 6.4 from 6.7 in 2023. Pt continues to follow with weight management provider/RD.    BG per meter  2 week average- 122  Fastin, 106  -136    Patient's most recent   Lab Results   Component Value Date    A1C 6.6 2023    A1C 6.9 2022    HEMOGLOBINA1 6.4 2023     is meeting goal of <7.0    Diabetes knowledge and skills assessment:   Patient is knowledgeable in diabetes management concepts related to: Healthy Eating, Being Active, Monitoring, Taking Medication, Problem Solving, Reducing Risks, and Healthy Coping    Continue education with the following diabetes management concepts: Healthy Eating, Being Active, Monitoring, Taking Medication, Problem Solving, Reducing Risks, and Healthy Coping    Based on learning assessment above, most appropriate setting for further diabetes education would be: Individual setting.      PLAN    Topics to cover at upcoming visits: Healthy Eating, Being Active, Monitoring, Taking Medication, Problem Solving, Reducing Risks, and Healthy Coping    Follow-up: 3/6/24 at 1pm    See Care Plan for co-developed, patient-state behavior change goals.  AVS provided for patient today.    Education Materials Provided:  No new materials provided today      SUBJECTIVE/OBJECTIVE:  Accompanied by: Self  Diabetes education in the past 24mo: Yes  Focus of Visit: Monitoring  Diabetes type: Type 2  Disease course: Stable  How confident are you filling out medical forms by yourself:: Extremely  Transportation concerns:  "No  Difficulty affording diabetes medication?: No  Difficulty affording diabetes testing supplies?: No  Other concerns:: Glasses  Cultural Influences/Ethnic Background:  Not  or       Diabetes Symptoms & Complications:  Fatigue: Sometimes  Neuropathy: Yes  Polydipsia: Yes  Polyphagia: Sometimes  Polyuria: Yes  Visual change: No  Slow healing wounds: Yes  Other: No  Symptom course: Stable  Weight trend: Decreasing  Complications assessed today?: Yes  Autonomic neuropathy: No  CVA: No  Heart disease: No  Nephropathy: No  Peripheral neuropathy: No  Peripheral Vascular Disease: Yes  Retinopathy: No  Sexual dysfunction: Other    Patient Problem List and Family Medical History reviewed for relevant medical history, current medical status, and diabetes risk factors.    Vitals:  /84   Pulse 81   Ht 1.6 m (5' 3\")   Wt 126.5 kg (278 lb 14.4 oz)   SpO2 98%   BMI 49.40 kg/m    Estimated body mass index is 49.4 kg/m  as calculated from the following:    Height as of this encounter: 1.6 m (5' 3\").    Weight as of this encounter: 126.5 kg (278 lb 14.4 oz).   Last 3 BP:   BP Readings from Last 3 Encounters:   09/01/23 116/84   05/31/23 113/80   05/30/23 118/70       History   Smoking Status    Every Day    Packs/day: 0.50    Years: 32.00    Types: Cigarettes    Start date: 1/1/1989   Smokeless Tobacco    Never       Labs:  Lab Results   Component Value Date    A1C 6.6 02/17/2023    A1C 6.9 06/01/2022    HEMOGLOBINA1 6.4 09/01/2023     Lab Results   Component Value Date     05/17/2023     09/26/2022     05/19/2021     Lab Results   Component Value Date    LDL 74 05/17/2023     01/06/2022     01/29/2020     HDL Cholesterol   Date Value Ref Range Status   01/29/2020 87 >49 mg/dL Final     Direct Measure HDL   Date Value Ref Range Status   01/06/2022 65 >=50 mg/dL Final   ]  GFR Estimate   Date Value Ref Range Status   05/17/2023 85 >60 mL/min/1.73m2 Final     Comment:     " eGFR calculated using 2021 CKD-EPI equation.   05/19/2021 >90 >60 mL/min/[1.73_m2] Final     Comment:     Non  GFR Calc  Starting 12/18/2018, serum creatinine based estimated GFR (eGFR) will be   calculated using the Chronic Kidney Disease Epidemiology Collaboration   (CKD-EPI) equation.       GFR Estimate If Black   Date Value Ref Range Status   05/19/2021 >90 >60 mL/min/[1.73_m2] Final     Comment:      GFR Calc  Starting 12/18/2018, serum creatinine based estimated GFR (eGFR) will be   calculated using the Chronic Kidney Disease Epidemiology Collaboration   (CKD-EPI) equation.       Lab Results   Component Value Date    CR 0.84 05/17/2023    CR 0.78 05/19/2021     No results found for: MICROALBUMIN    Healthy Eating:  Healthy Eating Assessed Today: Yes  Cultural/Congregation diet restrictions?: No  Meal planning/habits: Avoiding sweets, Calorie counting, Carb counting, Low carb, Smaller portions, Plate planning method (forgets to eat)  How many times a week on average do you eat food made away from home (restaurant/take-out)?: 2  Meals include: Lunch, Dinner, Evening Snack  Lunch: mom's meals  Dinner: mom's meals, bbq'd chicken breast, hamburger, with veggies/salads, small amounts of pasta or rice. out to eat sometimes smaller portions lookes for veggie sides veggie side  Snacks: granola bar, yogurt  Beverages: Water, Coffee, Milk, Diet soda, Alcohol  Has patient met with a dietitian in the past?: Yes    Being Active:  Being Active Assessed Today: Yes  Exercise:: Yes (getting out more, walking around in the woods, swimming, camping)  How intense was your typical exercise? : Light (like stretching or slow walking)  Barrier to exercise: Physical limitation    Monitoring:  Monitoring Assessed Today: Yes  Did patient bring glucose meter to appointment? : Yes  Blood Glucose Meter: Accu-chek  Times checking blood sugar at home (number): 1  Times checking blood sugar at home (per):  Day  Blood glucose trend: Decreasing      Taking Medications:  Diabetes Medication(s)       Incretin Mimetic Agents       Semaglutide, 2 MG/DOSE, (OZEMPIC) 8 MG/3ML pen    Inject 2 mg Subcutaneous every 7 days     Semaglutide, 2 MG/DOSE, (OZEMPIC) 8 MG/3ML pen    Inject 2 mg Subcutaneous every 7 days            Taking Medication Assessed Today: Yes  Current Treatments: Diet, Non-insulin Injectables (ozempic 2mg daily)  Problems taking diabetes medications regularly?: No  Diabetes medication side effects?: No (acid)    Problem Solving:  Problem Solving Assessed Today: Yes  Is the patient at risk for hypoglycemia?: No  Is the patient at risk for DKA?: No  Does patient have severe weather/disaster plan for diabetes management?: Not Needed  Does patient have sick day plan for diabetes management?: Not Needed              Reducing Risks:  Reducing Risks Assessed Today: Yes  Diabetes Risks: Age over 45 years, Sedentary Lifestyle, History of gestational diabetes, Hyperlipidemia  Additional female risks: Gestational Diabetes  CAD Risks: Diabetes Mellitus, Family history, Obesity, Sedentary lifestyle, Tobacco exposure, Dyslipidemia  Has dilated eye exam at least once a year?: Yes  Sees dentist every 6 months?: Yes  Feet checked by healthcare provider in the last year?: No    Healthy Coping:  Healthy Coping Assessed Today: Yes  Emotional response to diabetes: Acceptance, Confidence diabetes can be controlled  Informal Support system:: Children, Family, Friends, Other  Stage of change: ACTION (Actively working towards change)  Patient Activation Measure Survey Score:      11/28/2018    12:00 PM 3/7/2019     4:00 PM   KEN Score (Last Two)   KEN Raw Score 27 27   Activation Score 47.4 47.4   KEN Level 2 2         Care Plan and Education Provided:  Care Plan: Diabetes   Updates made by Taisha Corral RD since 9/1/2023 12:00 AM        Problem: Diabetes Self-Management Education Needed to Optimize Self-Care Behaviors          Goal: Monitoring - monitor glucose and ketones as directed    This Visit's Progress: 100%   Recent Progress: 50%   Note:    BG testing 2-3 times/week. A1C stable.        Long-Range Goal: Reducing Risks - know how to prevent and treat long-term diabetes complications    This Visit's Progress: 10%   Note:    tobacco cessation- 1/2 pack -10 per day  Using gum   Trying hypnosis with therapist           Time Spent: 30 minutes  Encounter Type: Individual    Any diabetes medication dose changes were made via the CDE Protocol per the patient's primary care provider. A copy of this encounter was shared with the provider.

## 2023-09-01 NOTE — PROGRESS NOTES
"Diabetes Self-Management Education & Support    Autumn, 48 year old, returns to [facility] for follow up, A1c update with [facility] RD and RN. Refer to RD note for additional information.     Pt reports she has some numbness, burning feeling on left, outer thigh. Resembles neuropathy in feet and is wondering if its the same thing. Suggested follow up with PCP to assess her symptoms. The burning and numbness  She has a follow up scheduled 9/8 with PCP.      PCP: Apryl [email or web address]   Insurance Coverage: [facility]      Monitoring:  A1C:  6.4 today    Target A1C: <7    Previous A1C: 6.7 5/31/23      BG Targets:   FBG 80- 130   PP  <180     Meter Report: 2 week average 122. 101-136.      Diabetes Medications:   Ozempic 2 mg once weekly, tolerating.      Tried previously:  Trulicity  Metformin     Health Eating:   Noticing smaller portions 1/4th of plate when eating out. Food preferences has changed - \"craving\" more healthier foods vs   Mom's meals for most meals. Other options: BBQ, burger, veg, salads. Little rice, rare to have pastas.   Decreased carb and sweets. Occasional ice cream treat.    Lots of water.      Activity:    Walking in the woods  Tried swimming, has knee ROM issues, not able to kick leg to swim in \"deeper water\". Considering PT referral.   Has cortisone injection 9/8 for knee.     Reducing Risks:   /84  Statin use yes  ASA use no, defer to pcp for recommendations.   Tobacco use- yes- plans to stop smoking 1/2 ppd. Vape. has nicotine gum.  Plans to try hypnosis.   Foot exam due.    Eye exam- current Location [facility] November '22.    PCP follow up-scheduled  9/11/23     Refills needed: none today.       Allergies   Allergen Reactions    Depakote [Valproic Acid]     Gabapentin Swelling    Propofol Unknown     Got very stiff and tight.       Wt Readings from Last 10 Encounters:   09/01/23 126.5 kg (278 lb 14.4 oz)   07/19/23 130.2 kg (287 lb)   07/06/23 127 kg (280 lb)   06/12/23 127.3 kg (280 lb 9.6 oz) "   05/31/23 127.7 kg (281 lb 8 oz)   05/30/23 132.9 kg (293 lb)   05/17/23 132.9 kg (293 lb 1.6 oz)   03/01/23 128.8 kg (284 lb)   02/28/23 130.4 kg (287 lb 6.4 oz)   02/22/23 130.5 kg (287 lb 9.6 oz)     Pts stated goals: stop smoking, knee surgery, dental care.     Plan-  continue current plan. Follow up 6 months, scheduled 3/6/23 1 pm with UYEN, RN.   Kinga Beckwith RN Diabetes Educator,  [phone]

## 2023-09-11 ENCOUNTER — OFFICE VISIT (OUTPATIENT)
Dept: FAMILY MEDICINE | Facility: OTHER | Age: 48
End: 2023-09-11
Attending: PHYSICIAN ASSISTANT
Payer: COMMERCIAL

## 2023-09-11 VITALS
BODY MASS INDEX: 49.25 KG/M2 | TEMPERATURE: 97.2 F | DIASTOLIC BLOOD PRESSURE: 60 MMHG | OXYGEN SATURATION: 98 % | SYSTOLIC BLOOD PRESSURE: 110 MMHG | HEART RATE: 82 BPM | WEIGHT: 278 LBS | RESPIRATION RATE: 16 BRPM

## 2023-09-11 DIAGNOSIS — Z87.891 PERSONAL HISTORY OF NICOTINE DEPENDENCE: ICD-10-CM

## 2023-09-11 DIAGNOSIS — F17.200 NICOTINE DEPENDENCE, UNCOMPLICATED, UNSPECIFIED NICOTINE PRODUCT TYPE: ICD-10-CM

## 2023-09-11 DIAGNOSIS — E03.9 ACQUIRED HYPOTHYROIDISM: ICD-10-CM

## 2023-09-11 DIAGNOSIS — F31.61 MILD MIXED BIPOLAR I DISORDER (H): ICD-10-CM

## 2023-09-11 DIAGNOSIS — R60.0 BILATERAL EDEMA OF LOWER EXTREMITY: ICD-10-CM

## 2023-09-11 DIAGNOSIS — Z01.818 PREOP GENERAL PHYSICAL EXAM: Primary | ICD-10-CM

## 2023-09-11 DIAGNOSIS — E11.9 TYPE 2 DIABETES MELLITUS WITHOUT COMPLICATION, WITHOUT LONG-TERM CURRENT USE OF INSULIN (H): ICD-10-CM

## 2023-09-11 LAB
ANION GAP SERPL CALCULATED.3IONS-SCNC: 10 MMOL/L (ref 7–15)
BASOPHILS # BLD AUTO: 0.1 10E3/UL (ref 0–0.2)
BASOPHILS NFR BLD AUTO: 1 %
BUN SERPL-MCNC: 14.3 MG/DL (ref 6–20)
CALCIUM SERPL-MCNC: 9.7 MG/DL (ref 8.6–10)
CHLORIDE SERPL-SCNC: 100 MMOL/L (ref 98–107)
CREAT SERPL-MCNC: 0.82 MG/DL (ref 0.51–0.95)
DEPRECATED HCO3 PLAS-SCNC: 24 MMOL/L (ref 22–29)
EGFRCR SERPLBLD CKD-EPI 2021: 87 ML/MIN/1.73M2
EOSINOPHIL # BLD AUTO: 0.2 10E3/UL (ref 0–0.7)
EOSINOPHIL NFR BLD AUTO: 2 %
ERYTHROCYTE [DISTWIDTH] IN BLOOD BY AUTOMATED COUNT: 14.5 % (ref 10–15)
GLUCOSE SERPL-MCNC: 97 MG/DL (ref 70–99)
HCT VFR BLD AUTO: 48 % (ref 35–47)
HGB BLD-MCNC: 15.5 G/DL (ref 11.7–15.7)
IMM GRANULOCYTES # BLD: 0.1 10E3/UL
IMM GRANULOCYTES NFR BLD: 1 %
LYMPHOCYTES # BLD AUTO: 2.7 10E3/UL (ref 0.8–5.3)
LYMPHOCYTES NFR BLD AUTO: 19 %
MCH RBC QN AUTO: 29.9 PG (ref 26.5–33)
MCHC RBC AUTO-ENTMCNC: 32.3 G/DL (ref 31.5–36.5)
MCV RBC AUTO: 93 FL (ref 78–100)
MONOCYTES # BLD AUTO: 1 10E3/UL (ref 0–1.3)
MONOCYTES NFR BLD AUTO: 7 %
NEUTROPHILS # BLD AUTO: 10.2 10E3/UL (ref 1.6–8.3)
NEUTROPHILS NFR BLD AUTO: 70 %
NRBC # BLD AUTO: 0 10E3/UL
NRBC BLD AUTO-RTO: 0 /100
PLATELET # BLD AUTO: 282 10E3/UL (ref 150–450)
POTASSIUM SERPL-SCNC: 3.7 MMOL/L (ref 3.4–5.3)
RBC # BLD AUTO: 5.19 10E6/UL (ref 3.8–5.2)
SODIUM SERPL-SCNC: 134 MMOL/L (ref 136–145)
WBC # BLD AUTO: 14.3 10E3/UL (ref 4–11)

## 2023-09-11 PROCEDURE — 36415 COLL VENOUS BLD VENIPUNCTURE: CPT | Mod: ZL | Performed by: PHYSICIAN ASSISTANT

## 2023-09-11 PROCEDURE — 99214 OFFICE O/P EST MOD 30 MIN: CPT | Performed by: PHYSICIAN ASSISTANT

## 2023-09-11 PROCEDURE — 99406 BEHAV CHNG SMOKING 3-10 MIN: CPT | Performed by: PHYSICIAN ASSISTANT

## 2023-09-11 PROCEDURE — 80048 BASIC METABOLIC PNL TOTAL CA: CPT | Mod: ZL | Performed by: PHYSICIAN ASSISTANT

## 2023-09-11 PROCEDURE — G0463 HOSPITAL OUTPT CLINIC VISIT: HCPCS | Mod: 25

## 2023-09-11 PROCEDURE — 93005 ELECTROCARDIOGRAM TRACING: CPT | Performed by: PHYSICIAN ASSISTANT

## 2023-09-11 PROCEDURE — 85025 COMPLETE CBC W/AUTO DIFF WBC: CPT | Mod: ZL | Performed by: PHYSICIAN ASSISTANT

## 2023-09-11 PROCEDURE — 93010 ELECTROCARDIOGRAM REPORT: CPT | Mod: 77 | Performed by: INTERNAL MEDICINE

## 2023-09-11 RX ORDER — POTASSIUM CHLORIDE 750 MG/1
TABLET, EXTENDED RELEASE ORAL
Qty: 30 TABLET | Refills: 3 | Status: SHIPPED | OUTPATIENT
Start: 2023-09-11 | End: 2024-01-16

## 2023-09-11 ASSESSMENT — PAIN SCALES - GENERAL: PAINLEVEL: SEVERE PAIN (6)

## 2023-09-11 ASSESSMENT — PATIENT HEALTH QUESTIONNAIRE - PHQ9: SUM OF ALL RESPONSES TO PHQ QUESTIONS 1-9: 0

## 2023-09-11 NOTE — PROGRESS NOTES
[facility] - [place]  [address]  [place] [address]  Phone: [phone]  Primary Provider: Apryl Hathaway  Pre-op Performing Provider: APRYL HATHAWAY      PREOPERATIVE EVALUATION:  Today's date: 9/11/2023    Autumn Holbrook is a 48 year old female who presents for a preoperative evaluation.      9/11/2023    11:02 AM   Additional Questions   Roomed by Jw Vieyra LPN   Accompanied by self         9/11/2023    11:02 AM   Patient Reported Additional Medications   Patient reports taking the following new medications none       Surgical Information:  Surgery/Procedure: sterilization  Surgery Location: [facility]  Surgeon: Dr Yuan  Surgery Date: 09/13/2023  Time of Surgery: tbd  Where patient plans to recover: At home with family  Fax number for surgical facility: [facility]     Assessment & Plan     The proposed surgical procedure is considered INTERMEDIATE risk.    Preop general physical exam  She is aware of what to hold and not take. Off all her nSAIDS. NO ASA NO ISSUES NO REACTIONS TO ANESTHESIA , NO HISTORY OF BLOOD CLOTS. MOM HAD A STROKE AND THAT WAS WHY SHE CHECKED YES.   - EKG 12-lead complete w/read - (Clinic Performed)  - CBC with platelets and differential; Future    Bilateral edema of lower extremity  TAKES ONLY WITH HER LASIX IS ON HOLD SO WILL ALSO HOLD THIS.   - potassium chloride ER (K-TAB/KLOR-CON) 10 MEQ CR tablet; TAKE 1 TABLET BY MOUTH ONCE DAILY WHEN  TAKING  LASIX  (FUROSEMIDE)    Acquired hypothyroidism  LEVEL IS ACCEPTABLE.     Mild mixed bipolar I disorder (H)  NEW COMBINATION OF HER MEDICATION HAS MADE HER VERY CALM.  MORE FOCUSED, MORE ATTENTION, LESS CRYING AND MOOD IS MORE STABLE.     Personal history of nicotine dependence  DOWN TO LESS THAN A HALF A PACK A DAY. OFFERED TO GIVE OPTIONS AVAILABLE. REFERRAL IS SENT.   - SMOKING CESSATION COUNSELING 3-10 MIN    Nicotine dependence, uncomplicated, unspecified nicotine product type  SHE IS  TRYING OT QUIT.   - [place] Quit Partner Referral; Future  - Medication Therapy Management Referral  - SMOKING CESSATION COUNSELING 3-10 MIN    Type 2 diabetes mellitus without complication, without long-term current use of insulin (H)  LAST A1C WAS 6.4 BETTER THAN LAST YEAR.  ON OZEMPIC, AND WILL HOLD THE DAY BEFORE SURGERY .  Her shot is due on Tuesday. Can take them after surgery if feeling well enough or the following day. Should have a good enough roll over.   - CBC with platelets and differential; Future  - Basic metabolic panel; Future           Risks and Recommendations:  The patient has the following additional risks and recommendations for perioperative complications:   - Morbid obesity (BMI >40)  Diabetes:  - Patient is not on insulin therapy: regular NPO guidelines can be followed.     Antiplatelet or Anticoagulation Medication Instructions:   - Patient is on no antiplatelet or anticoagulation medications.    Additional Medication Instructions:  Holding her hydrochlorothiazide, lasix, potassium, semiglutin, asa, motrin,     RECOMMENDATION:  APPROVAL GIVEN to proceed with proposed procedure, without further diagnostic evaluation.    Review of external notes as documented elsewhere in note  Ordering of each unique test  Prescription drug management  10  minutes spent by me on the date of the encounter doing chart review, history and exam, documentation and further activities per the note      Subjective       HPI related to upcoming procedure: Tubal removal for sterilization.         9/4/2023     2:10 PM   Preop Questions   1. Have you ever had a heart attack or stroke? No   2. Have you ever had surgery on your heart or blood vessels, such as a stent placement, a coronary artery bypass, or surgery on an artery in your head, neck, heart, or legs? No   3. Do you have chest pain with activity? No   4. Do you have a history of  heart failure? No   5. Do you currently have a cold, bronchitis or symptoms of other  infection? No   6. Do you have a cough, shortness of breath, or wheezing? No   7. Do you or anyone in your family have previous history of blood clots? YES - mother   8. Do you or does anyone in your family have a serious bleeding problem such as prolonged bleeding following surgeries or cuts? UNKNOWN    9. Have you ever had problems with anemia or been told to take iron pills? No   10. Have you had any abnormal blood loss such as black, tarry or bloody stools, or abnormal vaginal bleeding? No   11. Have you ever had a blood transfusion? No   12. Are you willing to have a blood transfusion if it is medically needed before, during, or after your surgery? Yes   13. Have you or any of your relatives ever had problems with anesthesia? No   14. Do you have sleep apnea, excessive snoring or daytime drowsiness? No   15. Do you have any artifical heart valves or other implanted medical devices like a pacemaker, defibrillator, or continuous glucose monitor? No   16. Do you have artificial joints? No   17. Are you allergic to latex? No   18. Is there any chance that you may be pregnant? No       Health Care Directive:  Patient does not have a Health Care Directive or Living Will: Discussed advance care planning with patient; however, patient declined at this time.    Preoperative Review of :   reviewed - no record of controlled substances prescribed.      Status of Chronic Conditions:  ASTHMA - Patient has a longstanding history of moderate-severe Asthma . Patient has been doing well overall noting NO SYMPTOMS and continues on medication regimen consisting of albuterol as needed  without adverse reactions or side effects.     DEPRESSION - Patient has a long history of Depression of moderate severity requiring medication for control with recent symptoms being gradually improving..Current symptoms of depression include none.     DIABETES - Patient has a longstanding history of DiabetesType Type II . Patient is being  treated with GLP  and denies significant side effects. Control has been good. Complicating factors include but are not limited to: hypertension.     HYPOTHYROIDISM - Patient has a longstanding history of chronic Hypothyroidism. Patient has been doing well, noting no tremor, insomnia, hair loss or changes in skin texture. Continues to take medications as directed, without adverse reactions or side effects. Last TSH   Lab Results   Component Value Date    TSH 1.36 05/17/2023   .      Review of Systems  CONSTITUTIONAL: NEGATIVE for fever, chills, change in weight  INTEGUMENTARY/SKIN: NEGATIVE for worrisome rashes, moles or lesions  EYES: NEGATIVE for vision changes or irritation  ENT/MOUTH: NEGATIVE for ear, mouth and throat problems  RESP: NEGATIVE for significant cough or SOB  CV: NEGATIVE for chest pain, palpitations or peripheral edema  GI: NEGATIVE for nausea, abdominal pain, heartburn, or change in bowel habits  : NEGATIVE for frequency, dysuria, or hematuria  MUSCULOSKELETAL: NEGATIVE for significant arthralgias or myalgia  NEURO: NEGATIVE for weakness, dizziness or paresthesias  ENDOCRINE: NEGATIVE for temperature intolerance, skin/hair changes  HEME: NEGATIVE for bleeding problems  PSYCHIATRIC: NEGATIVE for changes in mood or affect    Patient Active Problem List    Diagnosis Date Noted    Urge incontinence of urine 05/30/2023     Priority: Medium    Encounter for sterilization 05/30/2023     Priority: Medium    Tobacco abuse 05/30/2023     Priority: Medium    History of migraine headaches 09/26/2022     Priority: Medium    Diabetes mellitus, type 2 (H) 12/09/2021     Priority: Medium    Grief 01/18/2021     Priority: Medium    GERD (gastroesophageal reflux disease) 12/13/2019     Priority: Medium    Chronic pain syndrome 04/03/2017     Priority: Medium    Opioid dependence in remission (H) 04/03/2017     Priority: Medium     On suboxone.       PTSD (post-traumatic stress disorder) 04/03/2017     Priority:  Medium    COY (generalized anxiety disorder) 2017     Priority: Medium    Moderate episode of recurrent major depressive disorder (H) 2017     Priority: Medium    Hypothyroid 2017     Priority: Medium    Mild mixed bipolar I disorder (H) 2010     Priority: Medium      Past Medical History:   Diagnosis Date    Anxiety     Arthritis     Chronic pain syndrome 2017    Diabetes mellitus, type 2 (H) 2021    CYO (generalized anxiety disorder) 2017    GERD (gastroesophageal reflux disease) 2019    Grief 2021    History of migraine headaches 2022    Hypothyroid 2017    Migraine     Mild mixed bipolar I disorder (H) 2010    Moderate episode of recurrent major depressive disorder (H) 2017    Obesity, morbid, BMI 50 or higher (H) 2018    Opioid dependence in remission (H) 2017    On suboxone.     Osteoarthritis     PTSD (post-traumatic stress disorder) 2017    Thyroid disease     Tobacco abuse 2023    Trigger finger of both hands 2018    IMO Regulatory Load OCT 2020    Urge incontinence of urine 2023     Past Surgical History:   Procedure Laterality Date     SECTION  1995     SECTION  2002    CHOLECYSTECTOMY      COLONOSCOPY N/A 2022    Procedure: Colonscopy;  Surgeon: Deep Zhu MD;  Location: [facility]    KNEE ARTHROSCOPY AND ARTHROTOMY Right 2019    right knee arthroscopy, lateral menisectomy    LEEP TX, CERVICAL  2004     Current Outpatient Medications   Medication Sig Dispense Refill    Acetaminophen (TYLENOL PO) Take 500 mg by mouth every 4 hours as needed for mild pain or fever      asenapine (SAPHRIS) 2.5 MG SUBL sublingual tablet Place 2.5 mg under the tongue daily (2.5 mg + 5 mg = 7.5 mg)      asenapine (SAPHRIS) 5 MG SUBL sublingual tablet Place 5 mg under the tongue daily (2.5 mg + 5 mg = 7.5 mg)      baclofen (LIORESAL) 10 MG tablet TAKE 1 TABLET BY  MOUTH THREE TIMES DAILY 90 tablet 0    blood glucose (NO BRAND SPECIFIED) lancets standard Use to test blood sugar 1 times daily 100 each 5    blood glucose (NO BRAND SPECIFIED) test strip Use to test blood sugar 1 time daily 50 strip 5    blood glucose monitoring (NO BRAND SPECIFIED) meter device kit Use to test blood sugar 1 time daily 1 kit 0    blood glucose monitoring (SOFTCLIX) lancets USE 1 TO CHECK GLUCOSE ONCE DAILY      diclofenac (VOLTAREN) 1 % topical gel Apply 2 g topically 4 times daily as needed for moderate pain      furosemide (LASIX) 20 MG tablet TAKE 2 TABLETS BY MOUTH ONCE DAILY AS NEEDED FOR  LEG  SWELLING 60 tablet 9    hydrochlorothiazide (HYDRODIURIL) 25 MG tablet Take 1 tablet by mouth once daily 90 tablet 4    hydrOXYzine (ATARAX) 25 MG tablet Take 25 mg by mouth 4 times daily as needed for anxiety      ibuprofen (ADVIL/MOTRIN) 800 MG tablet Take 1 tablet (800 mg) by mouth every 8 hours as needed for moderate pain (4-6) 90 tablet 0    levothyroxine (SYNTHROID/LEVOTHROID) 175 MCG tablet Take 1 tablet (175 mcg) by mouth daily 90 tablet 1    lisdexamfetamine (VYVANSE) 40 MG capsule Take 40 mg by mouth every morning      Melatonin 10 MG CAPS Take 10 mg by mouth nightly as needed (insomnia)      multivitamin w/minerals (THERA-VIT-M) tablet Take 1 tablet by mouth daily      nicotine (NICORETTE) 2 MG gum CHEW 1-2 PIECES PER HOUR OR 10-12 PIECES PER DAY--TAPER AS TOLERATED      omeprazole (PRILOSEC) 40 MG DR capsule Take 1 capsule by mouth once daily 90 capsule 1    oxybutynin ER (DITROPAN XL) 10 MG 24 hr tablet Take 1 tablet (10 mg) by mouth daily 90 tablet 3    potassium chloride ER (K-TAB) 20 MEQ CR tablet 20 meq along with a 10 mg pill .  This should only be taken if taking lasix. 90 tablet 3    potassium chloride ER (K-TAB/KLOR-CON) 10 MEQ CR tablet TAKE 1 TABLET BY MOUTH ONCE DAILY WHEN  TAKING  LASIX  (FUROSEMIDE) 30 tablet 3    rosuvastatin (CRESTOR) 5 MG tablet Take 1 tablet by mouth once  daily 90 tablet 1    Semaglutide, 2 MG/DOSE, (OZEMPIC) 8 MG/3ML pen Inject 2 mg Subcutaneous every 7 days 9 mL 3    sertraline (ZOLOFT) 50 MG tablet Take 75 mg by mouth daily      topiramate (TOPAMAX) 25 MG tablet Take 3 tablets (75 mg) by mouth At Bedtime 90 tablet 3    Vitamin D3 (CHOLECALCIFEROL) 125 MCG (5000 UT) tablet Take 1 tablet by mouth daily      VRAYLAR 3 MG CAPS capsule Take 3 mg by mouth daily      Blood Glucose Monitoring Suppl (ONETOUCH VERIO REFLECT) w/Device KIT USE TO TEST BLOOD SUGAR ONCE DAILY      Semaglutide, 2 MG/DOSE, (OZEMPIC) 8 MG/3ML pen Inject 2 mg Subcutaneous every 7 days (Patient not taking: Reported on 9/11/2023)         Allergies   Allergen Reactions    Depakote [Valproic Acid]     Gabapentin Swelling    Propofol Unknown     Got very stiff and tight.         Social History     Tobacco Use    Smoking status: Every Day     Packs/day: 0.50     Years: 32.00     Pack years: 16.00     Types: Cigarettes     Start date: 1/1/1989     Passive exposure: Past    Smokeless tobacco: Never    Tobacco comments:     pt declines   Substance Use Topics    Alcohol use: No       History   Drug Use    Types: Marijuana     Comment: daily-medical         Objective     /60 (BP Location: Left arm, Patient Position: Sitting, Cuff Size: Adult Large)   Pulse 82   Temp 97.2  F (36.2  C) (Tympanic)   Resp 16   Wt 126.1 kg (278 lb)   LMP 08/22/2023 (Approximate)   SpO2 98%   BMI 49.25 kg/m      Physical Exam    GENERAL APPEARANCE: healthy, alert and no distress     EYES: EOMI, PERRL     HENT: ear canals and TM's normal and nose and mouth without ulcers or lesions     NECK: no adenopathy, no asymmetry, masses, or scars and thyroid normal to palpation     RESP: lungs clear to auscultation - no rales, rhonchi or wheezes     CV: regular rates and rhythm, normal S1 S2, no S3 or S4 and no murmur, click or rub     ABDOMEN:  soft, nontender, no HSM or masses and bowel sounds normal     MS: extremities normal-  no gross deformities noted, no evidence of inflammation in joints, FROM in all extremities.     SKIN: no suspicious lesions or rashes     NEURO: Normal strength and tone, sensory exam grossly normal, mentation intact and speech normal     PSYCH: mentation appears normal. and affect normal/bright     LYMPHATICS: No cervical adenopathy    Recent Labs   Lab Test 05/17/23  1517 02/17/23  1545 08/15/22  1104 08/01/22  0954 07/21/22  1614 07/11/22  1157   HGB  --   --   --  14.4  --  15.8*   PLT  --   --   --  308  --  327    136   < > 137  --  136   POTASSIUM 3.8 3.8   < > 2.8*   < > 2.9*   CR 0.84 0.81   < > 0.68  --  0.82   A1C  --  6.6*  --  6.6*  --   --     < > = values in this interval not displayed.        Diagnostics:  Recent Results (from the past 24 hour(s))   EKG 12-lead complete w/read - (Clinic Performed)    Collection Time: 09/11/23 11:59 AM   Result Value Ref Range    Systolic Blood Pressure  mmHg    Diastolic Blood Pressure  mmHg    Ventricular Rate 59 BPM    Atrial Rate 59 BPM    NV Interval 186 ms    QRS Duration 94 ms     ms    QTc 394 ms    P Axis 35 degrees    R AXIS 38 degrees    T Axis 39 degrees    Interpretation ECG       Sinus bradycardia  Otherwise normal ECG  No previous ECGs available        EKG: appears normal, NSR, sinus bradycardia, normal axis, normal intervals, no acute ST/T changes c/w ischemia, no LVH by voltage criteria, unchanged from previous tracings    Revised Cardiac Risk Index (RCRI):  The patient has the following serious cardiovascular risks for perioperative complications:   - No serious cardiac risks = 0 points     RCRI Interpretation: 0 points: Class I (very low risk - 0.4% complication rate)         Signed Electronically by: SEVEN Bryant  Copy of this evaluation report is provided to requesting physician.

## 2023-09-12 ENCOUNTER — TELEPHONE (OUTPATIENT)
Dept: FAMILY MEDICINE | Facility: OTHER | Age: 48
End: 2023-09-12

## 2023-09-12 NOTE — TELEPHONE ENCOUNTER
MTM referral from: [facility] visit (referral by provider)    MTM referral outreach attempt #1 on September 12, 2023 at 3:00 PM      Outcome: Patient is not interested at this time because she's working with someone else on getting patches to quit smoking, will route to MTM Pharmacist/Provider as an FYI. Thank you for the referral.     Use [facility] part d map for the carrier/Plan on the flowsheet    Bobo Valle [facility]

## 2023-09-16 ENCOUNTER — HEALTH MAINTENANCE LETTER (OUTPATIENT)
Age: 48
End: 2023-09-16

## 2023-09-16 LAB
ATRIAL RATE - MUSE: 59 BPM
DIASTOLIC BLOOD PRESSURE - MUSE: NORMAL MMHG
INTERPRETATION ECG - MUSE: NORMAL
P AXIS - MUSE: 35 DEGREES
PR INTERVAL - MUSE: 186 MS
QRS DURATION - MUSE: 94 MS
QT - MUSE: 398 MS
QTC - MUSE: 394 MS
R AXIS - MUSE: 38 DEGREES
SYSTOLIC BLOOD PRESSURE - MUSE: NORMAL MMHG
T AXIS - MUSE: 39 DEGREES
VENTRICULAR RATE- MUSE: 59 BPM

## 2023-09-19 ENCOUNTER — VIRTUAL VISIT (OUTPATIENT)
Dept: ENDOCRINOLOGY | Facility: CLINIC | Age: 48
End: 2023-09-19
Payer: COMMERCIAL

## 2023-09-19 VITALS — HEIGHT: 63 IN | WEIGHT: 279 LBS | BODY MASS INDEX: 49.43 KG/M2

## 2023-09-19 DIAGNOSIS — Z71.3 NUTRITIONAL COUNSELING: Primary | ICD-10-CM

## 2023-09-19 DIAGNOSIS — E11.9 TYPE 2 DIABETES MELLITUS WITHOUT COMPLICATION, WITHOUT LONG-TERM CURRENT USE OF INSULIN (H): ICD-10-CM

## 2023-09-19 DIAGNOSIS — E66.9 OBESITY: ICD-10-CM

## 2023-09-19 PROCEDURE — 97803 MED NUTRITION INDIV SUBSEQ: CPT | Mod: 95 | Performed by: DIETITIAN, REGISTERED

## 2023-09-19 PROCEDURE — 99207 PR NO CHARGE LOS: CPT | Mod: 95 | Performed by: DIETITIAN, REGISTERED

## 2023-09-19 ASSESSMENT — PAIN SCALES - GENERAL: PAINLEVEL: SEVERE PAIN (6)

## 2023-09-19 NOTE — NURSING NOTE
Is the patient currently in the state of [place]? YES    Visit mode:VIDEO    If the visit is dropped, the patient can be reconnected by: VIDEO VISIT: Text to cell phone:   Telephone Information:   Mobile [phone]       Will anyone else be joining the visit? NO  (If patient encounters technical issues they should call [phone])    How would you like to obtain your AVS? MyChart    Are changes needed to the allergy or medication list? No    Pt states chronic knee pain 6/10 today.    Reason for visit: RECHJOHNATHON OBANDO

## 2023-09-19 NOTE — LETTER
"9/19/2023       RE: Autumn Holbrook  [address] [facility] [address]     Dear Colleague,    Thank you for referring your patient, Autumn Holbrook, to the [facility] [place] at [facility]. Please see a copy of my visit note below.    Video-Visit Details    Type of service:  Video Visit    Video Start Time: 9:36 am   Video End Time: 9:51 am    Originating Location (pt. Location): Home    Distant Location (provider location):  Offsite (providers home)     Platform used for Video Visit: Amind      Return Weight Management Nutrition Consultation    Autumn Holbrook is a 47 year old female presents today for a return weight management nutrition consultation.  Patient referred by SEVEN Nickerson on March 1, 2023.    Patient with Co-morbidities of obesity including:  Type 2 Diabetes, GERD, Knee Pain, Back Pain    Anthropometrics:  Weight 2/28/23: 287 lbs with BMI 52.36    Estimated body mass index is 49.42 kg/m  as calculated from the following:    Height as of this encounter: 1.6 m (5' 3\").    Weight as of this encounter: 126.6 kg (279 lb).     Current weight: 279 lbs, pt report     Medications for Weight Loss:  Ozempic    NUTRITION HISTORY  Food allergies: none  Food intolerances: avocados  Supplements: Vitamin D3, MVI, potassium (prescribed by PCP)   Previous methods of diet modification for weight loss: Watching Portions or Calories, Exercise, Weight Watchers, Medications, Fasting    Pt reports she has been heavy for most of her life. Thyroid issues/Hasimotos make it difficult to lose weight. Has been trying to decrease carbs d/t T2DM. Often craves sugar at night. Has Moms Meals delivered- diabetic friendly and pre-portioned. Physical activity is limited d/t knee pain.     4/11/23: Pt started Ozempic about a month ago. Has not noticed much of a change since taking it. Still craving sweets. Still using Mom's Meals 2x per day- when she " cooks she makes a veggie and a protein, sometimes a starch. Struggling some with drinking enough water. Working hard to eat earlier in the day (sometimes isn't hungry until noon).    Recent food recall:  Breakfast: skips  Lunch: Moms Meals  Dinner: Moms Meals  Snacks: Granola bar, cottage cheese and fruit, yogurt, chips, chocolate  Beverages: water, diet soda     June 2023:  First time meeting pt, worked with Allison previously.     Pt reports some recent weight gain - sister was in town and more eating out.     Earlier satiety - smaller portions. Focusing on protein/fiber and healthy fats.  Feels eating is going well in general but movement is limited. Continues with 2 MOM meals/day. If rice is about 1/4 to 1/2 cup portion per pt. Always has vegetables.     Had pizza over weekend. Feels going out to eat is hard to navigate. Tries to look at menu ahead of time. Taking home leftovers.  Snack - Might have cereal with some milk at night time if hungry.    Hydration: water with prabhjot, diet pepsi, at least 1 glass of milk/day  - Working on decreasing diet pepsi intake    PA: yard work, house cleaning  - Limited by knee pain    July 2023:    Pt reports her sister is in town currently. Eating out more and more carbs than normal - pad [place], pizza.    Noticing earlier satiety with Ozempic.     Occ eating past fullness. Found self munching on fried cauliflower and fries last night despite feeling very full/sick. Endorses feeling bored.    Hydration: water, 1-2 diet pops/day    Sept 2023:    Pt reports things are going better - decreased sugar/carb intake. Getting MOMs meals. Starting with protein (chicken, pork chops, ribs, pork tenderloin, some fish, occ steak) and vegetables (mostly greens, carrots in MOMs meals)) first then starch last at meal time.    Decreased cravings.   Continues to notice earlier satiety.     Switched from skim milk to a unflavored 30 kcal almond milk in her cereal.    Previous goals:  1. Consider  "reflecting on how you feel when eating past fullness to help with prevention in the moment  2. Recommend trying to delay the urge to eat when feeling full - wait 5 minutes, drink some water first, clean, tend to dog, go outside.   3. Continue staying well hydrated - Met, did have 1 day camping with more alcohol intake and less water and woke up really \"puffy\"   4. Recommend something in the morning as opposed to nothing - In progress    - Toast with peanut butter   - Hard boiled egg   - Scrambled egg   - Oatmeal   - Cottage cheese   - Yogurt    Nutrition Prescription  Recommended energy/nutrient modification.    Nutrition Diagnosis  Obesity r/t long history of positive energy balance aeb BMI >30.    Nutrition Intervention  Materials/education provided on hypocaloric diet for weight loss. Discussed 1700 calorie/day diet, Volumetric eating to help satiety level on fewer calories; portion control and healthy food choices (Plate Method and Volumetrics handouts), 100 calorie snack choices, meal and snack planning and websites, sample meal plans     Patient demonstrates understanding.    Expected Engagement: good    Nutrition Goals  Continue working on something small at breakfast time as opposed to skipping  Continue focusing on lean protein and non-starchy vegetables   Continue staying well hydrated      Breakfast time ideas:   - Toast with peanut butter   - Hard boiled egg   - Scrambled egg   - Oatmeal   - Cottage cheese   - Yogurt    The Plate Method  [email or web address]    Protein Sources   [email or web address]     Carbohydrates  [email or web address]     Mindful Eating  [email or web address]     Summary of Volumetrics Eating Plan  [email or web address]     Follow-Up:  Wednesday, November 15th at 9:30 am    Time spent with patient: 15 minutes.  Bonny Apodaca, STEVO, RD, LD    "

## 2023-09-19 NOTE — PROGRESS NOTES
"Video-Visit Details    Type of service:  Video Visit    Video Start Time: 9:36 am   Video End Time: 9:51 am    Originating Location (pt. Location): Home    Distant Location (provider location):  Offsite (providers home)     Platform used for Video Visit: KongZhong      Return Weight Management Nutrition Consultation    Autumn Holbrook is a 47 year old female presents today for a return weight management nutrition consultation.  Patient referred by SEVEN Nickerson on March 1, 2023.    Patient with Co-morbidities of obesity including:  Type 2 Diabetes, GERD, Knee Pain, Back Pain    Anthropometrics:  Weight 2/28/23: 287 lbs with BMI 52.36    Estimated body mass index is 49.42 kg/m  as calculated from the following:    Height as of this encounter: 1.6 m (5' 3\").    Weight as of this encounter: 126.6 kg (279 lb).     Current weight: 279 lbs, pt report     Medications for Weight Loss:  Ozempic    NUTRITION HISTORY  Food allergies: none  Food intolerances: avocados  Supplements: Vitamin D3, MVI, potassium (prescribed by PCP)   Previous methods of diet modification for weight loss: Watching Portions or Calories, Exercise, Weight Watchers, Medications, Fasting    Pt reports she has been heavy for most of her life. Thyroid issues/Hasimotos make it difficult to lose weight. Has been trying to decrease carbs d/t T2DM. Often craves sugar at night. Has Moms Meals delivered- diabetic friendly and pre-portioned. Physical activity is limited d/t knee pain.     4/11/23: Pt started Ozempic about a month ago. Has not noticed much of a change since taking it. Still craving sweets. Still using Mom's Meals 2x per day- when she cooks she makes a veggie and a protein, sometimes a starch. Struggling some with drinking enough water. Working hard to eat earlier in the day (sometimes isn't hungry until noon).    Recent food recall:  Breakfast: skips  Lunch: Moms Meals  Dinner: Moms Meals  Snacks: Granola bar, cottage cheese and fruit, yogurt, " chips, chocolate  Beverages: water, diet soda     June 2023:  First time meeting pt, worked with Allison previously.     Pt reports some recent weight gain - sister was in town and more eating out.     Earlier satiety - smaller portions. Focusing on protein/fiber and healthy fats.  Feels eating is going well in general but movement is limited. Continues with 2 MOM meals/day. If rice is about 1/4 to 1/2 cup portion per pt. Always has vegetables.     Had pizza over weekend. Feels going out to eat is hard to navigate. Tries to look at menu ahead of time. Taking home leftovers.  Snack - Might have cereal with some milk at night time if hungry.    Hydration: water with prabhjot, diet pepsi, at least 1 glass of milk/day  - Working on decreasing diet pepsi intake    PA: yard work, house cleaning  - Limited by knee pain    July 2023:    Pt reports her sister is in town currently. Eating out more and more carbs than normal - pad mackenzie, pizza.    Noticing earlier satiety with Ozempic.     Occ eating past fullness. Found self munching on fried cauliflower and fries last night despite feeling very full/sick. Endorses feeling bored.    Hydration: water, 1-2 diet pops/day    Sept 2023:    Pt reports things are going better - decreased sugar/carb intake. Getting MOMs meals. Starting with protein (chicken, pork chops, ribs, pork tenderloin, some fish, occ steak) and vegetables (mostly greens, carrots in MOMs meals)) first then starch last at meal time.    Decreased cravings.   Continues to notice earlier satiety.     Switched from skim milk to a unflavored 30 kcal almond milk in her cereal.    Previous goals:  1. Consider reflecting on how you feel when eating past fullness to help with prevention in the moment  2. Recommend trying to delay the urge to eat when feeling full - wait 5 minutes, drink some water first, clean, tend to dog, go outside.   3. Continue staying well hydrated - Met, did have 1 day camping with more alcohol intake and  "less water and woke up really \"puffy\"   4. Recommend something in the morning as opposed to nothing - In progress    - Toast with peanut butter   - Hard boiled egg   - Scrambled egg   - Oatmeal   - Cottage cheese   - Yogurt    Nutrition Prescription  Recommended energy/nutrient modification.    Nutrition Diagnosis  Obesity r/t long history of positive energy balance aeb BMI >30.    Nutrition Intervention  Materials/education provided on hypocaloric diet for weight loss. Discussed 1700 calorie/day diet, Volumetric eating to help satiety level on fewer calories; portion control and healthy food choices (Plate Method and Volumetrics handouts), 100 calorie snack choices, meal and snack planning and websites, sample meal plans     Patient demonstrates understanding.    Expected Engagement: good    Nutrition Goals  Continue working on something small at breakfast time as opposed to skipping  Continue focusing on lean protein and non-starchy vegetables   Continue staying well hydrated      Breakfast time ideas:   - Toast with peanut butter   - Hard boiled egg   - Scrambled egg   - Oatmeal   - Cottage cheese   - Yogurt    The Plate Method  [email or web address]    Protein Sources   [email or web address]     Carbohydrates  [email or web address]     Mindful Eating  [email or web address]     Summary of Volumetrics Eating Plan  [email or web address]     Follow-Up:  Wednesday, November 15th at 9:30 am    Time spent with patient: 15 minutes.  STEVO Gil, RD, LD  "

## 2023-09-19 NOTE — PATIENT INSTRUCTIONS
Nutrition Goals  Continue working on something small at breakfast time as opposed to skipping  Continue focusing on lean protein and non-starchy vegetables   Continue staying well hydrated      Breakfast time ideas:   - Toast with peanut butter   - Hard boiled egg   - Scrambled egg   - Oatmeal   - Cottage cheese   - Yogurt    The Plate Method  [email or web address]    Protein Sources   [email or web address]     Carbohydrates  [email or web address]     Mindful Eating  [email or web address]     Summary of Volumetrics Eating Plan  [email or web address]     Follow-Up:  Wednesday, November 15th at 9:30 am    STEVO Calvo, RD, LD  [facility] #: [phone]

## 2023-09-21 DIAGNOSIS — G89.4 CHRONIC PAIN SYNDROME: ICD-10-CM

## 2023-09-22 RX ORDER — BACLOFEN 10 MG/1
TABLET ORAL
Qty: 90 TABLET | Refills: 0 | Status: SHIPPED | OUTPATIENT
Start: 2023-09-22 | End: 2023-10-20

## 2023-09-22 NOTE — TELEPHONE ENCOUNTER
Baclofen      Last Written Prescription Date:  8.24.23  Last Fill Quantity: #90,   # refills: 0  Last Office Visit: 9.11.23  Future Office visit:       Routing refill request to provider for review/approval because:  Drug not on the [facility] refill protocol or controlled substance

## 2023-10-13 ENCOUNTER — TRANSFERRED RECORDS (OUTPATIENT)
Dept: HEALTH INFORMATION MANAGEMENT | Facility: HOSPITAL | Age: 48
End: 2023-10-13

## 2023-10-19 DIAGNOSIS — G89.4 CHRONIC PAIN SYNDROME: ICD-10-CM

## 2023-10-20 RX ORDER — BACLOFEN 10 MG/1
TABLET ORAL
Qty: 90 TABLET | Refills: 0 | Status: SHIPPED | OUTPATIENT
Start: 2023-10-20 | End: 2023-11-30

## 2023-10-20 NOTE — TELEPHONE ENCOUNTER
Baclofen      Last Written Prescription Date:  09/22/23  Last Fill Quantity: 90,   # refills: 0  Last Office Visit: 09/11/23  Future Office visit:

## 2023-10-24 DIAGNOSIS — Z53.9 PERSONS ENCOUNTERING HEALTH SERVICES FOR SPECIFIC PROCEDURES, NOT CARRIED OUT: Primary | ICD-10-CM

## 2023-10-24 PROCEDURE — G0179 MD RECERTIFICATION HHA PT: HCPCS | Performed by: PHYSICIAN ASSISTANT

## 2023-10-25 ENCOUNTER — TELEPHONE (OUTPATIENT)
Dept: FAMILY MEDICINE | Facility: OTHER | Age: 48
End: 2023-10-25

## 2023-10-25 NOTE — TELEPHONE ENCOUNTER
Liana from [facility] call requesting a call back for chronic care orders for this patient. Please call back back Liana at [phone]

## 2023-10-26 ENCOUNTER — MEDICAL CORRESPONDENCE (OUTPATIENT)
Dept: HEALTH INFORMATION MANAGEMENT | Facility: CLINIC | Age: 48
End: 2023-10-26

## 2023-10-26 NOTE — TELEPHONE ENCOUNTER
Called and spoke to Liana covington [facility] who was seeking verbal order to continue cares for pt in her home such as setting up medications.       VO were given.    Please note.

## 2023-11-09 ENCOUNTER — VIRTUAL VISIT (OUTPATIENT)
Dept: ENDOCRINOLOGY | Facility: CLINIC | Age: 48
End: 2023-11-09
Payer: COMMERCIAL

## 2023-11-09 VITALS — BODY MASS INDEX: 47.66 KG/M2 | HEIGHT: 63 IN | WEIGHT: 269 LBS

## 2023-11-09 DIAGNOSIS — E11.9 TYPE 2 DIABETES MELLITUS WITHOUT COMPLICATION, WITHOUT LONG-TERM CURRENT USE OF INSULIN (H): ICD-10-CM

## 2023-11-09 DIAGNOSIS — G43.909 MIGRAINE WITHOUT STATUS MIGRAINOSUS, NOT INTRACTABLE, UNSPECIFIED MIGRAINE TYPE: ICD-10-CM

## 2023-11-09 PROCEDURE — 99212 OFFICE O/P EST SF 10 MIN: CPT | Mod: 95 | Performed by: PHYSICIAN ASSISTANT

## 2023-11-09 RX ORDER — TOPIRAMATE 25 MG/1
75 TABLET, FILM COATED ORAL AT BEDTIME
Qty: 270 TABLET | Refills: 1 | Status: SHIPPED | OUTPATIENT
Start: 2023-11-09 | End: 2024-05-08

## 2023-11-09 ASSESSMENT — PAIN SCALES - GENERAL: PAINLEVEL: SEVERE PAIN (6)

## 2023-11-09 NOTE — PROGRESS NOTES
"  Return Medical Weight Management Note     Autumn Holbrook  MRN:  [id number]  :  1975  JALYN:  2023    Dear SEVEN Bryant,    I had the pleasure of seeing your patient Autumn Holbrook. She is a 48 year old female who I am continuing to see for treatment of obesity related to:        3/1/2023     1:32 PM   --   I have the following health issues associated with obesity Type II Diabetes    GERD (Reflux)    Stress Incontinence    Osteoarthritis (joint disease)    Hypothyroidism   I have the following symptoms associated with obesity Knee Pain    Depression    Lower Extremity Swelling    Back Pain    Fatigue    Irregular Menstral Cycle       Assessment & Plan   Problem List Items Addressed This Visit          Endocrine Diagnoses    Diabetes mellitus, type 2 (H)    Relevant Medications    Semaglutide, 2 MG/DOSE, (OZEMPIC) 8 MG/3ML pen     Other Visit Diagnoses       Migraine without status migrainosus, not intractable, unspecified migraine type        Relevant Medications    topiramate (TOPAMAX) 25 MG tablet           Weight mgmt follow up  Lost from 284 to 269, 10 lbs since last visit  Doing well on ozempic 2mg weekly, refill today  Was able to restart topiramate and doing well  She is doing well with decreased sode and cooking more at home.    Follow up 6 mo Ivone return [facility]  Follow up with Bonny QIU 11/15/23      INTERVAL HISTORY:  \" Highest wt in life: 325 lbs. Lost weight over the last 2 years with phentermine. Weight gain associated with psychiatric medications started 7 years ago. Weight prior was 180 lbs. \"       Anti-obesity medication history    Current:   Ozempic 2mg, switched from trulicity and helping with preventing overeating.   Topiramate for weight loss and migraines restarted 2 mo ago.    Past/Failed/contraindicated:      Metformin in the past not effective and upset stomach  Psychiatrist: off since taking vyvanse also.      Vitamins/Labs: A1C 6.4    CURRENT WEIGHT:   269 lbs 0 " oz    Highest wt in life 325 lbs  Initial Weight (lbs): 284 lbs  Last Visits Weight: 126.6 kg (279 lb)  Cumulative weight loss (lbs): 15  Weight Loss Percentage: 5.28%        11/6/2023    12:19 PM   Changes and Difficulties   I have made the following changes to my diet since my last visit: Cut down on diet soda by a quarter, have not eaten takeout or restaurant food since last visit, increased veggie intake..   With regards to my diet, I am still struggling with: Craving junk food, trying to eat in the am..   I have made the following changes to my activity/exercise since my last visit: Increased my activity in the house   With regards to my activity/exercise, I am still struggling with: Finding an exercise regimen that works with my disability..         MEDICATIONS:   Current Outpatient Medications   Medication Sig Dispense Refill    topiramate (TOPAMAX) 25 MG tablet Take 3 tablets (75 mg) by mouth at bedtime 270 tablet 1    Acetaminophen (TYLENOL PO) Take 500 mg by mouth every 4 hours as needed for mild pain or fever      asenapine (SAPHRIS) 2.5 MG SUBL sublingual tablet Place 2.5 mg under the tongue daily (2.5 mg + 5 mg = 7.5 mg)      asenapine (SAPHRIS) 5 MG SUBL sublingual tablet Place 5 mg under the tongue daily (2.5 mg + 5 mg = 7.5 mg)      baclofen (LIORESAL) 10 MG tablet TAKE 1 TABLET BY MOUTH THREE TIMES DAILY 90 tablet 0    blood glucose (NO BRAND SPECIFIED) lancets standard Use to test blood sugar 1 times daily 100 each 5    blood glucose (NO BRAND SPECIFIED) test strip Use to test blood sugar 1 time daily 50 strip 5    blood glucose monitoring (NO BRAND SPECIFIED) meter device kit Use to test blood sugar 1 time daily 1 kit 0    blood glucose monitoring (SOFTCLIX) lancets USE 1 TO CHECK GLUCOSE ONCE DAILY      diclofenac (VOLTAREN) 1 % topical gel Apply 2 g topically 4 times daily as needed for moderate pain      furosemide (LASIX) 20 MG tablet TAKE 2 TABLETS BY MOUTH ONCE DAILY AS NEEDED FOR  LEG   SWELLING 60 tablet 9    hydrochlorothiazide (HYDRODIURIL) 25 MG tablet Take 1 tablet by mouth once daily 90 tablet 4    hydrOXYzine (ATARAX) 25 MG tablet Take 25 mg by mouth 4 times daily as needed for anxiety      ibuprofen (ADVIL/MOTRIN) 800 MG tablet Take 1 tablet (800 mg) by mouth every 8 hours as needed for moderate pain (4-6) 90 tablet 0    levothyroxine (SYNTHROID/LEVOTHROID) 175 MCG tablet Take 1 tablet (175 mcg) by mouth daily 90 tablet 1    lisdexamfetamine (VYVANSE) 40 MG capsule Take 40 mg by mouth every morning      Melatonin 10 MG CAPS Take 10 mg by mouth nightly as needed (insomnia)      multivitamin w/minerals (THERA-VIT-M) tablet Take 1 tablet by mouth daily      nicotine (NICORETTE) 2 MG gum CHEW 1-2 PIECES PER HOUR OR 10-12 PIECES PER DAY--TAPER AS TOLERATED      omeprazole (PRILOSEC) 40 MG DR capsule Take 1 capsule by mouth once daily 90 capsule 1    oxybutynin ER (DITROPAN XL) 10 MG 24 hr tablet Take 1 tablet (10 mg) by mouth daily 90 tablet 3    potassium chloride ER (K-TAB) 20 MEQ CR tablet 20 meq along with a 10 mg pill .  This should only be taken if taking lasix. 90 tablet 3    potassium chloride ER (K-TAB/KLOR-CON) 10 MEQ CR tablet TAKE 1 TABLET BY MOUTH ONCE DAILY WHEN  TAKING  LASIX  (FUROSEMIDE) 30 tablet 3    rosuvastatin (CRESTOR) 5 MG tablet Take 1 tablet by mouth once daily 90 tablet 1    Semaglutide, 2 MG/DOSE, (OZEMPIC) 8 MG/3ML pen Inject 2 mg Subcutaneous every 7 days 9 mL 3    sertraline (ZOLOFT) 50 MG tablet Take 75 mg by mouth daily      Vitamin D3 (CHOLECALCIFEROL) 125 MCG (5000 UT) tablet Take 1 tablet by mouth daily      VRAYLAR 3 MG CAPS capsule Take 3 mg by mouth daily             11/6/2023    12:19 PM   Weight Loss Medication History Reviewed With Patient   Which weight loss medications are you currently taking on a regular basis? Ozempic    Topamax (topiramate)    Vyvanse   Are you having any side effects from the weight loss medication that we have prescribed you? No        Office Visit on 09/11/2023   Component Date Value Ref Range Status    Ventricular Rate 09/11/2023 59  BPM Final    Atrial Rate 09/11/2023 59  BPM Final    IN Interval 09/11/2023 186  ms Final    QRS Duration 09/11/2023 94  ms Final    QT 09/11/2023 398  ms Final    QTc 09/11/2023 394  ms Final    P Axis 09/11/2023 35  degrees Final    R AXIS 09/11/2023 38  degrees Final    T Axis 09/11/2023 39  degrees Final    Interpretation ECG 09/11/2023    Final                    Value:Sinus bradycardia  Otherwise normal ECG  No previous ECGs available  Confirmed by MD Marilynn, Kyra Jessi ([id number]) on 9/16/2023 1:07:12 PM      Sodium 09/11/2023 134 (L)  136 - 145 mmol/L Final    Potassium 09/11/2023 3.7  3.4 - 5.3 mmol/L Final    Chloride 09/11/2023 100  98 - 107 mmol/L Final    Carbon Dioxide (CO2) 09/11/2023 24  22 - 29 mmol/L Final    Anion Gap 09/11/2023 10  7 - 15 mmol/L Final    Urea Nitrogen 09/11/2023 14.3  6.0 - 20.0 mg/dL Final    Creatinine 09/11/2023 0.82  0.51 - 0.95 mg/dL Final    Calcium 09/11/2023 9.7  8.6 - 10.0 mg/dL Final    Glucose 09/11/2023 97  70 - 99 mg/dL Final    GFR Estimate 09/11/2023 87  >60 mL/min/1.73m2 Final    WBC Count 09/11/2023 14.3 (H)  4.0 - 11.0 10e3/uL Final    RBC Count 09/11/2023 5.19  3.80 - 5.20 10e6/uL Final    Hemoglobin 09/11/2023 15.5  11.7 - 15.7 g/dL Final    Hematocrit 09/11/2023 48.0 (H)  35.0 - 47.0 % Final    MCV 09/11/2023 93  78 - 100 fL Final    MCH 09/11/2023 29.9  26.5 - 33.0 pg Final    MCHC 09/11/2023 32.3  31.5 - 36.5 g/dL Final    RDW 09/11/2023 14.5  10.0 - 15.0 % Final    Platelet Count 09/11/2023 282  150 - 450 10e3/uL Final    % Neutrophils 09/11/2023 70  % Final    % Lymphocytes 09/11/2023 19  % Final    % Monocytes 09/11/2023 7  % Final    % Eosinophils 09/11/2023 2  % Final    % Basophils 09/11/2023 1  % Final    % Immature Granulocytes 09/11/2023 1  % Final    NRBCs per 100 WBC 09/11/2023 0  <1 /100 Final    Absolute Neutrophils 09/11/2023 10.2 (H)  1.6 - 8.3  "10e3/uL Final    Absolute Lymphocytes 09/11/2023 2.7  0.8 - 5.3 10e3/uL Final    Absolute Monocytes 09/11/2023 1.0  0.0 - 1.3 10e3/uL Final    Absolute Eosinophils 09/11/2023 0.2  0.0 - 0.7 10e3/uL Final    Absolute Basophils 09/11/2023 0.1  0.0 - 0.2 10e3/uL Final    Absolute Immature Granulocytes 09/11/2023 0.1  <=0.4 10e3/uL Final    Absolute NRBCs 09/11/2023 0.0  10e3/uL Final         PHYSICAL EXAM:  Objective    Ht 1.6 m (5' 2.99\")   Wt 122 kg (269 lb)   LMP 08/22/2023 (Approximate)   BMI 47.66 kg/m      Vitals - Patient Reported  Pain Score: Severe Pain (6)  Pain Loc: Knee (lt arm)        GENERAL: Healthy, alert and no distress  EYES: Eyes grossly normal to inspection.  No discharge or erythema, or obvious scleral/conjunctival abnormalities.  RESP: No audible wheeze, cough, or visible cyanosis.  No visible retractions or increased work of breathing.    SKIN: Visible skin clear. No significant rash, abnormal pigmentation or lesions.  NEURO: Cranial nerves grossly intact.  Mentation and speech appropriate for age.  PSYCH: Mentation appears normal, affect normal/bright, judgement and insight intact, normal speech and appearance well-groomed.        Sincerely,    Ivone Moses PA-C      10 minutes spent by me on the date of the encounter doing chart review, history and exam, documentation and further activities per the note    Virtual Visit Details    Type of service:  Video Visit   Video Start Time:  105  Video End Time:1:11 PM    Originating Location (pt. Location): Home    Distant Location (provider location):  Off-site  Platform used for Video Visit: Bell"

## 2023-11-09 NOTE — LETTER
"2023       RE: Autumn Holbrook  [address] [facility] [address]     Dear Colleague,    Thank you for referring your patient, Autumn Holrbook, to the [facility] [place] at [facility]. Please see a copy of my visit note below.      Return Medical Weight Management Note     Autumn Holbrook  MRN:  [id number]  :  1975  JALYN:  2023    Dear SEVEN Bryant,    I had the pleasure of seeing your patient Autumn Holbrook. She is a 48 year old female who I am continuing to see for treatment of obesity related to:        3/1/2023     1:32 PM   --   I have the following health issues associated with obesity Type II Diabetes    GERD (Reflux)    Stress Incontinence    Osteoarthritis (joint disease)    Hypothyroidism   I have the following symptoms associated with obesity Knee Pain    Depression    Lower Extremity Swelling    Back Pain    Fatigue    Irregular Menstral Cycle       Assessment & Plan  Problem List Items Addressed This Visit          Endocrine Diagnoses    Diabetes mellitus, type 2 (H)    Relevant Medications    Semaglutide, 2 MG/DOSE, (OZEMPIC) 8 MG/3ML pen     Other Visit Diagnoses       Migraine without status migrainosus, not intractable, unspecified migraine type        Relevant Medications    topiramate (TOPAMAX) 25 MG tablet           Weight mgmt follow up  Lost from 284 to 269, 10 lbs since last visit  Doing well on ozempic 2mg weekly, refill today  Was able to restart topiramate and doing well  She is doing well with decreased sode and cooking more at home.    Follow up 6 mo [facility]  Follow up with Bonny QIU 11/15/23      INTERVAL HISTORY:  \" Highest wt in life: 325 lbs. Lost weight over the last 2 years with phentermine. Weight gain associated with psychiatric medications started 7 years ago. Weight prior was 180 lbs. \"       Anti-obesity medication history    Current:   Ozempic 2mg, switched from " trulicity and helping with preventing overeating.   Topiramate for weight loss and migraines restarted 2 mo ago.    Past/Failed/contraindicated:      Metformin in the past not effective and upset stomach  Psychiatrist: off since taking vyvanse also.      Vitamins/Labs: A1C 6.4    CURRENT WEIGHT:   269 lbs 0 oz    Highest wt in life 325 lbs  Initial Weight (lbs): 284 lbs  Last Visits Weight: 126.6 kg (279 lb)  Cumulative weight loss (lbs): 15  Weight Loss Percentage: 5.28%        11/6/2023    12:19 PM   Changes and Difficulties   I have made the following changes to my diet since my last visit: Cut down on diet soda by a quarter, have not eaten takeout or restaurant food since last visit, increased veggie intake..   With regards to my diet, I am still struggling with: Craving junk food, trying to eat in the am..   I have made the following changes to my activity/exercise since my last visit: Increased my activity in the house   With regards to my activity/exercise, I am still struggling with: Finding an exercise regimen that works with my disability..         MEDICATIONS:   Current Outpatient Medications   Medication Sig Dispense Refill    topiramate (TOPAMAX) 25 MG tablet Take 3 tablets (75 mg) by mouth at bedtime 270 tablet 1    Acetaminophen (TYLENOL PO) Take 500 mg by mouth every 4 hours as needed for mild pain or fever      asenapine (SAPHRIS) 2.5 MG SUBL sublingual tablet Place 2.5 mg under the tongue daily (2.5 mg + 5 mg = 7.5 mg)      asenapine (SAPHRIS) 5 MG SUBL sublingual tablet Place 5 mg under the tongue daily (2.5 mg + 5 mg = 7.5 mg)      baclofen (LIORESAL) 10 MG tablet TAKE 1 TABLET BY MOUTH THREE TIMES DAILY 90 tablet 0    blood glucose (NO BRAND SPECIFIED) lancets standard Use to test blood sugar 1 times daily 100 each 5    blood glucose (NO BRAND SPECIFIED) test strip Use to test blood sugar 1 time daily 50 strip 5    blood glucose monitoring (NO BRAND SPECIFIED) meter device kit Use to test blood  sugar 1 time daily 1 kit 0    blood glucose monitoring (SOFTCLIX) lancets USE 1 TO CHECK GLUCOSE ONCE DAILY      diclofenac (VOLTAREN) 1 % topical gel Apply 2 g topically 4 times daily as needed for moderate pain      furosemide (LASIX) 20 MG tablet TAKE 2 TABLETS BY MOUTH ONCE DAILY AS NEEDED FOR  LEG  SWELLING 60 tablet 9    hydrochlorothiazide (HYDRODIURIL) 25 MG tablet Take 1 tablet by mouth once daily 90 tablet 4    hydrOXYzine (ATARAX) 25 MG tablet Take 25 mg by mouth 4 times daily as needed for anxiety      ibuprofen (ADVIL/MOTRIN) 800 MG tablet Take 1 tablet (800 mg) by mouth every 8 hours as needed for moderate pain (4-6) 90 tablet 0    levothyroxine (SYNTHROID/LEVOTHROID) 175 MCG tablet Take 1 tablet (175 mcg) by mouth daily 90 tablet 1    lisdexamfetamine (VYVANSE) 40 MG capsule Take 40 mg by mouth every morning      Melatonin 10 MG CAPS Take 10 mg by mouth nightly as needed (insomnia)      multivitamin w/minerals (THERA-VIT-M) tablet Take 1 tablet by mouth daily      nicotine (NICORETTE) 2 MG gum CHEW 1-2 PIECES PER HOUR OR 10-12 PIECES PER DAY--TAPER AS TOLERATED      omeprazole (PRILOSEC) 40 MG DR capsule Take 1 capsule by mouth once daily 90 capsule 1    oxybutynin ER (DITROPAN XL) 10 MG 24 hr tablet Take 1 tablet (10 mg) by mouth daily 90 tablet 3    potassium chloride ER (K-TAB) 20 MEQ CR tablet 20 meq along with a 10 mg pill .  This should only be taken if taking lasix. 90 tablet 3    potassium chloride ER (K-TAB/KLOR-CON) 10 MEQ CR tablet TAKE 1 TABLET BY MOUTH ONCE DAILY WHEN  TAKING  LASIX  (FUROSEMIDE) 30 tablet 3    rosuvastatin (CRESTOR) 5 MG tablet Take 1 tablet by mouth once daily 90 tablet 1    Semaglutide, 2 MG/DOSE, (OZEMPIC) 8 MG/3ML pen Inject 2 mg Subcutaneous every 7 days 9 mL 3    sertraline (ZOLOFT) 50 MG tablet Take 75 mg by mouth daily      Vitamin D3 (CHOLECALCIFEROL) 125 MCG (5000 UT) tablet Take 1 tablet by mouth daily      VRAYLAR 3 MG CAPS capsule Take 3 mg by mouth daily              11/6/2023    12:19 PM   Weight Loss Medication History Reviewed With Patient   Which weight loss medications are you currently taking on a regular basis? Ozempic    Topamax (topiramate)    Vyvanse   Are you having any side effects from the weight loss medication that we have prescribed you? No       Office Visit on 09/11/2023   Component Date Value Ref Range Status    Ventricular Rate 09/11/2023 59  BPM Final    Atrial Rate 09/11/2023 59  BPM Final    UT Interval 09/11/2023 186  ms Final    QRS Duration 09/11/2023 94  ms Final    QT 09/11/2023 398  ms Final    QTc 09/11/2023 394  ms Final    P Axis 09/11/2023 35  degrees Final    R AXIS 09/11/2023 38  degrees Final    T Axis 09/11/2023 39  degrees Final    Interpretation ECG 09/11/2023    Final                    Value:Sinus bradycardia  Otherwise normal ECG  No previous ECGs available  Confirmed by MD Marilynn, [facility] ([id number]) on 9/16/2023 1:07:12 PM      Sodium 09/11/2023 134 (L)  136 - 145 mmol/L Final    Potassium 09/11/2023 3.7  3.4 - 5.3 mmol/L Final    Chloride 09/11/2023 100  98 - 107 mmol/L Final    Carbon Dioxide (CO2) 09/11/2023 24  22 - 29 mmol/L Final    Anion Gap 09/11/2023 10  7 - 15 mmol/L Final    Urea Nitrogen 09/11/2023 14.3  6.0 - 20.0 mg/dL Final    Creatinine 09/11/2023 0.82  0.51 - 0.95 mg/dL Final    Calcium 09/11/2023 9.7  8.6 - 10.0 mg/dL Final    Glucose 09/11/2023 97  70 - 99 mg/dL Final    GFR Estimate 09/11/2023 87  >60 mL/min/1.73m2 Final    WBC Count 09/11/2023 14.3 (H)  4.0 - 11.0 10e3/uL Final    RBC Count 09/11/2023 5.19  3.80 - 5.20 10e6/uL Final    Hemoglobin 09/11/2023 15.5  11.7 - 15.7 g/dL Final    Hematocrit 09/11/2023 48.0 (H)  35.0 - 47.0 % Final    MCV 09/11/2023 93  78 - 100 fL Final    MCH 09/11/2023 29.9  26.5 - 33.0 pg Final    MCHC 09/11/2023 32.3  31.5 - 36.5 g/dL Final    RDW 09/11/2023 14.5  10.0 - 15.0 % Final    Platelet Count 09/11/2023 282  150 - 450 10e3/uL Final    % Neutrophils 09/11/2023 70  % Final  "   % Lymphocytes 09/11/2023 19  % Final    % Monocytes 09/11/2023 7  % Final    % Eosinophils 09/11/2023 2  % Final    % Basophils 09/11/2023 1  % Final    % Immature Granulocytes 09/11/2023 1  % Final    NRBCs per 100 WBC 09/11/2023 0  <1 /100 Final    Absolute Neutrophils 09/11/2023 10.2 (H)  1.6 - 8.3 10e3/uL Final    Absolute Lymphocytes 09/11/2023 2.7  0.8 - 5.3 10e3/uL Final    Absolute Monocytes 09/11/2023 1.0  0.0 - 1.3 10e3/uL Final    Absolute Eosinophils 09/11/2023 0.2  0.0 - 0.7 10e3/uL Final    Absolute Basophils 09/11/2023 0.1  0.0 - 0.2 10e3/uL Final    Absolute Immature Granulocytes 09/11/2023 0.1  <=0.4 10e3/uL Final    Absolute NRBCs 09/11/2023 0.0  10e3/uL Final         PHYSICAL EXAM:  Objective   Ht 1.6 m (5' 2.99\")   Wt 122 kg (269 lb)   LMP 08/22/2023 (Approximate)   BMI 47.66 kg/m      Vitals - Patient Reported  Pain Score: Severe Pain (6)  Pain Loc: Knee (lt arm)        GENERAL: Healthy, alert and no distress  EYES: Eyes grossly normal to inspection.  No discharge or erythema, or obvious scleral/conjunctival abnormalities.  RESP: No audible wheeze, cough, or visible cyanosis.  No visible retractions or increased work of breathing.    SKIN: Visible skin clear. No significant rash, abnormal pigmentation or lesions.  NEURO: Cranial nerves grossly intact.  Mentation and speech appropriate for age.  PSYCH: Mentation appears normal, affect normal/bright, judgement and insight intact, normal speech and appearance well-groomed.        Sincerely,    Ivone Moses PA-C      10 minutes spent by me on the date of the encounter doing chart review, history and exam, documentation and further activities per the note    Virtual Visit Details    Type of service:  Video Visit   Video Start Time:  105  Video End Time:1:11 PM    Originating Location (pt. Location): Home    Distant Location (provider location):  Off-site  Platform used for Video Visit: Bell"

## 2023-11-09 NOTE — NURSING NOTE
Is the patient currently in the state of [place]? YES    Visit mode:VIDEO    If the visit is dropped, the patient can be reconnected by: VIDEO VISIT: Send to e-mail at: [email or web address]    Will anyone else be joining the visit? NO  (If patient encounters technical issues they should call [phone]    How would you like to obtain your AVS? MyChart    Are changes needed to the allergy or medication list? Pt stated no changes to allergies and Pt stated no med changes    Reason for visit: Follow Up    Bárbara OBANDO

## 2023-11-11 ENCOUNTER — HOSPITAL ENCOUNTER (EMERGENCY)
Facility: HOSPITAL | Age: 48
Discharge: HOME | End: 2023-11-11
Attending: NURSE PRACTITIONER | Admitting: NURSE PRACTITIONER
Payer: COMMERCIAL

## 2023-11-11 VITALS
HEART RATE: 93 BPM | HEIGHT: 63 IN | SYSTOLIC BLOOD PRESSURE: 120 MMHG | WEIGHT: 269 LBS | DIASTOLIC BLOOD PRESSURE: 88 MMHG | TEMPERATURE: 97.9 F | RESPIRATION RATE: 18 BRPM | OXYGEN SATURATION: 97 % | BODY MASS INDEX: 47.66 KG/M2

## 2023-11-11 DIAGNOSIS — S81.831A PUNCTURE WOUND OF RIGHT LOWER LEG, INITIAL ENCOUNTER: Primary | ICD-10-CM

## 2023-11-11 DIAGNOSIS — R60.0 EDEMA OF RIGHT LOWER LEG: ICD-10-CM
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
## 2023-11-11 DIAGNOSIS — S80.811A CAT SCRATCH OF LOWER LEG, RIGHT, INITIAL ENCOUNTER: ICD-10-CM

## 2023-11-11 PROCEDURE — 99213 OFFICE O/P EST LOW 20 MIN: CPT | Performed by: NURSE PRACTITIONER

## 2023-11-11 PROCEDURE — G0463 HOSPITAL OUTPT CLINIC VISIT: HCPCS

## 2023-11-11 ASSESSMENT — ENCOUNTER SYMPTOMS
WOUND: 1
CHILLS: 0
JOINT SWELLING: 1
FEVER: 0

## 2023-11-11 ASSESSMENT — ACTIVITIES OF DAILY LIVING (ADL): ADLS_ACUITY_SCORE: 35

## 2023-11-11 NOTE — DISCHARGE INSTRUCTIONS
Keep your wounds clean with mild soap and water.  Continue applying Neosporin to the wounds.  Start taking the antibiotic that I prescribed today.    Continue observing your wounds and if you start noticing redness that is spreading to the rest of your leg or increase swelling to your leg please return here for evaluation.    Follow-up with your doctor if no improvement in symptoms.    Return to urgent care or emergency department for any worsening or concerning symptoms.

## 2023-11-11 NOTE — ED PROVIDER NOTES
History   No chief complaint on file.    HPI  Autumn Holbrook is a 48 year old female who presents ambulatory to urgent care for evaluation of a puncture wound to her right lower leg.  Patient states that her cat punctured her right lower leg last night.  Patient states that she cleaned the wound has been applying Neosporin ointment to her wounds.  She also has a scratch to her leg.  She is mostly concerned about the puncture wounds to her leg that will not stop draining serous fluid.  History of edema to her legs and notes that they have been little more swollen over the last week but the swelling has been gradually decreasing.  No fevers or chills.  Ambulating with minimal difficulty.  She states that this is a baby kitten and is not eligible for shots at this time.  Halle is up-to-date on her tetanus vaccination.    Allergies:  Allergies   Allergen Reactions    Depakote [Valproic Acid]     Gabapentin Swelling    Propofol Unknown     Got very stiff and tight.        Problem List:    Patient Active Problem List    Diagnosis Date Noted    Urge incontinence of urine 05/30/2023     Priority: Medium    Encounter for sterilization 05/30/2023     Priority: Medium    Tobacco abuse 05/30/2023     Priority: Medium    History of migraine headaches 09/26/2022     Priority: Medium    Diabetes mellitus, type 2 (H) 12/09/2021     Priority: Medium    Grief 01/18/2021     Priority: Medium    GERD (gastroesophageal reflux disease) 12/13/2019     Priority: Medium    Chronic pain syndrome 04/03/2017     Priority: Medium    Opioid dependence in remission (H) 04/03/2017     Priority: Medium     On suboxone.       PTSD (post-traumatic stress disorder) 04/03/2017     Priority: Medium    COY (generalized anxiety disorder) 04/03/2017     Priority: Medium    Moderate episode of recurrent major depressive disorder (H) 04/03/2017     Priority: Medium    Bilateral edema of lower extremity 04/03/2017     Priority: Medium    Hypothyroid  2017     Priority: Medium    Mild mixed bipolar I disorder (H) 2010     Priority: Medium        Past Medical History:    Past Medical History:   Diagnosis Date    Anxiety     Arthritis     Chronic pain syndrome 2017    Diabetes mellitus, type 2 (H) 2021    COY (generalized anxiety disorder) 2017    GERD (gastroesophageal reflux disease) 2019    Grief 2021    History of migraine headaches 2022    Hypothyroid 2017    Migraine     Mild mixed bipolar I disorder (H) 2010    Moderate episode of recurrent major depressive disorder (H) 2017    Obesity, morbid, BMI 50 or higher (H) 2018    Opioid dependence in remission (H) 2017    Osteoarthritis     PTSD (post-traumatic stress disorder) 2017    Thyroid disease     Tobacco abuse 2023    Trigger finger of both hands 2018    Urge incontinence of urine 2023       Past Surgical History:    Past Surgical History:   Procedure Laterality Date     SECTION  1995     SECTION  2002    CHOLECYSTECTOMY      COLONOSCOPY N/A 2022    Procedure: Colonscopy;  Surgeon: Deep Zhu MD;  Location: [facility] OR    KNEE ARTHROSCOPY AND ARTHROTOMY Right 2019    right knee arthroscopy, lateral menisectomy    LEEP TX, CERVICAL  2004       Family History:    Family History   Problem Relation Age of Onset    Cerebrovascular Disease Mother     Alcoholism Mother     Cancer Mother     Depression Mother     Eczema Mother     Hypertension Mother     Migraines Mother     Mental Illness Mother     Osteoarthritis Mother     Osteoporosis Mother     Alcoholism Father     Cancer Father     Hyperlipidemia Father     Hypertension Father     Myocardial Infarction Father     Mental Illness Sister     Migraines Sister        Social History:  Marital Status:   [5]  Social History     Tobacco Use    Smoking status: Every Day     Packs/day: 0.50     Years: 32.00      Additional pack years: 0.00     Total pack years: 16.00     Types: Cigarettes     Start date: 1/1/1989     Passive exposure: Past    Smokeless tobacco: Never    Tobacco comments:     pt declines   Vaping Use    Vaping Use: Some days    Substances: Nicotine, Flavoring    Devices: Disposable   Substance Use Topics    Alcohol use: No    Drug use: Yes     Types: Marijuana     Comment: daily-medical        Medications:    Acetaminophen (TYLENOL PO)  amoxicillin-clavulanate (AUGMENTIN) 875-125 MG tablet  asenapine (SAPHRIS) 2.5 MG SUBL sublingual tablet  asenapine (SAPHRIS) 5 MG SUBL sublingual tablet  baclofen (LIORESAL) 10 MG tablet  blood glucose (NO BRAND SPECIFIED) lancets standard  blood glucose (NO BRAND SPECIFIED) test strip  blood glucose monitoring (NO BRAND SPECIFIED) meter device kit  blood glucose monitoring (SOFTCLIX) lancets  diclofenac (VOLTAREN) 1 % topical gel  furosemide (LASIX) 20 MG tablet  hydrochlorothiazide (HYDRODIURIL) 25 MG tablet  hydrOXYzine (ATARAX) 25 MG tablet  ibuprofen (ADVIL/MOTRIN) 800 MG tablet  levothyroxine (SYNTHROID/LEVOTHROID) 175 MCG tablet  lisdexamfetamine (VYVANSE) 40 MG capsule  Melatonin 10 MG CAPS  multivitamin w/minerals (THERA-VIT-M) tablet  nicotine (NICORETTE) 2 MG gum  omeprazole (PRILOSEC) 40 MG DR capsule  oxybutynin ER (DITROPAN XL) 10 MG 24 hr tablet  potassium chloride ER (K-TAB) 20 MEQ CR tablet  potassium chloride ER (K-TAB/KLOR-CON) 10 MEQ CR tablet  rosuvastatin (CRESTOR) 5 MG tablet  Semaglutide, 2 MG/DOSE, (OZEMPIC) 8 MG/3ML pen  sertraline (ZOLOFT) 50 MG tablet  topiramate (TOPAMAX) 25 MG tablet  Vitamin D3 (CHOLECALCIFEROL) 125 MCG (5000 UT) tablet  VRAYLAR 3 MG CAPS capsule          Review of Systems   Constitutional:  Negative for chills and fever.   Musculoskeletal:  Positive for joint swelling.   Skin:  Positive for wound.   All other systems reviewed and are negative.      Physical Exam   BP: 120/88  Pulse: 93  Temp: 97.9  F (36.6  C)  Resp:  "18  Height: 160 cm (5' 3\")  Weight: 122 kg (269 lb)  SpO2: 97 %      Physical Exam  Vitals and nursing note reviewed.   Constitutional:       General: She is not in acute distress.     Appearance: Normal appearance. She is well-developed. She is not ill-appearing or toxic-appearing.   HENT:      Head: Normocephalic and atraumatic.   Eyes:      Conjunctiva/sclera: Conjunctivae normal.   Cardiovascular:      Rate and Rhythm: Normal rate.   Pulmonary:      Effort: Pulmonary effort is normal. No respiratory distress.   Abdominal:      General: Abdomen is flat.   Musculoskeletal:      Cervical back: Normal range of motion and neck supple.      Right lower le+ Pitting Edema present.      Left lower leg: No edema.   Skin:     General: Skin is warm and dry.      Findings: No bruising or erythema.             Comments: Puncture wound to medial aspect of right lower leg slowly oozing clear fluid.  Minimal redness around the puncture wound consistent with irritation versus infection.  Linear abrasion noted to lateral right lower leg.  Mild redness around this abrasion.     Neurological:      Mental Status: She is alert and oriented to person, place, and time.         ED Course                Procedures             No results found for this or any previous visit (from the past 24 hour(s)).    Medications - No data to display    Assessments & Plan (with Medical Decision Making)   Pleasant and well-appearing 48-year-old female that presented for evaluation of wounds to her right lower leg following an injury by her kitten at home last night.  Patient has a puncture wound as well as a scratch type wound to the same leg.  Both wounds do not appear infected at this time.  She does have serous fluid that is slowly draining out of the puncture wound site.  Patient does have 2+ pitting edema to her right lower leg.  Patient tells me this is chronic for her.  She states that the edema is gradually improving.  She is " afebrile.    Discussed findings with patient.  No significant signs of infection at this time.  I recommended that she keeps the wounds clean with mild soap and water.  Continue applying Neosporin.  Patient will be treated with Augmentin empirically.  She is up-to-date on her tetanus.  Advised her to continue monitoring her wounds and if she starts seeing redness spreading to the rest of her leg, increased swelling or discharge then she should return here for evaluation.  Follow-up with primary doctor as needed.  Return to urgent care or emergency room for any worsening or concerning symptoms.      I have reviewed the nursing notes.    I have reviewed the findings, diagnosis, plan and need for follow up with the patient.  This document was prepared using a combination of typing and voice generated software.  While every attempt was made for accuracy, spelling and grammatical errors may exist.         New Prescriptions    AMOXICILLIN-CLAVULANATE (AUGMENTIN) 875-125 MG TABLET    Take 1 tablet by mouth 2 times daily for 7 days       Final diagnoses:   Cat scratch of lower leg, right, initial encounter   Puncture wound of right lower leg, initial encounter   Edema of right lower leg       11/11/2023   [facility]       Mpofu, Prudence, CNP  11/11/23 1524

## 2023-11-11 NOTE — ED TRIAGE NOTES
Pt presents with concern of leaking fluid from right lower leg from small puncture wound. Pt reports hx cellulitis and diabetes. Pt denies worsening diabetic and neuropathy symptoms. Edema is noted bilaterally. Pt reports fluid has been clear/pink.

## 2023-11-15 ENCOUNTER — VIRTUAL VISIT (OUTPATIENT)
Dept: ENDOCRINOLOGY | Facility: CLINIC | Age: 48
End: 2023-11-15
Payer: COMMERCIAL

## 2023-11-15 VITALS — HEIGHT: 63 IN | BODY MASS INDEX: 47.84 KG/M2 | WEIGHT: 270 LBS

## 2023-11-15 DIAGNOSIS — E11.9 TYPE 2 DIABETES MELLITUS WITHOUT COMPLICATION, WITHOUT LONG-TERM CURRENT USE OF INSULIN (H): ICD-10-CM

## 2023-11-15 DIAGNOSIS — E66.01 CLASS 3 SEVERE OBESITY WITH SERIOUS COMORBIDITY AND BODY MASS INDEX (BMI) OF 45.0 TO 49.9 IN ADULT, UNSPECIFIED OBESITY TYPE (H): ICD-10-CM

## 2023-11-15 DIAGNOSIS — Z71.3 NUTRITIONAL COUNSELING: Primary | ICD-10-CM

## 2023-11-15 DIAGNOSIS — E66.813 CLASS 3 SEVERE OBESITY WITH SERIOUS COMORBIDITY AND BODY MASS INDEX (BMI) OF 45.0 TO 49.9 IN ADULT, UNSPECIFIED OBESITY TYPE (H): ICD-10-CM

## 2023-11-15 PROCEDURE — 97803 MED NUTRITION INDIV SUBSEQ: CPT | Mod: 95 | Performed by: DIETITIAN, REGISTERED

## 2023-11-15 PROCEDURE — 99207 PR NO CHARGE LOS: CPT | Mod: 95 | Performed by: DIETITIAN, REGISTERED

## 2023-11-15 ASSESSMENT — PAIN SCALES - GENERAL: PAINLEVEL: SEVERE PAIN (6)

## 2023-11-15 NOTE — NURSING NOTE
Is the patient currently in the state of [place]? YES    Visit mode:VIDEO    If the visit is dropped, the patient can be reconnected by: VIDEO VISIT: Send to e-mail at: [email or web address]    Will anyone else be joining the visit? NO  (If patient encounters technical issues they should call [phone]    How would you like to obtain your AVS? MyChart    Are changes needed to the allergy or medication list? No, Pt stated no changes to allergies, and Pt stated no med changes    Reason for visit: RECHECK ([facility])    Alison OBANDO

## 2023-11-15 NOTE — LETTER
"11/15/2023       RE: Autumn Holbrook  [address] [facility] [address]     Dear Colleague,    Thank you for referring your patient, Autumn Holbrook, to the [facility] [place] at [facility]. Please see a copy of my visit note below.    Video-Visit Details    Type of service:  Video Visit    Video Start Time: 9:32 am  Video End Time: 9:48 am    Originating Location (pt. Location): Home    Distant Location (provider location):  Offsite (providers home)     Platform used for Video Visit: [employer]      Return Weight Management Nutrition Consultation    Autumn Holbrook is a 48 year old female presents today for a return weight management nutrition consultation.  Patient referred by SEVEN Nickerson on March 1, 2023.    Patient with Co-morbidities of obesity including:  Type 2 Diabetes, GERD, Knee Pain, Back Pain    Anthropometrics:  Weight 2/28/23: 287 lbs with BMI 52.36    Estimated body mass index is 47.83 kg/m  as calculated from the following:    Height as of this encounter: 1.6 m (5' 3\").    Weight as of this encounter: 122.5 kg (270 lb).     Current weight: 270 lbs, pt report     Medications for Weight Loss:  Topiramate   Ozempic    A1C 6.4 9/1/23    NUTRITION HISTORY  Food allergies: none  Food intolerances: avocados  Supplements: Vitamin D3, MVI, potassium (prescribed by PCP)   Previous methods of diet modification for weight loss: Watching Portions or Calories, Exercise, Weight Watchers, Medications, Fasting    Pt reports she has been heavy for most of her life. Thyroid issues/Hasimotos make it difficult to lose weight. Has been trying to decrease carbs d/t T2DM. Often craves sugar at night. Has [employer] Meals delivered- diabetic friendly and pre-portioned. Physical activity is limited d/t knee pain.     Please see previous RD notes for more detail.    Sept 2023:    Pt reports things are going better - decreased sugar/carb intake. " Getting MOMs meals. Starting with protein (chicken, pork chops, ribs, pork tenderloin, some fish, occ steak) and vegetables (mostly greens, carrots in MOMs meals) first then starch last at meal time.    Decreased cravings.   Continues to notice earlier satiety.     Switched from skim milk to a unflavored 30 kcal almond milk in her cereal.    Nov 2023:    Doing well with dietary goals.  Decreased diet soda intake by 1/4, eating more meals at home (no takeout or restaurant food in about 2 months), increased vegetable intake.  Struggling with eating in the morning and cravings.    Doing MOMs meals and PCA is helping with other meals. Made and froze a bunch of meals.  Earlier satiety - not always able to get to starch. Focusing on protein/vegetable. Has glucose tabs if needed if glucose gets too low. Will start to feel nauseous/vomit if gets around 90 or below per pt.    Craving salt lately. Trying to be mindful of due to fluid retention. Will have something small to help satisfy.     PA: increased activity in the house    Previous goals:  Continue working on something small at breakfast time as opposed to skipping - In progress, tends to have just coffee. Feels full/sick at this time (has felt this way since she was a kid per pt). Hard to even take meds per pt.  Continue focusing on lean protein and non-starchy vegetables - Going well  Continue staying well hydrated - In progress. Noticed she wasn't drinking enough and legs got swollen. Has cut back on diet pop (trying to use more as a treat)      Nutrition Prescription  Recommended energy/nutrient modification.    Nutrition Diagnosis  Obesity r/t long history of positive energy balance aeb BMI >30.    Nutrition Intervention  Materials/education provided on hypocaloric diet for weight loss. Discussed 1700 calorie/day diet, Volumetric eating to help satiety level on fewer calories; portion control and healthy food choices (Plate Method and Volumetrics handouts), 100  calorie snack choices, meal and snack planning and websites, sample meal plans     Patient demonstrates understanding.    Expected Engagement: good    Nutrition Goals  Aim to set a timer after meals to check Blood Sugar level 2 hrs post meal   Check in on morning routine (wake time, med time, breakfast time)      Breakfast time ideas:   - Toast with peanut butter   - Hard boiled egg   - Scrambled egg   - Oatmeal   - Cottage cheese   - Yogurt    The Plate Method  [email or web address]    Protein Sources   [email or web address]     Carbohydrates  [email or web address]     Mindful Eating  [email or web address]     Summary of Volumetrics Eating Plan  [email or web address]     Follow-Up:  Wednesday, January 17th at 9:30 am    Time spent with patient: 16 minutes.  STEVO Gil, RD, LD

## 2023-11-15 NOTE — PROGRESS NOTES
"Video-Visit Details    Type of service:  Video Visit    Video Start Time: 9:32 am  Video End Time: 9:48 am    Originating Location (pt. Location): Home    Distant Location (provider location):  Offsite (providers home)     Platform used for Video Visit: [employer]      Return Weight Management Nutrition Consultation    Autumn Holbrook is a 48 year old female presents today for a return weight management nutrition consultation.  Patient referred by SEVEN Nickerson on March 1, 2023.    Patient with Co-morbidities of obesity including:  Type 2 Diabetes, GERD, Knee Pain, Back Pain    Anthropometrics:  Weight 2/28/23: 287 lbs with BMI 52.36    Estimated body mass index is 47.83 kg/m  as calculated from the following:    Height as of this encounter: 1.6 m (5' 3\").    Weight as of this encounter: 122.5 kg (270 lb).     Current weight: 270 lbs, pt report     Medications for Weight Loss:  Topiramate   Ozempic    A1C 6.4 9/1/23    NUTRITION HISTORY  Food allergies: none  Food intolerances: avocados  Supplements: Vitamin D3, MVI, potassium (prescribed by PCP)   Previous methods of diet modification for weight loss: Watching Portions or Calories, Exercise, Weight Watchers, Medications, Fasting    Pt reports she has been heavy for most of her life. Thyroid issues/Hasimotos make it difficult to lose weight. Has been trying to decrease carbs d/t T2DM. Often craves sugar at night. Has [employer] Meals delivered- diabetic friendly and pre-portioned. Physical activity is limited d/t knee pain.     Please see previous RD notes for more detail.    Sept 2023:    Pt reports things are going better - decreased sugar/carb intake. Getting MOMs meals. Starting with protein (chicken, pork chops, ribs, pork tenderloin, some fish, occ steak) and vegetables (mostly greens, carrots in MOMs meals) first then starch last at meal time.    Decreased cravings.   Continues to notice earlier satiety.     Switched from skim milk to a unflavored 30 kcal almond " milk in her cereal.    Nov 2023:    Doing well with dietary goals.  Decreased diet soda intake by 1/4, eating more meals at home (no takeout or restaurant food in about 2 months), increased vegetable intake.  Struggling with eating in the morning and cravings.    Doing MOMs meals and PCA is helping with other meals. Made and froze a bunch of meals.  Earlier satiety - not always able to get to starch. Focusing on protein/vegetable. Has glucose tabs if needed if glucose gets too low. Will start to feel nauseous/vomit if gets around 90 or below per pt.    Craving salt lately. Trying to be mindful of due to fluid retention. Will have something small to help satisfy.     PA: increased activity in the house    Previous goals:  Continue working on something small at breakfast time as opposed to skipping - In progress, tends to have just coffee. Feels full/sick at this time (has felt this way since she was a kid per pt). Hard to even take meds per pt.  Continue focusing on lean protein and non-starchy vegetables - Going well  Continue staying well hydrated - In progress. Noticed she wasn't drinking enough and legs got swollen. Has cut back on diet pop (trying to use more as a treat)      Nutrition Prescription  Recommended energy/nutrient modification.    Nutrition Diagnosis  Obesity r/t long history of positive energy balance aeb BMI >30.    Nutrition Intervention  Materials/education provided on hypocaloric diet for weight loss. Discussed 1700 calorie/day diet, Volumetric eating to help satiety level on fewer calories; portion control and healthy food choices (Plate Method and Volumetrics handouts), 100 calorie snack choices, meal and snack planning and websites, sample meal plans     Patient demonstrates understanding.    Expected Engagement: good    Nutrition Goals  Aim to set a timer after meals to check Blood Sugar level 2 hrs post meal   Check in on morning routine (wake time, med time, breakfast time)      Breakfast  time ideas:   - Toast with peanut butter   - Hard boiled egg   - Scrambled egg   - Oatmeal   - Cottage cheese   - Yogurt    The Plate Method  [email or web address]    Protein Sources   [email or web address]     Carbohydrates  [email or web address]     Mindful Eating  [email or web address]     Summary of Volumetrics Eating Plan  [email or web address]     Follow-Up:  Wednesday, January 17th at 9:30 am    Time spent with patient: 16 minutes.  STEVO Gil, RD, LD

## 2023-11-15 NOTE — PATIENT INSTRUCTIONS
Nutrition Goals  Aim to set a timer after meals to check Blood Sugar level 2 hrs post meal   Check in on morning routine (wake time, med time, breakfast time)      Breakfast time ideas:   - Toast with peanut butter   - Hard boiled egg   - Scrambled egg   - Oatmeal   - Cottage cheese   - Yogurt    The Plate Method  [email or web address]    Protein Sources   [email or web address]://[email or web address]     Carbohydrates  [email or web address]     Mindful Eating  [email or web address]     Summary of Volumetrics Eating Plan  [email or web address]     Follow-Up:  Wednesday, January 17th at 9:30 am    Bonny Apodaca (Duncan), STEVO, RD, LD  [facility] #: [phone]

## 2023-11-25 DIAGNOSIS — G89.4 CHRONIC PAIN SYNDROME: ICD-10-CM

## 2023-11-28 NOTE — TELEPHONE ENCOUNTER
baclofen (LIORESAL) 10 MG tablet 90 tablet 0 10/20/2023     Last Office Visit: 09/11/23     Future Office visit:       Routing refill request to provider for review/approval because:

## 2023-11-30 RX ORDER — BACLOFEN 10 MG/1
TABLET ORAL
Qty: 90 TABLET | Refills: 0 | Status: SHIPPED | OUTPATIENT
Start: 2023-11-30 | End: 2023-12-29

## 2023-12-08 ENCOUNTER — MEDICAL CORRESPONDENCE (OUTPATIENT)
Dept: HEALTH INFORMATION MANAGEMENT | Facility: CLINIC | Age: 48
End: 2023-12-08

## 2023-12-17 DIAGNOSIS — E87.6 HYPOKALEMIA: ICD-10-CM

## 2023-12-18 NOTE — TELEPHONE ENCOUNTER
Potassium      Last Written Prescription Date:  09/26/22  Last Fill Quantity: 90,   # refills: 3  Last Office Visit: 09/11/23  Future Office visit:

## 2023-12-19 RX ORDER — POTASSIUM CHLORIDE 1500 MG/1
TABLET, EXTENDED RELEASE ORAL
Qty: 90 TABLET | Refills: 2 | Status: SHIPPED | OUTPATIENT
Start: 2023-12-19 | End: 2024-09-20

## 2023-12-22 ENCOUNTER — TELEPHONE (OUTPATIENT)
Dept: FAMILY MEDICINE | Facility: OTHER | Age: 48
End: 2023-12-22

## 2023-12-22 NOTE — TELEPHONE ENCOUNTER
4:31 PM    Reason for Call: Phone Call    Description: [facility] called she is needing verbal orders for skilled nursing once a week for 9 weeks     Was an appointment offered for this call? No  If yes : Appointment type              Date    Preferred method for responding to this message: Telephone Call  What is your phone number ? [phone]    If we cannot reach you directly, may we leave a detailed response at the number you provided? Yes    Can this message wait until your PCP/provider returns, if available today? Not applicable    Brittni Hewitt

## 2023-12-23 DIAGNOSIS — Z53.9 PERSONS ENCOUNTERING HEALTH SERVICES FOR SPECIFIC PROCEDURES, NOT CARRIED OUT: Primary | ICD-10-CM

## 2023-12-23 PROCEDURE — G0179 MD RECERTIFICATION HHA PT: HCPCS | Performed by: PHYSICIAN ASSISTANT

## 2023-12-26 NOTE — TELEPHONE ENCOUNTER
I called [facility] back for more information, they have been providing med mgmt and plan to be transitioning to teaching for self med mgmt.

## 2023-12-28 ENCOUNTER — DOCUMENTATION ONLY (OUTPATIENT)
Dept: FAMILY MEDICINE | Facility: OTHER | Age: 48
End: 2023-12-28

## 2023-12-28 DIAGNOSIS — N39.46 MIXED INCONTINENCE URGE AND STRESS (MALE)(FEMALE): Primary | ICD-10-CM

## 2023-12-28 DIAGNOSIS — R39.81 FUNCTIONAL URINARY INCONTINENCE: ICD-10-CM

## 2023-12-28 DIAGNOSIS — G89.4 CHRONIC PAIN SYNDROME: ICD-10-CM

## 2023-12-28 NOTE — TELEPHONE ENCOUNTER
Baclofen (LIORESAL) 10 mg tab      Last Written Prescription Date:  11/30/23  Last Fill Quantity: 90,   # refills: 0  Last Office Visit: 9/11/23

## 2023-12-29 RX ORDER — BACLOFEN 10 MG/1
TABLET ORAL
Qty: 90 TABLET | Refills: 0 | Status: SHIPPED | OUTPATIENT
Start: 2023-12-29 | End: 2024-02-08

## 2023-12-29 NOTE — PROGRESS NOTES
Outcome for 12/29/23 4:00 PM: [employer] message sent  Shani Alvarez MA  Outcome for 01/04/24 1:31 PM: Left Voicemail   Shani Alvarez MA  Outcome for 01/05/24 10:16 AM: Left Voicemail   Shani Alvarez MA    Patient is showing 3/5 MNCM met. Current tobacco use and Aspirin not prescribed   Shani Alvarez MA

## 2024-01-04 ENCOUNTER — TELEPHONE (OUTPATIENT)
Dept: ENDOCRINOLOGY | Facility: CLINIC | Age: 49
End: 2024-01-04
Payer: COMMERCIAL

## 2024-01-04 NOTE — TELEPHONE ENCOUNTER
Called patient and left voicemail. Patient has an appointment on  1/8/24 . Need to collect the last 14 days worth of blood sugar readings to prepare for patient's visit.   Shani Alvarez MA    Xeljanz Counseling: I discussed with the patient the risks of Xeljanz therapy including increased risk of infection, liver issues, headache, diarrhea, or cold symptoms. Live vaccines should be avoided. They were instructed to call if they have any problems.

## 2024-01-05 NOTE — TELEPHONE ENCOUNTER
Called patient and left voicemail. Patient has an appointment on  1/8/24 . Need to collect the last 14 days worth of blood sugar readings to prepare for patient's visit.   Shani Alvarez MA

## 2024-01-08 ENCOUNTER — VIRTUAL VISIT (OUTPATIENT)
Dept: ENDOCRINOLOGY | Facility: CLINIC | Age: 49
End: 2024-01-08
Payer: COMMERCIAL

## 2024-01-08 DIAGNOSIS — E03.9 ACQUIRED HYPOTHYROIDISM: Primary | ICD-10-CM

## 2024-01-08 PROCEDURE — 99214 OFFICE O/P EST MOD 30 MIN: CPT | Mod: 95 | Performed by: STUDENT IN AN ORGANIZED HEALTH CARE EDUCATION/TRAINING PROGRAM

## 2024-01-08 ASSESSMENT — PAIN SCALES - GENERAL: PAINLEVEL: SEVERE PAIN (6)

## 2024-01-08 NOTE — PROGRESS NOTES
Endocrinology Clinic Visit     Video-Visit Details     Type of service:  Video Visit   Joined the call at 1/8/2024, 11:37:30 am.  Left the call at 1/8/2024, 11:5053 pm.    I spent a total of 30 minutes on the date of encounter reviewing medical records, evaluating the patient, coordinating care and documenting in the EHR, as detailed above.        Platform used for Video Visit: [employer]    Patient location: home  Provider location: off site     NAME:  Autumn Holbrook  PCP:  Apryl Hathaway  MRN:  [id number]  Reason for Consult:  hypothyroidism  Requesting Provider:  Apryl Hathaway    Chief Complaint     Chief Complaint   Patient presents with    RECHECK       History of Present Illness     Autumn Holbrook is a 47 year old female who is seen in video visit for hypothyroidism. Last seen 1/2023    # hypothyroidism  She had hypothyroidism diagnosed long time ago and has been on LT4 replacement since.  She is on lt4 150 mcg since 9/26/22, prior to that she was on LT4 175 mcg. She said she has been on higher doses up to 225 mcg and is being lowered over the past couple years. Her TFT showed iatrogenic hyperthyroidism with low TSH since 2/2020. Most recent TFT : 9/2022 TSH 0.01 and ft4 1.3.     She had TFT : 2/203 TSH 21. I increased her lt4 from 150 mcg to 175 mcg daily. TSH on 5/2023 was 1.36.    Currently takes levothyroxine on empty stomach and waits at least 30 minutes before eating.  Does not take calcium or iron containing multivitamins within 4 hours of taking the levothyroxine tablet.     She doing well, no major complaints.    # morbid obesity, BMI 45  # DM2  Followed by PCP and weight management clinic.   Lost 20 lbs over this past year.      Social:  She is unemployed; she is on disability because of knee OA. She has 2 daughters. She is a smoker ( trying to quit). Alcohol very occasional. She smoke medical cannabis   Problem List     Patient Active Problem List   Diagnosis    Chronic pain syndrome    Opioid  dependence in remission (H)    PTSD (post-traumatic stress disorder)    COY (generalized anxiety disorder)    Moderate episode of recurrent major depressive disorder (H)    Bilateral edema of lower extremity    Hypothyroid    GERD (gastroesophageal reflux disease)    Mild mixed bipolar I disorder (H)    Grief    Diabetes mellitus, type 2 (H)    History of migraine headaches    Urge incontinence of urine    Encounter for sterilization    Tobacco abuse        Medications     Current Outpatient Medications   Medication    Acetaminophen (TYLENOL PO)    asenapine (SAPHRIS) 2.5 MG SUBL sublingual tablet    asenapine (SAPHRIS) 5 MG SUBL sublingual tablet    baclofen (LIORESAL) 10 MG tablet    blood glucose (NO BRAND SPECIFIED) lancets standard    blood glucose (NO BRAND SPECIFIED) test strip    blood glucose monitoring (NO BRAND SPECIFIED) meter device kit    blood glucose monitoring (SOFTCLIX) lancets    diclofenac (VOLTAREN) 1 % topical gel    furosemide (LASIX) 20 MG tablet    hydrochlorothiazide (HYDRODIURIL) 25 MG tablet    hydrOXYzine (ATARAX) 25 MG tablet    ibuprofen (ADVIL/MOTRIN) 800 MG tablet    levothyroxine (SYNTHROID/LEVOTHROID) 175 MCG tablet    lisdexamfetamine (VYVANSE) 40 MG capsule    Melatonin 10 MG CAPS    multivitamin w/minerals (THERA-VIT-M) tablet    nicotine (NICORETTE) 2 MG gum    omeprazole (PRILOSEC) 40 MG DR capsule    oxybutynin ER (DITROPAN XL) 10 MG 24 hr tablet    potassium chloride ER (K-TAB) 20 MEQ CR tablet    potassium chloride ER (K-TAB/KLOR-CON) 10 MEQ CR tablet    rosuvastatin (CRESTOR) 5 MG tablet    Semaglutide, 2 MG/DOSE, (OZEMPIC) 8 MG/3ML pen    sertraline (ZOLOFT) 50 MG tablet    topiramate (TOPAMAX) 25 MG tablet    Vitamin D3 (CHOLECALCIFEROL) 125 MCG (5000 UT) tablet    VRAYLAR 3 MG CAPS capsule     No current facility-administered medications for this visit.        Allergies     Allergies   Allergen Reactions    Depakote [Valproic Acid]     Gabapentin Swelling    Propofol  Unknown     Got very stiff and tight.        Medical / Surgical History     Past Medical History:   Diagnosis Date    Anxiety     Arthritis     Chronic pain syndrome 2017    Diabetes mellitus, type 2 (H) 2021    COY (generalized anxiety disorder) 2017    GERD (gastroesophageal reflux disease) 2019    Grief 2021    History of migraine headaches 2022    Hypothyroid 2017    Migraine     Mild mixed bipolar I disorder (H) 2010    Moderate episode of recurrent major depressive disorder (H) 2017    Obesity, morbid, BMI 50 or higher (H) 2018    Opioid dependence in remission (H) 2017    On suboxone.     Osteoarthritis     PTSD (post-traumatic stress disorder) 2017    Thyroid disease     Tobacco abuse 2023    Trigger finger of both hands 2018    IMO Regulatory Load OCT 2020    Urge incontinence of urine 2023     Past Surgical History:   Procedure Laterality Date     SECTION  1995     SECTION  2002    CHOLECYSTECTOMY      COLONOSCOPY N/A 2022    Procedure: Colonscopy;  Surgeon: Deep Zhu MD;  Location: [facility] OR    KNEE ARTHROSCOPY AND ARTHROTOMY Right 2019    right knee arthroscopy, lateral menisectomy    LEEP TX, CERVICAL         Social History     Social History     Socioeconomic History    Marital status:      Spouse name: Not on file    Number of children: 2    Years of education: Not on file    Highest education level: Not on file   Occupational History    Not on file   Tobacco Use    Smoking status: Every Day     Packs/day: 0.50     Years: 32.00     Additional pack years: 0.00     Total pack years: 16.00     Types: Cigarettes     Start date: 1989     Passive exposure: Past    Smokeless tobacco: Never    Tobacco comments:     pt declines   Vaping Use    Vaping Use: Some days    Substances: Nicotine, Flavoring    Devices: Disposable   Substance and Sexual Activity     Alcohol use: No    Drug use: Yes     Types: Marijuana     Comment: daily-medical    Sexual activity: Yes     Partners: Male     Birth control/protection: Condom   Other Topics Concern    Parent/sibling w/ CABG, MI or angioplasty before 65F 55M? Not Asked   Social History Narrative    Not on file     Social Determinants of Health     Financial Resource Strain: Not on file   Food Insecurity: Not on file   Transportation Needs: Not on file   Physical Activity: Not on file   Stress: Not on file   Social Connections: Not on file   Interpersonal Safety: Not on file   Housing Stability: Not on file       Family History     Family History   Problem Relation Age of Onset    Cerebrovascular Disease Mother     Alcoholism Mother     Cancer Mother     Depression Mother     Eczema Mother     Hypertension Mother     Migraines Mother     Mental Illness Mother     Osteoarthritis Mother     Osteoporosis Mother     Alcoholism Father     Cancer Father     Hyperlipidemia Father     Hypertension Father     Myocardial Infarction Father     Mental Illness Sister     Migraines Sister        ROS     12 ROS completed, pertinent positive and negative in HPI    Physical Exam   There were no vitals taken for this visit.   GENERAL: Healthy, alert and no distress  EYES: Eyes grossly normal to inspection.  No discharge or erythema, or obvious scleral/conjunctival abnormalities.  RESP: No audible wheeze, cough, or visible cyanosis.  No visible retractions or increased work of breathing.    SKIN: Visible skin clear. No significant rash, abnormal pigmentation or lesions.  NEURO: Cranial nerves grossly intact.  Mentation and speech appropriate for age.  PSYCH: Mentation appears normal, affect normal/bright, judgement and insight intact, normal speech and appearance well-groomed.     Labs/Imaging     Pertinent Labs were reviewed and updated in EPIC and discussed briefly.  Radiology Results were  reviewed and updated in EPIC and discussed  "briefly.    Summary of recent findings:   Lab Results   Component Value Date    A1C 6.6 08/01/2022    A1C 6.9 06/01/2022    A1C 7.6 02/16/2022    A1C 7.0 11/04/2021    A1C 6.0 01/29/2020    A1C 6.2 07/01/2019    A1C 5.9 12/19/2018       TSH   Date Value Ref Range Status   05/17/2023 1.36 0.30 - 4.20 uIU/mL Final   02/17/2023 21.09 (H) 0.30 - 4.20 uIU/mL Final   09/26/2022 0.01 (L) 0.40 - 4.00 mU/L Final   06/09/2022 <0.01 (L) 0.40 - 4.00 mU/L Final   03/09/2022 0.15 (L) 0.40 - 4.00 mU/L Final   11/04/2021 0.31 (L) 0.40 - 4.00 mU/L Final   09/08/2021 0.21 (L) 0.40 - 4.00 mU/L Final   05/05/2021 0.30 (L) 0.40 - 4.00 mU/L Final   03/01/2021 0.14 (L) 0.40 - 4.00 mU/L Final   01/18/2021 0.15 (L) 0.40 - 4.00 mU/L Final   12/04/2020 0.10 (L) 0.40 - 4.00 mU/L Final   05/28/2020 0.44 0.40 - 4.00 mU/L Final     T4 Free   Date Value Ref Range Status   05/05/2021 1.35 0.76 - 1.46 ng/dL Final   03/01/2021 1.49 (H) 0.76 - 1.46 ng/dL Final   01/18/2021 1.35 0.76 - 1.46 ng/dL Final   12/04/2020 1.47 (H) 0.76 - 1.46 ng/dL Final   02/27/2020 1.82 (H) 0.76 - 1.46 ng/dL Final     Free T4   Date Value Ref Range Status   02/17/2023 1.02 0.90 - 1.70 ng/dL Final   09/26/2022 1.30 0.76 - 1.46 ng/dL Final   06/09/2022 1.46 0.76 - 1.46 ng/dL Final   03/09/2022 1.43 0.76 - 1.46 ng/dL Final   11/04/2021 1.38 0.76 - 1.46 ng/dL Final       Creatinine   Date Value Ref Range Status   09/11/2023 0.82 0.51 - 0.95 mg/dL Final   05/19/2021 0.78 0.52 - 1.04 mg/dL Final       Recent Labs   Lab Test 01/06/22  1528 01/29/20  1351   CHOL 191 252*   HDL 65 87   * 155*   TRIG 111 52       No results found for: \"[id number]\", \"[id number]\", \"[id number]\"    I personally reviewed the patient's outside records from [employer] EMR. Summary of pertinent findings in HPI.    Impression / Plan     1.  Chronic hypothyroidism  She had TFT : 2/203 TSH 21. I increased her lt4 from 150 mcg to 175 mcg daily. TSH on 5/2023 was 1.36.  We reviewed some general information " about hypothyroidism diagnosis and management.   Recommend at least yearly TSH stable if LT4 dose is table/not changing. She prefers to do it every 3-4 month. She is ok following with PCP moving forward. She does not need lt4 refill.      Follow up with PCP. Graduate from endocrine clinic.      Jazmín Wallace MD  Endocrinology, Diabetes and Metabolism  [facility]

## 2024-01-08 NOTE — LETTER
1/8/2024       RE: Autumn Holbrook  [address] [facility] [address]     Dear Colleague,    Thank you for referring your patient, Autumn Holbrook, to the [facility]OCRINOLOG[facility] [place] at [facility]. Please see a copy of my visit note below.        Outcome for 12/29/23 4:00 PM: Squidbidt message sent  Shani Alvarez MA  Outcome for 01/04/24 1:31 PM: Left Voicemail   Shani Alvarez MA  Outcome for 01/05/24 10:16 AM: Left Voicemail   Shani Alvarez MA    Patient is showing 3/5 MNCM met. Current tobacco use and Aspirin not prescribed   Shani Alvarez MA      Endocrinology Clinic Visit     Video-Visit Details     Type of service:  Video Visit   Joined the call at 1/8/2024, 11:37:30 am.  Left the call at 1/8/2024, 11:5053 pm.    I spent a total of 30 minutes on the date of encounter reviewing medical records, evaluating the patient, coordinating care and documenting in the EHR, as detailed above.        Platform used for Video Visit: Aidhenscorner    Patient location: home  Provider location: off site     NAME:  Autumn Holbrook  PCP:  Apryl Hathaway  MRN:  [id number]  Reason for Consult:  hypothyroidism  Requesting Provider:  Apryl Hathaway    Chief Complaint     Chief Complaint   Patient presents with    RECHECK       History of Present Illness     Autumn Holbrook is a 47 year old female who is seen in video visit for hypothyroidism. Last seen 1/2023    # hypothyroidism  She had hypothyroidism diagnosed long time ago and has been on LT4 replacement since.  She is on lt4 150 mcg since 9/26/22, prior to that she was on LT4 175 mcg. She said she has been on higher doses up to 225 mcg and is being lowered over the past couple years. Her TFT showed iatrogenic hyperthyroidism with low TSH since 2/2020. Most recent TFT : 9/2022 TSH 0.01 and ft4 1.3.     She had TFT : 2/203 TSH 21. I increased her lt4 from 150 mcg to 175 mcg daily. TSH on 5/2023 was  1.36.    Currently takes levothyroxine on empty stomach and waits at least 30 minutes before eating.  Does not take calcium or iron containing multivitamins within 4 hours of taking the levothyroxine tablet.     She doing well, no major complaints.    # morbid obesity, BMI 45  # DM2  Followed by PCP and weight management clinic.   Lost 20 lbs over this past year.      Social:  She is unemployed; she is on disability because of knee OA. She has 2 daughters. She is a smoker ( trying to quit). Alcohol very occasional. She smoke medical cannabis   Problem List     Patient Active Problem List   Diagnosis    Chronic pain syndrome    Opioid dependence in remission (H)    PTSD (post-traumatic stress disorder)    COY (generalized anxiety disorder)    Moderate episode of recurrent major depressive disorder (H)    Bilateral edema of lower extremity    Hypothyroid    GERD (gastroesophageal reflux disease)    Mild mixed bipolar I disorder (H)    Grief    Diabetes mellitus, type 2 (H)    History of migraine headaches    Urge incontinence of urine    Encounter for sterilization    Tobacco abuse        Medications     Current Outpatient Medications   Medication    Acetaminophen (TYLENOL PO)    asenapine (SAPHRIS) 2.5 MG SUBL sublingual tablet    asenapine (SAPHRIS) 5 MG SUBL sublingual tablet    baclofen (LIORESAL) 10 MG tablet    blood glucose (NO BRAND SPECIFIED) lancets standard    blood glucose (NO BRAND SPECIFIED) test strip    blood glucose monitoring (NO BRAND SPECIFIED) meter device kit    blood glucose monitoring (SOFTCLIX) lancets    diclofenac (VOLTAREN) 1 % topical gel    furosemide (LASIX) 20 MG tablet    hydrochlorothiazide (HYDRODIURIL) 25 MG tablet    hydrOXYzine (ATARAX) 25 MG tablet    ibuprofen (ADVIL/MOTRIN) 800 MG tablet    levothyroxine (SYNTHROID/LEVOTHROID) 175 MCG tablet    lisdexamfetamine (VYVANSE) 40 MG capsule    Melatonin 10 MG CAPS    multivitamin w/minerals (THERA-VIT-M) tablet    nicotine  (NICORETTE) 2 MG gum    omeprazole (PRILOSEC) 40 MG DR capsule    oxybutynin ER (DITROPAN XL) 10 MG 24 hr tablet    potassium chloride ER (K-TAB) 20 MEQ CR tablet    potassium chloride ER (K-TAB/KLOR-CON) 10 MEQ CR tablet    rosuvastatin (CRESTOR) 5 MG tablet    Semaglutide, 2 MG/DOSE, (OZEMPIC) 8 MG/3ML pen    sertraline (ZOLOFT) 50 MG tablet    topiramate (TOPAMAX) 25 MG tablet    Vitamin D3 (CHOLECALCIFEROL) 125 MCG (5000 UT) tablet    VRAYLAR 3 MG CAPS capsule     No current facility-administered medications for this visit.        Allergies     Allergies   Allergen Reactions    Depakote [Valproic Acid]     Gabapentin Swelling    Propofol Unknown     Got very stiff and tight.        Medical / Surgical History     Past Medical History:   Diagnosis Date    Anxiety     Arthritis     Chronic pain syndrome 2017    Diabetes mellitus, type 2 (H) 2021    COY (generalized anxiety disorder) 2017    GERD (gastroesophageal reflux disease) 2019    Grief 2021    History of migraine headaches 2022    Hypothyroid 2017    Migraine     Mild mixed bipolar I disorder (H) 2010    Moderate episode of recurrent major depressive disorder (H) 2017    Obesity, morbid, BMI 50 or higher (H) 2018    Opioid dependence in remission (H) 2017    On suboxone.     Osteoarthritis     PTSD (post-traumatic stress disorder) 2017    Thyroid disease     Tobacco abuse 2023    Trigger finger of both hands 2018    IMO Regulatory Load OCT 2020    Urge incontinence of urine 2023     Past Surgical History:   Procedure Laterality Date     SECTION  1995     SECTION  2002    CHOLECYSTECTOMY      COLONOSCOPY N/A 2022    Procedure: Colonscopy;  Surgeon: Deep Zhu MD;  Location: [facility]    KNEE ARTHROSCOPY AND ARTHROTOMY Right 2019    right knee arthroscopy, lateral menisectomy    LEEP TX, CERVICAL  2004       Social  History     Social History     Socioeconomic History    Marital status:      Spouse name: Not on file    Number of children: 2    Years of education: Not on file    Highest education level: Not on file   Occupational History    Not on file   Tobacco Use    Smoking status: Every Day     Packs/day: 0.50     Years: 32.00     Additional pack years: 0.00     Total pack years: 16.00     Types: Cigarettes     Start date: 1/1/1989     Passive exposure: Past    Smokeless tobacco: Never    Tobacco comments:     pt declines   Vaping Use    Vaping Use: Some days    Substances: Nicotine, Flavoring    Devices: Disposable   Substance and Sexual Activity    Alcohol use: No    Drug use: Yes     Types: Marijuana     Comment: daily-medical    Sexual activity: Yes     Partners: Male     Birth control/protection: Condom   Other Topics Concern    Parent/sibling w/ CABG, MI or angioplasty before 65F 55M? Not Asked   Social History Narrative    Not on file     Social Determinants of Health     Financial Resource Strain: Not on file   Food Insecurity: Not on file   Transportation Needs: Not on file   Physical Activity: Not on file   Stress: Not on file   Social Connections: Not on file   Interpersonal Safety: Not on file   Housing Stability: Not on file       Family History     Family History   Problem Relation Age of Onset    Cerebrovascular Disease Mother     Alcoholism Mother     Cancer Mother     Depression Mother     Eczema Mother     Hypertension Mother     Migraines Mother     Mental Illness Mother     Osteoarthritis Mother     Osteoporosis Mother     Alcoholism Father     Cancer Father     Hyperlipidemia Father     Hypertension Father     Myocardial Infarction Father     Mental Illness Sister     Migraines Sister        ROS     12 ROS completed, pertinent positive and negative in HPI    Physical Exam   There were no vitals taken for this visit.   GENERAL: Healthy, alert and no distress  EYES: Eyes grossly normal to  inspection.  No discharge or erythema, or obvious scleral/conjunctival abnormalities.  RESP: No audible wheeze, cough, or visible cyanosis.  No visible retractions or increased work of breathing.    SKIN: Visible skin clear. No significant rash, abnormal pigmentation or lesions.  NEURO: Cranial nerves grossly intact.  Mentation and speech appropriate for age.  PSYCH: Mentation appears normal, affect normal/bright, judgement and insight intact, normal speech and appearance well-groomed.     Labs/Imaging     Pertinent Labs were reviewed and updated in EPIC and discussed briefly.  Radiology Results were  reviewed and updated in EPIC and discussed briefly.    Summary of recent findings:   Lab Results   Component Value Date    A1C 6.6 08/01/2022    A1C 6.9 06/01/2022    A1C 7.6 02/16/2022    A1C 7.0 11/04/2021    A1C 6.0 01/29/2020    A1C 6.2 07/01/2019    A1C 5.9 12/19/2018       TSH   Date Value Ref Range Status   05/17/2023 1.36 0.30 - 4.20 uIU/mL Final   02/17/2023 21.09 (H) 0.30 - 4.20 uIU/mL Final   09/26/2022 0.01 (L) 0.40 - 4.00 mU/L Final   06/09/2022 <0.01 (L) 0.40 - 4.00 mU/L Final   03/09/2022 0.15 (L) 0.40 - 4.00 mU/L Final   11/04/2021 0.31 (L) 0.40 - 4.00 mU/L Final   09/08/2021 0.21 (L) 0.40 - 4.00 mU/L Final   05/05/2021 0.30 (L) 0.40 - 4.00 mU/L Final   03/01/2021 0.14 (L) 0.40 - 4.00 mU/L Final   01/18/2021 0.15 (L) 0.40 - 4.00 mU/L Final   12/04/2020 0.10 (L) 0.40 - 4.00 mU/L Final   05/28/2020 0.44 0.40 - 4.00 mU/L Final     T4 Free   Date Value Ref Range Status   05/05/2021 1.35 0.76 - 1.46 ng/dL Final   03/01/2021 1.49 (H) 0.76 - 1.46 ng/dL Final   01/18/2021 1.35 0.76 - 1.46 ng/dL Final   12/04/2020 1.47 (H) 0.76 - 1.46 ng/dL Final   02/27/2020 1.82 (H) 0.76 - 1.46 ng/dL Final     Free T4   Date Value Ref Range Status   02/17/2023 1.02 0.90 - 1.70 ng/dL Final   09/26/2022 1.30 0.76 - 1.46 ng/dL Final   06/09/2022 1.46 0.76 - 1.46 ng/dL Final   03/09/2022 1.43 0.76 - 1.46 ng/dL Final   11/04/2021  "1.38 0.76 - 1.46 ng/dL Final       Creatinine   Date Value Ref Range Status   09/11/2023 0.82 0.51 - 0.95 mg/dL Final   05/19/2021 0.78 0.52 - 1.04 mg/dL Final       Recent Labs   Lab Test 01/06/22  1528 01/29/20  1351   CHOL 191 252*   HDL 65 87   * 155*   TRIG 111 52       No results found for: \"[id number]\", \"[id number]\", \"[id number]\"    I personally reviewed the patient's outside records from [employer] EMR. Summary of pertinent findings in HPI.    Impression / Plan     1.  Chronic hypothyroidism  She had TFT : 2/203 TSH 21. I increased her lt4 from 150 mcg to 175 mcg daily. TSH on 5/2023 was 1.36.  We reviewed some general information about hypothyroidism diagnosis and management.   Recommend at least yearly TSH stable if LT4 dose is table/not changing. She prefers to do it every 3-4 month. She is ok following with PCP moving forward. She does not need lt4 refill.      Follow up with PCP. Graduate from endocrine clinic.      Jazmín Wallace MD  Endocrinology, Diabetes and Metabolism  [facility]  "

## 2024-01-08 NOTE — NURSING NOTE
Is the patient currently in the state of [place]? YES    Visit mode:VIDEO    If the visit is dropped, the patient can be reconnected by: VIDEO VISIT: Text to cell phone:   Telephone Information:   Mobile [phone]    and VIDEO VISIT: Send to e-mail at: [email or web address]    Will anyone else be joining the visit? NO  (If patient encounters technical issues they should call [phone])    How would you like to obtain your AVS? MyChart    Are changes needed to the allergy or medication list? No    Reason for visit: TANGELA OBANDO

## 2024-01-13 DIAGNOSIS — I10 BENIGN ESSENTIAL HYPERTENSION: ICD-10-CM

## 2024-01-15 DIAGNOSIS — R60.0 BILATERAL EDEMA OF LOWER EXTREMITY: ICD-10-CM

## 2024-01-15 RX ORDER — HYDROCHLOROTHIAZIDE 25 MG/1
TABLET ORAL
Qty: 90 TABLET | Refills: 0 | Status: SHIPPED | OUTPATIENT
Start: 2024-01-15 | End: 2024-04-12

## 2024-01-15 NOTE — TELEPHONE ENCOUNTER
Potassium      Last Written Prescription Date:  09/11/23  Last Fill Quantity: 30,   # refills: 3  Last Office Visit: 09/11/23  Future Office visit:

## 2024-01-15 NOTE — TELEPHONE ENCOUNTER
HCTZ      Last Written Prescription Date:  01/05/23  Last Fill Quantity: 90,   # refills: 4  Last Office Visit: 09/11/23  Future Office visit:

## 2024-01-16 RX ORDER — POTASSIUM CHLORIDE 750 MG/1
TABLET, EXTENDED RELEASE ORAL
Qty: 30 TABLET | Refills: 3 | Status: SHIPPED | OUTPATIENT
Start: 2024-01-16 | End: 2024-09-20

## 2024-01-17 ENCOUNTER — VIRTUAL VISIT (OUTPATIENT)
Dept: ENDOCRINOLOGY | Facility: CLINIC | Age: 49
End: 2024-01-17
Payer: COMMERCIAL

## 2024-01-17 VITALS — WEIGHT: 262 LBS | HEIGHT: 63 IN | BODY MASS INDEX: 46.42 KG/M2

## 2024-01-17 DIAGNOSIS — E11.9 TYPE 2 DIABETES MELLITUS WITHOUT COMPLICATION, WITHOUT LONG-TERM CURRENT USE OF INSULIN (H): ICD-10-CM

## 2024-01-17 DIAGNOSIS — Z71.3 NUTRITIONAL COUNSELING: Primary | ICD-10-CM

## 2024-01-17 DIAGNOSIS — E66.9 OBESITY: ICD-10-CM

## 2024-01-17 PROCEDURE — 97803 MED NUTRITION INDIV SUBSEQ: CPT | Mod: 95 | Performed by: DIETITIAN, REGISTERED

## 2024-01-17 PROCEDURE — 99207 PR NO CHARGE LOS: CPT | Mod: 95 | Performed by: DIETITIAN, REGISTERED

## 2024-01-17 NOTE — LETTER
"1/17/2024       RE: Autumn Holbrook  [address] [facility] [address]     Dear Colleague,    Thank you for referring your patient, Autumn Holbrook, to the [facility] WEIGHT MANAGEMENT CLINIC [place] at [facility]. Please see a copy of my visit note below.    Video-Visit Details    Type of service:  Video Visit    Video Start Time: 9:33 am  Video End Time: 9:50 am    Originating Location (pt. Location): Home    Distant Location (provider location):  Offsite (providers home)     Platform used for Video Visit: UNATION      Return Weight Management Nutrition Consultation    Autumn Holbrook is a 48 year old female presents today for a return weight management nutrition consultation.  Patient referred by SEVEN Nickerson on March 1, 2023.    Patient with Co-morbidities of obesity including:  Type 2 Diabetes, GERD, Knee Pain, Back Pain    Anthropometrics:  Weight 2/28/23: 287 lbs with BMI 52.36    Estimated body mass index is 46.42 kg/m  as calculated from the following:    Height as of this encounter: 1.6 m (5' 2.99\").    Weight as of this encounter: 118.8 kg (262 lb).     Current weight: 262 lbs, pt report   - Down 8 lbs since last RD appt     Medications for Weight Loss:  Topiramate   Ozempic    A1C 6.4 9/1/23    NUTRITION HISTORY  Food allergies: none  Food intolerances: avocados  Supplements: Vitamin D3, MVI, potassium (prescribed by PCP)   Previous methods of diet modification for weight loss: Watching Portions or Calories, Exercise, Weight Watchers, Medications, Fasting    Pt reports she has been heavy for most of her life. Thyroid issues/Hasimotos make it difficult to lose weight. Has been trying to decrease carbs d/t T2DM. Often craves sugar at night. Has [employer] Meals delivered- diabetic friendly and pre-portioned. Physical activity is limited d/t knee pain.     Please see previous RD notes for more detail.    Sept 2023:    Pt reports things are going better - " decreased sugar/carb intake. Getting MOMs meals. Starting with protein (chicken, pork chops, ribs, pork tenderloin, some fish, occ steak) and vegetables (mostly greens, carrots in MOMs meals) first then starch last at meal time.    Decreased cravings.   Continues to notice earlier satiety.     Switched from skim milk to a unflavored 30 kcal almond milk in her cereal.    Nov 2023:    Doing well with dietary goals.  Decreased diet soda intake by 1/4, eating more meals at home (no takeout or restaurant food in about 2 months), increased vegetable intake.  Struggling with eating in the morning and cravings.    Doing MOMs meals and PCA is helping with other meals. Made and froze a bunch of meals.  Earlier satiety - not always able to get to starch. Focusing on protein/vegetable. Has glucose tabs if needed if glucose gets too low. Will start to feel nauseous/vomit if gets around 90 or below per pt.    Craving salt lately. Trying to be mindful of due to fluid retention. Will have something small to help satisfy.     PA: increased activity in the house    Jan 2024:  Busy couple months with holidays. Feels self care fell off.    Has noticed more snacking recently. Craving  junk food  or fast meals - something quick such as pizza versus making a full meal.     Doing well with portion control.   Continues to forget to eat during day. Not feeling hungry during the day. Is keeping granola bars on hand.    Hydration: going well currently - was lower a couple weeks ago and noticed more swelling.     PA: wants to find an exercise regimen that works well for her    Previous goals:  Aim to set a timer after meals to check Blood Sugar level 2 hrs post meal - In progress, often forgetting. Was 127, 136 last couple times she checked. The 136 was after pasta/a glass of wine per pt.  Check in on morning routine (wake time, med time, breakfast time) - Discussed. No major changes to morning routine. Can't eat with one of her meds - wants to  work on moving this med earlier.    Nutrition Prescription  Recommended energy/nutrient modification.    Nutrition Diagnosis  Obesity r/t long history of positive energy balance aeb BMI >30.    Nutrition Intervention  Materials/education provided on hypocaloric diet for weight loss. Discussed 1700 calorie/day diet, Volumetric eating to help satiety level on fewer calories; portion control and healthy food choices (Plate Method and Volumetrics handouts), 100 calorie snack choices, meal and snack planning and websites, sample meal plans     Patient demonstrates understanding.    Expected Engagement: good    Nutrition Goals   Aim to take meds earlier to help with getting food in earlier (consider putting med in bathroom or somewhere more accessible)   Could consider keeping ready made chicken (example: rotisserie chicken), ready made grain packs, canned beans, and frozen vegetables on hand for faster meal options   Schedule follow up with Primary Doctor.   Check in on activity next appointment - links below for free weights and seated exercises        Resistance Training with Free Weights: 3 Exercises    Seated Exercises for Arms and Legs: 11 Exercises     Breakfast time ideas:   - Toast with peanut butter   - Hard boiled egg   - Scrambled egg   - Oatmeal   - Cottage cheese   - Yogurt    The Plate Method  [email or web address]    Protein Sources   [email or web address]     Carbohydrates  [email or web address]     Mindful Eating  [email or web address]     Summary of Volumetrics Eating Plan  [email or web address]     Follow-Up:  Wednesday, March 13th at 9:30 am    Time spent with patient: 17 minutes.  STEVO Gil, RD, LD

## 2024-01-17 NOTE — NURSING NOTE
Is the patient currently in the state of [place]? YES    Visit mode:VIDEO    If the visit is dropped, the patient can be reconnected by: VIDEO VISIT: Text to cell phone:   Telephone Information:   Mobile [phone]       Will anyone else be joining the visit? NO  (If patient encounters technical issues they should call [phone])    How would you like to obtain your AVS? MyChart    Are changes needed to the allergy or medication list? Pt stated no changes to allergies and Pt stated no med changes    Reason for visit: Follow Up    Bárbara OBANDO

## 2024-01-17 NOTE — PROGRESS NOTES
"Video-Visit Details    Type of service:  Video Visit    Video Start Time: 9:33 am  Video End Time: 9:50 am    Originating Location (pt. Location): Home    Distant Location (provider location):  Offsite (providers home)     Platform used for Video Visit: BitMethod      Return Weight Management Nutrition Consultation    Autumn Holbrook is a 48 year old female presents today for a return weight management nutrition consultation.  Patient referred by SEVEN Nickerson on March 1, 2023.    Patient with Co-morbidities of obesity including:  Type 2 Diabetes, GERD, Knee Pain, Back Pain    Anthropometrics:  Weight 2/28/23: 287 lbs with BMI 52.36    Estimated body mass index is 46.42 kg/m  as calculated from the following:    Height as of this encounter: 1.6 m (5' 2.99\").    Weight as of this encounter: 118.8 kg (262 lb).     Current weight: 262 lbs, pt report   - Down 8 lbs since last RD appt     Medications for Weight Loss:  Topiramate   Ozempic    A1C 6.4 9/1/23    NUTRITION HISTORY  Food allergies: none  Food intolerances: avocados  Supplements: Vitamin D3, MVI, potassium (prescribed by PCP)   Previous methods of diet modification for weight loss: Watching Portions or Calories, Exercise, Weight Watchers, Medications, Fasting    Pt reports she has been heavy for most of her life. Thyroid issues/Hasimotos make it difficult to lose weight. Has been trying to decrease carbs d/t T2DM. Often craves sugar at night. Has Moms Meals delivered- diabetic friendly and pre-portioned. Physical activity is limited d/t knee pain.     Please see previous RD notes for more detail.    Sept 2023:    Pt reports things are going better - decreased sugar/carb intake. Getting MOMs meals. Starting with protein (chicken, pork chops, ribs, pork tenderloin, some fish, occ steak) and vegetables (mostly greens, carrots in MOMs meals) first then starch last at meal time.    Decreased cravings.   Continues to notice earlier satiety.     Switched from skim " milk to a unflavored 30 kcal almond milk in her cereal.    Nov 2023:    Doing well with dietary goals.  Decreased diet soda intake by 1/4, eating more meals at home (no takeout or restaurant food in about 2 months), increased vegetable intake.  Struggling with eating in the morning and cravings.    Doing MOMs meals and PCA is helping with other meals. Made and froze a bunch of meals.  Earlier satiety - not always able to get to starch. Focusing on protein/vegetable. Has glucose tabs if needed if glucose gets too low. Will start to feel nauseous/vomit if gets around 90 or below per pt.    Craving salt lately. Trying to be mindful of due to fluid retention. Will have something small to help satisfy.     PA: increased activity in the house    Jan 2024:  Busy couple months with holidays. Feels self care fell off.    Has noticed more snacking recently. Craving  junk food  or fast meals - something quick such as pizza versus making a full meal.     Doing well with portion control.   Continues to forget to eat during day. Not feeling hungry during the day. Is keeping granola bars on hand.    Hydration: going well currently - was lower a couple weeks ago and noticed more swelling.     PA: wants to find an exercise regimen that works well for her    Previous goals:  Aim to set a timer after meals to check Blood Sugar level 2 hrs post meal - In progress, often forgetting. Was 127, 136 last couple times she checked. The 136 was after pasta/a glass of wine per pt.  Check in on morning routine (wake time, med time, breakfast time) - Discussed. No major changes to morning routine. Can't eat with one of her meds - wants to work on moving this med earlier.    Nutrition Prescription  Recommended energy/nutrient modification.    Nutrition Diagnosis  Obesity r/t long history of positive energy balance aeb BMI >30.    Nutrition Intervention  Materials/education provided on hypocaloric diet for weight loss. Discussed 1700 calorie/day  diet, Volumetric eating to help satiety level on fewer calories; portion control and healthy food choices (Plate Method and Volumetrics handouts), 100 calorie snack choices, meal and snack planning and websites, sample meal plans     Patient demonstrates understanding.    Expected Engagement: good    Nutrition Goals   Aim to take meds earlier to help with getting food in earlier (consider putting med in bathroom or somewhere more accessible)   Could consider keeping ready made chicken (example: rotisserie chicken), ready made grain packs, canned beans, and frozen vegetables on hand for faster meal options   Schedule follow up with Primary Doctor.   Check in on activity next appointment - links below for free weights and seated exercises        Resistance Training with Free Weights: 3 Exercises    Seated Exercises for Arms and Legs: 11 Exercises     Breakfast time ideas:   - Toast with peanut butter   - Hard boiled egg   - Scrambled egg   - Oatmeal   - Cottage cheese   - Yogurt    The Plate Method  [email or web address]    Protein Sources   [email or web address]     Carbohydrates  [email or web address]     Mindful Eating  [email or web address]     Summary of Volumetrics Eating Plan  [email or web address]     Follow-Up:  Wednesday, March 13th at 9:30 am    Time spent with patient: 17 minutes.  STEVO Gil, RD, LD

## 2024-01-17 NOTE — PATIENT INSTRUCTIONS
Nutrition Goals   Aim to take meds earlier to help with getting food in earlier (consider putting med in bathroom or somewhere more accessible)   Could consider keeping ready made chicken (example: rotisserie chicken), ready made grain packs, canned beans, and frozen vegetables on hand for faster meal options   Schedule follow up with Primary Doctor.   Check in on activity next appointment - links below for free weights and seated exercises        Resistance Training with Free Weights: 3 Exercises    Seated Exercises for Arms and Legs: 11 Exercises     Breakfast time ideas:   - Toast with peanut butter   - Hard boiled egg   - Scrambled egg   - Oatmeal   - Cottage cheese   - Yogurt    The Plate Method  [email or web address]    Protein Sources   [email or web address]     Carbohydrates  [email or web address]     Mindful Eating  [email or web address]     Summary of Volumetrics Eating Plan  [email or web address]     Follow-Up:  Wednesday, March 13th at 9:30 am    Bonny Apodaca ([place]), STEVO, RD, LD  Clinic #: [phone]

## 2024-02-03 ENCOUNTER — HEALTH MAINTENANCE LETTER (OUTPATIENT)
Age: 49
End: 2024-02-03

## 2024-02-08 ENCOUNTER — OFFICE VISIT (OUTPATIENT)
Dept: FAMILY MEDICINE | Facility: OTHER | Age: 49
End: 2024-02-08
Attending: PHYSICIAN ASSISTANT
Payer: COMMERCIAL

## 2024-02-08 VITALS
OXYGEN SATURATION: 98 % | WEIGHT: 266.2 LBS | DIASTOLIC BLOOD PRESSURE: 84 MMHG | BODY MASS INDEX: 47.17 KG/M2 | TEMPERATURE: 98.8 F | HEART RATE: 85 BPM | SYSTOLIC BLOOD PRESSURE: 118 MMHG | RESPIRATION RATE: 20 BRPM

## 2024-02-08 DIAGNOSIS — R10.13 ABDOMINAL PAIN, EPIGASTRIC: ICD-10-CM

## 2024-02-08 DIAGNOSIS — E11.9 TYPE 2 DIABETES MELLITUS WITHOUT COMPLICATION, WITHOUT LONG-TERM CURRENT USE OF INSULIN (H): ICD-10-CM

## 2024-02-08 DIAGNOSIS — M17.2 POST-TRAUMATIC OSTEOARTHRITIS OF BOTH KNEES: Primary | ICD-10-CM

## 2024-02-08 DIAGNOSIS — G43.909 MIGRAINE WITHOUT STATUS MIGRAINOSUS, NOT INTRACTABLE, UNSPECIFIED MIGRAINE TYPE: ICD-10-CM

## 2024-02-08 DIAGNOSIS — Z72.0 TOBACCO ABUSE: ICD-10-CM

## 2024-02-08 DIAGNOSIS — N32.81 OVERACTIVE BLADDER: ICD-10-CM

## 2024-02-08 DIAGNOSIS — G89.4 CHRONIC PAIN SYNDROME: ICD-10-CM

## 2024-02-08 DIAGNOSIS — K29.70 GASTRITIS WITHOUT BLEEDING, UNSPECIFIED CHRONICITY, UNSPECIFIED GASTRITIS TYPE: ICD-10-CM

## 2024-02-08 DIAGNOSIS — E03.9 ACQUIRED HYPOTHYROIDISM: ICD-10-CM

## 2024-02-08 LAB
AMPHETAMINES UR QL SCN: ABNORMAL
BARBITURATES UR QL SCN: ABNORMAL
BASOPHILS # BLD AUTO: 0.1 10E3/UL (ref 0–0.2)
BASOPHILS NFR BLD AUTO: 0 %
BENZODIAZ UR QL SCN: ABNORMAL
BZE UR QL SCN: ABNORMAL
CANNABINOIDS UR QL SCN: ABNORMAL
CHOLEST SERPL-MCNC: 164 MG/DL
EOSINOPHIL # BLD AUTO: 0.2 10E3/UL (ref 0–0.7)
EOSINOPHIL NFR BLD AUTO: 2 %
ERYTHROCYTE [DISTWIDTH] IN BLOOD BY AUTOMATED COUNT: 14.7 % (ref 10–15)
EST. AVERAGE GLUCOSE BLD GHB EST-MCNC: 143 MG/DL
FASTING STATUS PATIENT QL REPORTED: YES
FENTANYL UR QL: ABNORMAL
HBA1C MFR BLD: 6.6 %
HCT VFR BLD AUTO: 45.7 % (ref 35–47)
HDLC SERPL-MCNC: 71 MG/DL
HGB BLD-MCNC: 15.4 G/DL (ref 11.7–15.7)
IMM GRANULOCYTES # BLD: 0.1 10E3/UL
IMM GRANULOCYTES NFR BLD: 1 %
LDLC SERPL CALC-MCNC: 73 MG/DL
LYMPHOCYTES # BLD AUTO: 2.5 10E3/UL (ref 0.8–5.3)
LYMPHOCYTES NFR BLD AUTO: 18 %
MCH RBC QN AUTO: 29.6 PG (ref 26.5–33)
MCHC RBC AUTO-ENTMCNC: 33.7 G/DL (ref 31.5–36.5)
MCV RBC AUTO: 88 FL (ref 78–100)
MONOCYTES # BLD AUTO: 0.8 10E3/UL (ref 0–1.3)
MONOCYTES NFR BLD AUTO: 5 %
NEUTROPHILS # BLD AUTO: 10.7 10E3/UL (ref 1.6–8.3)
NEUTROPHILS NFR BLD AUTO: 74 %
NONHDLC SERPL-MCNC: 93 MG/DL
NRBC # BLD AUTO: 0 10E3/UL
NRBC BLD AUTO-RTO: 0 /100
OPIATES UR QL SCN: ABNORMAL
PCP QUAL URINE (ROCHE): ABNORMAL
PLATELET # BLD AUTO: 325 10E3/UL (ref 150–450)
RBC # BLD AUTO: 5.21 10E6/UL (ref 3.8–5.2)
TRIGL SERPL-MCNC: 100 MG/DL
TSH SERPL DL<=0.005 MIU/L-ACNC: 0.35 UIU/ML (ref 0.3–4.2)
WBC # BLD AUTO: 14.3 10E3/UL (ref 4–11)

## 2024-02-08 PROCEDURE — 82465 ASSAY BLD/SERUM CHOLESTEROL: CPT | Mod: ZL | Performed by: PHYSICIAN ASSISTANT

## 2024-02-08 PROCEDURE — 80307 DRUG TEST PRSMV CHEM ANLYZR: CPT | Mod: ZL | Performed by: PHYSICIAN ASSISTANT

## 2024-02-08 PROCEDURE — 85025 COMPLETE CBC W/AUTO DIFF WBC: CPT | Mod: ZL | Performed by: PHYSICIAN ASSISTANT

## 2024-02-08 PROCEDURE — G0463 HOSPITAL OUTPT CLINIC VISIT: HCPCS | Mod: 25

## 2024-02-08 PROCEDURE — 84443 ASSAY THYROID STIM HORMONE: CPT | Mod: ZL | Performed by: PHYSICIAN ASSISTANT

## 2024-02-08 PROCEDURE — 99214 OFFICE O/P EST MOD 30 MIN: CPT | Performed by: PHYSICIAN ASSISTANT

## 2024-02-08 PROCEDURE — 83036 HEMOGLOBIN GLYCOSYLATED A1C: CPT | Mod: ZL | Performed by: PHYSICIAN ASSISTANT

## 2024-02-08 PROCEDURE — 36415 COLL VENOUS BLD VENIPUNCTURE: CPT | Mod: ZL | Performed by: PHYSICIAN ASSISTANT

## 2024-02-08 PROCEDURE — G0009 ADMIN PNEUMOCOCCAL VACCINE: HCPCS

## 2024-02-08 RX ORDER — BACLOFEN 10 MG/1
TABLET ORAL
Qty: 90 TABLET | Refills: 0 | Status: SHIPPED | OUTPATIENT
Start: 2024-02-08 | End: 2024-03-07

## 2024-02-08 RX ORDER — OMEPRAZOLE 40 MG/1
CAPSULE, DELAYED RELEASE ORAL
Qty: 90 CAPSULE | Refills: 1 | Status: SHIPPED | OUTPATIENT
Start: 2024-02-08 | End: 2024-08-01

## 2024-02-08 RX ORDER — OXYBUTYNIN CHLORIDE 10 MG/1
10 TABLET, EXTENDED RELEASE ORAL DAILY
Qty: 90 TABLET | Refills: 3 | Status: SHIPPED | OUTPATIENT
Start: 2024-02-08

## 2024-02-08 NOTE — COMMUNITY RESOURCES LIST (ENGLISH)
02/08/2024   [facility]  N/A  For questions about this resource list or additional care needs, please contact your primary care clinic or care manager.  Phone: [phone]   Email: N/A   Address: [address] [place] [id number]   Hours: N/A        Food and Nutrition       Food pantry  1  [facility] [place] Distance: 5.77 miles      Pickup   [address] [place] [address]  Language: English  Hours: Mon 9:00 AM - 11:00 AM , Tue 1:00 PM - 3:00 PM , Wed - Thu 9:00 AM - 11:00 AM  Fees: Free, Self Pay   Phone: (899) 314-1994 Email: [email or web address] Website: [email or web address].[email or web address]/[facility]/[place]     2  [facility] [employer] [facility] Free [facility] Distance: 15.88 miles      Pickup   [address] [place] [address]  Language: English  Hours: Mon - Sun Open 24 Hours  Fees: Free   Phone: (289) 704-2567 Email: jazmin@[employer].org Website: http://www.[employer].org     SNAP application assistance  3  [facility] Economic Services & [facility] Distance: 5.26 miles      In-Person, Phone/Virtual   [address] [place] [address]  Language: English  Hours: Mon - Fri 8:00 AM - 4:30 PM  Fees: Free   Phone: (900) 760-9859 Website: https://www.[facility].gov/pjqtkdevxpb-z-s/public-health-human-services/economic-services-supports     4  [facility] Human [facility] Economic Services & [facility] Distance: 15.88 miles      In-Person, Phone/Virtual   [address] [place] [address]  Language: English  Hours: Mon - Fri 8:00 AM - 4:30 PM  Fees: Free   Phone: (529) 416-6053 Email: financialassistance@[facility].gov Website: https://www.[facility].gov/jbglshltomo-n-c/public-health-human-services/economic-services-supports     Soup kitchen or free meals  5  [facility] Distance: 5.77 miles      Pickup   [address] [place] [address]  Language: English  Hours: Mon - Fri 4:00 PM - 4:45 PM  Fees: Free   Phone: (823) 945-5667 Email: kay@[facility].[employer] Website: https://[facility].org/northern/[place]          Important Numbers & Websites       Emergency Services   911  [facility] Services   311  Poison Control   (347) 791-1650  Suicide Prevention Lifeline   (261) 585-1771 (TALK)  Child Abuse Hotline   (936) 497-1802 (4-A-Child)  Sexual Assault Hotline   (434) 659-5236 (HOPE)  National Runaway Safeline   (187) 582-2457 (RUNAWAY)  All-Options Talkline   (370) 608-8854  Substance Abuse Referral   (750) 567-8903 (HELP)

## 2024-02-08 NOTE — RESULT ENCOUNTER NOTE
Your labs are ok. Your urine as expected. . Your A1C is at 6.6. continue healthy lifestyle changes and weight loss. WBC is up.  No other sx for this. HBG is ok. We will watch this. Cholesterol is good.

## 2024-02-08 NOTE — PROGRESS NOTES
Assessment & Plan     Chronic pain syndrome  She is given a refill. Still struggling with her knee.   - baclofen (LIORESAL) 10 MG tablet; TAKE 1 TABLET BY MOUTH THREE TIMES DAILY    Migraine without status migrainosus, not intractable, unspecified migraine type  She is doing well. Headaches are better.     Overactive bladder  Helps control her urine.  No recent infections.  Stable.   - oxyBUTYnin ER (DITROPAN XL) 10 MG 24 hr tablet; Take 1 tablet (10 mg) by mouth daily    Gastritis without bleeding, unspecified chronicity, unspecified gastritis type  Heartburn is improved since losing weight.  Still on PPI and stable now.   - omeprazole (PRILOSEC) 40 MG DR capsule; Take 1 capsule by mouth once daily    Abdominal pain, epigastric  As above.   - omeprazole (PRILOSEC) 40 MG DR capsule; Take 1 capsule by mouth once daily    Post-traumatic osteoarthritis of both knees  Severe pain in her knees. Replacement she still needs to lose another 50 # to get BMI in range they request. 35.  We will try to give her a small under desk exercise bike.  Helping with ROM as she is very limited in flexion. Should help when her weight and help her with the movement. Still walking with a cane and walker.  Injections with OA.   - Miscellaneous Order for DME - ONLY FOR DME    Acquired hypothyroidism  Checking level. Weight is up a little bit today.   - TSH with free T4 reflex; Future    Type 2 diabetes mellitus without complication, without long-term current use of insulin (H)  Needing A1C max dose of Ozempic.  Weight is down. No low blood sugars . Eating very smart. Likes her wine . Highly discouraged. Discussion on  her risk for pancreatitis. Has had this in the past.   - CBC with platelets and differential; Future  - Lipid Profile (Chol, Trig, HDL, LDL calc); Future  - Hemoglobin A1c; Future    Tobacco abuse  Active right now in Quit Plan. No need for referral. Message sent to quality for follow up on their part.     Review of external  "notes as documented elsewhere in note  Ordering of each unique test  Prescription drug management  15  minutes spent by me on the date of the encounter doing chart review, history and exam, documentation and further activities per the note      BMI  Estimated body mass index is 47.17 kg/m  as calculated from the following:    Height as of 1/17/24: 1.6 m (5' 2.99\").    Weight as of this encounter: 120.7 kg (266 lb 3.2 oz).   Weight management plan: Discussed healthy diet and exercise guidelines      See Patient Instructions    Return in about 3 months (around 5/8/2024) for Follow up.    Debra Leyva is a 48 year old, presenting for the following health issues:  Diabetes, Thyroid Problem, Anxiety, and Depression        2/8/2024     1:50 PM   Additional Questions   Roomed by phylicia morgan   Accompanied by none         2/8/2024     1:50 PM   Patient Reported Additional Medications   Patient reports taking the following new medications none     HPI     Diabetes Follow-up    How often are you checking your blood sugar? Not at all  What concerns do you have today about your diabetes? None   Do you have any of these symptoms? (Select all that apply)  Numbness in feet and Excessive thirst  Have you had a diabetic eye exam in the last 12 months? No        BP Readings from Last 2 Encounters:   02/08/24 118/84   11/11/23 120/88     Hemoglobin A1C (%)   Date Value   02/17/2023 6.6 (H)   08/01/2022 6.6 (H)   06/01/2022 6.9 (A)   02/16/2022 7.6 (A)     LDL Cholesterol Calculated (mg/dL)   Date Value   01/06/2022 104 (H)   01/29/2020 155 (H)   11/28/2018 125 (H)     LDL Cholesterol Direct (mg/dL)   Date Value   05/17/2023 74             Depression and Anxiety Follow-Up  How are you doing with your depression since your last visit? No change  How are you doing with your anxiety since your last visit?  Worsened   Are you having other symptoms that might be associated with depression or anxiety? Yes:  sweating, nervousness, not " wanting to leave the house, picking, lack of appetite, irritable   Have you had a significant life event? No   Do you have any concerns with your use of alcohol or other drugs? No    Social History     Tobacco Use    Smoking status: Every Day     Packs/day: 0.50     Years: 32.00     Additional pack years: 0.00     Total pack years: 16.00     Types: Cigarettes     Start date: 1/1/1989     Passive exposure: Past    Smokeless tobacco: Never    Tobacco comments:     pt declines   Vaping Use    Vaping Use: Some days    Substances: Nicotine, Flavoring    Devices: Disposable   Substance Use Topics    Alcohol use: No    Drug use: Yes     Types: Marijuana     Comment: daily-medical         2/17/2023     2:22 PM 5/17/2023     1:58 PM 9/11/2023    11:14 AM   PHQ   PHQ-9 Total Score 12 9 0   Q9: Thoughts of better off dead/self-harm past 2 weeks Not at all Not at all Not at all         6/9/2022     2:00 PM 8/1/2022     8:00 AM 11/2/2022     2:00 PM   COY-7 SCORE   Total Score 21 8 16         9/11/2023    11:14 AM   Last PHQ-9   1.  Little interest or pleasure in doing things 0   2.  Feeling down, depressed, or hopeless 0   3.  Trouble falling or staying asleep, or sleeping too much 0   4.  Feeling tired or having little energy 0   5.  Poor appetite or overeating 0   6.  Feeling bad about yourself 0   7.  Trouble concentrating 0   8.  Moving slowly or restless 0   Q9: Thoughts of better off dead/self-harm past 2 weeks 0   PHQ-9 Total Score 0         11/2/2022     2:00 PM   COY-7    1. Feeling nervous, anxious, or on edge 3   2. Not being able to stop or control worrying 2   3. Worrying too much about different things 2   4. Trouble relaxing 3   5. Being so restless that it is hard to sit still 2   6. Becoming easily annoyed or irritable 2   7. Feeling afraid, as if something awful might happen 2   COY-7 Total Score 16   If you checked any problems, how difficult have they made it for you to do your work, take care of things at  home, or get along with other people? Somewhat difficult       Suicide Assessment Five-step Evaluation and Treatment (SAFE-T)    Hypothyroidism Follow-up    Since last visit, patient describes the following symptoms: Weight stable, no hair loss, no skin changes, no constipation, no loose stools, dry skin, tremors, anxiety, and fatigue    Hyperlipidemia Follow-Up    Are you regularly taking any medication or supplement to lower your cholesterol?   Yes- crestor  Are you having muscle aches or other side effects that you think could be caused by your cholesterol lowering medication?  No    Depression Followup  How are you doing with your depression since your last visit? Improved   Are you having other symptoms that might be associated with depression? No  Have you had a significant life event?  OTHER: new relationship very happy less lonely.     Are you feeling anxious or having panic attacks?   Yes:  working one on one with her Therapist [place].   Do you have any concerns with your use of alcohol or other drugs? No, sober and off Suboxone now for over a year.     Social History     Tobacco Use    Smoking status: Every Day     Packs/day: 0.50     Years: 32.00     Additional pack years: 0.00     Total pack years: 16.00     Types: Cigarettes     Start date: 1/1/1989     Passive exposure: Past    Smokeless tobacco: Never    Tobacco comments:     pt declines   Vaping Use    Vaping Use: Some days    Substances: Nicotine, Flavoring    Devices: Disposable   Substance Use Topics    Alcohol use: No    Drug use: Yes     Types: Marijuana     Comment: daily-medical         2/17/2023     2:22 PM 5/17/2023     1:58 PM 9/11/2023    11:14 AM   PHQ   PHQ-9 Total Score 12 9 0   Q9: Thoughts of better off dead/self-harm past 2 weeks Not at all Not at all Not at all         6/9/2022     2:00 PM 8/1/2022     8:00 AM 11/2/2022     2:00 PM   COY-7 SCORE   Total Score 21 8 16         9/11/2023    11:14 AM   Last PHQ-9   1.  Little interest or  pleasure in doing things 0   2.  Feeling down, depressed, or hopeless 0   3.  Trouble falling or staying asleep, or sleeping too much 0   4.  Feeling tired or having little energy 0   5.  Poor appetite or overeating 0   6.  Feeling bad about yourself 0   7.  Trouble concentrating 0   8.  Moving slowly or restless 0   Q9: Thoughts of better off dead/self-harm past 2 weeks 0   PHQ-9 Total Score 0         11/2/2022     2:00 PM   COY-7    1. Feeling nervous, anxious, or on edge 3   2. Not being able to stop or control worrying 2   3. Worrying too much about different things 2   4. Trouble relaxing 3   5. Being so restless that it is hard to sit still 2   6. Becoming easily annoyed or irritable 2   7. Feeling afraid, as if something awful might happen 2   COY-7 Total Score 16   If you checked any problems, how difficult have they made it for you to do your work, take care of things at home, or get along with other people? Somewhat difficult       Suicide Assessment Five-step Evaluation and Treatment (SAFE-T)        Review of Systems  Constitutional, neuro, ENT, endocrine, pulmonary, cardiac, gastrointestinal, genitourinary, musculoskeletal, integument and psychiatric systems are negative, except as otherwise noted.      Objective    /84 (BP Location: Left arm, Patient Position: Sitting, Cuff Size: Adult Large)   Pulse 85   Temp 98.8  F (37.1  C) (Tympanic)   Resp 20   Wt 120.7 kg (266 lb 3.2 oz)   SpO2 98%   BMI 47.17 kg/m    Body mass index is 47.17 kg/m .  Physical Exam   GENERAL: alert and no distress  EYES: Eyes grossly normal to inspection, PERRL and conjunctivae and sclerae normal  HENT: ear canals and TM's normal, nose and mouth without ulcers or lesions  NECK: no adenopathy, no asymmetry, masses, or scars  RESP: lungs clear to auscultation - no rales, rhonchi or wheezes  CV: regular rate and rhythm, normal S1 S2, no S3 or S4, no murmur, click or rub, no peripheral edema  ABDOMEN: soft, nontender, no  hepatosplenomegaly, no masses and bowel sounds normal  MS: no gross musculoskeletal defects noted, no edema, walking with her cane. Both knees are less swollen but remain painful and signs of arthritis.   SKIN: no suspicious lesions or rashes  NEURO: Normal strength and tone, mentation intact and speech normal  BACK: no CVA tenderness, no paralumbar tenderness  PSYCH: mentation appears normal, affect normal/bright  LYMPH: no cervical, supraclavicular, axillary, or inguinal adenopathy    No results found for any visits on 02/08/24.      Results for orders placed or performed in visit on 02/08/24   Lipid Profile (Chol, Trig, HDL, LDL calc)     Status: None   Result Value Ref Range    Cholesterol 164 <200 mg/dL    Triglycerides 100 <150 mg/dL    Direct Measure HDL 71 >=50 mg/dL    LDL Cholesterol Calculated 73 <=100 mg/dL    Non HDL Cholesterol 93 <130 mg/dL    Patient Fasting > 8hrs? Yes     Narrative    Cholesterol  Desirable:  <200 mg/dL    Triglycerides  Normal:  Less than 150 mg/dL  Borderline High:  150-199 mg/dL  High:  200-499 mg/dL  Very High:  Greater than or equal to 500 mg/dL    Direct Measure HDL  Female:  Greater than or equal to 50 mg/dL   Male:  Greater than or equal to 40 mg/dL    LDL Cholesterol  Desirable:  <100mg/dL  Above Desirable:  100-129 mg/dL   Borderline High:  130-159 mg/dL   High:  160-189 mg/dL   Very High:  >= 190 mg/dL    Non HDL Cholesterol  Desirable:  130 mg/dL  Above Desirable:  130-159 mg/dL  Borderline High:  160-189 mg/dL  High:  190-219 mg/dL  Very High:  Greater than or equal to 220 mg/dL   Hemoglobin A1c     Status: Abnormal   Result Value Ref Range    Estimated Average Glucose 143 mg/dL    Hemoglobin A1C 6.6 (H) <5.7 %   TSH with free T4 reflex     Status: Normal   Result Value Ref Range    TSH 0.35 0.30 - 4.20 uIU/mL   CBC with platelets and differential     Status: Abnormal   Result Value Ref Range    WBC Count 14.3 (H) 4.0 - 11.0 10e3/uL    RBC Count 5.21 (H) 3.80 - 5.20  10e6/uL    Hemoglobin 15.4 11.7 - 15.7 g/dL    Hematocrit 45.7 35.0 - 47.0 %    MCV 88 78 - 100 fL    MCH 29.6 26.5 - 33.0 pg    MCHC 33.7 31.5 - 36.5 g/dL    RDW 14.7 10.0 - 15.0 %    Platelet Count 325 150 - 450 10e3/uL    % Neutrophils 74 %    % Lymphocytes 18 %    % Monocytes 5 %    % Eosinophils 2 %    % Basophils 0 %    % Immature Granulocytes 1 %    NRBCs per 100 WBC 0 <1 /100    Absolute Neutrophils 10.7 (H) 1.6 - 8.3 10e3/uL    Absolute Lymphocytes 2.5 0.8 - 5.3 10e3/uL    Absolute Monocytes 0.8 0.0 - 1.3 10e3/uL    Absolute Eosinophils 0.2 0.0 - 0.7 10e3/uL    Absolute Basophils 0.1 0.0 - 0.2 10e3/uL    Absolute Immature Granulocytes 0.1 <=0.4 10e3/uL    Absolute NRBCs 0.0 10e3/uL   Urine Drug Screen Panel     Status: Abnormal   Result Value Ref Range    Amphetamines Urine Screen Positive (A) Screen Negative    Barbituates Urine Screen Negative Screen Negative    Benzodiazepine Urine Screen Negative Screen Negative    Cannabinoids Urine Screen Positive (A) Screen Negative    Cocaine Urine Screen Negative Screen Negative    Fentanyl Qual Urine Screen Negative Screen Negative    Opiates Urine Screen Negative Screen Negative    PCP Urine Screen Negative Screen Negative   CBC with platelets and differential     Status: Abnormal    Narrative    The following orders were created for panel order CBC with platelets and differential.  Procedure                               Abnormality         Status                     ---------                               -----------         ------                     CBC with platelets and d...[[id number]]  Abnormal            Final result                 Please view results for these tests on the individual orders.   Urine Drug Screen     Status: Abnormal    Narrative    The following orders were created for panel order Urine Drug Screen.  Procedure                               Abnormality         Status                     ---------                               -----------          ------                     Urine Drug Screen Panel[[id number]]      Abnormal            Final result                 Please view results for these tests on the individual orders.         Signed Electronically by: SEVEN Bryant

## 2024-02-22 DIAGNOSIS — E03.9 ACQUIRED HYPOTHYROIDISM: ICD-10-CM

## 2024-02-22 RX ORDER — LEVOTHYROXINE SODIUM 175 UG/1
175 TABLET ORAL DAILY
Qty: 90 TABLET | Refills: 1 | Status: SHIPPED | OUTPATIENT
Start: 2024-02-22 | End: 2024-08-05

## 2024-02-22 NOTE — TELEPHONE ENCOUNTER
Levothyroxine       Last Written Prescription Date:  05/17/2023  Last Fill Quantity: 90,   # refills: 1  Last Office Visit: 02/08/2024  Future Office visit:    Next 5 appointments (look out 90 days)      May 08, 2024  2:30 PM  (Arrive by 2:15 PM)  SHORT with SEVEN Pandey  [facility] [place] ([facility] [place] ) [address]  [place] [address]  [phone]             Routing refill request to provider for review/approval because:  Patient no longer seeing other provider and would like it filled here.

## 2024-02-23 ENCOUNTER — TELEPHONE (OUTPATIENT)
Dept: FAMILY MEDICINE | Facility: OTHER | Age: 49
End: 2024-02-23

## 2024-02-23 DIAGNOSIS — E11.9 TYPE 2 DIABETES MELLITUS WITHOUT COMPLICATION, WITHOUT LONG-TERM CURRENT USE OF INSULIN (H): Primary | ICD-10-CM

## 2024-03-06 ENCOUNTER — HOSPITAL ENCOUNTER (OUTPATIENT)
Dept: EDUCATION SERVICES | Facility: HOSPITAL | Age: 49
Discharge: HOME | End: 2024-03-06
Attending: NURSE PRACTITIONER | Admitting: FAMILY MEDICINE
Payer: COMMERCIAL

## 2024-03-06 VITALS
HEART RATE: 75 BPM | OXYGEN SATURATION: 98 % | SYSTOLIC BLOOD PRESSURE: 125 MMHG | HEIGHT: 62 IN | WEIGHT: 266.9 LBS | RESPIRATION RATE: 16 BRPM | DIASTOLIC BLOOD PRESSURE: 81 MMHG | BODY MASS INDEX: 49.11 KG/M2

## 2024-03-06 PROCEDURE — G0108 DIAB MANAGE TRN  PER INDIV: HCPCS | Performed by: DIETITIAN, REGISTERED

## 2024-03-06 ASSESSMENT — PAIN SCALES - GENERAL: PAINLEVEL: EXTREME PAIN (8)

## 2024-03-06 NOTE — LETTER
"    3/6/2024        RE: Autumn BATISTA Hnatko  [address] [facility] [address]        Diabetes Self-Management Education & Support    Autumn, 48 year old, returns to [facility] for follow up. Seen with RD, refer to RD note for additional information.  PCP follow up early February. Discussed concerns for pancreatitis risk.     A1C:  6.6    2/8/2024   Target A1C: <7.0  Previous A1C: 6.4 9/1/2023     Meter Report: no current data time/date incorrect. Updated.  Accu-chek guide me meter.  Pt checked this am 143.     Has a new meter- one touch, would like to get set up.   Shares has trouble remembering to check BG, hasn't checked in last couple months.     Ozempic 2 mg weekly.   Nauseated with alcohol, sugary foods, carbs, sweets, fried foods. Pt shares, PCP aware. \"Called it Pancreatitis\" -has episodes of vomiting every 15 minutes/ 24 hours .vomiting Bile.  2 times/year. Knows has to watch food choices.     Has been following low carbs. Focusing on protein, fiber, healthy fats.   Eating out more last couple weeks, with new boyfriend.     This last week, feels a little more hungry lately- craving more food in general.   Talks of meal prepping with PCA.   Skips breakfast usually, coffee and creamer in am. Lunch sandwich or moms meal in afternoon. \"Something small   Dinner 6-7. Went grocery shopping to avoid going out to eat. Shakes. Veggies. Granola bars.      Was going out to eat 4/7 days.  Before was once a month or 2 months.     No snacking in between.   Protein shakes- has- but hasn't started drinking.     Low BG: Forgets to eat. Then will feel nauseous, dizzy, confused. \"Hits fast\".  Treats with Glucose 1/2 tab then will eat something:  sandwich or moms, granola bar. Better with food.  Checks- BG 91 range with this feeling.     Has been a little more physical/active in general. No formal exercise. Has resources and wants to put  a program together. Looking into doing with daughter.   Barrier: Busy with stuff, helping others.      " /81  Statin use. ASA use no, defer to pcp.   Tobacco use- yes- has goal to stop smoking. Foot exam due    Eye exam- due [facility] in [place] (glasses) or [place].    PCP follow up-  scheduled  PCP: Apryl AMEZCUA  Insurance Coverage: [facility] Dual solution       Labs:  BMP, Lipids within the last year.   Microalbumin- due.      Allergies   Allergen Reactions     Depakote [Valproic Acid]      Gabapentin Swelling     Propofol Unknown     Got very stiff and tight.         Wt Readings from Last 10 Encounters:   03/06/24 121.1 kg (266 lb 14.4 oz)   02/08/24 120.7 kg (266 lb 3.2 oz)   01/17/24 118.8 kg (262 lb)   11/15/23 122.5 kg (270 lb)   11/11/23 122 kg (269 lb)   11/09/23 122 kg (269 lb)   09/19/23 126.6 kg (279 lb)   09/11/23 126.1 kg (278 lb)   09/01/23 126.5 kg (278 lb 14.4 oz)   07/19/23 130.2 kg (287 lb)       Plan:   Goals- exercise and tobacco cessation.   Continue Ozempic 2 mg once weekly.   Work on diet, activity.   Return August for A1c update.  8/19/2024 at 1 pm.    Kinga Beckwith RN Diabetes Educator,  [phone]  3/6/2024 at 2:31 PM                  Diabetes Self-Management Education & Support    Presents for: Follow-up (BG review)    Type of Service: In Person Visit      ASSESSMENT:  Pt seen in diabetes education for a follow up with RN and RD. Pt has twice experienced  significant GI distress (nausea with frequent vomiting lasting for about a day) after drinking a larger amount of alcohol or eating higher fat and/or sugar foods. Saw PCP and discusses risk for pancreatitis. Pt has been limiting these foods to prevent another episode. She admits to eating out more often with her boyfriend, about 4x a week due to not wanting to go to the store to get food to cook. Trying to keep active but no regular exercise right now, would like to work with her daughter/PCA to plan a program. Checking BG on occasion, has trouble remembering to check after eating dinner. Notes having some symptoms of  low BG when she doesn't eat for an extended period, takes half a glucose tab and follows it up with a meal or snack. She notes BG is around 91 when she feels these symptoms.    Patient's most recent   Lab Results   Component Value Date    A1C 6.6 02/08/2024    A1C 6.9 06/01/2022    HEMOGLOBINA1 6.4 09/01/2023     is meeting goal of <7.0    Diabetes knowledge and skills assessment:   Patient is knowledgeable in diabetes management concepts related to: Healthy Eating, Being Active, Monitoring, Taking Medication, Problem Solving, Reducing Risks, and Healthy Coping    Continue education with the following diabetes management concepts: review topics as indicated    Based on learning assessment above, most appropriate setting for further diabetes education would be: Individual setting.      PLAN  Continue with Ozempic at current dose. Work on eating out less often. Look into making an exercise schedule with daughter/PCA.     Topics to cover at upcoming visits: will review topics as indicated    Follow-up: 8/19/24    See Care Plan for co-developed, patient-state behavior change goals.  AVS provided for patient today.    Education Materials Provided:  No new materials provided today      SUBJECTIVE/OBJECTIVE:  Presents for: Follow-up (BG review)  Accompanied by: Self  Diabetes education in the past 24mo: Yes  Focus of Visit: Problem Solving  Diabetes type: Type 2  Date of diagnosis: 11/4/21  Disease course: Stable  How confident are you filling out medical forms by yourself:: Extremely  Transportation concerns: No  Difficulty affording diabetes medication?: No  Difficulty affording diabetes testing supplies?: No  Other concerns:: Glasses  Cultural Influences/Ethnic Background:  Not  or       Diabetes Symptoms & Complications:  Diabetes Related Symptoms: Fatigue, Neuropathy, Polydipsia (increased thirst), Polyuria (increased urination), Slow healing wounds  Weight trend: Decreasing  Symptom course:  "Stable  Disease course: Stable  Complications assessed today?: Yes  Autonomic neuropathy: No  CVA: No  Heart disease: No  Nephropathy: No  Peripheral neuropathy: No  Peripheral Vascular Disease: Yes  Retinopathy: No  Sexual dysfunction: Other    Patient Problem List and Family Medical History reviewed for relevant medical history, current medical status, and diabetes risk factors.    Vitals:  /81   Pulse 75   Resp 16   Ht 1.578 m (5' 2.12\")   Wt 121.1 kg (266 lb 14.4 oz)   SpO2 98%   BMI 48.63 kg/m    Estimated body mass index is 48.63 kg/m  as calculated from the following:    Height as of this encounter: 1.578 m (5' 2.12\").    Weight as of this encounter: 121.1 kg (266 lb 14.4 oz).   Last 3 BP:   BP Readings from Last 3 Encounters:   03/06/24 125/81   02/08/24 118/84   11/11/23 120/88       History   Smoking Status     Every Day     Packs/day: 0.50     Years: 32.00     Types: Cigarettes     Start date: 1/1/1989   Smokeless Tobacco     Never       Labs:  Lab Results   Component Value Date    A1C 6.6 02/08/2024    A1C 6.9 06/01/2022    HEMOGLOBINA1 6.4 09/01/2023     Lab Results   Component Value Date    GLC 97 09/11/2023     09/26/2022     05/19/2021     Lab Results   Component Value Date    LDL 73 02/08/2024     01/29/2020     HDL Cholesterol   Date Value Ref Range Status   01/29/2020 87 >49 mg/dL Final     Direct Measure HDL   Date Value Ref Range Status   02/08/2024 71 >=50 mg/dL Final   ]  GFR Estimate   Date Value Ref Range Status   09/11/2023 87 >60 mL/min/1.73m2 Final   05/19/2021 >90 >60 mL/min/[1.73_m2] Final     Comment:     Non  GFR Calc  Starting 12/18/2018, serum creatinine based estimated GFR (eGFR) will be   calculated using the Chronic Kidney Disease Epidemiology Collaboration   (CKD-EPI) equation.       GFR Estimate If Black   Date Value Ref Range Status   05/19/2021 >90 >60 mL/min/[1.73_m2] Final     Comment:      GFR Calc  Starting " "12/18/2018, serum creatinine based estimated GFR (eGFR) will be   calculated using the Chronic Kidney Disease Epidemiology Collaboration   (CKD-EPI) equation.       Lab Results   Component Value Date    CR 0.82 09/11/2023    CR 0.78 05/19/2021     No results found for: \"MICROALBUMIN\"    Healthy Eating:  Healthy Eating Assessed Today: Yes  Cultural/Shinto diet restrictions?: No  Do you have any food allergies or intolerances?: No  Meal planning/habits: Avoiding sweets, Low carb, Low salt, Smaller portions  Who cooks/prepares meals for you?: Self  Who purchases food in  your home?: Self  How many times a week on average do you eat food made away from home (restaurant/take-out)?: 4 (eat out a bit more)  Meals include: Lunch, Dinner  Breakfast: can't eat for while after meds  Lunch: sandwich or mom's meal  Dinner: protein, veg, small portion of carbs, glass of milk or going out to eat  Snacks: not really snacking, got some protein shakes but hasn't had them  Beverages: Water, Coffee, Milk, Diet soda, Alcohol  Has patient met with a dietitian in the past?: Yes    Being Active:  Being Active Assessed Today: Yes  Exercise:: Currently not exercising (trying to move more, looking into some resources and connect with her daughter)  Barrier to exercise: Physical limitation    Monitoring:  Monitoring Assessed Today: Yes  Did patient bring glucose meter to appointment? : Yes  Blood Glucose Meter: Accu-chek  Times checking blood sugar at home (number): 2  Times checking blood sugar at home (per): Week  Blood glucose trend: No change      Taking Medications:  Diabetes Medication(s)       Incretin Mimetic Agents       Semaglutide, 2 MG/DOSE, (OZEMPIC) 8 MG/3ML pen Inject 2 mg Subcutaneous every 7 days            Taking Medication Assessed Today: Yes  Current Treatments: Non-insulin Injectables (ozempic)  Problems taking diabetes medications regularly?: No  Diabetes medication side effects?: Yes (nausea, if she eats/drinks too " much sugar/fat/alcohol)    Problem Solving:  Problem Solving Assessed Today: Yes  Is the patient at risk for hypoglycemia?: Yes  Hypoglycemia Frequency: Rarely  Hypoglycemia Treatment: Glucose (tablets or gel) (half glucose tab then follows with a meal. BG at around 91)  Patient carries a carbohydrate source: Yes  Is the patient at risk for DKA?: No  Does patient have severe weather/disaster plan for diabetes management?: No  Does patient have sick day plan for diabetes management?: No    Hypoglycemia Symptoms  Hypoglycemia: Confusion, Dizziness/Lightheadedness, Headaches, Hunger, Mood changes, Nervousness/Anxiety, Feeling shaky    Hypoglycemia Complications  Hypoglycemia Complications: None    Reducing Risks:  Reducing Risks Assessed Today: Yes  Diabetes Risks: Age over 45 years, Sedentary Lifestyle, History of gestational diabetes, Hyperlipidemia  CAD Risks: Diabetes Mellitus, Tobacco exposure, Sedentary lifestyle, Dyslipidemia  Has dilated eye exam at least once a year?: No (just got a reminder to schedule)  Sees dentist every 6 months?: Yes  Feet checked by healthcare provider in the last year?: No    Healthy Coping:  Healthy Coping Assessed Today: Yes  Emotional response to diabetes: Ready to learn, Acceptance  Informal Support system:: Family, Friends  Stage of change: ACTION (Actively working towards change)  Patient Activation Measure Survey Score:      11/28/2018    12:00 PM 3/7/2019     4:00 PM   KEN Score (Last Two)   KEN Raw Score 27 27   Activation Score 47.4 47.4   KEN Level 2 2         Care Plan and Education Provided:  Care Plan: Diabetes   Updates made by Taisha Corral RD since 3/7/2024 12:00 AM        Problem: Diabetes Self-Management Education Needed to Optimize Self-Care Behaviors         Goal: Healthy Eating - follow a healthy eating pattern for diabetes    This Visit's Progress: 0%   Recent Progress: On track   Note:    Goal of eating out less often 1x a week or less- currently about 4x  per week.         Goal: Being Active - get regular physical activity, working up to at least 150 minutes per week    This Visit's Progress: 0%   Note:    Work with daughter to build exercise plan           Time Spent: 30 minutes  Encounter Type: Individual    Any diabetes medication dose changes were made via the CDE Protocol per the patient's primary care provider. A copy of this encounter was shared with the provider.            Sincerely,        Kinga Beckwith RN

## 2024-03-06 NOTE — PROGRESS NOTES
"Diabetes Self-Management Education & Support    Autumn, 48 year old, returns to [facility] for follow up. Seen with RD, refer to RD note for additional information.  PCP follow up early February. Discussed concerns for pancreatitis risk.     A1C:  6.6    2/8/2024   Target A1C: <7.0  Previous A1C: 6.4 9/1/2023     Meter Report: no current data time/date incorrect. Updated.  Accu-chek guide me meter.  Pt checked this am 143.     Has a new meter- one touch, would like to get set up.   Shares has trouble remembering to check BG, hasn't checked in last couple months.     Ozempic 2 mg weekly.   Nauseated with alcohol, sugary foods, carbs, sweets, fried foods. Pt shares, PCP aware. \"Called it Pancreatitis\" -has episodes of vomiting every 15 minutes/ 24 hours .vomiting Bile.  2 times/year. Knows has to watch food choices.     Has been following low carbs. Focusing on protein, fiber, healthy fats.   Eating out more last couple weeks, with new boyfriend.     This last week, feels a little more hungry lately- craving more food in general.   Talks of meal prepping with PCA.   Skips breakfast usually, coffee and creamer in am. Lunch sandwich or moms meal in afternoon. \"Something small   Dinner 6-7. Went grocery shopping to avoid going out to eat. Shakes. Veggies. Granola bars.      Was going out to eat 4/7 days.  Before was once a month or 2 months.     No snacking in between.   Protein shakes- has- but hasn't started drinking.     Low BG: Forgets to eat. Then will feel nauseous, dizzy, confused. \"Hits fast\".  Treats with Glucose 1/2 tab then will eat something:  sandwich or moms, granola bar. Better with food.  Checks- BG 91 range with this feeling.     Has been a little more physical/active in general. No formal exercise. Has resources and wants to put  a program together. Looking into doing with daughter.   Barrier: Busy with stuff, helping others.      /81  Statin use. ASA use no, defer to pcp.   Tobacco use- yes- has goal to " stop smoking. Foot exam due    Eye exam- due [facility] in [place] (glasses) or [place].    PCP follow up-  scheduled  PCP: Apryl AMEZCUA  Insurance Coverage: [facility] Dual solution       Labs:  BMP, Lipids within the last year.   Microalbumin- due.      Allergies   Allergen Reactions    Depakote [Valproic Acid]     Gabapentin Swelling    Propofol Unknown     Got very stiff and tight.         Wt Readings from Last 10 Encounters:   03/06/24 121.1 kg (266 lb 14.4 oz)   02/08/24 120.7 kg (266 lb 3.2 oz)   01/17/24 118.8 kg (262 lb)   11/15/23 122.5 kg (270 lb)   11/11/23 122 kg (269 lb)   11/09/23 122 kg (269 lb)   09/19/23 126.6 kg (279 lb)   09/11/23 126.1 kg (278 lb)   09/01/23 126.5 kg (278 lb 14.4 oz)   07/19/23 130.2 kg (287 lb)       Plan:   Goals- exercise and tobacco cessation.   Continue Ozempic 2 mg once weekly.   Work on diet, activity.   Return August for A1c update.  8/19/2024 at 1 pm.    Kinga Beckwith RN Diabetes Educator,  [phone]  3/6/2024 at 2:31 PM

## 2024-03-06 NOTE — PROGRESS NOTES
Diabetes Self-Management Education & Support    Presents for: Follow-up (BG review)    Type of Service: In Person Visit      ASSESSMENT:  Pt seen in diabetes education for a follow up with RN and RD. Pt has twice experienced  significant GI distress (nausea with frequent vomiting lasting for about a day) after drinking a larger amount of alcohol or eating higher fat and/or sugar foods. Saw PCP and discusses risk for pancreatitis. Pt has been limiting these foods to prevent another episode. She admits to eating out more often with her boyfriend, about 4x a week due to not wanting to go to the store to get food to cook. Trying to keep active but no regular exercise right now, would like to work with her daughter/PCA to plan a program. Checking BG on occasion, has trouble remembering to check after eating dinner. Notes having some symptoms of low BG when she doesn't eat for an extended period, takes half a glucose tab and follows it up with a meal or snack. She notes BG is around 91 when she feels these symptoms.    Patient's most recent   Lab Results   Component Value Date    A1C 6.6 02/08/2024    A1C 6.9 06/01/2022    HEMOGLOBINA1 6.4 09/01/2023     is meeting goal of <7.0    Diabetes knowledge and skills assessment:   Patient is knowledgeable in diabetes management concepts related to: Healthy Eating, Being Active, Monitoring, Taking Medication, Problem Solving, Reducing Risks, and Healthy Coping    Continue education with the following diabetes management concepts: review topics as indicated    Based on learning assessment above, most appropriate setting for further diabetes education would be: Individual setting.      PLAN  Continue with Ozempic at current dose. Work on eating out less often. Look into making an exercise schedule with daughter/PCA.     Topics to cover at upcoming visits: will review topics as indicated    Follow-up: 8/19/24    See Care Plan for co-developed, patient-state behavior change  "goals.  AVS provided for patient today.    Education Materials Provided:  No new materials provided today      SUBJECTIVE/OBJECTIVE:  Presents for: Follow-up (BG review)  Accompanied by: Self  Diabetes education in the past 24mo: Yes  Focus of Visit: Problem Solving  Diabetes type: Type 2  Date of diagnosis: 11/4/21  Disease course: Stable  How confident are you filling out medical forms by yourself:: Extremely  Transportation concerns: No  Difficulty affording diabetes medication?: No  Difficulty affording diabetes testing supplies?: No  Other concerns:: Glasses  Cultural Influences/Ethnic Background:  Not  or       Diabetes Symptoms & Complications:  Diabetes Related Symptoms: Fatigue, Neuropathy, Polydipsia (increased thirst), Polyuria (increased urination), Slow healing wounds  Weight trend: Decreasing  Symptom course: Stable  Disease course: Stable  Complications assessed today?: Yes  Autonomic neuropathy: No  CVA: No  Heart disease: No  Nephropathy: No  Peripheral neuropathy: No  Peripheral Vascular Disease: Yes  Retinopathy: No  Sexual dysfunction: Other    Patient Problem List and Family Medical History reviewed for relevant medical history, current medical status, and diabetes risk factors.    Vitals:  /81   Pulse 75   Resp 16   Ht 1.578 m (5' 2.12\")   Wt 121.1 kg (266 lb 14.4 oz)   SpO2 98%   BMI 48.63 kg/m    Estimated body mass index is 48.63 kg/m  as calculated from the following:    Height as of this encounter: 1.578 m (5' 2.12\").    Weight as of this encounter: 121.1 kg (266 lb 14.4 oz).   Last 3 BP:   BP Readings from Last 3 Encounters:   03/06/24 125/81   02/08/24 118/84   11/11/23 120/88       History   Smoking Status    Every Day    Packs/day: 0.50    Years: 32.00    Types: Cigarettes    Start date: 1/1/1989   Smokeless Tobacco    Never       Labs:  Lab Results   Component Value Date    A1C 6.6 02/08/2024    A1C 6.9 06/01/2022    HEMOGLOBINA1 6.4 09/01/2023     Lab " "Results   Component Value Date    GLC 97 09/11/2023     09/26/2022     05/19/2021     Lab Results   Component Value Date    LDL 73 02/08/2024     01/29/2020     HDL Cholesterol   Date Value Ref Range Status   01/29/2020 87 >49 mg/dL Final     Direct Measure HDL   Date Value Ref Range Status   02/08/2024 71 >=50 mg/dL Final   ]  GFR Estimate   Date Value Ref Range Status   09/11/2023 87 >60 mL/min/1.73m2 Final   05/19/2021 >90 >60 mL/min/[1.73_m2] Final     Comment:     Non  GFR Calc  Starting 12/18/2018, serum creatinine based estimated GFR (eGFR) will be   calculated using the Chronic Kidney Disease Epidemiology Collaboration   (CKD-EPI) equation.       GFR Estimate If Black   Date Value Ref Range Status   05/19/2021 >90 >60 mL/min/[1.73_m2] Final     Comment:      GFR Calc  Starting 12/18/2018, serum creatinine based estimated GFR (eGFR) will be   calculated using the Chronic Kidney Disease Epidemiology Collaboration   (CKD-EPI) equation.       Lab Results   Component Value Date    CR 0.82 09/11/2023    CR 0.78 05/19/2021     No results found for: \"MICROALBUMIN\"    Healthy Eating:  Healthy Eating Assessed Today: Yes  Cultural/Rastafarian diet restrictions?: No  Do you have any food allergies or intolerances?: No  Meal planning/habits: Avoiding sweets, Low carb, Low salt, Smaller portions  Who cooks/prepares meals for you?: Self  Who purchases food in  your home?: Self  How many times a week on average do you eat food made away from home (restaurant/take-out)?: 4 (eat out a bit more)  Meals include: Lunch, Dinner  Breakfast: can't eat for while after meds  Lunch: sandwich or mom's meal  Dinner: protein, veg, small portion of carbs, glass of milk or going out to eat  Snacks: not really snacking, got some protein shakes but hasn't had them  Beverages: Water, Coffee, Milk, Diet soda, Alcohol  Has patient met with a dietitian in the past?: Yes    Being Active:  Being " Active Assessed Today: Yes  Exercise:: Currently not exercising (trying to move more, looking into some resources and connect with her daughter)  Barrier to exercise: Physical limitation    Monitoring:  Monitoring Assessed Today: Yes  Did patient bring glucose meter to appointment? : Yes  Blood Glucose Meter: Accu-chek  Times checking blood sugar at home (number): 2  Times checking blood sugar at home (per): Week  Blood glucose trend: No change      Taking Medications:  Diabetes Medication(s)       Incretin Mimetic Agents       Semaglutide, 2 MG/DOSE, (OZEMPIC) 8 MG/3ML pen Inject 2 mg Subcutaneous every 7 days            Taking Medication Assessed Today: Yes  Current Treatments: Non-insulin Injectables (ozempic)  Problems taking diabetes medications regularly?: No  Diabetes medication side effects?: Yes (nausea, if she eats/drinks too much sugar/fat/alcohol)    Problem Solving:  Problem Solving Assessed Today: Yes  Is the patient at risk for hypoglycemia?: Yes  Hypoglycemia Frequency: Rarely  Hypoglycemia Treatment: Glucose (tablets or gel) (half glucose tab then follows with a meal. BG at around 91)  Patient carries a carbohydrate source: Yes  Is the patient at risk for DKA?: No  Does patient have severe weather/disaster plan for diabetes management?: No  Does patient have sick day plan for diabetes management?: No    Hypoglycemia Symptoms  Hypoglycemia: Confusion, Dizziness/Lightheadedness, Headaches, Hunger, Mood changes, Nervousness/Anxiety, Feeling shaky    Hypoglycemia Complications  Hypoglycemia Complications: None    Reducing Risks:  Reducing Risks Assessed Today: Yes  Diabetes Risks: Age over 45 years, Sedentary Lifestyle, History of gestational diabetes, Hyperlipidemia  CAD Risks: Diabetes Mellitus, Tobacco exposure, Sedentary lifestyle, Dyslipidemia  Has dilated eye exam at least once a year?: No (just got a reminder to schedule)  Sees dentist every 6 months?: Yes  Feet checked by healthcare provider in  the last year?: No    Healthy Coping:  Healthy Coping Assessed Today: Yes  Emotional response to diabetes: Ready to learn, Acceptance  Informal Support system:: Family, Friends  Stage of change: ACTION (Actively working towards change)  Patient Activation Measure Survey Score:      11/28/2018    12:00 PM 3/7/2019     4:00 PM   KEN Score (Last Two)   KEN Raw Score 27 27   Activation Score 47.4 47.4   KEN Level 2 2         Care Plan and Education Provided:  Care Plan: Diabetes   Updates made by Taisha Corral RD since 3/7/2024 12:00 AM        Problem: Diabetes Self-Management Education Needed to Optimize Self-Care Behaviors         Goal: Healthy Eating - follow a healthy eating pattern for diabetes    This Visit's Progress: 0%   Recent Progress: On track   Note:    Goal of eating out less often 1x a week or less- currently about 4x per week.         Goal: Being Active - get regular physical activity, working up to at least 150 minutes per week    This Visit's Progress: 0%   Note:    Work with daughter to build exercise plan           Time Spent: 30 minutes  Encounter Type: Individual    Any diabetes medication dose changes were made via the CDE Protocol per the patient's primary care provider. A copy of this encounter was shared with the provider.    Taisha Corral RD

## 2024-03-07 DIAGNOSIS — G89.4 CHRONIC PAIN SYNDROME: ICD-10-CM

## 2024-03-07 RX ORDER — BACLOFEN 10 MG/1
TABLET ORAL
Qty: 90 TABLET | Refills: 0 | Status: SHIPPED | OUTPATIENT
Start: 2024-03-07 | End: 2024-04-15

## 2024-03-07 NOTE — TELEPHONE ENCOUNTER
Baclofen 10 MG Oral Tablet       Last Written Prescription Date:  2/8/2024  Last Fill Quantity: 90,   # refills: 0  Last Office Visit: 2/8/2024  Future Office visit:    Next 5 appointments (look out 90 days)      May 08, 2024  2:30 PM  (Arrive by 2:15 PM)  SHORT with SEVEN Pandey  [facility] [place] [facility] [address]  [place] [address]  [phone]             Routing refill request to provider for review/approval because:    Kimberly Boecker, RN

## 2024-03-12 NOTE — PROGRESS NOTES
"Video-Visit Details    Type of service:  Video Visit    Video Start Time: 9:36 am  Video End Time: 9:58 am    Originating Location (pt. Location): Home    Distant Location (provider location):  Offsite (providers home)     Platform used for Video Visit: Verifico    *Jane White, [place] D shadowed visit today      Return Weight Management Nutrition Consultation    Autumn Holbrook is a 48 year old female presents today for a return weight management nutrition consultation.  Patient referred by SEVEN Nickerson on March 1, 2023.    Patient with Co-morbidities of obesity including:  Type 2 Diabetes, GERD, Knee Pain, Back Pain    Anthropometrics:  Weight 2/28/23: 287 lbs with BMI 52.36    Estimated body mass index is 47.12 kg/m  as calculated from the following:    Height as of this encounter: 1.6 m (5' 3\").    Weight as of this encounter: 120.7 kg (266 lb).     Current weight: 266 lbs, pt report   - Gained some weight around holidays per pt. Has been maintaining past few months.      Medications for Weight Loss:  Topiramate   Ozempic    Labs 2/8/24  Lipids WNL - hx of elevated Chol  A1C 6.6   Elevated WBC  TSH WNL (hx of being elevated)     NUTRITION HISTORY  Food allergies: none  Food intolerances: avocados  Supplements: Vitamin D3, MVI, potassium (prescribed by PCP)   Previous methods of diet modification for weight loss: Watching Portions or Calories, Exercise, Weight Watchers, Medications, Fasting    Pt reports she has been heavy for most of her life. Thyroid issues/Hasimotos make it difficult to lose weight. Has been trying to decrease carbs d/t T2DM. Often craves sugar at night. Has Moms Meals delivered- diabetic friendly and pre-portioned. Physical activity is limited d/t knee pain.     Please see previous RD notes for more detail.    Nov 2023:    Doing well with dietary goals.  Decreased diet soda intake by 1/4, eating more meals at home (no takeout or restaurant food in about 2 months), increased vegetable " "intake.  Struggling with eating in the morning and cravings.    Doing MOMs meals and PCA is helping with other meals. Made and froze a bunch of meals.  Earlier satiety - not always able to get to starch. Focusing on protein/vegetable. Has glucose tabs if needed if glucose gets too low. Will start to feel nauseous/vomit if gets around 90 or below per pt.    Craving salt lately. Trying to be mindful of due to fluid retention. Will have something small to help satisfy.     PA: increased activity in the house    Jan 2024:  Busy couple months with holidays. Feels self care fell off.    Has noticed more snacking recently. Craving  junk food  or fast meals - something quick such as pizza versus making a full meal.     Doing well with portion control.   Continues to forget to eat during day. Not feeling hungry during the day. Is keeping granola bars on hand.    Hydration: going well currently - was lower a couple weeks ago and noticed more swelling.     PA: wants to find an exercise regimen that works well for her    March 2024:    Feelings snacking and \"junk food\" intake is improving.     Has been on the go a lot - forgetting to eat or not eating in morning. Might munch on granola bar or two during day. First meal around 6-7 pm, then finds self very hungry. Biggest barrier is her meds - hard to take them early enough. Also finds she gets nauseous with food too early - full feeling.     Continues with MOM meals. PCA also helping make/freeze meals. Feels biggest issue in regard to eating is eating too late, letty in summer time due to losing track of time.     PA: looking into pool exercises, exploring resources I sent, walking more    Previous goals:   Aim to take meds earlier to help with getting food in earlier (consider putting med in bathroom or somewhere more accessible) - In progress, currently taking between 10:30-11 am. Has not moved yet but wants to.   Could consider keeping ready made chicken (example: rotisserie " chicken), ready made grain packs, canned beans, and frozen vegetables on hand for faster meal options - Going well. PCA has been helping with making meals and freezing them.  Schedule follow up with Primary Doctor. - Met, checked labs   Check in on activity next appointment - links below for free weights and seated exercises -Discussed, wants to try pool walking     Nutrition Prescription  Recommended energy/nutrient modification.    Nutrition Diagnosis  Obesity r/t long history of positive energy balance aeb BMI >30.    Nutrition Intervention  Materials/education provided on hypocaloric diet for weight loss. Discussed 1700 calorie/day diet, Volumetric eating to help satiety level on fewer calories; portion control and healthy food choices (Plate Method and Volumetrics handouts), 100 calorie snack choices, meal and snack planning and websites, sample meal plans     Patient demonstrates understanding.    Expected Engagement: good    Nutrition Goals  1.  Consider trying a protein shake in the morning (starting with half) to see if that helps with nausea.   2. Aim to take meds around 9:30 or earlier.   3. Consider meeting with Pharmacist through primary to review medications (Adelina will send message to your primary)  4. Aim to eat dinner earlier as able.  5. Continue keeping ready made chicken (example: rotisserie chicken), ready made grain packs, canned beans, and frozen vegetables on hand for faster meal options  6. Aim to create an exercise regimen as able (will check in on next appointment)        Resistance Training with Free Weights: 3 Exercises    Seated Exercises for Arms and Legs: 11 Exercises     Breakfast time ideas:   - Toast with peanut butter   - Hard boiled egg   - Scrambled egg   - Oatmeal   - Cottage cheese   - Yogurt    The Plate Method  [email or web address]    Protein Sources   [email or web address]     Carbohydrates  [email or web address]     Mindful  Eating  [email or web address]     Summary of Volumetrics Eating Plan  [email or web address]     Follow-Up:  Wednesday, May 15th at 9:30 am    Time spent with patient: 22 minutes.  STEVO Pearl, RD, LD

## 2024-03-13 ENCOUNTER — VIRTUAL VISIT (OUTPATIENT)
Dept: ENDOCRINOLOGY | Facility: CLINIC | Age: 49
End: 2024-03-13
Payer: COMMERCIAL

## 2024-03-13 VITALS — HEIGHT: 63 IN | WEIGHT: 266 LBS | BODY MASS INDEX: 47.13 KG/M2

## 2024-03-13 DIAGNOSIS — E11.9 TYPE 2 DIABETES MELLITUS WITHOUT COMPLICATION, WITHOUT LONG-TERM CURRENT USE OF INSULIN (H): ICD-10-CM

## 2024-03-13 DIAGNOSIS — E66.9 OBESITY: ICD-10-CM

## 2024-03-13 DIAGNOSIS — E03.9 ACQUIRED HYPOTHYROIDISM: ICD-10-CM

## 2024-03-13 DIAGNOSIS — Z71.3 NUTRITIONAL COUNSELING: Primary | ICD-10-CM

## 2024-03-13 PROCEDURE — 97803 MED NUTRITION INDIV SUBSEQ: CPT | Mod: 95 | Performed by: DIETITIAN, REGISTERED

## 2024-03-13 PROCEDURE — 99207 PR NO CHARGE LOS: CPT | Mod: 95 | Performed by: DIETITIAN, REGISTERED

## 2024-03-13 ASSESSMENT — PAIN SCALES - GENERAL: PAINLEVEL: SEVERE PAIN (7)

## 2024-03-13 NOTE — PATIENT INSTRUCTIONS
Nutrition Goals  1.  Consider trying a protein shake in the morning (starting with half) to see if that helps with nausea.   2. Aim to take meds around 9:30 or earlier.   3. Consider meeting with Pharmacist through primary to review medications (Adelina will send message to your primary)  4. Aim to eat dinner earlier as able.  5. Continue keeping ready made chicken (example: rotisserie chicken), ready made grain packs, canned beans, and frozen vegetables on hand for faster meal options  6. Aim to create an exercise regimen as able (will check in on next appointment)        Resistance Training with Free Weights: 3 Exercises    Seated Exercises for Arms and Legs: 11 Exercises     Breakfast time ideas:   - Toast with peanut butter   - Hard boiled egg   - Scrambled egg   - Oatmeal   - Cottage cheese   - Yogurt    The Plate Method  [email or web address]    Protein Sources   [email or web address]     Carbohydrates  [email or web address]     Mindful Eating  [email or web address]     Summary of Volumetrics Eating Plan  [email or web address]     Follow-Up:  Wednesday, May 15th at 9:30 am    Bonny Apodaca ([place]), STEVO, RD, LD  Clinic #: [phone]

## 2024-03-13 NOTE — NURSING NOTE
Is the patient currently in the state of [place]? YES    Visit mode:VIDEO    If the visit is dropped, the patient can be reconnected by: VIDEO VISIT: Send to e-mail at: [email or web address]    Will anyone else be joining the visit? NO  (If patient encounters technical issues they should call [phone])    How would you like to obtain your AVS? MyChart    Are changes needed to the allergy or medication list? N/A  Please remove any meds marked not taking and any flagged for removal.    Reason for visit: RECHECK    Wt/ht other than 24 hrs:  two days ago  Pain more than one location:  7 knees  Sunitha PIPERF

## 2024-03-13 NOTE — LETTER
"3/13/2024       RE: Autumn Holbrook  [address] [facility] [address]     Dear Colleague,    Thank you for referring your patient, Autumn Holbrook, to the [facility] [place] [facility]. Please see a copy of my visit note below.    Video-Visit Details    Type of service:  Video Visit    Video Start Time: 9:36 am  Video End Time: 9:58 am    Originating Location (pt. Location): Home    Distant Location (provider location):  Offsite (providers home)     Platform used for Video Visit: AdWhirl    *Jane White, Pharm D shadowed visit today      Return Weight Management Nutrition Consultation    Autumn Holbrook is a 48 year old female presents today for a return weight management nutrition consultation.  Patient referred by SEVEN Nickerson on March 1, 2023.    Patient with Co-morbidities of obesity including:  Type 2 Diabetes, GERD, Knee Pain, Back Pain    Anthropometrics:  Weight 2/28/23: 287 lbs with BMI 52.36    Estimated body mass index is 47.12 kg/m  as calculated from the following:    Height as of this encounter: 1.6 m (5' 3\").    Weight as of this encounter: 120.7 kg (266 lb).     Current weight: 266 lbs, pt report   - Gained some weight around holidays per pt. Has been maintaining past few months.      Medications for Weight Loss:  Topiramate   Ozempic    Labs 2/8/24  Lipids WNL - hx of elevated Chol  A1C 6.6   Elevated WBC  TSH WNL (hx of being elevated)     NUTRITION HISTORY  Food allergies: none  Food intolerances: avocados  Supplements: Vitamin D3, MVI, potassium (prescribed by PCP)   Previous methods of diet modification for weight loss: Watching Portions or Calories, Exercise, Weight Watchers, Medications, Fasting    Pt reports she has been heavy for most of her life. Thyroid issues/Hasimotos make it difficult to lose weight. Has been trying to decrease carbs d/t T2DM. Often craves sugar at night. Has Moms Meals delivered- " "diabetic friendly and pre-portioned. Physical activity is limited d/t knee pain.     Please see previous RD notes for more detail.    Nov 2023:    Doing well with dietary goals.  Decreased diet soda intake by 1/4, eating more meals at home (no takeout or restaurant food in about 2 months), increased vegetable intake.  Struggling with eating in the morning and cravings.    Doing MOMs meals and PCA is helping with other meals. Made and froze a bunch of meals.  Earlier satiety - not always able to get to starch. Focusing on protein/vegetable. Has glucose tabs if needed if glucose gets too low. Will start to feel nauseous/vomit if gets around 90 or below per pt.    Craving salt lately. Trying to be mindful of due to fluid retention. Will have something small to help satisfy.     PA: increased activity in the house    Jan 2024:  Busy couple months with holidays. Feels self care fell off.    Has noticed more snacking recently. Craving  junk food  or fast meals - something quick such as pizza versus making a full meal.     Doing well with portion control.   Continues to forget to eat during day. Not feeling hungry during the day. Is keeping granola bars on hand.    Hydration: going well currently - was lower a couple weeks ago and noticed more swelling.     PA: wants to find an exercise regimen that works well for her    March 2024:    Feelings snacking and \"junk food\" intake is improving.     Has been on the go a lot - forgetting to eat or not eating in morning. Might munch on granola bar or two during day. First meal around 6-7 pm, then finds self very hungry. Biggest barrier is her meds - hard to take them early enough. Also finds she gets nauseous with food too early - full feeling.     Continues with MOM meals. PCA also helping make/freeze meals. Feels biggest issue in regard to eating is eating too late, letty in summer time due to losing track of time.     PA: looking into pool exercises, exploring resources I sent, " walking more    Previous goals:   Aim to take meds earlier to help with getting food in earlier (consider putting med in bathroom or somewhere more accessible) - In progress, currently taking between 10:30-11 am. Has not moved yet but wants to.   Could consider keeping ready made chicken (example: rotisserie chicken), ready made grain packs, canned beans, and frozen vegetables on hand for faster meal options - Going well. PCA has been helping with making meals and freezing them.  Schedule follow up with Primary Doctor. - Met, checked labs   Check in on activity next appointment - links below for free weights and seated exercises -Discussed, wants to try pool walking     Nutrition Prescription  Recommended energy/nutrient modification.    Nutrition Diagnosis  Obesity r/t long history of positive energy balance aeb BMI >30.    Nutrition Intervention  Materials/education provided on hypocaloric diet for weight loss. Discussed 1700 calorie/day diet, Volumetric eating to help satiety level on fewer calories; portion control and healthy food choices (Plate Method and Volumetrics handouts), 100 calorie snack choices, meal and snack planning and websites, sample meal plans     Patient demonstrates understanding.    Expected Engagement: good    Nutrition Goals  1.  Consider trying a protein shake in the morning (starting with half) to see if that helps with nausea.   2. Aim to take meds around 9:30 or earlier.   3. Consider meeting with Pharmacist through primary to review medications (Adelina will send message to your primary)  4. Aim to eat dinner earlier as able.  5. Continue keeping ready made chicken (example: rotisserie chicken), ready made grain packs, canned beans, and frozen vegetables on hand for faster meal options  6. Aim to create an exercise regimen as able (will check in on next appointment)        Resistance Training with Free Weights: 3 Exercises    Seated Exercises for Arms and Legs: 11 Exercises     Breakfast  time ideas:   - Toast with peanut butter   - Hard boiled egg   - Scrambled egg   - Oatmeal   - Cottage cheese   - Yogurt    The Plate Method  [email or web address]    Protein Sources   [email or web address]     Carbohydrates  [email or web address]     Mindful Eating  [email or web address]     Summary of Volumetrics Eating Plan  [email or web address]     Follow-Up:  Wednesday, May 15th at 9:30 am    Time spent with patient: 22 minutes.  STEVO Pearl, RD, LD

## 2024-03-14 ENCOUNTER — TELEPHONE (OUTPATIENT)
Dept: FAMILY MEDICINE | Facility: OTHER | Age: 49
End: 2024-03-14

## 2024-03-14 DIAGNOSIS — Z78.9 MEDICALLY COMPLEX PATIENT: Primary | ICD-10-CM

## 2024-03-15 DIAGNOSIS — E11.9 TYPE 2 DIABETES MELLITUS WITHOUT COMPLICATION, WITHOUT LONG-TERM CURRENT USE OF INSULIN (H): ICD-10-CM

## 2024-03-15 RX ORDER — ROSUVASTATIN CALCIUM 5 MG/1
TABLET, COATED ORAL
Qty: 90 TABLET | Refills: 3 | Status: SHIPPED | OUTPATIENT
Start: 2024-03-15

## 2024-03-29 ENCOUNTER — VIRTUAL VISIT (OUTPATIENT)
Dept: PHARMACY | Facility: PHYSICIAN GROUP | Age: 49
End: 2024-03-29
Attending: PHYSICIAN ASSISTANT
Payer: COMMERCIAL

## 2024-03-29 DIAGNOSIS — R60.0 BILATERAL EDEMA OF LOWER EXTREMITY: ICD-10-CM

## 2024-03-29 DIAGNOSIS — Z78.9 TAKES DIETARY SUPPLEMENTS: ICD-10-CM

## 2024-03-29 DIAGNOSIS — E11.9 TYPE 2 DIABETES MELLITUS WITHOUT COMPLICATION, WITHOUT LONG-TERM CURRENT USE OF INSULIN (H): Primary | ICD-10-CM

## 2024-03-29 DIAGNOSIS — F90.9 ATTENTION DEFICIT HYPERACTIVITY DISORDER (ADHD), UNSPECIFIED ADHD TYPE: ICD-10-CM

## 2024-03-29 DIAGNOSIS — Z86.69 HISTORY OF MIGRAINE HEADACHES: ICD-10-CM

## 2024-03-29 DIAGNOSIS — F41.1 GAD (GENERALIZED ANXIETY DISORDER): ICD-10-CM

## 2024-03-29 DIAGNOSIS — K21.9 GASTROESOPHAGEAL REFLUX DISEASE WITHOUT ESOPHAGITIS: ICD-10-CM

## 2024-03-29 DIAGNOSIS — I10 BENIGN ESSENTIAL HYPERTENSION: ICD-10-CM

## 2024-03-29 DIAGNOSIS — F33.1 MODERATE EPISODE OF RECURRENT MAJOR DEPRESSIVE DISORDER (H): ICD-10-CM

## 2024-03-29 DIAGNOSIS — F31.9 BIPOLAR I DISORDER (H): ICD-10-CM

## 2024-03-29 DIAGNOSIS — N32.81 OVERACTIVE BLADDER: ICD-10-CM

## 2024-03-29 DIAGNOSIS — E78.5 HYPERLIPIDEMIA LDL GOAL <100: ICD-10-CM

## 2024-03-29 DIAGNOSIS — G89.4 CHRONIC PAIN SYNDROME: ICD-10-CM

## 2024-03-29 DIAGNOSIS — Z72.0 TOBACCO ABUSE: ICD-10-CM

## 2024-03-29 DIAGNOSIS — E03.9 ACQUIRED HYPOTHYROIDISM: ICD-10-CM

## 2024-03-29 DIAGNOSIS — F43.10 PTSD (POST-TRAUMATIC STRESS DISORDER): ICD-10-CM

## 2024-03-29 DIAGNOSIS — G47.00 INSOMNIA, UNSPECIFIED TYPE: ICD-10-CM

## 2024-03-29 PROCEDURE — 99605 MTMS BY PHARM NP 15 MIN: CPT | Mod: 95

## 2024-03-29 PROCEDURE — 99607 MTMS BY PHARM ADDL 15 MIN: CPT | Mod: 95

## 2024-03-29 NOTE — PATIENT INSTRUCTIONS
"Recommendations from today's MTM visit:                                                    MTM (medication therapy management) is a service provided by a clinical pharmacist designed to help you get the most of out of your medicines.   Today we reviewed what your medicines are for, how to know if they are working, that your medicines are safe and how to make your medicine regimen as easy as possible.      The following recommendations are being made directly to SEVEN Best:  Consider a transition from oxybutynin ER to a medication like Myrbetriq or Gemtesa that may be more effective with less anticholinergic side effects. The typical starting dose of Myrbetriq is 25 mg by mouth once daily.    Follow-up: 3 months, sooner if needed.     It was great speaking with you today.  I value your experience and would be very thankful for your time in providing feedback in our clinic survey. In the next few days, you may receive an email or text message from [employer] with a link to a survey related to your  clinical pharmacist.\"     To schedule another MTM appointment, please call the clinic directly or you may call the MTM scheduling line at [phone] or toll-free at [phone].     My Clinical Pharmacist's contact information:                                                      Please feel free to contact me with any questions or concerns you have.      Noah Hearn, PharmD  Medication Therapy Management Pharmacist  [facility]  Office phone: [phone]   "

## 2024-03-29 NOTE — LETTER
April 1, 2024  Autumn LYNNE Mainkumar  [address] [facility] [address]    Dear Ms. Wallmirela, [facility] [place] [facility]     Thank you for talking with me on Mar 29, 2024 about your health and medications. As a follow-up to our conversation, I have included two documents:      Your Recommended To-Do List has steps you should take to get the best results from your medications.  Your Medication List will help you keep track of your medications and how to take them.    If you want to talk about these documents, please call Noah Hearn RPH at phone: [phone], Monday-Friday 8-4:30pm.    I look forward to working with you and your doctors to make sure your medications work well for you.    Sincerely,  Noah Hearn RPH  [place] [facility] Pharmacist, [facility]

## 2024-03-29 NOTE — LETTER
"Recommended To-Do List      Prepared on: 04/01/2024       You can get the best results from your medications by completing the items on this \"To-Do List.\"      Bring your To-Do List when you go to your doctor. And, share it with your family or caregivers.    My To-Do List:  What we talked about: What I should do:   A medication that is not working    Talk to your provider about changing the medication you are taking from oxyBUTYnin ER (DITROPAN XL) to Myrbetriq          What we talked about: What I should do:                       "

## 2024-03-29 NOTE — PROGRESS NOTES
Medication Therapy Management (MTM) Encounter    ASSESSMENT:                            Medication Adherence/Access:   No issues identified    Diabetes:   Stable.  Patient is meeting A1c goal of < 7%.    GERD:   Stable.    Hypertension/Edema:   Stable.   Patient is meeting blood pressure goal of < 130/80mmHg.    Hyperlipidemia:   Stable.    Hypothyroidism:   Stable.   Last TSH is within normal limits.     Bipolar I Disorder/Depression/Anxiety/PTSD:  Stable.    Insomnia:  Stable.    ADHD:  Plan in place with psychiatrist.    Pain:  Stable.    Urinary Incontinence:  Patient may benefit from a transition to a medication like Myrbetriq or Gemtesa that may be more effective with less anticholinergic side effects.    Tobacco Use Disorder:  Patient continues to use tobacco.    Migraine:  Stable.    Supplements:  Stable.    PLAN:                            The following recommendations are being made directly to SEVEN Best:  Consider a transition from oxybutynin ER to a medication like Myrbetriq or Gemtesa that may be more effective with less anticholinergic side effects. The typical starting dose of Myrbetriq is 25 mg by mouth once daily.    Follow-up: 3 months, sooner if needed.     SUBJECTIVE/OBJECTIVE:                          Autumn Holbrook is a 48 year old female contacted via secure video for an initial visit. She was referred to me from SEVEN Best.      Reason for visit: Initial visit - comprehensive medication review.    Allergies/ADRs: Reviewed in chart  Past Medical History: Reviewed in chart  Tobacco: She reports that she has been smoking cigarettes. She started smoking about 35 years ago. She has a 16 pack-year smoking history. She has been exposed to tobacco smoke. She has never used smokeless tobacco.Nicotine/Tobacco Cessation Plan  Has been working on cutting down on smoking.   Alcohol: 4-6 beverages / week  Caffeine: coffee - 2 cups in the morning    Medication Adherence/Access: no issues  "reported  Patient uses pill box(es).  Patient takes medications 3 time(s) per day.   Per patient, misses medication 4 times per week (her afternoon doses).   Medication barriers: none.   The patient fills medications at [facility]: NO, fills medications at [facility] in [place], [place].    Diabetes   - Ozempic 2 mg once weekly  Patient is experiencing the following side effects: bruising where she injects, sometimes nausea/vomiting.   Blood sugar monitoring: rarely. Fasting - around 98 to 99 mg/dL  Current diabetes symptoms: none  Diet/Exercise: a normal dinner - does a lot of \"Mom's Meals\" - diabetic friendly diet. Focuses on proteins and health fats, fibers, and a little bit of carbohydrates. Eats a lot of vegetables.      Eye exam in the last 12 months? No    Foot exam: due  Urine Albumin:   Lab Results   Component Value Date    UMALCR  02/17/2023      Comment:      Unable to calculate, urine albumin and/or urine creatinine is outside detectable limits.  Microalbuminuria is defined as an albumin:creatinine ratio of 17 to 299 for males and 25 to 299 for females. A ratio of albumin:creatinine of 300 or higher is indicative of overt proteinuria.  Due to biologic variability, positive results should be confirmed by a second, first-morning random or 24-hour timed urine specimen. If there is discrepancy, a third specimen is recommended. When 2 out of 3 results are in the microalbuminuria range, this is evidence for incipient nephropathy and warrants increased efforts at glucose control, blood pressure control, and institution of therapy with an angiotensin-converting-enzyme (ACE) inhibitor (if the patient can tolerate it).        Lab Results   Component Value Date    A1C 6.6 (H) 02/08/2024     GERD    - Omeprazole 40 mg once daily   Patient reports no current symptoms.   Patient feels that current regimen is effective.     Hypertension   Hypertension/Edema:  - Hydrochlorothiazide 25 mg daily  - Furosemide 20 mg twice " daily as needed  Patient watches her salt intake.    Patient reports no current medication side effects  Patient does not self-monitor blood pressure.       BP Readings from Last 3 Encounters:   03/06/24 125/81   02/08/24 118/84   11/11/23 120/88     Pulse Readings from Last 3 Encounters:   03/06/24 75   02/08/24 85   11/11/23 93     Hyperlipidemia   - Rosuvastatin 5 mg daily  Patient reports no significant myalgias or other side effects.  The 10-year ASCVD risk score (Alejandrina MCLAUGHLIN, et al., 2019) is: 4.9%    Values used to calculate the score:      Age: 48 years      Sex: Female      Is Non- : No      Diabetic: Yes      Tobacco smoker: Yes      Systolic Blood Pressure: 125 mmHg      Is BP treated: Yes      HDL Cholesterol: 71 mg/dL      Total Cholesterol: 164 mg/dL     Recent Labs   Lab Test 02/08/24  1512 05/17/23  1517 01/06/22  1528   CHOL 164  --  191   HDL 71  --  65   LDL 73 74 104*   TRIG 100  --  111     Hypothyroidism   - Levothyroxine 175 mcg daily  Patient is having the following symptoms: none.      TSH   Date Value Ref Range Status   02/08/2024 0.35 0.30 - 4.20 uIU/mL Final   09/26/2022 0.01 (L) 0.40 - 4.00 mU/L Final   05/05/2021 0.30 (L) 0.40 - 4.00 mU/L Final     Bipolar I Disorder/Depression/Anxiety/PTSD:  - Saphris 7.5 mg daily  - Hydroxyzine 25 mg four times daily as needed   - Sertraline 75 mg daily  - Vraylar 3 mg daily  Patient reports no current medication side effects.  Current symptoms include: feels anxious all the time (this is normal for her per her report, it waxes and wanes and can be situational). No thoughts of self-harm reported.   Patient reports symptoms are stable.  Patient is seeing a therapist.   Patient is seeing a psychiatrist.      2/17/2023     2:22 PM 5/17/2023     1:58 PM 9/11/2023    11:14 AM   PHQ-9 SCORE   PHQ-9 Total Score 12 9 0         6/9/2022     2:00 PM 8/1/2022     8:00 AM 11/2/2022     2:00 PM   COY-7 SCORE   Total Score 21 8 16  "    Insomnia:   - Melatonin 10 mg at bedtime as needed   Patient reports no issues at this time.     ADHD:  - Vyvanse (lisdexamfetamine) 40 mg daily  Patient reports that she still has trouble concentrating while on this medication. Patient has been working on slowly increasing the dose of this medication with her psychiatrist. Patient has found benefit as she increases the dose of Vyvanse.       Pain:    - Acetaminophen 500 mg every 4 hours as needed   - Baclofen 10 mg three times daily   - Diclofenac 1% gel 2 grams topically four times daily as needed   - Ibuprofen 800 mg every 8 hours as needed   Pain type/location: knees (needs surgery per her report) and her back  Pain is described as Aching, Sharp, and Shooting.  Patient reports the following side effects: denies side effects.    Urinary Incontinence:  - Oxybutynin ER 10 mg daily  Patient reports she is not sure if this medication is effective. Patient reports dry mouth from this medication and she is not sure if this is effective.     Tobacco Use Disorder:  Nicotine Replacement: Gum 2 mg  (but is not using this medication currently)  Current Tobacco Product use and Frequency:  about one-half pack per day.  Tried quitting in the past: Yes.   What worked/didn't work in the past: nicotine replacement.      Migraine:   Preventive medications  Topiramate 75 mg once daily  Acute medications  See \"pain\" section above.   Triggers include: Stress and hydration.  Associated symptoms include: Nausea, Vomiting, Photophobia, and Phonophobia  Patient reports having 2 headache days per month  Current side effects include: none.    Supplements   - Multivitamin daily  - Potassium chloride ER 30 mEq daily when taking furosemide  - Cholecalciferol 125 mcg daily  No reported issues at this time.      Today's Vitals: There were no vitals taken for this visit.  ----------------  I spent 40 minutes with this patient today. I offer these suggestions for consideration by Apryl " SEVEN Hathaway. A copy of the visit note was provided to the patient's provider(s).    A summary of these recommendations was sent via [employer].    Noah Hearn, PharmD  Medication Therapy Management Pharmacist  [facility]  Office phone: [phone]    Telemedicine Visit Details  Type of service:  Video Conference via [employer]  Start Time:  10:05 AM  End Time:  10:45 AM     Medication Therapy Recommendations  Overactive bladder    Current Medication: oxyBUTYnin ER (DITROPAN XL) 10 MG 24 hr tablet   Rationale: Condition refractory to medication - Ineffective medication - Effectiveness   Recommendation: Change Medication - Myrbetriq 25 MG Tb24   Status: Contact Provider - Awaiting Response

## 2024-03-29 NOTE — LETTER
_  Medication List        Prepared on: 04/01/2024     Bring your Medication List when you go to the doctor, hospital, or   emergency room. And, share it with your family or caregivers.     Note any changes to how you take your medications.  Cross out medications when you no longer use them.    Medication How I take it Why I use it Prescriber   Acetaminophen (TYLENOL PO) Take 500 mg by mouth every 4 hours as needed for mild pain or fever Pain Patient Reported   asenapine (SAPHRIS) 2.5 MG SUBL sublingual tablet Place 2.5 mg under the tongue daily (2.5 mg + 5 mg = 7.5 mg) Manic-Depression Zac Lucero NP   asenapine (SAPHRIS) 5 MG SUBL sublingual tablet Place 5 mg under the tongue daily (2.5 mg + 5 mg = 7.5 mg) Manic-Depression Zac Lucero NP   baclofen (LIORESAL) 10 MG tablet TAKE 1 TABLET BY MOUTH THREE TIMES DAILY Chronic Pain Syndrome SEVEN Pandey   diclofenac (VOLTAREN) 1 % topical gel Apply 2 g topically 4 times daily as needed for moderate pain Joint Damage causing Pain and Loss of Function Patient Reported   furosemide (LASIX) 20 MG tablet TAKE 2 TABLETS BY MOUTH ONCE DAILY AS NEEDED FOR  LEG  SWELLING Bilateral Leg Edema SEVEN Pandey   hydrochlorothiazide (HYDRODIURIL) 25 MG tablet Take 1 tablet by mouth once daily Benign Essential Hypertension SEVEN Pandey   hydrOXYzine (ATARAX) 25 MG tablet Take 25 mg by mouth 4 times daily as needed for anxiety Feeling Anxious Zac Lucero NP   ibuprofen (ADVIL/MOTRIN) 800 MG tablet Take 1 tablet (800 mg) by mouth every 8 hours as needed for moderate pain (4-6) Chronic Pain Syndrome Laura Duffy MD   levothyroxine (SYNTHROID/LEVOTHROID) 175 MCG tablet Take 1 tablet (175 mcg) by mouth daily Acquired Hypothyroidism SEVEN Pandey   lisdexamfetamine (VYVANSE) 40 MG capsule Take 40 mg by mouth every morning Attention Deficit Hyperactivity Disorder Zac Lucero NP   Melatonin 10 MG CAPS Take 10 mg by mouth nightly as needed (insomnia)  Insomnia Zac Lucero NP   multivitamin w/minerals (THERA-VIT-M) tablet Take 1 tablet by mouth daily Nutritional Support Patient Reported   nicotine (NICORETTE) 2 MG gum CHEW 1-2 PIECES PER HOUR OR 10-12 PIECES PER DAY--TAPER AS TOLERATED Tobacco use disorder Patient Reported   omeprazole (PRILOSEC) 40 MG DR capsule Take 1 capsule by mouth once daily Gastritis without bleeding, unspecified chronicity, unspecified gastritis type; Abdominal Pain, Epigastric SEVEN Pandey   oxyBUTYnin ER (DITROPAN XL) 10 MG 24 hr tablet Take 1 tablet (10 mg) by mouth daily Overactive Bladder SEVEN Pandey   potassium chloride ER (K-TAB) 20 MEQ CR tablet TAKE 1 TABLET BY MOUTH ONCE DAILY ALONG WITH A 10 MG TABLET **THIS SHOULD ONLY BE IF TAKING LASIX** Hypokalemia SEVEN Pandey   potassium chloride ER (K-TAB/KLOR-CON) 10 MEQ CR tablet TAKE 1 TABLET BY MOUTH ONCE DAILY WHEN  TAKING  LASIX  (FUROSEMIDE) Bilateral edema of lower extremity SEVEN Pandey   rosuvastatin (CRESTOR) 5 MG tablet Take 1 tablet by mouth once daily Type 2 diabetes mellitus without complication, without long-term current use of insulin (H) SEVEN Pandey   Semaglutide, 2 MG/DOSE, (OZEMPIC) 8 MG/3ML pen Inject 2 mg Subcutaneous every 7 days Type 2 diabetes mellitus without complication, without long-term current use of insulin (H) Ivone Moses PA-C   sertraline (ZOLOFT) 50 MG tablet Take 75 mg by mouth daily Generalized Anxiety Disorder; Bipolar I Disorder Zac Lucero NP   topiramate (TOPAMAX) 25 MG tablet Take 3 tablets (75 mg) by mouth at bedtime Migraine without status migrainosus, not intractable, unspecified migraine type Ivone Moses PA-C   Vitamin D3 (CHOLECALCIFEROL) 125 MCG (5000 UT) tablet Take 1 tablet by mouth daily Vitamin D Deficiency Patient Reported   VRAYLAR 3 MG CAPS capsule Take 3 mg by mouth daily Depressive Phase of Manic-Depression Zac Lucero NP         Add new medications,  over-the-counter drugs, herbals, vitamins, or  minerals in the blank rows below.    Medication How I take it Why I use it Prescriber                                      Allergies:      depakote [valproic acid]; gabapentin; propofol        Side effects I have had:               Other Information:              My notes and questions:

## 2024-04-12 DIAGNOSIS — I10 BENIGN ESSENTIAL HYPERTENSION: ICD-10-CM

## 2024-04-12 RX ORDER — HYDROCHLOROTHIAZIDE 25 MG/1
TABLET ORAL
Qty: 90 TABLET | Refills: 0 | Status: SHIPPED | OUTPATIENT
Start: 2024-04-12 | End: 2024-06-28

## 2024-04-12 NOTE — TELEPHONE ENCOUNTER
hydroCHLOROthiazide 25 MG Oral Tablet       Last Written Prescription Date:  0  Last Fill Quantity: 0,   # refills: 0  Last Office Visit: 2/8/2024  Future Office visit:    Next 5 appointments (look out 90 days)      May 08, 2024  2:30 PM  (Arrive by 2:15 PM)  Provider Visit with SEVEN Pandey  [facility] ([facility] [address]  [place] [address]  [phone]             Routing refill request to provider for review/approval because:  Drug not active on patient's medication list    Kimberly Boecker, RN

## 2024-04-15 DIAGNOSIS — G89.4 CHRONIC PAIN SYNDROME: ICD-10-CM

## 2024-04-15 RX ORDER — BACLOFEN 10 MG/1
TABLET ORAL
Qty: 90 TABLET | Refills: 0 | Status: SHIPPED | OUTPATIENT
Start: 2024-04-15 | End: 2024-06-02

## 2024-04-15 NOTE — TELEPHONE ENCOUNTER
Baclofen 10 MG Oral Tablet       Last Written Prescription Date:  3/7/2024  Last Fill Quantity: 90,   # refills: 0  Last Office Visit: 2/8/2024  Future Office visit:    Next 5 appointments (look out 90 days)      May 08, 2024  2:30 PM  (Arrive by 2:15 PM)  Provider Visit with SEVEN Pandey  [facility] [place] ([facility] [place] ) [address]  [place] [address]  [phone]             Routing refill request to provider for review/approval because:    Kimberly Boecker, RN

## 2024-05-05 DIAGNOSIS — G43.909 MIGRAINE WITHOUT STATUS MIGRAINOSUS, NOT INTRACTABLE, UNSPECIFIED MIGRAINE TYPE: ICD-10-CM

## 2024-05-08 ENCOUNTER — VIRTUAL VISIT (OUTPATIENT)
Dept: ENDOCRINOLOGY | Facility: CLINIC | Age: 49
End: 2024-05-08
Payer: COMMERCIAL

## 2024-05-08 ENCOUNTER — OFFICE VISIT (OUTPATIENT)
Dept: FAMILY MEDICINE | Facility: OTHER | Age: 49
End: 2024-05-08
Attending: PHYSICIAN ASSISTANT
Payer: COMMERCIAL

## 2024-05-08 VITALS — WEIGHT: 269 LBS | BODY MASS INDEX: 47.66 KG/M2 | HEIGHT: 63 IN

## 2024-05-08 VITALS
HEART RATE: 91 BPM | RESPIRATION RATE: 18 BRPM | SYSTOLIC BLOOD PRESSURE: 100 MMHG | DIASTOLIC BLOOD PRESSURE: 80 MMHG | WEIGHT: 270.9 LBS | TEMPERATURE: 98.7 F | BODY MASS INDEX: 48 KG/M2 | OXYGEN SATURATION: 98 %

## 2024-05-08 DIAGNOSIS — F31.61 MILD MIXED BIPOLAR I DISORDER (H): Primary | ICD-10-CM

## 2024-05-08 DIAGNOSIS — G89.4 CHRONIC PAIN SYNDROME: ICD-10-CM

## 2024-05-08 DIAGNOSIS — E66.813 CLASS 3 SEVERE OBESITY WITH SERIOUS COMORBIDITY AND BODY MASS INDEX (BMI) OF 45.0 TO 49.9 IN ADULT, UNSPECIFIED OBESITY TYPE (H): Primary | ICD-10-CM

## 2024-05-08 DIAGNOSIS — E03.9 ACQUIRED HYPOTHYROIDISM: ICD-10-CM

## 2024-05-08 DIAGNOSIS — I10 BENIGN ESSENTIAL HYPERTENSION: ICD-10-CM

## 2024-05-08 DIAGNOSIS — G43.909 MIGRAINE WITHOUT STATUS MIGRAINOSUS, NOT INTRACTABLE, UNSPECIFIED MIGRAINE TYPE: ICD-10-CM

## 2024-05-08 DIAGNOSIS — E11.9 TYPE 2 DIABETES MELLITUS WITHOUT COMPLICATION, WITHOUT LONG-TERM CURRENT USE OF INSULIN (H): ICD-10-CM

## 2024-05-08 DIAGNOSIS — E66.01 CLASS 3 SEVERE OBESITY WITH SERIOUS COMORBIDITY AND BODY MASS INDEX (BMI) OF 45.0 TO 49.9 IN ADULT, UNSPECIFIED OBESITY TYPE (H): Primary | ICD-10-CM

## 2024-05-08 LAB
ALBUMIN SERPL BCG-MCNC: 3.8 G/DL (ref 3.5–5.2)
ALP SERPL-CCNC: 89 U/L (ref 40–150)
ALT SERPL W P-5'-P-CCNC: 22 U/L (ref 0–50)
ANION GAP SERPL CALCULATED.3IONS-SCNC: 12 MMOL/L (ref 7–15)
AST SERPL W P-5'-P-CCNC: 21 U/L (ref 0–45)
BASOPHILS # BLD AUTO: 0.1 10E3/UL (ref 0–0.2)
BASOPHILS NFR BLD AUTO: 1 %
BILIRUB SERPL-MCNC: 0.2 MG/DL
BUN SERPL-MCNC: 11.5 MG/DL (ref 6–20)
CALCIUM SERPL-MCNC: 9.4 MG/DL (ref 8.6–10)
CHLORIDE SERPL-SCNC: 104 MMOL/L (ref 98–107)
CREAT SERPL-MCNC: 0.82 MG/DL (ref 0.51–0.95)
DEPRECATED HCO3 PLAS-SCNC: 22 MMOL/L (ref 22–29)
EGFRCR SERPLBLD CKD-EPI 2021: 87 ML/MIN/1.73M2
EOSINOPHIL # BLD AUTO: 0.3 10E3/UL (ref 0–0.7)
EOSINOPHIL NFR BLD AUTO: 3 %
ERYTHROCYTE [DISTWIDTH] IN BLOOD BY AUTOMATED COUNT: 14.8 % (ref 10–15)
EST. AVERAGE GLUCOSE BLD GHB EST-MCNC: 140 MG/DL
GLUCOSE SERPL-MCNC: 100 MG/DL (ref 70–99)
HBA1C MFR BLD: 6.5 %
HCT VFR BLD AUTO: 45.1 % (ref 35–47)
HGB BLD-MCNC: 15 G/DL (ref 11.7–15.7)
IMM GRANULOCYTES # BLD: 0.1 10E3/UL
IMM GRANULOCYTES NFR BLD: 1 %
LYMPHOCYTES # BLD AUTO: 3 10E3/UL (ref 0.8–5.3)
LYMPHOCYTES NFR BLD AUTO: 26 %
MCH RBC QN AUTO: 29.6 PG (ref 26.5–33)
MCHC RBC AUTO-ENTMCNC: 33.3 G/DL (ref 31.5–36.5)
MCV RBC AUTO: 89 FL (ref 78–100)
MONOCYTES # BLD AUTO: 0.7 10E3/UL (ref 0–1.3)
MONOCYTES NFR BLD AUTO: 6 %
NEUTROPHILS # BLD AUTO: 7.4 10E3/UL (ref 1.6–8.3)
NEUTROPHILS NFR BLD AUTO: 64 %
NRBC # BLD AUTO: 0 10E3/UL
NRBC BLD AUTO-RTO: 0 /100
PLATELET # BLD AUTO: 312 10E3/UL (ref 150–450)
POTASSIUM SERPL-SCNC: 3.7 MMOL/L (ref 3.4–5.3)
PROT SERPL-MCNC: 7 G/DL (ref 6.4–8.3)
RBC # BLD AUTO: 5.06 10E6/UL (ref 3.8–5.2)
SODIUM SERPL-SCNC: 138 MMOL/L (ref 135–145)
TSH SERPL DL<=0.005 MIU/L-ACNC: 0.63 UIU/ML (ref 0.3–4.2)
WBC # BLD AUTO: 11.6 10E3/UL (ref 4–11)

## 2024-05-08 PROCEDURE — 84155 ASSAY OF PROTEIN SERUM: CPT | Mod: ZL | Performed by: PHYSICIAN ASSISTANT

## 2024-05-08 PROCEDURE — 99214 OFFICE O/P EST MOD 30 MIN: CPT | Performed by: PHYSICIAN ASSISTANT

## 2024-05-08 PROCEDURE — 84443 ASSAY THYROID STIM HORMONE: CPT | Mod: ZL | Performed by: PHYSICIAN ASSISTANT

## 2024-05-08 PROCEDURE — 83036 HEMOGLOBIN GLYCOSYLATED A1C: CPT | Mod: ZL | Performed by: PHYSICIAN ASSISTANT

## 2024-05-08 PROCEDURE — 85025 COMPLETE CBC W/AUTO DIFF WBC: CPT | Mod: ZL | Performed by: PHYSICIAN ASSISTANT

## 2024-05-08 PROCEDURE — 36415 COLL VENOUS BLD VENIPUNCTURE: CPT | Mod: ZL | Performed by: PHYSICIAN ASSISTANT

## 2024-05-08 PROCEDURE — G0463 HOSPITAL OUTPT CLINIC VISIT: HCPCS

## 2024-05-08 PROCEDURE — 99213 OFFICE O/P EST LOW 20 MIN: CPT | Mod: 95 | Performed by: PHYSICIAN ASSISTANT

## 2024-05-08 PROCEDURE — 82040 ASSAY OF SERUM ALBUMIN: CPT | Mod: ZL | Performed by: PHYSICIAN ASSISTANT

## 2024-05-08 RX ORDER — LISDEXAMFETAMINE DIMESYLATE 50 MG/1
50 CAPSULE ORAL EVERY MORNING
COMMUNITY
Start: 2024-04-29

## 2024-05-08 RX ORDER — TOPIRAMATE 25 MG/1
75 TABLET, FILM COATED ORAL AT BEDTIME
Qty: 270 TABLET | Refills: 1 | Status: SHIPPED | OUTPATIENT
Start: 2024-05-08 | End: 2024-05-08

## 2024-05-08 RX ORDER — TOPIRAMATE 25 MG/1
75 TABLET, FILM COATED ORAL AT BEDTIME
Qty: 270 TABLET | Refills: 1 | Status: SHIPPED | OUTPATIENT
Start: 2024-05-08

## 2024-05-08 ASSESSMENT — PAIN SCALES - GENERAL
PAINLEVEL: SEVERE PAIN (7)
PAINLEVEL: SEVERE PAIN (7)

## 2024-05-08 ASSESSMENT — PATIENT HEALTH QUESTIONNAIRE - PHQ9
SUM OF ALL RESPONSES TO PHQ QUESTIONS 1-9: 10
SUM OF ALL RESPONSES TO PHQ QUESTIONS 1-9: 10
10. IF YOU CHECKED OFF ANY PROBLEMS, HOW DIFFICULT HAVE THESE PROBLEMS MADE IT FOR YOU TO DO YOUR WORK, TAKE CARE OF THINGS AT HOME, OR GET ALONG WITH OTHER PEOPLE: VERY DIFFICULT

## 2024-05-08 NOTE — RESULT ENCOUNTER NOTE
Lab improvement. WBC near normal. A1C stable. Continue on our course.  Thyroid is fine. Kidney and liver are ok.

## 2024-05-08 NOTE — LETTER
"2024       RE: Autumn Holbrook  [address] [facility] [address]     Dear Colleague,    Thank you for referring your patient, Autumn Holbrook, to the [facility] [place] at [facility]. Please see a copy of my visit note below.      Return Medical Weight Management Note     Autumn Holbrook  MRN:  [id number]  :  1975  JALYN:  2024    Dear SEVEN Bryant,    I had the pleasure of seeing your patient Autumn Holbrook. She is a 48 year old female who I am continuing to see for treatment of obesity related to:        3/1/2023     1:32 PM   --   I have the following health issues associated with obesity Type II Diabetes    GERD (Reflux)    Stress Incontinence    Osteoarthritis (joint disease)    Hypothyroidism   I have the following symptoms associated with obesity Knee Pain    Depression    Lower Extremity Swelling    Back Pain    Fatigue    Irregular Menstral Cycle       Assessment & Plan  Problem List Items Addressed This Visit          Endocrine Diagnoses    Diabetes mellitus, type 2 (H)    Relevant Medications    Semaglutide, 2 MG/DOSE, (OZEMPIC) 8 MG/3ML pen     Other Visit Diagnoses       Migraine without status migrainosus, not intractable, unspecified migraine type        Relevant Medications    topiramate (TOPAMAX) 25 MG tablet           Weight mgmt follow up  Highest wt in life 325 lbs  Today 269 lbs, stable since last visit  Taking Ozempic 2mg weekly  Taking topiramate 75mg at bedtime, can take earlier in the evening  Type 2 DM A1C stable at 6.6    Refill topiramate and ozempic     Follow up   [facility] 5/15 already scheduled  Ivone 6 months return MW      INTERVAL HISTORY:  \" Highest wt in life: 325 lbs. Lost weight over the last 2 years with phentermine. Weight gain associated with psychiatric medications started 7 years ago. Weight prior was 180 lbs. \"       Anti-obesity medication history    Current: "   Ozempic 2mg, switched from trulicity and helping with preventing overeating.   Topiramate for weight loss and migraines restarted 2 mo ago.        Past/Failed/contraindicated:   Metformin in the past not effective and upset stomach  Phentermine: off since taking vyvanse also.    Recent diet changes: eating lower carb, focusing on protein, veggies and healthy fats    Recent exercise/activity changes: new walking pad, going to start soon    CURRENT WEIGHT:   269 lbs 0 oz    Initial Weight (lbs): 284 lbs  Last Visits Weight: 120.7 kg (266 lb)  Cumulative weight loss (lbs): 15  Weight Loss Percentage: 5.28%        5/5/2024     5:22 PM   Changes and Difficulties   I have made the following changes to my diet since my last visit: Cut down on alcohol, added more water   With regards to my diet, I am still struggling with: Craving takeout food, pizza especially..   I have made the following changes to my activity/exercise since my last visit: Bought a treadmill..it just arrived yesterday   With regards to my activity/exercise, I am still struggling with: Pain.. It's hard to move around as it is super painful in my knees, back, and now hips..         MEDICATIONS:   Current Outpatient Medications   Medication Sig Dispense Refill    Semaglutide, 2 MG/DOSE, (OZEMPIC) 8 MG/3ML pen Inject 2 mg Subcutaneous every 7 days 9 mL 3    topiramate (TOPAMAX) 25 MG tablet Take 3 tablets (75 mg) by mouth at bedtime 270 tablet 1    Acetaminophen (TYLENOL PO) Take 500 mg by mouth every 4 hours as needed for mild pain or fever      asenapine (SAPHRIS) 2.5 MG SUBL sublingual tablet Place 2.5 mg under the tongue daily (2.5 mg + 5 mg = 7.5 mg)      asenapine (SAPHRIS) 5 MG SUBL sublingual tablet Place 5 mg under the tongue daily (2.5 mg + 5 mg = 7.5 mg)      baclofen (LIORESAL) 10 MG tablet TAKE 1 TABLET BY MOUTH THREE TIMES DAILY 90 tablet 0    blood glucose (NO BRAND SPECIFIED) lancets standard Use to test blood sugar 1 times daily 100 each 5     blood glucose (NO BRAND SPECIFIED) test strip Use to test blood sugar 1 time daily 50 strip 5    blood glucose monitoring (NO BRAND SPECIFIED) meter device kit Use to test blood sugar 1 time daily 1 kit 0    blood glucose monitoring (SOFTCLIX) lancets USE 1 TO CHECK GLUCOSE ONCE DAILY      diclofenac (VOLTAREN) 1 % topical gel Apply 2 g topically 4 times daily as needed for moderate pain      furosemide (LASIX) 20 MG tablet TAKE 2 TABLETS BY MOUTH ONCE DAILY AS NEEDED FOR  LEG  SWELLING 60 tablet 9    hydrochlorothiazide (HYDRODIURIL) 25 MG tablet Take 1 tablet by mouth once daily 90 tablet 0    hydrOXYzine (ATARAX) 25 MG tablet Take 25 mg by mouth 4 times daily as needed for anxiety      ibuprofen (ADVIL/MOTRIN) 800 MG tablet Take 1 tablet (800 mg) by mouth every 8 hours as needed for moderate pain (4-6) 90 tablet 0    levothyroxine (SYNTHROID/LEVOTHROID) 175 MCG tablet Take 1 tablet (175 mcg) by mouth daily 90 tablet 1    lisdexamfetamine (VYVANSE) 40 MG capsule Take 40 mg by mouth every morning      Melatonin 10 MG CAPS Take 10 mg by mouth nightly as needed (insomnia)      multivitamin w/minerals (THERA-VIT-M) tablet Take 1 tablet by mouth daily      nicotine (NICORETTE) 2 MG gum CHEW 1-2 PIECES PER HOUR OR 10-12 PIECES PER DAY--TAPER AS TOLERATED      omeprazole (PRILOSEC) 40 MG DR capsule Take 1 capsule by mouth once daily 90 capsule 1    oxyBUTYnin ER (DITROPAN XL) 10 MG 24 hr tablet Take 1 tablet (10 mg) by mouth daily 90 tablet 3    potassium chloride ER (K-TAB) 20 MEQ CR tablet TAKE 1 TABLET BY MOUTH ONCE DAILY ALONG WITH A 10 MG TABLET **THIS SHOULD ONLY BE IF TAKING LASIX** 90 tablet 2    potassium chloride ER (K-TAB/KLOR-CON) 10 MEQ CR tablet TAKE 1 TABLET BY MOUTH ONCE DAILY WHEN  TAKING  LASIX  (FUROSEMIDE) 30 tablet 3    rosuvastatin (CRESTOR) 5 MG tablet Take 1 tablet by mouth once daily 90 tablet 3    sertraline (ZOLOFT) 50 MG tablet Take 75 mg by mouth daily      Vitamin D3 (CHOLECALCIFEROL) 125  MCG (5000 UT) tablet Take 1 tablet by mouth daily      VRAYLAR 3 MG CAPS capsule Take 3 mg by mouth daily             5/5/2024     5:22 PM   Weight Loss Medication History Reviewed With Patient   Which weight loss medications are you currently taking on a regular basis? Ozempic    Topamax (topiramate)    Vyvanse   Are you having any side effects from the weight loss medication that we have prescribed you? No       Office Visit on 02/08/2024   Component Date Value Ref Range Status    Cholesterol 02/08/2024 164  <200 mg/dL Final    Triglycerides 02/08/2024 100  <150 mg/dL Final    Direct Measure HDL 02/08/2024 71  >=50 mg/dL Final    LDL Cholesterol Calculated 02/08/2024 73  <=100 mg/dL Final    Non HDL Cholesterol 02/08/2024 93  <130 mg/dL Final    Patient Fasting > 8hrs? 02/08/2024 Yes   Final    Estimated Average Glucose 02/08/2024 143  mg/dL Final    Hemoglobin A1C 02/08/2024 6.6 (H)  <5.7 % Final    Normal <5.7%   Prediabetes 5.7-6.4%    Diabetes 6.5% or higher     Note: Adopted from ADA consensus guidelines.    TSH 02/08/2024 0.35  0.30 - 4.20 uIU/mL Final    WBC Count 02/08/2024 14.3 (H)  4.0 - 11.0 10e3/uL Final    RBC Count 02/08/2024 5.21 (H)  3.80 - 5.20 10e6/uL Final    Hemoglobin 02/08/2024 15.4  11.7 - 15.7 g/dL Final    Hematocrit 02/08/2024 45.7  35.0 - 47.0 % Final    MCV 02/08/2024 88  78 - 100 fL Final    MCH 02/08/2024 29.6  26.5 - 33.0 pg Final    MCHC 02/08/2024 33.7  31.5 - 36.5 g/dL Final    RDW 02/08/2024 14.7  10.0 - 15.0 % Final    Platelet Count 02/08/2024 325  150 - 450 10e3/uL Final    % Neutrophils 02/08/2024 74  % Final    % Lymphocytes 02/08/2024 18  % Final    % Monocytes 02/08/2024 5  % Final    % Eosinophils 02/08/2024 2  % Final    % Basophils 02/08/2024 0  % Final    % Immature Granulocytes 02/08/2024 1  % Final    NRBCs per 100 WBC 02/08/2024 0  <1 /100 Final    Absolute Neutrophils 02/08/2024 10.7 (H)  1.6 - 8.3 10e3/uL Final    Absolute Lymphocytes 02/08/2024 2.5  0.8 - 5.3  "10e3/uL Final    Absolute Monocytes 02/08/2024 0.8  0.0 - 1.3 10e3/uL Final    Absolute Eosinophils 02/08/2024 0.2  0.0 - 0.7 10e3/uL Final    Absolute Basophils 02/08/2024 0.1  0.0 - 0.2 10e3/uL Final    Absolute Immature Granulocytes 02/08/2024 0.1  <=0.4 10e3/uL Final    Absolute NRBCs 02/08/2024 0.0  10e3/uL Final    Amphetamines Urine 02/08/2024 Screen Positive (A)  Screen Negative Final    Cutoff for a positive amphetamine is 500 ng/mL or greater.   This is an unconfirmed screening result to be used for medical purposes only.    Barbituates Urine 02/08/2024 Screen Negative  Screen Negative Final    Cutoff for a negative barbiturate is less than 200 ng/mL.    Benzodiazepine Urine 02/08/2024 Screen Negative  Screen Negative Final    Cutoff for a negative benzodiazepine is less than 100 ng/mL.    Cannabinoids Urine 02/08/2024 Screen Positive (A)  Screen Negative Final    Cutoff for a positive cannabinoid is 50 ng/mL or greater.   This is an unconfirmed screening result to be used for medical purposes only.    Cocaine Urine 02/08/2024 Screen Negative  Screen Negative Final    Cutoff for a negative cocaine is less than 300 ng/mL.    Fentanyl Qual Urine 02/08/2024 Screen Negative  Screen Negative Final    Cutoff for negative fentanyl is less than 5 ng/mL.    Opiates Urine 02/08/2024 Screen Negative  Screen Negative Final    Cutoff for a negative opiate is less than 300 ng/mL.    PCP Urine 02/08/2024 Screen Negative  Screen Negative Final    Cutoff for a negative PCP is less than 25 ng/mL.           PHYSICAL EXAM:  Objective   Ht 1.6 m (5' 2.99\")   Wt 122 kg (269 lb)   BMI 47.66 kg/m      Vitals - Patient Reported  Pain Score: Severe Pain (7)  Pain Loc: Knee        GENERAL: alert and no distress  EYES: Eyes grossly normal to inspection.  No discharge or erythema, or obvious scleral/conjunctival abnormalities.  RESP: No audible wheeze, cough, or visible cyanosis.    SKIN: Visible skin clear. No significant rash, " abnormal pigmentation or lesions.  NEURO: Cranial nerves grossly intact.  Mentation and speech appropriate for age.  PSYCH: Appropriate affect, tone, and pace of words         Sincerely,    Ivone Moses PA-C      20 minutes spent by me on the date of the encounter doing chart review, history and exam, documentation and further activities per the note  Virtual Visit Details    Type of service:  Video Visit   Video Start Time:  1035  Video End Time: 1048    Originating Location (pt. Location): Home    Distant Location (provider location):  Off-site  Platform used for Video Visit: [employer]

## 2024-05-08 NOTE — PROGRESS NOTES
"  Return Medical Weight Management Note     Autumn Holbrook  MRN:  [id number]  :  1975  JALYN:  2024    Dear SEVEN Bryant,    I had the pleasure of seeing your patient Autumn Holbrook. She is a 48 year old female who I am continuing to see for treatment of obesity related to:        3/1/2023     1:32 PM   --   I have the following health issues associated with obesity Type II Diabetes    GERD (Reflux)    Stress Incontinence    Osteoarthritis (joint disease)    Hypothyroidism   I have the following symptoms associated with obesity Knee Pain    Depression    Lower Extremity Swelling    Back Pain    Fatigue    Irregular Menstral Cycle       Assessment & Plan   Problem List Items Addressed This Visit          Endocrine Diagnoses    Diabetes mellitus, type 2 (H)    Relevant Medications    Semaglutide, 2 MG/DOSE, (OZEMPIC) 8 MG/3ML pen     Other Visit Diagnoses       Migraine without status migrainosus, not intractable, unspecified migraine type        Relevant Medications    topiramate (TOPAMAX) 25 MG tablet           Weight mgmt follow up  Highest wt in life 325 lbs  Today 269 lbs, stable since last visit  Taking Ozempic 2mg weekly  Taking topiramate 75mg at bedtime, can take earlier in the evening  Type 2 DM A1C stable at 6.6    Refill topiramate and ozempic     Follow up   [facility] 5/15 already scheduled  Ivone 6 months return MWM      INTERVAL HISTORY:  \" Highest wt in life: 325 lbs. Lost weight over the last 2 years with phentermine. Weight gain associated with psychiatric medications started 7 years ago. Weight prior was 180 lbs. \"       Anti-obesity medication history    Current:   Ozempic 2mg, switched from trulicity and helping with preventing overeating.   Topiramate for weight loss and migraines restarted 2 mo ago.        Past/Failed/contraindicated:   Metformin in the past not effective and upset stomach  Phentermine: off since taking vyvanse also.    Recent diet changes: eating lower carb, " focusing on protein, veggies and healthy fats    Recent exercise/activity changes: new walking pad, going to start soon    CURRENT WEIGHT:   269 lbs 0 oz    Initial Weight (lbs): 284 lbs  Last Visits Weight: 120.7 kg (266 lb)  Cumulative weight loss (lbs): 15  Weight Loss Percentage: 5.28%        5/5/2024     5:22 PM   Changes and Difficulties   I have made the following changes to my diet since my last visit: Cut down on alcohol, added more water   With regards to my diet, I am still struggling with: Craving takeout food, pizza especially..   I have made the following changes to my activity/exercise since my last visit: Bought a treadmill..it just arrived yesterday   With regards to my activity/exercise, I am still struggling with: Pain.. It's hard to move around as it is super painful in my knees, back, and now hips..         MEDICATIONS:   Current Outpatient Medications   Medication Sig Dispense Refill    Semaglutide, 2 MG/DOSE, (OZEMPIC) 8 MG/3ML pen Inject 2 mg Subcutaneous every 7 days 9 mL 3    topiramate (TOPAMAX) 25 MG tablet Take 3 tablets (75 mg) by mouth at bedtime 270 tablet 1    Acetaminophen (TYLENOL PO) Take 500 mg by mouth every 4 hours as needed for mild pain or fever      asenapine (SAPHRIS) 2.5 MG SUBL sublingual tablet Place 2.5 mg under the tongue daily (2.5 mg + 5 mg = 7.5 mg)      asenapine (SAPHRIS) 5 MG SUBL sublingual tablet Place 5 mg under the tongue daily (2.5 mg + 5 mg = 7.5 mg)      baclofen (LIORESAL) 10 MG tablet TAKE 1 TABLET BY MOUTH THREE TIMES DAILY 90 tablet 0    blood glucose (NO BRAND SPECIFIED) lancets standard Use to test blood sugar 1 times daily 100 each 5    blood glucose (NO BRAND SPECIFIED) test strip Use to test blood sugar 1 time daily 50 strip 5    blood glucose monitoring (NO BRAND SPECIFIED) meter device kit Use to test blood sugar 1 time daily 1 kit 0    blood glucose monitoring (SOFTCLIX) lancets USE 1 TO CHECK GLUCOSE ONCE DAILY      diclofenac (VOLTAREN) 1 %  topical gel Apply 2 g topically 4 times daily as needed for moderate pain      furosemide (LASIX) 20 MG tablet TAKE 2 TABLETS BY MOUTH ONCE DAILY AS NEEDED FOR  LEG  SWELLING 60 tablet 9    hydrochlorothiazide (HYDRODIURIL) 25 MG tablet Take 1 tablet by mouth once daily 90 tablet 0    hydrOXYzine (ATARAX) 25 MG tablet Take 25 mg by mouth 4 times daily as needed for anxiety      ibuprofen (ADVIL/MOTRIN) 800 MG tablet Take 1 tablet (800 mg) by mouth every 8 hours as needed for moderate pain (4-6) 90 tablet 0    levothyroxine (SYNTHROID/LEVOTHROID) 175 MCG tablet Take 1 tablet (175 mcg) by mouth daily 90 tablet 1    lisdexamfetamine (VYVANSE) 40 MG capsule Take 40 mg by mouth every morning      Melatonin 10 MG CAPS Take 10 mg by mouth nightly as needed (insomnia)      multivitamin w/minerals (THERA-VIT-M) tablet Take 1 tablet by mouth daily      nicotine (NICORETTE) 2 MG gum CHEW 1-2 PIECES PER HOUR OR 10-12 PIECES PER DAY--TAPER AS TOLERATED      omeprazole (PRILOSEC) 40 MG DR capsule Take 1 capsule by mouth once daily 90 capsule 1    oxyBUTYnin ER (DITROPAN XL) 10 MG 24 hr tablet Take 1 tablet (10 mg) by mouth daily 90 tablet 3    potassium chloride ER (K-TAB) 20 MEQ CR tablet TAKE 1 TABLET BY MOUTH ONCE DAILY ALONG WITH A 10 MG TABLET **THIS SHOULD ONLY BE IF TAKING LASIX** 90 tablet 2    potassium chloride ER (K-TAB/KLOR-CON) 10 MEQ CR tablet TAKE 1 TABLET BY MOUTH ONCE DAILY WHEN  TAKING  LASIX  (FUROSEMIDE) 30 tablet 3    rosuvastatin (CRESTOR) 5 MG tablet Take 1 tablet by mouth once daily 90 tablet 3    sertraline (ZOLOFT) 50 MG tablet Take 75 mg by mouth daily      Vitamin D3 (CHOLECALCIFEROL) 125 MCG (5000 UT) tablet Take 1 tablet by mouth daily      VRAYLAR 3 MG CAPS capsule Take 3 mg by mouth daily             5/5/2024     5:22 PM   Weight Loss Medication History Reviewed With Patient   Which weight loss medications are you currently taking on a regular basis? Ozempic    Topamax (topiramate)    Vyvanse   Are  you having any side effects from the weight loss medication that we have prescribed you? No       Office Visit on 02/08/2024   Component Date Value Ref Range Status    Cholesterol 02/08/2024 164  <200 mg/dL Final    Triglycerides 02/08/2024 100  <150 mg/dL Final    Direct Measure HDL 02/08/2024 71  >=50 mg/dL Final    LDL Cholesterol Calculated 02/08/2024 73  <=100 mg/dL Final    Non HDL Cholesterol 02/08/2024 93  <130 mg/dL Final    Patient Fasting > 8hrs? 02/08/2024 Yes   Final    Estimated Average Glucose 02/08/2024 143  mg/dL Final    Hemoglobin A1C 02/08/2024 6.6 (H)  <5.7 % Final    Normal <5.7%   Prediabetes 5.7-6.4%    Diabetes 6.5% or higher     Note: Adopted from ADA consensus guidelines.    TSH 02/08/2024 0.35  0.30 - 4.20 uIU/mL Final    WBC Count 02/08/2024 14.3 (H)  4.0 - 11.0 10e3/uL Final    RBC Count 02/08/2024 5.21 (H)  3.80 - 5.20 10e6/uL Final    Hemoglobin 02/08/2024 15.4  11.7 - 15.7 g/dL Final    Hematocrit 02/08/2024 45.7  35.0 - 47.0 % Final    MCV 02/08/2024 88  78 - 100 fL Final    MCH 02/08/2024 29.6  26.5 - 33.0 pg Final    MCHC 02/08/2024 33.7  31.5 - 36.5 g/dL Final    RDW 02/08/2024 14.7  10.0 - 15.0 % Final    Platelet Count 02/08/2024 325  150 - 450 10e3/uL Final    % Neutrophils 02/08/2024 74  % Final    % Lymphocytes 02/08/2024 18  % Final    % Monocytes 02/08/2024 5  % Final    % Eosinophils 02/08/2024 2  % Final    % Basophils 02/08/2024 0  % Final    % Immature Granulocytes 02/08/2024 1  % Final    NRBCs per 100 WBC 02/08/2024 0  <1 /100 Final    Absolute Neutrophils 02/08/2024 10.7 (H)  1.6 - 8.3 10e3/uL Final    Absolute Lymphocytes 02/08/2024 2.5  0.8 - 5.3 10e3/uL Final    Absolute Monocytes 02/08/2024 0.8  0.0 - 1.3 10e3/uL Final    Absolute Eosinophils 02/08/2024 0.2  0.0 - 0.7 10e3/uL Final    Absolute Basophils 02/08/2024 0.1  0.0 - 0.2 10e3/uL Final    Absolute Immature Granulocytes 02/08/2024 0.1  <=0.4 10e3/uL Final    Absolute NRBCs 02/08/2024 0.0  10e3/uL Final     "Amphetamines Urine 02/08/2024 Screen Positive (A)  Screen Negative Final    Cutoff for a positive amphetamine is 500 ng/mL or greater.   This is an unconfirmed screening result to be used for medical purposes only.    Barbituates Urine 02/08/2024 Screen Negative  Screen Negative Final    Cutoff for a negative barbiturate is less than 200 ng/mL.    Benzodiazepine Urine 02/08/2024 Screen Negative  Screen Negative Final    Cutoff for a negative benzodiazepine is less than 100 ng/mL.    Cannabinoids Urine 02/08/2024 Screen Positive (A)  Screen Negative Final    Cutoff for a positive cannabinoid is 50 ng/mL or greater.   This is an unconfirmed screening result to be used for medical purposes only.    Cocaine Urine 02/08/2024 Screen Negative  Screen Negative Final    Cutoff for a negative cocaine is less than 300 ng/mL.    Fentanyl Qual Urine 02/08/2024 Screen Negative  Screen Negative Final    Cutoff for negative fentanyl is less than 5 ng/mL.    Opiates Urine 02/08/2024 Screen Negative  Screen Negative Final    Cutoff for a negative opiate is less than 300 ng/mL.    PCP Urine 02/08/2024 Screen Negative  Screen Negative Final    Cutoff for a negative PCP is less than 25 ng/mL.           PHYSICAL EXAM:  Objective    Ht 1.6 m (5' 2.99\")   Wt 122 kg (269 lb)   BMI 47.66 kg/m      Vitals - Patient Reported  Pain Score: Severe Pain (7)  Pain Loc: Knee        GENERAL: alert and no distress  EYES: Eyes grossly normal to inspection.  No discharge or erythema, or obvious scleral/conjunctival abnormalities.  RESP: No audible wheeze, cough, or visible cyanosis.    SKIN: Visible skin clear. No significant rash, abnormal pigmentation or lesions.  NEURO: Cranial nerves grossly intact.  Mentation and speech appropriate for age.  PSYCH: Appropriate affect, tone, and pace of words         Sincerely,    Ivone Moses PA-C      20 minutes spent by me on the date of the encounter doing chart review, history and exam, " documentation and further activities per the note  Virtual Visit Details    Type of service:  Video Visit   Video Start Time:  1035  Video End Time: 1048    Originating Location (pt. Location): Home    Distant Location (provider location):  Off-site  Platform used for Video Visit: [employer]

## 2024-05-08 NOTE — NURSING NOTE
Is the patient currently in the state of [place]? YES    Visit mode:VIDEO    If the visit is dropped, the patient can be reconnected by: VIDEO VISIT: Send to e-mail at: [email or web address]    Will anyone else be joining the visit? NO  (If patient encounters technical issues they should call [phone]    How would you like to obtain your AVS? MyChart    Are changes needed to the allergy or medication list? No    Are refills needed on medications prescribed by this physician? NO    Reason for visit: TANGELA OBANDO

## 2024-05-08 NOTE — PROGRESS NOTES
Assessment & Plan     Mild mixed bipolar I disorder (H)  Really doing well on her new medications.  Tracking well. Smiling, laughing     Type 2 diabetes mellitus without complication, without long-term current use of insulin (H)  She is doing ok. Not having any low sugars.  Her checks are all in range.  Her eye exam I good. And new glasses.  She has no issues with elelvated pressures.  She is also doing foot checks. Has burning in her toes.  Worse with standing on them.  The tips are very uncomfortable.     Acquired hypothyroidism  Due for labs. An we will recheck. Last check was very close to being over supressed. Aware of need to change.     Benign essential hypertension  Excellent readying today. Doing well. Weight up a little it.  Exercise is acive as much as she can be, usually after knee injections.     Review of external notes as documented elsewhere in note  Ordering of each unique test  Prescription drug management  10 minutes spent by me on the date of the encounter doing chart review, history and exam, documentation and further activities per the note        See Patient Instructions    No follow-ups on file.    Debra Leyva is a 48 year old, presenting for the following health issues:  Follow Up        5/8/2024     2:19 PM   Additional Questions   Roomed by mali aviles lpn   Accompanied by self         5/8/2024     2:19 PM   Patient Reported Additional Medications   Patient reports taking the following new medications none     History of Present Illness       Diabetes:   She presents for follow up of diabetes.  She is checking home blood glucose a few times a month.   She checks blood glucose before meals.  Blood glucose is never over 200 and never under 70. She is aware of hypoglycemia symptoms including shakiness, dizziness, weakness, blurred vision, confusion and other.    She has no concerns regarding her diabetes at this time.  She is having numbness in feet, burning in feet, excessive  thirst and weight gain.  The patient has not had a diabetic eye exam in the last 12 months.          Hypothyroidism:     Since last visit, patient describes the following symptoms::  Anxiety, Depression, Dry skin, Fatigue and Weight gain    Weight gain::  5 lbs.    She eats 2-3 servings of fruits and vegetables daily.She consumes 0 sweetened beverage(s) daily.She exercises with enough effort to increase her heart rate 9 or less minutes per day.  She exercises with enough effort to increase her heart rate 3 or less days per week. She is missing 3 dose(s) of medications per week.  She is not taking prescribed medications regularly due to remembering to take.               Review of Systems  Constitutional, neuro, ENT, endocrine, pulmonary, cardiac, gastrointestinal, genitourinary, musculoskeletal, integument and psychiatric systems are negative, except as otherwise noted.      Objective    /80 (BP Location: Left arm, Patient Position: Sitting, Cuff Size: Adult Large)   Pulse 91   Temp 98.7  F (37.1  C) (Tympanic)   Resp 18   Wt 122.9 kg (270 lb 14.4 oz)   LMP 05/05/2024 (Exact Date)   SpO2 98%   BMI 48.00 kg/m    Body mass index is 48 kg/m .  Physical Exam   GENERAL: alert and no distress  EYES: Eyes grossly normal to inspection, PERRL and conjunctivae and sclerae normal  HENT: ear canals and TM's normal, nose and mouth without ulcers or lesions  NECK: no adenopathy, no asymmetry, masses, or scars  RESP: lungs clear to auscultation - no rales, rhonchi or wheezes  CV: regular rate and rhythm, normal S1 S2, no S3 or S4, no murmur, click or rub, no peripheral edema  ABDOMEN: soft, nontender, no hepatosplenomegaly, no masses and bowel sounds normal  MS: no gross musculoskeletal defects noted, no edema  SKIN: no suspicious lesions or rashes  NEURO: Normal strength and tone, mentation intact and speech normal  BACK: no CVA tenderness, no paralumbar tenderness  PSYCH: mentation appears normal, affect  normal/bright    Office Visit on 02/08/2024   Component Date Value Ref Range Status    Cholesterol 02/08/2024 164  <200 mg/dL Final    Triglycerides 02/08/2024 100  <150 mg/dL Final    Direct Measure HDL 02/08/2024 71  >=50 mg/dL Final    LDL Cholesterol Calculated 02/08/2024 73  <=100 mg/dL Final    Non HDL Cholesterol 02/08/2024 93  <130 mg/dL Final    Patient Fasting > 8hrs? 02/08/2024 Yes   Final    Estimated Average Glucose 02/08/2024 143  mg/dL Final    Hemoglobin A1C 02/08/2024 6.6 (H)  <5.7 % Final    Normal <5.7%   Prediabetes 5.7-6.4%    Diabetes 6.5% or higher     Note: Adopted from ADA consensus guidelines.    TSH 02/08/2024 0.35  0.30 - 4.20 uIU/mL Final    WBC Count 02/08/2024 14.3 (H)  4.0 - 11.0 10e3/uL Final    RBC Count 02/08/2024 5.21 (H)  3.80 - 5.20 10e6/uL Final    Hemoglobin 02/08/2024 15.4  11.7 - 15.7 g/dL Final    Hematocrit 02/08/2024 45.7  35.0 - 47.0 % Final    MCV 02/08/2024 88  78 - 100 fL Final    MCH 02/08/2024 29.6  26.5 - 33.0 pg Final    MCHC 02/08/2024 33.7  31.5 - 36.5 g/dL Final    RDW 02/08/2024 14.7  10.0 - 15.0 % Final    Platelet Count 02/08/2024 325  150 - 450 10e3/uL Final    % Neutrophils 02/08/2024 74  % Final    % Lymphocytes 02/08/2024 18  % Final    % Monocytes 02/08/2024 5  % Final    % Eosinophils 02/08/2024 2  % Final    % Basophils 02/08/2024 0  % Final    % Immature Granulocytes 02/08/2024 1  % Final    NRBCs per 100 WBC 02/08/2024 0  <1 /100 Final    Absolute Neutrophils 02/08/2024 10.7 (H)  1.6 - 8.3 10e3/uL Final    Absolute Lymphocytes 02/08/2024 2.5  0.8 - 5.3 10e3/uL Final    Absolute Monocytes 02/08/2024 0.8  0.0 - 1.3 10e3/uL Final    Absolute Eosinophils 02/08/2024 0.2  0.0 - 0.7 10e3/uL Final    Absolute Basophils 02/08/2024 0.1  0.0 - 0.2 10e3/uL Final    Absolute Immature Granulocytes 02/08/2024 0.1  <=0.4 10e3/uL Final    Absolute NRBCs 02/08/2024 0.0  10e3/uL Final    Amphetamines Urine 02/08/2024 Screen Positive (A)  Screen Negative Final     Cutoff for a positive amphetamine is 500 ng/mL or greater.   This is an unconfirmed screening result to be used for medical purposes only.    Barbituates Urine 02/08/2024 Screen Negative  Screen Negative Final    Cutoff for a negative barbiturate is less than 200 ng/mL.    Benzodiazepine Urine 02/08/2024 Screen Negative  Screen Negative Final    Cutoff for a negative benzodiazepine is less than 100 ng/mL.    Cannabinoids Urine 02/08/2024 Screen Positive (A)  Screen Negative Final    Cutoff for a positive cannabinoid is 50 ng/mL or greater.   This is an unconfirmed screening result to be used for medical purposes only.    Cocaine Urine 02/08/2024 Screen Negative  Screen Negative Final    Cutoff for a negative cocaine is less than 300 ng/mL.    Fentanyl Qual Urine 02/08/2024 Screen Negative  Screen Negative Final    Cutoff for negative fentanyl is less than 5 ng/mL.    Opiates Urine 02/08/2024 Screen Negative  Screen Negative Final    Cutoff for a negative opiate is less than 300 ng/mL.    PCP Urine 02/08/2024 Screen Negative  Screen Negative Final    Cutoff for a negative PCP is less than 25 ng/mL.     No results found for any visits on 05/08/24.        Signed Electronically by: SEVEN Bryant

## 2024-05-10 ENCOUNTER — TELEPHONE (OUTPATIENT)
Dept: ENDOCRINOLOGY | Facility: CLINIC | Age: 49
End: 2024-05-10
Payer: COMMERCIAL

## 2024-05-10 NOTE — TELEPHONE ENCOUNTER
Sent [employer] (1st Attempt) for the patient to call back and schedule the following:    Appointment type: RET [facility]   Provider: Ivone Moses PA-C  Return date: 6 mos   Specialty phone number: [phone]  Additional appointment(s) needed: no  Additonal Notes: no

## 2024-05-13 DIAGNOSIS — R60.0 BILATERAL LEG EDEMA: ICD-10-CM

## 2024-05-13 RX ORDER — FUROSEMIDE 20 MG
TABLET ORAL
Qty: 60 TABLET | Refills: 11 | Status: SHIPPED | OUTPATIENT
Start: 2024-05-13

## 2024-05-15 ENCOUNTER — TELEPHONE (OUTPATIENT)
Dept: ENDOCRINOLOGY | Facility: CLINIC | Age: 49
End: 2024-05-15

## 2024-05-15 RX ORDER — TOPIRAMATE 25 MG/1
75 TABLET, FILM COATED ORAL AT BEDTIME
Qty: 270 TABLET | Refills: 0 | OUTPATIENT
Start: 2024-05-15

## 2024-05-15 NOTE — TELEPHONE ENCOUNTER
Patient confirmed scheduled appointment:  Date: 5/29/24  Time: 11:00 am  Visit type: Return Med Wt Mgmt Nutrition (video)  Provider: Bonny Banda  [place] [facility] [place]  Testing/imaging: n/a  Additional notes: rescheduled 5/15 appt due to the provider being out

## 2024-05-21 NOTE — MR AVS SNAPSHOT
After Visit Summary   8/30/2017    Autumn Wallko    MRN: [id number]           Patient Information     Date Of Birth          1975        Visit Information        Provider Department      8/30/2017 9:15 AM Apryl Hathaway PA [facility]        Today's Diagnoses     Acquired hypothyroidism    -  1    Moderate episode of recurrent major depressive disorder (H)        Bilateral edema of lower extremity        Lipid screening        Secondary amenorrhea          Care Instructions      Thank you for choosing [facility].   I have office hours 8:00 am to 4:30 pm on Monday's, Wednesday's, Thursday's and Friday's. My nurse and I are out of the office every Tuesday.    Following your visit, when your labs and diagnostic testing have returned, I will review then and you will be contacted by my nurse.  If you are on My Chart, you can also view results there.    For refills, notify your pharmacy regarding what you need and the pharmacy will generate a refill request. Do not call my nurse as she is unable to process refill request. Please plan ahead and allow 3-5 days for refill requests.    You will generally receive a reminder call the day prior to your appointment.  If you cannot attend your appointment, please cancel your appointment with as much notice as possible.  If there is a pattern of failure to present for your appointments, I cannot provide consistent, meaningful, ongoing care for you. It is very important to me that you come in for your care, so we can best assist you with your health care needs.    IMPORTANT:  Please note that it is my standard of practice to NOT participate in prescribing ongoing requested Narcotic Analgesic therapy, and/or participate in the prescribing of other controlled substances.  My nurse and I am happy to assist you with the process of referral for alternative pain management as needed, and other treatment modalities including but not  [place] AMBULATORY ENCOUNTER     Family Medicine    PCP:  Rosa Ricci MD     Chief Complaint   Patient presents with    Physical    Office Visit    Finger     Numbness left pinky        SUBJECTIVE:  Axel Harris is a 34 year old male presenting for evaluation of left hand numbness and annual exam.     Left pinky numbness since December. Ring finger was numb for a few weeks but then resolved. May have hit his elbow on a desk prior to onset. 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     Sleep: Working from 11am to 8pm. Sleeping from 10:30-4:30. Wakes up on his own and doesn't feel tired.     Recent PHQ 2/9 Score    PHQ 2:  PHQ 2 Score Adult PHQ 2 Score Adult PHQ 2 Interpretation Little interest or pleasure in activity?   5/21/2024   8:34 AM 0 No further screening needed 0     DEPRESSION ASSESSMENT/PLAN:  Depression screening is negative no further plan needed.      Would like routine STI screening.     Review of Systems:  Denies HA, dizziness, changes in hearing or vision, sore throat, chest pain, palpitations, SOB, cough, wheezing, abdominal pain, N/V/D or constipation, dysuria, LE edema     Endorses numbness tingling in extremities.     There is no problem list on file for this patient.      No outpatient medications have been marked as taking for the 5/21/24 encounter (Office Visit) with Rosa Ricci MD.       ALLERGIES:  No Known Allergies    Social History:  Tobacco:  reports that he has never smoked. He has never been exposed to tobacco smoke. He has never used smokeless tobacco.      OBJECTIVE:    PHYSICAL EXAM:    Vital Signs:  Visit Vitals  /80 (BP Location: LUE - Left upper extremity, Patient Position: Sitting, Cuff Size: Large Adult)   Pulse (!) 60   Resp 18   Ht 5' 10\" (1.778 m)   Wt 79.7 kg (175 lb 12.8 oz)   SpO2 99%   BMI 25.22 kg/m²     General:  Alert, cooperative, conversive in no acute distress.  Head:  Normocephalic atraumatic.   Neck:  Trachea is  limited to:  Physical Therapy, Physical Medicine and Rehab, Counseling, Chiropractic Care, Orthopedic Care, and non-narcotic medication management.     In the event that you need to be seen for emergent concerns and I am out of office,  please see one of my colleagues for acute concerns.  You may also present to  or ER.  I appreciate the opportunity to serve you and look forward to supporting your healthcare needs in the future. Please contact me with any questions or concerns that you may have.    Sincerely,      Apryl Hathaway RN, PA-C               Follow-ups after your visit        Your next 10 appointments already scheduled     Aug 31, 2017  2:30 PM CDT   (Arrive by 2:15 PM)   Return Nutrition with Blanca Mota RD   [facility] ([facility] )    [address] [address] [phone]            Sep 11, 2017  3:00 PM CDT   (Arrive by 2:45 PM)   Return Visit with Gurjit Hung DO    ORTHOPEDICS ([facility] )    [address] [facility] [address] [phone]              Who to contact     If you have questions or need follow up information about today's clinic visit or your schedule please contact [facility] directly at [phone].  Normal or non-critical lab and imaging results will be communicated to you by [employer], letter or phone within 4 business days after the clinic has received the results. If you do not hear from us within 7 days, please contact the clinic through [employer] or phone. If you have a critical or abnormal lab result, we will notify you by phone as soon as possible.  Submit refill requests through [employer] or call your pharmacy and they will forward the refill request to us. Please allow 3 business days for your refill to be completed.          Additional Information About Your Visit        [employer] Information     [employer] lets you send messages to your doctor, view your test results, renew your  midline. No adenopathy.  Normal thyroid without mass or tenderness.  Eyes:  Normal conjunctivae and sclerae.  Pupils equal, round and reactive to light.  Extraocular movements intact.    ENT:   Mucous membranes are moist.  Normal tympanic membranes and external auditory canals bilaterally.    Cardiovascular:   Regular rate and rhythm without murmur.  Respiratory:   Normal respiratory effort.  Clear to auscultation.  No wheezes, rales or rhonchi.  Gastrointestinal:  Soft and nontender.    Musculoskeletal:  No deformity or edema.  Tenderness to palpation of L medial elbow.   Skin:  Warm and dry without rash.    Back:   Normal alignment.  No costovertebral angle tenderness or midline bony tenderness.  Neurologic:   Oriented x3.  Cranial nerves II-XII are intact.  No nystagmus.  No focal deficits or lateralizing signs.      LAB RESULTS:  All pertinent laboratory results were reviewed.    Assessment and Plan:   Axel Harris is a pleasant 34 year old male we discussed the following:    Diagnoses and all orders for this visit:  Annual physical exam  Screening for endocrine, nutritional, metabolic and immunity disorder  -     Comprehensive Metabolic Panel; Future  -     Lipid Panel With Reflex; Future  Routine screening for STI (sexually transmitted infection)  -     Chlamydia/Gonorrhea by Nucleic Acid Amplification; Future  -     Trichomonas vaginalis by Nucleic Acid Amplification; Future  -     HIV 1/HIV 2 Antigen/Antibody Screen; Future  -     RPR (Rapid Plasma Reagin) Traditional Syphilis Screen Algorithm; Future  Cubital tunnel syndrome on left: discussed NSAIDs, stretching, handout of exercises given. Ace bandage to elbow at night to prevent flexion. Don't rest elbows on desk. Let me know if not improving in 2-3 weeks.     Health Maintenance:   Health Maintenance Due   Topic Date Due    Hepatitis B Vaccine (1 of 3 - 19+ 3-dose series) Never done    COVID-19 Vaccine (3 - 2023-24 season) 09/01/2023       FOLLOW  "prescriptions, schedule appointments and more. To sign up, go to [email or web address] . Click on \"Log in\" on the left side of the screen, which will take you to the Welcome page. Then click on \"Sign up Now\" on the right side of the page.     You will be asked to enter the access code listed below, as well as some personal information. Please follow the directions to create your username and password.     Your access code is: [email or web address]  Expires: 10/1/2017  1:41 PM     Your access code will  in 90 days. If you need help or a new code, please call your [place] clinic or [phone].        Care EveryWhere ID     This is your Care EveryWhere ID. This could be used by other organizations to access your [place] medical records  [id number]        Your Vitals Were     Pulse Temperature Pulse Oximetry Breastfeeding? BMI (Body Mass Index)       71 97  F (36.1  C) (Tympanic) 98% No 46.94 kg/m2        Blood Pressure from Last 3 Encounters:   17 116/68   17 116/70   17 112/76    Weight from Last 3 Encounters:   17 265 lb (120.2 kg)   17 256 lb 6.4 oz (116.3 kg)   17 257 lb (116.6 kg)              We Performed the Following     Comprehensive metabolic panel (BMP + Alb, Alk Phos, ALT, AST, Total. Bili, TP)     Follicle stimulating hormone     Hemoglobin A1c     Lipid Profile (Chol, Trig, HDL, LDL calc)     T3 Free     T4, free     TSH        Primary Care Provider Office Phone # Fax #    SEVEN Pandey [phone]       [facility] [address] [facility] [address]  [facility] [address]        Equal Access to Services     BELL BUITRAGO : Viral Pryor, ritu levi, rohan kaalmamedhat collins, consuelo valverde. So [facility] [phone].    ATENCIÓN: Si habla español, tiene a muñoz disposición servicios gratuitos de asistencia lingüística. Llame al [phone].    We comply with applicable federal civil rights laws and " UP:  Return in about 1 year (around 5/21/2025) for annual.      Future Appointments   Date Time Provider Department Center   5/22/2025  8:00 AM Rosa Ricci MD [facility]       Rosa Ricci MD Family Medicine      PATIENT INSTRUCTIONS:  See AVS.    The patient indicated understanding of the diagnosis and agreed with the plan of care.   [place] laws. We do not discriminate on the basis of race, color, national origin, age, disability sex, sexual orientation or gender identity.            Thank you!     Thank you for choosing [facility]  for your care. Our goal is always to provide you with excellent care. Hearing back from our patients is one way we can continue to improve our services. Please take a few minutes to complete the written survey that you may receive in the mail after your visit with us. Thank you!             Your Updated Medication List - Protect others around you: Learn how to safely use, store and throw away your medicines at www.disposemymeds.org.          This list is accurate as of: 8/30/17  9:55 AM.  Always use your most recent med list.                   Brand Name Dispense Instructions for use Diagnosis    buprenorphine HCl-naloxone HCl 8-2 MG per film    SUBOXONE     Place 1 Film under the tongue daily        hydrochlorothiazide 12.5 MG capsule    MICROZIDE    90 capsule    Take 1 capsule (12.5 mg) by mouth daily    Benign essential hypertension       HYDROXYZINE HCL PO      Take 25 mg by mouth 4 times daily as needed for itching        LAMICTAL 100 MG tablet   Generic drug:  lamoTRIgine      Take 100 mg by mouth 2 times daily        levothyroxine 200 MCG tablet    SYNTHROID/LEVOTHROID     Take 200 mcg by mouth daily        nicotine      Place onto the skin every 8 hours        TYLENOL PO      Take 500 mg by mouth every 4 hours as needed for mild pain or fever        WELLBUTRIN  MG 12 hr tablet   Generic drug:  buPROPion      Take 150 mg by mouth daily

## 2024-05-28 NOTE — PROGRESS NOTES
"Video-Visit Details    Type of service:  Video Visit    Video Start Time: 11:08 am   Video End Time: 11:24 am    Originating Location (pt. Location): Home    Distant Location (provider location):  Offsite (providers home)     Platform used for Video Visit: Broadband Voice      Return Weight Management Nutrition Consultation    Autumn Holbrook is a 48 year old female presents today for a return weight management nutrition consultation.  Patient referred by SEVEN Nickerson on March 1, 2023.    Patient with Co-morbidities of obesity including:  Type 2 Diabetes, GERD, Knee Pain, Back Pain    Anthropometrics:  Weight 2/28/23: 287 lbs with BMI 52.36    Estimated body mass index is 47.65 kg/m  as calculated from the following:    Height as of this encounter: 1.6 m (5' 3\").    Weight as of this encounter: 122 kg (269 lb).     Current weight: 269 lbs, pt report      Medications for Weight Loss:  Topiramate   Ozempic    Labs 2/8/24  Lipids WNL - hx of elevated Chol  A1C 6.6   Elevated WBC  TSH WNL (hx of being elevated)     NUTRITION HISTORY  Food allergies: none  Food intolerances: avocados  Supplements: Vitamin D3, MVI, potassium (prescribed by PCP)   Previous methods of diet modification for weight loss: Watching Portions or Calories, Exercise, Weight Watchers, Medications, Fasting    Pt reports she has been heavy for most of her life. Thyroid issues/Hasimotos make it difficult to lose weight. Has been trying to decrease carbs d/t T2DM. Often craves sugar at night. Has [employer] Meals delivered- diabetic friendly and pre-portioned. Physical activity is limited d/t knee pain.     Please see previous RD notes for more detail.    Jan 2024:  Busy couple months with holidays. Feels self care fell off.    Has noticed more snacking recently. Craving  junk food  or fast meals - something quick such as pizza versus making a full meal.     Doing well with portion control.   Continues to forget to eat during day. Not feeling hungry during the " "day. Is keeping granola bars on hand.    Hydration: going well currently - was lower a couple weeks ago and noticed more swelling.     PA: wants to find an exercise regimen that works well for her    March 2024:    Feelings snacking and \"junk food\" intake is improving.     Has been on the go a lot - forgetting to eat or not eating in morning. Might munch on granola bar or two during day. First meal around 6-7 pm, then finds self very hungry. Biggest barrier is her meds - hard to take them early enough. Also finds she gets nauseous with food too early - full feeling.     Continues with MOM meals. PCA also helping make/freeze meals. Feels biggest issue in regard to eating is eating too late, letty in summer time due to losing track of time.     PA: looking into pool exercises, exploring resources I sent, walking more    May 2024:    Continues to try to take meds earlier to help with eating in morning. Hx use to take at 11 am everyday. Now aiming for 9 am. Not noticing any hunger until 1 pm typically.  Then will eat 1-2 meals.     Typical day  1-2 pm - MOMs meal OR sandwich OR granola bar  Dinner - protein/veg and a little carb. If out to eat often gets a little fries or broccoli instead of fries  Snacking - rare, might have a few bites of lunch meat or salad    Less cravings for sweets/chips overall. Occ dark chocolate cravings but easily satisfied with just a little square of chocolate.     PA: wants to increase, limited by knee pain. Getting shots this Friday to help. Hard to move for more than 5 minutes currently.    - Has a walking pad, wants to get set up this month.    Previous goals:  1.  Consider trying a protein shake in the morning (starting with half) to see if that helps with nausea. - Did not get any protein shakes yet. Hard due to not feeling hungry in morning time.   2. Aim to take meds around 9:30 or earlier. - In progress  3. Consider meeting with Pharmacist through [facility] to review medications (Adelina " will send message to your primary) - Met, saw Pharm D 3/29/24. Helpful. Sleeping better now and not needing naps as often during day.   4. Aim to eat dinner earlier as able. - In progress, aiming for 6-7 pm at latest. Friends/family have been cooking meals for her which is helpful. Has been very busy so if she cooks it is often later.  5. Continue keeping ready made chicken (example: rotisserie chicken), ready made grain packs, canned beans, and frozen vegetables on hand for faster meal options - Met, has been keeping these things on hand per pt. Makes dinner time prep quicker  6. Aim to create an exercise regimen as able (will check in on next appointment) - In progress    Nutrition Prescription  Recommended energy/nutrient modification.    Nutrition Diagnosis  Obesity r/t long history of positive energy balance aeb BMI >30.    Nutrition Intervention  Materials/education provided on hypocaloric diet for weight loss. Discussed 1700 calorie/day diet, Volumetric eating to help satiety level on fewer calories; portion control and healthy food choices (Plate Method and Volumetrics handouts), 100 calorie snack choices, meal and snack planning and websites, sample meal plans     Patient demonstrates understanding.    Expected Engagement: good    Nutrition Goals  Consider getting some protein shakes to help supplement intake  Aim to eat dinner by 7 pm  Continue keeping ready made chicken (example: rotisserie chicken), ready made grain packs, canned beans, and frozen vegetables on hand for faster meal options   Aim to get walking pad set up  Consider asking Orthopedic team about exercise recommendations for knee pain       Resistance Training with Free Weights: 3 Exercises    Seated Exercises for Arms and Legs: 11 Exercises     Breakfast time ideas:   - Toast with peanut butter   - Hard boiled egg   - Scrambled egg   - Oatmeal   - Cottage cheese   - Yogurt    The Plate Method  [email or web address]    Protein  Sources   [email or web address]     Carbohydrates  [email or web address]     Mindful Eating  [email or web address]     Summary of Volumetrics Eating Plan  [email or web address]     Follow-Up:  Thursday, August 1st at 10:30 am with Roz Joseph RD    Time spent with patient: 16 minutes.  STEVO Pearl, RD, LD

## 2024-05-29 ENCOUNTER — VIRTUAL VISIT (OUTPATIENT)
Dept: ENDOCRINOLOGY | Facility: CLINIC | Age: 49
End: 2024-05-29
Payer: COMMERCIAL

## 2024-05-29 VITALS — HEIGHT: 63 IN | WEIGHT: 269 LBS | BODY MASS INDEX: 47.66 KG/M2

## 2024-05-29 DIAGNOSIS — Z71.3 NUTRITIONAL COUNSELING: Primary | ICD-10-CM

## 2024-05-29 DIAGNOSIS — E11.9 TYPE 2 DIABETES MELLITUS WITHOUT COMPLICATION, WITHOUT LONG-TERM CURRENT USE OF INSULIN (H): ICD-10-CM

## 2024-05-29 DIAGNOSIS — E66.01 CLASS 3 SEVERE OBESITY WITH SERIOUS COMORBIDITY AND BODY MASS INDEX (BMI) OF 45.0 TO 49.9 IN ADULT, UNSPECIFIED OBESITY TYPE (H): ICD-10-CM

## 2024-05-29 DIAGNOSIS — E66.813 CLASS 3 SEVERE OBESITY WITH SERIOUS COMORBIDITY AND BODY MASS INDEX (BMI) OF 45.0 TO 49.9 IN ADULT, UNSPECIFIED OBESITY TYPE (H): ICD-10-CM

## 2024-05-29 PROCEDURE — 97803 MED NUTRITION INDIV SUBSEQ: CPT | Mod: 95 | Performed by: DIETITIAN, REGISTERED

## 2024-05-29 PROCEDURE — 99207 PR NO CHARGE LOS: CPT | Mod: 95 | Performed by: DIETITIAN, REGISTERED

## 2024-05-29 ASSESSMENT — PAIN SCALES - GENERAL: PAINLEVEL: SEVERE PAIN (7)

## 2024-05-29 NOTE — NURSING NOTE
Is the patient currently in the state of [place]? YES    Visit mode:VIDEO    If the visit is dropped, the patient can be reconnected by: VIDEO VISIT: Text to cell phone:   Telephone Information:   Mobile [phone]       Will anyone else be joining the visit? NO  (If patient encounters technical issues they should call [phone]    How would you like to obtain your AVS? MyChart    Are changes needed to the allergy or medication list? N/A    Are refills needed on medications prescribed by this physician? NO     Reason for visit: RECHECK    Wt other than 24 hrs:    Pain more than one location:  7 knees  Sunitha PIPERF

## 2024-05-29 NOTE — PATIENT INSTRUCTIONS
Nutrition Goals  Consider getting some protein shakes to help supplement intake  Aim to eat dinner by 7 pm  Continue keeping ready made chicken (example: rotisserie chicken), ready made grain packs, canned beans, and frozen vegetables on hand for faster meal options   Aim to get walking pad set up  Consider asking Orthopedic team about exercise recommendations for knee pain       Resistance Training with Free Weights: 3 Exercises    Seated Exercises for Arms and Legs: 11 Exercises     Breakfast time ideas:   - Toast with peanut butter   - Hard boiled egg   - Scrambled egg   - Oatmeal   - Cottage cheese   - Yogurt    The Plate Method  [email or web address]    Protein Sources   [email or web address]     Carbohydrates  [email or web address]     Mindful Eating  [email or web address]     Summary of Volumetrics Eating Plan  [email or web address]     Follow-Up:  Thursday, August 1st at 10:30 am with UYEN Wynn (Duncan), STEVO, RD, LD  Clinic #: [phone]

## 2024-05-29 NOTE — LETTER
"5/29/2024       RE: Autumn Holbrook  [address] [facility] [address]     Dear Colleague,    Thank you for referring your patient, Autumn Holbrook, to the [facility] [place] [facility]. Please see a copy of my visit note below.    Video-Visit Details    Type of service:  Video Visit    Video Start Time: 11:08 am   Video End Time: 11:24 am    Originating Location (pt. Location): Home    Distant Location (provider location):  Offsite (providers home)     Platform used for Video Visit: PayOrPass      Return Weight Management Nutrition Consultation    Autumn Holbrook is a 48 year old female presents today for a return weight management nutrition consultation.  Patient referred by SEVEN Nickerson on March 1, 2023.    Patient with Co-morbidities of obesity including:  Type 2 Diabetes, GERD, Knee Pain, Back Pain    Anthropometrics:  Weight 2/28/23: 287 lbs with BMI 52.36    Estimated body mass index is 47.65 kg/m  as calculated from the following:    Height as of this encounter: 1.6 m (5' 3\").    Weight as of this encounter: 122 kg (269 lb).     Current weight: 269 lbs, pt report      Medications for Weight Loss:  Topiramate   Ozempic    Labs 2/8/24  Lipids WNL - hx of elevated Chol  A1C 6.6   Elevated WBC  TSH WNL (hx of being elevated)     NUTRITION HISTORY  Food allergies: none  Food intolerances: avocados  Supplements: Vitamin D3, MVI, potassium (prescribed by PCP)   Previous methods of diet modification for weight loss: Watching Portions or Calories, Exercise, Weight Watchers, Medications, Fasting    Pt reports she has been heavy for most of her life. Thyroid issues/Hasimotos make it difficult to lose weight. Has been trying to decrease carbs d/t T2DM. Often craves sugar at night. Has [employer]oms Meals delivered- diabetic friendly and pre-portioned. Physical activity is limited d/t knee pain.     Please see previous RD notes for more " "detail.    Jan 2024:  Busy couple months with holidays. Feels self care fell off.    Has noticed more snacking recently. Craving  junk food  or fast meals - something quick such as pizza versus making a full meal.     Doing well with portion control.   Continues to forget to eat during day. Not feeling hungry during the day. Is keeping granola bars on hand.    Hydration: going well currently - was lower a couple weeks ago and noticed more swelling.     PA: wants to find an exercise regimen that works well for her    March 2024:    Feelings snacking and \"junk food\" intake is improving.     Has been on the go a lot - forgetting to eat or not eating in morning. Might munch on granola bar or two during day. First meal around 6-7 pm, then finds self very hungry. Biggest barrier is her meds - hard to take them early enough. Also finds she gets nauseous with food too early - full feeling.     Continues with MOM meals. PCA also helping make/freeze meals. Feels biggest issue in regard to eating is eating too late, letty in summer time due to losing track of time.     PA: looking into pool exercises, exploring resources I sent, walking more    May 2024:    Continues to try to take meds earlier to help with eating in morning. Hx use to take at 11 am everyday. Now aiming for 9 am. Not noticing any hunger until 1 pm typically.  Then will eat 1-2 meals.     Typical day  1-2 pm - MOMs meal OR sandwich OR granola bar  Dinner - protein/veg and a little carb. If out to eat often gets a little fries or broccoli instead of fries  Snacking - rare, might have a few bites of lunch meat or salad    Less cravings for sweets/chips overall. Occ dark chocolate cravings but easily satisfied with just a little square of chocolate.     PA: wants to increase, limited by knee pain. Getting shots this Friday to help. Hard to move for more than 5 minutes currently.    - Has a walking pad, wants to get set up this month.    Previous goals:  1.  Consider " trying a protein shake in the morning (starting with half) to see if that helps with nausea. - Did not get any protein shakes yet. Hard due to not feeling hungry in morning time.   2. Aim to take meds around 9:30 or earlier. - In progress  3. Consider meeting with Pharmacist through primary to review medications (Adelina will send message to your primary) - Met, saw Pharm D 3/29/24. Helpful. Sleeping better now and not needing naps as often during day.   4. Aim to eat dinner earlier as able. - In progress, aiming for 6-7 pm at latest. Friends/family have been cooking meals for her which is helpful. Has been very busy so if she cooks it is often later.  5. Continue keeping ready made chicken (example: rotisserie chicken), ready made grain packs, canned beans, and frozen vegetables on hand for faster meal options - Met, has been keeping these things on hand per pt. Makes dinner time prep quicker  6. Aim to create an exercise regimen as able (will check in on next appointment) - In progress    Nutrition Prescription  Recommended energy/nutrient modification.    Nutrition Diagnosis  Obesity r/t long history of positive energy balance aeb BMI >30.    Nutrition Intervention  Materials/education provided on hypocaloric diet for weight loss. Discussed 1700 calorie/day diet, Volumetric eating to help satiety level on fewer calories; portion control and healthy food choices (Plate Method and Volumetrics handouts), 100 calorie snack choices, meal and snack planning and websites, sample meal plans     Patient demonstrates understanding.    Expected Engagement: good    Nutrition Goals  Consider getting some protein shakes to help supplement intake  Aim to eat dinner by 7 pm  Continue keeping ready made chicken (example: rotisserie chicken), ready made grain packs, canned beans, and frozen vegetables on hand for faster meal options   Aim to get walking pad set up  Consider asking Orthopedic team about exercise recommendations for  knee pain       Resistance Training with Free Weights: 3 Exercises    Seated Exercises for Arms and Legs: 11 Exercises     Breakfast time ideas:   - Toast with peanut butter   - Hard boiled egg   - Scrambled egg   - Oatmeal   - Cottage cheese   - Yogurt    The Plate Method  [email or web address]    Protein Sources   [email or web address]     Carbohydrates  [email or web address]     Mindful Eating  [email or web address]     Summary of Volumetrics Eating Plan  [email or web address]     Follow-Up:  Thursday, August 1st at 10:30 am with Roz Joseph RD    Time spent with patient: 16 minutes.  STEVO Pearl, RD, LD

## 2024-05-31 DIAGNOSIS — G89.4 CHRONIC PAIN SYNDROME: ICD-10-CM

## 2024-05-31 NOTE — TELEPHONE ENCOUNTER
baclofen (LIORESAL) 10 MG tablet 90 tablet 0 4/15/2024     Last Office Visit: 05/08/2024  Future Office visit:       Routing refill request to provider for review/approval because:

## 2024-06-02 RX ORDER — BACLOFEN 10 MG/1
TABLET ORAL
Qty: 90 TABLET | Refills: 0 | Status: SHIPPED | OUTPATIENT
Start: 2024-06-02 | End: 2024-06-21

## 2024-06-21 DIAGNOSIS — G89.4 CHRONIC PAIN SYNDROME: ICD-10-CM

## 2024-06-21 RX ORDER — BACLOFEN 10 MG/1
TABLET ORAL
Qty: 90 TABLET | Refills: 0 | Status: SHIPPED | OUTPATIENT
Start: 2024-06-21 | End: 2024-08-05

## 2024-06-21 NOTE — TELEPHONE ENCOUNTER
Baclofen      Last Written Prescription Date:  6/2/24  Last Fill Quantity: 90,   # refills: 0  Last Office Visit: 5/8/24  Future Office visit:       Routing refill request to provider for review/approval because:

## 2024-06-27 DIAGNOSIS — I10 BENIGN ESSENTIAL HYPERTENSION: ICD-10-CM

## 2024-06-28 RX ORDER — HYDROCHLOROTHIAZIDE 25 MG/1
TABLET ORAL
Qty: 90 TABLET | Refills: 0 | Status: SHIPPED | OUTPATIENT
Start: 2024-06-28 | End: 2024-09-23

## 2024-06-28 NOTE — TELEPHONE ENCOUNTER
Hydrochlorothiazide (Hydrodiuril) 25 MG tablet    Last Written Prescription Date:  04/12/2024  Last Fill Quantity: 90,   # refills: 0  Last Office Visit: 05/08/2024

## 2024-07-31 NOTE — PROGRESS NOTES
"Video-Visit Details    Type of service:  Video Visit    Video Start Time: 10:39 am  Video End Time: 10:57 am    Originating Location (pt. Location): Home    Distant Location (provider location):  Offsite (providers home)     Platform used for Video Visit: [employer]      Return Weight Management Nutrition Consultation    Autumn Holbrook is a 48 year old female presents today for a return weight management nutrition consultation.  Patient referred by SEVEN Nickerson on March 1, 2023.    Patient with Co-morbidities of obesity including:  Type 2 Diabetes, GERD, Knee Pain, Back Pain    Anthropometrics:  Weight 2/28/23: 287 lbs with BMI 52.36    Estimated body mass index is 46.42 kg/m  as calculated from the following:    Height as of this encounter: 1.6 m (5' 2.99\").    Weight as of this encounter: 118.8 kg (262 lb).     Current weight: 262 lbs, pt report (down 7 lbs since last RD visit)      Medications for Weight Loss:  Topiramate - 75 mg daily  Ozempic - 2 mg dose    Labs 2/8/24  Lipids WNL - hx of elevated Chol  A1C 6.6   Elevated WBC  TSH WNL (hx of being elevated)     Labs 5/8/24  A1C 6.5  Elevated WBC, but improved from 2/8/24  TSH WNL (hx of being elevated)     NUTRITION HISTORY  Food allergies: none  Food intolerances: avocados  Supplements: Vitamin D3, MVI, potassium (prescribed by PCP)   Previous methods of diet modification for weight loss: Watching Portions or Calories, Exercise, Weight Watchers, Medications, Fasting    Pt reports she has been heavy for most of her life. Thyroid issues/Hasimotos make it difficult to lose weight. Has been trying to decrease carbs d/t T2DM. Often craves sugar at night. Has Moms Meals delivered- diabetic friendly and pre-portioned. Physical activity is limited d/t knee pain.     Please see previous RD notes for more detail.    Jan 2024:  Busy couple months with holidays. Feels self care fell off.    Has noticed more snacking recently. Craving  junk food  or fast meals - " "something quick such as pizza versus making a full meal.     Doing well with portion control.   Continues to forget to eat during day. Not feeling hungry during the day. Is keeping granola bars on hand.    Hydration: going well currently - was lower a couple weeks ago and noticed more swelling.     PA: wants to find an exercise regimen that works well for her    March 2024:    Feelings snacking and \"junk food\" intake is improving.     Has been on the go a lot - forgetting to eat or not eating in morning. Might munch on granola bar or two during day. First meal around 6-7 pm, then finds self very hungry. Biggest barrier is her meds - hard to take them early enough. Also finds she gets nauseous with food too early - full feeling.     Continues with MOM meals. PCA also helping make/freeze meals. Feels biggest issue in regard to eating is eating too late, letty in summer time due to losing track of time.     PA: looking into pool exercises, exploring resources I sent, walking more    May 2024:  Continues to try to take meds earlier to help with eating in morning. Hx use to take at 11 am everyday. Now aiming for 9 am. Not noticing any hunger until 1 pm typically.  Then will eat 1-2 meals.     Typical day  1-2 pm - MOMs meal OR sandwich OR granola bar  Dinner - protein/veg and a little carb. If out to eat often gets a little fries or broccoli instead of fries  Snacking - rare, might have a few bites of lunch meat or salad    Less cravings for sweets/chips overall. Occ dark chocolate cravings but easily satisfied with just a little square of chocolate.     PA: wants to increase, limited by knee pain. Getting shots this Friday to help. Hard to move for more than 5 minutes currently.    - Has a walking pad, wants to get set up this month.    August 2024:  Has been watching carbohydrate intake. Diet is going well. Has been really busy lately and was recently sick 2 weeks ago. Over-ate after taking Ozempic once recently (had " food that was very greasy) and vomited afterwards. Wants to quit smoking.    Has been using protein shakes ([employer] brand, Equate) at 12:00-1:00 pm (at least 1/2 of it). Then waits and has a sandwich with fruit in afternoon. Dinner before 6 pm, mom's meals and has been cooking casseroles, freezes some of it.    Hydration: Off/on per patient. Got dehydrated last week, then increased hydration. Drinks 2-3 diet soda per day. 1-2 glasses of milk. Uses 32 oz water bottle plus 16 oz bottle - at least 48 oz/day.    PA: Wants to work on getting exercise regimen. Hasn't gotten walking pad set up yet d/t being busy.    Previous goals:  Consider getting some protein shakes to help supplement intake - Met  Aim to eat dinner by 7 pm - Met  Continue keeping ready made chicken (example: rotisserie chicken), ready made grain packs, canned beans, and frozen vegetables on hand for faster meal options - Met  Aim to get walking pad set up - Not yet  Consider asking Orthopedic team about exercise recommendations for knee pain - Met, now has exercise links from her    Nutrition Prescription  Recommended energy/nutrient modification.    Nutrition Diagnosis  Obesity r/t long history of positive energy balance aeb BMI >30.    Nutrition Intervention  Materials/education provided on hypocaloric diet for weight loss. Discussed 1700 calorie/day diet, Volumetric eating to help satiety level on fewer calories; portion control and healthy food choices (Plate Method and Volumetrics handouts), 100 calorie snack choices, meal and snack planning and websites, sample meal plans     Patient demonstrates understanding.    Expected Engagement: good    Nutrition Goals  1. Aim to eat dinner by 7 pm  2. Continue keeping ready made chicken (example: rotisserie chicken), ready made grain packs, canned beans, and frozen vegetables on hand for faster meal options   3. Aim to get walking pad set up  4. Look at resources from Orthopedic team for exercise. Aim for  15 minutes of activity in the morning.  5. Start incorporating more sparkling mcleod versus diet soda to help with hydration.       Resistance Training with Free Weights: 3 Exercises    Seated Exercises for Arms and Legs: 11 Exercises     Breakfast time ideas:   - Toast with peanut butter   - Hard boiled egg   - Scrambled egg   - Oatmeal   - Cottage cheese   - Yogurt    The Plate Method  [email or web address]    Protein Sources   [email or web address]     Carbohydrates  [email or web address]     Mindful Eating  [email or web address]     Summary of Volumetrics Eating Plan  [email or web address]     Follow-Up:  Wednesday, October 9th at 10:30 am    Time spent with patient: 18 minutes.  ESTEFANIA Wynn, RD, LD  Clinic #: [phone]

## 2024-08-01 ENCOUNTER — VIRTUAL VISIT (OUTPATIENT)
Dept: ENDOCRINOLOGY | Facility: CLINIC | Age: 49
End: 2024-08-01
Payer: COMMERCIAL

## 2024-08-01 VITALS — BODY MASS INDEX: 46.42 KG/M2 | HEIGHT: 63 IN | WEIGHT: 262 LBS

## 2024-08-01 DIAGNOSIS — R10.13 ABDOMINAL PAIN, EPIGASTRIC: ICD-10-CM

## 2024-08-01 DIAGNOSIS — Z71.3 NUTRITIONAL COUNSELING: Primary | ICD-10-CM

## 2024-08-01 DIAGNOSIS — K29.70 GASTRITIS WITHOUT BLEEDING, UNSPECIFIED CHRONICITY, UNSPECIFIED GASTRITIS TYPE: ICD-10-CM

## 2024-08-01 DIAGNOSIS — E66.813 CLASS 3 SEVERE OBESITY WITH SERIOUS COMORBIDITY AND BODY MASS INDEX (BMI) OF 45.0 TO 49.9 IN ADULT, UNSPECIFIED OBESITY TYPE (H): ICD-10-CM

## 2024-08-01 DIAGNOSIS — E11.9 TYPE 2 DIABETES MELLITUS WITHOUT COMPLICATION, WITHOUT LONG-TERM CURRENT USE OF INSULIN (H): ICD-10-CM

## 2024-08-01 DIAGNOSIS — E66.01 CLASS 3 SEVERE OBESITY WITH SERIOUS COMORBIDITY AND BODY MASS INDEX (BMI) OF 45.0 TO 49.9 IN ADULT, UNSPECIFIED OBESITY TYPE (H): ICD-10-CM

## 2024-08-01 PROCEDURE — 99207 PR NO CHARGE LOS: CPT | Mod: 95

## 2024-08-01 PROCEDURE — 97803 MED NUTRITION INDIV SUBSEQ: CPT | Mod: 95

## 2024-08-01 RX ORDER — OMEPRAZOLE 40 MG/1
CAPSULE, DELAYED RELEASE ORAL
Qty: 90 CAPSULE | Refills: 3 | Status: SHIPPED | OUTPATIENT
Start: 2024-08-01

## 2024-08-01 ASSESSMENT — PAIN SCALES - GENERAL: PAINLEVEL: SEVERE PAIN (7)

## 2024-08-01 NOTE — LETTER
"8/1/2024       RE: Autumn Holbrook  [address] [facility] [address]     Dear Colleague,    Thank you for referring your patient, Autumn Holbrook, to the [facility] [place] at [facility]. Please see a copy of my visit note below.    Video-Visit Details    Type of service:  Video Visit    Video Start Time: 10:39 am  Video End Time: 10:57 am    Originating Location (pt. Location): Home    Distant Location (provider location):  Offsite (providers home)     Platform used for Video Visit: [employer]      Return Weight Management Nutrition Consultation    Autumn Holbrook is a 48 year old female presents today for a return weight management nutrition consultation.  Patient referred by SEVEN Nickerson on March 1, 2023.    Patient with Co-morbidities of obesity including:  Type 2 Diabetes, GERD, Knee Pain, Back Pain    Anthropometrics:  Weight 2/28/23: 287 lbs with BMI 52.36    Estimated body mass index is 46.42 kg/m  as calculated from the following:    Height as of this encounter: 1.6 m (5' 2.99\").    Weight as of this encounter: 118.8 kg (262 lb).     Current weight: 262 lbs, pt report (down 7 lbs since last RD visit)      Medications for Weight Loss:  Topiramate - 75 mg daily  Ozempic - 2 mg dose    Labs 2/8/24  Lipids WNL - hx of elevated Chol  A1C 6.6   Elevated WBC  TSH WNL (hx of being elevated)     Labs 5/8/24  A1C 6.5  Elevated WBC, but improved from 2/8/24  TSH WNL (hx of being elevated)     NUTRITION HISTORY  Food allergies: none  Food intolerances: avocados  Supplements: Vitamin D3, MVI, potassium (prescribed by PCP)   Previous methods of diet modification for weight loss: Watching Portions or Calories, Exercise, Weight Watchers, Medications, Fasting    Pt reports she has been heavy for most of her life. Thyroid issues/Hasimotos make it difficult to lose weight. Has been trying to decrease carbs d/t T2DM. Often craves sugar at night. " "Has Moms Meals delivered- diabetic friendly and pre-portioned. Physical activity is limited d/t knee pain.     Please see previous RD notes for more detail.    Jan 2024:  Busy couple months with holidays. Feels self care fell off.    Has noticed more snacking recently. Craving  junk food  or fast meals - something quick such as pizza versus making a full meal.     Doing well with portion control.   Continues to forget to eat during day. Not feeling hungry during the day. Is keeping granola bars on hand.    Hydration: going well currently - was lower a couple weeks ago and noticed more swelling.     PA: wants to find an exercise regimen that works well for her    March 2024:    Feelings snacking and \"junk food\" intake is improving.     Has been on the go a lot - forgetting to eat or not eating in morning. Might munch on granola bar or two during day. First meal around 6-7 pm, then finds self very hungry. Biggest barrier is her meds - hard to take them early enough. Also finds she gets nauseous with food too early - full feeling.     Continues with MOM meals. PCA also helping make/freeze meals. Feels biggest issue in regard to eating is eating too late, letty in summer time due to losing track of time.     PA: looking into pool exercises, exploring resources I sent, walking more    May 2024:  Continues to try to take meds earlier to help with eating in morning. Hx use to take at 11 am everyday. Now aiming for 9 am. Not noticing any hunger until 1 pm typically.  Then will eat 1-2 meals.     Typical day  1-2 pm - MOMs meal OR sandwich OR granola bar  Dinner - protein/veg and a little carb. If out to eat often gets a little fries or broccoli instead of fries  Snacking - rare, might have a few bites of lunch meat or salad    Less cravings for sweets/chips overall. Occ dark chocolate cravings but easily satisfied with just a little square of chocolate.     PA: wants to increase, limited by knee pain. Getting shots this " Friday to help. Hard to move for more than 5 minutes currently.    - Has a walking pad, wants to get set up this month.    August 2024:  Has been watching carbohydrate intake. Diet is going well. Has been really busy lately and was recently sick 2 weeks ago. Over-ate after taking Ozempic once recently (had food that was very greasy) and vomited afterwards. Wants to quit smoking.    Has been using protein shakes ([employer] brand, Equate) at 12:00-1:00 pm (at least 1/2 of it). Then waits and has a sandwich with fruit in afternoon. Dinner before 6 pm, mom's meals and has been cooking casseroles, freezes some of it.    Hydration: Off/on per patient. Got dehydrated last week, then increased hydration. Drinks 2-3 diet soda per day. 1-2 glasses of milk. Uses 32 oz water bottle plus 16 oz bottle - at least 48 oz/day.    PA: Wants to work on getting exercise regimen. Hasn't gotten walking pad set up yet d/t being busy.    Previous goals:  Consider getting some protein shakes to help supplement intake - Met  Aim to eat dinner by 7 pm - Met  Continue keeping ready made chicken (example: rotisserie chicken), ready made grain packs, canned beans, and frozen vegetables on hand for faster meal options - Met  Aim to get walking pad set up - Not yet  Consider asking Orthopedic team about exercise recommendations for knee pain - Met, now has exercise links from her    Nutrition Prescription  Recommended energy/nutrient modification.    Nutrition Diagnosis  Obesity r/t long history of positive energy balance aeb BMI >30.    Nutrition Intervention  Materials/education provided on hypocaloric diet for weight loss. Discussed 1700 calorie/day diet, Volumetric eating to help satiety level on fewer calories; portion control and healthy food choices (Plate Method and Volumetrics handouts), 100 calorie snack choices, meal and snack planning and websites, sample meal plans     Patient demonstrates understanding.    Expected Engagement:  good    Nutrition Goals  1. Aim to eat dinner by 7 pm  2. Continue keeping ready made chicken (example: rotisserie chicken), ready made grain packs, canned beans, and frozen vegetables on hand for faster meal options   3. Aim to get walking pad set up  4. Look at resources from Orthopedic team for exercise. Aim for 15 minutes of activity in the morning.  5. Start incorporating more sparkling mcleod versus diet soda to help with hydration.       Resistance Training with Free Weights: 3 Exercises    Seated Exercises for Arms and Legs: 11 Exercises     Breakfast time ideas:   - Toast with peanut butter   - Hard boiled egg   - Scrambled egg   - Oatmeal   - Cottage cheese   - Yogurt    The Plate Method  [email or web address]    Protein Sources   [email or web address]     Carbohydrates  [email or web address]     Mindful Eating  [email or web address]     Summary of Volumetrics Eating Plan  [email or web address]     Follow-Up:  Wednesday, October 9th at 10:30 am    Time spent with patient: 18 minutes.  ESTEFANIA Wynn, UYEN, LD  Clinic #: [phone]      Again, thank you for allowing me to participate in the care of your patient.      Sincerely,    Roz Joseph RD

## 2024-08-01 NOTE — NURSING NOTE
Is the patient currently in the state of [place]? YES    Visit mode:VIDEO    If the visit is dropped, the patient can be reconnected by: VIDEO VISIT: Text to cell phone:   Telephone Information:   Mobile [phone]    and VIDEO VISIT: Send to e-mail at: [email or web address]    Will anyone else be joining the visit? NO  (If patient encounters technical issues they should call [phone]    How would you like to obtain your AVS? MyChart    Are changes needed to the allergy or medication list? No    Are refills needed on medications prescribed by this physician? NO    Reason for visit: TANGELA OBANDO

## 2024-08-01 NOTE — PATIENT INSTRUCTIONS
John Leyva,    Please follow-up with dietitian on Wednesday, Octoer 9th at 10:30 am.    Thank you,    oRz Joseph, MPS, RD, LD  If you need to schedule or reschedule, please call [phone].    Nutrition Goals:  1. Aim to eat dinner by 7 pm  2. Continue keeping ready made chicken (example: rotisserie chicken), ready made grain packs, canned beans, and frozen vegetables on hand for faster meal options   3. Aim to get walking pad set up  4. Look at resources from Orthopedic team for exercise. Aim for 15 minutes of activity in the morning.  5. Start incorporating more sparkling mcleod versus diet soda to help with hydration.       Resistance Training with Free Weights: 3 Exercises    Seated Exercises for Arms and Legs: 11 Exercises     Breakfast time ideas:   - Toast with peanut butter   - Hard boiled egg   - Scrambled egg   - Oatmeal   - Cottage cheese   - Yogurt    The Plate Method  [email or web address]    Protein Sources   [email or web address]     Carbohydrates  [email or web address]     Mindful Eating  [email or web address]     Summary of Volumetrics Eating Plan  [email or web address]

## 2024-08-03 DIAGNOSIS — E03.9 ACQUIRED HYPOTHYROIDISM: ICD-10-CM

## 2024-08-03 DIAGNOSIS — G89.4 CHRONIC PAIN SYNDROME: ICD-10-CM

## 2024-08-05 RX ORDER — BACLOFEN 10 MG/1
TABLET ORAL
Qty: 90 TABLET | Refills: 0 | Status: SHIPPED | OUTPATIENT
Start: 2024-08-05 | End: 2024-09-11

## 2024-08-05 RX ORDER — LEVOTHYROXINE SODIUM 175 UG/1
175 TABLET ORAL DAILY
Qty: 90 TABLET | Refills: 3 | Status: SHIPPED | OUTPATIENT
Start: 2024-08-05

## 2024-08-05 NOTE — TELEPHONE ENCOUNTER
Baclofen 10 MG Oral Tablet       Last Written Prescription Date:  06/21/2024  Last Fill Quantity: 90,   # refills: 0  Last Office Visit: 05/08/2024  Future Office visit:       Routing refill request to provider for review/approval because:    Kimberly Boecker, RN

## 2024-08-19 ENCOUNTER — HOSPITAL ENCOUNTER (OUTPATIENT)
Dept: EDUCATION SERVICES | Facility: HOSPITAL | Age: 49
Discharge: HOME | End: 2024-08-19
Attending: DIETITIAN, REGISTERED | Admitting: DIETITIAN, REGISTERED
Payer: COMMERCIAL

## 2024-08-19 VITALS
HEIGHT: 62 IN | SYSTOLIC BLOOD PRESSURE: 114 MMHG | DIASTOLIC BLOOD PRESSURE: 59 MMHG | WEIGHT: 264 LBS | OXYGEN SATURATION: 97 % | HEART RATE: 79 BPM | BODY MASS INDEX: 48.58 KG/M2 | RESPIRATION RATE: 16 BRPM

## 2024-08-19 DIAGNOSIS — E11.9 TYPE 2 DIABETES MELLITUS WITHOUT COMPLICATION, WITHOUT LONG-TERM CURRENT USE OF INSULIN (H): Primary | ICD-10-CM

## 2024-08-19 LAB — HBA1C MFR BLD: 6.2 % (ref 4.3–?)

## 2024-08-19 PROCEDURE — G0108 DIAB MANAGE TRN  PER INDIV: HCPCS | Performed by: DIETITIAN, REGISTERED

## 2024-08-19 PROCEDURE — 83036 HEMOGLOBIN GLYCOSYLATED A1C: CPT | Performed by: PHYSICIAN ASSISTANT

## 2024-08-19 RX ORDER — LANCETS 33 GAUGE
1 EACH MISCELLANEOUS DAILY
Qty: 100 EACH | Refills: 1 | Status: SHIPPED | OUTPATIENT
Start: 2024-08-19

## 2024-08-19 ASSESSMENT — PAIN SCALES - GENERAL: PAINLEVEL: SEVERE PAIN (7)

## 2024-08-19 NOTE — PROGRESS NOTES
Diabetes Self-Management Education & Support    Presents for: Individual review    Type of Service: In Person Visit      ASSESSMENT:  Pt seen for BG review/A1C. Pt lost her meter. Did  a new one. Meter was set up in office. Maintaining food choices. Skips breakfast, but eats lunch and dinner. Moving a bit more this summer, going for walks (about a block), gardening, house work. Taking Ozempic 2mg. Tolerating pretty well, some vomiting when eating fried foods/high carb foods. A1C rechecked- 6.2 today.    Due for dentist and eye appt. Pt scheduled appt with podiatry.    Wt Readings from Last 10 Encounters:   08/19/24 119.7 kg (264 lb)   08/01/24 118.8 kg (262 lb)   05/29/24 122 kg (269 lb)   05/08/24 122.9 kg (270 lb 14.4 oz)   05/08/24 122 kg (269 lb)   03/13/24 120.7 kg (266 lb)   03/06/24 121.1 kg (266 lb 14.4 oz)   02/08/24 120.7 kg (266 lb 3.2 oz)   01/17/24 118.8 kg (262 lb)   11/15/23 122.5 kg (270 lb)         Patient's most recent   Lab Results   Component Value Date    A1C 6.5 05/08/2024    A1C 6.9 06/01/2022    HEMOGLOBINA1 6.4 09/01/2023     is meeting goal of <7.0    Diabetes knowledge and skills assessment:   Patient is knowledgeable in diabetes management concepts related to: Healthy Eating, Being Active, Monitoring, Taking Medication, Problem Solving, Reducing Risks, and Healthy Coping    Continue education with the following diabetes management concepts: review topics as needed    Based on learning assessment above, most appropriate setting for further diabetes education would be: Individual setting.      PLAN  Continue with current regimen      Topics to cover at upcoming visits: review topics as indicated    Follow-up: 2/4/25 at 1pm    See Care Plan for co-developed, patient-state behavior change goals.  AVS provided for patient today.    Education Materials Provided:  No new materials provided today      SUBJECTIVE/OBJECTIVE:  Presents for: Individual review  Accompanied by: Self  Diabetes  "education in the past 24mo: Yes  Focus of Visit: Other  Diabetes type: Type 2  Date of diagnosis: 11/4/21  Disease course: Stable  How confident are you filling out medical forms by yourself:: Quite a bit  Diabetes management related comments/concerns: I need to get my new blood sugar monitor working. I lost my old one.  Transportation concerns: No  Difficulty affording diabetes medication?: No  Difficulty affording diabetes testing supplies?: No  Other concerns:: Glasses  Cultural Influences/Ethnic Background:  Not  or       Diabetes Symptoms & Complications:  Diabetes Related Symptoms: Fatigue, Neuropathy, Polydipsia (increased thirst), Polyphagia (increased hunger), Polyuria (increased urination), Yeast infection, Slow healing wounds (fatigue could be related to medication/heat, cramping in feet, pins and needles/burning sensation in feet, increased thirst and hunger (a couple times a week or every 2 weeks, no consistent))  Weight trend: Fluctuating  Symptom course: Stable  Disease course: Stable  Complications assessed today?: Yes  Autonomic neuropathy: No  CVA: No  Heart disease: No  Nephropathy: Yes  Peripheral neuropathy: No  Peripheral Vascular Disease: No  Retinopathy: No  Sexual dysfunction: Other    Patient Problem List and Family Medical History reviewed for relevant medical history, current medical status, and diabetes risk factors.    Vitals:  /59   Pulse 79   Resp 16   Ht 1.578 m (5' 2.12\")   Wt 119.7 kg (264 lb)   SpO2 97%   BMI 48.10 kg/m    Estimated body mass index is 48.1 kg/m  as calculated from the following:    Height as of this encounter: 1.578 m (5' 2.12\").    Weight as of this encounter: 119.7 kg (264 lb).   Last 3 BP:   BP Readings from Last 3 Encounters:   08/19/24 114/59   05/08/24 100/80   03/06/24 125/81       History   Smoking Status    Every Day    Types: Cigarettes   Smokeless Tobacco    Never       Labs:  Lab Results   Component Value Date    A1C 6.5 " "05/08/2024    A1C 6.9 06/01/2022    HEMOGLOBINA1 6.4 09/01/2023     Lab Results   Component Value Date     05/08/2024     09/26/2022     05/19/2021     Lab Results   Component Value Date    LDL 73 02/08/2024     01/29/2020     HDL Cholesterol   Date Value Ref Range Status   01/29/2020 87 >49 mg/dL Final     Direct Measure HDL   Date Value Ref Range Status   02/08/2024 71 >=50 mg/dL Final   ]  GFR Estimate   Date Value Ref Range Status   05/08/2024 87 >60 mL/min/1.73m2 Final   05/19/2021 >90 >60 mL/min/[1.73_m2] Final     Comment:     Non  GFR Calc  Starting 12/18/2018, serum creatinine based estimated GFR (eGFR) will be   calculated using the Chronic Kidney Disease Epidemiology Collaboration   (CKD-EPI) equation.       GFR Estimate If Black   Date Value Ref Range Status   05/19/2021 >90 >60 mL/min/[1.73_m2] Final     Comment:      GFR Calc  Starting 12/18/2018, serum creatinine based estimated GFR (eGFR) will be   calculated using the Chronic Kidney Disease Epidemiology Collaboration   (CKD-EPI) equation.       Lab Results   Component Value Date    CR 0.82 05/08/2024    CR 0.78 05/19/2021     No results found for: \"MICROALBUMIN\"    Healthy Eating:  Cultural/[employer]ist diet restrictions?: No  Do you have any food allergies or intolerances?: No  Who cooks/prepares meals for you?: Self  Who purchases food in  your home?: Self  How many times a week on average do you eat food made away from home (restaurant/take-out)?: 1  Meals include: Lunch, Dinner, Evening Snack  Breakfast: skips  Lunch: moms meals, sandwich  Dinner: protein veg and a little carb, gets really sick if she has fried foods/greasy foods  Beverages: Water, Coffee, Milk, Diet soda, Alcohol  Has patient met with a dietitian in the past?: Yes    Being Active:  Being Active Assessed Today: Yes  Exercise:: Yes (some walks- 1 block walk, gardening)  Days per week of moderate to strenuous exercise (like " a brisk walk): 3 (sometimes every couple days or everyday)  On average, minutes per day of exercise at this level: 20 (trying to get up every 20 minutes to move a little)  How intense was your typical exercise? : Light (like stretching or slow walking)  Exercise Minutes per Week: 60  Barrier to exercise: Physical limitation (knee, getting shots in September)    Monitoring:  Monitoring Assessed Today: Yes  Did patient bring glucose meter to appointment? : Yes (lost old meter, brought in her new one to get set up)  Blood Glucose Meter: One Touch (verio reflect)  Times checking blood sugar at home (number): Never  Times checking blood sugar at home (per): Day        Taking Medications:  Diabetes Medication(s)       Incretin Mimetic Agents       Semaglutide, 2 MG/DOSE, (OZEMPIC) 8 MG/3ML pen Inject 2 mg Subcutaneous every 7 days            Taking Medication Assessed Today: Yes  Current Treatments: Diet, Non-insulin Injectables (Ozempic 2mg weekly)  Problems taking diabetes medications regularly?: No  Diabetes medication side effects?: Yes (vomiting when eating high carb/high fat foods)    Problem Solving:  Problem Solving Assessed Today: Yes  Is the patient at risk for hypoglycemia?: No  Hypoglycemia Frequency: Rarely (late afternoon - some nausea/cranky (becuase she forgets to eat))  Hypoglycemia Treatment: Glucose (tablets or gel), Other food (granola bars/protein bars, has some glucose tabs)  Does patient have severe weather/disaster plan for diabetes management?: Not Needed  Does patient have sick day plan for diabetes management?: Not Needed    Hypoglycemia Symptoms  Hypoglycemia: Hunger, None (nausea)         Reducing Risks:  Reducing Risks Assessed Today: Yes  Diabetes Risks: Age over 45 years, Sedentary Lifestyle, History of gestational diabetes, Hyperlipidemia  Additional female risks: Gestational Diabetes  CAD Risks: Diabetes Mellitus, Dyslipidemia, Obesity, Sedentary lifestyle, Tobacco exposure  Has dilated  eye exam at least once a year?: No (due for exam)  Sees dentist every 6 months?: No (going in for cleaning in October)  Feet checked by healthcare provider in the last year?: Yes    Healthy Coping:  Healthy Coping Assessed Today: Yes  Emotional response to diabetes: Ready to learn, Acceptance  Informal Support system:: Children, Family, Friends, Other  Stage of change: ACTION (Actively working towards change)  Patient Activation Measure Survey Score:      11/28/2018    12:00 PM 3/7/2019     4:00 PM   KEN Score (Last Two)   KEN Raw Score 27 27   Activation Score 47.4 47.4   KEN Level 2 2         Care Plan and Education Provided:  There are no care plans that you recently modified to display for this patient.        Time Spent: 30 minutes  Encounter Type: Individual    Any diabetes medication dose changes were made via the CDE Protocol per the patient's primary care provider. A copy of this encounter was shared with the provider.

## 2024-08-19 NOTE — PROGRESS NOTES
Autumn, 48 year old, returns to [facility] for follow up. Has insurace form would like completed for a gift card. Form asks if A1C is on record in the last year, does not request result info. Signed and returned to patient.   Seen with DRC RD. Refer to RD note for additional information.     POCT A1C:  6.2    Target A1C: <7.0  Previous A1C: 6.5  5/8/2024.    New meter- one touch, needs test strips, one touch verio. Orders sent to pharmacy.     Ozempic 2 mg weekly. She wonders if at times she has pancreatitis symptoms, mild GI when eating more carbs, fatty foods or alcohol. Resolves. Wants to continue to watch symptoms. She asks about alternative options, feels weight has stalled. Suggested consideration for Mounjaro, explained action. She plans to discuss with her weight loss provider at her next visit since she has a few months of Ozempic currently and would like to use them first.   Mom's meals. Feels full at dinner.     Activity-she reports is trying to get up and moving every 20 minutes- concerned about cancer link with sitting long periods of time. When walking every 2-3 days- depending on weather- 20 minutes- goes about 1/2 city block and back- currently what she can tolerate with her knee pain. Planning for injection next month, aware steroids tend to increase glucose levels. Has every 3 months. Gardening and housework.      Wt Readings from Last 10 Encounters:   08/19/24 119.7 kg (264 lb)   08/01/24 118.8 kg (262 lb)   05/29/24 122 kg (269 lb)   05/08/24 122.9 kg (270 lb 14.4 oz)   05/08/24 122 kg (269 lb)   03/13/24 120.7 kg (266 lb)   03/06/24 121.1 kg (266 lb 14.4 oz)   02/08/24 120.7 kg (266 lb 3.2 oz)   01/17/24 118.8 kg (262 lb)   11/15/23 122.5 kg (270 lb)       Allergies   Allergen Reactions    Depakote [Valproic Acid]     Gabapentin Swelling    Propofol Unknown     Got very stiff and tight.         PLAN:   Continue Ozempic for now. Plans to discuss with weight loss management- Mounjaro.   Work on healthy  lifestyle habits- activity and healthy eating.   New order for test strips sent to pharmacy.   Return 6 months with PJ QIU.  2/4/2025 at 1 pm.   Monitor glucose levels with injection.  Pt aware to reach out with concerns or questions.   Kinga Beckwith RN [facility]   [phone]

## 2024-09-05 ENCOUNTER — OFFICE VISIT (OUTPATIENT)
Dept: PODIATRY | Facility: OTHER | Age: 49
End: 2024-09-05
Attending: PODIATRIST
Payer: COMMERCIAL

## 2024-09-05 VITALS
SYSTOLIC BLOOD PRESSURE: 105 MMHG | OXYGEN SATURATION: 99 % | TEMPERATURE: 98.1 F | HEART RATE: 81 BPM | DIASTOLIC BLOOD PRESSURE: 67 MMHG

## 2024-09-05 DIAGNOSIS — E11.9 DIABETES MELLITUS TYPE 2, NONINSULIN DEPENDENT (H): ICD-10-CM

## 2024-09-05 DIAGNOSIS — M21.42 PES PLANUS OF BOTH FEET: ICD-10-CM

## 2024-09-05 DIAGNOSIS — M62.461 GASTROCNEMIUS EQUINUS OF RIGHT LOWER EXTREMITY: ICD-10-CM

## 2024-09-05 DIAGNOSIS — M77.41 METATARSALGIA OF RIGHT FOOT: ICD-10-CM

## 2024-09-05 DIAGNOSIS — L60.3 ONYCHODYSTROPHY: Primary | ICD-10-CM

## 2024-09-05 DIAGNOSIS — M21.41 PES PLANUS OF BOTH FEET: ICD-10-CM

## 2024-09-05 DIAGNOSIS — M62.462 GASTROCNEMIUS EQUINUS OF LEFT LOWER EXTREMITY: ICD-10-CM

## 2024-09-05 DIAGNOSIS — L84 CALLUS OF FOOT: ICD-10-CM

## 2024-09-05 DIAGNOSIS — Z13.89 SCREENING FOR DIABETIC PERIPHERAL NEUROPATHY: ICD-10-CM

## 2024-09-05 DIAGNOSIS — E11.42 DIABETIC POLYNEUROPATHY ASSOCIATED WITH TYPE 2 DIABETES MELLITUS (H): ICD-10-CM

## 2024-09-05 DIAGNOSIS — M77.42 METATARSALGIA OF LEFT FOOT: ICD-10-CM

## 2024-09-05 PROCEDURE — 99207 PR FOOT EXAM NO CHARGE: CPT | Performed by: PODIATRIST

## 2024-09-05 PROCEDURE — G0463 HOSPITAL OUTPT CLINIC VISIT: HCPCS | Mod: 25

## 2024-09-05 PROCEDURE — 11056 PARNG/CUTG B9 HYPRKR LES 2-4: CPT | Performed by: PODIATRIST

## 2024-09-05 PROCEDURE — G0463 HOSPITAL OUTPT CLINIC VISIT: HCPCS

## 2024-09-05 PROCEDURE — 99203 OFFICE O/P NEW LOW 30 MIN: CPT | Mod: 25 | Performed by: PODIATRIST

## 2024-09-05 PROCEDURE — 11721 DEBRIDE NAIL 6 OR MORE: CPT | Mod: XS | Performed by: PODIATRIST

## 2024-09-05 ASSESSMENT — PAIN SCALES - GENERAL: PAINLEVEL: SEVERE PAIN (7)

## 2024-09-05 NOTE — PROGRESS NOTES
Chief complaint: Patient presents with:  Toenail: DFE      History of Present Illness: This 49-year-old NIDDM II female is seen at the request of No ref. provider found for evaluation and suggestions of management of thickened toenails. The nails are very difficult to trim. She sometimes gets ingrown toenails but they have improved more recently. Regular nail clippers do not cut through the toenail.    Additionally, she gets pain in the ball of the foot when she is walking (dorsal and plantar bilaterally). She has very flat feet and she is not sure how to treat the pain in her forefoot. The pain develops after she has been walking for a long period of time. She primarily wears flat, slip-on shoes.    She gets burning, tingling, and numbness in her feet.    No further pedal complaints today.        /67 (BP Location: Left arm, Patient Position: Sitting, Cuff Size: Adult Large)   Pulse 81   Temp 98.1  F (36.7  C) (Tympanic)   SpO2 99%     Patient Active Problem List   Diagnosis    Chronic pain syndrome    Opioid dependence in remission (H)    PTSD (post-traumatic stress disorder)    COY (generalized anxiety disorder)    Moderate episode of recurrent major depressive disorder (H)    Bilateral edema of lower extremity    Hypothyroid    GERD (gastroesophageal reflux disease)    Mild mixed bipolar I disorder (H)    Grief    Diabetes mellitus, type 2 (H)    History of migraine headaches    Urge incontinence of urine    Encounter for sterilization    Tobacco abuse       Past Surgical History:   Procedure Laterality Date     SECTION  1995     SECTION  2002    CHOLECYSTECTOMY      COLONOSCOPY N/A 2022    Procedure: Colonscopy;  Surgeon: Deep Zhu MD;  Location: [facility]    KNEE ARTHROSCOPY AND ARTHROTOMY Right 2019    right knee arthroscopy, lateral menisectomy    LEEP TX, CERVICAL  2004       Current Outpatient Medications   Medication Sig Dispense Refill     Acetaminophen (TYLENOL PO) Take 500 mg by mouth every 4 hours as needed for mild pain or fever      asenapine (SAPHRIS) 2.5 MG SUBL sublingual tablet Place 2.5 mg under the tongue daily (2.5 mg + 5 mg = 7.5 mg)      asenapine (SAPHRIS) 5 MG SUBL sublingual tablet Place 5 mg under the tongue daily (2.5 mg + 5 mg = 7.5 mg)      baclofen (LIORESAL) 10 MG tablet TAKE 1 TABLET BY MOUTH THREE TIMES DAILY 90 tablet 0    blood glucose (NO BRAND SPECIFIED) test strip Use to test blood sugar 1 time daily 50 strip 5    blood glucose monitoring (NO BRAND SPECIFIED) meter device kit Use to test blood sugar 1 time daily 1 kit 0    diclofenac (VOLTAREN) 1 % topical gel Apply 2 g topically 4 times daily as needed for moderate pain      furosemide (LASIX) 20 MG tablet TAKE 2 TABLETS BY MOUTH ONCE DAILY AS NEEDED FOR  LEG  SWELLING 60 tablet 11    hydrochlorothiazide (HYDRODIURIL) 25 MG tablet Take 1 tablet by mouth once daily 90 tablet 0    hydrOXYzine (ATARAX) 25 MG tablet Take 25 mg by mouth 4 times daily as needed for anxiety      ibuprofen (ADVIL/MOTRIN) 800 MG tablet Take 1 tablet (800 mg) by mouth every 8 hours as needed for moderate pain (4-6) 90 tablet 0    levothyroxine (SYNTHROID/LEVOTHROID) 175 MCG tablet Take 1 tablet by mouth once daily 90 tablet 3    Melatonin 10 MG CAPS Take 10 mg by mouth nightly as needed (insomnia)      multivitamin w/minerals (THERA-VIT-M) tablet Take 1 tablet by mouth daily      nicotine (NICORETTE) 2 MG gum CHEW 1-2 PIECES PER HOUR OR 10-12 PIECES PER DAY--TAPER AS TOLERATED      omeprazole (PRILOSEC) 40 MG DR capsule Take 1 capsule by mouth once daily 90 capsule 3    OneTouch Delica Lancets 33G MISC 1 Lancet daily 100 each 1    oxyBUTYnin ER (DITROPAN XL) 10 MG 24 hr tablet Take 1 tablet (10 mg) by mouth daily 90 tablet 3    potassium chloride ER (K-TAB) 20 MEQ CR tablet TAKE 1 TABLET BY MOUTH ONCE DAILY ALONG WITH A 10 MG TABLET **THIS SHOULD ONLY BE IF TAKING LASIX** 90 tablet 2    potassium  chloride ER (K-TAB/KLOR-CON) 10 MEQ CR tablet TAKE 1 TABLET BY MOUTH ONCE DAILY WHEN  TAKING  LASIX  (FUROSEMIDE) 30 tablet 3    rosuvastatin (CRESTOR) 5 MG tablet Take 1 tablet by mouth once daily 90 tablet 3    Semaglutide, 2 MG/DOSE, (OZEMPIC) 8 MG/3ML pen Inject 2 mg Subcutaneous every 7 days 9 mL 3    sertraline (ZOLOFT) 50 MG tablet Take 75 mg by mouth daily      topiramate (TOPAMAX) 25 MG tablet Take 3 tablets (75 mg) by mouth at bedtime 270 tablet 1    Vitamin D3 (CHOLECALCIFEROL) 125 MCG (5000 UT) tablet Take 1 tablet by mouth daily      VRAYLAR 3 MG CAPS capsule Take 3 mg by mouth daily      blood glucose (NO BRAND SPECIFIED) lancets standard Use to test blood sugar 1 times daily 100 each 5    blood glucose monitoring (SOFTCLIX) lancets USE 1 TO CHECK GLUCOSE ONCE DAILY      lisdexamfetamine (VYVANSE) 50 MG capsule Take 50 mg by mouth every morning       No current facility-administered medications for this visit.          Allergies   Allergen Reactions    Depakote [Valproic Acid]     Gabapentin Swelling    Propofol Unknown     Got very stiff and tight.        Family History   Problem Relation Age of Onset    Cerebrovascular Disease Mother     Alcoholism Mother     Cancer Mother     Depression Mother     Eczema Mother     Hypertension Mother     Migraines Mother     Mental Illness Mother     Osteoarthritis Mother     Osteoporosis Mother     Alcoholism Father     Cancer Father     Hyperlipidemia Father     Hypertension Father     Myocardial Infarction Father     Mental Illness Sister     Migraines Sister        Social History     Socioeconomic History    Marital status:      Spouse name: None    Number of children: 2    Years of education: None    Highest education level: None   Tobacco Use    Smoking status: Every Day     Current packs/day: 0.50     Average packs/day: 0.5 packs/day for 35.7 years (17.8 ttl pk-yrs)     Types: Cigarettes     Start date: 1/1/1989     Passive exposure: Past    Smokeless  tobacco: Never    Tobacco comments:     pt declines 9/5/24   Vaping Use    Vaping status: Some Days    Substances: Nicotine, Flavoring    Devices: Disposable   Substance and Sexual Activity    Alcohol use: No    Drug use: Yes     Types: Marijuana     Comment: daily-medical    Sexual activity: Yes     Partners: Male     Birth control/protection: Condom     Social Determinants of Health     Financial Resource Strain: Low Risk  (2/8/2024)    Financial Resource Strain     Within the past 12 months, have you or your family members you live with been unable to get utilities (heat, electricity) when it was really needed?: No   Food Insecurity: High Risk (2/8/2024)    Food Insecurity     Within the past 12 months, did you worry that your food would run out before you got money to buy more?: Yes     Within the past 12 months, did the food you bought just not last and you didn t have money to get more?: Yes   Transportation Needs: Low Risk  (2/8/2024)    Transportation Needs     Within the past 12 months, has lack of transportation kept you from medical appointments, getting your medicines, non-medical meetings or appointments, work, or from getting things that you need?: No   Interpersonal Safety: Low Risk  (9/5/2024)    Interpersonal Safety     Do you feel physically and emotionally safe where you currently live?: Yes     Within the past 12 months, have you been hit, slapped, kicked or otherwise physically hurt by someone?: No     Within the past 12 months, have you been humiliated or emotionally abused in other ways by your partner or ex-partner?: No   Housing Stability: Low Risk  (2/8/2024)    Housing Stability     Do you have housing? : Yes     Are you worried about losing your housing?: No       ROS: 10 point ROS neg other than the symptoms noted above in the HPI.  EXAM  Constitutional: healthy, alert, and no distress    Psychiatric: mentation appears normal and affect normal/bright    VASCULAR:  -Dorsalis pedis pulse  +2/4 b/l  -Posterior tibial pulse +2/4 b/l  -Capillary refill time < 3 seconds to b/l hallux  NEURO:  -Light touch sensation intact to b/l plantar forefoot  DERM:  -Skin temperature, texture and turgor WNL b/l  -Toenails elongated, thickened, dystrophic and discolored x 10  -Hyperkeratotic lesion on the medial plantar distal aspect of the proximal phalanx of the bilateral hallux  MSK:  -Pain on to the plantar metatarsal heads 2-5 bilaterally (increased pain with walking)  -Muscle strength of ankles +5/5 for dorsiflexion, plantarflexion, ABDUction and ADDuction b/l  -Moderate decrease in arch height while patient is NWB, bilaterally   -Moderate medial midfoot arch collapse bilaterally  -Ankle joint passive ROM within normal limits except for dorsiflexion:    Dorsiflexion, RIGHT Straight knee 0 degrees    Dorsiflexion, LEFT Straight knee 0 degrees    ============================================================    ASSESSMENT:    (L60.3) Onychodystrophy  (primary encounter diagnosis)    (L84) Callus of foot    (M77.41) Metatarsalgia of right foot    (M77.42) Metatarsalgia of left foot    (M21.41,  M21.42) Pes planus of both feet    (M62.461) Gastrocnemius equinus of right lower extremity    (M62.462) Gastrocnemius equinus of left lower extremity    (E11.9) Diabetes mellitus type 2, noninsulin dependent (H)    (E11.42) Diabetic polyneuropathy associated with type 2 diabetes mellitus (H)    (Z13.89) Screening for diabetic peripheral neuropathy      PLAN:  -Patient evaluated and examined. Treatment options discussed with no educational barriers noted.    -High risk toenail debridement x 10 toenails without incident on 09/05/2024    -Callus pared x 2 to the medial plantar distal aspect of the proximal phalanx of the bilateral hallux   without incident  ---Patient reminded that the callus will likely return due to the underlying, prominent bone causing the callus while the patient is  walking.    -----------------------------------------    -Diabetic Foot Education provided. This included checking the feet daily looking for new new blisters or wounds, wearing shoes at all times when walking including around the house, and avoiding lotion application between the toes. If there are any signs of infection, the patient should present to the ED as soon as possible. Infections of the foot can be life threatening or lead to amputations of the foot or leg.    Metatarsalgia:  -Discussed potential causes and treatment options for metatarsalgia. Pain along the metatarsals can be caused from a number of factors, but is commonly caused by an imbalance of the biomechanics. This can include but is not limited to a flat foot, high arched foot, tendon imbalance, gastrocnemius equinus, and compensation for pain in other areas of the foot. In this case, patient has a pes planus and bilateral gastroc equinus.  ---Conservative treatment options consist of wider shoe gear and orthotics +/- padding/splinting to correct the biomechanics of the foot. Consistently wearing supportive shoe gear can also decrease this pain (stability shoe gear involves wearing a stability shoe that bends at the toe, but has a solid midfoot shank and heel contour).   ---Patient understands the treatment options. At this time, the patient wishes to pursue the following:  -Stability Shoe Gear: This involves wearing a solid tennis shoe that bends at the toe, but has a solid midfoot portion of the shoe that does not bend or twist in half, and a rigid heel contour.   -----Ward, Asics, and New Balance are a few brands that have several types of stability tennis shoes. However, these brands also carry lightweight shoes that do not meet the above criteria, so look for a stability tennis shoe.  -----Ward tend to have a wider toe box if you have difficulty finding wide enough shoes for your feet.   -----Any brand of can be worn as long as it meets  the above three criteria.  --The patient is wearing very thin, slip-on shoes. She is advised to wear thick, supportive tennis shoes.  -Compression socks: Advised to wear compression socks all day (especially when working) to decrease LOWER EXTREMITY swelling in both feet and legs. Apply the socks first thing in the morning before getting out of bed and remove them at night when the feet are elevated in bed.  -Stretching: Stretch the calf muscles to increase flexibility of the calf muscles. If possible, aim to stretch the calf muscles for a combined total of one hour per day.  -Consider supportive sandals for around the house (such as [place], Vionics, Birkenstocks, Keens, Merrells, or Oofos sandals)  -This is an acute, uncomplicated illness/injury with OTC treatment options reviewed.    Diabetic Shoes:  ---Discussed DM shoes and educated the patient as to the advantages to DM shoes. DM shoes have a thicker sole which helps protect the feet from puncture wounds of sharp objects that can puncture through shoes. They also have more depth to the toe box and a wider toe box in attempt to prevent the shoes from rubbing on the toes and causing blisters/wounds. Additionally, the shoes come with a custom molded insert that is designed to also attempt to prevent blisters or ulcers of the foot. Blisters and ulcers can still occur while wearing DM shoes (espeically when the shoes are new), but they are aimed at helping prevent blisters and ulcers. The patient understands the benefits of DM shoes and would like a referral for DM shoes.  ---Referral made for DM shoes through the orthotist, Jayla Wasserman on 09/05/2024 per patient request.    -Diabetic Foot Education provided. This included checking the feet daily looking for new new blisters or wounds, wearing shoes at all times when walking including around the house, and avoiding lotion application between the toes. If there are any signs of infection, the patient should present to  the ED as soon as possible. Infections of the foot can be life threatening or lead to amputations of the foot or leg.    -Patient in agreement with the above treatment plan and all of patient's questions were answered.      Return to clinic 63+ days for a diabetic foot exam and high risk nail debridement         Tanisha Shannon DPM

## 2024-09-07 DIAGNOSIS — G89.4 CHRONIC PAIN SYNDROME: ICD-10-CM

## 2024-09-09 NOTE — TELEPHONE ENCOUNTER
Baclofen      Last Written Prescription Date:  8/5/24  Last Fill Quantity: 90,   # refills: 0  Last Office Visit: 5/8/24  Future Office visit:    Next 5 appointments (look out 90 days)      Nov 12, 2024 2:45 PM  (Arrive by 2:30 PM)  Return Visit with Tanisha Shannon DPM  [facility] ([facility] [place] ) [address] [place] [id number] [phone]             Routing refill request to provider for review/approval because:

## 2024-09-11 RX ORDER — BACLOFEN 10 MG/1
TABLET ORAL
Qty: 90 TABLET | Refills: 0 | Status: SHIPPED | OUTPATIENT
Start: 2024-09-11

## 2024-09-20 DIAGNOSIS — R60.0 BILATERAL EDEMA OF LOWER EXTREMITY: ICD-10-CM

## 2024-09-20 DIAGNOSIS — E87.6 HYPOKALEMIA: ICD-10-CM

## 2024-09-20 RX ORDER — POTASSIUM CHLORIDE 1500 MG/1
TABLET, EXTENDED RELEASE ORAL
Qty: 90 TABLET | Refills: 0 | Status: SHIPPED | OUTPATIENT
Start: 2024-09-20

## 2024-09-20 RX ORDER — POTASSIUM CHLORIDE 750 MG/1
TABLET, EXTENDED RELEASE ORAL
Qty: 30 TABLET | Refills: 0 | Status: SHIPPED | OUTPATIENT
Start: 2024-09-20

## 2024-09-21 DIAGNOSIS — I10 BENIGN ESSENTIAL HYPERTENSION: ICD-10-CM

## 2024-09-23 RX ORDER — HYDROCHLOROTHIAZIDE 25 MG/1
TABLET ORAL
Qty: 90 TABLET | Refills: 0 | Status: SHIPPED | OUTPATIENT
Start: 2024-09-23

## 2024-10-03 ENCOUNTER — TELEPHONE (OUTPATIENT)
Dept: ENDOCRINOLOGY | Facility: CLINIC | Age: 49
End: 2024-10-03
Payer: COMMERCIAL

## 2024-10-03 NOTE — TELEPHONE ENCOUNTER
Left Voicemail (1st Attempt) and Sent Mychart (1st Attempt) for the patient to call back and schedule the following:    Appointment type: [facility]  Provider: Bonny Banda  Return date: Reschedule 10/9/24 appointment as provider unavailable  Specialty phone number: [phone]  Additional appointment(s) needed: n/a  Additonal Notes: n/a

## 2024-10-05 DIAGNOSIS — G89.4 CHRONIC PAIN SYNDROME: ICD-10-CM

## 2024-10-07 ENCOUNTER — TELEPHONE (OUTPATIENT)
Dept: ENDOCRINOLOGY | Facility: CLINIC | Age: 49
End: 2024-10-07
Payer: COMMERCIAL

## 2024-10-07 NOTE — TELEPHONE ENCOUNTER
Patient confirmed scheduled appointment:  Date: 11/4/24  Time: 9:30 am  Visit type: [facility]  Provider: Bonny Banda  Location: virtual  Testing/imaging: n/a  Additional notes: n/a

## 2024-10-08 NOTE — TELEPHONE ENCOUNTER
Baclofen 10 MG Oral Tablet       Last Written Prescription Date:  09/11/2024  Last Fill Quantity: 90,   # refills: 0  Last Office Visit: 05/08/2024  Future Office visit:    Next 5 appointments (look out 90 days)      Nov 12, 2024 2:45 PM  (Arrive by 2:30 PM)  Return Visit with Tanisha Shannon DPM  [facility] ([facility] ) [address] [place] [id number] [phone]             Routing refill request to provider for review/approval because:    Kimberly Boecker, RN

## 2024-10-10 RX ORDER — BACLOFEN 10 MG/1
TABLET ORAL
Qty: 90 TABLET | Refills: 0 | Status: SHIPPED | OUTPATIENT
Start: 2024-10-10

## 2024-10-15 NOTE — TELEPHONE ENCOUNTER
Crestor      Last Written Prescription Date:  8/29/23  Last Fill Quantity: 90,   # refills: 1  Last Office Visit: 2/8/24  Future Office visit:    Next 5 appointments (look out 90 days)      May 08, 2024  2:30 PM  (Arrive by 2:15 PM)  SHORT with SEVEN Pandey  [facility] [place] ([facility] [place] ) [address]  [place] [address]  [phone]             Routing refill request to provider for review/approval because:         Detail Level: Detailed Show Applicator Variable?: Yes Duration Of Freeze Thaw-Cycle (Seconds): 0 Post-Care Instructions: I reviewed with the patient in detail post-care instructions. Patient is to wear sunprotection, and avoid picking at any of the treated lesions. Pt may apply Vaseline to crusted or scabbing areas. Render Note In Bullet Format When Appropriate: No Consent: The patient's consent was obtained including but not limited to risks of crusting, scabbing, blistering, scarring, darker or lighter pigmentary change, recurrence, incomplete removal and infection.

## 2024-10-23 ENCOUNTER — TELEPHONE (OUTPATIENT)
Dept: ENDOCRINOLOGY | Facility: CLINIC | Age: 49
End: 2024-10-23
Payer: COMMERCIAL

## 2024-10-23 NOTE — TELEPHONE ENCOUNTER
Patient confirmed rescheduled appointment:  Date: 11/4 to 10/25/24  Time: 8:30am  Visit type: Return Med [facility]   Provider: Bonny Allen  Patient wanted to be seen sooner than Bonny's next available to discuss meds.

## 2024-10-25 ENCOUNTER — VIRTUAL VISIT (OUTPATIENT)
Dept: ENDOCRINOLOGY | Facility: CLINIC | Age: 49
End: 2024-10-25
Payer: COMMERCIAL

## 2024-10-25 VITALS — BODY MASS INDEX: 48.83 KG/M2 | WEIGHT: 268 LBS

## 2024-10-25 DIAGNOSIS — E11.9 TYPE 2 DIABETES MELLITUS WITHOUT COMPLICATION, WITHOUT LONG-TERM CURRENT USE OF INSULIN (H): ICD-10-CM

## 2024-10-25 DIAGNOSIS — R60.0 BILATERAL EDEMA OF LOWER EXTREMITY: ICD-10-CM

## 2024-10-25 DIAGNOSIS — E66.813 CLASS 3 SEVERE OBESITY WITH SERIOUS COMORBIDITY AND BODY MASS INDEX (BMI) OF 45.0 TO 49.9 IN ADULT, UNSPECIFIED OBESITY TYPE (H): ICD-10-CM

## 2024-10-25 DIAGNOSIS — Z71.3 NUTRITIONAL COUNSELING: Primary | ICD-10-CM

## 2024-10-25 DIAGNOSIS — E66.01 CLASS 3 SEVERE OBESITY WITH SERIOUS COMORBIDITY AND BODY MASS INDEX (BMI) OF 45.0 TO 49.9 IN ADULT, UNSPECIFIED OBESITY TYPE (H): ICD-10-CM

## 2024-10-25 PROCEDURE — 97803 MED NUTRITION INDIV SUBSEQ: CPT | Mod: 95

## 2024-10-25 PROCEDURE — 99207 PR NO CHARGE LOS: CPT | Mod: 95

## 2024-10-25 ASSESSMENT — PAIN SCALES - GENERAL: PAINLEVEL_OUTOF10: SEVERE PAIN (7)

## 2024-10-25 NOTE — PROGRESS NOTES
"Video-Visit Details    Type of service:  Video Visit    Video Start Time: 8:30 AM  Video End Time: 8:48 AM     Originating Location (pt. Location): Home    Distant Location (provider location):  Offsite (providers home)     Platform used for Video Visit: Alticast    Return Weight Management Nutrition Consultation    Autumn Holbrook is a 49 year old female presents today for a return weight management nutrition consultation.  Patient referred by SEVEN Nickerson on March 1, 2023.    Patient with Co-morbidities of obesity including:  Type 2 Diabetes, GERD, Knee Pain, Back Pain    Anthropometrics:  Weight 2/28/23: 287 lbs with BMI 52.36    Estimated body mass index is 48.83 kg/m  as calculated from the following:    Height as of 8/19/24: 1.578 m (5' 2.12\").    Weight as of this encounter: 121.6 kg (268 lb).     Current weight: 268 lbs    Medications for Weight Loss:  Topiramate - 75 mg daily  Ozempic - 2 mg dose    Labs 2/8/24  Lipids WNL - hx of elevated Chol  A1C 6.6   Elevated WBC  TSH WNL (hx of being elevated)     Labs 5/8/24  A1C 6.5  Elevated WBC, but improved from 2/8/24  TSH WNL (hx of being elevated)     NUTRITION HISTORY  Food allergies: none  Food intolerances: avocados  Supplements: Vitamin D3, MVI, potassium (prescribed by PCP)   Previous methods of diet modification for weight loss: Watching Portions or Calories, Exercise, Weight Watchers, Medications, Fasting    Pt reports she has been heavy for most of her life. Thyroid issues/Hasimotos make it difficult to lose weight. Has been trying to decrease carbs d/t T2DM. Often craves sugar at night. Has Moms Meals delivered- diabetic friendly and pre-portioned. Physical activity is limited d/t knee pain.     Please see previous RD notes for more detail.    Jan 2024:  Busy couple months with holidays. Feels self care fell off.    Has noticed more snacking recently. Craving  junk food  or fast meals - something quick such as pizza versus making a full meal. " "    Doing well with portion control.   Continues to forget to eat during day. Not feeling hungry during the day. Is keeping granola bars on hand.    Hydration: going well currently - was lower a couple weeks ago and noticed more swelling.     PA: wants to find an exercise regimen that works well for her    March 2024:    Feelings snacking and \"junk food\" intake is improving.     Has been on the go a lot - forgetting to eat or not eating in morning. Might munch on granola bar or two during day. First meal around 6-7 pm, then finds self very hungry. Biggest barrier is her meds - hard to take them early enough. Also finds she gets nauseous with food too early - full feeling.     Continues with MOM meals. PCA also helping make/freeze meals. Feels biggest issue in regard to eating is eating too late, letty in summer time due to losing track of time.     PA: looking into pool exercises, exploring resources I sent, walking more    May 2024:  Continues to try to take meds earlier to help with eating in morning. Hx use to take at 11 am everyday. Now aiming for 9 am. Not noticing any hunger until 1 pm typically.  Then will eat 1-2 meals.     Typical day  1-2 pm - MOMs meal OR sandwich OR granola bar  Dinner - protein/veg and a little carb. If out to eat often gets a little fries or broccoli instead of fries  Snacking - rare, might have a few bites of lunch meat or salad    Less cravings for sweets/chips overall. Occ dark chocolate cravings but easily satisfied with just a little square of chocolate.     PA: wants to increase, limited by knee pain. Getting shots this Friday to help. Hard to move for more than 5 minutes currently.    - Has a walking pad, wants to get set up this month.    August 2024:  Has been watching carbohydrate intake. Diet is going well. Has been really busy lately and was recently sick 2 weeks ago. Over-ate after taking Ozempic once recently (had food that was very greasy) and vomited afterwards. Wants to " quit smoking.    Has been using protein shakes ([employer] brand, Equate) at 12:00-1:00 pm (at least 1/2 of it). Then waits and has a sandwich with fruit in afternoon. Dinner before 6 pm, mom's meals and has been cooking casseroles, freezes some of it.    Hydration: Off/on per patient. Got dehydrated last week, then increased hydration. Drinks 2-3 diet soda per day. 1-2 glasses of milk. Uses 32 oz water bottle plus 16 oz bottle - at least 48 oz/day.    PA: Wants to work on getting exercise regimen. Hasn't gotten walking pad set up yet d/t being busy.    October 2024: Patient voices interest in seeing if she can change from Ozempic to Mounjaro. Encouraged patient to reach out to SEVEN Nickerson to discuss this interest. Patient has been working on the goals her and Roz Joseph RD talked about last visit. Doing well with stopping eating by 7 pm. Feels she could do a better job with having healthier quick meals available, also does Mom's Meals. Waiting to get a new PCA for additional help as her current one is out on maternity leave. When this new PCA starts she is going to have her help work with on some of her goals.  Drinking more water with Beto flavor enhancer. Diet soda intake is now down to 1-2 day.     Physical activity - limited due to knee pain however recently got steroid injections so they are feeling better right now. Needs to work on getting her walking pad set up. Has some new walking shoes coming soon.     Previous goals:  1. Aim to eat dinner by 7 pm - met  2. Continue keeping ready made chicken (example: rotisserie chicken), ready made grain packs, canned beans, and frozen vegetables on hand for faster meal options - not met, continues   3. Aim to get walking pad set up - not met, continues   4. Look at resources from Orthopedic team for exercise. Aim for 15 minutes of activity in the morning. - not met, continues   5. Start incorporating more sparkling mcleod versus diet soda to help with hydration.  met    Nutrition Prescription  Recommended energy/nutrient modification.    Nutrition Diagnosis  Obesity r/t long history of positive energy balance aeb BMI >30.    Nutrition Intervention  Reviewed current dietary habits and pts history   Answered pt questions  Coordination of care   Nutrition education   AVS and handouts via [employer]    Patient demonstrates understanding.    Expected Engagement: good    Nutrition Goals  1) Continue keeping ready made chicken (example: rotisserie chicken), ready made grain packs, canned beans, and frozen vegetables on hand for faster meal options   2) Aim to get walking pad set up  3) Look at resources from Orthopedic team for exercise. Aim for 15 minutes of activity in the morning.  4) Check blood sugars 2 hours after meals.     Resistance Training with Free Weights: 3 Exercises    Seated Exercises for Arms and Legs: 11 Exercises     Five Food Groups Pantry List  Vegetables: Keep a variety of canned tomatoes in stock (diced, crushed, whole, stewed). Use them in soups, stews, sauces, casseroles and more! Also,  a jar of your favorite pasta sauce. Dried mushrooms are another great pantry item because they can add depth of flavor to your meals.    Fruits: Dried fruits such as raisins, dried cranberries and dried apricots provide dietary fiber. They also can add a punch of flavor to your morning breakfast, midday salad and dinner grains.    Milk and Dairy Products: Dried milk is a great back-up item to have on stock. You can use it in your coffee or tea. Boxed milk also is available in single-serving packages and is a great item for lunch boxes. Evaporated milk, available in cans in the baking aisle, can be substituted for liquid milk in most recipes.    Protein Foods: Stock up on canned or dried lentils and black, goetz, cannellini, garbanzo and kidney beans. Toss cooked beans in salads, soups, stews and other dishes. Canned tuna, anchovies and sardines.    Grains: Keep a stash  of oatmeal, buckwheat and other whole-grain cereals in the pantry. For an extra boost, add nuts and fresh berries to these hot cereals. Barley, farro, quinoa and other grains are staples for healthy meals. Also, keep a variety of rice on hand. Long grain, short grain, basmati and brown rice. Spaghetti, ziti, penne and other pastas are great for an easy, quick and filling meal. Give yourself an extra nutrition boost by buying whole-grain pasta or trying pasta made from legumes.    Also, stock up on:  Condiments: Ketchup, mustard and relish can be stored in the pantry until opened. Once you open them, keep them in the fridge.  Oil and vinegar: Extra-virgin olive oil is a versatile, heart-healthy option. Other oils, such as peanut, walnut and sesame, add a burst of flavor to meals.  different types of vinegar, such as cider, white and balsamic. Each imparts a unique flavor to your recipes.  Stock: Vegetable, chicken and beef stock are the basics of many recipes. Opt for low-sodium versions or ones with no added salt.  Herbs and spices:  small containers of ground herbs and spices. That way they are as fresh as possible when you use them.  Flax and other seeds: Flax and hali seeds deliver protein, dietary fiber and omega-3 fatty acids. Add them to cereal, salads, sauces and home-baked goods. If you buy whole flaxseed, make sure you grind it up before eating so your body can absorb the nutrients.    Five Food Groups Freezer List  Vegetables:  some of your favorite frozen veggies. Look for packages without sodium. And, while you are in the produce aisle, grab some fresh herbs. When you get home, fill ice cube trays with chopped herbs, top off the herbs with boiling water, and carefully place in the freezer. Add these herb cubes as you prepare meals for a punch of freshness.    Fruits: Stash frozen berries and other fruits in the freezer.     Milk and Dairy Products: Freeze Parmesan and other  pre-shredded cheeses -- toss them into soups, stews and pasta dishes.     Protein Foods: Stock up on salmon and other fatty fishes to ensure you have ready access to healthy fats. Lean meats and poultry also store well in the freezer. One tip: Make sure you move it to the refrigerator one day before cooking to give adequate time for defrosting. Keep a variety of nuts in the freezer. This helps prevent them from spoiling. Add them to cold cereal, salads, hot grains and other dishes.    Grains: Whole-grain corn tortillas freeze well and can be used for quick breakfasts, lunches or dinners. Can t eat that loaf of bread fast enough while it is fresh? Make it a habit to freeze part of the loaf and defrost slices as you need them.    Faster meal component ideas:   Protein   Crockpot chicken  Rotisserie chicken  Deli meat  Ready made meat from store ([place], [employer])  Cottage cheese  Greek yogurt  String cheese  Scrambled/hard boiled eggs  Canned tuna/chicken/salmon (or pouches)  Frozen, already cooked shrimp  Nuts/Seeds (handful)  Nut Butter (2 tbsp)  Grains  Minute grain cups (lentils, rice, quinoa)   90 second grain pouches  Whole grain bread (i.e. Bhavin bread, etc.)  Carb balance tortillas  Quick oats  Whole wheat crackers  Fruit  Apples  Bananas  Peaches  Pears  Plums  Grapes  Berries (strawberries, blueberries, raspberries, blackberries)  Cherries  Frozen fruit  Fruit canned in 100% fruit juice  Vegetables  Mini cucumbers  Baby carrots  Mini bell peppers  Snap peas  Pre-cut vegetables (i.e. broccoli, brussels sprouts, etc.)  Frozen vegetables (steam in bag)  Canned vegetables (recommend low sodium options)  Other   Hemp hearts, hali, and other seeds      Follow-Up: 2 months     Time spent with patient: 18 minutes.    Elizabeth Allen RD, LD

## 2024-10-25 NOTE — PATIENT INSTRUCTIONS
John Leyva,     Follow-up with RD in 2 months or as needed.     Thank you,    Elizabeth Allen, UYEN, LD  If you would like to schedule or reschedule an appointment with the RD, please call [phone]    Nutrition Goals  1) Continue keeping ready made chicken (example: rotisserie chicken), ready made grain packs, canned beans, and frozen vegetables on hand for faster meal options   2) Aim to get walking pad set up  3) Look at resources from Orthopedic team for exercise. Aim for 15 minutes of activity in the morning.  4) Check blood sugars 2 hours after meals.     Resistance Training with Free Weights: 3 Exercises    Seated Exercises for Arms and Legs: 11 Exercises     Five Food Groups Pantry List  Vegetables: Keep a variety of canned tomatoes in stock (diced, crushed, whole, stewed). Use them in soups, stews, sauces, casseroles and more! Also,  a jar of your favorite pasta sauce. Dried mushrooms are another great pantry item because they can add depth of flavor to your meals.    Fruits: Dried fruits such as raisins, dried cranberries and dried apricots provide dietary fiber. They also can add a punch of flavor to your morning breakfast, midday salad and dinner grains.    Milk and Dairy Products: Dried milk is a great back-up item to have on stock. You can use it in your coffee or tea. Boxed milk also is available in single-serving packages and is a great item for lunch boxes. Evaporated milk, available in cans in the baking aisle, can be substituted for liquid milk in most recipes.    Protein Foods: Stock up on canned or dried lentils and black, goetz, cannellini, garbanzo and kidney beans. Toss cooked beans in salads, soups, stews and other dishes. Canned tuna, anchovies and sardines.    Grains: Keep a stash of oatmeal, buckwheat and other whole-grain cereals in the pantry. For an extra boost, add nuts and fresh berries to these hot cereals. Barley, farro, quinoa and other grains are staples for healthy meals.  Also, keep a variety of rice on hand. Long grain, short grain, basmati and brown rice. Spaghetti, ziti, penne and other pastas are great for an easy, quick and filling meal. Give yourself an extra nutrition boost by buying whole-grain pasta or trying pasta made from legumes.    Also, stock up on:  Condiments: Ketchup, mustard and relish can be stored in the pantry until opened. Once you open them, keep them in the fridge.  Oil and vinegar: Extra-virgin olive oil is a versatile, heart-healthy option. Other oils, such as peanut, walnut and sesame, add a burst of flavor to meals.  different types of vinegar, such as cider, white and balsamic. Each imparts a unique flavor to your recipes.  Stock: Vegetable, chicken and beef stock are the basics of many recipes. Opt for low-sodium versions or ones with no added salt.  Herbs and spices:  small containers of ground herbs and spices. That way they are as fresh as possible when you use them.  Flax and other seeds: Flax and hali seeds deliver protein, dietary fiber and omega-3 fatty acids. Add them to cereal, salads, sauces and home-baked goods. If you buy whole flaxseed, make sure you grind it up before eating so your body can absorb the nutrients.    Five Food Groups Freezer List  Vegetables:  some of your favorite frozen veggies. Look for packages without sodium. And, while you are in the produce aisle, grab some fresh herbs. When you get home, fill ice cube trays with chopped herbs, top off the herbs with boiling water, and carefully place in the freezer. Add these herb cubes as you prepare meals for a punch of freshness.    Fruits: Stash frozen berries and other fruits in the freezer.     Milk and Dairy Products: Freeze Parmesan and other pre-shredded cheeses -- toss them into soups, stews and pasta dishes.     Protein Foods: Stock up on salmon and other fatty fishes to ensure you have ready access to healthy fats. Lean meats and poultry also store  well in the freezer. One tip: Make sure you move it to the refrigerator one day before cooking to give adequate time for defrosting. Keep a variety of nuts in the freezer. This helps prevent them from spoiling. Add them to cold cereal, salads, hot grains and other dishes.    Grains: Whole-grain corn tortillas freeze well and can be used for quick breakfasts, lunches or dinners. Can t eat that loaf of bread fast enough while it is fresh? Make it a habit to freeze part of the loaf and defrost slices as you need them.    Faster meal component ideas:   Protein   Crockpot chicken  Rotisserie chicken  Deli meat  Ready made meat from store ([employer], [employer])  Cottage cheese  Greek yogurt  String cheese  Scrambled/hard boiled eggs  Canned tuna/chicken/salmon (or pouches)  Frozen, already cooked shrimp  Nuts/Seeds (handful)  Nut Butter (2 tbsp)  Grains  Minute grain cups (lentils, rice, quinoa)   90 second grain pouches  Whole grain bread (i.e. Bhavin bread, etc.)  Carb balance tortillas  Quick oats  Whole wheat crackers  Fruit  Apples  Bananas  Peaches  Pears  Plums  Grapes  Berries (strawberries, blueberries, raspberries, blackberries)  Cherries  Frozen fruit  Fruit canned in 100% fruit juice  Vegetables  Mini cucumbers  Baby carrots  Mini bell peppers  Snap peas  Pre-cut vegetables (i.e. broccoli, brussels sprouts, etc.)  Frozen vegetables (steam in bag)  Canned vegetables (recommend low sodium options)  Other   Hemp hearts, hali, and other seeds      COMPREHENSIVE WEIGHT MANAGEMENT PROGRAM  VIRTUAL SUPPORT GROUPS    At [facility], our Comprehensive Weight Management program offers on-line support groups for patients who are working on weight loss and considering, preparing for, or have had weight loss surgery.     There is no cost for this opportunity.  You are invited to attend the?Virtual Support Groups?provided by any of the following locations:    [employer] via [employer] Teams with Daxa  "UYEN Mariano, RN  2.   [place] via [employer] with Ignacio Mattson, PhD, LP  3.   [place] via [employer] with Annette Nair RN  4.   [facility] via a Zoom Meeting with Annette Roberto [facility]-    The following Support Group information can also be found on our website:  [email or web address]      [facility]   WEIGHT LOSS SURGERY SUPPORT GROUP  The support group is a patient-lead forum that meets monthly to share experiences, encouragement and education. It is open to those who have had weight loss surgery, are scheduled for surgery, or are considering surgery.   WHEN: 3rd Wednesday of each month from 5:00PM - 6:00PM using Microsoft Teams.   FACILITATOR: Led by Daxa Toscano RD, LD, RN, the program's Clinical Coordinator.   TO REGISTER: Please contact the clinic via [employer] or call the nurse line directly at [phone] to inform our staff that you would like an invite sent to you and to let us know the email you would like the invite sent to. Prior to the meeting, a link with directions on how to join the meeting will be sent to you.    2024 Meetings    September 18: Anny Chandler, PhD, Outpatient Clinical Therapist, \"Pro Tips for Habit Change\".  October 16:  Let's Talk  This will be a time of group support.??   November 20:  Let's Talk  about How to Navigate the Upcoming Holiday Season.  December 18:  Let's Talk  and Celebrate the past year.       [facility] BARIATRIC CARE SUPPORT GROUP  This is open to all pre- and post- operative bariatric surgery patients as well as their support system.   WHEN: 3rd Tuesday of each month from 6:30 PM - 8:00 PM using Microsoft Teams.   FACILITATOR: Led by Ignacio Mattson, Ph.D who is a Licensed Psychologist with the [facility] Comprehensive Weight Management Program.   TO REGISTER: Please send an email to " "Ignacio Mattson, Ph.D., LP at?[email or web address]?if you would like an invitation to the group. Prior to the meeting, a link with directions on how to join the meeting will be sent to you.    2024 Meetings September 17: IN PERSON MEETING. [facility], [facility], [facility] [place], [place], [address], 1st floor Conference Room.  October 15: Date changed to OCTOBER 8th (2nd Tuesday).   November 19: \"Holiday Eating\", Keiko Brower RD, LD.  December 17: \"Hospital Stay and Compliance\", Annette Nair RN, CBN.      [facility] POST-OPERATIVE BARIATRIC SURGERY SUPPORT GROUP  This is a support group for [facility] bariatric patients (and those external to [facility]) who have had bariatric surgery and are at least 3 months post-surgery.  WHEN: 4th Thursday of the month from 11:00 AM - 12:00 PM using Microsoft Teams.   FACILITATOR: Led by Certified Bariatric Nurse, Annette Nair RN.   TO REGISTER: Please send an email to Annette at [email or web address] if you would like an invitation to the group.  Prior to the meeting, a link with directions on how to join the meeting will be sent to you.    2024 Meetings September 26 October 24 November 28: No meeting  December 26    [facility]   HEALTHY LIFESTYLE COACHING GROUP  This is a 60 minute virtual coaching group for those who want to lead a healthier lifestyle. Come together to set goals and overcome barriers in a supportive group environment. We will address the four pillars of health: nutrition, exercise, sleep, and emotional well-being. This group is highly recommended for those who are participating in the 24 week Healthy Lifestyle Plan and our Health Coaching sessions.   WHEN: 1st Friday of the month, 12:30 PM - 1:30 PM   using a Zoom meeting.     FACILITATOR: Led by National Board-Certified Health and , Annette Roberto [facility].  TO " REGISTER: Please call the [employer] at [phone] to register. You will get an appointment to attend in [employer]. Fifteen minutes prior to the meeting, complete the e-check in and you will get the link to join the meeting.  There is no charge to attend this group and space is limited.  Please register for each month you wish to attend    2024 Meetings  September 5 No meeting.  October 4 November 1 December 6

## 2024-10-25 NOTE — LETTER
"10/25/2024       RE: Autumn Holbrook  [address] [facility] [address]     Dear Colleague,    Thank you for referring your patient, Autumn Holbrook, to the [facility] [place] [facility]. Please see a copy of my visit note below.    Video-Visit Details    Type of service:  Video Visit    Video Start Time: 8:30 AM  Video End Time: 8:48 AM     Originating Location (pt. Location): Home    Distant Location (provider location):  Offsite (providers home)     Platform used for Video Visit: [employer]    Return Weight Management Nutrition Consultation    Autumn Holbrook is a 49 year old female presents today for a return weight management nutrition consultation.  Patient referred by SEVEN Nickerson on March 1, 2023.    Patient with Co-morbidities of obesity including:  Type 2 Diabetes, GERD, Knee Pain, Back Pain    Anthropometrics:  Weight 2/28/23: 287 lbs with BMI 52.36    Estimated body mass index is 48.83 kg/m  as calculated from the following:    Height as of 8/19/24: 1.578 m (5' 2.12\").    Weight as of this encounter: 121.6 kg (268 lb).     Current weight: 268 lbs    Medications for Weight Loss:  Topiramate - 75 mg daily  Ozempic - 2 mg dose    Labs 2/8/24  Lipids WNL - hx of elevated Chol  A1C 6.6   Elevated WBC  TSH WNL (hx of being elevated)     Labs 5/8/24  A1C 6.5  Elevated WBC, but improved from 2/8/24  TSH WNL (hx of being elevated)     NUTRITION HISTORY  Food allergies: none  Food intolerances: avocados  Supplements: Vitamin D3, MVI, potassium (prescribed by PCP)   Previous methods of diet modification for weight loss: Watching Portions or Calories, Exercise, Weight Watchers, Medications, Fasting    Pt reports she has been heavy for most of her life. Thyroid issues/Hasimotos make it difficult to lose weight. Has been trying to decrease carbs d/t T2DM. Often craves sugar at night. Has Moms Meals delivered- diabetic friendly and " "pre-portioned. Physical activity is limited d/t knee pain.     Please see previous RD notes for more detail.    Jan 2024:  Busy couple months with holidays. Feels self care fell off.    Has noticed more snacking recently. Craving  junk food  or fast meals - something quick such as pizza versus making a full meal.     Doing well with portion control.   Continues to forget to eat during day. Not feeling hungry during the day. Is keeping granola bars on hand.    Hydration: going well currently - was lower a couple weeks ago and noticed more swelling.     PA: wants to find an exercise regimen that works well for her    March 2024:    Feelings snacking and \"junk food\" intake is improving.     Has been on the go a lot - forgetting to eat or not eating in morning. Might munch on granola bar or two during day. First meal around 6-7 pm, then finds self very hungry. Biggest barrier is her meds - hard to take them early enough. Also finds she gets nauseous with food too early - full feeling.     Continues with MOM meals. PCA also helping make/freeze meals. Feels biggest issue in regard to eating is eating too late, letty in summer time due to losing track of time.     PA: looking into pool exercises, exploring resources I sent, walking more    May 2024:  Continues to try to take meds earlier to help with eating in morning. Hx use to take at 11 am everyday. Now aiming for 9 am. Not noticing any hunger until 1 pm typically.  Then will eat 1-2 meals.     Typical day  1-2 pm - MOMs meal OR sandwich OR granola bar  Dinner - protein/veg and a little carb. If out to eat often gets a little fries or broccoli instead of fries  Snacking - rare, might have a few bites of lunch meat or salad    Less cravings for sweets/chips overall. Occ dark chocolate cravings but easily satisfied with just a little square of chocolate.     PA: wants to increase, limited by knee pain. Getting shots this Friday to help. Hard to move for more than 5 minutes " currently.    - Has a walking pad, wants to get set up this month.    August 2024:  Has been watching carbohydrate intake. Diet is going well. Has been really busy lately and was recently sick 2 weeks ago. Over-ate after taking Ozempic once recently (had food that was very greasy) and vomited afterwards. Wants to quit smoking.    Has been using protein shakes ([employer] brand, Equate) at 12:00-1:00 pm (at least 1/2 of it). Then waits and has a sandwich with fruit in afternoon. Dinner before 6 pm, mom's meals and has been cooking casseroles, freezes some of it.    Hydration: Off/on per patient. Got dehydrated last week, then increased hydration. Drinks 2-3 diet soda per day. 1-2 glasses of milk. Uses 32 oz water bottle plus 16 oz bottle - at least 48 oz/day.    PA: Wants to work on getting exercise regimen. Hasn't gotten walking pad set up yet d/t being busy.    October 2024: Patient voices interest in seeing if she can change from Ozempic to Mounjaro. Encouraged patient to reach out to SEVEN Nickerson to discuss this interest. Patient has been working on the goals her and Roz Joseph RD talked about last visit. Doing well with stopping eating by 7 pm. Feels she could do a better job with having healthier quick meals available, also does Mom's Meals. Waiting to get a new PCA for additional help as her current one is out on maternity leave. When this new PCA starts she is going to have her help work with on some of her goals.  Drinking more water with [place] flavor enhancer. Diet soda intake is now down to 1-2 day.     Physical activity - limited due to knee pain however recently got steroid injections so they are feeling better right now. Needs to work on getting her walking pad set up. Has some new walking shoes coming soon.     Previous goals:  1. Aim to eat dinner by 7 pm - met  2. Continue keeping ready made chicken (example: rotisserie chicken), ready made grain packs, canned beans, and frozen vegetables on hand  for faster meal options - not met, continues   3. Aim to get walking pad set up - not met, continues   4. Look at resources from Orthopedic team for exercise. Aim for 15 minutes of activity in the morning. - not met, continues   5. Start incorporating more sparkling mcleod versus diet soda to help with hydration. met    Nutrition Prescription  Recommended energy/nutrient modification.    Nutrition Diagnosis  Obesity r/t long history of positive energy balance aeb BMI >30.    Nutrition Intervention  Reviewed current dietary habits and pts history   Answered pt questions  Coordination of care   Nutrition education   AVS and handouts via [employer]    Patient demonstrates understanding.    Expected Engagement: good    Nutrition Goals  1) Continue keeping ready made chicken (example: rotisserie chicken), ready made grain packs, canned beans, and frozen vegetables on hand for faster meal options   2) Aim to get walking pad set up  3) Look at resources from Orthopedic team for exercise. Aim for 15 minutes of activity in the morning.  4) Check blood sugars 2 hours after meals.     Resistance Training with Free Weights: 3 Exercises    Seated Exercises for Arms and Legs: 11 Exercises     Five Food Groups Pantry List  Vegetables: Keep a variety of canned tomatoes in stock (diced, crushed, whole, stewed). Use them in soups, stews, sauces, casseroles and more! Also,  a jar of your favorite pasta sauce. Dried mushrooms are another great pantry item because they can add depth of flavor to your meals.    Fruits: Dried fruits such as raisins, dried cranberries and dried apricots provide dietary fiber. They also can add a punch of flavor to your morning breakfast, midday salad and dinner grains.    Milk and Dairy Products: Dried milk is a great back-up item to have on stock. You can use it in your coffee or tea. Boxed milk also is available in single-serving packages and is a great item for lunch boxes. Evaporated milk,  available in cans in the baking aisle, can be substituted for liquid milk in most recipes.    Protein Foods: Stock up on canned or dried lentils and black, goetz, cannellini, garbanzo and kidney beans. Toss cooked beans in salads, soups, stews and other dishes. Canned tuna, anchovies and sardines.    Grains: Keep a stash of oatmeal, buckwheat and other whole-grain cereals in the pantry. For an extra boost, add nuts and fresh berries to these hot cereals. Barley, farro, quinoa and other grains are staples for healthy meals. Also, keep a variety of rice on hand. Long grain, short grain, basmati and brown rice. Spaghetti, ziti, penne and other pastas are great for an easy, quick and filling meal. Give yourself an extra nutrition boost by buying whole-grain pasta or trying pasta made from legumes.    Also, stock up on:  Condiments: Ketchup, mustard and relish can be stored in the pantry until opened. Once you open them, keep them in the fridge.  Oil and vinegar: Extra-virgin olive oil is a versatile, heart-healthy option. Other oils, such as peanut, walnut and sesame, add a burst of flavor to meals.  different types of vinegar, such as cider, white and balsamic. Each imparts a unique flavor to your recipes.  Stock: Vegetable, chicken and beef stock are the basics of many recipes. Opt for low-sodium versions or ones with no added salt.  Herbs and spices:  small containers of ground herbs and spices. That way they are as fresh as possible when you use them.  Flax and other seeds: Flax and hali seeds deliver protein, dietary fiber and omega-3 fatty acids. Add them to cereal, salads, sauces and home-baked goods. If you buy whole flaxseed, make sure you grind it up before eating so your body can absorb the nutrients.    Five Food Groups Freezer List  Vegetables:  some of your favorite frozen veggies. Look for packages without sodium. And, while you are in the produce aisle, grab some fresh herbs. When  you get home, fill ice cube trays with chopped herbs, top off the herbs with boiling water, and carefully place in the freezer. Add these herb cubes as you prepare meals for a punch of freshness.    Fruits: Stash frozen berries and other fruits in the freezer.     Milk and Dairy Products: Freeze Parmesan and other pre-shredded cheeses -- toss them into soups, stews and pasta dishes.     Protein Foods: Stock up on salmon and other fatty fishes to ensure you have ready access to healthy fats. Lean meats and poultry also store well in the freezer. One tip: Make sure you move it to the refrigerator one day before cooking to give adequate time for defrosting. Keep a variety of nuts in the freezer. This helps prevent them from spoiling. Add them to cold cereal, salads, hot grains and other dishes.    Grains: Whole-grain corn tortillas freeze well and can be used for quick breakfasts, lunches or dinners. Can t eat that loaf of bread fast enough while it is fresh? Make it a habit to freeze part of the loaf and defrost slices as you need them.    Faster meal component ideas:   Protein   Crockpot chicken  Rotisserie chicken  Deli meat  Ready made meat from store ([employer])  Cottage cheese  Greek yogurt  String cheese  Scrambled/hard boiled eggs  Canned tuna/chicken/salmon (or pouches)  Frozen, already cooked shrimp  Nuts/Seeds (handful)  Nut Butter (2 tbsp)  Grains  Minute grain cups (lentils, rice, quinoa)   90 second grain pouches  Whole grain bread (i.e. Bhavin bread, etc.)  Carb balance tortillas  Quick oats  Whole wheat crackers  Fruit  Apples  Bananas  Peaches  Pears  Plums  Grapes  Berries (strawberries, blueberries, raspberries, blackberries)  Cherries  Frozen fruit  Fruit canned in 100% fruit juice  Vegetables  Mini cucumbers  Baby carrots  Mini bell peppers  Snap peas  Pre-cut vegetables (i.e. broccoli, brussels sprouts, etc.)  Frozen vegetables (steam in bag)  Canned vegetables (recommend low  sodium options)  Other   Hemp hearts, hali, and other seeds      Follow-Up: 2 months     Time spent with patient: 18 minutes.    Elizabeth Allen RD, LD          Again, thank you for allowing me to participate in the care of your patient.      Sincerely,    Elizabeth Allen RD

## 2024-10-25 NOTE — NURSING NOTE
Current patient location: [address] [facility] [address]    Is the patient currently in the state of [place]? YES    Visit mode:VIDEO    If the visit is dropped, the patient can be reconnected by: VIDEO VISIT: Text to cell phone:   Telephone Information:   Mobile [phone]       Will anyone else be joining the visit? NO  (If patient encounters technical issues they should call [phone]    Are changes needed to the allergy or medication list? Pt stated no changes to allergies and Pt stated no med changes    Are refills needed on medications prescribed by this physician? NO    Reason for visit: RECHECK    Bárbara OBANDO

## 2024-10-25 NOTE — TELEPHONE ENCOUNTER
Potassium Chloride  Last signed 9/20/24 #30, 0 R  Last office visit 2/8/24  Jeanne Moreira, RN  Care Coordination

## 2024-10-27 RX ORDER — POTASSIUM CHLORIDE 750 MG/1
TABLET, EXTENDED RELEASE ORAL
Qty: 30 TABLET | Refills: 1 | Status: SHIPPED | OUTPATIENT
Start: 2024-10-27

## 2024-10-30 ENCOUNTER — VIRTUAL VISIT (OUTPATIENT)
Dept: ENDOCRINOLOGY | Facility: CLINIC | Age: 49
End: 2024-10-30
Payer: COMMERCIAL

## 2024-10-30 VITALS — WEIGHT: 265 LBS | BODY MASS INDEX: 46.95 KG/M2 | HEIGHT: 63 IN

## 2024-10-30 DIAGNOSIS — E66.01 CLASS 3 SEVERE OBESITY WITH SERIOUS COMORBIDITY AND BODY MASS INDEX (BMI) OF 45.0 TO 49.9 IN ADULT, UNSPECIFIED OBESITY TYPE (H): ICD-10-CM

## 2024-10-30 DIAGNOSIS — E11.9 TYPE 2 DIABETES MELLITUS WITHOUT COMPLICATION, WITHOUT LONG-TERM CURRENT USE OF INSULIN (H): Primary | ICD-10-CM

## 2024-10-30 DIAGNOSIS — R11.2 NAUSEA AND VOMITING, UNSPECIFIED VOMITING TYPE: ICD-10-CM

## 2024-10-30 DIAGNOSIS — E66.813 CLASS 3 SEVERE OBESITY WITH SERIOUS COMORBIDITY AND BODY MASS INDEX (BMI) OF 45.0 TO 49.9 IN ADULT, UNSPECIFIED OBESITY TYPE (H): ICD-10-CM

## 2024-10-30 DIAGNOSIS — K21.9 GASTROESOPHAGEAL REFLUX DISEASE WITHOUT ESOPHAGITIS: ICD-10-CM

## 2024-10-30 PROCEDURE — G2211 COMPLEX E/M VISIT ADD ON: HCPCS | Mod: 95

## 2024-10-30 PROCEDURE — 99203 OFFICE O/P NEW LOW 30 MIN: CPT | Mod: 95

## 2024-10-30 RX ORDER — TIRZEPATIDE 5 MG/.5ML
5 INJECTION, SOLUTION SUBCUTANEOUS
Qty: 2 ML | Refills: 3 | OUTPATIENT
Start: 2024-10-30 | End: 2024-10-30

## 2024-10-30 RX ORDER — TIRZEPATIDE 5 MG/.5ML
5 INJECTION, SOLUTION SUBCUTANEOUS
Qty: 2 ML | Refills: 4 | Status: SHIPPED | OUTPATIENT
Start: 2024-10-30

## 2024-10-30 RX ORDER — FAMOTIDINE 20 MG/1
20 TABLET, FILM COATED ORAL 2 TIMES DAILY PRN
Qty: 60 TABLET | Refills: 3 | Status: SHIPPED | OUTPATIENT
Start: 2024-10-30

## 2024-10-30 NOTE — PATIENT INSTRUCTIONS
"Thank you for allowing us the privilege of caring for you. We hope we provided you with the excellent service you deserve.   Please let us know if there is anything else we can do for you so that we can be sure you are completely satisfied with your care experience.    To ensure the quality of our services you may be receiving a patient satisfaction survey from an independent patient satisfaction monitoring company.    The greatest compliment you can give is a \"Likely to Recommend\"    Your visit was with Roz Coffey PA-C today.    Instructions per today's visit:     John Holbrook, it was great to visit with you today.  Here is a review of our visit.  If our clinic scheduler is not able to reach you please call [phone] to schedule your next appointments.    Plan  Stop ozempic due to significant nausea, vomiting side effects   Will order cmp, lipase to evaluate for intermittent nausea symptoms   Start famotidine 20mg bid prn for break through heart burn symptoms   Will trial switching to low dose mounjaro to see if more tolerable- Autumn will reach out to clinic if seeing similar or worsened nausea/vomiting  with this med   Labs ordered today: lipase,   Follow up with Ivone Moses scheduled 4/9/25   [facility] pharmacy in 8 weeks to check in on mounjaro start   Dietician appointment with RD to be scheduled asap   Keep up the excellent work!       Information about Video Visits with [email or web address]: video visit information  _________________________________________________________________________________________________________________________________________________________  If you are asked by your clinic team to have your blood pressure checked:  [facility] do offer several locations for blood pressure checks. Please follow the below link to schedule an appointment. Scheduling an appointment at the pharmacy for a blood pressure check is now preferred.    Appointment Plus " ([email or web address])  _________________________________________________________________________________________________________________________________________________________  Important contact and scheduling information:  Please call our contact center at [phone] to schedule your next appointments.  To find a lab location near you, please call [phone].  For any nursing questions or concerns call Jovita Macdonald LPN at [phone] or Karly Neville RN at [phone]  Please call during clinic hours Monday through Friday 8:00a - 4:00p if you have questions or you can contact us via [email or web address] at anytime and we will reply during clinic hours.    Lab results will be communicated through My Chart or letter (if My Chart not used). Please call the clinic if you have not received communication after 1 week or if you have any questions.?  Clinic Fax: [phone]    _________________________________________________________________________________________________________________________________________________________  Meal Replacement Products:    Here is the link to our new e-store where you can purchase our meal replacement products    [facility] E-Store  [facility] Plan/store    The one week starter kit is a great way to sample a variety of products and see what works for you.    If you want more information about the product go to: [employer] Steps Meals  [email or web address]    If you are an employee or [facility] or [facility] please contact your care team for a 10% estore discount    Free Shipping for orders over $75     Benefits of meal replacements products:    Portion and calorie control  Improved nutrition  Structured eating  Simplified food choices  Avoid contact with trigger foods  _________________________________________________________________________________________________________________________________________________________  Interested in working with a health  ?  Health coaches work with you to improve your overall health and wellbeing.  They look at the whole person, and may involve discussion of different areas of life, including, but not limited to the four pillars of health (sleep, exercise, nutrition, and stress management). Discuss with your care team if you would like to start working a health .  Health Coaching-3 Pack: Schedule by calling [phone]    $99 for three health coaching visits    Visits may be done in person or via phone    Coaching is a partnership between the  and the client; Coaches do not prescribe or diagnose    Coaching helps inspire the client to reach his/her personal goals   _________________________________________________________________________________________________________________________________________________________  24 Week Healthy Lifestyle Plan:    Our mission in the 24-week Healthy Lifestyle Plan is to provide you with individualized care by giving you the tools, education and support you need to lose weight and maintain a healthy lifestyle. In your 24-week journey, you ll be supported by a dedicated weight loss team that includes registered dietitians, medical weight management providers, health coaches, and nurses -- all with special expertise in weight loss -- to help you every step of the way.     Monthly meetings with your registered dietician or medical weight management provider help to review your progress, update your care plan, and make any adjustments needed to ensure success. Between these visits, weekly and bi-weekly health  visits will help you focus on the four pillars of weight loss -- stress, sleep, nutrition, and exercise -- and how you can best adapt each to achieve sustainable weight loss results.    In addition, you will be given exclusive access to online wellbeing classes through [employer].  Your initial visit will be with a medical weight management provider who will help to  understand your weight loss goals and ensure this program is the right fit for you. Please let our team know if you are interested in the 24 week plan by sending a message to your care team or calling [phone] to schedule.  _________________________________________________________________________________________________________________________________________________________  __________  [facility] Get Moving Program  Our team of physical therapists is trained to help you understand and take control of your condition. They will perform a thorough evaluation to determine your ability for activity and develop a customized plan to fit your goals and physical ability.  Scheduling: Unsure if the Get Moving program is right for you? Discuss the program with your medical provider or diabetes educator. You can also call us at [phone] to ask questions or schedule an appointment.   [facility] Get Moving Program  ____________________________________________________________________________________________________________________________________________________________________________  [facility] Diabetes Prevention Program (DPP)  If you have prediabetes and Medicare please contact us via [employer] to learn more about the Diabetes Prevention Program (DPP)  Program Details:   [facility] offers the year-long Diabetes Prevention Program (DPP). The program helps you to make lifestyle changes that prevent or delay type 2 diabetes by supporting healthy eating, increased physical activity, stress reduction and use of coping skills.   On average, previous [facility] DPP cohorts have lost and maintained at least 5% of their starting weight throughout the program and averaged more than 150 minutes of physical activity per week.  Participants meet weekly for one-hour group sessions over sixteen weeks, every other week for the next 8 weeks, and monthly for the last six months.   A year-long  maintenance program is also available for participants who complete the first year.   Location & Cost:   During the COVID-19 Public Health Emergency, the program is offered virtually. When in-person classes can resume, they will be held at [facility].  For people with Medicare, the program is covered in full. A self-pay option will also be available for those with non-Medicare insurance plans.   ______________________________________________________________________________________________________________________________________________________________________________________________________________________________    To work with a Behavioral Health Psychologist:    Call to schedule:    Sukhi Candelario - [phone]  Artemio Hand - [phone]  Raven Ortiz - [phone]  Coral Campbell - [phone]   Keiko Mackay PhD (cannot accept Medicare) [phone]        Thank you,   [facility] Comprehensive Weight Management Team

## 2024-10-30 NOTE — PROGRESS NOTES
Virtual Visit Details    Type of service:  Video Visit   Video Start Time:  3:04pm  Video End Time:3:29 PM    Originating Location (pt. Location): Home    Distant Location (provider location):  Off-site  Platform used for Video Visit: Bell

## 2024-10-30 NOTE — LETTER
10/30/2024       RE: Autumn Holbrook  [address] [facility] [address]     Dear Colleague,    Thank you for referring your patient, Autumn Holbrook, to the [facility] [place] [facility]. Please see a copy of my visit note below.      Return Medical Weight Management Note     Autumn Holbrook  MRN:  [id number]  :  1975  JALYN:  10/30/2024    Dear SEVEN Bryant,    I had the pleasure of seeing your patient Autumn Holbrook. She is a 49 year old female who I am continuing to see for treatment of obesity related to:        3/1/2023     1:32 PM   --   I have the following health issues associated with obesity Type II Diabetes    GERD (Reflux)    Stress Incontinence    Osteoarthritis (joint disease)    Hypothyroidism   I have the following symptoms associated with obesity Knee Pain    Depression    Lower Extremity Swelling    Back Pain    Fatigue    Irregular Menstral Cycle       Assessment & Plan  Problem List Items Addressed This Visit       Diabetes mellitus, type 2 (H) - Primary    Relevant Medications    MOUNJARO 5 MG/0.5ML SOAJ    famotidine (PEPCID) 20 MG tablet    GERD (gastroesophageal reflux disease)    Relevant Medications    famotidine (PEPCID) 20 MG tablet    Other Relevant Orders    Lipase    Comprehensive metabolic panel     Other Visit Diagnoses       Class 3 severe obesity with serious comorbidity and body mass index (BMI) of 45.0 to 49.9 in adult, unspecified obesity type (H)        Relevant Medications    MOUNJARO 5 MG/0.5ML SOAJ    famotidine (PEPCID) 20 MG tablet    Nausea and vomiting, unspecified vomiting type        Relevant Orders    Lipase    Comprehensive metabolic panel           Plan  Stop ozempic due to significant nausea, vomiting side effects   Will order cmp, lipase to evaluate for intermittent nausea symptoms   Start famotidine 20mg bid prn for break through heart burn symptoms   Will trial  "switching to low dose mounjaro to see if more tolerable- Autumn will reach out to clinic if seeing similar or worsened nausea/vomiting  with this med   Labs ordered today: lipase, cmp  Follow up with Ivone Moses scheduled 4/9/25   [facility] pharmacy in 8 weeks to check in on mounjaro start   Dietician appointment with RD to be scheduled asap   Keep up the excellent work!     Anti-obesity medication started today for this patient   MOUNJARO    No history of pancreatitis   No personal or family history of medullary thyroid carcinoma  No personal or family history of Multiple Endocrine Neoplasia Type 2  Caution would be needed with this medication due to nausea, vomiting with ozempic. Will monitor closely   .         INTERVAL HISTORY:  MWM typically with Ivone Moses, last seen by her 5/29/24  Weight mgmt follow up  Highest wt in life 325 lbs  Today 269 lbs, stable since last visit  Taking Ozempic 2mg weekly  Taking topiramate 75mg at bedtime, can take earlier in the evening  Type 2 DM A1C stable at 6.6     Refill topiramate and ozempic      Follow up   UYEN Draper 5/15 already scheduled  Ivone 6 months return MWM    Today in visit- new to me- 10/30/24   4lbs weight loss since last visit  Notes that she's been having long term GERD, nausea, intolerable \"sulfur burps, \" some issues with vomiting since starting ozempic 18 months ago. Feels that the vomiting is often first triggered by worsened GERD. Estimates she's throwing up typically once a month after eating too big of a meal or the morning after eating a big meal.  However every 2-3 months will have a bout of extreme nausea where she throws up several times in 1 day, then will go back to baseline. Sometimes can connect these episodes with greasy foods. This day of worsened nausea last occurred July 2024.   Hadn't correlated these side effects with ozempic until this summer when talking with diabetes educator, so hadn't mentioned them to our clinic until now.     Is " interested in trailing mounjaro to see if it would be more helpful with regards to side effects       Seeing some worsened cravings a few times a week- particularly cravings for pizza, processed foods, chips, dark chocolate.   Wt Readings from Last 5 Encounters:   10/30/24 120.2 kg (265 lb)   10/25/24 121.6 kg (268 lb)   08/19/24 119.7 kg (264 lb)   08/01/24 118.8 kg (262 lb)   05/29/24 122 kg (269 lb)       Anti-obesity medication history    Current:   Ozempic- nausea, sulfur burps, vomiting once a month, every 2-3 months will have a bout of extreme nausea and throwing up multiple times in 1 day, then this will resolve. Some intermittent nausea and vomiting in the past with pain kills prior to ozempic.   Topiramate- switched to taking in the afternoon which has been helpful with having smaller dinners.     Past/Failed/contraindicated:     GERD symptoms: Taking omeprazole 40mg daily, sees breakthrough symptoms with eating greasy foods, sometimes can trigger an episode of vomiting.       Recent diet changes: prioritizing protein and fiber, healthy fats such as extra virgin olive oil and yogurt. Minimal butter. Eating dinners earlier in the day than previously, lately between 6pm and 7pm.   Cut down on diet soda intake- used to do 3 sodas a day, down to 1 soda a day.   Working on increasing water, getting 2 32oz bottles a day.     Recent exercise/activity changes: some increase in activity, but hoping to eventually implement more regular exercise. Just ordered some diabetic sneakers- also has a walking pad that she hopes to use.       Vitamins/Labs: A1c as of August improved to 6.2, down from 6.7 1 year ago.       CURRENT WEIGHT:   265 lbs 0 oz    Initial Weight (lbs): 284 lbs  Last Visits Weight: 121.6 kg (268 lb)  Cumulative weight loss (lbs): 19  Weight Loss Percentage: 6.69%        10/30/2024    12:17 PM   Changes and Difficulties   I have made the following changes to my diet since my last visit: Cut down to 1  diet soda a day, low carbs, Mom's meals, more veggies and water, focus on protein, healthy fats, and fiber.   With regards to my diet, I am still struggling with: Checking blood sugar after dinner, cravings, forgetting to eat, having to eat on the go   I have made the following changes to my activity/exercise since my last visit: Ordered diabetic sneakers for walking, got a walking pad need to start it tho. Have been more active getting out & doing more around the house. Moving around every 20 mins   With regards to my activity/exercise, I am still struggling with: Severe knee pain, back pain, being overweight, not strong enough anymore             MEDICATIONS:   Current Outpatient Medications   Medication Sig Dispense Refill     famotidine (PEPCID) 20 MG tablet Take 1 tablet (20 mg) by mouth 2 times daily as needed (indigestion). 60 tablet 3     MOUNJARO 5 MG/0.5ML SOAJ Inject 0.5 mLs (5 mg) subcutaneously every 7 days. 2 mL 4     Acetaminophen (TYLENOL PO) Take 500 mg by mouth every 4 hours as needed for mild pain or fever       asenapine (SAPHRIS) 2.5 MG SUBL sublingual tablet Place 2.5 mg under the tongue daily (2.5 mg + 5 mg = 7.5 mg)       asenapine (SAPHRIS) 5 MG SUBL sublingual tablet Place 5 mg under the tongue daily (2.5 mg + 5 mg = 7.5 mg)       baclofen (LIORESAL) 10 MG tablet TAKE 1 TABLET BY MOUTH THREE TIMES DAILY 90 tablet 0     blood glucose (NO BRAND SPECIFIED) lancets standard Use to test blood sugar 1 times daily 100 each 5     blood glucose (NO BRAND SPECIFIED) test strip Use to test blood sugar 1 time daily 50 strip 5     blood glucose monitoring (NO BRAND SPECIFIED) meter device kit Use to test blood sugar 1 time daily 1 kit 0     blood glucose monitoring (SOFTCLIX) lancets USE 1 TO CHECK GLUCOSE ONCE DAILY       diclofenac (VOLTAREN) 1 % topical gel Apply 2 g topically 4 times daily as needed for moderate pain       furosemide (LASIX) 20 MG tablet TAKE 2 TABLETS BY MOUTH ONCE DAILY AS NEEDED  FOR  LEG  SWELLING 60 tablet 11     hydrochlorothiazide (HYDRODIURIL) 25 MG tablet Take 1 tablet by mouth once daily 90 tablet 0     hydrOXYzine (ATARAX) 25 MG tablet Take 25 mg by mouth 4 times daily as needed for anxiety       ibuprofen (ADVIL/MOTRIN) 800 MG tablet Take 1 tablet (800 mg) by mouth every 8 hours as needed for moderate pain (4-6) 90 tablet 0     levothyroxine (SYNTHROID/LEVOTHROID) 175 MCG tablet Take 1 tablet by mouth once daily 90 tablet 3     lisdexamfetamine (VYVANSE) 50 MG capsule Take 50 mg by mouth every morning       Melatonin 10 MG CAPS Take 10 mg by mouth nightly as needed (insomnia)       multivitamin w/minerals (THERA-VIT-M) tablet Take 1 tablet by mouth daily       nicotine (NICORETTE) 2 MG gum CHEW 1-2 PIECES PER HOUR OR 10-12 PIECES PER DAY--TAPER AS TOLERATED       omeprazole (PRILOSEC) 40 MG DR capsule Take 1 capsule by mouth once daily 90 capsule 3     OneTouch Delica Lancets 33G MISC 1 Lancet daily 100 each 1     oxyBUTYnin ER (DITROPAN XL) 10 MG 24 hr tablet Take 1 tablet (10 mg) by mouth daily 90 tablet 3     potassium chloride ER (K-TAB) 20 MEQ CR tablet TAKE 1 TABLET BY MOUTH ONCE DAILY ALONG  WITH  A  10MG  TABLET  **THIS  SHOULD  ONLY  BE  IF  TAKING  LASIX** 90 tablet 0     potassium chloride ER (K-TAB/KLOR-CON) 10 MEQ CR tablet TAKE 1 TABLET BY MOUTH ONCE DAILY (WHEN TAKING LASIX) 30 tablet 1     rosuvastatin (CRESTOR) 5 MG tablet Take 1 tablet by mouth once daily 90 tablet 3     Semaglutide, 2 MG/DOSE, (OZEMPIC) 8 MG/3ML pen Inject 2 mg Subcutaneous every 7 days 9 mL 3     sertraline (ZOLOFT) 50 MG tablet Take 75 mg by mouth daily       topiramate (TOPAMAX) 25 MG tablet Take 3 tablets (75 mg) by mouth at bedtime 270 tablet 1     Vitamin D3 (CHOLECALCIFEROL) 125 MCG (5000 UT) tablet Take 1 tablet by mouth daily       VRAYLAR 3 MG CAPS capsule Take 3 mg by mouth daily             10/30/2024    12:17 PM   Weight Loss Medication History Reviewed With Patient   Which weight  "loss medications are you currently taking on a regular basis? Ozempic    Topamax (topiramate)    Vyvanse   Are you having any side effects from the weight loss medication that we have prescribed you? Yes   If you are having side effects please describe: Nausea & vomiting, sulfur burps, acid reflux, cravings               11/28/2018    12:00 PM 3/7/2019     4:00 PM   KEN Score (Last Two)   KEN Raw Score 27 27   Activation Score 47.4 47.4   KEN Level 2 2         PHYSICAL EXAM:  Objective   Ht 1.6 m (5' 2.99\")   Wt 120.2 kg (265 lb)   BMI 46.95 kg/m             Vitals:  No vitals were obtained today due to virtual visit.    GENERAL: alert and no distress  EYES: Eyes grossly normal to inspection.  No discharge or erythema, or obvious scleral/conjunctival abnormalities.  RESP: No audible wheeze, cough, or visible cyanosis.    SKIN: Visible skin clear. No significant rash, abnormal pigmentation or lesions.  NEURO: Cranial nerves grossly intact.  Mentation and speech appropriate for age.  PSYCH: Appropriate affect, tone, and pace of words        Sincerely,    Roz Coffey PA-C      30 minutes spent by me on the date of the encounter doing chart review, history and exam, documentation and further activities per the note    The longitudinal plan of care for the diagnosis(es)/condition(s) as documented were addressed during this visit. Due to the added complexity in care, I will continue to support Autumn in the subsequent management and with ongoing continuity of care.     Virtual Visit Details    Type of service:  Video Visit   Video Start Time:  3:04pm  Video End Time:3:29 PM    Originating Location (pt. Location): Home    Distant Location (provider location):  Off-site  Platform used for Video Visit: AmWell          Again, thank you for allowing me to participate in the care of your patient.      Sincerely,    Roz Coffey PA-C    "

## 2024-10-30 NOTE — PROGRESS NOTES
Return Medical Weight Management Note     Autumn Holbrook  MRN:  [id number]  :  1975  JALYN:  10/30/2024    Dear SEVEN Bryant,    I had the pleasure of seeing your patient Autumn Holbrook. She is a 49 year old female who I am continuing to see for treatment of obesity related to:        3/1/2023     1:32 PM   --   I have the following health issues associated with obesity Type II Diabetes    GERD (Reflux)    Stress Incontinence    Osteoarthritis (joint disease)    Hypothyroidism   I have the following symptoms associated with obesity Knee Pain    Depression    Lower Extremity Swelling    Back Pain    Fatigue    Irregular Menstral Cycle       Assessment & Plan   Problem List Items Addressed This Visit       Diabetes mellitus, type 2 (H) - Primary    Relevant Medications    MOUNJARO 5 MG/0.5ML SOAJ    famotidine (PEPCID) 20 MG tablet    GERD (gastroesophageal reflux disease)    Relevant Medications    famotidine (PEPCID) 20 MG tablet    Other Relevant Orders    Lipase    Comprehensive metabolic panel     Other Visit Diagnoses       Class 3 severe obesity with serious comorbidity and body mass index (BMI) of 45.0 to 49.9 in adult, unspecified obesity type (H)        Relevant Medications    MOUNJARO 5 MG/0.5ML SOAJ    famotidine (PEPCID) 20 MG tablet    Nausea and vomiting, unspecified vomiting type        Relevant Orders    Lipase    Comprehensive metabolic panel           Plan  Stop ozempic due to significant nausea, vomiting side effects   Will order cmp, lipase to evaluate for intermittent nausea symptoms   Start famotidine 20mg bid prn for break through heart burn symptoms   Will trial switching to low dose mounjaro to see if more tolerable- Autumn will reach out to clinic if seeing similar or worsened nausea/vomiting  with this med   Labs ordered today: lipase, cmp  Follow up with Ivone Moses scheduled 25   [facility] pharmacy in 8 weeks to check in on mounjaro start   Dietician appointment with UYEN  "to be scheduled asap   Keep up the excellent work!     Anti-obesity medication started today for this patient   MOUNJARO    No history of pancreatitis   No personal or family history of medullary thyroid carcinoma  No personal or family history of Multiple Endocrine Neoplasia Type 2  Caution would be needed with this medication due to nausea, vomiting with ozempic. Will monitor closely   .         INTERVAL HISTORY:  MWM typically with Ivone Moses, last seen by her 5/29/24  Weight mgmt follow up  Highest wt in life 325 lbs  Today 269 lbs, stable since last visit  Taking Ozempic 2mg weekly  Taking topiramate 75mg at bedtime, can take earlier in the evening  Type 2 DM A1C stable at 6.6     Refill topiramate and ozempic      Follow up   UYEN Draper 5/15 already scheduled  Ivone 6 months return MWM    Today in visit- new to me- 10/30/24   4lbs weight loss since last visit  Notes that she's been having long term GERD, nausea, intolerable \"sulfur burps, \" some issues with vomiting since starting ozempic 18 months ago. Feels that the vomiting is often first triggered by worsened GERD. Estimates she's throwing up typically once a month after eating too big of a meal or the morning after eating a big meal.  However every 2-3 months will have a bout of extreme nausea where she throws up several times in 1 day, then will go back to baseline. Sometimes can connect these episodes with greasy foods. This day of worsened nausea last occurred July 2024.   Hadn't correlated these side effects with ozempic until this summer when talking with diabetes educator, so hadn't mentioned them to our clinic until now.     Is interested in trailing mounjaro to see if it would be more helpful with regards to side effects       Seeing some worsened cravings a few times a week- particularly cravings for pizza, processed foods, chips, dark chocolate.   Wt Readings from Last 5 Encounters:   10/30/24 120.2 kg (265 lb)   10/25/24 121.6 kg (268 lb) "   08/19/24 119.7 kg (264 lb)   08/01/24 118.8 kg (262 lb)   05/29/24 122 kg (269 lb)       Anti-obesity medication history    Current:   Ozempic- nausea, sulfur burps, vomiting once a month, every 2-3 months will have a bout of extreme nausea and throwing up multiple times in 1 day, then this will resolve. Some intermittent nausea and vomiting in the past with pain kills prior to ozempic.   Topiramate- switched to taking in the afternoon which has been helpful with having smaller dinners.     Past/Failed/contraindicated:     GERD symptoms: Taking omeprazole 40mg daily, sees breakthrough symptoms with eating greasy foods, sometimes can trigger an episode of vomiting.       Recent diet changes: prioritizing protein and fiber, healthy fats such as extra virgin olive oil and yogurt. Minimal butter. Eating dinners earlier in the day than previously, lately between 6pm and 7pm.   Cut down on diet soda intake- used to do 3 sodas a day, down to 1 soda a day.   Working on increasing water, getting 2 32oz bottles a day.     Recent exercise/activity changes: some increase in activity, but hoping to eventually implement more regular exercise. Just ordered some diabetic sneakers- also has a walking pad that she hopes to use.       Vitamins/Labs: A1c as of August improved to 6.2, down from 6.7 1 year ago.       CURRENT WEIGHT:   265 lbs 0 oz    Initial Weight (lbs): 284 lbs  Last Visits Weight: 121.6 kg (268 lb)  Cumulative weight loss (lbs): 19  Weight Loss Percentage: 6.69%        10/30/2024    12:17 PM   Changes and Difficulties   I have made the following changes to my diet since my last visit: Cut down to 1 diet soda a day, low carbs, Mom's meals, more veggies and water, focus on protein, healthy fats, and fiber.   With regards to my diet, I am still struggling with: Checking blood sugar after dinner, cravings, forgetting to eat, having to eat on the go   I have made the following changes to my activity/exercise since my  last visit: Ordered diabetic sneakers for walking, got a walking pad need to start it tho. Have been more active getting out & doing more around the house. Moving around every 20 mins   With regards to my activity/exercise, I am still struggling with: Severe knee pain, back pain, being overweight, not strong enough anymore             MEDICATIONS:   Current Outpatient Medications   Medication Sig Dispense Refill    famotidine (PEPCID) 20 MG tablet Take 1 tablet (20 mg) by mouth 2 times daily as needed (indigestion). 60 tablet 3    MOUNJARO 5 MG/0.5ML SOAJ Inject 0.5 mLs (5 mg) subcutaneously every 7 days. 2 mL 4    Acetaminophen (TYLENOL PO) Take 500 mg by mouth every 4 hours as needed for mild pain or fever      asenapine (SAPHRIS) 2.5 MG SUBL sublingual tablet Place 2.5 mg under the tongue daily (2.5 mg + 5 mg = 7.5 mg)      asenapine (SAPHRIS) 5 MG SUBL sublingual tablet Place 5 mg under the tongue daily (2.5 mg + 5 mg = 7.5 mg)      baclofen (LIORESAL) 10 MG tablet TAKE 1 TABLET BY MOUTH THREE TIMES DAILY 90 tablet 0    blood glucose (NO BRAND SPECIFIED) lancets standard Use to test blood sugar 1 times daily 100 each 5    blood glucose (NO BRAND SPECIFIED) test strip Use to test blood sugar 1 time daily 50 strip 5    blood glucose monitoring (NO BRAND SPECIFIED) meter device kit Use to test blood sugar 1 time daily 1 kit 0    blood glucose monitoring (SOFTCLIX) lancets USE 1 TO CHECK GLUCOSE ONCE DAILY      diclofenac (VOLTAREN) 1 % topical gel Apply 2 g topically 4 times daily as needed for moderate pain      furosemide (LASIX) 20 MG tablet TAKE 2 TABLETS BY MOUTH ONCE DAILY AS NEEDED FOR  LEG  SWELLING 60 tablet 11    hydrochlorothiazide (HYDRODIURIL) 25 MG tablet Take 1 tablet by mouth once daily 90 tablet 0    hydrOXYzine (ATARAX) 25 MG tablet Take 25 mg by mouth 4 times daily as needed for anxiety      ibuprofen (ADVIL/MOTRIN) 800 MG tablet Take 1 tablet (800 mg) by mouth every 8 hours as needed for  moderate pain (4-6) 90 tablet 0    levothyroxine (SYNTHROID/LEVOTHROID) 175 MCG tablet Take 1 tablet by mouth once daily 90 tablet 3    lisdexamfetamine (VYVANSE) 50 MG capsule Take 50 mg by mouth every morning      Melatonin 10 MG CAPS Take 10 mg by mouth nightly as needed (insomnia)      multivitamin w/minerals (THERA-VIT-M) tablet Take 1 tablet by mouth daily      nicotine (NICORETTE) 2 MG gum CHEW 1-2 PIECES PER HOUR OR 10-12 PIECES PER DAY--TAPER AS TOLERATED      omeprazole (PRILOSEC) 40 MG DR capsule Take 1 capsule by mouth once daily 90 capsule 3    OneTouch Delica Lancets 33G MISC 1 Lancet daily 100 each 1    oxyBUTYnin ER (DITROPAN XL) 10 MG 24 hr tablet Take 1 tablet (10 mg) by mouth daily 90 tablet 3    potassium chloride ER (K-TAB) 20 MEQ CR tablet TAKE 1 TABLET BY MOUTH ONCE DAILY ALONG  WITH  A  10MG  TABLET  **THIS  SHOULD  ONLY  BE  IF  TAKING  LASIX** 90 tablet 0    potassium chloride ER (K-TAB/KLOR-CON) 10 MEQ CR tablet TAKE 1 TABLET BY MOUTH ONCE DAILY (WHEN TAKING LASIX) 30 tablet 1    rosuvastatin (CRESTOR) 5 MG tablet Take 1 tablet by mouth once daily 90 tablet 3    Semaglutide, 2 MG/DOSE, (OZEMPIC) 8 MG/3ML pen Inject 2 mg Subcutaneous every 7 days 9 mL 3    sertraline (ZOLOFT) 50 MG tablet Take 75 mg by mouth daily      topiramate (TOPAMAX) 25 MG tablet Take 3 tablets (75 mg) by mouth at bedtime 270 tablet 1    Vitamin D3 (CHOLECALCIFEROL) 125 MCG (5000 UT) tablet Take 1 tablet by mouth daily      VRAYLAR 3 MG CAPS capsule Take 3 mg by mouth daily             10/30/2024    12:17 PM   Weight Loss Medication History Reviewed With Patient   Which weight loss medications are you currently taking on a regular basis? Ozempic    Topamax (topiramate)    Vyvanse   Are you having any side effects from the weight loss medication that we have prescribed you? Yes   If you are having side effects please describe: Nausea & vomiting, sulfur burps, acid reflux, cravings               11/28/2018    12:00 PM  "3/7/2019     4:00 PM   KEN Score (Last Two)   KEN Raw Score 27 27   Activation Score 47.4 47.4   KEN Level 2 2         PHYSICAL EXAM:  Objective    Ht 1.6 m (5' 2.99\")   Wt 120.2 kg (265 lb)   BMI 46.95 kg/m             Vitals:  No vitals were obtained today due to virtual visit.    GENERAL: alert and no distress  EYES: Eyes grossly normal to inspection.  No discharge or erythema, or obvious scleral/conjunctival abnormalities.  RESP: No audible wheeze, cough, or visible cyanosis.    SKIN: Visible skin clear. No significant rash, abnormal pigmentation or lesions.  NEURO: Cranial nerves grossly intact.  Mentation and speech appropriate for age.  PSYCH: Appropriate affect, tone, and pace of words        Sincerely,    Roz Coffey PA-C      30 minutes spent by me on the date of the encounter doing chart review, history and exam, documentation and further activities per the note    The longitudinal plan of care for the diagnosis(es)/condition(s) as documented were addressed during this visit. Due to the added complexity in care, I will continue to support Autumn in the subsequent management and with ongoing continuity of care.   "

## 2024-11-03 ENCOUNTER — HEALTH MAINTENANCE LETTER (OUTPATIENT)
Age: 49
End: 2024-11-03

## 2024-11-05 ENCOUNTER — TRANSFERRED RECORDS (OUTPATIENT)
Dept: HEALTH INFORMATION MANAGEMENT | Facility: CLINIC | Age: 49
End: 2024-11-05

## 2024-11-05 LAB — RETINOPATHY: NEGATIVE

## 2024-11-12 ENCOUNTER — OFFICE VISIT (OUTPATIENT)
Dept: PODIATRY | Facility: OTHER | Age: 49
End: 2024-11-12
Attending: PODIATRIST
Payer: COMMERCIAL

## 2024-11-12 VITALS
DIASTOLIC BLOOD PRESSURE: 80 MMHG | TEMPERATURE: 97.8 F | OXYGEN SATURATION: 96 % | SYSTOLIC BLOOD PRESSURE: 112 MMHG | HEART RATE: 77 BPM

## 2024-11-12 DIAGNOSIS — E11.42 DIABETIC POLYNEUROPATHY ASSOCIATED WITH TYPE 2 DIABETES MELLITUS (H): ICD-10-CM

## 2024-11-12 DIAGNOSIS — L84 CALLUS OF FOOT: ICD-10-CM

## 2024-11-12 DIAGNOSIS — Z13.89 SCREENING FOR DIABETIC PERIPHERAL NEUROPATHY: ICD-10-CM

## 2024-11-12 DIAGNOSIS — L85.3 XEROSIS OF SKIN: ICD-10-CM

## 2024-11-12 DIAGNOSIS — E11.9 DIABETES MELLITUS TYPE 2, NONINSULIN DEPENDENT (H): ICD-10-CM

## 2024-11-12 DIAGNOSIS — L60.3 ONYCHODYSTROPHY: ICD-10-CM

## 2024-11-12 PROCEDURE — 99213 OFFICE O/P EST LOW 20 MIN: CPT | Mod: 25 | Performed by: PODIATRIST

## 2024-11-12 PROCEDURE — 99207 PR FOOT EXAM NO CHARGE: CPT | Performed by: PODIATRIST

## 2024-11-12 PROCEDURE — 11721 DEBRIDE NAIL 6 OR MORE: CPT | Performed by: PODIATRIST

## 2024-11-12 PROCEDURE — 11056 PARNG/CUTG B9 HYPRKR LES 2-4: CPT | Performed by: PODIATRIST

## 2024-11-12 ASSESSMENT — PAIN SCALES - GENERAL: PAINLEVEL_OUTOF10: SEVERE PAIN (7)

## 2024-11-12 NOTE — PROGRESS NOTES
Chief complaint: Patient presents with:  Toenail: Trimming   NEUROPATHY      History of Present Illness: This 49-year-old NIDDM II female is seen for a diabetic foot exam and high risk nail debridement.    She has had leg cramps recently (RIGHT more than LEFT). This is newer for her but has been more noticeable. This has been worse during the day and it varies day-to-day.    She says her skin is also very dry on her bilateral foot. Nothing is helping reduce the dry skin. She is wearing Vaseline-intensive care sensitive skin but it is not helping. She has diffusely flaking skin on her legs and she is looking for treatment options for her flaking skin.    She gets burning, tingling, and numbness in her feet.    No further pedal complaints today.        /80 (BP Location: Left arm, Patient Position: Sitting, Cuff Size: Adult Large)   Pulse 77   Temp 97.8  F (36.6  C) (Tympanic)   SpO2 96%     Patient Active Problem List   Diagnosis    Chronic pain syndrome    Opioid dependence in remission (H)    PTSD (post-traumatic stress disorder)    COY (generalized anxiety disorder)    Moderate episode of recurrent major depressive disorder (H)    Bilateral edema of lower extremity    Hypothyroid    GERD (gastroesophageal reflux disease)    Mild mixed bipolar I disorder (H)    Grief    Diabetes mellitus, type 2 (H)    History of migraine headaches    Urge incontinence of urine    Encounter for sterilization    Tobacco abuse       Past Surgical History:   Procedure Laterality Date     SECTION  1995     SECTION  2002    CHOLECYSTECTOMY      COLONOSCOPY N/A 2022    Procedure: Colonscopy;  Surgeon: Deep Zhu MD;  Location: [facility]    KNEE ARTHROSCOPY AND ARTHROTOMY Right 2019    right knee arthroscopy, lateral menisectomy    LEEP TX, CERVICAL         Current Outpatient Medications   Medication Sig Dispense Refill    Acetaminophen (TYLENOL PO) Take 500 mg by mouth every  4 hours as needed for mild pain or fever      asenapine (SAPHRIS) 2.5 MG SUBL sublingual tablet Place 2.5 mg under the tongue daily (2.5 mg + 5 mg = 7.5 mg)      asenapine (SAPHRIS) 5 MG SUBL sublingual tablet Place 5 mg under the tongue daily (2.5 mg + 5 mg = 7.5 mg)      baclofen (LIORESAL) 10 MG tablet TAKE 1 TABLET BY MOUTH THREE TIMES DAILY 90 tablet 0    blood glucose (NO BRAND SPECIFIED) test strip Use to test blood sugar 1 time daily 50 strip 5    blood glucose monitoring (NO BRAND SPECIFIED) meter device kit Use to test blood sugar 1 time daily 1 kit 0    diclofenac (VOLTAREN) 1 % topical gel Apply 2 g topically 4 times daily as needed for moderate pain      famotidine (PEPCID) 20 MG tablet Take 1 tablet (20 mg) by mouth 2 times daily as needed (indigestion). 60 tablet 3    furosemide (LASIX) 20 MG tablet TAKE 2 TABLETS BY MOUTH ONCE DAILY AS NEEDED FOR  LEG  SWELLING 60 tablet 11    hydrochlorothiazide (HYDRODIURIL) 25 MG tablet Take 1 tablet by mouth once daily 90 tablet 0    hydrOXYzine (ATARAX) 25 MG tablet Take 25 mg by mouth 4 times daily as needed for anxiety      ibuprofen (ADVIL/MOTRIN) 800 MG tablet Take 1 tablet (800 mg) by mouth every 8 hours as needed for moderate pain (4-6) 90 tablet 0    levothyroxine (SYNTHROID/LEVOTHROID) 175 MCG tablet Take 1 tablet by mouth once daily 90 tablet 3    Melatonin 10 MG CAPS Take 10 mg by mouth nightly as needed (insomnia)      MOUNJARO 5 MG/0.5ML SOAJ Inject 0.5 mLs (5 mg) subcutaneously every 7 days. 2 mL 4    multivitamin w/minerals (THERA-VIT-M) tablet Take 1 tablet by mouth daily      nicotine (NICORETTE) 2 MG gum CHEW 1-2 PIECES PER HOUR OR 10-12 PIECES PER DAY--TAPER AS TOLERATED      omeprazole (PRILOSEC) 40 MG DR capsule Take 1 capsule by mouth once daily 90 capsule 3    OneTouch Delica Lancets 33G MISC 1 Lancet daily 100 each 1    oxyBUTYnin ER (DITROPAN XL) 10 MG 24 hr tablet Take 1 tablet (10 mg) by mouth daily 90 tablet 3    potassium chloride ER  (K-TAB) 20 MEQ CR tablet TAKE 1 TABLET BY MOUTH ONCE DAILY ALONG  WITH  A  10MG  TABLET  **THIS  SHOULD  ONLY  BE  IF  TAKING  LASIX** 90 tablet 0    potassium chloride ER (K-TAB/KLOR-CON) 10 MEQ CR tablet TAKE 1 TABLET BY MOUTH ONCE DAILY (WHEN TAKING LASIX) 30 tablet 1    rosuvastatin (CRESTOR) 5 MG tablet Take 1 tablet by mouth once daily 90 tablet 3    Semaglutide, 2 MG/DOSE, (OZEMPIC) 8 MG/3ML pen Inject 2 mg Subcutaneous every 7 days 9 mL 3    sertraline (ZOLOFT) 50 MG tablet Take 75 mg by mouth daily      topiramate (TOPAMAX) 25 MG tablet Take 3 tablets (75 mg) by mouth at bedtime 270 tablet 1    Vitamin D3 (CHOLECALCIFEROL) 125 MCG (5000 UT) tablet Take 1 tablet by mouth daily      VRAYLAR 3 MG CAPS capsule Take 3 mg by mouth daily       No current facility-administered medications for this visit.          Allergies   Allergen Reactions    Depakote [Valproic Acid]     Gabapentin Swelling    Propofol Unknown     Got very stiff and tight.        Family History   Problem Relation Age of Onset    Cerebrovascular Disease Mother     Alcoholism Mother     Cancer Mother     Depression Mother     Eczema Mother     Hypertension Mother     Migraines Mother     Mental Illness Mother     Osteoarthritis Mother     Osteoporosis Mother     Alcoholism Father     Cancer Father     Hyperlipidemia Father     Hypertension Father     Myocardial Infarction Father     Mental Illness Sister     Migraines Sister        Social History     Socioeconomic History    Marital status:      Spouse name: None    Number of children: 2    Years of education: None    Highest education level: None   Tobacco Use    Smoking status: Every Day     Current packs/day: 0.50     Average packs/day: 0.5 packs/day for 35.7 years (17.8 ttl pk-yrs)     Types: Cigarettes     Start date: 1/1/1989     Passive exposure: Past    Smokeless tobacco: Never    Tobacco comments:     pt declines 9/5/24   Vaping Use    Vaping status: Some Days    Substances:  Nicotine, Flavoring    Devices: Disposable   Substance and Sexual Activity    Alcohol use: No    Drug use: Yes     Types: Marijuana     Comment: daily-medical    Sexual activity: Yes     Partners: Male     Birth control/protection: Condom     Social Determinants of Health     Financial Resource Strain: Low Risk  (2/8/2024)    Financial Resource Strain     Within the past 12 months, have you or your family members you live with been unable to get utilities (heat, electricity) when it was really needed?: No   Food Insecurity: High Risk (2/8/2024)    Food Insecurity     Within the past 12 months, did you worry that your food would run out before you got money to buy more?: Yes     Within the past 12 months, did the food you bought just not last and you didn t have money to get more?: Yes   Transportation Needs: Low Risk  (2/8/2024)    Transportation Needs     Within the past 12 months, has lack of transportation kept you from medical appointments, getting your medicines, non-medical meetings or appointments, work, or from getting things that you need?: No   Interpersonal Safety: Low Risk  (9/5/2024)    Interpersonal Safety     Do you feel physically and emotionally safe where you currently live?: Yes     Within the past 12 months, have you been hit, slapped, kicked or otherwise physically hurt by someone?: No     Within the past 12 months, have you been humiliated or emotionally abused in other ways by your partner or ex-partner?: No   Housing Stability: Low Risk  (2/8/2024)    Housing Stability     Do you have housing? : Yes     Are you worried about losing your housing?: No       ROS: 10 point ROS neg other than the symptoms noted above in the HPI.  EXAM  Constitutional: healthy, alert, and no distress    Psychiatric: mentation appears normal and affect normal/bright    VASCULAR:  -Dorsalis pedis pulse +2/4 b/l  -Posterior tibial pulse +2/4 b/l  -Capillary refill time < 3 seconds to b/l hallux  NEURO:  -Epicritic and  protective sensation diminished to the bilateral foot.   DERM:  -Skin temperature, texture and turgor WNL b/l  -Toenails elongated, thickened, dystrophic and discolored x 10  -Hyperkeratotic lesion on the medial plantar distal aspect of the proximal phalanx of the bilateral hallux  -Diffusely dry skin with flaking on the bilateral leg  MSK:  -Muscle strength of ankles +5/5 for dorsiflexion, plantarflexion, ABDUction and ADDuction b/l  -Moderate decrease in arch height while patient is NWB, bilaterally   -Moderate medial midfoot arch collapse bilaterally  -Ankle joint passive ROM within normal limits except for dorsiflexion:    Dorsiflexion, RIGHT Straight knee 0 degrees    Dorsiflexion, LEFT Straight knee 0 degrees    ============================================================    ASSESSMENT:    (L60.3) Onychodystrophy  (primary encounter diagnosis)    (L84) Callus of foot    (L85.3) Xerosis of skin    (E11.9) Diabetes mellitus type 2, noninsulin dependent (H)    (E11.42) Diabetic polyneuropathy associated with type 2 diabetes mellitus (H)    (Z13.89) Screening for diabetic peripheral neuropathy      PLAN:  -Patient evaluated and examined. Treatment options discussed with no educational barriers noted.    -High risk toenail debridement x 10 toenails without incident on 09/05/2024    -Callus pared x 2 to the medial plantar distal aspect of the proximal phalanx of the bilateral hallux   without incident  ---Patient reminded that the callus will likely return due to the underlying, prominent bone causing the callus while the patient is walking.    -Xerosis of skin:   ---Discussed xerosis of the skin including the risks of dry, cracking skin creating ulcerations on the feet. Areas of skin breakdown can break through the skin layer and cause pain or can become infected which can then potentially lead to life threatening wounds or amputation.  ---Dry skin occurs when there is not enough moisture in the outer layers of the  skin. Multiple factors can contribute to this including climates with low humidity (including [place] winters), dehydration, using harsh soaps on the skin, frequent exposure to chlorinated water, frequent bathing, or employment that requires a lot of walking in outdoor environments or on very hard, cement surfaces.  ---Treatment of Xerosis can include application of lotion to the feet at least twice per day. Application of the lotion at night may be followed by the application of socks to prevent the lotion from rubbing off the foot on the floors or bedding. A pumice stone or Reynaldo board may be used to remove excessive, flaking skin. Care should be taken not to be too aggressive and cause akin breakdown from scrubbing.  ---It is important to monitor and treat the feet daily to prevent the dry skin from starting to or from continuing to crack own. Patient expressed understanding and agreed with this plan.  ---Patient is advised to try a Diabetic Gold Bond or Cera Ve lotion. Will consider Ammonium Lactate if this does not improve the dry skin on the patient's feet.  -This is an acute, uncomplicated illness/injury with OTC treatment options reviewed.       Diabetes Mellitus: Patient's DM is managed by their PCP. The DM appears to be stable. Patient's last HbA1C was 6.5% on 05/08/2024.    -Patient in agreement with the above treatment plan and all of patient's questions were answered.      Return to clinic 63+ days for a diabetic foot exam and high risk nail debridement         Tanisha Shannon DPM

## 2024-11-21 DIAGNOSIS — G89.4 CHRONIC PAIN SYNDROME: ICD-10-CM

## 2024-11-21 RX ORDER — BACLOFEN 10 MG/1
TABLET ORAL
Qty: 90 TABLET | Refills: 0 | OUTPATIENT
Start: 2024-11-21

## 2024-11-25 DIAGNOSIS — G89.4 CHRONIC PAIN SYNDROME: ICD-10-CM

## 2024-11-25 RX ORDER — BACLOFEN 10 MG/1
TABLET ORAL
Qty: 90 TABLET | Refills: 0 | OUTPATIENT
Start: 2024-11-25

## 2024-12-02 DIAGNOSIS — G89.4 CHRONIC PAIN SYNDROME: ICD-10-CM

## 2024-12-02 RX ORDER — BACLOFEN 10 MG/1
TABLET ORAL
Qty: 90 TABLET | Refills: 0 | Status: SHIPPED | OUTPATIENT
Start: 2024-12-02

## 2024-12-02 NOTE — TELEPHONE ENCOUNTER
Baclofen (Lioresal) 10 MG tablet    Last Written Prescription Date:  10/10/2024  Last Fill Quantity: 90,   # refills: 0  Last Office Visit: 05/08/2024  Future Office visit:    Next 5 appointments (look out 90 days)      Jan 03, 2025 11:15 AM  (Arrive by 11:00 AM)  Adult Preventative Visit with SEVEN Pandey  [facility] ([facility] ) [address] [place] [id number]  [phone]     Jan 08, 2025 1:30 PM  MTM New with Adelina White RPH  [facility] ([facility] ) [address]  [address]  [facility] [id number] [phone]     Jan 14, 2025 2:45 PM  (Arrive by 2:30 PM)  Return Visit with Tanisha Shannon DPM  [facility] ([facility] ) [address] [place] [id number] [phone]           Please delete duplicate.

## 2024-12-04 RX ORDER — BACLOFEN 10 MG/1
TABLET ORAL
Qty: 90 TABLET | Refills: 0 | Status: SHIPPED | OUTPATIENT
Start: 2024-12-04

## 2024-12-17 DIAGNOSIS — I10 BENIGN ESSENTIAL HYPERTENSION: ICD-10-CM

## 2024-12-17 RX ORDER — HYDROCHLOROTHIAZIDE 25 MG/1
TABLET ORAL
Qty: 90 TABLET | Refills: 3 | Status: SHIPPED | OUTPATIENT
Start: 2024-12-17

## 2024-12-17 NOTE — TELEPHONE ENCOUNTER
Hydrochlorothiazide (Hydrodiuril) 25 MG tablet    Last Written Prescription Date:  09/23/2024  Last Fill Quantity: 90,   # refills: 0  Last Office Visit: 05/08/2024  Future Office visit:    Next 5 appointments (look out 90 days)      Jan 03, 2025 11:15 AM  (Arrive by 11:00 AM)  Adult Preventative Visit with SEVEN Pandey  [facility] ([facility] ) [address] [place] [address]  [phone]     Jan 08, 2025 1:30 PM  MTM New with Adelina White RPH  [facility] ([facility] ) [address]  2nd Floor  [facility] [id number] [phone]     Jan 14, 2025 2:45 PM  (Arrive by 2:30 PM)  Return Visit with Tanisha Shannon DPM  [facility] ([facility] ) [address] [place] [id number] [phone]             Routing refill request to provider for review/approval because:

## 2024-12-26 DIAGNOSIS — E87.6 HYPOKALEMIA: ICD-10-CM

## 2024-12-26 RX ORDER — POTASSIUM CHLORIDE 1500 MG/1
TABLET, EXTENDED RELEASE ORAL
Qty: 90 TABLET | Refills: 0 | Status: SHIPPED | OUTPATIENT
Start: 2024-12-26

## 2025-01-02 SDOH — HEALTH STABILITY: PHYSICAL HEALTH: ON AVERAGE, HOW MANY DAYS PER WEEK DO YOU ENGAGE IN MODERATE TO STRENUOUS EXERCISE (LIKE A BRISK WALK)?: 0 DAYS

## 2025-01-02 SDOH — HEALTH STABILITY: PHYSICAL HEALTH: ON AVERAGE, HOW MANY MINUTES DO YOU ENGAGE IN EXERCISE AT THIS LEVEL?: 0 MIN

## 2025-01-02 ASSESSMENT — PATIENT HEALTH QUESTIONNAIRE - PHQ9
SUM OF ALL RESPONSES TO PHQ QUESTIONS 1-9: 9
10. IF YOU CHECKED OFF ANY PROBLEMS, HOW DIFFICULT HAVE THESE PROBLEMS MADE IT FOR YOU TO DO YOUR WORK, TAKE CARE OF THINGS AT HOME, OR GET ALONG WITH OTHER PEOPLE: VERY DIFFICULT
SUM OF ALL RESPONSES TO PHQ QUESTIONS 1-9: 9

## 2025-01-02 ASSESSMENT — SOCIAL DETERMINANTS OF HEALTH (SDOH): HOW OFTEN DO YOU GET TOGETHER WITH FRIENDS OR RELATIVES?: ONCE A WEEK

## 2025-01-03 ENCOUNTER — OFFICE VISIT (OUTPATIENT)
Dept: FAMILY MEDICINE | Facility: OTHER | Age: 50
End: 2025-01-03
Attending: PHYSICIAN ASSISTANT
Payer: COMMERCIAL

## 2025-01-03 VITALS
OXYGEN SATURATION: 98 % | WEIGHT: 266 LBS | TEMPERATURE: 98.3 F | SYSTOLIC BLOOD PRESSURE: 112 MMHG | BODY MASS INDEX: 48.95 KG/M2 | RESPIRATION RATE: 17 BRPM | HEART RATE: 86 BPM | DIASTOLIC BLOOD PRESSURE: 80 MMHG | HEIGHT: 62 IN

## 2025-01-03 DIAGNOSIS — I10 BENIGN ESSENTIAL HYPERTENSION: ICD-10-CM

## 2025-01-03 DIAGNOSIS — M79.89 LEG SWELLING: ICD-10-CM

## 2025-01-03 DIAGNOSIS — F43.10 PTSD (POST-TRAUMATIC STRESS DISORDER): ICD-10-CM

## 2025-01-03 DIAGNOSIS — E11.618 TYPE 2 DIABETES MELLITUS WITH OTHER DIABETIC ARTHROPATHY, WITHOUT LONG-TERM CURRENT USE OF INSULIN (H): ICD-10-CM

## 2025-01-03 DIAGNOSIS — Z23 NEED FOR PROPHYLACTIC VACCINATION AGAINST HEPATITIS A: ICD-10-CM

## 2025-01-03 DIAGNOSIS — Z23 NEED FOR PROPHYLACTIC VACCINATION AGAINST HEPATITIS B VIRUS: ICD-10-CM

## 2025-01-03 DIAGNOSIS — E03.9 ACQUIRED HYPOTHYROIDISM: ICD-10-CM

## 2025-01-03 DIAGNOSIS — Z00.00 MEDICARE ANNUAL WELLNESS VISIT, SUBSEQUENT: Primary | ICD-10-CM

## 2025-01-03 DIAGNOSIS — Z87.891 PERSONAL HISTORY OF NICOTINE DEPENDENCE: ICD-10-CM

## 2025-01-03 DIAGNOSIS — Z12.31 ENCOUNTER FOR SCREENING MAMMOGRAM FOR BREAST CANCER: ICD-10-CM

## 2025-01-03 DIAGNOSIS — Z12.31 VISIT FOR SCREENING MAMMOGRAM: ICD-10-CM

## 2025-01-03 LAB
ALBUMIN UR-MCNC: NEGATIVE MG/DL
ANION GAP SERPL CALCULATED.3IONS-SCNC: 16 MMOL/L (ref 7–15)
APPEARANCE UR: CLEAR
BACTERIA #/AREA URNS HPF: ABNORMAL /HPF
BASOPHILS # BLD AUTO: 0.1 10E3/UL (ref 0–0.2)
BASOPHILS NFR BLD AUTO: 1 %
BILIRUB UR QL STRIP: NEGATIVE
BUN SERPL-MCNC: 9.5 MG/DL (ref 6–20)
CALCIUM SERPL-MCNC: 9.8 MG/DL (ref 8.8–10.4)
CHLORIDE SERPL-SCNC: 95 MMOL/L (ref 98–107)
CHOLEST SERPL-MCNC: 201 MG/DL
COLOR UR AUTO: ABNORMAL
CREAT SERPL-MCNC: 0.86 MG/DL (ref 0.51–0.95)
CREAT UR-MCNC: 45 MG/DL
EGFRCR SERPLBLD CKD-EPI 2021: 81 ML/MIN/1.73M2
EOSINOPHIL # BLD AUTO: 0.3 10E3/UL (ref 0–0.7)
EOSINOPHIL NFR BLD AUTO: 3 %
ERYTHROCYTE [DISTWIDTH] IN BLOOD BY AUTOMATED COUNT: 13.8 % (ref 10–15)
EST. AVERAGE GLUCOSE BLD GHB EST-MCNC: 137 MG/DL
FASTING STATUS PATIENT QL REPORTED: YES
FASTING STATUS PATIENT QL REPORTED: YES
GLUCOSE SERPL-MCNC: 110 MG/DL (ref 70–99)
GLUCOSE UR STRIP-MCNC: NEGATIVE MG/DL
HBA1C MFR BLD: 6.4 %
HCO3 SERPL-SCNC: 24 MMOL/L (ref 22–29)
HCT VFR BLD AUTO: 47.3 % (ref 35–47)
HDLC SERPL-MCNC: 66 MG/DL
HGB BLD-MCNC: 15.9 G/DL (ref 11.7–15.7)
HGB UR QL STRIP: NEGATIVE
HYALINE CASTS: 4 /LPF
IMM GRANULOCYTES # BLD: 0.1 10E3/UL
IMM GRANULOCYTES NFR BLD: 1 %
KETONES UR STRIP-MCNC: NEGATIVE MG/DL
LDLC SERPL CALC-MCNC: 111 MG/DL
LEUKOCYTE ESTERASE UR QL STRIP: NEGATIVE
LYMPHOCYTES # BLD AUTO: 3.1 10E3/UL (ref 0.8–5.3)
LYMPHOCYTES NFR BLD AUTO: 28 %
MCH RBC QN AUTO: 29.9 PG (ref 26.5–33)
MCHC RBC AUTO-ENTMCNC: 33.6 G/DL (ref 31.5–36.5)
MCV RBC AUTO: 89 FL (ref 78–100)
MICROALBUMIN UR-MCNC: <12 MG/L
MICROALBUMIN/CREAT UR: NORMAL MG/G{CREAT}
MONOCYTES # BLD AUTO: 0.8 10E3/UL (ref 0–1.3)
MONOCYTES NFR BLD AUTO: 8 %
MUCOUS THREADS #/AREA URNS LPF: PRESENT /LPF
NEUTROPHILS # BLD AUTO: 6.7 10E3/UL (ref 1.6–8.3)
NEUTROPHILS NFR BLD AUTO: 61 %
NITRATE UR QL: NEGATIVE
NONHDLC SERPL-MCNC: 135 MG/DL
NRBC # BLD AUTO: 0 10E3/UL
NRBC BLD AUTO-RTO: 0 /100
PH UR STRIP: 6.5 [PH] (ref 4.7–8)
PLATELET # BLD AUTO: 312 10E3/UL (ref 150–450)
POTASSIUM SERPL-SCNC: 3.4 MMOL/L (ref 3.4–5.3)
RBC # BLD AUTO: 5.32 10E6/UL (ref 3.8–5.2)
RBC URINE: <1 /HPF
SODIUM SERPL-SCNC: 135 MMOL/L (ref 135–145)
SP GR UR STRIP: 1.01 (ref 1–1.03)
SQUAMOUS EPITHELIAL: 2 /HPF
T4 FREE SERPL-MCNC: 1.99 NG/DL (ref 0.9–1.7)
TRIGL SERPL-MCNC: 120 MG/DL
TSH SERPL DL<=0.005 MIU/L-ACNC: 0.13 UIU/ML (ref 0.3–4.2)
UROBILINOGEN UR STRIP-MCNC: NORMAL MG/DL
WBC # BLD AUTO: 11 10E3/UL (ref 4–11)
WBC URINE: <1 /HPF

## 2025-01-03 PROCEDURE — 85004 AUTOMATED DIFF WBC COUNT: CPT | Mod: ZL | Performed by: PHYSICIAN ASSISTANT

## 2025-01-03 PROCEDURE — G0439 PPPS, SUBSEQ VISIT: HCPCS | Performed by: PHYSICIAN ASSISTANT

## 2025-01-03 PROCEDURE — 81001 URINALYSIS AUTO W/SCOPE: CPT | Mod: ZL | Performed by: PHYSICIAN ASSISTANT

## 2025-01-03 PROCEDURE — G0463 HOSPITAL OUTPT CLINIC VISIT: HCPCS

## 2025-01-03 PROCEDURE — 82043 UR ALBUMIN QUANTITATIVE: CPT | Mod: ZL | Performed by: PHYSICIAN ASSISTANT

## 2025-01-03 PROCEDURE — 84132 ASSAY OF SERUM POTASSIUM: CPT | Mod: ZL | Performed by: PHYSICIAN ASSISTANT

## 2025-01-03 PROCEDURE — 99213 OFFICE O/P EST LOW 20 MIN: CPT | Mod: 25 | Performed by: PHYSICIAN ASSISTANT

## 2025-01-03 PROCEDURE — 36415 COLL VENOUS BLD VENIPUNCTURE: CPT | Mod: ZL | Performed by: PHYSICIAN ASSISTANT

## 2025-01-03 PROCEDURE — 85041 AUTOMATED RBC COUNT: CPT | Mod: ZL | Performed by: PHYSICIAN ASSISTANT

## 2025-01-03 PROCEDURE — 85018 HEMOGLOBIN: CPT | Mod: ZL | Performed by: PHYSICIAN ASSISTANT

## 2025-01-03 PROCEDURE — 84443 ASSAY THYROID STIM HORMONE: CPT | Mod: ZL | Performed by: PHYSICIAN ASSISTANT

## 2025-01-03 PROCEDURE — 83036 HEMOGLOBIN GLYCOSYLATED A1C: CPT | Mod: ZL | Performed by: PHYSICIAN ASSISTANT

## 2025-01-03 PROCEDURE — 80048 BASIC METABOLIC PNL TOTAL CA: CPT | Mod: ZL | Performed by: PHYSICIAN ASSISTANT

## 2025-01-03 PROCEDURE — 82465 ASSAY BLD/SERUM CHOLESTEROL: CPT | Mod: ZL | Performed by: PHYSICIAN ASSISTANT

## 2025-01-03 PROCEDURE — 84439 ASSAY OF FREE THYROXINE: CPT | Mod: ZL | Performed by: PHYSICIAN ASSISTANT

## 2025-01-03 RX ORDER — NICOTINE 21 MG/24HR
1 PATCH, TRANSDERMAL 24 HOURS TRANSDERMAL EVERY 24 HOURS
Qty: 42 PATCH | Refills: 0 | Status: SHIPPED | OUTPATIENT
Start: 2025-01-03 | End: 2025-02-14

## 2025-01-03 RX ORDER — LISDEXAMFETAMINE DIMESYLATE 60 MG/1
60 CAPSULE ORAL EVERY MORNING
COMMUNITY
Start: 2024-08-09

## 2025-01-03 RX ORDER — NICOTINE 21 MG/24HR
1 PATCH, TRANSDERMAL 24 HOURS TRANSDERMAL EVERY 24 HOURS
Qty: 14 PATCH | Refills: 0 | Status: SHIPPED | OUTPATIENT
Start: 2025-02-14 | End: 2025-02-28

## 2025-01-03 ASSESSMENT — ANXIETY QUESTIONNAIRES
2. NOT BEING ABLE TO STOP OR CONTROL WORRYING: SEVERAL DAYS
IF YOU CHECKED OFF ANY PROBLEMS ON THIS QUESTIONNAIRE, HOW DIFFICULT HAVE THESE PROBLEMS MADE IT FOR YOU TO DO YOUR WORK, TAKE CARE OF THINGS AT HOME, OR GET ALONG WITH OTHER PEOPLE: VERY DIFFICULT
8. IF YOU CHECKED OFF ANY PROBLEMS, HOW DIFFICULT HAVE THESE MADE IT FOR YOU TO DO YOUR WORK, TAKE CARE OF THINGS AT HOME, OR GET ALONG WITH OTHER PEOPLE?: VERY DIFFICULT
3. WORRYING TOO MUCH ABOUT DIFFERENT THINGS: SEVERAL DAYS
7. FEELING AFRAID AS IF SOMETHING AWFUL MIGHT HAPPEN: NOT AT ALL
GAD7 TOTAL SCORE: 6
1. FEELING NERVOUS, ANXIOUS, OR ON EDGE: SEVERAL DAYS
6. BECOMING EASILY ANNOYED OR IRRITABLE: NOT AT ALL
5. BEING SO RESTLESS THAT IT IS HARD TO SIT STILL: MORE THAN HALF THE DAYS
4. TROUBLE RELAXING: SEVERAL DAYS
7. FEELING AFRAID AS IF SOMETHING AWFUL MIGHT HAPPEN: NOT AT ALL

## 2025-01-03 ASSESSMENT — PAIN SCALES - GENERAL: PAINLEVEL_OUTOF10: SEVERE PAIN (7)

## 2025-01-03 NOTE — PATIENT INSTRUCTIONS
Nicotine Transdermal System   Habitrol, Nicoderm C-Q    Uses  For quitting smoking.    Instructions  DO NOT take this medicine by mouth.    Avoid placing the patch near the breast.    Remove the patch after 24 hours.    Keep the medicine at room temperature. Avoid heat and direct light.    This patch should not be cut.    Wash your hands before and after handling this medicine.    Remove old patch before applying new one. Change the location of the new patch.    If you have vivid dreams or trouble sleeping, you may remove the patch before going to sleep.    Ask your doctor or pharmacist about locations on your body where this patch can be used.    Remove the plastic liner that protects the sticky side of the patch before applying to the skin.    Be sure the area of skin is clean and dry before putting on a new patch.    Apply the patch to a clean, dry, hairless area.    Press the patch firmly for a few seconds to make sure it stays in place.    After removing the patch, fold it together and discard it out of reach of children and pets.    Please ask your doctor or pharmacist how you can safely dispose of used patches.    If the skin under the patch becomes irritated, remove the patch. Do not apply a new patch to the area until the skin feels better.    To avoid irritating your skin, use a different location for a new patch.    Apply the patch only to normal looking skin. Avoid areas of the skin that are red, have scrapes, or damaged.    If the patch falls off, apply a new a patch on a different location of the body.    Please tell your doctor and pharmacist about all the medicines you take. Include both prescription and over-the-counter medicines. Also tell them about any vitamins, herbal medicines, or anything else you take for your health.    If you need to stop this medicine, your doctor may wish to gradually reduce the dosage before stopping.    Do not use more than 1 patch at any one time.    Cautions  Tell  your doctor and pharmacist if you ever had an allergic reaction to a medicine. Symptoms of an allergic reaction can include trouble breathing, skin rash, itching, swelling, or severe dizziness.    Do not use the medication any more than instructed.    Avoid smoking while on this medicine. Smoking may increase your risk for stroke, heart attack, blood clots, high blood pressure, and other diseases of the heart and blood vessels.    Tell the doctor or pharmacist if you are pregnant, planning to be pregnant, or breastfeeding.    Ask your pharmacist if this medicine can interact with any of your other medicines. Be sure to tell them about all the medicines you take.    Please tell all your doctors and dentists that you are on this medicine before they provide care.    Side Effects  The following is a list of some common side effects from this medicine. Please speak with your doctor about what you should do if you experience these or other side effects.    skin irritation where medicine is applied    If you have any of the following side effects, you may be getting too much medicine. Please contact your doctor to let them know about these side effects.    diarrhea  dizziness  nausea  rapid heartbeat  vomiting    A few people may have an allergic reaction to this medicine. Symptoms can include difficulty breathing, skin rash, itching, swelling, or severe dizziness. If you notice any of these symptoms, seek medical help quickly.    Extra  Please speak with your doctor, nurse, or pharmacist if you have any questions about this medicine.      [email or web address]  IMPORTANT NOTE: This document tells you briefly how to take your medicine, but it does not tell you all there is to know about it. Your doctor or pharmacist may give you other documents about your medicine. Please talk to them if you have any questions. Always follow their advice. There is a more complete description of this medicine  available in English. Scan this code on your smartphone or tablet or use the web address below. You can also ask your pharmacist for a printout. If you have any questions, please ask your pharmacist.   2021 [employer].      8291-2575 [employer]. All rights reserved. This information is not intended as a substitute for professional medical care. Always follow your healthcare professional's instructions.    Quitting Tobacco: Care Instructions  Quitting tobacco is much harder than simply changing a habit. Nicotine cravings make it hard to quit, but you can do it. Your doctor will help you set up the plan that best meets your needs.    You will miss the nicotine at first. You may feel short-tempered and grumpy. You may have trouble sleeping or thinking clearly. The urge to use tobacco may continue for a time.   Combining tools can raise your chances of success. You can use medicine along with counseling. And you can join a quit-tobacco program, such as the American Lung Association's Freedom from Smoking program.     Get support.  Reach out to family and friends, and find a counselor to help you quit. Join a support group, such as Nicotine Anonymous. Go to www.smokefree.gov to sign up for text messaging support.     Talk to your doctor or pharmacist about medicines that can help you quit.  Medicines can help with cravings and withdrawal symptoms. There are several over-the-counter choices, such as nicotine patches, gum, and lozenges.     After you quit, do not use tobacco again, not even once.  Get rid of all tobacco products and anything that reminds you of tobacco, such as ashtrays.     Avoid things that make you reach for tobacco.  Change your daily routine. Take a different route to work, or eat a meal in a different place.     Try to cut down on stress.  Find ways to calm yourself, such as taking a hot bath or doing deep breathing exercises.     Eat a healthy diet, and get regular exercise.   "Having healthy habits may help you quit using tobacco.     Don't give up on quitting if you use tobacco again.  Most people quit and restart a few times before they quit for good.   Follow-up care is a key part of your treatment and safety. Be sure to make and go to all appointments, and call your doctor if you are having problems. It's also a good idea to know your test results and keep a list of the medicines you take.  Where can you learn more?  Go to https://www.[employer].net/patiented  Enter Y522 in the search box to learn more about \"Quitting Tobacco: Care Instructions.\"  Current as of: November 15, 2023  Content Version: 14.3    2024 [employer].   Care instructions adapted under license by your healthcare professional. If you have questions about a medical condition or this instruction, always ask your healthcare professional. [employer] disclaims any warranty or liability for your use of this information.    "

## 2025-01-03 NOTE — LETTER
Opioid / Opioid Plus Controlled Substance Agreement    This is an agreement between you and your provider about the safe and appropriate use of controlled substance/opioids prescribed by your care team. Controlled substances are medicines that can cause physical and mental dependence (abuse).    There are strict laws about having and using these medicines. We here at [facility] are committing to working with you in your efforts to get better. To support you in this work, we ll help you schedule regular office appointments for medicine refills. If we must cancel or change your appointment for any reason, we ll make sure you have enough medicine to last until your next appointment.     As a Provider, I will:  Listen carefully to your concerns and treat you with respect.   Recommend a treatment plan that I believe is in your best interest. This plan may involve therapies other than opioid pain medication.   Talk with you often about the possible benefits, and the risk of harm of any medicine that we prescribe for you.   Provide a plan on how to taper (discontinue or go off) using this medicine if the decision is made to stop its use.    As a Patient, I understand that opioid(s):   Are a controlled substance prescribed by my care team to help me function or work and manage my condition(s).   Are strong medicines and can cause serious side effects such as:  Drowsiness, which can seriously affect my driving ability  A lower breathing rate, enough to cause death  Harm to my thinking ability   Depression   Abuse of and addiction to this medicine  Need to be taken exactly as prescribed. Combining opioids with certain medicines or chemicals (such as illegal drugs, sedatives, sleeping pills, and benzodiazepines) can be dangerous or even fatal. If I stop opioids suddenly, I may have severe withdrawal symptoms.  Do not work for all types of pain nor for all patients. If they re not helpful, I may be asked to stop  them.        The risks, benefits and side effects of these medicine(s) were explained to me. I agree that:  I will take part in other treatments as advised by my care team. This may be psychiatry or counseling, physical therapy, behavioral therapy, group treatment or a referral to a specialist.     I will keep all my appointments. I understand that this is part of the monitoring of opioids. My care team may require an office visit for EVERY opioid/controlled substance refill. If I miss appointments or don t follow instructions, my care team may stop my medicine.    I will take my medicines as prescribed. I will not change the dose or schedule unless my care team tells me to. There will be no refills if I run out early.     I may be asked to come to the clinic and complete a urine drug test or complete a pill count at any time. If I don t give a urine sample or participate in a pill count, the care team may stop my medicine.    I will only receive prescriptions from this clinic for chronic pain. If I am treated by another provider for acute pain issues, I will tell them that I am taking opioid pain medication for chronic pain and that I have a treatment agreement with this provider. I will inform my [facility] care team within one business day if I am given a prescription for any pain medication by another healthcare provider. My [facility] care team can contact other providers and pharmacists about my use of any medicines.    It is up to me to make sure that I don t run out of my medicines on weekends or holidays. If my care team is willing to refill my opioid prescription without a visit, I must request refills only during office hours. Refills may take up to 3 business days to process. I will use one pharmacy to fill all my opioid and other controlled substance prescriptions. I will notify the clinic about any changes to my insurance or medication availability.    I am responsible for my  prescriptions. If the medicine/prescription is lost, stolen or destroyed, it will not be replaced. I also agree not to share controlled substance medicines with anyone.    I am aware I should not use any illegal or recreational drugs. I agree not to drink alcohol unless my care team says I can.       If I enroll in the [place] [facility] program, I will tell my care team prior to my next refill.     I will tell my care team right away if I become pregnant, have a new medical problem treated outside of my regular clinic, or have a change in my medications.    I understand that this medicine can affect my thinking, judgment and reaction time. Alcohol and drugs affect the brain and body, which can affect the safety of my driving. Being under the influence of alcohol or drugs can affect my decision-making, behaviors, personal safety, and the safety of others. Driving while impaired (DWI) can occur if a person is driving, operating, or in physical control of a car, motorcycle, boat, snowmobile, ATV, motorbike, off-road vehicle, or any other motor vehicle ([place] Statute 169A.20). I understand the risk if I choose to drive or operate any vehicle or machinery.    I understand that if I do not follow any of the conditions above, my prescriptions or treatment may be stopped or changed.          Opioids  What You Need to Know    What are opioids?   Opioids are pain medicines that must be prescribed by a doctor. They are also known as narcotics.     Examples are:   morphine (MS Contin, Virgen)  oxycodone (Oxycontin)  oxycodone and acetaminophen (Percocet)  hydrocodone and acetaminophen (Vicodin, Norco)   fentanyl patch (Duragesic)   hydromorphone (Dilaudid)   methadone  codeine (Tylenol #3)     What do opioids do well?   Opioids are best for severe short-term pain such as after a surgery or injury. They may work well for cancer pain. They may help some people with long-lasting (chronic) pain.     What do opioids NOT do  well?   Opioids never get rid of pain entirely, and they don t work well for most patients with chronic pain. Opioids don t reduce swelling, one of the causes of pain.                                    Other ways to manage chronic pain and improve function include:     Treat the health problem that may be causing pain  Anti-inflammation medicines, which reduce swelling and tenderness, such as ibuprofen (Advil, Motrin) or naproxen (Aleve)  Acetaminophen (Tylenol)  Antidepressants and anti-seizure medicines, especially for nerve pain  Topical treatments such as patches or creams  Injections or nerve blocks  Chiropractic or osteopathic treatment  Acupuncture, massage, deep breathing, meditation, visual imagery, aromatherapy  Use heat or ice at the pain site  Physical therapy   Exercise  Stop smoking  Take part in therapy       Risks and side effects     Talk to your doctor before you start or decide to keep taking opioids. Possible side effects include:    Lowering your breathing rate enough to cause death  Overdose, including death, especially if taking higher than prescribed doses  Worse depression symptoms; less pleasure in things you usually enjoy  Feeling tired or sluggish  Slower thoughts or cloudy thinking  Being more sensitive to pain over time; pain is harder to control  Trouble sleeping or restless sleep  Changes in hormone levels (for example, less testosterone)  Changes in sex drive or ability to have sex  Constipation  Unsafe driving  Itching and sweating  Dizziness  Nausea, throwing up and dry mouth    What else should I know about opioids?    Opioids may lead to dependence, tolerance, or addiction.    Dependence means that if you stop or reduce the medicine too quickly, you will have withdrawal symptoms. These include loose poop (diarrhea), jitters, flu-like symptoms, nervousness and tremors. Dependence is not the same as addiction.                     Tolerance means needing higher doses over time to  get the same effect. This may increase the chance of serious side effects.    Addiction is when people improperly use a substance that harms their body, their mind or their relations with others. Use of opiates can cause a relapse of addiction if you have a history of drug or alcohol abuse.    People who have used opioids for a long time may have a lower quality of life, worse depression, higher levels of pain and more visits to doctors.    You can overdose on opioids. Take these steps to lower your risk of overdose:    Recognize the signs:  Signs of overdose include decrease or loss of consciousness (blackout), slowed breathing, trouble waking up and blue lips. If someone is worried about overdose, they should call 911.    Talk to your doctor about Narcan (naloxone).   If you are at risk for overdose, you may be given a prescription for Narcan. This medicine very quickly reverses the effects of opioids.   If you overdose, a friend or family member can give you Narcan while waiting for the ambulance. They need to know the signs of overdose and how to give Narcan.     Don't use alcohol or street drugs.   Taking them with opioids can cause death.    Do not take any of these medicines unless your doctor says it s OK. Taking these with opioids can cause death:  Benzodiazepines, such as lorazepam (Ativan), alprazolam (Xanax) or diazepam (Valium)  Muscle relaxers, such as cyclobenzaprine (Flexeril)  Sleeping pills like zolpidem (Ambien)   Other opioids      How to keep you and other people safe while taking opioids:    Never share your opioids with others.  Opioid medicines are regulated by the Drug Enforcement Agency (FEDE). Selling or sharing medications is a criminal act.    2. Be sure to store opioids in a secure place, locked up if possible. Young children can easily swallow them and overdose.    3. When you are traveling with your medicines, keep them in the original bottles. If you use a pill box, be sure you also  carry a copy of your medicine list from your clinic or pharmacy.    4. Safe disposal of opioids    Most pharmacies have places to get rid of medicine, called disposal kiosks. Medicine disposal options are also available in every [facility]. Search your county and  medication disposal  to find more options. You can find more details at:  https://www.[facility].[place]./living-green/managing-unwanted-medications     I agree that my provider, clinic care team, and pharmacy may work with any city, state or federal law enforcement agency that investigates the misuse, sale, or other diversion of my controlled medicine. I will allow my provider to discuss my care with, or share a copy of, this agreement with any other treating provider, pharmacy or emergency room where I receive care.    I have read this agreement and have asked questions about anything I did not understand.    _______________________________________________________  Patient Signature - Autumn Holbrook _____________________                   Date     _______________________________________________________  Provider Signature - SEVEN Bryant   _____________________                   Date     _______________________________________________________  Witness Signature (required if provider not present while patient signing)   _____________________                   Date

## 2025-01-03 NOTE — PROGRESS NOTES
Preventive Care Visit  [facility]  SE[facility], Family Medicine  Sylvester 3, 2025      Assessment & Plan     Medicare annual wellness visit, subsequent  She is here for routine exam.  She is very active in Mental Health and has her care plan team in place. She has been active with [facility] her therapist. [employer] workers, has been continued to have a very good relationship with her sister after her mom passed.  Has two kids both in [place] doing well. They come up to visit.  She feels they are very focused and have good life plans.  This is a relief for her.  She is off Suboxone now.  Getting ortho injections for pain in her knees.  She has to lose weight to get her a surgery done ans her BMI is too high. We did discuss all her care gabs.  We did make a referral for these.  Smoking discussion and set up     Type 2 diabetes mellitus with other diabetic arthropathy, without long-term current use of insulin (H)  Due for a recheck. Has done very well with GLP medications. Weight has come down some and her sugars are very well controlled. Eye exam up to date. New glasses.    - Albumin Random Urine Quantitative with Creat Ratio; Future  - Hemoglobin A1c; Future  - Basic metabolic panel; Future  - UA Macroscopic with reflex to Microscopic and Culture; Future    Visit for screening mammogram  Due for this.    - MA Screening Bilateral w/ Yung; Future    Need for prophylactic vaccination against hepatitis A  She wants her immunizations for Hepatitis.  May travel in the future.    - hepatitis A vaccine (VAQTA) 50 UNIT/ML injection; Inject 1 mL (50 Units) into the muscle once for 1 dose.  - hepatitis b vaccine recombinant (RECOMBIVAX-HB) 10 MCG/ML injection; Inject 0.5 mLs (5 mcg) into the muscle once for 1 dose.    Need for prophylactic vaccination against hepatitis B virus  As above.   - hepatitis A vaccine (VAQTA) 50 UNIT/ML injection; Inject 1 mL (50 Units) into the muscle once for 1 dose.  - hepatitis b vaccine  "recombinant (RECOMBIVAX-HB) 10 MCG/ML injection; Inject 0.5 mLs (5 mcg) into the muscle once for 1 dose.    Personal history of nicotine dependence  As above.   - [place] Quit Partner Referral; Future  - nicotine (NICODERM CQ) 21 MG/24HR 24 hr patch; Place 1 patch over 24 hours onto the skin every 24 hours.  - nicotine (NICODERM CQ) 14 MG/24HR 24 hr patch; Place 1 patch over 24 hours onto the skin every 24 hours for 14 days.  - nicotine (NICODERM CQ) 7 MG/24HR 24 hr patch; Place 1 patch over 24 hours onto the skin every 24 hours for 14 days.  - CT Chest Lung Cancer Scrn Low Dose wo; Future    Acquired hypothyroidism  Recheck her level. Set up labs.   - CBC with platelets and differential; Future  - Basic metabolic panel; Future  - Lipid Profile (Chol, Trig, HDL, LDL calc); Future  - TSH with free T4 reflex; Future    PTSD (post-traumatic stress disorder)  With her Bipolar 1 and PTSD is active with Mental Health.  It works well for her.     Benign essential hypertension  She will be given recheck on urine and kidneys. DASH again reviewed for dietary choices.   - CBC with platelets and differential; Future  - Basic metabolic panel; Future  - UA Macroscopic with reflex to Microscopic and Culture; Future    Encounter for screening mammogram for breast cancer  Exam was negative.   - MA Screen Bilateral w/Yung; Future    Leg swelling  Chronic issue for her.  Has limited mobility at times due to knees.  They do swell worse in the heat. We discussed LUBNA vs. [facility]st.  We will just go with TEDS.  If not helpful can move up.   - Miscellaneous DME Supply Order (Use only if a more specific DME order does not already exist)    Patient has been advised of split billing requirements and indicates understanding: Yes        BMI  Estimated body mass index is 48.65 kg/m  as calculated from the following:    Height as of this encounter: 1.575 m (5' 2\").    Weight as of this encounter: 120.7 kg (266 lb).   Weight management plan: Discussed " healthy diet and exercise guidelines    Counseling  Appropriate preventive services were addressed with this patient via screening, questionnaire, or discussion as appropriate for fall prevention, nutrition, physical activity, Tobacco-use cessation, social engagement, weight loss and cognition.  Checklist reviewing preventive services available has been given to the patient.  Reviewed patient's diet, addressing concerns and/or questions.   She is at risk for psychosocial distress and has been provided with information to reduce risk.   Updated plan of care.  Patient reported difficulty with activities of daily living were addressed today.Patient reported safety concerns were addressed today.The patient was provided with written information regarding signs of hearing loss.   Information on urinary incontinence and treatment options given to patient.   The patient's PHQ-9 score is consistent with mild depression. She was provided with information regarding depression.       See Patient Instructions    No follow-ups on file.    Debra Leyva is a 49 year old, presenting for the following:  Physical        1/3/2025    11:03 AM   Additional Questions   Roomed by Liana Driver   Accompanied by self         1/3/2025    11:03 AM   Patient Reported Additional Medications   Patient reports taking the following new medications Adderall 15 mg in afternoon           HPI    Diabetes Follow-up    How often are you checking your blood sugar? Not at all  What concerns do you have today about your diabetes? None   Do you have any of these symptoms? (Select all that apply)  No numbness or tingling in feet.  No redness, sores or blisters on feet.  No complaints of excessive thirst.  No reports of blurry vision.  No significant changes to weight.      BP Readings from Last 2 Encounters:   01/03/25 112/80   11/12/24 112/80     Hemoglobin A1C (%)   Date Value   05/08/2024 6.5 (H)   02/08/2024 6.6 (H)   06/01/2022 6.9 (A)   02/16/2022  7.6 (A)     LDL Cholesterol Calculated (mg/dL)   Date Value   02/08/2024 73   01/06/2022 104 (H)   01/29/2020 155 (H)   11/28/2018 125 (H)     LDL Cholesterol Direct (mg/dL)   Date Value   05/17/2023 74             Hyperlipidemia Follow-Up    Are you regularly taking any medication or supplement to lower your cholesterol?   Yes- crestor  Are you having muscle aches or other side effects that you think could be caused by your cholesterol lowering medication?  No    Hypertension Follow-up    Do you check your blood pressure regularly outside of the clinic? Yes   Are you following a low salt diet? Yes  Are your blood pressures ever more than 140 on the top number (systolic) OR more   than 90 on the bottom number (diastolic), for example 140/90? No    Migraine   Since your last clinic visit, how have your headaches changed?  Improved  How often are you getting headaches or migraines? Rarely now.    Are you able to do normal daily activities when you have a migraine? Yes  Are you taking rescue/relief medications? (Select all that apply) No  How helpful is your rescue/relief medication?  The relief is inconsistent  Are you taking any medications to prevent migraines? (Select all that apply)  Topamax  In the past 4 weeks, how often have you gone to urgent care or the emergency room because of your headaches?  0    Hypothyroidism Follow-up    Since last visit, patient describes the following symptoms: Weight stable, no hair loss, no skin changes, no constipation, no loose stools    Health Care Directive  Patient does not have a Health Care Directive: Discussed advance care planning with patient; however, patient declined at this time.      1/2/2025   General Health   How would you rate your overall physical health? (!) POOR   Feel stress (tense, anxious, or unable to sleep) Very much   (!) STRESS CONCERN      1/2/2025   Nutrition   Diet: Low salt    Low fat/cholesterol    Diabetic    Carbohydrate counting    Breakfast  skipped       Multiple values from one day are sorted in reverse-chronological order         1/2/2025   Exercise   Days per week of moderate/strenous exercise 0 days   Average minutes spent exercising at this level 0 min   (!) EXERCISE CONCERN      1/2/2025   Social Factors   Frequency of gathering with friends or relatives Once a week   Worry food won't last until get money to buy more Yes   Food not last or not have enough money for food? Yes   Do you have housing? (Housing is defined as stable permanent housing and does not include staying ouside in a car, in a tent, in an abandoned building, in an overnight shelter, or couch-surfing.) Yes   Are you worried about losing your housing? No   Lack of transportation? No   Unable to get utilities (heat,electricity)? No   (!) FOOD SECURITY CONCERN PRESENT      1/3/2025   Fall Risk   Gait Speed Test (Document in seconds) 4          1/2/2025   Activities of Daily Living- Home Safety   Needs help with the following daily activites Shopping    Preparing meals    Housework    Bathing    Laundry    Toileting    Dressing   Safety concerns in the home Poor lighting       Multiple values from one day are sorted in reverse-chronological order         1/2/2025   Dental   Dentist two times every year? Yes         1/2/2025   Hearing Screening   Hearing concerns? (!) I NEED TO ASK PEOPLE TO SPEAK UP OR REPEAT THEMSELVES.         1/2/2025   Driving Risk Screening   Patient/family members have concerns about driving No         1/2/2025   General Alertness/Fatigue Screening   Have you been more tired than usual lately? No         1/2/2025   Urinary Incontinence Screening   Bothered by leaking urine in past 6 months Yes         1/2/2025   TB Screening   Were you born outside of the [place]? No       Today's PHQ-9 Score:       1/2/2025     5:33 PM   PHQ-9 SCORE   PHQ-9 Total Score MyChart 9 (Mild depression)   PHQ-9 Total Score 9        Patient-reported         1/2/2025   Substance Use   If I  could quit smoking, I would Completely agree   I want to quit somking, worry about health affects Somewhat disagree   Willing to make a plan to quit smoking Completely agree   Willing to cut down before quitting Completely agree   Alcohol more than 3/day or more than 7/wk No   Do you have a current opioid prescription? No   How severe/bad is pain from 1 to 10? 8/10   Do you use any other substances recreationally? (!) CANNABIS PRODUCTS     Social History     Tobacco Use    Smoking status: Every Day     Current packs/day: 0.50     Average packs/day: 0.5 packs/day for 36.0 years (18.0 ttl pk-yrs)     Types: Cigarettes     Start date: 1/1/1989     Passive exposure: Past    Smokeless tobacco: Never    Tobacco comments:     pt declines 11/12/24   Vaping Use    Vaping status: Some Days    Substances: Nicotine, Flavoring    Devices: Disposable   Substance Use Topics    Alcohol use: No    Drug use: Yes     Types: Marijuana     Comment: daily-medical          Mammogram Screening - Mammogram every 1-2 years updated in Health Maintenance based on mutual decision making      History of abnormal Pap smear: No - age 30-64 HPV with reflex Pap every 5 years recommended        Latest Ref Rng & Units 11/2/2022     3:01 PM 7/6/2017     2:41 PM   PAP / HPV   PAP  Negative for Intraepithelial Lesion or Malignancy (NILM)     PAP (Historical)   NIL    HPV 16 DNA Negative Negative  Negative    HPV 18 DNA Negative Negative  Negative    Other HR HPV Negative Negative  Negative      ASCVD Risk   The 10-year ASCVD risk score (Alejandrina MCLAUGHLIN, et al., 2019) is: 4.3%    Values used to calculate the score:      Age: 49 years      Sex: Female      Is Non- : No      Diabetic: Yes      Tobacco smoker: Yes      Systolic Blood Pressure: 112 mmHg      Is BP treated: Yes      HDL Cholesterol: 71 mg/dL      Total Cholesterol: 164 mg/dL            Reviewed and updated as needed this visit by Provider                    Past  Medical History:   Diagnosis Date    Anxiety     Arthritis     Chronic pain syndrome 2017    Diabetes mellitus, type 2 (H) 2021    COY (generalized anxiety disorder) 2017    GERD (gastroesophageal reflux disease) 2019    Grief 2021    History of migraine headaches 2022    Hypothyroid 2017    Migraine     Mild mixed bipolar I disorder (H) 2010    Moderate episode of recurrent major depressive disorder (H) 2017    Obesity, morbid, BMI 50 or higher (H) 2018    Opioid dependence in remission (H) 2017    On suboxone.     Osteoarthritis     PTSD (post-traumatic stress disorder) 2017    Thyroid disease     Tobacco abuse 2023    Trigger finger of both hands 2018    IMO Regulatory Load OCT 2020    Urge incontinence of urine 2023     Past Surgical History:   Procedure Laterality Date     SECTION  1995     SECTION  2002    CHOLECYSTECTOMY      COLONOSCOPY N/A 2022    Procedure: Colonscopy;  Surgeon: Deep Zhu MD;  Location: [facility]    KNEE ARTHROSCOPY AND ARTHROTOMY Right 2019    right knee arthroscopy, lateral menisectomy    LEEP TX, CERVICAL  2004     BP Readings from Last 3 Encounters:   25 112/80   24 112/80   24 105/67    Wt Readings from Last 3 Encounters:   25 120.7 kg (266 lb)   10/30/24 120.2 kg (265 lb)   10/25/24 121.6 kg (268 lb)                  Patient Active Problem List   Diagnosis    Chronic pain syndrome    Opioid dependence in remission (H)    PTSD (post-traumatic stress disorder)    COY (generalized anxiety disorder)    Moderate episode of recurrent major depressive disorder (H)    Bilateral edema of lower extremity    Hypothyroid    GERD (gastroesophageal reflux disease)    Mild mixed bipolar I disorder (H)    Grief    Diabetes mellitus, type 2 (H)    History of migraine headaches    Urge incontinence of urine    Encounter for sterilization     Tobacco abuse     Past Surgical History:   Procedure Laterality Date     SECTION  1995     SECTION  2002    CHOLECYSTECTOMY  1999    COLONOSCOPY N/A 2022    Procedure: Colonscopy;  Surgeon: Deep Zhu MD;  Location: [facility]    KNEE ARTHROSCOPY AND ARTHROTOMY Right 2019    right knee arthroscopy, lateral menisectomy    LEEP TX, CERVICAL  2004       Social History     Tobacco Use    Smoking status: Every Day     Current packs/day: 0.50     Average packs/day: 0.5 packs/day for 36.0 years (18.0 ttl pk-yrs)     Types: Cigarettes     Start date: 1989     Passive exposure: Past    Smokeless tobacco: Never    Tobacco comments:     pt declines 24   Substance Use Topics    Alcohol use: No     Family History   Problem Relation Age of Onset    Cerebrovascular Disease Mother     Alcoholism Mother     Cancer Mother     Depression Mother     Eczema Mother     Hypertension Mother     Migraines Mother     Mental Illness Mother     Osteoarthritis Mother     Osteoporosis Mother     Alcoholism Father     Cancer Father     Hyperlipidemia Father     Hypertension Father     Myocardial Infarction Father     Mental Illness Sister     Migraines Sister          Current Outpatient Medications   Medication Sig Dispense Refill    Acetaminophen (TYLENOL PO) Take 500 mg by mouth every 4 hours as needed for mild pain or fever      asenapine (SAPHRIS) 2.5 MG SUBL sublingual tablet Place 2.5 mg under the tongue daily (2.5 mg + 5 mg = 7.5 mg)      asenapine (SAPHRIS) 5 MG SUBL sublingual tablet Place 5 mg under the tongue daily (2.5 mg + 5 mg = 7.5 mg)      baclofen (LIORESAL) 10 MG tablet TAKE 1 TABLET BY MOUTH THREE TIMES DAILY 90 tablet 0    blood glucose (NO BRAND SPECIFIED) test strip Use to test blood sugar 1 time daily 50 strip 5    blood glucose monitoring (NO BRAND SPECIFIED) meter device kit Use to test blood sugar 1 time daily 1 kit 0    diclofenac (VOLTAREN) 1 % topical gel  Apply 2 g topically 4 times daily as needed for moderate pain      famotidine (PEPCID) 20 MG tablet Take 1 tablet (20 mg) by mouth 2 times daily as needed (indigestion). 60 tablet 3    furosemide (LASIX) 20 MG tablet TAKE 2 TABLETS BY MOUTH ONCE DAILY AS NEEDED FOR  LEG  SWELLING 60 tablet 11    hepatitis A vaccine (VAQTA) 50 UNIT/ML injection Inject 1 mL (50 Units) into the muscle once for 1 dose. 1 mL 0    hepatitis b vaccine recombinant (RECOMBIVAX-HB) 10 MCG/ML injection Inject 0.5 mLs (5 mcg) into the muscle once for 1 dose. 1 mL 0    hydrochlorothiazide (HYDRODIURIL) 25 MG tablet Take 1 tablet by mouth once daily 90 tablet 3    hydrOXYzine (ATARAX) 25 MG tablet Take 25 mg by mouth 4 times daily as needed for anxiety      ibuprofen (ADVIL/MOTRIN) 800 MG tablet Take 1 tablet (800 mg) by mouth every 8 hours as needed for moderate pain (4-6) 90 tablet 0    levothyroxine (SYNTHROID/LEVOTHROID) 175 MCG tablet Take 1 tablet by mouth once daily 90 tablet 3    lisdexamfetamine (VYVANSE) 60 MG capsule Take 60 mg by mouth every morning.      Melatonin 10 MG CAPS Take 10 mg by mouth nightly as needed (insomnia)      MOUNJARO 5 MG/0.5ML SOAJ Inject 0.5 mLs (5 mg) subcutaneously every 7 days. 2 mL 4    multivitamin w/minerals (THERA-VIT-M) tablet Take 1 tablet by mouth daily      [START ON 2/14/2025] nicotine (NICODERM CQ) 14 MG/24HR 24 hr patch Place 1 patch over 24 hours onto the skin every 24 hours for 14 days. 14 patch 0    nicotine (NICODERM CQ) 21 MG/24HR 24 hr patch Place 1 patch over 24 hours onto the skin every 24 hours. 42 patch 0    [START ON 3/1/2025] nicotine (NICODERM CQ) 7 MG/24HR 24 hr patch Place 1 patch over 24 hours onto the skin every 24 hours for 14 days. 14 patch 0    nicotine (NICORETTE) 2 MG gum CHEW 1-2 PIECES PER HOUR OR 10-12 PIECES PER DAY--TAPER AS TOLERATED      omeprazole (PRILOSEC) 40 MG DR capsule Take 1 capsule by mouth once daily 90 capsule 3    OneTouch Delica Lancets 33G MISC 1 Lancet  daily 100 each 1    oxyBUTYnin ER (DITROPAN XL) 10 MG 24 hr tablet Take 1 tablet (10 mg) by mouth daily 90 tablet 3    potassium chloride ER (K-TAB) 20 MEQ CR tablet TAKE 1 TABLET BY MOUTH ONCE DAILY ALONG  WITH  A  10  MG  TABLET **THIS SHOULD ONLY BE TAKEN IF TAKING LASIX** 90 tablet 0    potassium chloride ER (K-TAB/KLOR-CON) 10 MEQ CR tablet TAKE 1 TABLET BY MOUTH ONCE DAILY (WHEN TAKING LASIX) 30 tablet 1    rosuvastatin (CRESTOR) 5 MG tablet Take 1 tablet by mouth once daily 90 tablet 3    sertraline (ZOLOFT) 50 MG tablet Take 75 mg by mouth daily      topiramate (TOPAMAX) 25 MG tablet Take 3 tablets (75 mg) by mouth at bedtime 270 tablet 1    Vitamin D3 (CHOLECALCIFEROL) 125 MCG (5000 UT) tablet Take 1 tablet by mouth daily      VRAYLAR 3 MG CAPS capsule Take 3 mg by mouth daily      baclofen (LIORESAL) 10 MG tablet TAKE 1 TABLET BY MOUTH THREE TIMES DAILY 90 tablet 0    Semaglutide, 2 MG/DOSE, (OZEMPIC) 8 MG/3ML pen Inject 2 mg Subcutaneous every 7 days 9 mL 3     Allergies   Allergen Reactions    Depakote [Valproic Acid]     Gabapentin Swelling    Propofol Unknown     Got very stiff and tight.      Recent Labs   Lab Test 05/08/24  1535 02/08/24  1512 09/11/23  1248 05/17/23  1517 02/17/23  1545 07/21/22  1614 07/11/22  1157 02/16/22  1355 01/06/22  1528 07/26/21  1720 05/19/21  0137 03/01/21  1252 01/18/21  1215 02/27/20  1634 01/29/20  1351   A1C 6.5* 6.6*  --   --  6.6*   < >  --    < >  --    < >  --   --   --   --  6.0*   LDL  --  73  --  74  --   --   --   --  104*  --   --   --   --   --  155*   HDL  --  71  --   --   --   --   --   --  65  --   --   --   --   --  87   TRIG  --  100  --   --   --   --   --   --  111  --   --   --   --   --  52   ALT 22  --   --   --  25  --  64*  --   --   --  31  --  33  --  28   CR 0.82  --  0.82 0.84 0.81   < > 0.82  --  0.69   < > 0.78  --  0.92   < > 0.98   GFRESTIMATED 87  --  87 85 88   < > 88  --  >90   < > >90  --  74   < > 69   GFRESTBLACK  --   --   --   --    --   --   --   --   --   --  >90  --  85   < > 80   POTASSIUM 3.7  --  3.7 3.8 3.8   < > 2.9*  --  3.0*   < > 3.5  --  3.8   < > 3.8   TSH 0.63 0.35  --  1.36 21.09*   < >  --    < >  --    < >  --    < > 0.15*   < > 18.84*    < > = values in this interval not displayed.      Current providers sharing in care for this patient include:  Patient Care Team:  Apryl Hathaway PA as PCP - General (Family Practice)  Apryl Hathaway PA as Assigned PCP  Tanya Sweet LPN as Taisha Rocha RD as Diabetes Educator (Diabetes Education)  Jazmín Wallace MD as MD (Endocrinology, Diabetes, and Metabolism)  Kinga Beckwith RN as Diabetes Educator (Diabetes Education)  Jazmín Wallace MD as Assigned Endocrinology Provider  Ivone Moses PA-C as Assigned Surgical Provider  Bloch, Lauren Turner, [facility] as Pharmacist (Pharmacist)  Bonny Banda RD as Registered Dietitian (Dietitian, Registered)  Noah Hearn [facility] as Assigned MTM Pharmacist  Tanisha Shannon DPM as Assigned Musculoskeletal Provider  Cem Varner [facility] as MTM Pharmacist  Cem Varner [facility] as Pharmacist (Pharmacist)    The following health maintenance items are reviewed in Epic and correct as of today:  Health Maintenance   Topic Date Due    CONTROLLED SUBSTANCE AGREEMENT FOR CHRONIC PAIN MANAGEMENT  Never done    HEPATITIS A IMMUNIZATION (1 of 2 - Risk 2-dose series) Never done    HEPATITIS B IMMUNIZATION (2 of 3 - 19+ 3-dose series) 09/11/2020    LUNG CANCER SCREENING  Never done    MEDICARE ANNUAL WELLNESS VISIT  11/02/2023    MICROALBUMIN  02/17/2024    MAMMO SCREENING  07/01/2024    PHQ-9  02/03/2025    COY ASSESSMENT  02/03/2025    LIPID  02/08/2025    URINE DRUG SCREEN  02/08/2025    A1C  02/19/2025    NICOTINE/TOBACCO CESSATION COUNSELING Q 1 YR  03/29/2025    BMP  05/08/2025    TSH W/FREE T4 REFLEX  05/08/2025    EYE EXAM  11/05/2025    DIABETIC FOOT EXAM  11/12/2025    HPV TEST  11/02/2027    PAP  11/02/2027     "ADVANCE CARE PLANNING  09/11/2028    DTAP/TDAP/TD IMMUNIZATION (4 - Td or Tdap) 01/31/2030    COLORECTAL CANCER SCREENING  09/29/2032    RSV VACCINE (1 - 1-dose 75+ series) 08/27/2048    HEPATITIS C SCREENING  Completed    HIV SCREENING  Completed    INFLUENZA VACCINE  Completed    Pneumococcal Vaccine: 50+ Years  Completed    ZOSTER IMMUNIZATION  Completed    COVID-19 Vaccine  Completed    HPV IMMUNIZATION  Aged Out    MENINGITIS IMMUNIZATION  Aged Out    RSV MONOCLONAL ANTIBODY  Aged Out         Review of Systems  Constitutional, HEENT, cardiovascular, pulmonary, GI, , musculoskeletal, neuro, skin, endocrine and psych systems are negative, except as otherwise noted.     Objective    Exam  There were no vitals taken for this visit.   Estimated body mass index is 46.95 kg/m  as calculated from the following:    Height as of 10/30/24: 1.6 m (5' 2.99\").    Weight as of 10/30/24: 120.2 kg (265 lb).    Physical Exam  GENERAL: alert and no distress  EYES: Eyes grossly normal to inspection, PERRL and conjunctivae and sclerae normal  HENT: ear canals and TM's normal, nose and mouth without ulcers or lesions  NECK: no adenopathy, no asymmetry, masses, or scars  RESP: lungs clear to auscultation - no rales, rhonchi or wheezes  CV: regular rate and rhythm, normal S1 S2, no S3 or S4, no murmur, click or rub, no peripheral edema  ABDOMEN: soft, nontender, no hepatosplenomegaly, no masses and bowel sounds normal  MS: no gross musculoskeletal defects noted, no edema  SKIN: no suspicious lesions or rashes  NEURO: Normal strength and tone, mentation intact and speech normal  PSYCH: mentation appears normal, affect normal/bright  LYMPH: no cervical, supraclavicular, axillary, or inguinal adenopathy         1/3/2025   Mini Cog   Clock Draw Score 2 Normal   3 Item Recall 3 objects recalled   Mini Cog Total Score 5              Signed Electronically by: SEVEN Bryant    Answers submitted by the patient for this " visit:  Patient Health Questionnaire (Submitted on 1/2/2025)  If you checked off any problems, how difficult have these problems made it for you to do your work, take care of things at home, or get along with other people?: Very difficult  PHQ9 TOTAL SCORE: 9  Patient Health Questionnaire (G7) (Submitted on 1/3/2025)  COY 7 TOTAL SCORE: 6

## 2025-01-03 NOTE — LETTER
Opioid / Opioid Plus Controlled Substance Agreement    This is an agreement between you and your provider about the safe and appropriate use of controlled substance/opioids prescribed by your care team. Controlled substances are medicines that can cause physical and mental dependence (abuse).    There are strict laws about having and using these medicines. We here at [facility] are committing to working with you in your efforts to get better. To support you in this work, we ll help you schedule regular office appointments for medicine refills. If we must cancel or change your appointment for any reason, we ll make sure you have enough medicine to last until your next appointment.     As a Provider, I will:  Listen carefully to your concerns and treat you with respect.   Recommend a treatment plan that I believe is in your best interest. This plan may involve therapies other than opioid pain medication.   Talk with you often about the possible benefits, and the risk of harm of any medicine that we prescribe for you.   Provide a plan on how to taper (discontinue or go off) using this medicine if the decision is made to stop its use.    As a Patient, I understand that opioid(s):   Are a controlled substance prescribed by my care team to help me function or work and manage my condition(s).   Are strong medicines and can cause serious side effects such as:  Drowsiness, which can seriously affect my driving ability  A lower breathing rate, enough to cause death  Harm to my thinking ability   Depression   Abuse of and addiction to this medicine  Need to be taken exactly as prescribed. Combining opioids with certain medicines or chemicals (such as illegal drugs, sedatives, sleeping pills, and benzodiazepines) can be dangerous or even fatal. If I stop opioids suddenly, I may have severe withdrawal symptoms.  Do not work for all types of pain nor for all patients. If they re not helpful, I may be asked to stop  them.        The risks, benefits and side effects of these medicine(s) were explained to me. I agree that:  I will take part in other treatments as advised by my care team. This may be psychiatry or counseling, physical therapy, behavioral therapy, group treatment or a referral to a specialist.     I will keep all my appointments. I understand that this is part of the monitoring of opioids. My care team may require an office visit for EVERY opioid/controlled substance refill. If I miss appointments or don t follow instructions, my care team may stop my medicine.    I will take my medicines as prescribed. I will not change the dose or schedule unless my care team tells me to. There will be no refills if I run out early.     I may be asked to come to the clinic and complete a urine drug test or complete a pill count at any time. If I don t give a urine sample or participate in a pill count, the care team may stop my medicine.    I will only receive prescriptions from this clinic for chronic pain. If I am treated by another provider for acute pain issues, I will tell them that I am taking opioid pain medication for chronic pain and that I have a treatment agreement with this provider. I will inform my [facility] care team within one business day if I am given a prescription for any pain medication by another healthcare provider. My [facility] care team can contact other providers and pharmacists about my use of any medicines.    It is up to me to make sure that I don t run out of my medicines on weekends or holidays. If my care team is willing to refill my opioid prescription without a visit, I must request refills only during office hours. Refills may take up to 3 business days to process. I will use one pharmacy to fill all my opioid and other controlled substance prescriptions. I will notify the clinic about any changes to my insurance or medication availability.    I am responsible for my  prescriptions. If the medicine/prescription is lost, stolen or destroyed, it will not be replaced. I also agree not to share controlled substance medicines with anyone.    I am aware I should not use any illegal or recreational drugs. I agree not to drink alcohol unless my care team says I can.       If I enroll in the [place] [facility] Cannabis program, I will tell my care team prior to my next refill.     I will tell my care team right away if I become pregnant, have a new medical problem treated outside of my regular clinic, or have a change in my medications.    I understand that this medicine can affect my thinking, judgment and reaction time. Alcohol and drugs affect the brain and body, which can affect the safety of my driving. Being under the influence of alcohol or drugs can affect my decision-making, behaviors, personal safety, and the safety of others. Driving while impaired (DWI) can occur if a person is driving, operating, or in physical control of a car, motorcycle, boat, snowmobile, ATV, motorbike, off-road vehicle, or any other motor vehicle ([place] Statute 169A.20). I understand the risk if I choose to drive or operate any vehicle or machinery.    I understand that if I do not follow any of the conditions above, my prescriptions or treatment may be stopped or changed.          Opioids  What You Need to Know    What are opioids?   Opioids are pain medicines that must be prescribed by a doctor. They are also known as narcotics.     Examples are:   morphine (MS Contin, Virgen)  oxycodone (Oxycontin)  oxycodone and acetaminophen (Percocet)  hydrocodone and acetaminophen (Vicodin, Norco)   fentanyl patch (Duragesic)   hydromorphone (Dilaudid)   methadone  codeine (Tylenol #3)     What do opioids do well?   Opioids are best for severe short-term pain such as after a surgery or injury. They may work well for cancer pain. They may help some people with long-lasting (chronic) pain.     What do opioids NOT do  well?   Opioids never get rid of pain entirely, and they don t work well for most patients with chronic pain. Opioids don t reduce swelling, one of the causes of pain.                                    Other ways to manage chronic pain and improve function include:     Treat the health problem that may be causing pain  Anti-inflammation medicines, which reduce swelling and tenderness, such as ibuprofen (Advil, Motrin) or naproxen (Aleve)  Acetaminophen (Tylenol)  Antidepressants and anti-seizure medicines, especially for nerve pain  Topical treatments such as patches or creams  Injections or nerve blocks  Chiropractic or osteopathic treatment  Acupuncture, massage, deep breathing, meditation, visual imagery, aromatherapy  Use heat or ice at the pain site  Physical therapy   Exercise  Stop smoking  Take part in therapy       Risks and side effects     Talk to your doctor before you start or decide to keep taking opioids. Possible side effects include:    Lowering your breathing rate enough to cause death  Overdose, including death, especially if taking higher than prescribed doses  Worse depression symptoms; less pleasure in things you usually enjoy  Feeling tired or sluggish  Slower thoughts or cloudy thinking  Being more sensitive to pain over time; pain is harder to control  Trouble sleeping or restless sleep  Changes in hormone levels (for example, less testosterone)  Changes in sex drive or ability to have sex  Constipation  Unsafe driving  Itching and sweating  Dizziness  Nausea, throwing up and dry mouth    What else should I know about opioids?    Opioids may lead to dependence, tolerance, or addiction.    Dependence means that if you stop or reduce the medicine too quickly, you will have withdrawal symptoms. These include loose poop (diarrhea), jitters, flu-like symptoms, nervousness and tremors. Dependence is not the same as addiction.                     Tolerance means needing higher doses over time to  get the same effect. This may increase the chance of serious side effects.    Addiction is when people improperly use a substance that harms their body, their mind or their relations with others. Use of opiates can cause a relapse of addiction if you have a history of drug or alcohol abuse.    People who have used opioids for a long time may have a lower quality of life, worse depression, higher levels of pain and more visits to doctors.    You can overdose on opioids. Take these steps to lower your risk of overdose:    Recognize the signs:  Signs of overdose include decrease or loss of consciousness (blackout), slowed breathing, trouble waking up and blue lips. If someone is worried about overdose, they should call 911.    Talk to your doctor about Narcan (naloxone).   If you are at risk for overdose, you may be given a prescription for Narcan. This medicine very quickly reverses the effects of opioids.   If you overdose, a friend or family member can give you Narcan while waiting for the ambulance. They need to know the signs of overdose and how to give Narcan.     Don't use alcohol or street drugs.   Taking them with opioids can cause death.    Do not take any of these medicines unless your doctor says it s OK. Taking these with opioids can cause death:  Benzodiazepines, such as lorazepam (Ativan), alprazolam (Xanax) or diazepam (Valium)  Muscle relaxers, such as cyclobenzaprine (Flexeril)  Sleeping pills like zolpidem (Ambien)   Other opioids      How to keep you and other people safe while taking opioids:    Never share your opioids with others.  Opioid medicines are regulated by the Drug Enforcement Agency (FEDE). Selling or sharing medications is a criminal act.    2. Be sure to store opioids in a secure place, locked up if possible. Young children can easily swallow them and overdose.    3. When you are traveling with your medicines, keep them in the original bottles. If you use a pill box, be sure you also  carry a copy of your medicine list from your clinic or pharmacy.    4. Safe disposal of opioids    Most pharmacies have places to get rid of medicine, called disposal kiosks. Medicine disposal options are also available in every [facility]. Search your county and  medication disposal  to find more options. You can find more details at:  https://www.pca.[email or web address].[place]./living-green/managing-unwanted-medications     I agree that my provider, clinic care team, and pharmacy may work with any city, state or federal law enforcement agency that investigates the misuse, sale, or other diversion of my controlled medicine. I will allow my provider to discuss my care with, or share a copy of, this agreement with any other treating provider, pharmacy or emergency room where I receive care.    I have read this agreement and have asked questions about anything I did not understand.    _______________________________________________________  Patient Signature - Autumn Holbrook _____________________                   Date     _______________________________________________________  Provider Signature - SEVEN Bryant   _____________________                   Date     _______________________________________________________  Witness Signature (required if provider not present while patient signing)   _____________________                   Date

## 2025-01-06 DIAGNOSIS — E03.9 ACQUIRED HYPOTHYROIDISM: Primary | ICD-10-CM

## 2025-01-06 RX ORDER — LEVOTHYROXINE SODIUM 150 UG/1
150 TABLET ORAL
Qty: 90 TABLET | Refills: 0 | Status: SHIPPED | OUTPATIENT
Start: 2025-01-06

## 2025-01-06 NOTE — RESULT ENCOUNTER NOTE
Need to back down on your Synthroid.  Your T 4 also shows this.  Your glucose is good.  Keep up the good work. Your kidney is good and your electrolytes are also very good.

## 2025-01-08 ENCOUNTER — VIRTUAL VISIT (OUTPATIENT)
Dept: PHARMACY | Facility: CLINIC | Age: 50
End: 2025-01-08
Payer: COMMERCIAL

## 2025-01-08 VITALS — BODY MASS INDEX: 48.47 KG/M2 | WEIGHT: 265 LBS

## 2025-01-08 DIAGNOSIS — Z72.0 TOBACCO ABUSE: ICD-10-CM

## 2025-01-08 DIAGNOSIS — E66.01 OBESITY, CLASS III, BMI 40-49.9 (MORBID OBESITY) (H): ICD-10-CM

## 2025-01-08 DIAGNOSIS — E11.9 TYPE 2 DIABETES MELLITUS WITHOUT COMPLICATION, WITHOUT LONG-TERM CURRENT USE OF INSULIN (H): Primary | ICD-10-CM

## 2025-01-08 RX ORDER — TIRZEPATIDE 7.5 MG/.5ML
7.5 INJECTION, SOLUTION SUBCUTANEOUS
Qty: 2 ML | Refills: 2 | Status: SHIPPED | OUTPATIENT
Start: 2025-01-08

## 2025-01-08 NOTE — Clinical Note
Autumn is doing GREAT with Mounjaro - much better than Ozempic for tolerability, but needing to go up for efficacy.  Increased to 7.5 mg today and follow up with me in March, KK in Apr.  lucinda

## 2025-01-08 NOTE — PROGRESS NOTES
Medication Therapy Management (MTM) Encounter    ASSESSMENT:                            Medication Adherence/Access: No issues identified.    Diabetes/weight management :    Patient congratulated on A1C at goal <7%, >18% total body weight loss, and lifestyle modifications.     Patient would benefit from continuing pharmacotherapy for weight management. Given tolerating well, class III obesity, recommend optimizing GLP1/GIP therapy as data to support most significant weight loss and patient has no contraindications. Patient also realizing benefit from reduction in food noise and increased satiety. Education provided on continued dietary and behavioral modifications. Agree with patient assessment, given returning appetite, recommend dose increase Mounjaro at this time.       Aspirin - not taking, unclear; recommend starting this given no contraindications and patient + for tobacco use, increasing risk  Rosuvastatin 5 mg once daily - appropriate dose with comorbid diabetes, however given ldl not at goal <100mg/dl, may consider dose increase. Given patient goal of weight loss with lifestyle modifications, expect some improvements in lipids as well. Recommend continue statin dose for now and will recheck lipids in approx 3 months to evaluate any changes secondary to lifestyle modifications before increasing dose.  Ace/Arb - not indicated, negative for microalbuminuria    Tobacco Cessation: not at goal. Education provided on use of Nicotine patch and gum. GLP1/GIP agonist data also supports reduction in tobacco cravings - increase in Mounjaro may be helpful for tobacco cessation as well.    PLAN:                            Increase Mounjaro to 7.5 mg weekly.    To help with tolerability and effectiveness of Mounjaro :  Eat 3 meals with protein focus daily to help with nausea. If you forget to eat, you may feel nausea as a hunger cue.  Focus on getting protein in first with each meal and snack.   A good starting goal is 60  g protein daily (track this, especially if at weight loss plateau). Once you consistently are getting 60g daily, try getting 90 g protein daily.  Drink plenty of water - goal 64 oz throughout the day  You may try Metamucil, Benefiber, or Citrucel to help feel more full (less nausea) and have softer, more consistent bowel movements.  To optimize weight management - work on incorporating resistance training/weight lifting to build muscle and improve overall metabolism of adipose tissue.      Start using Nicotine patch once daily in the morning, you may remove before bedtime if it makes difficult to sleep. Start with 21mg patch and work your way down on dose to 14 mg then to 7mg patches each month.    Use nicorette gum to help with break through cravings for a cigarette. Also go for a walk or find other physical activity to distract yourself once you pop in a piece of gum to help distract your brain.    You may want to check out the following for more information and help with quitting smoking:    - QuitPlan: www.RockYouplan.In[employer] or 6-468-404-PLAN (7270) to receive 2 weeks of free nicotine patches, gum, or lozenges. You may qualify for more benefits depending on your insurance coverage  - American Lung Association Dexter From Smoking: www.ffsonline.org; $15/3 months or $40/year for access to an online course to help you quit smoking and direct link to email the American Lung Association with questions  - CDC: www.cdc.gov/tobacco or 1-800-QUIT-NOW for access to free resources, coaching, and quit plans  - Smokefree.gov: www.smokefree.gov or 4-599-28Q-QUIT provides a free step-by-step quitting guide and live chat sessions with experts from the [employer] [place] Mon-Fri 7am-10pm CST    Follow-up: February with your new primary care Medication Therapy Management pharmacist - Cem Varner at [facility]. To schedule, please call the clinic at [phone] or use [employer] LÃ[employer].       3/11/25 with Adelina White  PharmD with [facility]   4/9/25 with Ivone Moses PA-C with [facility]       SUBJECTIVE/OBJECTIVE:                          Autumn Holbrook is a 49 year old female seen for an initial visit. She was referred to me from Roz Coffey PA-C .      Reason for visit: Medication Therapy Management GLP1/GIP Agonist Management .    Patient follows with Medication Therapy Management at [facility], given this, will focus on weight management/diabetes given reason for referral.    Allergies/ADRs: Reviewed in chart  Past Medical History: Reviewed in chart  Tobacco: She reports that she has been smoking cigarettes. She started smoking about 36 years ago. She has a 18 pack-year smoking history. She has been exposed to tobacco smoke. She has never used smokeless tobacco.Nicotine/Tobacco Cessation Plan  Pharmacotherapies : Nicotine patch and Nicotine gum  Alcohol: Less than 1 beverages / week      Medication Adherence/Access: no issues reported.    Diabetes / weight management:  Mounjaro 5mg weekly x 2 months  Topiramate 25 mg - 3 tabs every night at bedtime   Vyvanse 60 mg once daily (for ADHD)  Adderall IR 15 mg every afternoon (for ADHD)    Aspirin - not taking, unclear  Rosuvastatin 5 mg once daily  Ace/Arb - not indicated, negative for microalbuminuria    Return Medical Weight Management Visit 10/30/24 with Roz Coffey PA-C     Patient denies side effects with Mounjaro - much better tolerated than Ozempic (see below). Feeling that efficacy for appetite and weight management not quite as potent as with Ozempic 2mg, however. Interested in dose increase. Topiramate works well for appetite management historically, no side effects reported.      Blood sugar monitoring: rarely. Fasting - around  mg/dl    Current diabetes symptoms: none  Diet/Exercise: focuses on protein and low carb, lots of veggies. Small amounts of carb at end of a meal to  "prioritize protein    Water intake: goal to 64 oz per day; 32 oz water bottle    Activity: limited by recent fall on ice - swollen and sore.      Medication Considerations:  Phentermine:  taking diuretic (hypertension/edema), no cardiac history; positive for anxiety; on stimulants for ADHD - avoid duplicative stimulants  Topiramate: No kidney stones, no fatigue or cognitive concerns  Naltrexone: No liver dysfunction, no chronic opiate use  Bupropion:  no seizure, taking diuretic (hypertension/edema), positive anxiety history    GLP1: Patient denies personal or family history of MEN Type2, MTC;  Pancreatitis.  Ozempic caused severe nausea, vomiting, and belching.           Eye exam is up to date  Foot exam is up to date    Initial Consult Weight: 325 lb     Current weight today: 265 lbs 0 oz  Cumulative Weight Loss: -60 lb, -18.5% from baseline    Wt Readings from Last 4 Encounters:   01/08/25 265 lb (120.2 kg)   01/03/25 266 lb (120.7 kg)   10/30/24 265 lb (120.2 kg)   10/25/24 268 lb (121.6 kg)     Estimated body mass index is 48.47 kg/m  as calculated from the following:    Height as of 1/3/25: 5' 2\" (1.575 m).    Weight as of this encounter: 265 lb (120.2 kg).    Lab Results   Component Value Date    A1C 6.4 (H) 01/03/2025     (H) 01/03/2025     01/03/2025    POTASSIUM 3.4 01/03/2025    EUSEBIO 9.8 01/03/2025    CHLORIDE 95 (L) 01/03/2025    CO2 24 01/03/2025    BUN 9.5 01/03/2025    CR 0.86 01/03/2025    GFRESTIMATED 81 01/03/2025    CHOL 201 (H) 01/03/2025     (H) 01/03/2025    HDL 66 01/03/2025    TRIG 120 01/03/2025    VITDT 41 02/17/2023    UMALCR  01/03/2025      Comment:      Unable to calculate, urine albumin and/or urine creatinine is outside detectable limits.  Microalbuminuria is defined as an albumin:creatinine ratio of 17 to 299 for males and 25 to 299 for females. A ratio of albumin:creatinine of 300 or higher is indicative of overt proteinuria.  Due to biologic variability, positive " results should be confirmed by a second, first-morning random or 24-hour timed urine specimen. If there is discrepancy, a third specimen is recommended. When 2 out of 3 results are in the microalbuminuria range, this is evidence for incipient nephropathy and warrants increased efforts at glucose control, blood pressure control, and institution of therapy with an angiotensin-converting-enzyme (ACE) inhibitor (if the patient can tolerate it).      TSH 0.13 (L) 01/03/2025     Liver Function Studies -   Recent Labs   Lab Test 05/08/24  1535   PROTTOTAL 7.0   ALBUMIN 3.8   BILITOTAL 0.2   ALKPHOS 89   AST 21   ALT 22       Lab Results   Component Value Date    A1C 6.4 01/03/2025    A1C 6.5 05/08/2024    A1C 6.6 02/08/2024    A1C 6.6 02/17/2023    A1C 6.6 08/01/2022    A1C 6.9 06/01/2022    A1C 7.6 02/16/2022    A1C 6.0 01/29/2020    A1C 6.2 07/01/2019    A1C 5.9 12/19/2018     BP Readings from Last 3 Encounters:   01/03/25 112/80   11/12/24 112/80   09/05/24 105/67         Today's Vitals: Wt 265 lb (120.2 kg)   BMI 48.47 kg/m    ----------------      I spent 30 minutes with this patient today. All changes were made via collaborative practice agreement with Roz Coffey     A summary of these recommendations was sent via [employer].    Adelina White, Pharm D., MPH    Medication Therapy Management Pharmacist   [facility]      Telemedicine Visit Details  The patient's medications can be safely assessed via a telemedicine encounter.  Type of service:  Video Conference via [employer]  Originating Location (pt. Location): Home    Distant Location (provider location):  Off-site  Start Time:  1:38 PM  End Time:  2:08 PM     Medication Therapy Recommendations  Obesity, Class III, BMI 40-49.9 (morbid obesity) (H)   1 Current Medication: MOUNJARO 5 MG/0.5ML SOAJ (Discontinued)   Current Medication Sig: Inject 0.5 mLs (5 mg) subcutaneously every 7 days.   Rationale: Dose too low - Dosage too  low - Effectiveness   Recommendation: Increase Dose   Status: Accepted per CPA   Identified Date: 1/8/2025 Completed Date: 1/8/2025

## 2025-01-08 NOTE — Clinical Note
Hi Doc!  Great to share a patient with you! YOLI, I told her to follow up with you in February (we are working on diabetes and weight management with [facility], but has a lot of other things Noah was working with her on, so I told her it was important to get that follow up scheduled).  She doesn't check mychart often she notes. So phonen calls may be best. I asked our coordinators to call her to get that February visit scheduled!  Adelina White, Pharm D., MPH  Medication Therapy Management Pharmacist  [facility]

## 2025-01-09 RX ORDER — DEXTROAMPHETAMINE SACCHARATE, AMPHETAMINE ASPARTATE, DEXTROAMPHETAMINE SULFATE AND AMPHETAMINE SULFATE 3.75; 3.75; 3.75; 3.75 MG/1; MG/1; MG/1; MG/1
15 TABLET ORAL DAILY
COMMUNITY
Start: 2024-12-26

## 2025-01-09 NOTE — PATIENT INSTRUCTIONS
Recommendations from MTM Pharmacist visit:                                                    MTM (medication therapy management) is a service provided by a clinical pharmacist designed to help you get the most of out of your medicines.  You may be sent a phone or email survey evaluating today's visit.  Please provide feedback you have for the service he received today if you are able.      Increase Mounjaro to 7.5 mg weekly.    To help with tolerability and effectiveness of Mounjaro :  Eat 3 meals with protein focus daily to help with nausea. If you forget to eat, you may feel nausea as a hunger cue.  Focus on getting protein in first with each meal and snack.   A good starting goal is 60 g protein daily (track this, especially if at weight loss plateau). Once you consistently are getting 60g daily, try getting 90 g protein daily.  Drink plenty of water - goal 64 oz throughout the day  You may try Metamucil, Benefiber, or Citrucel to help feel more full (less nausea) and have softer, more consistent bowel movements.  To optimize weight management - work on incorporating resistance training/weight lifting to build muscle and improve overall metabolism of adipose tissue.      Start using Nicotine patch once daily in the morning, you may remove before bedtime if it makes difficult to sleep. Start with 21mg patch and work your way down on dose to 14 mg then to 7mg patches each month.    Use nicorette gum to help with break through cravings for a cigarette. Also go for a walk or find other physical activity to distract yourself once you pop in a piece of gum to help distract your brain.    You may want to check out the following for more information and help with quitting smoking:    - QuitPlan: www.quitplan.com or 4-451-041-PLAN (2585) to receive 2 weeks of free nicotine patches, gum, or lozenges. You may qualify for more benefits depending on your insurance coverage  - American Lung Association Freedom From Smoking:  "www.Whodinisonline.org; $15/3 months or $40/year for access to an online course to help you quit smoking and direct link to email the American Lung Association with questions  - CDC: www.cdc.gov/tobacco or 1-800-QUIT-NOW for access to free resources, coaching, and quit plans  - Smokefree.gov: www.smokefree.gov or 4-685-27B-QUIT provides a free step-by-step quitting guide and live chat sessions with experts from the [facility] Mon-Fri 7am-10pm CST    Follow-up: February with your new primary care Medication Therapy Management pharmacist - Cem Varner at [facility]. To schedule, please call the clinic at [phone] or use [employer].       3/11/25 with Adelina White, PharmD with [facility]   4/9/25 with Ivone Moses PA-C with [facility]         It was great speaking with you today.  I value your experience and would be very thankful for your time in providing feedback in our clinic survey. In the next few days, you may receive an email or text message from [facility] [employer] with a link to a survey related to your  clinical pharmacist.\"     To schedule another MTM appointment, please call the clinic directly ([facility] Phone Number: [phone] (schedules for [facility] - providers, dietitians, health coaches) or you may call the MTM scheduling line at [phone] or toll-free at [phone].     My Clinical Pharmacist's contact information:                                                      Please feel free to contact me with any questions or concerns you have.      Adelina White, Pharm D., MPH    Medication Therapy Management Pharmacist   [facility]          Meal Replacement Shake Options:   *Protein Shake Criteria: no more than 210 Calories, at least 20 grams of protein, and less than 10 grams of sugar   Premier Protein (160 " Calories, 30 g protein)  Slim Fast Advanced Nutrition (180 Calories, 20 g protein)  Muscle Milk, lactose-free, 17 oz bottle (210 Calories, 30 g protein)  Integrated Supplements, no artificial sugars (110 Calories, 20 g protein)  Boost/Ensure Max (160 calories, 30 gm protein)   Fairlife Protein Shakes (160-230 calories, 26-42 gm protein)  Aldi's Elevation Protein Powder (180 calories, 30 gm protein)   Orgain Protein Shakes (130-160 calories, 20-26 gm protein)     Meal Replacement Bar Options:  Quest Protein Bars (190 Calories, 20 g protein)  Built Bar (170 Calories, 15-20 g protein)  One Protein Bar (210 calories, 20 g protein)  [place] Signature Protein Bar ([employer]) (190 Calories, 21 g protein)  Pure Protein Bars (180 Calories, 21 g protein)    Low Calorie Frozen Meal:  Healthy Choice Power Bowls  Lean Cuisine  Smart Ones  Nirav Bee      ---------------------------------------------------------------    Patient Education    Quitting Tobacco: How Medicines Can Help (02:55)  Your health professional recommends that you watch this short online health video.  Learn how other people quit tobacco by using nicotine replacement and other medicines.   Purpose: Models three approaches to using nicotine replacement and other medicines to quit tobacco.  Goal: The user will learn how nicotine replacement and other medicines can help with quitting tobacco.    Watch: Scan the QR code or visit the link to view video       https://hwi.[email or web address]  Current as of: November 15, 2023  Content Version: 14.3    2024 [employer].   Care instructions adapted under license by your healthcare professional. If you have questions about a medical condition or this instruction, always ask your healthcare professional. [employer] disclaims any warranty or liability for your use of this information.      -----------------------------------------  .    Patient Education   Nicotine Chewing Gum (NICOTINE GUM -  BUCCAL)  For quitting smoking.  Brand Name(s): Nicorelief, Nicorette, Thrive  Generic Name: Nicotine  Instructions  Chew the gum when you feel the urge to smoke. Chew very slowly until it tingles, then move it to the space between your cheek and gum. Keep it there until it stops tingling. When the tingle is gone, begin chewing the gum again until the tingle returns. Most of the nicotine will be gone after 30 minutes.  Do not eat or drink for 15 minutes before or during use of the gum.  Store at room temperature away from heat, light, and moisture. Do not keep in the bathroom.  Tell your doctor and pharmacist about all your medicines. Include prescription and over-the-counter medicines, vitamins, and herbal medicines.  Keep using this medicine for the full number of days that it is prescribed. Do not stop the medicine even if you start to feel better.  This medicine contains sodium. If you are on a low sodium diet, consider this as part of your total sodium diet.  If you have been told to follow a low sodium diet, speak to your doctor before using this medicine.  Do not take the medicine more than 24 times during 24 hours.  Cautions  Tell your doctor and pharmacist if you ever had an allergic reaction to a medicine.  Do not use the medication any more than instructed.  Avoid smoking while on this medicine. Smoking may increase your risk for stroke, heart attack, blood clots, high blood pressure, and other diseases of the heart and blood vessels.  Tell the doctor or pharmacist if you are pregnant, planning to be pregnant, or breastfeeding.  Please tell all your doctors and dentists that you are on this medicine before they provide care.  Side Effects  The following is a list of some common side effects from this medicine. Please speak with your doctor about what you should do if you experience these or other side effects.  jaw pain  irritation of mouth and gum tissue  If you have any of the following side effects, you  may be getting too much medicine. Please contact your doctor to let them know about these side effects.  diarrhea  dizziness  rapid heartbeat  nausea and vomiting  weakness  A few people may have an allergic reaction to this medicine. Symptoms can include difficulty breathing, skin rash, itching, swelling, or severe dizziness. If you notice any of these symptoms, seek medical help quickly.  Please speak with your doctor, nurse, or pharmacist if you have any questions about this medicine.  IMPORTANT NOTE: This document tells you briefly how to take your medicine, but it does not tell you all there is to know about it. Your doctor or pharmacist may give you other documents about your medicine. Please talk to them if you have any questions. Always follow their advice.  There is a more complete description of this medicine available in English. Scan this code on your smartphone or tablet or use the web address below. You can also ask your pharmacist for a printout. If you have any questions, please ask your pharmacist.  The display and use of this drug information is subject to Terms of Use.  More information about NICOTINE GUM - BUCCAL      Copyright(c) 2024 [employer].  Selected from data included with permission and copyright by [employer]. This copyrighted material has been downloaded from a licensed data provider and is not for distribution, except as may be authorized by the applicable terms of use.  Conditions of Use: The information in this database is intended to supplement, not substitute for the expertise and judgment of healthcare professionals. The information is not intended to cover all possible uses, directions, precautions, drug interactions or adverse effects nor should it be construed to indicate that use of a particular drug is safe, appropriate or effective for you or anyone else. A healthcare professional should be consulted before taking any drug, changing any diet or commencing  or discontinuing any course of treatment. The display and use of this drug information is subject to express Terms of Use.  Care instructions adapted under license by your healthcare professional. If you have questions about a medical condition or this instruction, always ask your healthcare professional. [email or web address] disclaims any warranty or liability for your use of this information.       -----------------------------------------  .      Patient Education   Nicotine Transdermal System (NICOTINE PATCH - TRANSDERMAL)  For quitting smoking.  Brand Name(s): Habitrol, Nicoderm C-Q  Generic Name: Nicotine  Instructions  DO NOT take this medicine by mouth.  Avoid placing the patch near the breast.  Remove the patch after 24 hours.  Keep the medicine at room temperature. Avoid heat and direct light.  This patch should not be cut.  Wash your hands before and after handling this medicine.  Do not remove the patch from its package until you are ready to use it.  Remove old patch before applying new one. Change the location of the new patch.  Do not use the patch if it is broken, cut, or damaged.  If you have vivid dreams or trouble sleeping, you may remove the patch before going to sleep.  Ask your doctor or pharmacist about locations on your body where this patch can be used.  Remove the plastic liner that protects the sticky side of the patch before applying to the skin.  Apply the patch to a clean, dry, hairless area. Avoid skin that is red, scraped, or damaged.  Press the patch firmly for a few seconds to make sure it stays in place.  After removing the patch, fold it together and discard it out of reach of children and pets.  Please ask your doctor or pharmacist how you can safely dispose of used patches.  If the skin under the patch becomes irritated, remove the patch. Do not apply a new patch to the area until the skin feels better.  If the patch falls off, apply a new a patch on a different location of  the body.  Tell your doctor and pharmacist about all your medicines. Include prescription and over-the-counter medicines, vitamins, and herbal medicines.  If you need to stop this medicine, your doctor may wish to gradually reduce the dosage before stopping.  Do not use more than 1 patch at any one time.  Cautions  Tell your doctor and pharmacist if you ever had an allergic reaction to a medicine.  Avoid smoking while on this medicine. Smoking may increase your risk for stroke, heart attack, blood clots, high blood pressure, and other diseases of the heart and blood vessels.  Tell the doctor or pharmacist if you are pregnant, planning to be pregnant, or breastfeeding.  Please tell all your doctors and dentists that you are on this medicine before they provide care.  Side Effects  The following is a list of some common side effects from this medicine. Please speak with your doctor about what you should do if you experience these or other side effects.  skin irritation where medicine is applied  If you have any of the following side effects, you may be getting too much medicine. Please contact your doctor to let them know about these side effects.  diarrhea  dizziness  nausea and vomiting  rapid heartbeat  A few people may have an allergic reaction to this medicine. Symptoms can include difficulty breathing, skin rash, itching, swelling, or severe dizziness. If you notice any of these symptoms, seek medical help quickly.  Please speak with your doctor, nurse, or pharmacist if you have any questions about this medicine.  IMPORTANT NOTE: This document tells you briefly how to take your medicine, but it does not tell you all there is to know about it. Your doctor or pharmacist may give you other documents about your medicine. Please talk to them if you have any questions. Always follow their advice.  There is a more complete description of this medicine available in English. Scan this code on your smartphone or tablet or  use the web address below. You can also ask your pharmacist for a printout. If you have any questions, please ask your pharmacist.  The display and use of this drug information is subject to Terms of Use.  More information about NICOTINE PATCH - TRANSDERMAL      Copyright(c) 2024 [employer].  Selected from data included with permission and copyright by [employer]. This copyrighted material has been downloaded from a licensed data provider and is not for distribution, except as may be authorized by the applicable terms of use.  Conditions of Use: The information in this database is intended to supplement, not substitute for the expertise and judgment of healthcare professionals. The information is not intended to cover all possible uses, directions, precautions, drug interactions or adverse effects nor should it be construed to indicate that use of a particular drug is safe, appropriate or effective for you or anyone else. A healthcare professional should be consulted before taking any drug, changing any diet or commencing or discontinuing any course of treatment. The display and use of this drug information is subject to express Terms of Use.  Care instructions adapted under license by your healthcare professional. If you have questions about a medical condition or this instruction, always ask your healthcare professional. [employer] disclaims any warranty or liability for your use of this information.        Tips to Increase the Protein in Your Diet  You may need more protein in your diet to help you heal from an illness, surgery or wound. Extra protein can also help you gain weight. Here are some ideas for adding high-protein foods to your meals.  Meat and fish  Add chopped cooked meat to vegetables, salads, casseroles, soups, sauces and biscuit dough.  Use in omelets, soufflés, quiches, sandwich fillings and chicken or turkey stuffing.  Wrap in pie crust or biscuit dough to make a  turnover.  Add to stuffed baked potatoes.  Make a dip with diced meat or flaked fish mixed with sour cream and spices.  Chopped, hard-cooked eggs  Add to salads.  Use for snacks and sandwich filling.  High-protein milk  To make high-protein milk, mix 1 quart whole milk with 1 cup powdered milk.  Add to cream soups, mashed potatoes, scrambled eggs, cereals and dried eggnog mix.  Use as an ingredient in puddings, custards, hot chocolate, milk shakes and pancakes.  Powdered milk  If you don't have any high-protein milk on hand, you can use powdered milk. Add 3 tablespoons to:  gravies, sauces, cream soups, mayonnaise  casseroles, meat patties, meatloaf, tuna salad, deviled ham  scalloped or mashed potatoes, creamed spinach  scrambled eggs, egg salad  cereals  yogurt, milk drinks, ice cream, frozen desserts, puddings, custards.  Add 4 to 6 tablespoons powdered milk to make:  cream sauces  breads, muffins, pancakes, waffles, cookies, cakes  cream pies, frostings, cake fillings  fruit cobblers, bread or rice pudding, gelatin desserts.  For high-protein eggnog, add 3 to 6 tablespoons powdered milk to prepared eggnog.  Hard or soft cheese  Melt on sandwiches, breads, tortillas, hamburgers, hot dogs, other meats, vegetables, eggs and pies.  Grate into soups, chili, sauces, casseroles, vegetables, potatoes, rice, noodles or meatloaf.  Eat with toast or crackers, or melt for sanjeev dip.  Cottage cheese or ricotta cheese  Mix with or scoop on top of fruits and vegetables.  Add to casseroles, lasagna, spaghetti, noodles and egg dishes (omelets, scrambled eggs, soufflés).  Use in gelatin, pudding-type desserts, cheesecake and pancake batter.  Use to stuff crepes, pasta shells or manicotti.  Fruit yogurt  Blend with fruits for a fruit smoothie.  Use as a dip for fruits and vegetables.  Scoop on top of pancakes or waffles.  Tofu  Blend silken tofu with fruits and juices for a smoothie.  Add chunks of firm tofu to soups and stews, or  crumble into meatloaf.  Blend dried onion soup mix into soft or silken tofu for dip.  Use pureed silken tofu for part of the mayonnaise, sour cream, cream cheese or ricotta cheese called for in recipes.  Beans  Use cooked beans or peas in soups, casseroles, pasta, tacos and burritos.  Nuts and seeds  Note: For children under 3, discuss with the child's care team.  Use in casseroles, breads, muffins, pancakes, cookies and waffles.  Sprinkle on fruits, cereals, ice cream, yogurt, vegetables and salads.  Mix with raisins, dried fruits and chocolate chips for a snack.  Nut butters  Note: For children under 3, discuss with the child's care team.  Spread on sandwiches, toast, muffins, crackers, waffles, pancakes and fruit slices.  Use as a dip for raw vegetables.  Blend with milk drinks, or swirl through ice cream, yogurt or hot cereal.  Nutrition supplements (nutrition bars, drinks and powders)  Add powders to milk drinks and desserts.  Mix with ice cream, milk and fruit for a high-protein milk shake.    For informational purposes only. Not to replace the advice of your health care provider. Clinically reviewed by Natasha Berry RD, ELO, and the Clinical Nutrition Service Line. Copyright   2005 [facility]. All rights reserved. [employer] 240911 - REV 04/24.      -----------------------------------------------------------------------------------------------------------------  Learning About High-Protein Foods  What foods are high in protein?     The foods you eat contain nutrients, such as vitamins and minerals. Protein is a nutrient. Your body needs the right amount to stay healthy and work as it should. You can use the list below to help you make choices about which foods to eat.  Here are some examples of foods that are high in protein.  Dairy and dairy alternatives  Cheese  Milk  Soy milk  Yogurt (especially Greek)  Meat  Beef  Chicken  Ham  Lamb  Lunch meat  Pork  Sausage  [place]  Other protein  "foods  Beans (black, garbanzo, kidney, lima)  Eggs  Hummus  Lentils  Nuts  Peanut butter and other nut butters  Peas  Soybeans  Tofu  Veggie or soy dylan (Check the nutrition label for the amount of protein in each serving.)  Seafood  Anchovies  Cod  Crab  Halibut  [place]  Sardines  Shrimp  Tilapia  Gordon  Tuna  Protein supplements  Bars (Check the nutrition label for the amount of protein in each serving.)  Drinks  Powders  Work with your doctor to find out how much of this nutrient you need. Depending on your health, you may need more or less of it in your diet.  Where can you learn more?  Go to https://www.[employer]agon Army.net/patiented  Enter P335 in the search box to learn more about \"Learning About High-Protein Foods.\"  Current as of: September 20, 2023               Content Version: 14.0    1189-3237 [employer].   Care instructions adapted under license by your healthcare professional. If you have questions about a medical condition or this instruction, always ask your healthcare professional. [employer] disclaims any warranty or liability for your use of this information.         "

## 2025-01-14 ENCOUNTER — OFFICE VISIT (OUTPATIENT)
Dept: PODIATRY | Facility: OTHER | Age: 50
End: 2025-01-14
Attending: PODIATRIST
Payer: COMMERCIAL

## 2025-01-14 VITALS
TEMPERATURE: 98.3 F | OXYGEN SATURATION: 98 % | DIASTOLIC BLOOD PRESSURE: 81 MMHG | HEART RATE: 76 BPM | SYSTOLIC BLOOD PRESSURE: 121 MMHG

## 2025-01-14 DIAGNOSIS — E11.9 DIABETES MELLITUS TYPE 2, NONINSULIN DEPENDENT (H): ICD-10-CM

## 2025-01-14 DIAGNOSIS — Z13.89 SCREENING FOR DIABETIC PERIPHERAL NEUROPATHY: ICD-10-CM

## 2025-01-14 DIAGNOSIS — L60.3 ONYCHODYSTROPHY: Primary | ICD-10-CM

## 2025-01-14 DIAGNOSIS — L84 CALLUS OF FOOT: ICD-10-CM

## 2025-01-14 DIAGNOSIS — E11.42 DIABETIC POLYNEUROPATHY ASSOCIATED WITH TYPE 2 DIABETES MELLITUS (H): ICD-10-CM

## 2025-01-14 PROCEDURE — 11721 DEBRIDE NAIL 6 OR MORE: CPT | Performed by: PODIATRIST

## 2025-01-14 PROCEDURE — 11056 PARNG/CUTG B9 HYPRKR LES 2-4: CPT | Performed by: PODIATRIST

## 2025-01-14 PROCEDURE — G0463 HOSPITAL OUTPT CLINIC VISIT: HCPCS

## 2025-01-14 ASSESSMENT — PAIN SCALES - GENERAL: PAINLEVEL_OUTOF10: MODERATE PAIN (5)

## 2025-01-14 NOTE — PROGRESS NOTES
Chief complaint: Patient presents with:  Toenail: trimming      History of Present Illness: This 49-year-old NIDDM II female is seen for a diabetic foot exam and high risk nail debridement.    She does not want the bilateral second toenail debrided because it is causing discomfort and she does not want the toenail touched.     She gets burning, tingling, and numbness in her feet.    No further pedal complaints today.    Lab Results   Component Value Date    A1C 6.4 2025    A1C 6.5 2024    A1C 6.6 2024    A1C 6.6 2023    A1C 6.6 2022    A1C 6.9 2022    A1C 7.6 2022    A1C 6.0 2020    A1C 6.2 2019    A1C 5.9 2018         /81 (BP Location: Right arm, Patient Position: Sitting, Cuff Size: Adult Large)   Pulse 76   Temp 98.3  F (36.8  C) (Tympanic)   SpO2 98%     Patient Active Problem List   Diagnosis    Chronic pain syndrome    Opioid dependence in remission (H)    PTSD (post-traumatic stress disorder)    COY (generalized anxiety disorder)    Moderate episode of recurrent major depressive disorder (H)    Bilateral edema of lower extremity    Hypothyroid    GERD (gastroesophageal reflux disease)    Mild mixed bipolar I disorder (H)    Grief    Diabetes mellitus, type 2 (H)    History of migraine headaches    Urge incontinence of urine    Encounter for sterilization    Tobacco abuse       Past Surgical History:   Procedure Laterality Date     SECTION  1995     SECTION  2002    CHOLECYSTECTOMY      COLONOSCOPY N/A 2022    Procedure: Colonscopy;  Surgeon: Deep Zhu MD;  Location: [facility]    KNEE ARTHROSCOPY AND ARTHROTOMY Right 2019    right knee arthroscopy, lateral menisectomy    LEEP TX, CERVICAL  2004       Current Outpatient Medications   Medication Sig Dispense Refill    Acetaminophen (TYLENOL PO) Take 500 mg by mouth every 4 hours as needed for mild pain or fever       amphetamine-dextroamphetamine (ADDERALL) 15 MG tablet Take 15 mg by mouth daily. afternoon      asenapine (SAPHRIS) 2.5 MG SUBL sublingual tablet Place 2.5 mg under the tongue daily (2.5 mg + 5 mg = 7.5 mg)      asenapine (SAPHRIS) 5 MG SUBL sublingual tablet Place 5 mg under the tongue daily (2.5 mg + 5 mg = 7.5 mg)      baclofen (LIORESAL) 10 MG tablet TAKE 1 TABLET BY MOUTH THREE TIMES DAILY 90 tablet 0    blood glucose (NO BRAND SPECIFIED) test strip Use to test blood sugar 1 time daily 50 strip 5    blood glucose monitoring (NO BRAND SPECIFIED) meter device kit Use to test blood sugar 1 time daily 1 kit 0    diclofenac (VOLTAREN) 1 % topical gel Apply 2 g topically 4 times daily as needed for moderate pain      famotidine (PEPCID) 20 MG tablet Take 1 tablet (20 mg) by mouth 2 times daily as needed (indigestion). 60 tablet 3    furosemide (LASIX) 20 MG tablet TAKE 2 TABLETS BY MOUTH ONCE DAILY AS NEEDED FOR  LEG  SWELLING 60 tablet 11    hydrochlorothiazide (HYDRODIURIL) 25 MG tablet Take 1 tablet by mouth once daily 90 tablet 3    hydrOXYzine (ATARAX) 25 MG tablet Take 25 mg by mouth 4 times daily as needed for anxiety      ibuprofen (ADVIL/MOTRIN) 800 MG tablet Take 1 tablet (800 mg) by mouth every 8 hours as needed for moderate pain (4-6) 90 tablet 0    levothyroxine (SYNTHROID/LEVOTHROID) 150 MCG tablet Take 1 tablet (150 mcg) by mouth every morning (before breakfast). 90 tablet 0    lisdexamfetamine (VYVANSE) 60 MG capsule Take 60 mg by mouth every morning.      Melatonin 10 MG CAPS Take 10 mg by mouth nightly as needed (insomnia)      multivitamin w/minerals (THERA-VIT-M) tablet Take 1 tablet by mouth daily      [START ON 2/14/2025] nicotine (NICODERM CQ) 14 MG/24HR 24 hr patch Place 1 patch over 24 hours onto the skin every 24 hours for 14 days. 14 patch 0    nicotine (NICODERM CQ) 21 MG/24HR 24 hr patch Place 1 patch over 24 hours onto the skin every 24 hours. 42 patch 0    [START ON 3/1/2025] nicotine  (NICODERM CQ) 7 MG/24HR 24 hr patch Place 1 patch over 24 hours onto the skin every 24 hours for 14 days. 14 patch 0    nicotine (NICORETTE) 2 MG gum CHEW 1-2 PIECES PER HOUR OR 10-12 PIECES PER DAY--TAPER AS TOLERATED      omeprazole (PRILOSEC) 40 MG DR capsule Take 1 capsule by mouth once daily 90 capsule 3    OneTouch Delica Lancets 33G MISC 1 Lancet daily 100 each 1    oxyBUTYnin ER (DITROPAN XL) 10 MG 24 hr tablet Take 1 tablet (10 mg) by mouth daily 90 tablet 3    potassium chloride ER (K-TAB) 20 MEQ CR tablet TAKE 1 TABLET BY MOUTH ONCE DAILY ALONG  WITH  A  10  MG  TABLET **THIS SHOULD ONLY BE TAKEN IF TAKING LASIX** 90 tablet 0    potassium chloride ER (K-TAB/KLOR-CON) 10 MEQ CR tablet TAKE 1 TABLET BY MOUTH ONCE DAILY (WHEN TAKING LASIX) 30 tablet 1    rosuvastatin (CRESTOR) 5 MG tablet Take 1 tablet by mouth once daily 90 tablet 3    sertraline (ZOLOFT) 50 MG tablet Take 75 mg by mouth daily      Tirzepatide (MOUNJARO) 7.5 MG/0.5ML SOAJ Inject 0.5 mLs (7.5 mg) subcutaneously every 7 days. 2 mL 2    topiramate (TOPAMAX) 25 MG tablet Take 3 tablets (75 mg) by mouth at bedtime 270 tablet 1    Vitamin D3 (CHOLECALCIFEROL) 125 MCG (5000 UT) tablet Take 1 tablet by mouth daily      VRAYLAR 3 MG CAPS capsule Take 3 mg by mouth daily       No current facility-administered medications for this visit.          Allergies   Allergen Reactions    Depakote [Valproic Acid]     Gabapentin Swelling    Propofol Unknown     Got very stiff and tight.        Family History   Problem Relation Age of Onset    Cerebrovascular Disease Mother     Alcoholism Mother     Cancer Mother     Depression Mother     Eczema Mother     Hypertension Mother     Migraines Mother     Mental Illness Mother     Osteoarthritis Mother     Osteoporosis Mother     Alcoholism Father     Cancer Father     Hyperlipidemia Father     Hypertension Father     Myocardial Infarction Father     Mental Illness Sister     Migraines Sister        Social History      Socioeconomic History    Marital status:      Spouse name: None    Number of children: 2    Years of education: None    Highest education level: None   Tobacco Use    Smoking status: Every Day     Current packs/day: 0.50     Average packs/day: 0.5 packs/day for 35.7 years (17.8 ttl pk-yrs)     Types: Cigarettes     Start date: 1/1/1989     Passive exposure: Past    Smokeless tobacco: Never    Tobacco comments:     pt declines 9/5/24   Vaping Use    Vaping status: Some Days    Substances: Nicotine, Flavoring    Devices: Disposable   Substance and Sexual Activity    Alcohol use: No    Drug use: Yes     Types: Marijuana     Comment: daily-medical    Sexual activity: Yes     Partners: Male     Birth control/protection: Condom     Social Determinants of Health     Financial Resource Strain: Low Risk  (2/8/2024)    Financial Resource Strain     Within the past 12 months, have you or your family members you live with been unable to get utilities (heat, electricity) when it was really needed?: No   Food Insecurity: High Risk (2/8/2024)    Food Insecurity     Within the past 12 months, did you worry that your food would run out before you got money to buy more?: Yes     Within the past 12 months, did the food you bought just not last and you didn t have money to get more?: Yes   Transportation Needs: Low Risk  (2/8/2024)    Transportation Needs     Within the past 12 months, has lack of transportation kept you from medical appointments, getting your medicines, non-medical meetings or appointments, work, or from getting things that you need?: No   Interpersonal Safety: Low Risk  (9/5/2024)    Interpersonal Safety     Do you feel physically and emotionally safe where you currently live?: Yes     Within the past 12 months, have you been hit, slapped, kicked or otherwise physically hurt by someone?: No     Within the past 12 months, have you been humiliated or emotionally abused in other ways by your partner or  ex-partner?: No   Housing Stability: Low Risk  (2/8/2024)    Housing Stability     Do you have housing? : Yes     Are you worried about losing your housing?: No       ROS: 10 point ROS neg other than the symptoms noted above in the HPI.  EXAM  Constitutional: healthy, alert, and no distress    Psychiatric: mentation appears normal and affect normal/bright    VASCULAR:  -Dorsalis pedis pulse +2/4 b/l  -Posterior tibial pulse +2/4 b/l  -Capillary refill time < 3 seconds to b/l hallux  NEURO:  -Epicritic and protective sensation diminished to the bilateral foot.   DERM:  -Skin temperature, texture and turgor WNL b/l  -Toenails elongated, thickened, dystrophic and discolored x 10  -Hyperkeratotic lesion on the medial plantar distal aspect of the proximal phalanx of the bilateral hallux  -Diffusely dry skin with flaking on the bilateral leg  MSK:  -Muscle strength of ankles +5/5 for dorsiflexion, plantarflexion, ABDUction and ADDuction b/l  -Moderate decrease in arch height while patient is NWB, bilaterally   -Moderate medial midfoot arch collapse bilaterally  -Ankle joint passive ROM within normal limits except for dorsiflexion:    Dorsiflexion, RIGHT Straight knee 0 degrees    Dorsiflexion, LEFT Straight knee 0 degrees    ============================================================    ASSESSMENT:    (L60.3) Onychodystrophy  (primary encounter diagnosis)    (L84) Callus of foot    (E11.9) Diabetes mellitus type 2, noninsulin dependent (H)    (E11.42) Diabetic polyneuropathy associated with type 2 diabetes mellitus (H)    (Z13.89) Screening for diabetic peripheral neuropathy      PLAN:  -Patient evaluated and examined. Treatment options discussed with no educational barriers noted.    -High risk toenail debridement x 10 toenails without incident on 09/05/2024    -Callus pared x 2 to the medial plantar distal aspect of the proximal phalanx of the bilateral hallux   without incident  ---Patient reminded that the callus  will likely return due to the underlying, prominent bone causing the callus while the patient is walking.    Diabetes Mellitus: Patient's DM is managed by their PCP. The DM appears to be stable. Patient's last HbA1C was 6.4% on 01/03/2025.    -Patient in agreement with the above treatment plan and all of patient's questions were answered.      Return to clinic 63+ days for a diabetic foot exam and high risk nail debridement         Tanisha Shannon DPM

## 2025-01-18 DIAGNOSIS — R60.0 BILATERAL EDEMA OF LOWER EXTREMITY: ICD-10-CM

## 2025-01-18 DIAGNOSIS — N32.81 OVERACTIVE BLADDER: ICD-10-CM

## 2025-01-20 RX ORDER — POTASSIUM CHLORIDE 750 MG/1
TABLET, EXTENDED RELEASE ORAL
Qty: 30 TABLET | Refills: 5 | Status: SHIPPED | OUTPATIENT
Start: 2025-01-20

## 2025-01-20 RX ORDER — OXYBUTYNIN CHLORIDE 10 MG/1
10 TABLET, EXTENDED RELEASE ORAL DAILY
Qty: 90 TABLET | Refills: 1 | Status: SHIPPED | OUTPATIENT
Start: 2025-01-20

## 2025-01-20 NOTE — TELEPHONE ENCOUNTER
K-Tab      Last Written Prescription Date:  12/26/24  Last Fill Quantity: 90,   # refills: 0  Last Office Visit: 1/3/25  Future Office visit:    Next 5 appointments (look out 90 days)      Feb 19, 2025 11:00 AM  MTM New with [facility] ([facility]) [address] [place] [id number]  [phone]     Mar 11, 2025 11:00 AM  Pharmacist Visit with Adelina White RPH  [facility] ([facility] ) [address] [id number] [phone]     Mar 25, 2025 2:30 PM  (Arrive by 2:15 PM)  Return Visit with Tanisha Shannon DPM  [facility] ([facility] ) [address] [place] [id number] [phone]             Routing refill request to provider for review/approval because:      Oxybutynin      Last Written Prescription Date:  2/8/24  Last Fill Quantity: 90,   # refills: 3  Last Office Visit: 1/3/25  Future Office visit:    Next 5 appointments (look out 90 days)      Feb 19, 2025 11:00 AM  MTM New with [facility] ([facility]) [address] [place] [id number]  [phone]     Mar 11, 2025 11:00 AM  Pharmacist Visit with Adelina White RPH  [facility] ([facility] ) [address] [phone] [phone]     Mar 25, 2025 2:30 PM  (Arrive by 2:15 PM)  Return Visit with Tanisha Shannon DPM  [facility] ([facility] ) [address] [place] [id number] [phone]             Routing refill request to provider for review/approval because:

## 2025-01-27 ENCOUNTER — MYC MEDICAL ADVICE (OUTPATIENT)
Dept: FAMILY MEDICINE | Facility: OTHER | Age: 50
End: 2025-01-27

## 2025-01-27 DIAGNOSIS — G89.4 CHRONIC PAIN SYNDROME: ICD-10-CM

## 2025-01-28 RX ORDER — BACLOFEN 10 MG/1
TABLET ORAL
Qty: 90 TABLET | Refills: 1 | Status: SHIPPED | OUTPATIENT
Start: 2025-01-28

## 2025-01-28 RX ORDER — IBUPROFEN 800 MG/1
800 TABLET, FILM COATED ORAL EVERY 8 HOURS PRN
Qty: 90 TABLET | Refills: 0 | Status: SHIPPED | OUTPATIENT
Start: 2025-01-28

## 2025-01-28 NOTE — PROGRESS NOTES
Ibuprofen 800 mg  Last signed 1/24/23 #90, 0 R    Baclofen  Last signed 12/4 #90, 0 R    Has refills of potassium at the pharmacy.  Last office visit 1/3  Jeanne Moreira RN  Care Coordination

## 2025-01-30 ENCOUNTER — TELEPHONE (OUTPATIENT)
Dept: MAMMOGRAPHY | Facility: HOSPITAL | Age: 50
End: 2025-01-30

## 2025-01-30 ENCOUNTER — HOSPITAL ENCOUNTER (OUTPATIENT)
Dept: CT IMAGING | Facility: HOSPITAL | Age: 50
Discharge: HOME | End: 2025-01-30
Attending: PHYSICIAN ASSISTANT
Payer: COMMERCIAL

## 2025-01-30 ENCOUNTER — ANCILLARY PROCEDURE (OUTPATIENT)
Dept: MAMMOGRAPHY | Facility: OTHER | Age: 50
End: 2025-01-30
Attending: PHYSICIAN ASSISTANT
Payer: COMMERCIAL

## 2025-01-30 DIAGNOSIS — Z87.891 PERSONAL HISTORY OF NICOTINE DEPENDENCE: ICD-10-CM

## 2025-01-30 DIAGNOSIS — Z12.31 ENCOUNTER FOR SCREENING MAMMOGRAM FOR BREAST CANCER: ICD-10-CM

## 2025-01-30 PROCEDURE — 77063 BREAST TOMOSYNTHESIS BI: CPT | Mod: TC

## 2025-01-30 PROCEDURE — 71271 CT THORAX LUNG CANCER SCR C-: CPT

## 2025-02-04 ENCOUNTER — HOSPITAL ENCOUNTER (OUTPATIENT)
Dept: EDUCATION SERVICES | Facility: HOSPITAL | Age: 50
Discharge: HOME | End: 2025-02-04
Attending: PHYSICIAN ASSISTANT
Payer: COMMERCIAL

## 2025-02-04 VITALS
HEIGHT: 62 IN | HEART RATE: 69 BPM | OXYGEN SATURATION: 99 % | BODY MASS INDEX: 48.12 KG/M2 | SYSTOLIC BLOOD PRESSURE: 101 MMHG | WEIGHT: 261.5 LBS | DIASTOLIC BLOOD PRESSURE: 71 MMHG | RESPIRATION RATE: 16 BRPM

## 2025-02-04 DIAGNOSIS — E11.9 TYPE 2 DIABETES MELLITUS WITHOUT COMPLICATION, WITHOUT LONG-TERM CURRENT USE OF INSULIN (H): Primary | ICD-10-CM

## 2025-02-04 PROCEDURE — G0108 DIAB MANAGE TRN  PER INDIV: HCPCS | Performed by: DIETITIAN, REGISTERED

## 2025-02-04 ASSESSMENT — PAIN SCALES - GENERAL: PAINLEVEL_OUTOF10: SEVERE PAIN (9)

## 2025-02-04 NOTE — LETTER
2/4/2025      Autumn BATISAT Hnatko  [address] [facility] [address]        Diabetes Self-Management Education & Support    Presents for: Individual review (BG review)    Type of Service: In Person Visit    Assessment  Pt seen by RN and RD in diabetes education. Pt has maintained her eating habits, she limits simple carbs and keeps portions small. She does get Mom's meals delivered and eats 1 or more per day. Activity as been limited due to pain in her knee. She has considered trying chair yoga and has a treadmill at home. Makes her own barriers, has to get other tasks done before she can use her treadmill. Working with therapy. Switched from Ozempic to Mounjaro. Working with MWM on titration up the dose. A1C is stable. Misplaced her meter, but she thinks she knows where it is. Up to date on eye, foot and dental exams.    Patient's most recent   Lab Results   Component Value Date    A1C 6.4 01/03/2025    A1C 6.9 06/01/2022    HEMOGLOBINA1 6.2 08/19/2024     is meeting goal of <7.0    Diabetes knowledge and skills assessment:   Patient is knowledgeable in diabetes management concepts related to: Healthy Eating, Being Active, Monitoring, Taking Medication, Problem Solving, Reducing Risks, and Healthy Coping    Based on learning assessment above, most appropriate setting for further diabetes education would be: Individual setting.    Care Plan and Education Provided:  There are no care plans that you recently modified to display for this patient.      Patient verbalized understanding of diabetes self-management education concepts discussed, opportunities for ongoing education and support, and recommendations provided today.    Plan  Continue with Mounjaro, titrate up as able. Has appt with MWM coming up to increase dose.  Increase activity- chair yoga    Topics to cover at upcoming visits: review topics as indicated    Follow-up:  Upcoming Diabetes Ed Appointments     Visit Type Date Time Department    DIABETES ED 2/4/2025  " 1:00 PM [facility]        See Care Plan for co-developed, patient-state behavior change goals.    Education Materials Provided:  No new materials provided today      Subjective/Objective  Autumn is an 49 year old year old, presenting for the following diabetes education related to: Presents for: Individual review (BG review)  Accompanied by: Self  Diabetes education in the past 24mo: Yes  Focus of Visit: Other  Diabetes type: Type 2  Date of diagnosis: 11/4/21  Disease course: Stable  How confident are you filling out medical forms by yourself:: Quite a bit  Diabetes management related comments/concerns: I need to get my new blood sugar monitor working. I lost my old one.  Transportation concerns: No  Difficulty affording diabetes medication?: No  Difficulty affording diabetes testing supplies?: No  Other concerns:: Glasses  Cultural Influences/Ethnic Background:  Not  or       Diabetes Symptoms & Complications:  Diabetes Related Symptoms: Fatigue, Neuropathy, Polydipsia (increased thirst), Polyphagia (increased hunger), Polyuria (increased urination), Yeast infection, Slow healing wounds (fatigue could be related to medication/heat, cramping in feet, pins and needles/burning sensation in feet, increased thirst and hunger (a couple times a week or every 2 weeks, no consistent))  Weight trend: Fluctuating  Symptom course: Stable  Disease course: Stable  Complications assessed today?: Yes  Autonomic neuropathy: No  CVA: No  Heart disease: No  Nephropathy: Yes  Peripheral neuropathy: No  Peripheral Vascular Disease: No  Retinopathy: No  Sexual dysfunction: Other    Patient Problem List and Family Medical History reviewed for relevant medical history, current medical status, and diabetes risk factors.    Vitals:  /71   Pulse 69   Resp 16   Ht 1.575 m (5' 2\")   Wt 118.6 kg (261 lb 8 oz)   SpO2 99%   BMI 47.83 kg/m    Estimated body mass index is 47.83 kg/m  as calculated from the " "following:    Height as of this encounter: 1.575 m (5' 2\").    Weight as of this encounter: 118.6 kg (261 lb 8 oz).   Last 3 BP:   BP Readings from Last 3 Encounters:   02/04/25 101/71   01/14/25 121/81   01/03/25 112/80       History   Smoking Status     Every Day     Types: Cigarettes   Smokeless Tobacco     Never       Labs:  Lab Results   Component Value Date    A1C 6.4 01/03/2025    A1C 6.9 06/01/2022    HEMOGLOBINA1 6.2 08/19/2024     Lab Results   Component Value Date     01/03/2025     09/26/2022     05/19/2021     Lab Results   Component Value Date     01/03/2025     01/29/2020     HDL Cholesterol   Date Value Ref Range Status   01/29/2020 87 >49 mg/dL Final     Direct Measure HDL   Date Value Ref Range Status   01/03/2025 66 >=50 mg/dL Final   ]  GFR Estimate   Date Value Ref Range Status   01/03/2025 81 >60 mL/min/1.73m2 Final     Comment:     eGFR calculated using 2021 CKD-EPI equation.   05/19/2021 >90 >60 mL/min/[1.73_m2] Final     Comment:     Non  GFR Calc  Starting 12/18/2018, serum creatinine based estimated GFR (eGFR) will be   calculated using the Chronic Kidney Disease Epidemiology Collaboration   (CKD-EPI) equation.       GFR Estimate If Black   Date Value Ref Range Status   05/19/2021 >90 >60 mL/min/[1.73_m2] Final     Comment:      GFR Calc  Starting 12/18/2018, serum creatinine based estimated GFR (eGFR) will be   calculated using the Chronic Kidney Disease Epidemiology Collaboration   (CKD-EPI) equation.       Lab Results   Component Value Date    CR 0.86 01/03/2025    CR 0.78 05/19/2021     No results found for: \"MICROALBUMIN\"    Healthy Eating:  Cultural/Jewish diet restrictions?: No  Do you have any food allergies or intolerances?: No  Meal planning/habits: Smaller portions, Heart healthy, Low carb, Avoiding sweets  Who cooks/prepares meals for you?: Self  Who purchases food in  your home?: Self  How many times a week " on average do you eat food made away from home (restaurant/take-out)?: 1  Meals include: Lunch, Dinner, Evening Snack  Breakfast: skips  Lunch: moms meals, sandwich  Dinner: protein (fish or chicken) veg and a little carb. Last night egg noodles with meat sauce wtih broccoli and milk, moms meals  Beverages: Water, Coffee, Milk, Diet soda, Tea (needs to drink more water, milk or hot tea with dinner, little alcohol (holidays))  Has patient met with a dietitian in the past?: Yes    Being Active:  Being Active Assessed Today: Yes  Exercise:: Yes (up and down the stairs more, gardening)  Days per week of moderate to strenuous exercise (like a brisk walk): 0  How intense was your typical exercise? : Light (like stretching or slow walking)  Barrier to exercise: Physical limitation (knee, getting shots in September)    Monitoring:  Monitoring Assessed Today: Yes  Did patient bring glucose meter to appointment? : Yes (lost old meter, brought in her new one to get set up)  Blood Glucose Meter: One Touch (verio reflect)  Times checking blood sugar at home (number): Never  Times checking blood sugar at home (per): Day        Taking Medications:  Diabetes Medication(s)       Incretin Mimetic Agents       Tirzepatide (MOUNJARO) 7.5 MG/0.5ML SOAJ Inject 0.5 mLs (7.5 mg) subcutaneously every 7 days.          Taking Medication Assessed Today: Yes  Current Treatments: Diet, Non-insulin Injectables (Swtiched to Mounjaro from Ozempic. Mounjaro 7.5mg weekly)  Problems taking diabetes medications regularly?: No  Diabetes medication side effects?: Yes (vomiting when eating high carb/high fat foods)    Problem Solving:  Problem Solving Assessed Today: Yes  Is the patient at risk for hypoglycemia?: No  Hypoglycemia Frequency: Rarely (late afternoon - some nausea/cranky (becuase she forgets to eat))  Hypoglycemia Treatment: Glucose (tablets or gel), Other food (granola bars/protein bars, has some glucose tabs)  Does patient have severe  "weather/disaster plan for diabetes management?: Not Needed  Does patient have sick day plan for diabetes management?: Not Needed  Hypoglycemia: Hunger, None (nausea)     Reducing Risks:  Reducing Risks Assessed Today: Yes  Diabetes Risks: Age over 45 years, Sedentary Lifestyle, History of gestational diabetes, Hyperlipidemia  Additional female risks: Gestational Diabetes  CAD Risks: Diabetes Mellitus, Dyslipidemia, Obesity, Sedentary lifestyle, Tobacco exposure  Has dilated eye exam at least once a year?: Yes (11/5/24)  Sees dentist every 6 months?: Yes (10/2024)  Feet checked by healthcare provider in the last year?: Yes (1/14/25)    Healthy Coping:  Healthy Coping Assessed Today: Yes  Emotional response to diabetes: Ready to learn, Acceptance  Informal Support system:: Children, Family, Friends, Other  Stage of change: ACTION (Actively working towards change)    Taisha Corral RD  Time Spent: 30 minutes  Encounter Type: Individual    Any diabetes medication dose changes were made via the CDCES Standing Orders under the patient's referring provider.      Diabetes Self-Management Education & Support    Presents for: Individual review (BG review)    Type of Service: In Person Visit    Assessment  Autumn, 49 year old, returns to [facility] for follow up with UYEN, RN. Refer to RD note for additional information.    Hasn't been testing meter, not able to find.   Suggested OTC Stelo, cgm. FSA/HSA item.     Proteins, healthy fats. Veg.   Low carb/portion size. - eats carbs last.  Glass milk.     Working with weight loss program.   Has switched to Mounjaro and has had 2 doses of 7.5 mg weekly. Weight is down about 4# with switch.   1st dose of Mounjaro 7.5 mg didn't notice any side effects. 2nd dose, noticed \"burps\". Shares she would have similar with Ozempic increase. Will continue to monitor sx.     Trying to be more activy- up down stairs, but is bothering her knees.    Activity levels discussed, important.     Feels she " "puts off tasks that are important for other tasks, \"Not sure why I do that but has appointment with therapist and will work on this with them.\"   Pt provides example, has a walking pad but wants to get house organized before she can open the box and set up. Pt was encouraged to open and start with activity as exercise is of high importance to support her goals.     Has DM shoes but hasn't worn them yet- has \"tasks to complete before will wear.\" Autumn considers the exercise walking pad and the DM shoes as rewards for completing tasks.     eye exam at eye clinic [place].     Dm shoes.    Shares when she takes a hot shower, will feel dizzy. Likely drop in BP, vessels dilate, decreasing BP.   Neuropathy- feet. Burns- ants. Tips, tops toes , feet. Will dicuss with PC. Shares has tried Gabapentin (had swelling, is now listed as allergy) and Lyrica before but didn't tolerate.     a1C:  6.4 - January 2025    Target A1C: <7.0       /71 meeting ADA target guideline. Statin use. ASA use, no,  defer to PCP for recommendations.   Foot exam- current with podiatry. denies new concerns.  Eye exam- current,  Location eye clinic [place], nov.   PCP: Apryl Hathaway, will be establishing with Dr Hdez    PCP follow up- scheduled.   Insurance Coverage: [facility]    Refills needed: none today     Discussed standards of care for diabetes, the importance of early treatment and prevention of cardiovascular risks.     Labs:  BMP Microalbumin Lipids within the last year.     Noninvasive Liver Fibrosis Assessment:  defer to pcp for further recommendations.   FIB-4 Calculation: 0.72 at 5/8/2024  3:35 PM  Calculated from:  SGOT/AST: 21 U/L at 5/8/2024  3:35 PM  SGPT/ALT: 22 U/L at 5/8/2024  3:35 PM  Platelets: 312 10e3/uL at 5/8/2024  3:35 PM  Age: 48 years      Allergies   Allergen Reactions     Depakote [Valproic Acid]      Gabapentin Swelling     Propofol Unknown     Got very stiff and tight.       Wt Readings from Last 10 Encounters: " "  02/04/25 118.6 kg (261 lb 8 oz)   01/08/25 120.2 kg (265 lb)   01/03/25 120.7 kg (266 lb)   10/30/24 120.2 kg (265 lb)   10/25/24 121.6 kg (268 lb)   08/19/24 119.7 kg (264 lb)   08/01/24 118.8 kg (262 lb)   05/29/24 122 kg (269 lb)   05/08/24 122.9 kg (270 lb 14.4 oz)   05/08/24 122 kg (269 lb)       Patient's most recent   Lab Results   Component Value Date    A1C 6.4 01/03/2025    A1C 6.9 06/01/2022    HEMOGLOBINA1 6.2 08/19/2024     is meeting goal of <7.0     Patient verbalized understanding of diabetes self-management education concepts discussed, opportunities for ongoing education and support, and recommendations provided today.    Plan    Continue working with weight management RE: Mounjaro.    Consider Stelo cgm, OTC.   Consider activity levels and goal setting.   Follow up with RD, RN 6/26/2025 at 11 am.     Follow-up:  Upcoming Diabetes Ed Appointments     No appointments to display      See Care Plan for co-developed, patient-state behavior change goals.    Education Materials Provided:  Offered jose j Victor.     Vitals:  /71   Pulse 69   Resp 16   Ht 1.575 m (5' 2\")   Wt 118.6 kg (261 lb 8 oz)   SpO2 99%   BMI 47.83 kg/m    Estimated body mass index is 47.83 kg/m  as calculated from the following:    Height as of this encounter: 1.575 m (5' 2\").    Weight as of this encounter: 118.6 kg (261 lb 8 oz).   Last 3 BP:   BP Readings from Last 3 Encounters:   02/04/25 101/71   01/14/25 121/81   01/03/25 112/80       History   Smoking Status     Every Day     Types: Cigarettes   Smokeless Tobacco     Never     Kinga Beckwith RN  Any diabetes medication dose changes were made via the CDCES Standing Orders under the patient's referring provider.      Sincerely,        Kinga Beckwith RN    Electronically signed  "

## 2025-02-04 NOTE — PROGRESS NOTES
"Diabetes Self-Management Education & Support    Presents for: Individual review (BG review)    Type of Service: In Person Visit    Assessment  Autumn, 49 year old, returns to [facility] for follow up with UYEN, RN. Refer to RD note for additional information.    Hasn't been testing meter, not able to find.   Suggested OTC Stelo, cgm. FSA/HSA item.     Proteins, healthy fats. Veg.   Low carb/portion size. - eats carbs last.  Glass milk.     Working with weight loss program.   Has switched to Mounjaro and has had 2 doses of 7.5 mg weekly. Weight is down about 4# with switch.   1st dose of Mounjaro 7.5 mg didn't notice any side effects. 2nd dose, noticed \"burps\". Shares she would have similar with Ozempic increase. Will continue to monitor sx.     Trying to be more activy- up down stairs, but is bothering her knees.    Activity levels discussed, important.     Feels she puts off tasks that are important for other tasks, \"Not sure why I do that but has appointment with therapist and will work on this with them.\"   Pt provides example, has a walking pad but wants to get house organized before she can open the box and set up. Pt was encouraged to open and start with activity as exercise is of high importance to support her goals.     Has DM shoes but hasn't worn them yet- has \"tasks to complete before will wear.\" Autumn considers the exercise walking pad and the DM shoes as rewards for completing tasks.     eye exam at eye [facility].     Dm shoes.    Shares when she takes a hot shower, will feel dizzy. Likely drop in BP, vessels dilate, decreasing BP.   Neuropathy- feet. Burns- ants. Tips, tops toes , feet. Will dicuss with PC. Shares has tried Gabapentin (had swelling, is now listed as allergy) and Lyrica before but didn't tolerate.     a1C:  6.4 - January 2025    Target A1C: <7.0       /71 meeting ADA target guideline. Statin use. ASA use, no,  defer to PCP for recommendations.   Foot exam- current with podiatry. denies " new concerns.  Eye exam- current,  Location eye clinic [place], nov.   PCP: Apryl Hathaway, will be establishing with Dr Hdez    PCP follow up- scheduled.   Insurance Coverage: [employer]    Refills needed: none today     Discussed standards of care for diabetes, the importance of early treatment and prevention of cardiovascular risks.     Labs:  BMP Microalbumin Lipids within the last year.     Noninvasive Liver Fibrosis Assessment:  defer to pcp for further recommendations.   FIB-4 Calculation: 0.72 at 5/8/2024  3:35 PM  Calculated from:  SGOT/AST: 21 U/L at 5/8/2024  3:35 PM  SGPT/ALT: 22 U/L at 5/8/2024  3:35 PM  Platelets: 312 10e3/uL at 5/8/2024  3:35 PM  Age: 48 years      Allergies   Allergen Reactions    Depakote [Valproic Acid]     Gabapentin Swelling    Propofol Unknown     Got very stiff and tight.       Wt Readings from Last 10 Encounters:   02/04/25 118.6 kg (261 lb 8 oz)   01/08/25 120.2 kg (265 lb)   01/03/25 120.7 kg (266 lb)   10/30/24 120.2 kg (265 lb)   10/25/24 121.6 kg (268 lb)   08/19/24 119.7 kg (264 lb)   08/01/24 118.8 kg (262 lb)   05/29/24 122 kg (269 lb)   05/08/24 122.9 kg (270 lb 14.4 oz)   05/08/24 122 kg (269 lb)       Patient's most recent   Lab Results   Component Value Date    A1C 6.4 01/03/2025    A1C 6.9 06/01/2022    HEMOGLOBINA1 6.2 08/19/2024     is meeting goal of <7.0     Patient verbalized understanding of diabetes self-management education concepts discussed, opportunities for ongoing education and support, and recommendations provided today.    Plan    Continue working with weight management RE: Mounjaro.    Consider Stelo cgm, OTC.   Consider activity levels and goal setting.   Follow up with UYEN RN 6/26/2025 at 11 am.     Follow-up:  Upcoming Diabetes Ed Appointments     No appointments to display      See Care Plan for co-developed, patient-state behavior change goals.    Education Materials Provided:  Offered Stelo information, declines.     Vitals:  /71   Pulse 69   " Resp 16   Ht 1.575 m (5' 2\")   Wt 118.6 kg (261 lb 8 oz)   SpO2 99%   BMI 47.83 kg/m    Estimated body mass index is 47.83 kg/m  as calculated from the following:    Height as of this encounter: 1.575 m (5' 2\").    Weight as of this encounter: 118.6 kg (261 lb 8 oz).   Last 3 BP:   BP Readings from Last 3 Encounters:   02/04/25 101/71   01/14/25 121/81   01/03/25 112/80       History   Smoking Status    Every Day    Types: Cigarettes   Smokeless Tobacco    Never     Kinga Beckwith RN  [facility] diabetes medication dose [facility] the patient's referring provider.  "

## 2025-02-04 NOTE — PROGRESS NOTES
Diabetes Self-Management Education & Support    Presents for: Individual review (BG review)    Type of Service: In Person Visit    Assessment  Pt seen by RN and RD in diabetes education. Pt has maintained her eating habits, she limits simple carbs and keeps portions small. She does get Mom's meals delivered and eats 1 or more per day. Activity as been limited due to pain in her knee. She has considered trying chair yoga and has a treadmill at home. Makes her own barriers, has to get other tasks done before she can use her treadmill. Working with therapy. Switched from Ozempic to Mounjaro. Working with MWM on titration up the dose. A1C is stable. Misplaced her meter, but she thinks she knows where it is. Up to date on eye, foot and dental exams.    Patient's most recent   Lab Results   Component Value Date    A1C 6.4 01/03/2025    A1C 6.9 06/01/2022    HEMOGLOBINA1 6.2 08/19/2024     is meeting goal of <7.0    Diabetes knowledge and skills assessment:   Patient is knowledgeable in diabetes management concepts related to: Healthy Eating, Being Active, Monitoring, Taking Medication, Problem Solving, Reducing Risks, and Healthy Coping    Based on learning assessment above, most appropriate setting for further diabetes education would be: Individual setting.    Care Plan and Education Provided:  There are no care plans that you recently modified to display for this patient.      Patient verbalized understanding of diabetes self-management education concepts discussed, opportunities for ongoing education and support, and recommendations provided today.    Plan  Continue with Mounjaro, titrate up as able. Has appt with MWM coming up to increase dose.  Increase activity- chair yoga    Topics to cover at upcoming visits: review topics as indicated    Follow-up:  Upcoming Diabetes Ed Appointments     Visit Type Date Time Department    DIABETES ED 2/4/2025  1:00 PM [facility]        See Care Plan for co-developed,  "patient-state behavior change goals.    Education Materials Provided:  No new materials provided today      Subjective/Objective  Autumn is an 49 year old year old, presenting for the following diabetes education related to: Presents for: Individual review (BG review)  Accompanied by: Self  Diabetes education in the past 24mo: Yes  Focus of Visit: Other  Diabetes type: Type 2  Date of diagnosis: 11/4/21  Disease course: Stable  How confident are you filling out medical forms by yourself:: Quite a bit  Diabetes management related comments/concerns: I need to get my new blood sugar monitor working. I lost my old one.  Transportation concerns: No  Difficulty affording diabetes medication?: No  Difficulty affording diabetes testing supplies?: No  Other concerns:: Glasses  Cultural Influences/Ethnic Background:  Not  or       Diabetes Symptoms & Complications:  Diabetes Related Symptoms: Fatigue, Neuropathy, Polydipsia (increased thirst), Polyphagia (increased hunger), Polyuria (increased urination), Yeast infection, Slow healing wounds (fatigue could be related to medication/heat, cramping in feet, pins and needles/burning sensation in feet, increased thirst and hunger (a couple times a week or every 2 weeks, no consistent))  Weight trend: Fluctuating  Symptom course: Stable  Disease course: Stable  Complications assessed today?: Yes  Autonomic neuropathy: No  CVA: No  Heart disease: No  Nephropathy: Yes  Peripheral neuropathy: No  Peripheral Vascular Disease: No  Retinopathy: No  Sexual dysfunction: Other    Patient Problem List and Family Medical History reviewed for relevant medical history, current medical status, and diabetes risk factors.    Vitals:  /71   Pulse 69   Resp 16   Ht 1.575 m (5' 2\")   Wt 118.6 kg (261 lb 8 oz)   SpO2 99%   BMI 47.83 kg/m    Estimated body mass index is 47.83 kg/m  as calculated from the following:    Height as of this encounter: 1.575 m (5' 2\").    Weight as " "of this encounter: 118.6 kg (261 lb 8 oz).   Last 3 BP:   BP Readings from Last 3 Encounters:   02/04/25 101/71   01/14/25 121/81   01/03/25 112/80       History   Smoking Status    Every Day    Types: Cigarettes   Smokeless Tobacco    Never       Labs:  Lab Results   Component Value Date    A1C 6.4 01/03/2025    A1C 6.9 06/01/2022    HEMOGLOBINA1 6.2 08/19/2024     Lab Results   Component Value Date     01/03/2025     09/26/2022     05/19/2021     Lab Results   Component Value Date     01/03/2025     01/29/2020     HDL Cholesterol   Date Value Ref Range Status   01/29/2020 87 >49 mg/dL Final     Direct Measure HDL   Date Value Ref Range Status   01/03/2025 66 >=50 mg/dL Final   ]  GFR Estimate   Date Value Ref Range Status   01/03/2025 81 >60 mL/min/1.73m2 Final     Comment:     eGFR calculated using 2021 CKD-EPI equation.   05/19/2021 >90 >60 mL/min/[1.73_m2] Final     Comment:     Non  GFR Calc  Starting 12/18/2018, serum creatinine based estimated GFR (eGFR) will be   calculated using the Chronic Kidney Disease Epidemiology Collaboration   (CKD-EPI) equation.       GFR Estimate If Black   Date Value Ref Range Status   05/19/2021 >90 >60 mL/min/[1.73_m2] Final     Comment:      GFR Calc  Starting 12/18/2018, serum creatinine based estimated GFR (eGFR) will be   calculated using the Chronic Kidney Disease Epidemiology Collaboration   (CKD-EPI) equation.       Lab Results   Component Value Date    CR 0.86 01/03/2025    CR 0.78 05/19/2021     No results found for: \"MICROALBUMIN\"    Healthy Eating:  Cultural/Judaism diet restrictions?: No  Do you have any food allergies or intolerances?: No  Meal planning/habits: Smaller portions, Heart healthy, Low carb, Avoiding sweets  Who cooks/prepares meals for you?: Self  Who purchases food in  your home?: Self  How many times a week on average do you eat food made away from home (restaurant/take-out)?: " 1  Meals include: Lunch, Dinner, Evening Snack  Breakfast: skips  Lunch: moms meals, sandwich  Dinner: protein (fish or chicken) veg and a little carb. Last night egg noodles with meat sauce wtih broccoli and milk, moms meals  Beverages: Water, Coffee, Milk, Diet soda, Tea (needs to drink more water, milk or hot tea with dinner, little alcohol (holidays))  Has patient met with a dietitian in the past?: Yes    Being Active:  Being Active Assessed Today: Yes  Exercise:: Yes (up and down the stairs more, gardening)  Days per week of moderate to strenuous exercise (like a brisk walk): 0  How intense was your typical exercise? : Light (like stretching or slow walking)  Barrier to exercise: Physical limitation (knee, getting shots in September)    Monitoring:  Monitoring Assessed Today: Yes  Did patient bring glucose meter to appointment? : Yes (lost old meter, brought in her new one to get set up)  Blood Glucose Meter: One Touch (verio reflect)  Times checking blood sugar at home (number): Never  Times checking blood sugar at home (per): Day        Taking Medications:  Diabetes Medication(s)       Incretin Mimetic Agents       Tirzepatide (MOUNJARO) 7.5 MG/0.5ML SOAJ Inject 0.5 mLs (7.5 mg) subcutaneously every 7 days.          Taking Medication Assessed Today: Yes  Current Treatments: Diet, Non-insulin Injectables (Swtiched to Mounjaro from Ozempic. Mounjaro 7.5mg weekly)  Problems taking diabetes medications regularly?: No  Diabetes medication side effects?: Yes (vomiting when eating high carb/high fat foods)    Problem Solving:  Problem Solving Assessed Today: Yes  Is the patient at risk for hypoglycemia?: No  Hypoglycemia Frequency: Rarely (late afternoon - some nausea/cranky (becuase she forgets to eat))  Hypoglycemia Treatment: Glucose (tablets or gel), Other food (granola bars/protein bars, has some glucose tabs)  Does patient have severe weather/disaster plan for diabetes management?: Not Needed  Does patient  have sick day plan for diabetes management?: Not Needed  Hypoglycemia: Hunger, None (nausea)     Reducing Risks:  Reducing Risks Assessed Today: Yes  Diabetes Risks: Age over 45 years, Sedentary Lifestyle, History of gestational diabetes, Hyperlipidemia  Additional female risks: Gestational Diabetes  CAD Risks: Diabetes Mellitus, Dyslipidemia, Obesity, Sedentary lifestyle, Tobacco exposure  Has dilated eye exam at least once a year?: Yes (11/5/24)  Sees dentist every 6 months?: Yes (10/2024)  Feet checked by healthcare provider in the last year?: Yes (1/14/25)    Healthy Coping:  Healthy Coping Assessed Today: Yes  Emotional response to diabetes: Ready to learn, Acceptance  Informal Support system:: Children, Family, Friends, Other  Stage of change: ACTION (Actively working towards change)    Taisha Corral RD  Time Spent: 30 minutes  Encounter Type: Individual    Any diabetes medication dose changes were made via the CDCES Standing Orders under the patient's referring provider.

## 2025-02-18 NOTE — PROGRESS NOTES
Medication Therapy Management (MTM) Encounter    ASSESSMENT:                            Medication Adherence/Access: No issues identified.    Diabetes /Weight Management/ADHD  Stable-plan to recheck A1c next month.  Patient may also benefit from lipase recheck due to history of pancreatitis    Hypertension/Edema:  Stable     Hyperlipidemia   Patient not meeting LDL goal of <100. Patient may benefit from increasing rosuvastatin     GERD  Patient would benefit from the following changes: taper off proton pump inhibitor.. Chronic PPI use places patient at an increased risk of C. Diff, hypomagnesemia and lower bone mineral density, these risks were reviewed with the patient.      Bone Health   Osteoporosis Prevention:   Patient may benefit from DEXA scan to assess bone health as she carries several risk factors for osteoporosis     Tobacco Use Disorder:  Stable    Mental Health   Anxiety, Depression, Bipolar, Insomnia, and PTSD  Plan in place with psychiatry     Chronic pain    Stable    Tobacco Use Disorder:  Stable    Genitourinary Symptoms  Overactive Bladder  Stable    Hypothyroidism   Patient may benefit from thyroid panel next month due to recent dose change     Supplements   Stable    PLAN:                            Increase rosuvastatin to 10 mg at bedtime  Decrease omeprazole to 20 mg daily  You can take famotidine up to twice daily if needed  Plan to check thyroid, lipids, lipase, and A1c in April  DEXA scan to assess bones-someone will reach out to you to get this scheduled    Follow-up: Return in about 6 weeks (around 4/2/2025) for Medication Therapy Management Visit.    SUBJECTIVE/OBJECTIVE:                          Autumn Holbrook is a 49 year old female seen for a follow-up visit from 1/8/25.       Reason for visit: Medication Review.    Allergies/ADRs: Reviewed in chart  Past Medical History: Reviewed in chart  Tobacco: She reports that she has been smoking cigarettes. She started smoking about 36 years  ago. She has a 18.1 pack-year smoking history. She has been exposed to tobacco smoke. She has never used smokeless tobacco.Nicotine/Tobacco Cessation Plan  See below  Alcohol: rare    Medication Adherence/Access: no issues reported.    Diabetes /Weight Management/ADHD  Mounjaro 7.5 mg weekly -taken 5 weeks  Had some burps with 2nd injection, otherwise going very well  Prefers to get this filled at [place]  Topiramate 75 mg at bedtime   Vyvanse 60 mg once daily   Adderall IR 15 mg every afternoon at 3pm  Aspirin - not taking, unclear  Patient denies side effects with Mounjaro - much better tolerated than Ozempic. Feeling that efficacy for appetite and weight management not quite as potent as with Ozempic 2mg, however. Interested in dose increase. Topiramate works well for appetite management historically, no side effects reported.  Weight = 256 pounds  Blood sugar monitoring: rarely. Fasting - around  mg/dl; does not check post-prandial  Current diabetes symptoms: none  Diet/Exercise: focuses on protein and low carb, lots of veggies. Small amounts of carb at end of a meal to prioritize protein  Activity: limited   Ozempic caused severe nausea, vomiting, and belching.  Saw Diabetes Ed: Consider Faraz cgm, OTC; hasn't picked up  Patient also follows with [facility]     Eye exam is up to date  Foot exam is up to date    Hypertension   Hypertension/Edema:  Hydrochlorothiazide 25 mg daily  Furosemide 20 mg twice daily  Potassium chloride ER 30 mEq daily   Patient watches her salt intake.    Patient reports no current medication side effects  Patient does not self-monitor blood pressure.    Patient reports legs are still swollen-primary care provider is aware     Hyperlipidemia   Rosuvastatin 5 mg daily  Patient reports no significant myalgias or other side effects.  The 10-year ASCVD risk score (Alejandrina DK, et al., 2019) is: 4.9%    Values used to calculate the score:      Age: 48 years       Sex: Female      Is Non- : No      Diabetic: Yes      Tobacco smoker: Yes      Systolic Blood Pressure: 125 mmHg      Is BP treated: Yes      HDL Cholesterol: 71 mg/dL      Total Cholesterol: 164 mg/dL     GERD    Famotidine 20 mg twice daily as needed-rare use  Omeprazole 40 mg once daily   Patient reports no current symptoms.   Patient feels that current regimen is effective.  The patient does not have a history of GI bleed.  The patient does not notice symptoms if they miss a dose.  Patient has not tried a trial off of therapy and is interested in doing so.     Bone Health   Osteoporosis Prevention:   Vitamin 125 mcg daily  Patient is not experiencing side effects.  DEXA History: none  Patient is getting  1 serving(s)/day of calcium in their diet.  Risk factors: chronic PPI use, family history of osteoporosis, recurring steroid injections     Tobacco Use Disorder:  Nicotine Replacement: Gum 2 mg   Nicotine 21 mg daily  Current Tobacco Product use and Frequency:  about one-half pack per day.  Tried quitting in the past: Yes.   What worked/didn't work in the past: nicotine replacement.      Mental Health   Anxiety, Depression, Bipolar, Insomnia, and PTSD  Asenapine 7.5 mg at bedtime  Hydroxyzine 25 mg four times daily as needed -uses once a year  Melatonin 10 mg at bedtime as needed  Sertraline 75 mg daily  Vraylar 3 mg daily  Patient reports no current medication side effects.  Feels like meds are working well  Follows with Zac Lucero psychiatrist at [facility]     Chronic pain    Acetaminophen 500 mg q4h as needed -once or twice per week  Baclofen 10 mg three times daily   Voltaren Gel twice daily to knees  Ibuprofen 800 mg three times daily as needed -two to three times weekly on average  Synvisc Injection 9/6/24  Triamcinolone injection - 12/6/24  Patient reports no medication side effects    Genitourinary Symptoms  Overactive Bladder  Oxybutynin XL 10 mg  daily  Patient reports no current medication side effects.    Patient feels that current therapy is effective.     Hypothyroidism   Levothyroxine 175 mcg daily  Recently changed from 150 mcg on 1/28/25  Patient is having the following symptoms: cold      Supplements   Multivitamin daily  No reported issues at this time.      ---------------    I spent 50 minutes with this patient today. All changes were made via collaborative practice agreement with SE[facility] Bryant.     A summary of these recommendations was sent via [employer].    Cem Varner, PharmD  Medication Therapy Management Pharmacist     Medication Therapy Recommendations  GERD (gastroesophageal reflux disease)   1 Current Medication: omeprazole (PRILOSEC) 40 MG DR capsule   Current Medication Sig: Take 1 capsule by mouth once daily   Rationale: Dose too high - Dosage too high - Safety   Recommendation: Decrease Dose   Status: Accepted per CPA   Identified Date: 2/19/2025 Completed Date: 2/19/2025         Hyperlipidemia LDL goal <100   1 Current Medication: rosuvastatin (CRESTOR) 5 MG tablet   Current Medication Sig: Take 1 tablet by mouth once daily   Rationale: Dose too low - Dosage too low - Effectiveness   Recommendation: Increase Dose - rosuvastatin 10 MG sprinkle capsule   Status: Accepted per CPA   Identified Date: 2/19/2025 Completed Date: 2/19/2025         Hypothyroid   1 Current Medication: levothyroxine (SYNTHROID/LEVOTHROID) 150 MCG tablet   Current Medication Sig: Take 1 tablet (150 mcg) by mouth every morning (before breakfast).   Rationale: Medication requires monitoring - Needs additional monitoring   Recommendation: Order Lab   Status: Accepted per CPA   Identified Date: 2/19/2025 Completed Date: 2/19/2025         Screening for osteoporosis   1 Current Medication: omeprazole (PRILOSEC) 40 MG DR capsule   Current Medication Sig: Take 1 capsule by mouth once daily   Rationale: Medication requires monitoring - Needs additional monitoring    Recommendation: Make Appt with PCP   Status: Contact Provider - Awaiting Response   Identified Date: 2/19/2025

## 2025-02-19 ENCOUNTER — OFFICE VISIT (OUTPATIENT)
Dept: PHARMACY | Facility: PHYSICIAN GROUP | Age: 50
End: 2025-02-19
Payer: COMMERCIAL

## 2025-02-19 DIAGNOSIS — I10 BENIGN ESSENTIAL HYPERTENSION: ICD-10-CM

## 2025-02-19 DIAGNOSIS — Z78.9 TAKES DIETARY SUPPLEMENTS: ICD-10-CM

## 2025-02-19 DIAGNOSIS — E08.9 DIABETES MELLITUS RELATED TO CYSTIC FIBROSIS (H): ICD-10-CM

## 2025-02-19 DIAGNOSIS — E66.813 CLASS 3 SEVERE OBESITY WITH SERIOUS COMORBIDITY AND BODY MASS INDEX (BMI) OF 45.0 TO 49.9 IN ADULT, UNSPECIFIED OBESITY TYPE (H): ICD-10-CM

## 2025-02-19 DIAGNOSIS — F41.1 GAD (GENERALIZED ANXIETY DISORDER): ICD-10-CM

## 2025-02-19 DIAGNOSIS — E66.01 CLASS 3 SEVERE OBESITY WITH SERIOUS COMORBIDITY AND BODY MASS INDEX (BMI) OF 45.0 TO 49.9 IN ADULT, UNSPECIFIED OBESITY TYPE (H): ICD-10-CM

## 2025-02-19 DIAGNOSIS — E11.9 TYPE 2 DIABETES MELLITUS WITHOUT COMPLICATION, WITHOUT LONG-TERM CURRENT USE OF INSULIN (H): Primary | ICD-10-CM

## 2025-02-19 DIAGNOSIS — G89.4 CHRONIC PAIN SYNDROME: ICD-10-CM

## 2025-02-19 DIAGNOSIS — K21.9 GASTROESOPHAGEAL REFLUX DISEASE WITHOUT ESOPHAGITIS: ICD-10-CM

## 2025-02-19 DIAGNOSIS — F33.1 MODERATE EPISODE OF RECURRENT MAJOR DEPRESSIVE DISORDER (H): ICD-10-CM

## 2025-02-19 DIAGNOSIS — N32.81 OVERACTIVE BLADDER: ICD-10-CM

## 2025-02-19 DIAGNOSIS — G47.00 INSOMNIA, UNSPECIFIED TYPE: ICD-10-CM

## 2025-02-19 DIAGNOSIS — R60.0 BILATERAL EDEMA OF LOWER EXTREMITY: ICD-10-CM

## 2025-02-19 DIAGNOSIS — E03.9 ACQUIRED HYPOTHYROIDISM: ICD-10-CM

## 2025-02-19 DIAGNOSIS — Z13.820 SCREENING FOR OSTEOPOROSIS: ICD-10-CM

## 2025-02-19 DIAGNOSIS — F43.10 PTSD (POST-TRAUMATIC STRESS DISORDER): ICD-10-CM

## 2025-02-19 DIAGNOSIS — Z72.0 TOBACCO ABUSE: ICD-10-CM

## 2025-02-19 DIAGNOSIS — E84.8 DIABETES MELLITUS RELATED TO CYSTIC FIBROSIS (H): ICD-10-CM

## 2025-02-19 DIAGNOSIS — E78.5 HYPERLIPIDEMIA LDL GOAL <100: ICD-10-CM

## 2025-02-19 DIAGNOSIS — F31.9 BIPOLAR I DISORDER (H): ICD-10-CM

## 2025-02-19 DIAGNOSIS — F90.9 ATTENTION DEFICIT HYPERACTIVITY DISORDER (ADHD), UNSPECIFIED ADHD TYPE: ICD-10-CM

## 2025-02-19 PROCEDURE — 99607 MTMS BY PHARM ADDL 15 MIN: CPT

## 2025-02-19 PROCEDURE — 99605 MTMS BY PHARM NP 15 MIN: CPT

## 2025-02-19 RX ORDER — FAMOTIDINE 20 MG/1
20 TABLET, FILM COATED ORAL 2 TIMES DAILY PRN
Qty: 60 TABLET | Refills: 3 | Status: SHIPPED | OUTPATIENT
Start: 2025-02-19

## 2025-02-19 RX ORDER — OMEPRAZOLE 20 MG/1
20 CAPSULE, DELAYED RELEASE ORAL DAILY
Qty: 90 CAPSULE | Refills: 1 | Status: SHIPPED | OUTPATIENT
Start: 2025-02-19

## 2025-02-19 RX ORDER — FUROSEMIDE 20 MG/1
20 TABLET ORAL
COMMUNITY
Start: 2025-02-19

## 2025-02-19 RX ORDER — NICOTINE 21 MG/24HR
1 PATCH, TRANSDERMAL 24 HOURS TRANSDERMAL EVERY 24 HOURS
COMMUNITY

## 2025-02-19 RX ORDER — ROSUVASTATIN CALCIUM 10 MG/1
10 TABLET, COATED ORAL AT BEDTIME
Qty: 90 TABLET | Refills: 3 | Status: SHIPPED | OUTPATIENT
Start: 2025-02-19

## 2025-02-19 NOTE — LETTER
"Recommended To-Do List      Prepared on: Feb 19, 2025       You can get the best results from your medications by completing the items on this \"To-Do List.\"      Bring your To-Do List when you go to your doctor. And, share it with your family or caregivers.    My To-Do List:  What we talked about: What I should do:   Needing additional monitoring    Someone will reach out to you to schedule a DEXA scan          What we talked about: What I should do:   Your medication dosage being too high    Decrease your dosage of omeprazole (PriLOSEC)          What we talked about: What I should do:   Your medication dosage being too low    Increase your dosage of rosuvastatin (CRESTOR)          What we talked about: What I should do:   Needing additional monitoring    Get the following lab test(s) in April: thyroid, lipids, lipase, AIC           What we talked about: What I should do:                     "

## 2025-02-19 NOTE — LETTER
_  Medication List        Prepared on: Feb 19, 2025     Bring your Medication List when you go to the doctor, hospital, or   emergency room. And, share it with your family or caregivers.     Note any changes to how you take your medications.  Cross out medications when you no longer use them.    Medication How I take it Why I use it Prescriber   Acetaminophen (TYLENOL PO) Take 500 mg by mouth every 4 hours as needed for mild pain or fever  Pain Patient Reported   amphetamine-dextroamphetamine (ADDERALL) 15 MG tablet Take 15 mg by mouth daily. afternoon  ADHD Patient Reported   asenapine (SAPHRIS) 2.5 MG SUBL sublingual tablet Place 2.5 mg under the tongue at bedtime. (2.5 mg + 5 mg = 7.5 mg) Manic-Depression Zac Lucero NP   asenapine (SAPHRIS) 5 MG SUBL sublingual tablet Place 5 mg under the tongue at bedtime. (2.5 mg + 5 mg = 7.5 mg) Manic-Depression Zac Lucero NP   baclofen (LIORESAL) 10 MG tablet TAKE 1 TABLET BY MOUTH THREE TIMES DAILY Chronic Pain Syndrome SEVEN Pandey   blood glucose (NO BRAND SPECIFIED) test strip Use to test blood sugar 1 time daily Type 2 diabetes mellitus without complication, without long-term current use of insulin (H) SEVEN Pandey   blood glucose monitoring (NO BRAND SPECIFIED) meter device kit Use to test blood sugar 1 time daily Type 2 diabetes mellitus without complication, without long-term current use of insulin (H) SEVEN Pandey   diclofenac (VOLTAREN) 1 % topical gel Apply 2 g topically 2 times daily. Joint Damage causing Pain and Loss of Function Patient Reported   famotidine (PEPCID) 20 MG tablet Take 1 tablet (20 mg) by mouth 2 times daily as needed (indigestion). Type 2 diabetes mellitus without complication, without long-term current use of insulin (H); Class 3 severe obesity with serious comorbidity and body mass index (BMI) of 45.0 to 49.9 in adult, unspecified obesity type (H); Gastroesophageal Reflux Disease without Esophagitis Apryl WALLACE  [facility]   furosemide (LASIX) 20 MG tablet Take 1 tablet (20 mg) by mouth 2 times daily.  Edema SE[facility] Pandey   hydrochlorothiazide (HYDRODIURIL) 25 MG tablet Take 1 tablet by mouth once daily Benign Essential Hypertension Isabel Hdez MD   hydrOXYzine (ATARAX) 25 MG tablet Take 25 mg by mouth 4 times daily as needed for anxiety Feeling Anxious Zac Lucero NP   ibuprofen (ADVIL/MOTRIN) 800 MG tablet Take 1 tablet (800 mg) by mouth every 8 hours as needed for moderate pain. Chronic Pain Syndrome SEVEN Pandey   levothyroxine (SYNTHROID/LEVOTHROID) 150 MCG tablet Take 1 tablet (150 mcg) by mouth every morning (before breakfast). Acquired Hypothyroidism SEVEN Pandey   lisdexamfetamine (VYVANSE) 60 MG capsule Take 60 mg by mouth every morning.  ADHD Patient Reported   Melatonin 10 MG CAPS Take 10 mg by mouth nightly as needed (insomnia)  Insomnia Zac Lucero NP   multivitamin w/minerals (THERA-VIT-M) tablet Take 1 tablet by mouth daily Nutritional Support Patient Reported   nicotine (NICODERM CQ) 14 MG/24HR 24 hr patch Place 1 patch over 24 hours onto the skin every 24 hours for 14 days. Personal history of nicotine dependence SEVEN Pandey   nicotine (NICODERM CQ) 21 MG/24HR 24 hr patch Place 1 patch onto the skin every 24 hours.  Personal history of nicotine dependence Patient Reported   nicotine (NICODERM CQ) 7 MG/24HR 24 hr patch  Place 1 patch over 24 hours onto the skin every 24 hours for 14 days. Personal history of nicotine dependence SEVEN Pandey   nicotine (NICORETTE) 2 MG gum Take 1 each (2 mg) by mouth every hour as needed for nicotine withdrawal symptoms. Tobacco Abuse SEVEN Pandey   omeprazole (PRILOSEC) 20 MG DR capsule Take 1 capsule (20 mg) by mouth daily. Take 1 capsule by mouth once daily Gastroesophageal Reflux Disease without Esophagitis SEVEN Pandey   OneTouch Delica Lancets 33G MISC 1 Lancet daily Type 2 diabetes mellitus  without complication, without long-term current use of insulin (H) SEVEN Pandey   oxyBUTYnin ER (DITROPAN XL) 10 MG 24 hr tablet Take 1 tablet by mouth once daily Overactive Bladder SEVEN Pandey   potassium chloride ER (K-TAB) 20 MEQ CR tablet TAKE 1 TABLET BY MOUTH ONCE DAILY ALONG  WITH  A  10  MG  TABLET **THIS SHOULD ONLY BE TAKEN IF TAKING LASIX** Hypokalemia SEVEN Pandey   potassium chloride ER (K-TAB/KLOR-CON) 10 MEQ CR tablet TAKE 1 TABLET BY MOUTH ONCE DAILY (WHEN TAKING LASIX) Bilateral edema of lower extremity SEVEN Pandey   rosuvastatin (CRESTOR) 10 MG tablet Take 1 tablet (10 mg) by mouth at bedtime. Type 2 diabetes mellitus without complication, without long-term current use of insulin (H) SEVEN Pandey   sertraline (ZOLOFT) 50 MG tablet Take 75 mg by mouth daily Generalized Anxiety Disorder; Bipolar I Disorder Zac Lucero NP   Tirzepatide (MOUNJARO) 7.5 MG/0.5ML SOAJ Inject 0.5 mLs (7.5 mg) subcutaneously every 7 days. Type 2 diabetes mellitus without complication, without long-term current use of insulin (H) Roz Coffey PA-C   topiramate (TOPAMAX) 25 MG tablet Take 3 tablets (75 mg) by mouth at bedtime. Migraine without status migrainosus, not intractable, unspecified migraine type Ivone Reba Moses PA-C   Vitamin D3 (CHOLECALCIFEROL) 125 MCG (5000 UT) tablet Take 1 tablet by mouth daily Vitamin D Deficiency Patient Reported   VRAYLAR 3 MG CAPS capsule Take 3 mg by mouth daily Depressive Phase of Manic-Depression Zac Lucero NP         Add new medications, over-the-counter drugs, herbals, vitamins, or  minerals in the blank rows below.    Medication How I take it Why I use it Prescriber                                      Allergies:      - Depakote [valproic Acid]  - Gabapentin - Swelling  - Propofol - Unknown        Side effects I have had:      Not on File        Other Information:              My notes and questions:

## 2025-02-19 NOTE — PATIENT INSTRUCTIONS
"Recommendations from today's MTM visit:                                                    MTM (medication therapy management) is a service provided by a clinical pharmacist designed to help you get the most of out of your medicines.   Today we reviewed what your medicines are for, how to know if they are working, that your medicines are safe and how to make your medicine regimen as easy as possible.      Increase rosuvastatin to 10 mg at bedtime  Decrease omeprazole to 20 mg daily  You can take famotidine up to twice daily if needed  Plan to check thyroid, lipids, lipase, and A1c in April  DEXA scan to assess bones-someone will reach out to you to get this scheduled    Follow-up: Return in about 6 weeks (around 4/2/2025) for Medication Therapy Management Visit.    It was great speaking with you today.  I value your experience and would be very thankful for your time in providing feedback in our clinic survey. In the next few days, you may receive an email or text message from [employer] with a link to a survey related to your  clinical pharmacist.\"     To schedule another MTM appointment, please call the clinic directly or you may call the MTM scheduling line at [phone] or toll-free at [phone].     My Clinical Pharmacist's contact information:                                                      Please feel free to contact me with any questions or concerns you have.      Cem Varner, PharmD  Medication Therapy Management Pharmacist     "

## 2025-02-19 NOTE — LETTER
February 19, 2025  Autumn LYNNE Holbrook  [address] [facility] [address]    Dear MsDenice Sheron, [facility]     Thank you for talking with me on Feb 19, 2025 about your health and medications. As a follow-up to our conversation, I have included two documents:      Your Recommended To-Do List has steps you should take to get the best results from your medications.  Your Medication List will help you keep track of your medications and how to take them.    If you want to talk about these documents, please call Cem Varner RPH at phone: [phone], Monday-Friday 8-4:30pm.    I look forward to working with you and your doctors to make sure your medications work well for you.    Sincerely,  Cem Varner RPH  [facility] Pharmacist, [facility]

## 2025-02-19 NOTE — Clinical Note
Hello! I just met with this patient and she has chronic PPI use, family history of osteoporosis, recurring steroid injections. Could you place an order for a DEXA scan?

## 2025-02-24 ENCOUNTER — PATIENT OUTREACH (OUTPATIENT)
Dept: CARE COORDINATION | Facility: CLINIC | Age: 50
End: 2025-02-24
Payer: COMMERCIAL

## 2025-03-03 DIAGNOSIS — E87.6 HYPOKALEMIA: ICD-10-CM

## 2025-03-03 NOTE — TELEPHONE ENCOUNTER
Potassium  Last Written Prescription Date: 1/20/25  Last Fill Quantity: 30 # of Refills: 5  Last Office Visit: 1/3/25

## 2025-03-03 NOTE — TELEPHONE ENCOUNTER
Potassium Supplements Protocol [id number]/03/2025 01:56 PM   Protocol Details Medication is active on med list and the sig matches. RN to manually verify dose and sig if red X/fail.

## 2025-03-05 RX ORDER — POTASSIUM CHLORIDE 1500 MG/1
TABLET, EXTENDED RELEASE ORAL
Qty: 90 TABLET | Refills: 0 | Status: SHIPPED | OUTPATIENT
Start: 2025-03-05

## 2025-03-11 ENCOUNTER — VIRTUAL VISIT (OUTPATIENT)
Dept: PHARMACY | Facility: CLINIC | Age: 50
End: 2025-03-11
Payer: COMMERCIAL

## 2025-03-11 VITALS — HEIGHT: 63 IN | BODY MASS INDEX: 45.54 KG/M2 | WEIGHT: 257 LBS

## 2025-03-11 DIAGNOSIS — E66.01 OBESITY, MORBID, BMI 50 OR HIGHER (H): ICD-10-CM

## 2025-03-11 DIAGNOSIS — E11.9 TYPE 2 DIABETES MELLITUS WITHOUT COMPLICATION, WITHOUT LONG-TERM CURRENT USE OF INSULIN (H): Primary | ICD-10-CM

## 2025-03-11 RX ORDER — ASPIRIN 81 MG/1
81 TABLET ORAL DAILY
Qty: 90 TABLET | Refills: 3 | Status: SHIPPED | OUTPATIENT
Start: 2025-03-11

## 2025-03-11 RX ORDER — TIRZEPATIDE 7.5 MG/.5ML
7.5 INJECTION, SOLUTION SUBCUTANEOUS
Qty: 2 ML | Refills: 2 | Status: SHIPPED | OUTPATIENT
Start: 2025-03-11

## 2025-03-11 ASSESSMENT — PAIN SCALES - GENERAL: PAINLEVEL_OUTOF10: SEVERE PAIN (7)

## 2025-03-11 NOTE — NURSING NOTE
Current patient location: [address] [facility] [address]    Is the patient currently in the state of [place]? YES    Visit mode: VIDEO    If the visit is dropped, the patient can be reconnected by:VIDEO VISIT: Text to cell phone:   Telephone Information:   Mobile [phone]       Will anyone else be joining the visit? NO  (If patient encounters technical issues they should call [phone]    Are changes needed to the allergy or medication list? No    Are refills needed on medications prescribed by this physician? Discuss with provider    Rooming Documentation:  Questionnaire(s) not pre-assigned    Reason for visit: Medication Therapy Management    Pt states 7/10 knee pain both knees chronic today.    Barron PIPERF

## 2025-03-11 NOTE — PATIENT INSTRUCTIONS
Recommendations from MTM Pharmacist visit:                                                    MTM (medication therapy management) is a service provided by a clinical pharmacist designed to help you get the most of out of your medicines.  You may be sent a phone or email survey evaluating today's visit.  Please provide feedback you have for the service he received today if you are able.      Continue current meds as prescribed.    Start Aspirin 81 mg once daily for heart health.    To help with tolerability and effectiveness of Mounjaro :  Eat 3 meals with protein focus daily to help with nausea. If you forget to eat, you may feel nausea as a hunger cue.  Focus on getting protein in first with each meal and snack.   A good starting goal is 60 g protein daily (track this, especially if at weight loss plateau). Once you consistently are getting 60g daily, try getting 90 g protein daily.  See list below of protein-rich food ideas  Drink plenty of water - goal 64 oz throughout the day  You may try Metamucil, Benefiber, or Citrucel to help feel more full (less nausea) and have softer, more consistent bowel movements.  To optimize weight management - work on incorporating resistance training/weight lifting to build muscle and improve overall metabolism of adipose tissue.          Follow-up: this summer with Adelina White, BayD as decided with Ivone Moses PA-C     Appointments in Next Year      Mar 25, 2025 2:30 PM  (Arrive by 2:15 PM)  Return Visit with Tanisha Shannon DPM  [facility] ([facility] ) [phone]     Apr 09, 2025 1:30 PM  (Arrive by 1:15 PM)  Return Weight Management Visit with Ivone Moses PA-C  [facility] [place] ([facility] ) [phone]     Apr 10, 2025 1:00 PM  (Arrive by 12:45 PM)  Provider Visit with Isabel Hdez MD  [facility] ([facility] [place] ) [phone]     Jun 26, 2025 11:00 AM  (Arrive by 10:45 AM)  Diabetes Education with Kinga Beckwith RN, Taisha Corral RD  [facility] ([facility] [place] ) [phone]                It was great speaking with you today.  I value your experience and would be very thankful for your time in providing feedback in our clinic survey. In the next few days, you may receive an email or text message from [facility] with a link to a survey related to your  clinical pharmacist.\"     To schedule another MT appointment, please call the clinic directly ([facility] Phone Number: [phone] (schedules for [facility] and [facility] - providers, dietitians, health coaches) or you may call the MTM scheduling line at [phone] or toll-free at [phone].     My Clinical Pharmacist's contact information:                                                      Please feel free to contact me with any questions or concerns you have.      Adelina White, Pharm D., MPH    Medication Therapy Management Pharmacist   [facility]          Meal Replacement Shake Options:   *Protein Shake Criteria: no more than 210 Calories, at least 20 grams of protein, and less than 10 grams of sugar   Premier Protein (160 Calories, 30 g protein)  Slim Fast Advanced Nutrition (180 Calories, 20 g protein)  Muscle Milk, lactose-free, 17 oz bottle (210 Calories, 30 g protein)  Integrated Supplements, no artificial sugars (110 Calories, 20 g protein)  Boost/Ensure Max (160 calories, 30 gm protein)   Fairlife Protein Shakes (160-230 calories, 26-42 gm protein)  Aldi's Elevation Protein Powder (180 calories, 30 gm protein)   Orgain Protein Shakes (130-160 calories, 20-26 gm protein)     Meal Replacement Bar Options:  Quest Protein Bars (190 Calories, 20 g protein)  Built Bar (170 Calories, 15-20 g protein)  One Protein Bar (210 " calories, 20 g protein)  [place] Signature Protein Bar ([employer]) (190 Calories, 21 g protein)  Pure Protein Bars (180 Calories, 21 g protein)    Low Calorie Frozen Meal:  Healthy Choice Power Bowls  Getachew Bee      ---------------------------------------------------------------  Tips to Increase the Protein in Your Diet  You may need more protein in your diet to help you heal from an illness, surgery or wound. Extra protein can also help you gain weight. Here are some ideas for adding high-protein foods to your meals.  Meat and fish  Add chopped cooked meat to vegetables, salads, casseroles, soups, sauces and biscuit dough.  Use in omelets, soufflés, quiches, sandwich fillings and chicken or turkey stuffing.  Wrap in pie crust or biscuit dough to make a turnover.  Add to stuffed baked potatoes.  Make a dip with diced meat or flaked fish mixed with sour cream and spices.  Chopped, hard-cooked eggs  Add to salads.  Use for snacks and sandwich filling.  High-protein milk  To make high-protein milk, mix 1 quart whole milk with 1 cup powdered milk.  Add to cream soups, mashed potatoes, scrambled eggs, cereals and dried eggnog mix.  Use as an ingredient in puddings, custards, hot chocolate, milk shakes and pancakes.  Powdered milk  If you don't have any high-protein milk on hand, you can use powdered milk. Add 3 tablespoons to:  gravies, sauces, cream soups, mayonnaise  casseroles, meat patties, meatloaf, tuna salad, deviled ham  scalloped or mashed potatoes, creamed spinach  scrambled eggs, egg salad  cereals  yogurt, milk drinks, ice cream, frozen desserts, puddings, custards.  Add 4 to 6 tablespoons powdered milk to make:  cream sauces  breads, muffins, pancakes, waffles, cookies, cakes  cream pies, frostings, cake fillings  fruit cobblers, bread or rice pudding, gelatin desserts.  For high-protein eggnog, add 3 to 6 tablespoons powdered milk to prepared eggnog.  Hard or soft  cheese  Melt on sandwiches, breads, tortillas, hamburgers, hot dogs, other meats, vegetables, eggs and pies.  Grate into soups, chili, sauces, casseroles, vegetables, potatoes, rice, noodles or meatloaf.  Eat with toast or crackers, or melt for sanjeev dip.  Cottage cheese or ricotta cheese  Mix with or scoop on top of fruits and vegetables.  Add to casseroles, lasagna, spaghetti, noodles and egg dishes (omelets, scrambled eggs, soufflés).  Use in gelatin, pudding-type desserts, cheesecake and pancake batter.  Use to stuff crepes, pasta shells or manicotti.  Fruit yogurt  Blend with fruits for a fruit smoothie.  Use as a dip for fruits and vegetables.  Scoop on top of pancakes or waffles.  Tofu  Blend silken tofu with fruits and juices for a smoothie.  Add chunks of firm tofu to soups and stews, or crumble into meatloaf.  Blend dried onion soup mix into soft or silken tofu for dip.  Use pureed silken tofu for part of the mayonnaise, sour cream, cream cheese or ricotta cheese called for in recipes.  Beans  Use cooked beans or peas in soups, casseroles, pasta, tacos and burritos.  Nuts and seeds  Note: For children under 3, discuss with the child's care team.  Use in casseroles, breads, muffins, pancakes, cookies and waffles.  Sprinkle on fruits, cereals, ice cream, yogurt, vegetables and salads.  Mix with raisins, dried fruits and chocolate chips for a snack.  Nut butters  Note: For children under 3, discuss with the child's care team.  Spread on sandwiches, toast, muffins, crackers, waffles, pancakes and fruit slices.  Use as a dip for raw vegetables.  Blend with milk drinks, or swirl through ice cream, yogurt or hot cereal.  Nutrition supplements (nutrition bars, drinks and powders)  Add powders to milk drinks and desserts.  Mix with ice cream, milk and fruit for a high-protein milk shake.    For informational purposes only. Not to replace the advice of your health care provider. Clinically reviewed by Natasha  "Jess eBrry, UYEN, ELO, and the Clinical Nutrition Service Line. Copyright   2005 [facility]. All rights reserved. Zoom 588200 - REV 04/24.      -----------------------------------------------------------------------------------------------------------------  Learning About High-Protein Foods  What foods are high in protein?     The foods you eat contain nutrients, such as vitamins and minerals. Protein is a nutrient. Your body needs the right amount to stay healthy and work as it should. You can use the list below to help you make choices about which foods to eat.  Here are some examples of foods that are high in protein.  Dairy and dairy alternatives  Cheese  Milk  Soy milk  Yogurt (especially Greek)  Meat  Beef  Chicken  Ham  Lamb  Lunch meat  Pork  Sausage  Turkey  Other protein foods  Hemp seeds!  Beans (black, garbanzo, kidney, lima)  Eggs  Hummus  Lentils  Nuts  Peanut butter and other nut butters  Peas  Soybeans  Tofu  Veggie or soy dylan (Check the nutrition label for the amount of protein in each serving.)  Seafood  Anchovies  Cod  Crab  Halibut  Wilkinson  Sardines  Shrimp  Tilapia  Randolph  Tuna  Protein supplements  Bars (Check the nutrition label for the amount of protein in each serving.)  Drinks  Powders  Work with your doctor to find out how much of this nutrient you need. Depending on your health, you may need more or less of it in your diet.  Where can you learn more?  Go to https://www.[employer].net/patiented  Enter P335 in the search box to learn more about \"Learning About High-Protein Foods.\"  Current as of: September 20, 2023               Content Version: 14.0    7796-8517 [employer].   Care instructions adapted under license by your healthcare professional. If you have questions about a medical condition or this instruction, always ask your healthcare professional. [employer] disclaims any warranty or liability for your use of this " information.

## 2025-03-11 NOTE — Clinical Note
Seeing you in a few weeks - doing GREAT on Mounjaro. Has Medication Therapy Management with PCP too, so balancing pharmacist visits -- whatever you think is best based on when sheron is back, please have her schedule with me!  Shannan

## 2025-03-11 NOTE — PROGRESS NOTES
Medication Therapy Management (MTM) Encounter    ASSESSMENT:                            Medication Adherence/Access: No issues identified.    Diabetes/weight management :   A1C at goal <7%.  Patient congratulated on >20% total body weight loss and lifestyle modifications.     Given current blood glucose at goal and patient tolerating Mounjaro 7.5 mg well, continuing to see weight loss, will maintain current dose in agreement with patient. Discussed reducing topiramate given this may be less effective for appetite management given Mounjaro efficacy, but given works well at this dose for migraine and denies side effects - will continue current dose.    Patient not on aspirin for primary prevention - started this today. Education provided including limiting use of NSAIDs for pain.    Will follow up with Medication Therapy Management with PCP to evaluate history of pancreatitis noted in Medication Therapy Management note 2/19/25. Will also discuss lipid follow up - recommended 6-8 weeks after statin dose change (goal LDL <100mg/dl). Education provided on low cholesterol diet today.          PLAN:                            Continue current meds as prescribed.    Start Aspirin 81 mg once daily for heart health.    To help with tolerability and effectiveness of Mounjaro :  Eat 3 meals with protein focus daily to help with nausea. If you forget to eat, you may feel nausea as a hunger cue.  Focus on getting protein in first with each meal and snack.   A good starting goal is 60 g protein daily (track this, especially if at weight loss plateau). Once you consistently are getting 60g daily, try getting 90 g protein daily.  See list below of protein-rich food ideas  Drink plenty of water - goal 64 oz throughout the day  You may try Metamucil, Benefiber, or Citrucel to help feel more full (less nausea) and have softer, more consistent bowel movements.  To optimize weight management - work on incorporating resistance  training/weight lifting to build muscle and improve overall metabolism of adipose tissue.          Follow-up: this summer with Adelina White, BayD as decided with Ivone Moses PA-C     Appointments in Next Year      Mar 25, 2025 2:30 PM  (Arrive by 2:15 PM)  Return Visit with Tanisha Shannon DPM  [facility] ([facility] ) [phone]     Apr 09, 2025 1:30 PM  (Arrive by 1:15 PM)  Return Weight Management Visit with Ivone Moses PA-C  [facility] [place] ([facility] [place] ) [phone]     Apr 10, 2025 1:00 PM  (Arrive by 12:45 PM)  Provider Visit with Isabel Hdez MD  [facility] ([facility] ) [phone]     Jun 26, 2025 11:00 AM  (Arrive by 10:45 AM)  Diabetes Education with Kinga Beckwith RN, Taisha Corral RD  [facility] ([facility] ) [phone]            SUBJECTIVE/OBJECTIVE:                          Autumn Holbrook is a 49 year old female seen for a follow-up visit.       Reason for visit: Medication Therapy Management - weight management/diabetes  .    Given patient follows with Medication Therapy Management with PCP -focus on visit today was weight management with diabetes.    Allergies/ADRs: Reviewed in chart  Past Medical History: Reviewed in chart  Tobacco: She reports that she has been smoking cigarettes. She started smoking about 36 years ago. She has a 18.1 pack-year smoking history. She has been exposed to tobacco smoke. She has never used smokeless tobacco.Nicotine/Tobacco Cessation Plan  Pharmacotherapies : Nicotine patch and Nicotine gum    Alcohol: rare    Medication Adherence/Access: no issues reported.    Diabetes /Weight Management/ADHD  Mounjaro 7.5 mg weekly -taken 5 weeks  Topiramate 75 mg at bedtime (also for migraine)  Vyvanse 60 mg once daily (for mental health - also helpful for  appetite)  Adderall IR 15 mg every afternoon at 3pm (for mental health - also helpful for appetite)    Aspirin - not taking, unclear    Rosuvastatin 10mg once daily - increased with PCP Medication Therapy Management about 3 weeks ago (no recheck on file)  Ace/arb - not indicated (neg for microalbuminuria)    Patient denies side effects with Mounjaro - much better tolerated than Ozempic. Feels appetite similar management with Ozempic - had maybe a little smaller portions with Ozempic. Notes more frequent meals and snacks with Mounjaro as well. Feels current dose works well for her.    Topiramate works well for appetite management historically, no side effects reported. Patient notes current dose is what she has been on for migraine and effectively manages this.    Blood sugar monitoring: rarely. Fasting - around  mg/dl (hasn't checked in while she notes - has been stable when checking prevously); goal to start testing post-prandial to better understand these trends occasionally otoo  Current diabetes symptoms: none      Diet/Exercise: focuses on protein with meals and snacks (drinking more milk, meat, beans) and low carb, increasing fiber,  lots of veggies, some healthy. Small amounts of carb at end of a meal to prioritize protein    32 oz water bottle x 1.5 per day - goal   Activity: limited   Ozempic caused severe nausea, vomiting, and belching. PCP Medication Therapy Management noted history of pancreatitis - author doesn't see this on chart review - will confer with PCP Medication Therapy Management.    Saw Diabetes Ed: Consider Faraz cgm, OTC; hasn't picked up  [facility]           Eye exam is up to date  Foot exam is up to date    Initial weight:   325 lb    Current weight today: 257 lbs 0 oz  Cumulative Weight Loss: -68 lb, -20.1% from baseline    Wt Readings from Last 4 Encounters:   03/11/25 257 lb (116.6 kg)   02/04/25 261 lb 8 oz (118.6 kg)   01/08/25  "265 lb (120.2 kg)   01/03/25 266 lb (120.7 kg)     Estimated body mass index is 45.53 kg/m  as calculated from the following:    Height as of this encounter: 5' 3\" (1.6 m).    Weight as of this encounter: 257 lb (116.6 kg).    Lab Results   Component Value Date    A1C 6.4 01/03/2025    A1C 6.5 05/08/2024    A1C 6.6 02/08/2024    A1C 6.6 02/17/2023    A1C 6.6 08/01/2022    A1C 6.9 06/01/2022    A1C 7.6 02/16/2022    A1C 6.0 01/29/2020    A1C 6.2 07/01/2019    A1C 5.9 12/19/2018     Lab Results   Component Value Date    A1C 6.4 (H) 01/03/2025     (H) 01/03/2025     01/03/2025    POTASSIUM 3.4 01/03/2025    EUSEBIO 9.8 01/03/2025    CHLORIDE 95 (L) 01/03/2025    CO2 24 01/03/2025    BUN 9.5 01/03/2025    CR 0.86 01/03/2025    GFRESTIMATED 81 01/03/2025    CHOL 201 (H) 01/03/2025     (H) 01/03/2025    HDL 66 01/03/2025    TRIG 120 01/03/2025    VITDT 41 02/17/2023    UMALCR  01/03/2025      Comment:      Unable to calculate, urine albumin and/or urine creatinine is outside detectable limits.  Microalbuminuria is defined as an albumin:creatinine ratio of 17 to 299 for males and 25 to 299 for females. A ratio of albumin:creatinine of 300 or higher is indicative of overt proteinuria.  Due to biologic variability, positive results should be confirmed by a second, first-morning random or 24-hour timed urine specimen. If there is discrepancy, a third specimen is recommended. When 2 out of 3 results are in the microalbuminuria range, this is evidence for incipient nephropathy and warrants increased efforts at glucose control, blood pressure control, and institution of therapy with an angiotensin-converting-enzyme (ACE) inhibitor (if the patient can tolerate it).      TSH 0.13 (L) 01/03/2025       Today's Vitals: Ht 5' 3\" (1.6 m)   Wt 257 lb (116.6 kg)   BMI 45.53 kg/m    ----------------      I spent 23 minutes with this patient today. All changes were made via collaborative practice agreement with " Roz Coffey     A summary of these recommendations was sent via [employer].    Adelina White, Pharm D., MPH    Medication Therapy Management Pharmacist   [facility]      Telemedicine Visit Details  The patient's medications can be safely assessed via a telemedicine encounter.  Type of service:  Video Conference via [employer]  Originating Location (pt. Location): Home    Distant Location (provider location):  Off-site  Start Time:  11:13 AM  End Time:  11:36 AM     Medication Therapy Recommendations  Diabetes mellitus, type 2 (H)   1 Current Medication: aspirin 81 MG EC tablet   Current Medication Sig: Take 1 tablet (81 mg) by mouth daily.   Rationale: Preventive therapy - Needs additional medication therapy - Indication   Recommendation: Start Medication   Status: Accepted per CPA   Identified Date: 3/11/2025 Completed Date: 3/11/2025

## 2025-03-11 NOTE — Clinical Note
WAYLON Priest!  I saw Autumn for weight management today.  I saw in your note you saw history of pancreatitis in her chart. I was looking and couldn't find it, so just wanted to follow up to see what I missed! Thanks for grabbing the lipase!  I also was wondering if you wanted to grab lipids at that follow up lab appointment you have planned for her as it will be in the 6-8 week window for recheck after increasing statin?  Let me know what you find with pancreatitis, I want to be sure we are watchign that! Thanksagain !  Adelina

## 2025-03-31 DIAGNOSIS — E03.9 ACQUIRED HYPOTHYROIDISM: ICD-10-CM

## 2025-03-31 DIAGNOSIS — G43.909 MIGRAINE WITHOUT STATUS MIGRAINOSUS, NOT INTRACTABLE, UNSPECIFIED MIGRAINE TYPE: ICD-10-CM

## 2025-03-31 DIAGNOSIS — G89.4 CHRONIC PAIN SYNDROME: ICD-10-CM

## 2025-04-01 NOTE — TELEPHONE ENCOUNTER
Baclofen 10 MG Oral Tablet       Last Written Prescription Date:  01/28/2025  Last Fill Quantity: 90,   # refills: 1  Last Office Visit: 01/03/2025  Future Office visit:    Next 5 appointments (look out 90 days)      Apr 29, 2025 1:15 PM  (Arrive by 1:00 PM)  [facility] [address] [place] [id number]  [phone]             Routing refill request to provider for review/approval because:    Levothyroxine Sodium 150 MCG Oral Tablet       Last Written Prescription Date:  01/06/2025  Last Fill Quantity: 90,   # refills: 0  Last Office Visit: 01/03/2025  Future Office visit:    Next 5 appointments (look out 90 days)      Apr 29, 2025 1:15 PM  (Arrive by 1:00 PM)  Return Visit with Tanishagerman Phelan [facility] ([facility] ) [address] [place] [id number]  [phone]             Routing refill request to provider for review/approval because:  Thyroid Protocol [id number]/31/2025 02:33 PM   Protocol Details Medication is active on med list and the sig matches. RN to manually verify dose and sig if red X/fail.    Normal TSH on file in past 12 months     Kimberly Boecker, RN

## 2025-04-02 RX ORDER — LEVOTHYROXINE SODIUM 150 UG/1
TABLET ORAL
Qty: 90 TABLET | Refills: 3 | Status: SHIPPED | OUTPATIENT
Start: 2025-04-02

## 2025-04-02 RX ORDER — BACLOFEN 10 MG/1
10 TABLET ORAL
Qty: 90 TABLET | Refills: 4 | Status: SHIPPED | OUTPATIENT
Start: 2025-04-02

## 2025-04-07 RX ORDER — TOPIRAMATE 25 MG/1
75 TABLET, FILM COATED ORAL AT BEDTIME
Qty: 90 TABLET | Refills: 1 | Status: SHIPPED | OUTPATIENT
Start: 2025-04-07

## 2025-04-08 PROBLEM — I10 BENIGN ESSENTIAL HYPERTENSION: Status: ACTIVE | Noted: 2025-04-08

## 2025-04-09 ENCOUNTER — TELEPHONE (OUTPATIENT)
Dept: FAMILY MEDICINE | Facility: OTHER | Age: 50
End: 2025-04-09

## 2025-04-09 ENCOUNTER — VIRTUAL VISIT (OUTPATIENT)
Dept: ENDOCRINOLOGY | Facility: CLINIC | Age: 50
End: 2025-04-09
Payer: COMMERCIAL

## 2025-04-09 VITALS — HEIGHT: 63 IN | WEIGHT: 253.2 LBS | BODY MASS INDEX: 44.86 KG/M2

## 2025-04-09 DIAGNOSIS — R26.89 IMPAIRED GAIT AND MOBILITY: Primary | ICD-10-CM

## 2025-04-09 DIAGNOSIS — E11.9 TYPE 2 DIABETES MELLITUS WITHOUT COMPLICATION, WITHOUT LONG-TERM CURRENT USE OF INSULIN (H): Primary | ICD-10-CM

## 2025-04-09 PROCEDURE — 1125F AMNT PAIN NOTED PAIN PRSNT: CPT | Mod: 95 | Performed by: PHYSICIAN ASSISTANT

## 2025-04-09 PROCEDURE — 98005 SYNCH AUDIO-VIDEO EST LOW 20: CPT | Performed by: PHYSICIAN ASSISTANT

## 2025-04-09 ASSESSMENT — PAIN SCALES - GENERAL: PAINLEVEL_OUTOF10: SEVERE PAIN (7)

## 2025-04-09 NOTE — TELEPHONE ENCOUNTER
11:16 AM    Reason for Call: OVERBOOK    Patient is having the following symptoms: lost her cane and needs a prescription for a new one  for 0 days.    The patient is requesting an appointment for sooner than her est care appt with dr thomas.    Was an appointment offered for this call? No  If yes : Appointment type              Date    Preferred method for responding to this message: Telephone Call  What is your phone number ? [phone]    If we cannot reach you directly, may we leave a detailed response at the number you provided? Yes    Can this message wait until your PCP/provider returns, if unavailable today? YES    Sabrina Soto

## 2025-04-09 NOTE — PROGRESS NOTES
Return Medical Weight Management Note     Autumn Holbrook  MRN:  [id number]  :  1975  JALYN:  2025    Dear SEVEN Bryant,    I had the pleasure of seeing your patient Autumn Holbrook. She is a 49 year old female who I am continuing to see for treatment of obesity related to:        3/1/2023     1:32 PM   --   I have the following health issues associated with obesity Type II Diabetes    GERD (Reflux)    Stress Incontinence    Osteoarthritis (joint disease)    Hypothyroidism   I have the following symptoms associated with obesity Knee Pain    Depression    Lower Extremity Swelling    Back Pain    Fatigue    Irregular Menstral Cycle       Assessment & Plan   Problem List Items Addressed This Visit          Endocrine Diagnoses    Diabetes mellitus, type 2 (H) - Primary    Relevant Medications    tirzepatide (MOUNJARO) 10 MG/0.5ML SOAJ auto-injector pen        Comprehensive weight management follow up visit    Weight loss from highest 284 to 253 today (10%)  Weight change since last visit: down 12 lbs.    Last visit was with Adelina ELLIOTT 3/11/25 and Roz 10/30/24 switched from ozempic to mounjaro due to s/e  Last visit with me 24 and was taking ozempic    Currently taking the following medications that can help with weight loss/weight mgmt:  Mounjaro 7.5mg weekly A1C 6.4, tolerating much better than ozempic.  Topiramate 75mg daily      Past weight loss medication history and considerations:  Ozempic prior to mounjaro, had gas/burping s/e    Plan today:  Increase Mounjaro to 10mg weekly   DM educator 25, discuss CGM to see if covered now and get recommendations.      Follow up plan:  LEXI 2 mo Adelina Wiseman 4 mo    INTERVAL HISTORY:    CURRENT WEIGHT:   253 lbs 3.2 oz    Initial Weight (lbs): 284 lbs   Last Visits Weight: 120.2 kg (265 lb)   Cumulative weight loss (lbs): 30.8   Weight Loss Percentage: 10.85%         2025     9:30 AM   Changes and Difficulties   I have made the following  changes to my diet since my last visit: Added more water, had a few breakfasts   With regards to my diet, I am still struggling with: Adding more water, breakfasts, checking blood sugar   I have made the following changes to my activity/exercise since my last visit: Doing more stair activity   With regards to my activity/exercise, I am still struggling with: Walking, standing for any lengths of time, weak knees and legs             4/9/2025     9:30 AM   Weight Loss Medication History Reviewed With Patient   Which weight loss medications are you currently taking on a regular basis? Topamax (topiramate)    Vyvanse   Are you having any side effects from the weight loss medication that we have prescribed you? No       Office Visit on 01/03/2025   Component Date Value Ref Range Status    Color Urine 01/03/2025 Light Yellow  Colorless, Straw, Light Yellow, Yellow Final    Appearance Urine 01/03/2025 Clear  Clear Final    Glucose Urine 01/03/2025 Negative  Negative mg/dL Final    Bilirubin Urine 01/03/2025 Negative  Negative Final    Ketones Urine 01/03/2025 Negative  Negative mg/dL Final    Specific Gravity Urine 01/03/2025 1.008  1.003 - 1.035 Final    Blood Urine 01/03/2025 Negative  Negative Final    pH Urine 01/03/2025 6.5  4.7 - 8.0 Final    Protein Albumin Urine 01/03/2025 Negative  Negative mg/dL Final    Urobilinogen Urine 01/03/2025 Normal  Normal, 2.0 mg/dL Final    Nitrite Urine 01/03/2025 Negative  Negative Final    Leukocyte Esterase Urine 01/03/2025 Negative  Negative Final    Bacteria Urine 01/03/2025 Few (A)  None Seen /HPF Final    Mucus Urine 01/03/2025 Present (A)  None Seen /LPF Final    RBC Urine 01/03/2025 <1  <=2 /HPF Final    WBC Urine 01/03/2025 <1  <=5 /HPF Final    Squamous Epithelials Urine 01/03/2025 2 (H)  <=1 /HPF Final    Hyaline Casts Urine 01/03/2025 4 (H)  <=2 /LPF Final    Creatinine Urine mg/dL 01/03/2025 45.0  mg/dL Final    The reference ranges have not been established in urine  creatinine. The results should be integrated into the clinical context for interpretation.    Albumin Urine mg/L 01/03/2025 <12.0  mg/L Final    The reference ranges have not been established in urine albumin. The results should be integrated into the clinical context for interpretation.    Albumin Urine mg/g Cr 01/03/2025    Final    Unable to calculate, urine albumin and/or urine creatinine is outside detectable limits.  Microalbuminuria is defined as an albumin:creatinine ratio of 17 to 299 for males and 25 to 299 for females. A ratio of albumin:creatinine of 300 or higher is indicative of overt proteinuria.  Due to biologic variability, positive results should be confirmed by a second, first-morning random or 24-hour timed urine specimen. If there is discrepancy, a third specimen is recommended. When 2 out of 3 results are in the microalbuminuria range, this is evidence for incipient nephropathy and warrants increased efforts at glucose control, blood pressure control, and institution of therapy with an angiotensin-converting-enzyme (ACE) inhibitor (if the patient can tolerate it).      Estimated Average Glucose 01/03/2025 137 (H)  <117 mg/dL Final    Hemoglobin A1C 01/03/2025 6.4 (H)  <5.7 % Final    Normal <5.7%   Prediabetes 5.7-6.4%    Diabetes 6.5% or higher     Note: Adopted from ADA consensus guidelines.    Sodium 01/03/2025 135  135 - 145 mmol/L Final    Potassium 01/03/2025 3.4  3.4 - 5.3 mmol/L Final    Chloride 01/03/2025 95 (L)  98 - 107 mmol/L Final    Carbon Dioxide (CO2) 01/03/2025 24  22 - 29 mmol/L Final    Anion Gap 01/03/2025 16 (H)  7 - 15 mmol/L Final    Urea Nitrogen 01/03/2025 9.5  6.0 - 20.0 mg/dL Final    Creatinine 01/03/2025 0.86  0.51 - 0.95 mg/dL Final    GFR Estimate 01/03/2025 81  >60 mL/min/1.73m2 Final    eGFR calculated using 2021 CKD-EPI equation.    Calcium 01/03/2025 9.8  8.8 - 10.4 mg/dL Final    Reference intervals for this test were updated on 7/16/2024 to reflect our  healthy population more accurately. There may be differences in the flagging of prior results with similar values performed with this method. Those prior results can be interpreted in the context of the updated reference intervals.    Glucose 01/03/2025 110 (H)  70 - 99 mg/dL Final    Patient Fasting > 8hrs? 01/03/2025 Yes   Final    Cholesterol 01/03/2025 201 (H)  <200 mg/dL Final    Triglycerides 01/03/2025 120  <150 mg/dL Final    Direct Measure HDL 01/03/2025 66  >=50 mg/dL Final    LDL Cholesterol Calculated 01/03/2025 111 (H)  <100 mg/dL Final    Non HDL Cholesterol 01/03/2025 135 (H)  <130 mg/dL Final    Patient Fasting > 8hrs? 01/03/2025 Yes   Final    TSH 01/03/2025 0.13 (L)  0.30 - 4.20 uIU/mL Final    WBC Count 01/03/2025 11.0  4.0 - 11.0 10e3/uL Final    RBC Count 01/03/2025 5.32 (H)  3.80 - 5.20 10e6/uL Final    Hemoglobin 01/03/2025 15.9 (H)  11.7 - 15.7 g/dL Final    Hematocrit 01/03/2025 47.3 (H)  35.0 - 47.0 % Final    MCV 01/03/2025 89  78 - 100 fL Final    MCH 01/03/2025 29.9  26.5 - 33.0 pg Final    MCHC 01/03/2025 33.6  31.5 - 36.5 g/dL Final    RDW 01/03/2025 13.8  10.0 - 15.0 % Final    Platelet Count 01/03/2025 312  150 - 450 10e3/uL Final    % Neutrophils 01/03/2025 61  % Final    % Lymphocytes 01/03/2025 28  % Final    % Monocytes 01/03/2025 8  % Final    % Eosinophils 01/03/2025 3  % Final    % Basophils 01/03/2025 1  % Final    % Immature Granulocytes 01/03/2025 1  % Final    NRBCs per 100 WBC 01/03/2025 0  <1 /100 Final    Absolute Neutrophils 01/03/2025 6.7  1.6 - 8.3 10e3/uL Final    Absolute Lymphocytes 01/03/2025 3.1  0.8 - 5.3 10e3/uL Final    Absolute Monocytes 01/03/2025 0.8  0.0 - 1.3 10e3/uL Final    Absolute Eosinophils 01/03/2025 0.3  0.0 - 0.7 10e3/uL Final    Absolute Basophils 01/03/2025 0.1  0.0 - 0.2 10e3/uL Final    Absolute Immature Granulocytes 01/03/2025 0.1  <=0.4 10e3/uL Final    Absolute NRBCs 01/03/2025 0.0  10e3/uL Final    Free T4 01/03/2025 1.99 (H)  0.90 -  1.70 ng/dL Final       MEDICATIONS:   Current Outpatient Medications   Medication Sig Dispense Refill    tirzepatide (MOUNJARO) 10 MG/0.5ML SOAJ auto-injector pen Inject 0.5 mLs (10 mg) subcutaneously once a week. 2 mL 5    Acetaminophen (TYLENOL PO) Take 500 mg by mouth every 4 hours as needed for mild pain or fever      amphetamine-dextroamphetamine (ADDERALL) 15 MG tablet Take 15 mg by mouth daily. afternoon      asenapine (SAPHRIS) 2.5 MG SUBL sublingual tablet Place 2.5 mg under the tongue at bedtime. (2.5 mg + 5 mg = 7.5 mg)      asenapine (SAPHRIS) 5 MG SUBL sublingual tablet Place 5 mg under the tongue at bedtime. (2.5 mg + 5 mg = 7.5 mg)      aspirin 81 MG EC tablet Take 1 tablet (81 mg) by mouth daily. 90 tablet 3    baclofen (LIORESAL) 10 MG tablet TAKE 1 TABLET BY MOUTH THREE TIMES DAILY 90 tablet 4    blood glucose (NO BRAND SPECIFIED) test strip Use to test blood sugar 1 time daily 50 strip 5    blood glucose monitoring (NO BRAND SPECIFIED) meter device kit Use to test blood sugar 1 time daily 1 kit 0    diclofenac (VOLTAREN) 1 % topical gel Apply 2 g topically 2 times daily.      famotidine (PEPCID) 20 MG tablet Take 1 tablet (20 mg) by mouth 2 times daily as needed (indigestion). 60 tablet 3    furosemide (LASIX) 20 MG tablet Take 1 tablet (20 mg) by mouth 2 times daily.      hydrochlorothiazide (HYDRODIURIL) 25 MG tablet Take 1 tablet by mouth once daily 90 tablet 3    hydrOXYzine (ATARAX) 25 MG tablet Take 25 mg by mouth 4 times daily as needed for anxiety      ibuprofen (ADVIL/MOTRIN) 800 MG tablet Take 1 tablet (800 mg) by mouth every 8 hours as needed for moderate pain. 90 tablet 0    levothyroxine (SYNTHROID/LEVOTHROID) 150 MCG tablet TAKE 1 TABLET BY MOUTH ONCE DAILY IN THE MORNING BEFORE BREAKFAST 90 tablet 3    lisdexamfetamine (VYVANSE) 60 MG capsule Take 60 mg by mouth every morning.      Melatonin 10 MG CAPS Take 10 mg by mouth nightly as needed (insomnia)      multivitamin w/minerals  "(THERA-VIT-M) tablet Take 1 tablet by mouth daily      nicotine (NICODERM CQ) 21 MG/24HR 24 hr patch Place 1 patch onto the skin every 24 hours.      nicotine (NICORETTE) 2 MG gum Take 1 each (2 mg) by mouth every hour as needed for nicotine withdrawal symptoms. 100 each 3    omeprazole (PRILOSEC) 20 MG DR capsule Take 1 capsule (20 mg) by mouth daily. Take 1 capsule by mouth once daily 90 capsule 1    OneTouch Delica Lancets 33G MISC 1 Lancet daily 100 each 1    oxyBUTYnin ER (DITROPAN XL) 10 MG 24 hr tablet Take 1 tablet by mouth once daily 90 tablet 1    potassium chloride ER (K-TAB) 20 MEQ CR tablet TAKE 1 TABLET BY MOUTH ONCE DAILY ALONG  WITH  A  10  MEQ  TABLET  **THIS  SHOULD  ONLY  BE  TAKEN  IF  TAKING  LASIX** 90 tablet 0    potassium chloride ER (K-TAB/KLOR-CON) 10 MEQ CR tablet TAKE 1 TABLET BY MOUTH ONCE DAILY (WHEN TAKING LASIX) (Patient taking differently: Take 10 mEq by mouth daily at 2 pm. When taking furosemide) 30 tablet 5    rosuvastatin (CRESTOR) 10 MG tablet Take 1 tablet (10 mg) by mouth at bedtime. 90 tablet 3    sertraline (ZOLOFT) 50 MG tablet Take 75 mg by mouth daily      topiramate (TOPAMAX) 25 MG tablet TAKE 3 TABLETS BY MOUTH AT BEDTIME 90 tablet 1    Vitamin D3 (CHOLECALCIFEROL) 125 MCG (5000 UT) tablet Take 1 tablet by mouth daily      VRAYLAR 3 MG CAPS capsule Take 3 mg by mouth daily           PHYSICAL EXAM:  Objective    Ht 1.6 m (5' 2.99\")   Wt 114.9 kg (253 lb 3.2 oz)   BMI 44.86 kg/m      Vitals - Patient Reported  Pain Score: Severe Pain (7)  Pain Loc: Knee      Vitals:  No vitals were obtained today due to virtual visit.    GENERAL: alert and no distress  EYES: Eyes grossly normal to inspection.  No discharge or erythema, or obvious scleral/conjunctival abnormalities.  RESP: No audible wheeze, cough, or visible cyanosis.    SKIN: Visible skin clear. No significant rash, abnormal pigmentation or lesions.  NEURO: Cranial nerves grossly intact.  Mentation and speech " appropriate for age.  PSYCH: Appropriate affect, tone, and pace of words        Sincerely,    Ivone Moses, EMILIE      20 minutes spent by me on the date of the encounter doing chart review, history and exam, documentation and further activities per the note    The longitudinal plan of care for the diagnosis(es)/condition(s) as documented were addressed during this visit. Due to the added complexity in care, I will continue to support Autumn in the subsequent management and with ongoing continuity of care.

## 2025-04-09 NOTE — NURSING NOTE
Current patient location: [address] [facility] [address]    Is the patient currently in the state of [place]? YES    Visit mode: VIDEO    If the visit is dropped, the patient can be reconnected by:VIDEO VISIT: Text to cell phone:   Telephone Information:   Mobile [phone]       Will anyone else be joining the visit? NO  (If patient encounters technical issues they should call [phone]    Are changes needed to the allergy or medication list? No    Are refills needed on medications prescribed by this physician? NO    Rooming Documentation:  Questionnaire(s) completed    Reason for visit: RECHECK    Maria Elena OBANDO

## 2025-04-09 NOTE — LETTER
2025       RE: Autumn Holbrook  [address] [facility] [address]     Dear Colleague,    Thank you for referring your patient, Autumn Holbrook, to the [facility] [place] at [facility]. Please see a copy of my visit note below.      Return Medical Weight Management Note     Autumn Holbrook  MRN:  [id number]  :  1975  JALYN:  2025    Dear SEVEN Bryant,    I had the pleasure of seeing your patient Autumn Holbrook. She is a 49 year old female who I am continuing to see for treatment of obesity related to:        3/1/2023     1:32 PM   --   I have the following health issues associated with obesity Type II Diabetes    GERD (Reflux)    Stress Incontinence    Osteoarthritis (joint disease)    Hypothyroidism   I have the following symptoms associated with obesity Knee Pain    Depression    Lower Extremity Swelling    Back Pain    Fatigue    Irregular Menstral Cycle       Assessment & Plan  Problem List Items Addressed This Visit          Endocrine Diagnoses    Diabetes mellitus, type 2 (H) - Primary    Relevant Medications    tirzepatide (MOUNJARO) 10 MG/0.5ML SOAJ auto-injector pen        Comprehensive weight management follow up visit    Weight loss from highest 284 to 253 today (10%)  Weight change since last visit: down 12 lbs.    Last visit was with Adelina ELLIOTT 3/11/25 and Roz 10/30/24 switched from ozempic to mounjaro due to s/e  Last visit with me 24 and was taking ozempic    Currently taking the following medications that can help with weight loss/weight mgmt:  Mounjaro 7.5mg weekly A1C 6.4, tolerating much better than ozempic.  Topiramate 75mg daily      Past weight loss medication history and considerations:  Ozempic prior to mounjaro, had gas/burping s/e    Plan today:  Increase Mounjaro to 10mg weekly   DM educator 25, discuss CGM to see if covered now and get recommendations.      Follow up plan:  LEXI  2 mo Adelina Wiseman 4 mo    INTERVAL HISTORY:    CURRENT WEIGHT:   253 lbs 3.2 oz    Initial Weight (lbs): 284 lbs   Last Visits Weight: 120.2 kg (265 lb)   Cumulative weight loss (lbs): 30.8   Weight Loss Percentage: 10.85%         4/9/2025     9:30 AM   Changes and Difficulties   I have made the following changes to my diet since my last visit: Added more water, had a few breakfasts   With regards to my diet, I am still struggling with: Adding more water, breakfasts, checking blood sugar   I have made the following changes to my activity/exercise since my last visit: Doing more stair activity   With regards to my activity/exercise, I am still struggling with: Walking, standing for any lengths of time, weak knees and legs             4/9/2025     9:30 AM   Weight Loss Medication History Reviewed With Patient   Which weight loss medications are you currently taking on a regular basis? Topamax (topiramate)    Vyvanse   Are you having any side effects from the weight loss medication that we have prescribed you? No       Office Visit on 01/03/2025   Component Date Value Ref Range Status     Color Urine 01/03/2025 Light Yellow  Colorless, Straw, Light Yellow, Yellow Final     Appearance Urine 01/03/2025 Clear  Clear Final     Glucose Urine 01/03/2025 Negative  Negative mg/dL Final     Bilirubin Urine 01/03/2025 Negative  Negative Final     Ketones Urine 01/03/2025 Negative  Negative mg/dL Final     Specific Gravity Urine 01/03/2025 1.008  1.003 - 1.035 Final     Blood Urine 01/03/2025 Negative  Negative Final     pH Urine 01/03/2025 6.5  4.7 - 8.0 Final     Protein Albumin Urine 01/03/2025 Negative  Negative mg/dL Final     Urobilinogen Urine 01/03/2025 Normal  Normal, 2.0 mg/dL Final     Nitrite Urine 01/03/2025 Negative  Negative Final     Leukocyte Esterase Urine 01/03/2025 Negative  Negative Final     Bacteria Urine 01/03/2025 Few (A)  None Seen /HPF Final     Mucus Urine 01/03/2025 Present (A)  None Seen /LPF  Final     RBC Urine 01/03/2025 <1  <=2 /HPF Final     WBC Urine 01/03/2025 <1  <=5 /HPF Final     Squamous Epithelials Urine 01/03/2025 2 (H)  <=1 /HPF Final     Hyaline Casts Urine 01/03/2025 4 (H)  <=2 /LPF Final     Creatinine Urine mg/dL 01/03/2025 45.0  mg/dL Final    The reference ranges have not been established in urine creatinine. The results should be integrated into the clinical context for interpretation.     Albumin Urine mg/L 01/03/2025 <12.0  mg/L Final    The reference ranges have not been established in urine albumin. The results should be integrated into the clinical context for interpretation.     Albumin Urine mg/g Cr 01/03/2025    Final    Unable to calculate, urine albumin and/or urine creatinine is outside detectable limits.  Microalbuminuria is defined as an albumin:creatinine ratio of 17 to 299 for males and 25 to 299 for females. A ratio of albumin:creatinine of 300 or higher is indicative of overt proteinuria.  Due to biologic variability, positive results should be confirmed by a second, first-morning random or 24-hour timed urine specimen. If there is discrepancy, a third specimen is recommended. When 2 out of 3 results are in the microalbuminuria range, this is evidence for incipient nephropathy and warrants increased efforts at glucose control, blood pressure control, and institution of therapy with an angiotensin-converting-enzyme (ACE) inhibitor (if the patient can tolerate it).       Estimated Average Glucose 01/03/2025 137 (H)  <117 mg/dL Final     Hemoglobin A1C 01/03/2025 6.4 (H)  <5.7 % Final    Normal <5.7%   Prediabetes 5.7-6.4%    Diabetes 6.5% or higher     Note: Adopted from ADA consensus guidelines.     Sodium 01/03/2025 135  135 - 145 mmol/L Final     Potassium 01/03/2025 3.4  3.4 - 5.3 mmol/L Final     Chloride 01/03/2025 95 (L)  98 - 107 mmol/L Final     Carbon Dioxide (CO2) 01/03/2025 24  22 - 29 mmol/L Final     Anion Gap 01/03/2025 16 (H)  7 - 15 mmol/L Final      Urea Nitrogen 01/03/2025 9.5  6.0 - 20.0 mg/dL Final     Creatinine 01/03/2025 0.86  0.51 - 0.95 mg/dL Final     GFR Estimate 01/03/2025 81  >60 mL/min/1.73m2 Final    eGFR calculated using 2021 CKD-EPI equation.     Calcium 01/03/2025 9.8  8.8 - 10.4 mg/dL Final    Reference intervals for this test were updated on 7/16/2024 to reflect our healthy population more accurately. There may be differences in the flagging of prior results with similar values performed with this method. Those prior results can be interpreted in the context of the updated reference intervals.     Glucose 01/03/2025 110 (H)  70 - 99 mg/dL Final     Patient Fasting > 8hrs? 01/03/2025 Yes   Final     Cholesterol 01/03/2025 201 (H)  <200 mg/dL Final     Triglycerides 01/03/2025 120  <150 mg/dL Final     Direct Measure HDL 01/03/2025 66  >=50 mg/dL Final     LDL Cholesterol Calculated 01/03/2025 111 (H)  <100 mg/dL Final     Non HDL Cholesterol 01/03/2025 135 (H)  <130 mg/dL Final     Patient Fasting > 8hrs? 01/03/2025 Yes   Final     TSH 01/03/2025 0.13 (L)  0.30 - 4.20 uIU/mL Final     WBC Count 01/03/2025 11.0  4.0 - 11.0 10e3/uL Final     RBC Count 01/03/2025 5.32 (H)  3.80 - 5.20 10e6/uL Final     Hemoglobin 01/03/2025 15.9 (H)  11.7 - 15.7 g/dL Final     Hematocrit 01/03/2025 47.3 (H)  35.0 - 47.0 % Final     MCV 01/03/2025 89  78 - 100 fL Final     MCH 01/03/2025 29.9  26.5 - 33.0 pg Final     MCHC 01/03/2025 33.6  31.5 - 36.5 g/dL Final     RDW 01/03/2025 13.8  10.0 - 15.0 % Final     Platelet Count 01/03/2025 312  150 - 450 10e3/uL Final     % Neutrophils 01/03/2025 61  % Final     % Lymphocytes 01/03/2025 28  % Final     % Monocytes 01/03/2025 8  % Final     % Eosinophils 01/03/2025 3  % Final     % Basophils 01/03/2025 1  % Final     % Immature Granulocytes 01/03/2025 1  % Final     NRBCs per 100 WBC 01/03/2025 0  <1 /100 Final     Absolute Neutrophils 01/03/2025 6.7  1.6 - 8.3 10e3/uL Final     Absolute Lymphocytes 01/03/2025 3.1  0.8  - 5.3 10e3/uL Final     Absolute Monocytes 01/03/2025 0.8  0.0 - 1.3 10e3/uL Final     Absolute Eosinophils 01/03/2025 0.3  0.0 - 0.7 10e3/uL Final     Absolute Basophils 01/03/2025 0.1  0.0 - 0.2 10e3/uL Final     Absolute Immature Granulocytes 01/03/2025 0.1  <=0.4 10e3/uL Final     Absolute NRBCs 01/03/2025 0.0  10e3/uL Final     Free T4 01/03/2025 1.99 (H)  0.90 - 1.70 ng/dL Final       MEDICATIONS:   Current Outpatient Medications   Medication Sig Dispense Refill     tirzepatide (MOUNJARO) 10 MG/0.5ML SOAJ auto-injector pen Inject 0.5 mLs (10 mg) subcutaneously once a week. 2 mL 5     Acetaminophen (TYLENOL PO) Take 500 mg by mouth every 4 hours as needed for mild pain or fever       amphetamine-dextroamphetamine (ADDERALL) 15 MG tablet Take 15 mg by mouth daily. afternoon       asenapine (SAPHRIS) 2.5 MG SUBL sublingual tablet Place 2.5 mg under the tongue at bedtime. (2.5 mg + 5 mg = 7.5 mg)       asenapine (SAPHRIS) 5 MG SUBL sublingual tablet Place 5 mg under the tongue at bedtime. (2.5 mg + 5 mg = 7.5 mg)       aspirin 81 MG EC tablet Take 1 tablet (81 mg) by mouth daily. 90 tablet 3     baclofen (LIORESAL) 10 MG tablet TAKE 1 TABLET BY MOUTH THREE TIMES DAILY 90 tablet 4     blood glucose (NO BRAND SPECIFIED) test strip Use to test blood sugar 1 time daily 50 strip 5     blood glucose monitoring (NO BRAND SPECIFIED) meter device kit Use to test blood sugar 1 time daily 1 kit 0     diclofenac (VOLTAREN) 1 % topical gel Apply 2 g topically 2 times daily.       famotidine (PEPCID) 20 MG tablet Take 1 tablet (20 mg) by mouth 2 times daily as needed (indigestion). 60 tablet 3     furosemide (LASIX) 20 MG tablet Take 1 tablet (20 mg) by mouth 2 times daily.       hydrochlorothiazide (HYDRODIURIL) 25 MG tablet Take 1 tablet by mouth once daily 90 tablet 3     hydrOXYzine (ATARAX) 25 MG tablet Take 25 mg by mouth 4 times daily as needed for anxiety       ibuprofen (ADVIL/MOTRIN) 800 MG tablet Take 1 tablet (800  "mg) by mouth every 8 hours as needed for moderate pain. 90 tablet 0     levothyroxine (SYNTHROID/LEVOTHROID) 150 MCG tablet TAKE 1 TABLET BY MOUTH ONCE DAILY IN THE MORNING BEFORE BREAKFAST 90 tablet 3     lisdexamfetamine (VYVANSE) 60 MG capsule Take 60 mg by mouth every morning.       Melatonin 10 MG CAPS Take 10 mg by mouth nightly as needed (insomnia)       multivitamin w/minerals (THERA-VIT-M) tablet Take 1 tablet by mouth daily       nicotine (NICODERM CQ) 21 MG/24HR 24 hr patch Place 1 patch onto the skin every 24 hours.       nicotine (NICORETTE) 2 MG gum Take 1 each (2 mg) by mouth every hour as needed for nicotine withdrawal symptoms. 100 each 3     omeprazole (PRILOSEC) 20 MG DR capsule Take 1 capsule (20 mg) by mouth daily. Take 1 capsule by mouth once daily 90 capsule 1     OneTouch Delica Lancets 33G MISC 1 Lancet daily 100 each 1     oxyBUTYnin ER (DITROPAN XL) 10 MG 24 hr tablet Take 1 tablet by mouth once daily 90 tablet 1     potassium chloride ER (K-TAB) 20 MEQ CR tablet TAKE 1 TABLET BY MOUTH ONCE DAILY ALONG  WITH  A  10  MEQ  TABLET  **THIS  SHOULD  ONLY  BE  TAKEN  IF  TAKING  LASIX** 90 tablet 0     potassium chloride ER (K-TAB/KLOR-CON) 10 MEQ CR tablet TAKE 1 TABLET BY MOUTH ONCE DAILY (WHEN TAKING LASIX) (Patient taking differently: Take 10 mEq by mouth daily at 2 pm. When taking furosemide) 30 tablet 5     rosuvastatin (CRESTOR) 10 MG tablet Take 1 tablet (10 mg) by mouth at bedtime. 90 tablet 3     sertraline (ZOLOFT) 50 MG tablet Take 75 mg by mouth daily       topiramate (TOPAMAX) 25 MG tablet TAKE 3 TABLETS BY MOUTH AT BEDTIME 90 tablet 1     Vitamin D3 (CHOLECALCIFEROL) 125 MCG (5000 UT) tablet Take 1 tablet by mouth daily       VRAYLAR 3 MG CAPS capsule Take 3 mg by mouth daily           PHYSICAL EXAM:  Objective   Ht 1.6 m (5' 2.99\")   Wt 114.9 kg (253 lb 3.2 oz)   BMI 44.86 kg/m      Vitals - Patient Reported  Pain Score: Severe Pain (7)  Pain Loc: Knee      Vitals:  No vitals " were obtained today due to virtual visit.    GENERAL: alert and no distress  EYES: Eyes grossly normal to inspection.  No discharge or erythema, or obvious scleral/conjunctival abnormalities.  RESP: No audible wheeze, cough, or visible cyanosis.    SKIN: Visible skin clear. No significant rash, abnormal pigmentation or lesions.  NEURO: Cranial nerves grossly intact.  Mentation and speech appropriate for age.  PSYCH: Appropriate affect, tone, and pace of words        Sincerely,    Ivone Moses PA-C      20 minutes spent by me on the date of the encounter doing chart review, history and exam, documentation and further activities per the note    The longitudinal plan of care for the diagnosis(es)/condition(s) as documented were addressed during this visit. Due to the added complexity in care, I will continue to support Autumn in the subsequent management and with ongoing continuity of care.    Virtual Visit Details    Type of service:  Video Visit   Video Start Time:  125  Video End Time:1:33 PM    Originating Location (pt. Location): Home    Distant Location (provider location):  Off-site  Platform used for Video Visit: AmWell      Again, thank you for allowing me to participate in the care of your patient.      Sincerely,    Ivone Moses PA-C

## 2025-04-09 NOTE — PROGRESS NOTES
Virtual Visit Details    Type of service:  Video Visit   Video Start Time:  125  Video End Time:1:33 PM    Originating Location (pt. Location): Home    Distant Location (provider location):  Off-site  Platform used for Video Visit: Bell

## 2025-04-16 DIAGNOSIS — R60.0 BILATERAL EDEMA OF LOWER EXTREMITY: Primary | ICD-10-CM

## 2025-04-16 RX ORDER — FUROSEMIDE 20 MG/1
TABLET ORAL
Qty: 180 TABLET | Refills: 0 | Status: SHIPPED | OUTPATIENT
Start: 2025-04-16

## 2025-04-16 NOTE — TELEPHONE ENCOUNTER
Diuretics (Including Combos) Protocol Tvxcer2204/16/2025 10:13 AM   Protocol Details Medication is active on med list and the sig matches. RN to manually verify dose and sig if red X/fail.    Medication indicated for associated diagnosis

## 2025-04-16 NOTE — TELEPHONE ENCOUNTER
furosemide (LASIX) 20 MG tablet       Last Written Prescription Date:  02/19/25  Last Fill Quantity: unknown,   # refills: unknown  Last Office Visit: 01/03/25  Future Office visit:    Next 5 appointments (look out 90 days)      Apr 30, 2025 2:15 PM  (Arrive by 2:00 PM)  Return Visit with Tanisha Shannon DPM  [facility] ([facility] ) [address] [place] [address] [phone] [phone]             Routing refill request to provider for review/approval because:

## 2025-04-18 ENCOUNTER — HOSPITAL ENCOUNTER (EMERGENCY)
Facility: HOSPITAL | Age: 50
Discharge: HOME | End: 2025-04-18
Attending: NURSE PRACTITIONER | Admitting: NURSE PRACTITIONER
Payer: COMMERCIAL

## 2025-04-18 VITALS
DIASTOLIC BLOOD PRESSURE: 82 MMHG | SYSTOLIC BLOOD PRESSURE: 150 MMHG | HEART RATE: 75 BPM | TEMPERATURE: 98.7 F | RESPIRATION RATE: 16 BRPM | OXYGEN SATURATION: 98 %

## 2025-04-18 DIAGNOSIS — K08.89 PAIN, DENTAL: Primary | ICD-10-CM

## 2025-04-18 PROCEDURE — 99213 OFFICE O/P EST LOW 20 MIN: CPT | Performed by: NURSE PRACTITIONER

## 2025-04-18 PROCEDURE — G0463 HOSPITAL OUTPT CLINIC VISIT: HCPCS

## 2025-04-18 ASSESSMENT — ENCOUNTER SYMPTOMS
CHILLS: 0
SHORTNESS OF BREATH: 0
NECK STIFFNESS: 0
FEVER: 0
DIARRHEA: 0
NECK PAIN: 0
PSYCHIATRIC NEGATIVE: 1
TROUBLE SWALLOWING: 0
VOMITING: 0
FACIAL SWELLING: 0
NAUSEA: 0

## 2025-04-18 ASSESSMENT — COLUMBIA-SUICIDE SEVERITY RATING SCALE - C-SSRS
6. HAVE YOU EVER DONE ANYTHING, STARTED TO DO ANYTHING, OR PREPARED TO DO ANYTHING TO END YOUR LIFE?: NO
2. HAVE YOU ACTUALLY HAD ANY THOUGHTS OF KILLING YOURSELF IN THE PAST MONTH?: NO
1. IN THE PAST MONTH, HAVE YOU WISHED YOU WERE DEAD OR WISHED YOU COULD GO TO SLEEP AND NOT WAKE UP?: NO

## 2025-04-18 ASSESSMENT — ACTIVITIES OF DAILY LIVING (ADL): ADLS_ACUITY_SCORE: 41

## 2025-04-18 NOTE — ED PROVIDER NOTES
History     Chief Complaint   Patient presents with    Dental Pain     HPI  Autumn Holbrook is a 49 year old female who dental pain to tooth #7 which started approximately 3 weeks ago.  No facial swelling.  No neck pain or stiffness.  No difficulty swallowing.  No trismus.  Denies any fever, chills, nausea, vomiting, diarrhea, shortness of breath or chest pain.  Has dental appointment scheduled.  No other concerns.    Allergies:  Allergies   Allergen Reactions    Depakote [Valproic Acid]     Gabapentin Swelling    Propofol Unknown     Got very stiff and tight.        Problem List:    Patient Active Problem List    Diagnosis Date Noted    Benign essential hypertension 04/08/2025     Priority: Medium    Urge incontinence of urine 05/30/2023     Priority: Medium    Encounter for sterilization 05/30/2023     Priority: Medium    Tobacco abuse 05/30/2023     Priority: Medium    History of migraine headaches 09/26/2022     Priority: Medium    Diabetes mellitus, type 2 (H) 12/09/2021     Priority: Medium    Grief 01/18/2021     Priority: Medium    GERD (gastroesophageal reflux disease) 12/13/2019     Priority: Medium    Chronic pain syndrome 04/03/2017     Priority: Medium    Opioid dependence in remission (H) 04/03/2017     Priority: Medium     On suboxone.       PTSD (post-traumatic stress disorder) 04/03/2017     Priority: Medium    COY (generalized anxiety disorder) 04/03/2017     Priority: Medium    Moderate episode of recurrent major depressive disorder (H) 04/03/2017     Priority: Medium    Bilateral edema of lower extremity 04/03/2017     Priority: Medium    Hypothyroid 04/03/2017     Priority: Medium    Mild mixed bipolar I disorder (H) 01/13/2010     Priority: Medium        Past Medical History:    Past Medical History:   Diagnosis Date    Anxiety     Arthritis     Chronic pain syndrome 04/03/2017    Diabetes mellitus, type 2 (H) 12/09/2021    COY (generalized anxiety disorder) 04/03/2017    GERD  (gastroesophageal reflux disease) 2019    Grief 2021    History of migraine headaches 2022    Hypothyroid 2017    Migraine     Mild mixed bipolar I disorder (H) 2010    Moderate episode of recurrent major depressive disorder (H) 2017    Obesity, morbid, BMI 50 or higher (H) 2018    Opioid dependence in remission (H) 2017    Osteoarthritis     PTSD (post-traumatic stress disorder) 2017    Thyroid disease     Tobacco abuse 2023    Trigger finger of both hands 2018    Urge incontinence of urine 2023       Past Surgical History:    Past Surgical History:   Procedure Laterality Date     SECTION  1995     SECTION  2002    CHOLECYSTECTOMY      COLONOSCOPY N/A 2022    Procedure: Colonscopy;  Surgeon: Deep Zhu MD;  Location: [facility] OR    KNEE ARTHROSCOPY AND ARTHROTOMY Right 2019    right knee arthroscopy, lateral menisectomy    LEEP TX, CERVICAL  2004       Family History:    Family History   Problem Relation Age of Onset    Cerebrovascular Disease Mother     Alcoholism Mother     Cancer Mother     Depression Mother     Eczema Mother     Hypertension Mother     Migraines Mother     Mental Illness Mother     Osteoarthritis Mother     Osteoporosis Mother     Alcoholism Father     Cancer Father     Hyperlipidemia Father     Hypertension Father     Myocardial Infarction Father     Mental Illness Sister     Migraines Sister        Social History:  Marital Status:   [5]  Social History     Tobacco Use    Smoking status: Every Day     Current packs/day: 0.50     Average packs/day: 0.5 packs/day for 36.3 years (18.1 ttl pk-yrs)     Types: Cigarettes     Start date: 1989     Passive exposure: Past    Smokeless tobacco: Never    Tobacco comments:     2025 pt is on the quit plan   Vaping Use    Vaping status: Some Days    Substances: Nicotine, Flavoring    Devices: Disposable   Substance Use  Topics    Alcohol use: No     Comment: rare    Drug use: Yes     Types: Marijuana     Comment: daily-medical        Medications:    amoxicillin-clavulanate (AUGMENTIN) 875-125 MG tablet  Acetaminophen (TYLENOL PO)  amphetamine-dextroamphetamine (ADDERALL) 15 MG tablet  asenapine (SAPHRIS) 2.5 MG SUBL sublingual tablet  asenapine (SAPHRIS) 5 MG SUBL sublingual tablet  aspirin 81 MG EC tablet  baclofen (LIORESAL) 10 MG tablet  blood glucose (NO BRAND SPECIFIED) test strip  blood glucose monitoring (NO BRAND SPECIFIED) meter device kit  diclofenac (VOLTAREN) 1 % topical gel  famotidine (PEPCID) 20 MG tablet  furosemide (LASIX) 20 MG tablet  hydrochlorothiazide (HYDRODIURIL) 25 MG tablet  hydrOXYzine (ATARAX) 25 MG tablet  ibuprofen (ADVIL/MOTRIN) 800 MG tablet  levothyroxine (SYNTHROID/LEVOTHROID) 150 MCG tablet  lisdexamfetamine (VYVANSE) 60 MG capsule  Melatonin 10 MG CAPS  multivitamin w/minerals (THERA-VIT-M) tablet  nicotine (NICODERM CQ) 21 MG/24HR 24 hr patch  nicotine (NICORETTE) 2 MG gum  omeprazole (PRILOSEC) 20 MG DR capsule  OneTouch Delica Lancets 33G MISC  oxyBUTYnin ER (DITROPAN XL) 10 MG 24 hr tablet  potassium chloride ER (K-TAB) 20 MEQ CR tablet  potassium chloride ER (K-TAB/KLOR-CON) 10 MEQ CR tablet  rosuvastatin (CRESTOR) 10 MG tablet  sertraline (ZOLOFT) 50 MG tablet  tirzepatide (MOUNJARO) 10 MG/0.5ML SOAJ auto-injector pen  topiramate (TOPAMAX) 25 MG tablet  Vitamin D3 (CHOLECALCIFEROL) 125 MCG (5000 UT) tablet  VRAYLAR 3 MG CAPS capsule      Review of Systems   Constitutional:  Negative for chills and fever.   HENT:  Positive for dental problem. Negative for facial swelling and trouble swallowing.    Respiratory:  Negative for shortness of breath.    Cardiovascular:  Negative for chest pain.   Gastrointestinal:  Negative for diarrhea, nausea and vomiting.   Musculoskeletal:  Negative for gait problem, neck pain and neck stiffness.   Psychiatric/Behavioral: Negative.       Physical Exam   BP:  (!) 150/82  Pulse: 75  Temp: 98.7  F (37.1  C)  Resp: 16  SpO2: 98 %    Physical Exam  Vitals and nursing note reviewed.   Constitutional:       General: She is not in acute distress.     Appearance: Normal appearance. She is not ill-appearing or toxic-appearing.   HENT:      Right Ear: Tympanic membrane, ear canal and external ear normal.      Left Ear: Tympanic membrane, ear canal and external ear normal.      Mouth/Throat:      Mouth: Mucous membranes are moist.      Dentition: Abnormal dentition. Dental tenderness present. No gingival swelling or dental abscesses.      Pharynx: Oropharynx is clear.      Comments: Overall dentition fair.  Dental pain to tooth #7, tooth fully intact.  No surrounding gingival swelling.  No obvious abscess.  No trismus.  No difficulty swallowing.  No neck pain or stiffness.  Cardiovascular:      Rate and Rhythm: Normal rate and regular rhythm.      Pulses: Normal pulses.      Heart sounds: Normal heart sounds.   Pulmonary:      Effort: Pulmonary effort is normal.      Breath sounds: Normal breath sounds.   Musculoskeletal:      Cervical back: Normal range of motion and neck supple. No rigidity or tenderness.   Lymphadenopathy:      Cervical: No cervical adenopathy.   Neurological:      Mental Status: She is alert.   Psychiatric:         Mood and Affect: Mood normal.       ED Course     Procedures    No results found for this or any previous visit (from the past 24 hours).    Medications - No data to display    Assessments & Plan (with Medical Decision Making)     I have reviewed the nursing notes.    I have reviewed the findings, diagnosis, plan and need for follow up with the patient.  (K08.89) Pain, dental  (primary encounter diagnosis)  Plan:   Patient ambulatory with a nontoxic appearance.  Overall dentition fair.  Dental pain to tooth #7, tooth fully intact.  No surrounding gingival swelling.  No obvious abscess.  No trismus.  No difficulty swallowing.  No neck pain or  stiffness.  Has scheduled dental appointment on 4/29/2025.  Will start patient on Augmentin for dental pain.  Alternate Tylenol and ibuprofen as needed for pain.  Go to dentist appointment.  Follow-up with primary care provider or return to urgent care/ED with any worsening in condition or additional concerns.  Patient in agreement treatment plan.    New Prescriptions    AMOXICILLIN-CLAVULANATE (AUGMENTIN) 875-125 MG TABLET    Take 1 tablet by mouth 2 times daily for 7 days.     Final diagnoses:   Pain, dental     4/18/2025   [facility]       Hedy Holbrook, SHEA  04/18/25 [id number]

## 2025-04-18 NOTE — DISCHARGE INSTRUCTIONS
Augmentin as ordered  - Take entire course of antibiotic even if you start to feel better.  - Antibiotics can cause stomach upset including nausea and diarrhea. Read your bottle or ask the pharmacist if antibiotic can be taken with food to help prevent nausea. If you have symptoms of diarrhea you can take an over-the-counter probiotic and/or increase foods with probiotics such as yogurt, [place], sauerkraut.    Alternate tylenol and ibuprofen as needed for pain     Go to scheduled dental appointment    Follow-up with primary care provider or return to urgent care/ED with any worsening in condition or additional concerns

## 2025-04-30 ENCOUNTER — OFFICE VISIT (OUTPATIENT)
Dept: PODIATRY | Facility: OTHER | Age: 50
End: 2025-04-30
Attending: PODIATRIST
Payer: COMMERCIAL

## 2025-04-30 VITALS
DIASTOLIC BLOOD PRESSURE: 84 MMHG | SYSTOLIC BLOOD PRESSURE: 120 MMHG | RESPIRATION RATE: 18 BRPM | HEART RATE: 74 BPM | TEMPERATURE: 96.8 F | OXYGEN SATURATION: 97 %

## 2025-04-30 DIAGNOSIS — L84 CALLUS OF FOOT: ICD-10-CM

## 2025-04-30 DIAGNOSIS — E11.9 DIABETES MELLITUS TYPE 2, NONINSULIN DEPENDENT (H): ICD-10-CM

## 2025-04-30 DIAGNOSIS — L60.3 ONYCHODYSTROPHY: ICD-10-CM

## 2025-04-30 DIAGNOSIS — Z13.89 SCREENING FOR DIABETIC PERIPHERAL NEUROPATHY: ICD-10-CM

## 2025-04-30 DIAGNOSIS — E11.42 DIABETIC POLYNEUROPATHY ASSOCIATED WITH TYPE 2 DIABETES MELLITUS (H): Primary | ICD-10-CM

## 2025-04-30 PROCEDURE — 11056 PARNG/CUTG B9 HYPRKR LES 2-4: CPT | Performed by: PODIATRIST

## 2025-04-30 PROCEDURE — G0463 HOSPITAL OUTPT CLINIC VISIT: HCPCS

## 2025-04-30 PROCEDURE — 11721 DEBRIDE NAIL 6 OR MORE: CPT | Performed by: PODIATRIST

## 2025-04-30 ASSESSMENT — PAIN SCALES - GENERAL: PAINLEVEL_OUTOF10: NO PAIN (0)

## 2025-04-30 NOTE — PROGRESS NOTES
Chief complaint: Patient presents with:  Toenail: DFE      History of Present Illness: This 49-year-old NIDDM II female is seen for a diabetic foot exam and high risk nail debridement.    She gets burning, tingling, and numbness in her feet. This has been more prominent than usual.    She says the bilateral hallux toenail digs into the corners so she wants a slant back of the bilateral hallux bilateral toenail border.    No further pedal complaints today.    Lab Results   Component Value Date    A1C 6.4 2025    A1C 6.5 2024    A1C 6.6 2024    A1C 6.6 2023    A1C 6.6 2022    A1C 6.9 2022    A1C 7.6 2022    A1C 6.0 2020    A1C 6.2 2019    A1C 5.9 2018         /84   Pulse 74   Temp 96.8  F (36  C) (Tympanic)   Resp 18   SpO2 97%     Patient Active Problem List   Diagnosis    Chronic pain syndrome    Opioid dependence in remission (H)    PTSD (post-traumatic stress disorder)    COY (generalized anxiety disorder)    Moderate episode of recurrent major depressive disorder (H)    Bilateral edema of lower extremity    Hypothyroid    GERD (gastroesophageal reflux disease)    Mild mixed bipolar I disorder (H)    Grief    Diabetes mellitus, type 2 (H)    History of migraine headaches    Urge incontinence of urine    Encounter for sterilization    Tobacco abuse    Benign essential hypertension       Past Surgical History:   Procedure Laterality Date     SECTION  1995     SECTION  2002    CHOLECYSTECTOMY      COLONOSCOPY N/A 2022    Procedure: Colonscopy;  Surgeon: Deep Zhu MD;  Location: [facility]    KNEE ARTHROSCOPY AND ARTHROTOMY Right 2019    right knee arthroscopy, lateral menisectomy    LEEP TX, CERVICAL         Current Outpatient Medications   Medication Sig Dispense Refill    Acetaminophen (TYLENOL PO) Take 500 mg by mouth every 4 hours as needed for mild pain or fever       amphetamine-dextroamphetamine (ADDERALL) 15 MG tablet Take 15 mg by mouth daily. afternoon      asenapine (SAPHRIS) 2.5 MG SUBL sublingual tablet Place 2.5 mg under the tongue at bedtime. (2.5 mg + 5 mg = 7.5 mg)      asenapine (SAPHRIS) 5 MG SUBL sublingual tablet Place 5 mg under the tongue at bedtime. (2.5 mg + 5 mg = 7.5 mg)      aspirin 81 MG EC tablet Take 1 tablet (81 mg) by mouth daily. 90 tablet 3    baclofen (LIORESAL) 10 MG tablet TAKE 1 TABLET BY MOUTH THREE TIMES DAILY 90 tablet 4    blood glucose (NO BRAND SPECIFIED) test strip Use to test blood sugar 1 time daily 50 strip 5    blood glucose monitoring (NO BRAND SPECIFIED) meter device kit Use to test blood sugar 1 time daily 1 kit 0    diclofenac (VOLTAREN) 1 % topical gel Apply 2 g topically 2 times daily.      famotidine (PEPCID) 20 MG tablet Take 1 tablet (20 mg) by mouth 2 times daily as needed (indigestion). 60 tablet 3    furosemide (LASIX) 20 MG tablet TAKE 2 TABLETS BY MOUTH ONCE DAILY AS NEEDED FOR  LEG  SWELLING 180 tablet 0    hydrochlorothiazide (HYDRODIURIL) 25 MG tablet Take 1 tablet by mouth once daily 90 tablet 3    hydrOXYzine (ATARAX) 25 MG tablet Take 25 mg by mouth 4 times daily as needed for anxiety      ibuprofen (ADVIL/MOTRIN) 800 MG tablet Take 1 tablet (800 mg) by mouth every 8 hours as needed for moderate pain. 90 tablet 0    levothyroxine (SYNTHROID/LEVOTHROID) 150 MCG tablet TAKE 1 TABLET BY MOUTH ONCE DAILY IN THE MORNING BEFORE BREAKFAST 90 tablet 3    lisdexamfetamine (VYVANSE) 60 MG capsule Take 60 mg by mouth every morning.      Melatonin 10 MG CAPS Take 10 mg by mouth nightly as needed (insomnia)      multivitamin w/minerals (THERA-VIT-M) tablet Take 1 tablet by mouth daily      nicotine (NICODERM CQ) 21 MG/24HR 24 hr patch Place 1 patch onto the skin every 24 hours.      nicotine (NICORETTE) 2 MG gum Take 1 each (2 mg) by mouth every hour as needed for nicotine withdrawal symptoms. 100 each 3    omeprazole (PRILOSEC)  20 MG DR capsule Take 1 capsule (20 mg) by mouth daily. Take 1 capsule by mouth once daily 90 capsule 1    OneTouch Delica Lancets 33G MISC 1 Lancet daily 100 each 1    oxyBUTYnin ER (DITROPAN XL) 10 MG 24 hr tablet Take 1 tablet by mouth once daily 90 tablet 1    potassium chloride ER (K-TAB) 20 MEQ CR tablet TAKE 1 TABLET BY MOUTH ONCE DAILY ALONG  WITH  A  10  MEQ  TABLET  **THIS  SHOULD  ONLY  BE  TAKEN  IF  TAKING  LASIX** 90 tablet 0    potassium chloride ER (K-TAB/KLOR-CON) 10 MEQ CR tablet TAKE 1 TABLET BY MOUTH ONCE DAILY (WHEN TAKING LASIX) (Patient taking differently: Take 10 mEq by mouth daily at 2 pm. When taking furosemide) 30 tablet 5    rosuvastatin (CRESTOR) 10 MG tablet Take 1 tablet (10 mg) by mouth at bedtime. 90 tablet 3    sertraline (ZOLOFT) 50 MG tablet Take 75 mg by mouth daily      tirzepatide (MOUNJARO) 10 MG/0.5ML SOAJ auto-injector pen Inject 0.5 mLs (10 mg) subcutaneously once a week. 2 mL 5    topiramate (TOPAMAX) 25 MG tablet TAKE 3 TABLETS BY MOUTH AT BEDTIME 90 tablet 1    Vitamin D3 (CHOLECALCIFEROL) 125 MCG (5000 UT) tablet Take 1 tablet by mouth daily      VRAYLAR 3 MG CAPS capsule Take 3 mg by mouth daily       No current facility-administered medications for this visit.          Allergies   Allergen Reactions    Depakote [Valproic Acid]     Gabapentin Swelling    Propofol Unknown     Got very stiff and tight.        Family History   Problem Relation Age of Onset    Cerebrovascular Disease Mother     Alcoholism Mother     Cancer Mother     Depression Mother     Eczema Mother     Hypertension Mother     Migraines Mother     Mental Illness Mother     Osteoarthritis Mother     Osteoporosis Mother     Alcoholism Father     Cancer Father     Hyperlipidemia Father     Hypertension Father     Myocardial Infarction Father     Mental Illness Sister     Migraines Sister        Social History     Socioeconomic History    Marital status:      Spouse name: None    Number of children:  2    Years of education: None    Highest education level: None   Tobacco Use    Smoking status: Every Day     Current packs/day: 0.50     Average packs/day: 0.5 packs/day for 35.7 years (17.8 ttl pk-yrs)     Types: Cigarettes     Start date: 1/1/1989     Passive exposure: Past    Smokeless tobacco: Never    Tobacco comments:     pt declines 9/5/24   Vaping Use    Vaping status: Some Days    Substances: Nicotine, Flavoring    Devices: Disposable   Substance and Sexual Activity    Alcohol use: No    Drug use: Yes     Types: Marijuana     Comment: daily-medical    Sexual activity: Yes     Partners: Male     Birth control/protection: Condom     Social Determinants of Health     Financial Resource Strain: Low Risk  (2/8/2024)    Financial Resource Strain     Within the past 12 months, have you or your family members you live with been unable to get utilities (heat, electricity) when it was really needed?: No   Food Insecurity: High Risk (2/8/2024)    Food Insecurity     Within the past 12 months, did you worry that your food would run out before you got money to buy more?: Yes     Within the past 12 months, did the food you bought just not last and you didn t have money to get more?: Yes   Transportation Needs: Low Risk  (2/8/2024)    Transportation Needs     Within the past 12 months, has lack of transportation kept you from medical appointments, getting your medicines, non-medical meetings or appointments, work, or from getting things that you need?: No   Interpersonal Safety: Low Risk  (9/5/2024)    Interpersonal Safety     Do you feel physically and emotionally safe where you currently live?: Yes     Within the past 12 months, have you been hit, slapped, kicked or otherwise physically hurt by someone?: No     Within the past 12 months, have you been humiliated or emotionally abused in other ways by your partner or ex-partner?: No   Housing Stability: Low Risk  (2/8/2024)    Housing Stability     Do you have housing?  : Yes     Are you worried about losing your housing?: No       ROS: 10 point ROS neg other than the symptoms noted above in the HPI.  EXAM  Constitutional: healthy, alert, and no distress    Psychiatric: mentation appears normal and affect normal/bright    VASCULAR:  -Dorsalis pedis pulse +2/4 b/l  -Posterior tibial pulse +2/4 b/l  -Capillary refill time < 3 seconds to b/l hallux  NEURO:  -Epicritic and protective sensation diminished to the bilateral foot.   DERM:  -Skin temperature, texture and turgor WNL b/l  -Toenails elongated, thickened, dystrophic and discolored x 10  -Hyperkeratotic lesion on the medial plantar distal aspect of the proximal phalanx of the bilateral hallux and the LEFT plantar medial plantar foot  -Diffusely dry skin with flaking on the bilateral leg  MSK:  -Muscle strength of ankles +5/5 for dorsiflexion, plantarflexion, ABDUction and ADDuction b/l  -Moderate decrease in arch height while patient is NWB, bilaterally   -Moderate medial midfoot arch collapse bilaterally  -Ankle joint passive ROM within normal limits except for dorsiflexion:    Dorsiflexion, RIGHT Straight knee 0 degrees    Dorsiflexion, LEFT Straight knee 0 degrees    ============================================================    ASSESSMENT:    (E11.42) Diabetic polyneuropathy associated with type 2 diabetes mellitus (H)  (primary encounter diagnosis)    (L60.3) Onychodystrophy    (L84) Callus of foot    (E11.9) Diabetes mellitus type 2, noninsulin dependent (H)    (Z13.89) Screening for diabetic peripheral neuropathy      PLAN:  -Patient evaluated and examined. Treatment options discussed with no educational barriers noted.    -High risk toenail debridement x 10 toenails without incident  ---Patient prefers a slant back on the bilateral toenail border of the bilateral hallux    -Callus pared x 3 to the medial plantar distal aspect of the proximal phalanx of the bilateral hallux and the LEFT plantar medial plantar foot    without incident  ---Patient reminded that the callus will likely return due to the underlying, prominent bone causing the callus while the patient is walking.    Diabetes Mellitus: Patient's DM is managed by their PCP. The DM appears to be stable. Patient's last HbA1C was 6.4% on 01/03/2025.    -Diabetic Foot Education provided. This included checking the feet daily looking for new new blisters or wounds, wearing shoes at all times when walking including around the house, and avoiding lotion application between the toes. If there are any signs of infection, the patient should present to the ED as soon as possible. Infections of the foot can be life threatening or lead to amputations of the foot or leg.    -Patient in agreement with the above treatment plan and all of patient's questions were answered.      Return to clinic 63+ days for a diabetic foot exam and high risk nail debridement         Tanisha Shannon DPM

## 2025-06-08 DIAGNOSIS — G43.909 MIGRAINE WITHOUT STATUS MIGRAINOSUS, NOT INTRACTABLE, UNSPECIFIED MIGRAINE TYPE: ICD-10-CM

## 2025-06-09 ENCOUNTER — VIRTUAL VISIT (OUTPATIENT)
Dept: PHARMACY | Facility: CLINIC | Age: 50
End: 2025-06-09
Attending: PHYSICIAN ASSISTANT
Payer: COMMERCIAL

## 2025-06-09 VITALS — HEIGHT: 63 IN | BODY MASS INDEX: 44.12 KG/M2 | WEIGHT: 249 LBS

## 2025-06-09 DIAGNOSIS — E66.813 OBESITY, CLASS III, BMI 40-49.9 (MORBID OBESITY) (H): Primary | ICD-10-CM

## 2025-06-09 DIAGNOSIS — E11.9 TYPE 2 DIABETES MELLITUS WITHOUT COMPLICATION, WITHOUT LONG-TERM CURRENT USE OF INSULIN (H): ICD-10-CM

## 2025-06-09 DIAGNOSIS — G43.909 MIGRAINE WITHOUT STATUS MIGRAINOSUS, NOT INTRACTABLE, UNSPECIFIED MIGRAINE TYPE: ICD-10-CM

## 2025-06-09 RX ORDER — TOPIRAMATE 25 MG/1
50 TABLET, FILM COATED ORAL AT BEDTIME
Qty: 180 TABLET | Refills: 1 | Status: SHIPPED | OUTPATIENT
Start: 2025-06-09

## 2025-06-09 RX ORDER — LISDEXAMFETAMINE DIMESYLATE 70 MG/1
70 CAPSULE ORAL EVERY MORNING
COMMUNITY

## 2025-06-09 RX ORDER — TOPIRAMATE 25 MG/1
75 TABLET, FILM COATED ORAL AT BEDTIME
Qty: 90 TABLET | Refills: 0 | OUTPATIENT
Start: 2025-06-09

## 2025-06-09 NOTE — PATIENT INSTRUCTIONS
"Recommendations from MTM Pharmacist visit:                                                    MTM (medication therapy management) is a service provided by a clinical pharmacist designed to help you get the most of out of your medicines.  You may be sent a phone or email survey evaluating today's visit.  Please provide feedback you have for the service he received today if you are able.        Decrease topiramate 25 mg to 2 tabs every night at bedtime to help make it easier to eat in the morning. If your migraines return, you can return to 3 tabs every night at bedtime.    Continue Mounjaro 10 mg weekly for now. We want to make sure you are able to eat enough to nourish your body (see below).   Try adding 1 protein shake to your daily intake to help prevent malnutrition  Do not use tobacco to suppress your appetite. We would like to have to stop smoking for overall health and wellbeing. If you feel cravings, try using your nicotine gum /lozenge to reduce your cravings.    You are due for the following labs - please collect these ahead of your visit with Cem Varner PharmD with your PCP clinic. Please call to schedule this visit for around end of June or July.  A1C  Lipase  Lipids  Basic Metabolic Panel    Helpful Nutritional/Eating Behavior Recommendations When On Injectable GLP-1 Agonist  Water intake: Hydrate, hydrate, hydrate!!   64-96 oz water daily (this does not include coffee/caffeine containing beverages)  Ensure adequate protein intake   This is to ensure your body has the building blocks necessary for adequate muscle maintenance and decrease likelihood of severity of muscle loss  Ensure getting in protein with each meal -   Protein can come from come from meat, dairy products, nuts, some vegetables, and certain grains and beans  Examples of high quality or \"complete\" proteins: chicken, turkey, lean ground beef, salmon, tuna, certain beans and lentils  Ensure adequate fiber intake   At least 21 grams per " "day for women   At least 30 grams per day for men   A food that has 3 or more gram fiber is considered a \"good fiber choice\"   Consider starting multivitamin containing calcium and vitamin D if food intake more limited   Examples: Centrum, Nature Made, One-A-Day  To minimize side effects:   Smaller more frequent meals   Do not skip meals  Avoid foods that may worsen acid reflux, heartburn, bowel movements (fatty, rich, greasy, or acidic foods)   Stop eating when feeling satiated (~80% full)   Ensure having consistent bowel movements (every day or every 2 days at minimum)  If you are having straining, hard stools or having bowel movement every 3rd day or less, reach out to the care team      Follow-up:  approx October with Adelina White, BayD (or as discussed with Ivone Moses PA-C in August)    Appointments in Next Year      Jun 26, 2025 11:00 AM  (Arrive by 10:45 AM)  Diabetes Education with Kinga Beckwith RN, Taisha Corral RD  [facility] ([facility] ) [phone]     Jul 03, 2025 2:30 PM  (Arrive by 2:15 PM)  Return Visit with Tanisha Shannon DPM  [facility] ([facility] ) [phone]     Jul 21, 2025 11:30 AM  MTM New with Cem Varner [facility] ([facility]) [phone]     Aug 20, 2025 11:00 AM  (Arrive by 10:45 AM)  Return Weight Management Visit with Ivone Moses PA-C  [facility] [place] ([facility] ) [phone]                 It was great speaking with you today.  I value your experience and would be very thankful for your time in providing feedback in our clinic survey. In the next few days, you may receive an email or text message from [employer] with a link to a survey related to your  clinical pharmacist.\"     To schedule another [facility] appointment, please call the clinic directly " "([facility] Phone Number: [phone] (schedules for [facility] and [facility] - providers, dietitians, health coaches) or you may call the [facility] scheduling line at [phone] or toll-free at [phone].     My Clinical Pharmacist's contact information:                                                      Please feel free to contact me with any questions or concerns you have.      Adelina White, Pharm D., MPH    Medication Therapy Management Pharmacist   [facility]      Try doing the \"Healthy Eating Plate\" method at home by following these rules: Start with a 9-inch dinner plate.         Meal Replacement Shake Options:   *Protein Shake Criteria: no more than 210 Calories, at least 20 grams of protein, and less than 10 grams of sugar   Premier Protein (160 Calories, 30 g protein)  Slim Fast Advanced Nutrition (180 Calories, 20 g protein)  Muscle Milk, lactose-free, 17 oz bottle (210 Calories, 30 g protein)  Integrated Supplements, no artificial sugars (110 Calories, 20 g protein)  Boost/Ensure Max (160 calories, 30 gm protein)   Fairlife Protein Shakes (160-230 calories, 26-42 gm protein)  Aldi's Elevation Protein Powder (180 calories, 30 gm protein)   Orgain Protein Shakes (130-160 calories, 20-26 gm protein)     Meal Replacement Bar Options:  Quest Protein Bars (190 Calories, 20 g protein)  Built Bar (170 Calories, 15-20 g protein)  One Protein Bar (210 calories, 20 g protein)  Emerson Signature Protein Bar ([employer]) (190 Calories, 21 g protein)  Pure Protein Bars (180 Calories, 21 g protein)    Low Calorie Frozen Meal:  Healthy Choice Power Bowls  Getachew Echevarria  Smart Ones  Nirav Bee      ---------------------------------------------------------------  Tips to Increase the Protein in Your Diet  You may need more protein in your diet to help you heal from an illness, surgery or wound. Extra protein can also help " you gain weight. Here are some ideas for adding high-protein foods to your meals.  Meat and fish  Add chopped cooked meat to vegetables, salads, casseroles, soups, sauces and biscuit dough.  Use in omelets, soufflés, quiches, sandwich fillings and chicken or turkey stuffing.  Wrap in pie crust or biscuit dough to make a turnover.  Add to stuffed baked potatoes.  Make a dip with diced meat or flaked fish mixed with sour cream and spices.  Chopped, hard-cooked eggs  Add to salads.  Use for snacks and sandwich filling.  High-protein milk  To make high-protein milk, mix 1 quart whole milk with 1 cup powdered milk.  Add to cream soups, mashed potatoes, scrambled eggs, cereals and dried eggnog mix.  Use as an ingredient in puddings, custards, hot chocolate, milk shakes and pancakes.  Powdered milk  If you don't have any high-protein milk on hand, you can use powdered milk. Add 3 tablespoons to:  gravies, sauces, cream soups, mayonnaise  casseroles, meat patties, meatloaf, tuna salad, deviled ham  scalloped or mashed potatoes, creamed spinach  scrambled eggs, egg salad  cereals  yogurt, milk drinks, ice cream, frozen desserts, puddings, custards.  Add 4 to 6 tablespoons powdered milk to make:  cream sauces  breads, muffins, pancakes, waffles, cookies, cakes  cream pies, frostings, cake fillings  fruit cobblers, bread or rice pudding, gelatin desserts.  For high-protein eggnog, add 3 to 6 tablespoons powdered milk to prepared eggnog.  Hard or soft cheese  Melt on sandwiches, breads, tortillas, hamburgers, hot dogs, other meats, vegetables, eggs and pies.  Grate into soups, chili, sauces, casseroles, vegetables, potatoes, rice, noodles or meatloaf.  Eat with toast or crackers, or melt for sanjeev dip.  Cottage cheese or ricotta cheese  Mix with or scoop on top of fruits and vegetables.  Add to casseroles, lasagna, spaghetti, noodles and egg dishes (omelets, scrambled eggs, soufflés).  Use in gelatin, pudding-type desserts,  cheesecake and pancake batter.  Use to stuff crepes, pasta shells or manicotti.  Fruit yogurt  Blend with fruits for a fruit smoothie.  Use as a dip for fruits and vegetables.  Scoop on top of pancakes or waffles.  Tofu  Blend silken tofu with fruits and juices for a smoothie.  Add chunks of firm tofu to soups and stews, or crumble into meatloaf.  Blend dried onion soup mix into soft or silken tofu for dip.  Use pureed silken tofu for part of the mayonnaise, sour cream, cream cheese or ricotta cheese called for in recipes.  Beans  Use cooked beans or peas in soups, casseroles, pasta, tacos and burritos.  Nuts and seeds  Note: For children under 3, discuss with the child's care team.  Use in casseroles, breads, muffins, pancakes, cookies and waffles.  Sprinkle on fruits, cereals, ice cream, yogurt, vegetables and salads.  Mix with raisins, dried fruits and chocolate chips for a snack.  Nut butters  Note: For children under 3, discuss with the child's care team.  Spread on sandwiches, toast, muffins, crackers, waffles, pancakes and fruit slices.  Use as a dip for raw vegetables.  Blend with milk drinks, or swirl through ice cream, yogurt or hot cereal.  Nutrition supplements (nutrition bars, drinks and powders)  Add powders to milk drinks and desserts.  Mix with ice cream, milk and fruit for a high-protein milk shake.    For informational purposes only. Not to replace the advice of your health care provider. Clinically reviewed by Natasha Berry, UYEN, LD, and the Clinical Nutrition Service Line. Copyright   2005 [facility]. All rights reserved. [employer] 675057 - REV 04/24.      -----------------------------------------------------------------------------------------------------------------  Learning About High-Protein Foods  What foods are high in protein?     The foods you eat contain nutrients, such as vitamins and minerals. Protein is a nutrient. Your body needs the right amount to stay  "healthy and work as it should. You can use the list below to help you make choices about which foods to eat.  Here are some examples of foods that are high in protein.  Dairy and dairy alternatives  Cheese  Milk  Soy milk  Yogurt (especially Greek)  Meat  Beef  Chicken  Ham  Lamb  Lunch meat  Pork  Sausage  Turkey  Other protein foods  Hemp seeds!  Beans (black, garbanzo, kidney, lima)  Eggs  Hummus  Lentils  Nuts  Peanut butter and other nut butters  Peas  Soybeans  Tofu  Veggie or soy dylan (Check the nutrition label for the amount of protein in each serving.)  Seafood  Anchovies  Cod  Crab  Halibut  [place]  Sardines  Shrimp  Tilapia  [place]  Tuna  Protein supplements  Bars (Check the nutrition label for the amount of protein in each serving.)  Drinks  Powders  Work with your doctor to find out how much of this nutrient you need. Depending on your health, you may need more or less of it in your diet.  Where can you learn more?  Go to https://www.[employer].net/patiented  Enter P335 in the search box to learn more about \"Learning About High-Protein Foods.\"  Current as of: September 20, 2023               Content Version: 14.0    0865-9958 [employer].   Care instructions adapted under license by your healthcare professional. If you have questions about a medical condition or this instruction, always ask your healthcare professional. [employer] disclaims any warranty or liability for your use of this information.         "

## 2025-06-09 NOTE — Clinical Note
Some concern Autumn isn't eating enough. She is going to work on adding a protein shake and we also decreased topiramate to 50 mg. She takes this for migraine as well, so we will see if this impacts migraine with a lower dose.  Due for A1C, Lipid, BMP - ordered. Lipase has been ordered by Roz  and PCP too. Patient reported history of pancreatitis that today she noted was related to gall stone attack. Given this and no side effects, I do think ok to check, but no huge concerns.  Continuing Mounjaro 10mg for now - may consider dose decrease if having a tough time meeting nutritional goals when seeing you in August, KK.  No follow up scheduled with me yet- depending on what you decide in Aug. Also has Medication Therapy Management pharmD with PCP -- so balancing all her appts/team.  Adelina

## 2025-06-09 NOTE — PROGRESS NOTES
Medication Therapy Management (MTM) Encounter    ASSESSMENT:                            Medication Adherence/Access: education provided to schedule follow up with PCP Medication Therapy Management - Cem Varner, PharmD    Weight management/diabetes:   Patient due for A1C (ordered previously). Recommend lipid and BMP given med changes over past 3 months - rosuvastatin dose increase, furosemide dose decrease with stable KCl dose.  Additionally, Given patient also on topiramate and important to monitor for hyperchloremic metabolic acidosis - BMP ordered today. Statin for diabetes primary prevention - lipid panel ordered. Lipase ordered previously - ok to monitor. But given gall bladder removed and stones likely cause of previously discussed pancreatitis - low concern for continuing GLP1/GIP agonist at this time.    Some suspicion appetite too low. Education provided on this and do not recommend tobacco use to reduce appetite. Given increase in Vyvanse and Mounjaro significantly managing appetite - recommend trial of dose decrease topiramate to help patient increase nutrition. Some concern appetite too low and may risk malnutrition. If migraine returns with topiramate decrease, may return to 75 mg dose.    Discussed dietary and behavioral modifications for medication safety and efficacy.           PLAN:                            Decrease topiramate 25 mg to 2 tabs every night at bedtime to help make it easier to eat in the morning. If your migraines return, you can return to 3 tabs every night at bedtime.    Continue Mounjaro 10 mg weekly for now. We want to make sure you are able to eat enough to nourish your body (see below).   Try adding 1 protein shake to your daily intake to help prevent malnutrition  Do not use tobacco to suppress your appetite. We would like to have to stop smoking for overall health and wellbeing. If you feel cravings, try using your nicotine gum /lozenge to reduce your cravings.    You are  "due for the following labs - please collect these ahead of your visit with Cem Varner PharmD with your PCP clinic. Please call to schedule this visit for around end of June or July.  A1C  Lipase  Lipids  Basic Metabolic Panel    Helpful Nutritional/Eating Behavior Recommendations When On Injectable GLP-1 Agonist  Water intake: Hydrate, hydrate, hydrate!!   64-96 oz water daily (this does not include coffee/caffeine containing beverages)  Ensure adequate protein intake   This is to ensure your body has the building blocks necessary for adequate muscle maintenance and decrease likelihood of severity of muscle loss  Ensure getting in protein with each meal -   Protein can come from come from meat, dairy products, nuts, some vegetables, and certain grains and beans  Examples of high quality or \"complete\" proteins: chicken, turkey, lean ground beef, salmon, tuna, certain beans and lentils  Ensure adequate fiber intake   At least 21 grams per day for women   At least 30 grams per day for men   A food that has 3 or more gram fiber is considered a \"good fiber choice\"   Consider starting multivitamin containing calcium and vitamin D if food intake more limited   Examples: Centrum, Nature Made, One-A-Day  To minimize side effects:   Smaller more frequent meals   Do not skip meals  Avoid foods that may worsen acid reflux, heartburn, bowel movements (fatty, rich, greasy, or acidic foods)   Stop eating when feeling satiated (~80% full)   Ensure having consistent bowel movements (every day or every 2 days at minimum)  If you are having straining, hard stools or having bowel movement every 3rd day or less, reach out to the care team      Follow-up:  approx October with Adelina White PharmD (or as discussed with Ivone Moses PA-C in August)    Appointments in Next Year      Jun 26, 2025 11:00 AM  (Arrive by 10:45 AM)  Diabetes Education with Kinga Beckwith RN, Taisha Corral RD  [facility] ([place] [place] [facility] ) [phone]     Jul 03, 2025 2:30 PM  (Arrive by 2:15 PM)  Return Visit with Tanisha Shannon DPM  [facility] ([facility] ) [phone]     Jul 21, 2025 11:30 AM  MTM New with Cem Varner RAUL  [facility] ([facility]) [phone]     Aug 20, 2025 11:00 AM  (Arrive by 10:45 AM)  Return Weight Management Visit with Ivone Moses PA-C  [facility] ([facility] ) [phone]            SUBJECTIVE/OBJECTIVE:                          Autumn Holbrook is a 49 year old female seen for a follow-up visit.       Reason for visit: Medication Therapy Management - weight management and diabetes    Patient also follows with PCP Medication Therapy Management - Cem Varner, PharmD; due for follow up.    Allergies/ADRs: Reviewed in chart  Past Medical History: Reviewed in chart  Tobacco: She reports that she has been smoking cigarettes. She started smoking about 36 years ago. She has a 18.2 pack-year smoking history. She has been exposed to tobacco smoke. She has never used smokeless tobacco.Nicotine/Tobacco Cessation Plan  Pharmacotherapies : Nicotine gum & patch  Alcohol: rare    Medication Adherence/Access: no issues reported.    Diabetes   /Weight Management/ADHD  Mounjaro 10 mg weekly x 5 weeks   Topiramate 75 mg at bedtime (also for migraine)  Vyvanse 70 mg once daily (for mental health - also helpful for appetite)  Adderall IR 15 mg every afternoon at 3pm (for mental health - also helpful for appetite)    Aspirin 81 mg once daily     Rosuvastatin 10mg once daily - increased with PCP Medication Therapy Management 2/19/25   Ace/arb - not indicated (neg for microalbuminuria)    Return Medical Weight Management Visit 10/30/24 with Roz Coffey PA-C; next visit 8/20/25 with Ivone Moses PA-C     Patient denies side effects  with Mounjaro - much better tolerated than Ozempic. Feels appetite better managed with current dose and no cravings. Feels current dose works well for her as only eating 1 meal per day.    Medication Therapy Management investigated chart note from PCP Medication Therapy Management regarding pancreatitis. Patient notes that >20 years ago had pancreatitis when she had gall stones. Gall bladder was removed and no issues since. Lipase not checked recently.    Topiramate works well for appetite management historically, no side effects reported. Patient notes current dose is what she has been on for migraine and effectively manages - some hesitation to decrease dose.    Blood sugar monitoring: rarely. Fasting - around  mg/dl (hasn't checked in while she notes - has been stable when checking prevously); 120s post prandial  Current diabetes symptoms: none      Diet/Exercise: eating one meal per day now that nice out - less appetite. Drinks Coffee and nicotine in the afternoon as this helps educe appetite and she likes this.    1pm breakfast - granola bar and fruit or half sandwich.  If eats too much, will feel upset stomach or vomiting if over eats. This has been life-long - not since   4pm meal is the main meal of the day. focuses on protein with meals and snacks (drinking more milk, meat, beans) and low carb, increasing fiber,  lots of veggies, some healthy. Small amounts of carb at end of a meal to prioritize protein    Working to balance fluid intake and diuretics - 64 oz water good for keeping in balance with leg swelling and 1 furosemide - reduced from 2 tabs 3 months ago as not needed (and 30 mEq KCl daily)    Activity: limited - working to increase    Ozempic caused severe nausea, vomiting, and belching. PCP Medication Therapy Management noted history of pancreatitis.   Trulicity also caused GI upset per patient report.    Saw Diabetes Ed: Consider Faraz cgm, OTC; hasn't picked up  [facility]             Eye exam is up to date  Foot exam is up to date           Highest wt in life 325 lbs  (per patient report to Ivone Moses PA-C)  Initial consult weight: 284 lb (3/1/2023)  Current weight today: 249 lbs 0 oz  Cumulative Weight Loss: -76 lb, -23.4% from baseline    Wt Readings from Last 4 Encounters:   06/09/25 249 lb (112.9 kg)   04/09/25 253 lb 3.2 oz (114.9 kg)   03/11/25 257 lb (116.6 kg)   02/04/25 261 lb 8 oz (118.6 kg)     Estimated body mass index is 44.12 kg/m  as calculated from the following:    Height as of this encounter: 5' 2.99\" (1.6 m).    Weight as of this encounter: 249 lb (112.9 kg).    Lab Results   Component Value Date    A1C 6.4 01/03/2025    A1C 6.5 05/08/2024    A1C 6.6 02/08/2024    A1C 6.6 02/17/2023    A1C 6.6 08/01/2022    A1C 6.9 06/01/2022    A1C 7.6 02/16/2022    A1C 6.0 01/29/2020    A1C 6.2 07/01/2019    A1C 5.9 12/19/2018       Today's Vitals: Ht 5' 2.99\" (1.6 m)   Wt 249 lb (112.9 kg)   BMI 44.12 kg/m    ----------------      I spent 20 minutes with this patient today. All changes were made via collaborative practice agreement with * No referring provider recorded for this case *.     A summary of these recommendations was sent via [employer].    Adelina White, Pharm D., MPH    Medication Therapy Management Pharmacist   [facility]     Telemedicine Visit Details  The patient's medications can be safely assessed via a telemedicine encounter.  Type of service:  Video Conference via [employer]  Originating Location (pt. Location): Home    Distant Location (provider location):  Off-site  Start Time: 11:02 AM  End Time: 11:22 AM     Medication Therapy Recommendations  Obesity, Class III, BMI 40-49.9 (morbid obesity) (H)   1 Current Medication: topiramate (TOPAMAX) 25 MG tablet (Discontinued)   Current Medication Sig: TAKE 3 TABLETS BY MOUTH AT BEDTIME   Rationale: Dose too high - Dosage too high - Safety "   Recommendation: Decrease Dose   Status: Accepted per CPA   Identified Date: 6/9/2025 Completed Date: 6/9/2025

## 2025-06-09 NOTE — Clinical Note
We got Autumn coming your way! She called and got scheduled for follow up in July with you.  I asked that she get labs prior to your visit. And good news Is that she thinks the pancreas concerns were secondary to gall stones. And her gall bladder has since been removed. We will still have her get that Lipase, but low concern from our standpoint!  Goal is to eat more! She is only eating about 1 meal per day and a small snack. So asked ehr to find a protein shake to have as another meal during the day too if intake is low.  Decreased topiramate to see if helpful to stabilize appetite - but monitoring for any changes in Migraine with dose decrease.  Keep me posted on what more I can do from weight management stand point! Adelina

## 2025-06-09 NOTE — NURSING NOTE
Current patient location: [address] [facility] [address]    Is the patient currently in the state of [place]? YES    Visit mode:VIDEO    If the visit is dropped, the patient can be reconnected by: VIDEO VISIT: Text to cell phone:   Telephone Information:   Mobile [phone]       Will anyone else be joining the visit? NO  (If patient encounters technical issues they should call [phone]    Are changes needed to the allergy or medication list? Pt stated no changes to allergies and Pt stated no med changes    Are refills needed on medications prescribed by this physician? YES topogram goes to the [facility] pharmacy in [place]    Reason for visit: Medication Therapy Management    Bárbara PIPERF

## 2025-06-18 ENCOUNTER — TELEPHONE (OUTPATIENT)
Dept: FAMILY MEDICINE | Facility: OTHER | Age: 50
End: 2025-06-18

## 2025-06-18 DIAGNOSIS — E11.9 TYPE 2 DIABETES MELLITUS WITHOUT COMPLICATION, WITHOUT LONG-TERM CURRENT USE OF INSULIN (H): Primary | ICD-10-CM

## 2025-06-18 NOTE — TELEPHONE ENCOUNTER
Pt has an appt with Dm Ed, please sign the pended referral. Pt has an appt with you to Establish care.  Tanya Sweet

## 2025-06-19 DIAGNOSIS — E87.6 HYPOKALEMIA: ICD-10-CM

## 2025-06-19 RX ORDER — POTASSIUM CHLORIDE 1500 MG/1
TABLET, EXTENDED RELEASE ORAL
Qty: 90 TABLET | Refills: 0 | Status: SHIPPED | OUTPATIENT
Start: 2025-06-19

## 2025-06-19 NOTE — TELEPHONE ENCOUNTER
Last signed by Rivas    Potassium Chloride ER 20 MEQ Oral Tablet Extended Release       Last Written Prescription Date:  03/05/2025  Last Fill Quantity: 90,   # refills: 0  Last Office Visit: 01/03/2025  Future Office visit:    Next 5 appointments (look out 90 days)      Jul 03, 2025 2:30 PM  (Arrive by 2:15 PM)  Return Visit with Tanisha Shannon DPM  [facility] ([facility] ) [address] [place] [id number] [phone]     Jul 21, 2025 11:30 AM  [facility] New with Cem Varner [facility] ([facility]) [address] [place] [id number] [phone]             Routing refill request to provider for review/approval because:    Potassium Supplements Protocol [id number]/19/2025 10:12 AM   Protocol Details Medication is active on med list and the sig matches. RN to manually verify dose and sig if red X/fail.        Kimberly Boecker, RN

## 2025-06-26 ENCOUNTER — HOSPITAL ENCOUNTER (OUTPATIENT)
Dept: EDUCATION SERVICES | Facility: HOSPITAL | Age: 50
End: 2025-06-26
Attending: PHYSICIAN ASSISTANT
Payer: COMMERCIAL

## 2025-06-26 ENCOUNTER — RESULTS FOLLOW-UP (OUTPATIENT)
Dept: EDUCATION SERVICES | Facility: HOSPITAL | Age: 50
End: 2025-06-26

## 2025-06-26 VITALS
BODY MASS INDEX: 43.59 KG/M2 | SYSTOLIC BLOOD PRESSURE: 104 MMHG | OXYGEN SATURATION: 97 % | HEIGHT: 63 IN | HEART RATE: 74 BPM | WEIGHT: 246 LBS | RESPIRATION RATE: 18 BRPM | DIASTOLIC BLOOD PRESSURE: 70 MMHG

## 2025-06-26 DIAGNOSIS — E11.9 TYPE 2 DIABETES MELLITUS WITHOUT COMPLICATION, WITHOUT LONG-TERM CURRENT USE OF INSULIN (H): Primary | ICD-10-CM

## 2025-06-26 LAB — HBA1C MFR BLD: 6.3 % (ref 4.3–?)

## 2025-06-26 PROCEDURE — 83036 HEMOGLOBIN GLYCOSYLATED A1C: CPT | Performed by: STUDENT IN AN ORGANIZED HEALTH CARE EDUCATION/TRAINING PROGRAM

## 2025-06-26 ASSESSMENT — PAIN SCALES - GENERAL: PAINLEVEL_OUTOF10: SEVERE PAIN (7)

## 2025-06-26 NOTE — PROGRESS NOTES
"{Note Type:734690}      Autumn, 49 year old, returns to [facility] for follow up.  RD- not available today to attend this visit.     Neruopathy- terrible- not taking anything- due to past history with prior meds.       Feels misssing/crraving- \"naughty foods\".       Thirst- dry skin. Sin peels. Inside calvines. Lotions. Exfoliation/scrubber.   New- also on tummy. Softer skin areas.   Urination-constantly- drinking more. Trying to     When don't drink water- swelling LE. Goes down after rehydration.     Slow healing- scabs- hair from nerves.   Therapist-   No other wounds.     Injection in teeth. Root canal. Will be having teeth pulled- has referral pending /insurance- cities. .       Has establish care visit with DR Cast.   Follows with weight management clinic.   [facility] pharmacist.     Mounjaro 10 mg- per MT notes- eating  1 meal/ day. Encourage protein shake.   Topiramate decreased- also takes for migraines.        Not going out to eat as often.   Mindful of what eating  More avitiy with weith loss.  Diet- work on more bg sugars, hs meter. Was at [employer].  Wants to work on - learn trends.   Forgets to eat.       Trying to be more activy- up down stairs, but is bothering her knees.    Activity levels discussed, important.        Feels she puts off tasks that are important for other tasks, \"Not sure why I do that but has appointment with therapist and will work on this with them.\"   Pt provides example, has a walking pad but wants to get house organized before she can open the box and set up. Pt was encouraged to open and start with activity as exercise is of high importance to support her goals.     Has DM shoes but hasn't worn them yet- has \"tasks to complete before will wear.\" Autumn considers the exercise walking pad and the DM shoes as rewards for completing tasks.      eye exam at eye [facility].      smaller portions with mounjaro increase.         Last visit-shares  when she takes a hot shower, will feel " dizzy. Likely drop in BP, vessels dilate, decreasing BP.   Neuropathy- feet. Burns- ants. Tips, tops toes , feet. Will dicuss with PC. Shares has tried Gabapentin (had swelling, is now listed as allergy) and Lyrica before but didn't tolerate.        A1C:  ***    Target A1C: <7.0%  Previous A1C: 6.4 in January 2025.       BG Targets:  FBG <130    PP  <180   Meter Report: ***     CGM: considering OTC- Stelo or similar.     BP *** meeting ADA target guideline. Statin use ***  defer to PCP for recommendations.  ASA use ***  defer to PCP for recommendations.  Tobacco use- ***   Foot exam *** concerns.  Eye exam- *** Location ***   PCP: ***    PCP follow up- none on file- due *** scheduled ***   Insurance Coverage: ***    Refills needed: ***      Pharmacy: ***     *** Discussed standards of care for diabetes, the importance of early treatment and prevention of cardiovascular risks.     Labs:  *** BMP CMP Microalbumin Lipids within the last year.     Noninvasive Liver Fibrosis Assessment:  defer to pcp for further recommendations.   Computed FIB-4 Calculation unavailable. One or more values for this score either were not found within the given timeframe or did not fit some other criterion.        Allergies   Allergen Reactions    Depakote [Valproic Acid]     Gabapentin Swelling    Propofol Unknown     Got very stiff and tight.         Wt Readings from Last 10 Encounters:   06/26/25 111.6 kg (246 lb)   06/09/25 112.9 kg (249 lb)   04/09/25 114.9 kg (253 lb 3.2 oz)   03/11/25 116.6 kg (257 lb)   02/04/25 118.6 kg (261 lb 8 oz)   01/08/25 120.2 kg (265 lb)   01/03/25 120.7 kg (266 lb)   10/30/24 120.2 kg (265 lb)   10/25/24 121.6 kg (268 lb)   08/19/24 119.7 kg (264 lb)     Checking BG- woul rachealik eto get better.   Cost of strips. 1/day.   Would like twice dialy-

## 2025-06-26 NOTE — RESULT ENCOUNTER NOTE
Increased to 30 mg and then new labs sent in and scheduled.  This is a 68 year old male with past medical history of  COPD (on 2L home O2), DM 2  HFrEF 30%, CAD,  ischemic cardiomyopathy s/p CRT-D, HTN, paroxysmal A-fib (status post ablation 1/14/25) on amiodarone and  Eliquis who presented to ED w c/o LESLIE and b/l  leg swelling.    IMPRESSION  #Chronic Leukocytosis- Now Worsened- Possible leukemoid reaction  #Acute on chronic hypoxic respiratory failure  #Heart failure with reduced EF, Acute on chronic exacerbation  #Large Bilateral pleural effusions/Pulmonary edema- Improved on recent CT chest  #Anemia- Possible ischemic in nature resulting in loose stools  #CT abdomen noted for colitis- Suspect Ischemic colitis.   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  #Obesity BMI (kg/m2): 32.5, 28.7, 29.6, 30.1    Recently Enterovirus Positive Discharged on PO steroids Vantin+Doxy    Covid/Flu/RSV negative  MRSA nares positive  , Fungitell 108 (very mildly elevated)   Blood cultures NGTD  Sputum cultures Normal Respiratory magda  C.diff negative     RECOMMENDATIONS  -Unclear cause of fevers- Likely drug induced (Precedex)  -Monitor for fever curve. Trend WBC  -Cardiology follow up.  -Monitor off abx since 6/23/2025.   -Off loading to prevent pressure sores and preventive measures to avoid aspiration. TOV. GOC. Recurrent admissions expected.     If any questions, please send a message or call on [employer]  Please continue to update ID with any pertinent new laboratory or radiographic findings.    Benigno Pond M.D  Infectious Diseases Attending/   [employer] at [facility]

## 2025-07-02 ENCOUNTER — HOSPITAL ENCOUNTER (EMERGENCY)
Facility: HOSPITAL | Age: 50
Discharge: HOME | End: 2025-07-02
Attending: STUDENT IN AN ORGANIZED HEALTH CARE EDUCATION/TRAINING PROGRAM
Payer: COMMERCIAL

## 2025-07-02 VITALS
SYSTOLIC BLOOD PRESSURE: 128 MMHG | RESPIRATION RATE: 18 BRPM | TEMPERATURE: 98.7 F | DIASTOLIC BLOOD PRESSURE: 85 MMHG | HEART RATE: 90 BPM | OXYGEN SATURATION: 98 %

## 2025-07-02 DIAGNOSIS — K08.89 PAIN, DENTAL: ICD-10-CM

## 2025-07-02 PROCEDURE — G0463 HOSPITAL OUTPT CLINIC VISIT: HCPCS | Performed by: STUDENT IN AN ORGANIZED HEALTH CARE EDUCATION/TRAINING PROGRAM

## 2025-07-02 PROCEDURE — 99213 OFFICE O/P EST LOW 20 MIN: CPT | Performed by: STUDENT IN AN ORGANIZED HEALTH CARE EDUCATION/TRAINING PROGRAM

## 2025-07-02 ASSESSMENT — COLUMBIA-SUICIDE SEVERITY RATING SCALE - C-SSRS
1. IN THE PAST MONTH, HAVE YOU WISHED YOU WERE DEAD OR WISHED YOU COULD GO TO SLEEP AND NOT WAKE UP?: NO
6. HAVE YOU EVER DONE ANYTHING, STARTED TO DO ANYTHING, OR PREPARED TO DO ANYTHING TO END YOUR LIFE?: NO
2. HAVE YOU ACTUALLY HAD ANY THOUGHTS OF KILLING YOURSELF IN THE PAST MONTH?: NO

## 2025-07-02 ASSESSMENT — ENCOUNTER SYMPTOMS
CHILLS: 0
FEVER: 0

## 2025-07-02 NOTE — ED TRIAGE NOTES
Pt presents with recurring tooth abscess right upper tooth. Pt reports she's waiting for an appointment in the [facility] to have two teeth pulled.     Pressure started yesterday again. Pt has previously been treated with penicillin and Augmentin.

## 2025-07-02 NOTE — DISCHARGE INSTRUCTIONS
As discussed a prescription for antibiotics was sent to the [facility] pharmacy.  You may take this as prescribed until finished.  Ultimately need follow-up with a dentist for further evaluation and management of your tooth pain.

## 2025-07-02 NOTE — ED PROVIDER NOTES
History     Chief Complaint   Patient presents with    Dental Pain     HPI  Autumn Holbrook is a 49 year old female who presents to the urgent care for evaluation of some right upper tooth pain at roughly tooth #7.  Patient states she has had some recurrent abscesses with this tooth and has to get her teeth extracted.  She has not yet been able to schedule this, but is working on it.  She is not complaining of any significant pain at this time and is comfortable discharging just using over-the-counter meds for pain.  She denies systemic symptoms such as fevers, chills, nausea or vomiting.  She has not noticed any significant drainage coming from the tooth or gums.    Allergies:  Allergies   Allergen Reactions    Depakote [Valproic Acid]     Gabapentin Swelling    Propofol Unknown     Got very stiff and tight.        Problem List:    Patient Active Problem List    Diagnosis Date Noted    Benign essential hypertension 04/08/2025     Priority: Medium    Urge incontinence of urine 05/30/2023     Priority: Medium    Encounter for sterilization 05/30/2023     Priority: Medium    Tobacco abuse 05/30/2023     Priority: Medium    History of migraine headaches 09/26/2022     Priority: Medium    Diabetes mellitus, type 2 (H) 12/09/2021     Priority: Medium    Grief 01/18/2021     Priority: Medium    GERD (gastroesophageal reflux disease) 12/13/2019     Priority: Medium    Chronic pain syndrome 04/03/2017     Priority: Medium    Opioid dependence in remission (H) 04/03/2017     Priority: Medium     On suboxone.       PTSD (post-traumatic stress disorder) 04/03/2017     Priority: Medium    COY (generalized anxiety disorder) 04/03/2017     Priority: Medium    Moderate episode of recurrent major depressive disorder (H) 04/03/2017     Priority: Medium    Bilateral edema of lower extremity 04/03/2017     Priority: Medium    Hypothyroid 04/03/2017     Priority: Medium    Mild mixed bipolar I disorder (H) 01/13/2010     Priority:  Medium        Past Medical History:    Past Medical History:   Diagnosis Date    Anxiety     Arthritis     Chronic pain syndrome 2017    Diabetes mellitus, type 2 (H) 2021    COY (generalized anxiety disorder) 2017    GERD (gastroesophageal reflux disease) 2019    Grief 2021    History of migraine headaches 2022    Hypothyroid 2017    Migraine     Mild mixed bipolar I disorder (H) 2010    Moderate episode of recurrent major depressive disorder (H) 2017    Obesity, morbid, BMI 50 or higher (H) 2018    Opioid dependence in remission (H) 2017    Osteoarthritis     PTSD (post-traumatic stress disorder) 2017    Thyroid disease     Tobacco abuse 2023    Trigger finger of both hands 2018    Urge incontinence of urine 2023       Past Surgical History:    Past Surgical History:   Procedure Laterality Date     SECTION  1995     SECTION  2002    CHOLECYSTECTOMY      COLONOSCOPY N/A 2022    Procedure: Colonscopy;  Surgeon: Deep Zhu MD;  Location: [facility]    KNEE ARTHROSCOPY AND ARTHROTOMY Right 2019    right knee arthroscopy, lateral menisectomy    LEEP TX, CERVICAL  2004       Family History:    Family History   Problem Relation Age of Onset    Cerebrovascular Disease Mother     Alcoholism Mother     Cancer Mother     Depression Mother     Eczema Mother     Hypertension Mother     Migraines Mother     Mental Illness Mother     Osteoarthritis Mother     Osteoporosis Mother     Alcoholism Father     Cancer Father     Hyperlipidemia Father     Hypertension Father     Myocardial Infarction Father     Mental Illness Sister     Migraines Sister        Social History:  Marital Status:   [5]  Social History     Tobacco Use    Smoking status: Every Day     Current packs/day: 0.50     Average packs/day: 0.5 packs/day for 36.5 years (18.2 ttl pk-yrs)     Types: Cigarettes     Start date:  1/1/1989     Passive exposure: Past    Smokeless tobacco: Never    Tobacco comments:     1/14/2025 pt is on the quit plan   Vaping Use    Vaping status: Some Days    Substances: Nicotine, Flavoring    Devices: Disposable   Substance Use Topics    Alcohol use: No     Comment: rare    Drug use: Yes     Types: Marijuana     Comment: daily-medical        Medications:    Acetaminophen (TYLENOL PO)  amoxicillin-clavulanate (AUGMENTIN) 875-125 MG tablet  amphetamine-dextroamphetamine (ADDERALL) 15 MG tablet  asenapine (SAPHRIS) 2.5 MG SUBL sublingual tablet  asenapine (SAPHRIS) 5 MG SUBL sublingual tablet  aspirin 81 MG EC tablet  baclofen (LIORESAL) 10 MG tablet  blood glucose (NO BRAND SPECIFIED) test strip  blood glucose monitoring (NO BRAND SPECIFIED) meter device kit  diclofenac (VOLTAREN) 1 % topical gel  famotidine (PEPCID) 20 MG tablet  furosemide (LASIX) 20 MG tablet  hydrochlorothiazide (HYDRODIURIL) 25 MG tablet  hydrOXYzine (ATARAX) 25 MG tablet  ibuprofen (ADVIL/MOTRIN) 800 MG tablet  levothyroxine (SYNTHROID/LEVOTHROID) 150 MCG tablet  lisdexamfetamine (VYVANSE) 70 MG capsule  Melatonin 10 MG CAPS  multivitamin w/minerals (THERA-VIT-M) tablet  nicotine (NICODERM CQ) 21 MG/24HR 24 hr patch  nicotine (NICORETTE) 2 MG gum  omeprazole (PRILOSEC) 20 MG DR capsule  OneTouch Delica Lancets 33G MISC  oxyBUTYnin ER (DITROPAN XL) 10 MG 24 hr tablet  potassium chloride ER (K-TAB) 20 MEQ CR tablet  potassium chloride ER (K-TAB/KLOR-CON) 10 MEQ CR tablet  rosuvastatin (CRESTOR) 10 MG tablet  sertraline (ZOLOFT) 50 MG tablet  tirzepatide (MOUNJARO) 10 MG/0.5ML SOAJ auto-injector pen  topiramate (TOPAMAX) 25 MG tablet  Vitamin D3 (CHOLECALCIFEROL) 125 MCG (5000 UT) tablet  VRAYLAR 3 MG CAPS capsule          Review of Systems   Constitutional:  Negative for chills and fever.   HENT:  Positive for dental problem.        Physical Exam   BP: 128/85  Pulse: 90  Temp: 98.7  F (37.1  C)  Resp: 18  SpO2: 98 %      Physical  Exam  Constitutional:       Appearance: Normal appearance.   HENT:      Mouth/Throat:      Mouth: Mucous membranes are moist.      Pharynx: Posterior oropharyngeal erythema present. No oropharyngeal exudate.      Comments: Some slight gingival erythema throughout, with no sign of any gingival abscess, peritonsillar abscess, Yeyo's angina or uvular deviation.  Pulmonary:      Effort: Pulmonary effort is normal. No respiratory distress.   Skin:     General: Skin is warm and dry.      Coloration: Skin is not pale.      Findings: No erythema.   Neurological:      Mental Status: She is alert.         ED Course                      Critical Care time:  none     None         No results found for this or any previous visit (from the past 24 hours).    Medications - No data to display    Assessments & Plan (with Medical Decision Making)     I have reviewed the nursing notes.    I have reviewed the findings, diagnosis, plan and need for follow up with the patient.           Medical Decision Making  The patient's presentation was of straightforward complexity (a clearly self-limited or minor problem).    The patient's evaluation involved:  history and exam without other MDM data elements    The patient's management necessitated moderate risk (prescription drug management including medications given in the ED).    49-year-old female with a history of previous dental abscesses, who is supposed to be following up with her dentist to get a tooth extracted.  She started developing some dental pressure over the last couple of days, with pain rated 6 out of 10, manageable on ibuprofen.  She presents primarily for antibiotics as she plans to follow-up with a dentist for further treatment.    On exam she does have some gingival erythema but no sign of any obvious drainable gingival abscess.  No sign of any peritonsillar abscess, Yeyo's angina or uvular deviation.  Patient appropriate for discharge home on Augmentin and follow-up  with dentist.    Discharge Medication List as of 7/2/2025  5:57 PM        START taking these medications    Details   amoxicillin-clavulanate (AUGMENTIN) 875-125 MG tablet Take 1 tablet by mouth 2 times daily., Disp-28 tablet, R-0, E-Prescribe             Final diagnoses:   Pain, dental       7/2/2025   [facility]       Deon Ramirez PA-C  07/02/25 1800

## 2025-07-04 DIAGNOSIS — R60.0 BILATERAL EDEMA OF LOWER EXTREMITY: ICD-10-CM

## 2025-07-07 RX ORDER — POTASSIUM CHLORIDE 750 MG/1
TABLET, EXTENDED RELEASE ORAL
Qty: 30 TABLET | Refills: 1 | Status: SHIPPED | OUTPATIENT
Start: 2025-07-07

## 2025-07-07 NOTE — PROGRESS NOTES
Potassium 10 MEQ  Last office visit 1/3 [facility]  Last signed 1/20/25 #30, 5 R  Upcoming Dr. Cast visit 8/25  Jeanne Moreira, PJ  Care Coordination

## 2025-07-08 ENCOUNTER — HOSPITAL ENCOUNTER (EMERGENCY)
Facility: HOSPITAL | Age: 50
Discharge: HOME | End: 2025-07-08
Attending: NURSE PRACTITIONER
Payer: COMMERCIAL

## 2025-07-08 VITALS
DIASTOLIC BLOOD PRESSURE: 77 MMHG | TEMPERATURE: 98.2 F | OXYGEN SATURATION: 97 % | RESPIRATION RATE: 18 BRPM | SYSTOLIC BLOOD PRESSURE: 116 MMHG | HEART RATE: 76 BPM

## 2025-07-08 DIAGNOSIS — S80.812A ABRASION OF LEFT LOWER EXTREMITY, INITIAL ENCOUNTER: ICD-10-CM

## 2025-07-08 DIAGNOSIS — S80.12XA TRAUMATIC ECCHYMOSIS OF LOWER LEG, LEFT, INITIAL ENCOUNTER: ICD-10-CM

## 2025-07-08 PROCEDURE — G0463 HOSPITAL OUTPT CLINIC VISIT: HCPCS | Performed by: NURSE PRACTITIONER

## 2025-07-08 PROCEDURE — 99213 OFFICE O/P EST LOW 20 MIN: CPT | Performed by: NURSE PRACTITIONER

## 2025-07-08 ASSESSMENT — COLUMBIA-SUICIDE SEVERITY RATING SCALE - C-SSRS
2. HAVE YOU ACTUALLY HAD ANY THOUGHTS OF KILLING YOURSELF IN THE PAST MONTH?: NO
1. IN THE PAST MONTH, HAVE YOU WISHED YOU WERE DEAD OR WISHED YOU COULD GO TO SLEEP AND NOT WAKE UP?: NO
6. HAVE YOU EVER DONE ANYTHING, STARTED TO DO ANYTHING, OR PREPARED TO DO ANYTHING TO END YOUR LIFE?: NO

## 2025-07-08 ASSESSMENT — ENCOUNTER SYMPTOMS
FEVER: 0
NAUSEA: 0
VOMITING: 0
COLOR CHANGE: 1
DIARRHEA: 0
SHORTNESS OF BREATH: 0
CHILLS: 0
PSYCHIATRIC NEGATIVE: 1

## 2025-07-08 NOTE — DISCHARGE INSTRUCTIONS
Rest  Ice  Compression with ace wrap  Elevate  Monitor area  Follow-up with primary care provider or return to urgent care/ED with any worsening in condition or additional concerns.

## 2025-07-08 NOTE — ED PROVIDER NOTES
History     Chief Complaint   Patient presents with    Rash     HPI  Autumn Holbrook is a 49 year old female who presents to urgent care today ambulatory with complaints of redness and swelling to left lower extremity after hitting her leg on a cart the other day.  Has small abrasion to leg from cart hitting her.  Denies any fever, chills, nausea, vomiting, diarrhea, shortness of breath or chest pain.  History of cellulitis.  No OTC medication.  Currently on Augmentin for dental pain.  No other concerns.    Allergies:  Allergies   Allergen Reactions    Depakote [Valproic Acid]     Gabapentin Swelling    Propofol Unknown     Got very stiff and tight.        Problem List:    Patient Active Problem List    Diagnosis Date Noted    Benign essential hypertension 04/08/2025     Priority: Medium    Urge incontinence of urine 05/30/2023     Priority: Medium    Encounter for sterilization 05/30/2023     Priority: Medium    Tobacco abuse 05/30/2023     Priority: Medium    History of migraine headaches 09/26/2022     Priority: Medium    Diabetes mellitus, type 2 (H) 12/09/2021     Priority: Medium    Grief 01/18/2021     Priority: Medium    GERD (gastroesophageal reflux disease) 12/13/2019     Priority: Medium    Chronic pain syndrome 04/03/2017     Priority: Medium    Opioid dependence in remission (H) 04/03/2017     Priority: Medium     On suboxone.       PTSD (post-traumatic stress disorder) 04/03/2017     Priority: Medium    COY (generalized anxiety disorder) 04/03/2017     Priority: Medium    Moderate episode of recurrent major depressive disorder (H) 04/03/2017     Priority: Medium    Bilateral edema of lower extremity 04/03/2017     Priority: Medium    Hypothyroid 04/03/2017     Priority: Medium    Mild mixed bipolar I disorder (H) 01/13/2010     Priority: Medium        Past Medical History:    Past Medical History:   Diagnosis Date    Anxiety     Arthritis     Chronic pain syndrome 04/03/2017    Diabetes mellitus,  type 2 (H) 2021    COY (generalized anxiety disorder) 2017    GERD (gastroesophageal reflux disease) 2019    Grief 2021    History of migraine headaches 2022    Hypothyroid 2017    Migraine     Mild mixed bipolar I disorder (H) 2010    Moderate episode of recurrent major depressive disorder (H) 2017    Obesity, morbid, BMI 50 or higher (H) 2018    Opioid dependence in remission (H) 2017    Osteoarthritis     PTSD (post-traumatic stress disorder) 2017    Thyroid disease     Tobacco abuse 2023    Trigger finger of both hands 2018    Urge incontinence of urine 2023       Past Surgical History:    Past Surgical History:   Procedure Laterality Date     SECTION  1995     SECTION  2002    CHOLECYSTECTOMY      COLONOSCOPY N/A 2022    Procedure: Colonscopy;  Surgeon: eDep Zhu MD;  Location: [facility]    KNEE ARTHROSCOPY AND ARTHROTOMY Right 2019    right knee arthroscopy, lateral menisectomy    LEEP TX, CERVICAL  2004       Family History:    Family History   Problem Relation Age of Onset    Cerebrovascular Disease Mother     Alcoholism Mother     Cancer Mother     Depression Mother     Eczema Mother     Hypertension Mother     Migraines Mother     Mental Illness Mother     Osteoarthritis Mother     Osteoporosis Mother     Alcoholism Father     Cancer Father     Hyperlipidemia Father     Hypertension Father     Myocardial Infarction Father     Mental Illness Sister     Migraines Sister        Social History:  Marital Status:   [5]  Social History     Tobacco Use    Smoking status: Every Day     Current packs/day: 0.50     Average packs/day: 0.5 packs/day for 36.5 years (18.3 ttl pk-yrs)     Types: Cigarettes     Start date: 1989     Passive exposure: Past    Smokeless tobacco: Never    Tobacco comments:     2025 pt is on the quit plan   Vaping Use    Vaping status: Some  Days    Substances: Nicotine, Flavoring    Devices: Disposable   Substance Use Topics    Alcohol use: No     Comment: rare    Drug use: Yes     Types: Marijuana     Comment: daily-medical        Medications:    amoxicillin-clavulanate (AUGMENTIN) 875-125 MG tablet  Acetaminophen (TYLENOL PO)  amphetamine-dextroamphetamine (ADDERALL) 15 MG tablet  asenapine (SAPHRIS) 2.5 MG SUBL sublingual tablet  asenapine (SAPHRIS) 5 MG SUBL sublingual tablet  aspirin 81 MG EC tablet  baclofen (LIORESAL) 10 MG tablet  blood glucose (NO BRAND SPECIFIED) test strip  blood glucose monitoring (NO BRAND SPECIFIED) meter device kit  diclofenac (VOLTAREN) 1 % topical gel  famotidine (PEPCID) 20 MG tablet  furosemide (LASIX) 20 MG tablet  hydrochlorothiazide (HYDRODIURIL) 25 MG tablet  hydrOXYzine (ATARAX) 25 MG tablet  ibuprofen (ADVIL/MOTRIN) 800 MG tablet  levothyroxine (SYNTHROID/LEVOTHROID) 150 MCG tablet  lisdexamfetamine (VYVANSE) 70 MG capsule  Melatonin 10 MG CAPS  multivitamin w/minerals (THERA-VIT-M) tablet  nicotine (NICODERM CQ) 21 MG/24HR 24 hr patch  nicotine (NICORETTE) 2 MG gum  omeprazole (PRILOSEC) 20 MG DR capsule  OneTouch Delica Lancets 33G MISC  oxyBUTYnin ER (DITROPAN XL) 10 MG 24 hr tablet  potassium chloride ER (K-TAB) 20 MEQ CR tablet  potassium chloride ER (K-TAB/KLOR-CON) 10 MEQ CR tablet  rosuvastatin (CRESTOR) 10 MG tablet  sertraline (ZOLOFT) 50 MG tablet  tirzepatide (MOUNJARO) 10 MG/0.5ML SOAJ auto-injector pen  topiramate (TOPAMAX) 25 MG tablet  Vitamin D3 (CHOLECALCIFEROL) 125 MCG (5000 UT) tablet  VRAYLAR 3 MG CAPS capsule      Review of Systems   Constitutional:  Negative for chills and fever.   Respiratory:  Negative for shortness of breath.    Cardiovascular:  Negative for chest pain.   Gastrointestinal:  Negative for diarrhea, nausea and vomiting.   Musculoskeletal:  Negative for gait problem.   Skin:  Positive for color change (left lower extremity).   Psychiatric/Behavioral: Negative.        Physical Exam   BP: 116/77  Pulse: 76  Temp: 98.2  F (36.8  C)  Resp: 18  SpO2: 97 %    Physical Exam  Vitals and nursing note reviewed.   Constitutional:       General: She is not in acute distress.     Appearance: Normal appearance. She is not ill-appearing or toxic-appearing.   Cardiovascular:      Rate and Rhythm: Normal rate and regular rhythm.      Pulses: Normal pulses.      Heart sounds: Normal heart sounds.   Pulmonary:      Effort: Pulmonary effort is normal.      Breath sounds: Normal breath sounds.   Skin:     General: Skin is warm and dry.      Capillary Refill: Capillary refill takes less than 2 seconds.          Neurological:      Mental Status: She is alert.   Psychiatric:         Mood and Affect: Mood normal.       ED Course     Procedures    No results found for this or any previous visit (from the past 24 hours).    Medications - No data to display    Assessments & Plan (with Medical Decision Making)     I have reviewed the nursing notes.    I have reviewed the findings, diagnosis, plan and need for follow up with the patient.  (S80.812A) Abrasion of left lower extremity, initial encounter  (S80.12XA) Traumatic ecchymosis of lower leg, left, initial encounter  Plan:   Patient ambulatory with a nontoxic appearance.  Patient is superficial abrasion to left lower extremity with surrounding bruising after hitting it on a cart.  Currently on Augmentin for dental abscess, states dental pain has largely improved.  Area appears to be bruised, not consistent with cellulitis.  Patient to follow RICE.  Ace wrap as needed, declines ace wrapping in urgent care.  Monitor area.  Follow-up with primary care provider or return to urgent care/ED with any worsening in condition or additional concerns.  Patient in agreement treatment plan.    Discharge Medication List as of 7/8/2025  1:55 PM        Final diagnoses:   Abrasion of left lower extremity, initial encounter   Traumatic ecchymosis of lower leg, left,  initial encounter     7/8/2025   [facility]       Hedy Holbrook NP  07/08/25 0072

## 2025-07-08 NOTE — ED TRIAGE NOTES
Pt presents today with c/o redness, swelling, and skin hot to touch,  her lower left leg. Recently got a cut on her leg.

## 2025-07-18 NOTE — PROGRESS NOTES
Medication Therapy Management (MTM) Encounter    ASSESSMENT:                            Medication Adherence/Access: No issues identified.    Hypertension /Edema  Potassium is low; patient may benefit from increase in K-tabs    Diabetes   /Weight Management/ADHD  Patient is meeting A1c goal of < 7%.   Lipase within normal limits     Hyperlipidemia   LDL improved with increase in rosuvastatin; but not meeting goal LDL of <70. Patient may benefit from rosuvastatin dose increase     GERD    Stable with dose reduction of proton pump inhibitor and as needed famotidine; patient would like to stay on current regimen     Bone Health   Osteoporosis Prevention  DEXA scan today; results pending     Hypothyroidism   Stable    PLAN:                            Your potassium was slightly low today; increase your potassium to 40 mEq daily. We will recheck your potassium when you come back to the clinic on 8/18/25  Your LDL drastically improved from last time, but is still just a little bit away from goal. Increase rosuvastatin to 20 mg daily, and recheck cholesterol on 8/25/25    Follow-up: Return in about 1 year (around 7/21/2026) for Medication Therapy Management Visit.    SUBJECTIVE/OBJECTIVE:                          Autumn Holbrook is a 49 year old female seen for a follow-up visit from 2/19/25.     She was discharged from Emergency Room on 7/8/25 for Leg abrasion.      Reason for visit: Medication Review and Emergency Room Follow Up.    Allergies/ADRs: Reviewed in chart  Past Medical History: Reviewed in chart  Tobacco: She reports that she has been smoking cigarettes. She started smoking about 36 years ago. She has a 18.3 pack-year smoking history. She has been exposed to tobacco smoke. She has never used smokeless tobacco.Nicotine/Tobacco Cessation Plan  Information offered: Patient not interested at this time    Alcohol: Less than 1 beverage / month    Medication Adherence/Access: no issues reported.    Hypertension  /Edema  Hydrochlorothiazide 25 mg daily  Furosemide 20 mg twice daily  Potassium chloride ER 30 mEq daily   Patient watches her salt intake.    Patient reports no current medication side effects  Patient does not self-monitor blood pressure.    Patient reports legs are still swollen-primary care provider is aware       Diabetes   /Weight Management/ADHD  Aspirin 81 mg daily  Mounjaro 10 mg weekly x 5 weeks   Topiramate 50 mg at in the afternoon  Decreased from 75 mg at bedtime (also for migraine) at last Medication Therapy Management visit with Adelina Draper 70 mg once daily (for mental health - also helpful for appetite)  Adderall IR 15 mg every afternoon at 3pm (for mental health - also helpful for appetite)    Patient also follows with [facility] and Medication Therapy Management [facility], Adelina White  Saw diabetes ed 6/26/25  Saw [facility] 4/9/25  Patient denies side effects with Mounjaro - much better tolerated than Ozempic. Feels appetite better managed with current dose and no cravings. Feels current dose works well for her as only eating 1 meal per day.  Medication Therapy Management investigated chart note from PCP Medication Therapy Management regarding pancreatitis. Patient notes that >20 years ago had pancreatitis when she had gall stones. Gall bladder was removed and no issues since. Lipase not checked recently.  Topiramate works well for appetite management historically, no side effects reported. Patient notes current dose is what she has been on for migraine and effectively manages - some hesitation to decrease dose.  Blood sugar monitoring: rarely. Fasting - around  mg/dl (hasn't checked in while she notes - has been stable when checking prevously); 120s post prandial  Current diabetes symptoms: none    Activity: limited - working to increase    Ozempic caused severe nausea, vomiting, and belching. PCP Medication Therapy Management noted history of pancreatitis.  "  Trulicity also caused GI upset per patient report.    Saw Diabetes Ed: Consider Faraz cgm, OTC; hasn't picked up  Patient also follows with [facility]     Blood sugar monitoring: never  Eye exam is up to date  Foot exam is up to date    Hyperlipidemia   Rosuvastatin 10 mg daily  Increased from 5 mg daily February 2025  Patient reports no significant myalgias or other side effects.  The 10-year ASCVD risk score (Alejandrina MCLAUGHLIN, et al., 2019) is: 4.9%    Values used to calculate the score:      Age: 48 years      Sex: Female      Is Non- : No      Diabetic: Yes      Tobacco smoker: Yes      Systolic Blood Pressure: 125 mmHg      Is BP treated: Yes      HDL Cholesterol: 71 mg/dL      Total Cholesterol: 164 mg/dL     GERD    Famotidine 20 mg twice daily as needed-using 2-3 times per week when she eats spicy foods  Omeprazole 20 mg daily  Decreased from 40 mg once daily   Patient reports no current symptoms.   Patient feels that current regimen is effective.  The patient does not have a history of GI bleed.  The patient does not notice symptoms if they miss a dose.  Patient has not tried a trial off of therapy and is interested in doing so.  Patient does report feeling a little more \"acidy\" after going down on the omeprazole dose, but the famotidine has helped       Bone Health   Osteoporosis Prevention:   Vitamin 125 mcg daily  Patient is not experiencing side effects.  DEXA History: none  Patient is getting  1 serving(s)/day of calcium in their diet.  Risk factors: chronic PPI use, family history of osteoporosis, recurring steroid injections       Hypothyroidism   Levothyroxine 175 mcg daily  Recently changed from 150 mcg on 1/28/25  Patient is having the following symptoms: cold          ----------------  Post Discharge Medication Reconciliation Status: discharge medications reconciled and changed, per note/orders.    I spent 30 minutes with this patient today. All changes " were made via collaborative practice agreement with [facility].     A summary of these recommendations was sent via [employer].    Cem Varner, PharmD  Medication Therapy Management Pharmacist         Medication Therapy Recommendations  Benign essential hypertension   1 Current Medication: potassium chloride ER (K-TAB) 20 MEQ CR tablet (Discontinued)   Current Medication Sig: TAKE 1 TABLET BY MOUTH ONCE DAILY ALONG WITH A 10 MEQ TABLET. ONLY TAKE IF TAKING LASIX   Rationale: Dose too low - Dosage too low - Effectiveness   Recommendation: Increase Dose   Status: Accepted per Provider   Identified Date: 7/21/2025 Completed Date: 7/21/2025         Hyperlipidemia LDL goal <70   1 Current Medication: rosuvastatin (CRESTOR) 10 MG tablet (Discontinued)   Current Medication Sig: Take 1 tablet (10 mg) by mouth at bedtime.   Rationale: Dose too low - Dosage too low - Effectiveness   Recommendation: Increase Dose - rosuvastatin 20 MG sprinkle capsule   Status: Accepted per Provider   Identified Date: 7/21/2025 Completed Date: 7/21/2025

## 2025-07-21 ENCOUNTER — OFFICE VISIT (OUTPATIENT)
Dept: PHARMACY | Facility: PHYSICIAN GROUP | Age: 50
End: 2025-07-21
Payer: COMMERCIAL

## 2025-07-21 ENCOUNTER — HOSPITAL ENCOUNTER (OUTPATIENT)
Dept: BONE DENSITY | Facility: HOSPITAL | Age: 50
Discharge: HOME | End: 2025-07-21
Attending: PHYSICIAN ASSISTANT
Payer: COMMERCIAL

## 2025-07-21 ENCOUNTER — LAB (OUTPATIENT)
Dept: LAB | Facility: OTHER | Age: 50
End: 2025-07-21
Attending: PHYSICIAN ASSISTANT
Payer: COMMERCIAL

## 2025-07-21 DIAGNOSIS — E11.9 TYPE 2 DIABETES MELLITUS WITHOUT COMPLICATION, WITHOUT LONG-TERM CURRENT USE OF INSULIN (H): Primary | ICD-10-CM

## 2025-07-21 DIAGNOSIS — Z13.820 SCREENING FOR OSTEOPOROSIS: ICD-10-CM

## 2025-07-21 DIAGNOSIS — E03.9 ACQUIRED HYPOTHYROIDISM: ICD-10-CM

## 2025-07-21 DIAGNOSIS — Z78.0 ASYMPTOMATIC MENOPAUSAL STATE: ICD-10-CM

## 2025-07-21 DIAGNOSIS — E66.813 OBESITY, CLASS III, BMI 40-49.9 (MORBID OBESITY) (H): ICD-10-CM

## 2025-07-21 DIAGNOSIS — R60.0 BILATERAL EDEMA OF LOWER EXTREMITY: ICD-10-CM

## 2025-07-21 DIAGNOSIS — F90.9 ATTENTION DEFICIT HYPERACTIVITY DISORDER (ADHD), UNSPECIFIED ADHD TYPE: ICD-10-CM

## 2025-07-21 DIAGNOSIS — K21.9 GASTROESOPHAGEAL REFLUX DISEASE WITHOUT ESOPHAGITIS: ICD-10-CM

## 2025-07-21 DIAGNOSIS — E78.5 HYPERLIPIDEMIA LDL GOAL <70: ICD-10-CM

## 2025-07-21 DIAGNOSIS — E11.9 TYPE 2 DIABETES MELLITUS WITHOUT COMPLICATION, WITHOUT LONG-TERM CURRENT USE OF INSULIN (H): ICD-10-CM

## 2025-07-21 DIAGNOSIS — I10 BENIGN ESSENTIAL HYPERTENSION: ICD-10-CM

## 2025-07-21 LAB
ANION GAP SERPL CALCULATED.3IONS-SCNC: 14 MMOL/L (ref 7–15)
BUN SERPL-MCNC: 11.7 MG/DL (ref 6–20)
CALCIUM SERPL-MCNC: 9.6 MG/DL (ref 8.8–10.4)
CHLORIDE SERPL-SCNC: 101 MMOL/L (ref 98–107)
CHOLEST SERPL-MCNC: 158 MG/DL
CREAT SERPL-MCNC: 0.82 MG/DL (ref 0.51–0.95)
EGFRCR SERPLBLD CKD-EPI 2021: 86 ML/MIN/1.73M2
FASTING STATUS PATIENT QL REPORTED: YES
GLUCOSE SERPL-MCNC: 133 MG/DL (ref 70–99)
HCO3 SERPL-SCNC: 22 MMOL/L (ref 22–29)
HDLC SERPL-MCNC: 60 MG/DL
LDLC SERPL CALC-MCNC: 77 MG/DL
LIPASE SERPL-CCNC: 45 U/L (ref 13–60)
NONHDLC SERPL-MCNC: 98 MG/DL
POTASSIUM SERPL-SCNC: 3.2 MMOL/L (ref 3.4–5.3)
SODIUM SERPL-SCNC: 137 MMOL/L (ref 135–145)
TRIGL SERPL-MCNC: 103 MG/DL
TSH SERPL DL<=0.005 MIU/L-ACNC: 1.14 UIU/ML (ref 0.3–4.2)

## 2025-07-21 PROCEDURE — 82465 ASSAY BLD/SERUM CHOLESTEROL: CPT | Mod: ZL

## 2025-07-21 PROCEDURE — 77080 DXA BONE DENSITY AXIAL: CPT

## 2025-07-21 PROCEDURE — 36415 COLL VENOUS BLD VENIPUNCTURE: CPT | Mod: ZL

## 2025-07-21 PROCEDURE — 82435 ASSAY OF BLOOD CHLORIDE: CPT | Mod: ZL

## 2025-07-21 PROCEDURE — 77080 DXA BONE DENSITY AXIAL: CPT | Mod: 26 | Performed by: RADIOLOGY

## 2025-07-21 PROCEDURE — 99607 MTMS BY PHARM ADDL 15 MIN: CPT

## 2025-07-21 PROCEDURE — 99606 MTMS BY PHARM EST 15 MIN: CPT

## 2025-07-21 PROCEDURE — 83690 ASSAY OF LIPASE: CPT | Mod: ZL

## 2025-07-21 PROCEDURE — 3048F LDL-C <100 MG/DL: CPT

## 2025-07-21 PROCEDURE — 3048F LDL-C <100 MG/DL: CPT | Performed by: FAMILY MEDICINE

## 2025-07-21 PROCEDURE — 84443 ASSAY THYROID STIM HORMONE: CPT | Mod: ZL

## 2025-07-21 RX ORDER — ROSUVASTATIN CALCIUM 10 MG/1
20 TABLET, COATED ORAL AT BEDTIME
Qty: 90 TABLET | Refills: 3 | Status: SHIPPED | OUTPATIENT
Start: 2025-07-21

## 2025-07-21 RX ORDER — POTASSIUM CHLORIDE 1500 MG/1
40 TABLET, EXTENDED RELEASE ORAL DAILY
Qty: 30 TABLET | Refills: 1 | Status: SHIPPED | OUTPATIENT
Start: 2025-07-21

## 2025-07-21 NOTE — PATIENT INSTRUCTIONS
"Recommendations from today's MTM visit:                                                       Your potassium was slightly low today; increase your potassium to 40 mEq daily. We will recheck your potassium when you come back to the clinic on 8/18/25  Your LDL drastically improved from last time, but is still just a little bit away from goal. Increase rosuvastatin to 20 mg daily, and recheck cholesterol on 8/25/25    Follow-up: Return in about 1 year (around 7/21/2026) for Medication Therapy Management Visit.    It was great speaking with you today.  I value your experience and would be very thankful for your time in providing feedback in our clinic survey. In the next few days, you may receive an email or text message from [employer] with a link to a survey related to your  clinical pharmacist.\"     To schedule another MTM appointment, please call the clinic directly or you may call the MTM scheduling line at [phone] or toll-free at [phone].     My Clinical Pharmacist's contact information:                                                      Please feel free to contact me with any questions or concerns you have.      Cem Varner, PharmD  Medication Therapy Management Pharmacist     "

## 2025-07-21 NOTE — Clinical Note
Hi Dr. Cast, I just met with this patient, and had the following recommendations:  -Increase rosuvastatin to 20 mg daily -Her potassium was also slightly low today; increase to 40 mEq daily If you agree, I will place orders and follow up with the patient; I know you haven't seen her yet (previous [facility] patient), but she is scheduled with you in late August Thank you!

## 2025-07-22 DIAGNOSIS — N32.81 OVERACTIVE BLADDER: ICD-10-CM

## 2025-07-22 DIAGNOSIS — E03.9 ACQUIRED HYPOTHYROIDISM: ICD-10-CM

## 2025-07-22 RX ORDER — OXYBUTYNIN CHLORIDE 10 MG/1
10 TABLET, EXTENDED RELEASE ORAL DAILY
Qty: 90 TABLET | Refills: 0 | Status: SHIPPED | OUTPATIENT
Start: 2025-07-22

## 2025-07-22 RX ORDER — LEVOTHYROXINE SODIUM 175 UG/1
175 TABLET ORAL DAILY
Qty: 90 TABLET | Refills: 0 | OUTPATIENT
Start: 2025-07-22

## 2025-07-22 NOTE — TELEPHONE ENCOUNTER
Oxybutynin Chloride ER 10 MG Oral Tablet Extended Release 24 Hour       Last Written Prescription Date:  01/20/2025  Last Fill Quantity: 90,   # refills: 1  Last Office Visit: 01/03/2025  Future Office visit:    Next 5 appointments (look out 90 days)      Aug 18, 2025 12:45 PM  (Arrive by 12:30 PM)  Return Visit with Tanisha Shannon DPM  [facility] ([facility] ) [address] [place] [id number] [phone]             Routing refill request to provider for review/approval because:      Kimberly Boecker, RN

## 2025-08-12 DIAGNOSIS — K21.9 GASTROESOPHAGEAL REFLUX DISEASE WITHOUT ESOPHAGITIS: ICD-10-CM

## 2025-08-14 RX ORDER — OMEPRAZOLE 20 MG/1
20 CAPSULE, DELAYED RELEASE ORAL DAILY
Qty: 90 CAPSULE | Refills: 0 | Status: SHIPPED | OUTPATIENT
Start: 2025-08-14

## 2025-08-18 ENCOUNTER — OFFICE VISIT (OUTPATIENT)
Dept: PODIATRY | Facility: OTHER | Age: 50
End: 2025-08-18
Attending: PODIATRIST
Payer: COMMERCIAL

## 2025-08-18 VITALS
HEART RATE: 64 BPM | TEMPERATURE: 96.3 F | DIASTOLIC BLOOD PRESSURE: 70 MMHG | OXYGEN SATURATION: 97 % | SYSTOLIC BLOOD PRESSURE: 105 MMHG

## 2025-08-18 DIAGNOSIS — Z13.89 SCREENING FOR DIABETIC PERIPHERAL NEUROPATHY: ICD-10-CM

## 2025-08-18 DIAGNOSIS — L84 CALLUS OF FOOT: ICD-10-CM

## 2025-08-18 DIAGNOSIS — E11.9 DIABETES MELLITUS TYPE 2, NONINSULIN DEPENDENT (H): ICD-10-CM

## 2025-08-18 DIAGNOSIS — L60.3 ONYCHODYSTROPHY: ICD-10-CM

## 2025-08-18 DIAGNOSIS — E11.42 DIABETIC POLYNEUROPATHY ASSOCIATED WITH TYPE 2 DIABETES MELLITUS (H): Primary | ICD-10-CM

## 2025-08-18 PROCEDURE — G0463 HOSPITAL OUTPT CLINIC VISIT: HCPCS

## 2025-08-18 PROCEDURE — 11721 DEBRIDE NAIL 6 OR MORE: CPT | Performed by: PODIATRIST

## 2025-08-18 PROCEDURE — 11056 PARNG/CUTG B9 HYPRKR LES 2-4: CPT | Performed by: PODIATRIST

## 2025-08-18 ASSESSMENT — PAIN SCALES - GENERAL: PAINLEVEL_OUTOF10: SEVERE PAIN (7)

## 2025-08-20 ENCOUNTER — VIRTUAL VISIT (OUTPATIENT)
Dept: ENDOCRINOLOGY | Facility: CLINIC | Age: 50
End: 2025-08-20
Payer: COMMERCIAL

## 2025-08-20 VITALS — HEIGHT: 63 IN | BODY MASS INDEX: 42.24 KG/M2 | WEIGHT: 238.4 LBS

## 2025-08-20 DIAGNOSIS — E11.9 TYPE 2 DIABETES MELLITUS WITHOUT COMPLICATION, WITHOUT LONG-TERM CURRENT USE OF INSULIN (H): ICD-10-CM

## 2025-08-20 DIAGNOSIS — G43.909 MIGRAINE WITHOUT STATUS MIGRAINOSUS, NOT INTRACTABLE, UNSPECIFIED MIGRAINE TYPE: ICD-10-CM

## 2025-08-20 RX ORDER — TOPIRAMATE 25 MG/1
50 TABLET, FILM COATED ORAL AT BEDTIME
Qty: 180 TABLET | Refills: 1 | Status: SHIPPED | OUTPATIENT
Start: 2025-08-20

## 2025-08-20 ASSESSMENT — PAIN SCALES - GENERAL: PAINLEVEL_OUTOF10: SEVERE PAIN (7)

## 2025-08-25 ENCOUNTER — OFFICE VISIT (OUTPATIENT)
Dept: FAMILY MEDICINE | Facility: OTHER | Age: 50
End: 2025-08-25
Attending: STUDENT IN AN ORGANIZED HEALTH CARE EDUCATION/TRAINING PROGRAM
Payer: COMMERCIAL

## 2025-08-25 VITALS
DIASTOLIC BLOOD PRESSURE: 60 MMHG | TEMPERATURE: 97.7 F | WEIGHT: 235.7 LBS | BODY MASS INDEX: 41.76 KG/M2 | OXYGEN SATURATION: 99 % | SYSTOLIC BLOOD PRESSURE: 108 MMHG | HEART RATE: 89 BPM | HEIGHT: 63 IN

## 2025-08-25 DIAGNOSIS — F11.21 OPIOID DEPENDENCE IN REMISSION (H): Chronic | ICD-10-CM

## 2025-08-25 DIAGNOSIS — Z76.89 ENCOUNTER TO ESTABLISH CARE: Primary | ICD-10-CM

## 2025-08-25 DIAGNOSIS — I10 BENIGN ESSENTIAL HYPERTENSION: Chronic | ICD-10-CM

## 2025-08-25 DIAGNOSIS — M62.830 BACK MUSCLE SPASM: ICD-10-CM

## 2025-08-25 DIAGNOSIS — G89.4 CHRONIC PAIN SYNDROME: ICD-10-CM

## 2025-08-25 DIAGNOSIS — R60.0 BILATERAL EDEMA OF LOWER EXTREMITY: ICD-10-CM

## 2025-08-25 DIAGNOSIS — E11.9 TYPE 2 DIABETES MELLITUS WITHOUT COMPLICATION, WITHOUT LONG-TERM CURRENT USE OF INSULIN (H): ICD-10-CM

## 2025-08-25 DIAGNOSIS — F31.61 MILD MIXED BIPOLAR I DISORDER (H): Chronic | ICD-10-CM

## 2025-08-25 DIAGNOSIS — N39.41 URGE INCONTINENCE OF URINE: ICD-10-CM

## 2025-08-25 DIAGNOSIS — E66.813 CLASS 3 SEVERE OBESITY DUE TO EXCESS CALORIES WITH SERIOUS COMORBIDITY AND BODY MASS INDEX (BMI) OF 40.0 TO 44.9 IN ADULT (H): ICD-10-CM

## 2025-08-25 PROBLEM — F33.1 MODERATE EPISODE OF RECURRENT MAJOR DEPRESSIVE DISORDER (H): Chronic | Status: ACTIVE | Noted: 2017-04-03

## 2025-08-25 PROBLEM — F41.1 GAD (GENERALIZED ANXIETY DISORDER): Chronic | Status: ACTIVE | Noted: 2017-04-03

## 2025-08-25 PROBLEM — E03.9 HYPOTHYROID: Chronic | Status: ACTIVE | Noted: 2017-04-03

## 2025-08-25 PROBLEM — F43.10 PTSD (POST-TRAUMATIC STRESS DISORDER): Chronic | Status: ACTIVE | Noted: 2017-04-03

## 2025-08-25 PROBLEM — K21.9 GERD (GASTROESOPHAGEAL REFLUX DISEASE): Chronic | Status: ACTIVE | Noted: 2019-12-13

## 2025-08-25 PROBLEM — Z86.69 HISTORY OF MIGRAINE HEADACHES: Status: ACTIVE | Noted: 2022-09-26

## 2025-08-25 PROCEDURE — G0463 HOSPITAL OUTPT CLINIC VISIT: HCPCS

## 2025-08-25 RX ORDER — POTASSIUM CHLORIDE 750 MG/1
10 TABLET, EXTENDED RELEASE ORAL DAILY
Qty: 90 TABLET | Refills: 1 | Status: SHIPPED | OUTPATIENT
Start: 2025-08-25

## 2025-08-25 RX ORDER — BACLOFEN 10 MG/1
10 TABLET ORAL
Qty: 90 TABLET | Refills: 0 | OUTPATIENT
Start: 2025-08-25

## 2025-08-25 RX ORDER — HYDROCHLOROTHIAZIDE 25 MG/1
25 TABLET ORAL DAILY
Qty: 90 TABLET | Refills: 3 | Status: SHIPPED | OUTPATIENT
Start: 2025-08-25

## 2025-08-25 RX ORDER — POTASSIUM CHLORIDE 1500 MG/1
20 TABLET, EXTENDED RELEASE ORAL EVERY MORNING
Qty: 90 TABLET | Refills: 1 | Status: SHIPPED | OUTPATIENT
Start: 2025-08-25

## 2025-08-25 RX ORDER — FUROSEMIDE 20 MG/1
20 TABLET ORAL
Qty: 180 TABLET | Refills: 3 | Status: SHIPPED | OUTPATIENT
Start: 2025-08-25

## 2025-08-25 RX ORDER — BACLOFEN 10 MG/1
10 TABLET ORAL
Qty: 90 TABLET | Refills: 4 | Status: SHIPPED | OUTPATIENT
Start: 2025-08-25

## 2025-08-25 ASSESSMENT — PATIENT HEALTH QUESTIONNAIRE - PHQ9
SUM OF ALL RESPONSES TO PHQ QUESTIONS 1-9: 7
SUM OF ALL RESPONSES TO PHQ QUESTIONS 1-9: 7
10. IF YOU CHECKED OFF ANY PROBLEMS, HOW DIFFICULT HAVE THESE PROBLEMS MADE IT FOR YOU TO DO YOUR WORK, TAKE CARE OF THINGS AT HOME, OR GET ALONG WITH OTHER PEOPLE: SOMEWHAT DIFFICULT

## 2025-08-25 ASSESSMENT — PAIN SCALES - PAIN ENJOYMENT GENERAL ACTIVITY SCALE (PEG)
INTERFERED_ENJOYMENT_LIFE: 8
AVG_PAIN_PASTWEEK: 7
AVG_PAIN_PASTWEEK: 7
INTERFERED_ENJOYMENT_LIFE: 8
PEG_TOTALSCORE: 7.67
INTERFERED_GENERAL_ACTIVITY: 8
INTERFERED_GENERAL_ACTIVITY: 8
PEG_TOTALSCORE: 7.67

## 2025-08-25 ASSESSMENT — ANXIETY QUESTIONNAIRES
GAD7 TOTAL SCORE: 13
4. TROUBLE RELAXING: NEARLY EVERY DAY
GAD7 TOTAL SCORE: 13
8. IF YOU CHECKED OFF ANY PROBLEMS, HOW DIFFICULT HAVE THESE MADE IT FOR YOU TO DO YOUR WORK, TAKE CARE OF THINGS AT HOME, OR GET ALONG WITH OTHER PEOPLE?: VERY DIFFICULT
GAD7 TOTAL SCORE: 13
7. FEELING AFRAID AS IF SOMETHING AWFUL MIGHT HAPPEN: SEVERAL DAYS
3. WORRYING TOO MUCH ABOUT DIFFERENT THINGS: MORE THAN HALF THE DAYS
2. NOT BEING ABLE TO STOP OR CONTROL WORRYING: MORE THAN HALF THE DAYS
IF YOU CHECKED OFF ANY PROBLEMS ON THIS QUESTIONNAIRE, HOW DIFFICULT HAVE THESE PROBLEMS MADE IT FOR YOU TO DO YOUR WORK, TAKE CARE OF THINGS AT HOME, OR GET ALONG WITH OTHER PEOPLE: VERY DIFFICULT
5. BEING SO RESTLESS THAT IT IS HARD TO SIT STILL: NEARLY EVERY DAY
7. FEELING AFRAID AS IF SOMETHING AWFUL MIGHT HAPPEN: SEVERAL DAYS
6. BECOMING EASILY ANNOYED OR IRRITABLE: NOT AT ALL
1. FEELING NERVOUS, ANXIOUS, OR ON EDGE: MORE THAN HALF THE DAYS

## 2025-08-25 ASSESSMENT — PAIN SCALES - GENERAL: PAINLEVEL_OUTOF10: SEVERE PAIN (7)

## (undated) DEVICE — CANISTER-SUCTION 2000CC

## (undated) DEVICE — CONNECTOR-ERBEFLO 2 PORT

## (undated) DEVICE — TUBING-SUCTION 20FT

## (undated) DEVICE — IRRIGATION-H2O 1000ML

## (undated) RX ORDER — LIDOCAINE HYDROCHLORIDE 20 MG/ML
INJECTION, SOLUTION EPIDURAL; INFILTRATION; INTRACAUDAL; PERINEURAL
Status: DISPENSED
Start: 2022-09-29

## (undated) RX ORDER — PROPOFOL 10 MG/ML
INJECTION, EMULSION INTRAVENOUS
Status: DISPENSED
Start: 2022-09-29